# Patient Record
Sex: FEMALE | Race: WHITE | Employment: OTHER | URBAN - METROPOLITAN AREA
[De-identification: names, ages, dates, MRNs, and addresses within clinical notes are randomized per-mention and may not be internally consistent; named-entity substitution may affect disease eponyms.]

---

## 2018-02-23 PROBLEM — L02.91 ABSCESS: Status: ACTIVE | Noted: 2018-02-23

## 2018-11-07 ENCOUNTER — HOSPITAL ENCOUNTER (OUTPATIENT)
Dept: WOUND CARE | Age: 53
Discharge: HOME | End: 2018-11-07
Payer: MEDICAID

## 2018-12-05 ENCOUNTER — HOSPITAL ENCOUNTER (OUTPATIENT)
Dept: WOUND CARE | Age: 53
Discharge: HOME | End: 2018-12-05
Payer: MEDICAID

## 2018-12-12 ENCOUNTER — HOSPITAL ENCOUNTER (OUTPATIENT)
Dept: WOUND CARE | Age: 53
Discharge: HOME | End: 2018-12-12
Payer: MEDICAID

## 2019-01-25 ENCOUNTER — TELEPHONE (OUTPATIENT)
Dept: ADMINISTRATIVE | Age: 54
End: 2019-01-25

## 2019-03-08 ENCOUNTER — APPOINTMENT (OUTPATIENT)
Dept: GENERAL RADIOLOGY | Age: 54
End: 2019-03-08
Payer: OTHER GOVERNMENT

## 2019-03-08 ENCOUNTER — HOSPITAL ENCOUNTER (EMERGENCY)
Age: 54
Discharge: HOME | End: 2019-03-08
Payer: OTHER GOVERNMENT

## 2019-03-08 VITALS
WEIGHT: 175 LBS | BODY MASS INDEX: 25.92 KG/M2 | DIASTOLIC BLOOD PRESSURE: 84 MMHG | TEMPERATURE: 98.2 F | HEIGHT: 69 IN | RESPIRATION RATE: 16 BRPM | OXYGEN SATURATION: 95 % | HEART RATE: 86 BPM | SYSTOLIC BLOOD PRESSURE: 142 MMHG

## 2019-03-08 DIAGNOSIS — L03.113 CELLULITIS OF RIGHT ELBOW: Primary | ICD-10-CM

## 2019-03-08 PROCEDURE — 99283 EMERGENCY DEPT VISIT LOW MDM: CPT

## 2019-03-08 PROCEDURE — 73080 X-RAY EXAM OF ELBOW: CPT

## 2019-03-08 PROCEDURE — 6370000000 HC RX 637 (ALT 250 FOR IP): Performed by: NURSE PRACTITIONER

## 2019-03-08 RX ORDER — DOXYCYCLINE HYCLATE 100 MG/1
100 CAPSULE ORAL ONCE
Status: COMPLETED | OUTPATIENT
Start: 2019-03-08 | End: 2019-03-08

## 2019-03-08 RX ORDER — DOXYCYCLINE HYCLATE 100 MG
100 TABLET ORAL 2 TIMES DAILY WITH MEALS
Qty: 20 TABLET | Refills: 0 | Status: SHIPPED | OUTPATIENT
Start: 2019-03-08 | End: 2019-03-18

## 2019-03-08 RX ADMIN — DOXYCYCLINE HYCLATE 100 MG: 100 CAPSULE ORAL at 22:15

## 2019-04-05 ENCOUNTER — OFFICE VISIT (OUTPATIENT)
Dept: FAMILY MEDICINE CLINIC | Age: 54
End: 2019-04-05
Payer: MEDICAID

## 2019-04-05 ENCOUNTER — HOSPITAL ENCOUNTER (OUTPATIENT)
Age: 54
Discharge: HOME | End: 2019-04-07
Payer: OTHER GOVERNMENT

## 2019-04-05 VITALS
DIASTOLIC BLOOD PRESSURE: 73 MMHG | RESPIRATION RATE: 16 BRPM | SYSTOLIC BLOOD PRESSURE: 120 MMHG | HEART RATE: 81 BPM | TEMPERATURE: 96.8 F | BODY MASS INDEX: 26.51 KG/M2 | HEIGHT: 69 IN | WEIGHT: 179 LBS | OXYGEN SATURATION: 98 %

## 2019-04-05 DIAGNOSIS — E55.9 VITAMIN D DEFICIENCY: ICD-10-CM

## 2019-04-05 DIAGNOSIS — J44.9 CHRONIC OBSTRUCTIVE PULMONARY DISEASE, UNSPECIFIED COPD TYPE (HCC): ICD-10-CM

## 2019-04-05 DIAGNOSIS — S91.301A OPEN WOUND OF RIGHT HEEL, INITIAL ENCOUNTER: ICD-10-CM

## 2019-04-05 DIAGNOSIS — E78.5 HYPERLIPIDEMIA, UNSPECIFIED HYPERLIPIDEMIA TYPE: ICD-10-CM

## 2019-04-05 DIAGNOSIS — R25.1 TREMORS OF NERVOUS SYSTEM: Primary | ICD-10-CM

## 2019-04-05 LAB
AMPHETAMINE SCREEN, URINE: NOT DETECTED
BARBITURATE SCREEN URINE: NOT DETECTED
BENZODIAZEPINE SCREEN, URINE: NOT DETECTED
CANNABINOID SCREEN URINE: NOT DETECTED
COCAINE METABOLITE SCREEN URINE: NOT DETECTED
METHADONE SCREEN, URINE: POSITIVE
OPIATE SCREEN URINE: NOT DETECTED
PHENCYCLIDINE SCREEN URINE: NOT DETECTED
PROPOXYPHENE SCREEN: NOT DETECTED

## 2019-04-05 PROCEDURE — 87070 CULTURE OTHR SPECIMN AEROBIC: CPT

## 2019-04-05 PROCEDURE — G8427 DOCREV CUR MEDS BY ELIG CLIN: HCPCS | Performed by: FAMILY MEDICINE

## 2019-04-05 PROCEDURE — 87147 CULTURE TYPE IMMUNOLOGIC: CPT

## 2019-04-05 PROCEDURE — 80307 DRUG TEST PRSMV CHEM ANLYZR: CPT

## 2019-04-05 PROCEDURE — G8419 CALC BMI OUT NRM PARAM NOF/U: HCPCS | Performed by: FAMILY MEDICINE

## 2019-04-05 PROCEDURE — G8926 SPIRO NO PERF OR DOC: HCPCS | Performed by: FAMILY MEDICINE

## 2019-04-05 PROCEDURE — 3023F SPIROM DOC REV: CPT | Performed by: FAMILY MEDICINE

## 2019-04-05 PROCEDURE — 3017F COLORECTAL CA SCREEN DOC REV: CPT | Performed by: FAMILY MEDICINE

## 2019-04-05 PROCEDURE — G0480 DRUG TEST DEF 1-7 CLASSES: HCPCS

## 2019-04-05 PROCEDURE — 99204 OFFICE O/P NEW MOD 45 MIN: CPT | Performed by: FAMILY MEDICINE

## 2019-04-05 PROCEDURE — 4004F PT TOBACCO SCREEN RCVD TLK: CPT | Performed by: FAMILY MEDICINE

## 2019-04-05 RX ORDER — SULFAMETHOXAZOLE AND TRIMETHOPRIM 800; 160 MG/1; MG/1
1 TABLET ORAL 2 TIMES DAILY
Qty: 20 TABLET | Refills: 0 | Status: SHIPPED | OUTPATIENT
Start: 2019-04-05 | End: 2019-04-15

## 2019-04-05 RX ORDER — GABAPENTIN 600 MG/1
600 TABLET ORAL 2 TIMES DAILY
COMMUNITY
End: 2019-05-02

## 2019-04-05 RX ORDER — FLUTICASONE FUROATE AND VILANTEROL 100; 25 UG/1; UG/1
POWDER RESPIRATORY (INHALATION) DAILY
COMMUNITY
End: 2019-04-05

## 2019-04-05 RX ORDER — GABAPENTIN 600 MG/1
600 TABLET ORAL 2 TIMES DAILY
Qty: 60 TABLET | Refills: 0 | Status: CANCELLED | OUTPATIENT
Start: 2019-04-05 | End: 2019-05-05

## 2019-04-05 RX ORDER — BUDESONIDE AND FORMOTEROL FUMARATE DIHYDRATE 160; 4.5 UG/1; UG/1
2 AEROSOL RESPIRATORY (INHALATION) 2 TIMES DAILY
Qty: 3 INHALER | Refills: 5 | Status: ON HOLD | OUTPATIENT
Start: 2019-04-05 | End: 2019-07-08

## 2019-04-05 ASSESSMENT — PATIENT HEALTH QUESTIONNAIRE - PHQ9
2. FEELING DOWN, DEPRESSED OR HOPELESS: 1
SUM OF ALL RESPONSES TO PHQ9 QUESTIONS 1 & 2: 2
SUM OF ALL RESPONSES TO PHQ QUESTIONS 1-9: 2
SUM OF ALL RESPONSES TO PHQ QUESTIONS 1-9: 2
1. LITTLE INTEREST OR PLEASURE IN DOING THINGS: 1

## 2019-04-05 NOTE — PROGRESS NOTES
Every Day Smoker     Packs/day: 1.00     Types: Cigarettes    Smokeless tobacco: Never Used   Substance and Sexual Activity    Alcohol use: No    Drug use: No     Comment: clean since 2015    Sexual activity: None   Lifestyle    Physical activity:     Days per week: None     Minutes per session: None    Stress: None   Relationships    Social connections:     Talks on phone: None     Gets together: None     Attends Scientology service: None     Active member of club or organization: None     Attends meetings of clubs or organizations: None     Relationship status: None    Intimate partner violence:     Fear of current or ex partner: None     Emotionally abused: None     Physically abused: None     Forced sexual activity: None   Other Topics Concern    None   Social History Narrative    None       History reviewed. No pertinent family history. Current Outpatient Medications   Medication Sig Dispense Refill    gabapentin (NEURONTIN) 600 MG tablet Take 600 mg by mouth 2 times daily.  budesonide-formoterol (SYMBICORT) 160-4.5 MCG/ACT AERO Inhale 2 puffs into the lungs 2 times daily Rinse mouth after use. 3 Inhaler 5    sulfamethoxazole-trimethoprim (BACTRIM DS) 800-160 MG per tablet Take 1 tablet by mouth 2 times daily for 10 days 20 tablet 0    METHADONE HCL PO Take 75 mg by mouth daily       albuterol (PROVENTIL) (2.5 MG/3ML) 0.083% nebulizer solution Take 3 mLs by nebulization every 6 hours as needed for Wheezing or Shortness of Breath 120 each 3     No current facility-administered medications for this visit. Allergies   Allergen Reactions    Ancef [Cefazolin] Anaphylaxis    Duricef [Cefadroxil] Anaphylaxis    Iodine      Iv contrast       REVIEW OF SYSTEMS  Review of Systems   Constitutional: Positive for fatigue. Negative for fever. HENT: Negative for ear pain, sinus pressure, sneezing and sore throat. Eyes: Negative for pain.    Respiratory: Negative for cough and shortness of breath. Cardiovascular: Negative for chest pain and leg swelling. Gastrointestinal: Negative for abdominal pain, constipation and diarrhea. Genitourinary: Negative for dysuria and urgency. Musculoskeletal: Negative for back pain and myalgias. Skin: Negative for rash. Allergic/Immunologic: Negative for food allergies. Neurological: Positive for tremors, weakness and light-headedness. Negative for headaches. Hematological: Does not bruise/bleed easily. Psychiatric/Behavioral: Negative for behavioral problems and sleep disturbance. PHYSICAL EXAM  /73 (Site: Left Upper Arm)   Pulse 81   Temp 96.8 °F (36 °C) (Temporal)   Resp 16   Ht 5' 9\" (1.753 m)   Wt 179 lb (81.2 kg)   LMP 08/28/2013   SpO2 98%   BMI 26.43 kg/m²   Physical Exam   Constitutional: She is oriented to person, place, and time. She appears well-developed and well-nourished. HENT:   Head: Normocephalic and atraumatic. Right Ear: External ear normal.   Left Ear: External ear normal.   Nose: Nose normal.   Mouth/Throat: Oropharynx is clear and moist.   Eyes: Conjunctivae and EOM are normal.   Neck: Normal range of motion. Neck supple. Cardiovascular: Normal rate, regular rhythm and normal heart sounds. Exam reveals no gallop and no friction rub. No murmur heard. Pulmonary/Chest: Effort normal and breath sounds normal. She has no wheezes. Abdominal: Soft. There is no tenderness. Musculoskeletal: Normal range of motion. She exhibits no edema or tenderness. Neurological: She is alert and oriented to person, place, and time. She has normal reflexes. Resting tremor right side only, no asterixis, rapid alternating movements abnormal bilaterally. Skin: Skin is warm and dry. No rash noted. Large wound on the right heel that is deep into the soft tissues.  No erythema noted or drainage   Psychiatric: Her behavior is normal.   Goes on tangents, does not answer questions appropriately and timely   Nursing note and/or guardian verbalizes understanding, agrees, feels comfortable with and wishes to proceed withabove treatment plan.     Advised patient to call with any new medication issues, and read all Rx infofrom pharmacy to assure aware of all possible risks and side effects of medication before taking.     Reviewed age and gender appropriate health screening exams and vaccinations. Advised patient regarding importance of keeping up with recommended health maintenance and to schedule as soon as possible if overdue, as this isimportant in assessing for undiagnosed pathology, especially cancer, as well as protecting against potentially harmful/life threatening disease.          Patient and/or guardian verbalizes understanding andagrees with above counseling, assessment and plan.     All questions answered. Terry Baca DO  4/9/2019        NOTE: This report was transcribed usingvoice recognition software.  Every effort was made to ensure accuracy; however, inadvertent computerized transcription errors may be present

## 2019-04-09 LAB — WOUND/ABSCESS: NORMAL

## 2019-04-09 ASSESSMENT — ENCOUNTER SYMPTOMS
ABDOMINAL PAIN: 0
SHORTNESS OF BREATH: 0
DIARRHEA: 0
CONSTIPATION: 0
COUGH: 0
BACK PAIN: 0
SORE THROAT: 0
SINUS PRESSURE: 0
EYE PAIN: 0

## 2019-04-10 ENCOUNTER — TELEPHONE (OUTPATIENT)
Dept: FAMILY MEDICINE CLINIC | Age: 54
End: 2019-04-10

## 2019-04-10 NOTE — TELEPHONE ENCOUNTER
I tried to phone pt to get her in for a breathing treatment today, no answer, LM asking to phone us back.

## 2019-04-11 LAB
EDDP, URINE: >5000 NG/ML
METHADONE, URINE: 3263 NG/ML

## 2019-04-12 LAB
6AM URINE: <10 NG/ML
CODEINE, URINE: <20 NG/ML
HYDROCODONE, URINE: <20 NG/ML
HYDROMORPHONE, URINE: <20 NG/ML
MORPHINE URINE: <20 NG/ML
NORHYDROCODONE, URINE: <20 NG/ML
NOROXYCODONE, URINE: <20 NG/ML
NOROXYMORPHONE, URINE: <20 NG/ML
OXYCODONE, URINE CONFIRMATION: <20 NG/ML
OXYMORPHONE, URINE: <20 NG/ML

## 2019-04-17 ENCOUNTER — HOSPITAL ENCOUNTER (OUTPATIENT)
Dept: WOUND CARE | Age: 54
Discharge: HOME | End: 2019-04-17
Payer: MEDICAID

## 2019-04-24 ENCOUNTER — HOSPITAL ENCOUNTER (OUTPATIENT)
Dept: WOUND CARE | Age: 54
Discharge: HOME | End: 2019-04-24
Payer: MEDICAID

## 2019-04-30 ENCOUNTER — TELEPHONE (OUTPATIENT)
Dept: FAMILY MEDICINE CLINIC | Age: 54
End: 2019-04-30

## 2019-04-30 NOTE — TELEPHONE ENCOUNTER
Pt calling and states she is still waiting for her symbicort and gabapentin. She states the pharmacy needs a prior Annemarie Mohr.   Call back # [phone]

## 2019-04-30 NOTE — LETTER
[place] [address]  [facility] [address]  Phone: [phone]  Fax: [address] [facility] [address], DO        May 21, 2019     Patient: Jahaira Graves   YOB: 1965   Date of Visit: 4/30/2019       To Whom It May Concern: It is my medical opinion that Jahaira Graves needs improvement in her wheelchair ramp. If you have any questions or concerns, please don't hesitate to call.     Sincerely,        Vipin Rodriguez DO

## 2019-05-02 RX ORDER — GABAPENTIN 300 MG/1
300 CAPSULE ORAL 2 TIMES DAILY
Qty: 60 CAPSULE | Refills: 0 | Status: SHIPPED | OUTPATIENT
Start: 2019-05-02 | End: 2019-06-25 | Stop reason: SDUPTHER

## 2019-05-08 ENCOUNTER — HOSPITAL ENCOUNTER (OUTPATIENT)
Dept: WOUND CARE | Age: 54
Discharge: HOME | End: 2019-05-08
Payer: OTHER GOVERNMENT

## 2019-05-08 ENCOUNTER — TELEPHONE (OUTPATIENT)
Dept: FAMILY MEDICINE CLINIC | Age: 54
End: 2019-05-08

## 2019-05-08 DIAGNOSIS — J44.9 CHRONIC OBSTRUCTIVE PULMONARY DISEASE, UNSPECIFIED COPD TYPE (HCC): ICD-10-CM

## 2019-05-08 DIAGNOSIS — S91.301A OPEN WOUND OF RIGHT HEEL, INITIAL ENCOUNTER: Primary | ICD-10-CM

## 2019-05-21 NOTE — TELEPHONE ENCOUNTER
Pt phoned back today, this wheelchair ramp has been on her house it just has become worn down, this pt is scheduled for hip and foot surgeries, she will be going to [address] tomorrow, please fax letter for improvement to wheelchair ramp for pt, send to [phone], [employer].     PS Pt also wants to know because Symbicort was denied could should try Jan Kee, uses [employer]

## 2019-06-03 ENCOUNTER — TELEPHONE (OUTPATIENT)
Dept: FAMILY MEDICINE CLINIC | Age: 54
End: 2019-06-03

## 2019-06-03 NOTE — TELEPHONE ENCOUNTER
Pt getting a pic line ordered but podiatry put in but wants to know if you will follow with this. To have infection cleared out.

## 2019-06-04 NOTE — TELEPHONE ENCOUNTER
She did not get any of the orders completed - is she still going to follow with us?  If so then she needs to complete workup that was ordered as well

## 2019-06-24 ENCOUNTER — TELEPHONE (OUTPATIENT)
Dept: FAMILY MEDICINE CLINIC | Age: 54
End: 2019-06-24

## 2019-06-25 RX ORDER — GABAPENTIN 300 MG/1
300 CAPSULE ORAL 2 TIMES DAILY
Qty: 60 CAPSULE | Refills: 2 | Status: SHIPPED | OUTPATIENT
Start: 2019-06-25 | End: 2019-08-21

## 2019-06-26 NOTE — TELEPHONE ENCOUNTER
Pt phoned to check status she needs Gabapentin and she needs antibiotic for R heal also she would like a call to what kind of bacteria was found on her foot swab please

## 2019-06-26 NOTE — TELEPHONE ENCOUNTER
Pt was phoned again after I spoke with Dr Jordi Kearney,    Pt told antibiotic would not be phoned in because Dr Jordi Kearney has not seen pt recently and at last appt pt refused to schedule a one month follow up,    Pt told she needs to do Wound care that has been requested several times but pt does not go, pt also told she needs to follow podiatrist as well, Dr Jordi Kearney was told she was following podiatry back in early June,     Pt states she will go to ER to get checked    Pt gave understanding to all information given    BHAVANI

## 2019-06-27 NOTE — TELEPHONE ENCOUNTER
This will be the last month of gabapentin and will be dsicharged unless patient becomes compliant with medical therapy.

## 2019-07-04 ENCOUNTER — APPOINTMENT (OUTPATIENT)
Dept: GENERAL RADIOLOGY | Age: 54
DRG: 593 | End: 2019-07-04
Payer: OTHER GOVERNMENT

## 2019-07-04 ENCOUNTER — HOSPITAL ENCOUNTER (INPATIENT)
Age: 54
LOS: 5 days | Discharge: HOME HEALTH | DRG: 593 | End: 2019-07-09
Attending: EMERGENCY MEDICINE | Admitting: FAMILY MEDICINE
Payer: OTHER GOVERNMENT

## 2019-07-04 DIAGNOSIS — S91.301A OPEN WOUND OF RIGHT HEEL, INITIAL ENCOUNTER: Primary | ICD-10-CM

## 2019-07-04 DIAGNOSIS — J44.9 CHRONIC OBSTRUCTIVE PULMONARY DISEASE, UNSPECIFIED COPD TYPE (HCC): ICD-10-CM

## 2019-07-04 PROBLEM — L97.409 NONHEALING ULCER OF HEEL (HCC): Status: ACTIVE | Noted: 2019-07-04

## 2019-07-04 LAB
ALBUMIN SERPL-MCNC: 3.8 G/DL (ref 3.5–5.2)
ALP BLD-CCNC: 76 U/L (ref 35–104)
ALT SERPL-CCNC: <5 U/L (ref 0–32)
ANION GAP SERPL CALCULATED.3IONS-SCNC: 14 MMOL/L (ref 7–16)
AST SERPL-CCNC: 12 U/L (ref 0–31)
BASOPHILS ABSOLUTE: 0.05 E9/L (ref 0–0.2)
BASOPHILS RELATIVE PERCENT: 0.8 % (ref 0–2)
BILIRUB SERPL-MCNC: 0.2 MG/DL (ref 0–1.2)
BUN BLDV-MCNC: 10 MG/DL (ref 6–20)
C-REACTIVE PROTEIN: 3.4 MG/DL (ref 0–0.4)
CALCIUM SERPL-MCNC: 9.3 MG/DL (ref 8.6–10.2)
CHLORIDE BLD-SCNC: 99 MMOL/L (ref 98–107)
CO2: 29 MMOL/L (ref 22–29)
CREAT SERPL-MCNC: 0.9 MG/DL (ref 0.5–1)
EOSINOPHILS ABSOLUTE: 0.05 E9/L (ref 0.05–0.5)
EOSINOPHILS RELATIVE PERCENT: 0.8 % (ref 0–6)
GFR AFRICAN AMERICAN: >60
GFR NON-AFRICAN AMERICAN: >60 ML/MIN/1.73
GLUCOSE BLD-MCNC: 88 MG/DL (ref 74–99)
HCT VFR BLD CALC: 42.7 % (ref 34–48)
HEMOGLOBIN: 13.1 G/DL (ref 11.5–15.5)
IMMATURE GRANULOCYTES #: 0.03 E9/L
IMMATURE GRANULOCYTES %: 0.5 % (ref 0–5)
LACTIC ACID: 1.8 MMOL/L (ref 0.5–2.2)
LYMPHOCYTES ABSOLUTE: 1.52 E9/L (ref 1.5–4)
LYMPHOCYTES RELATIVE PERCENT: 24.8 % (ref 20–42)
MCH RBC QN AUTO: 29.4 PG (ref 26–35)
MCHC RBC AUTO-ENTMCNC: 30.7 % (ref 32–34.5)
MCV RBC AUTO: 96 FL (ref 80–99.9)
MONOCYTES ABSOLUTE: 0.46 E9/L (ref 0.1–0.95)
MONOCYTES RELATIVE PERCENT: 7.5 % (ref 2–12)
NEUTROPHILS ABSOLUTE: 4.03 E9/L (ref 1.8–7.3)
NEUTROPHILS RELATIVE PERCENT: 65.6 % (ref 43–80)
PDW BLD-RTO: 14.1 FL (ref 11.5–15)
PLATELET # BLD: 305 E9/L (ref 130–450)
PMV BLD AUTO: 10.2 FL (ref 7–12)
POTASSIUM SERPL-SCNC: 4.7 MMOL/L (ref 3.5–5)
RBC # BLD: 4.45 E12/L (ref 3.5–5.5)
SEDIMENTATION RATE, ERYTHROCYTE: 41 MM/HR (ref 0–20)
SODIUM BLD-SCNC: 142 MMOL/L (ref 132–146)
TOTAL PROTEIN: 8.1 G/DL (ref 6.4–8.3)
WBC # BLD: 6.1 E9/L (ref 4.5–11.5)

## 2019-07-04 PROCEDURE — 96368 THER/DIAG CONCURRENT INF: CPT

## 2019-07-04 PROCEDURE — 85025 COMPLETE CBC W/AUTO DIFF WBC: CPT

## 2019-07-04 PROCEDURE — 2060000000 HC ICU INTERMEDIATE R&B

## 2019-07-04 PROCEDURE — 36415 COLL VENOUS BLD VENIPUNCTURE: CPT

## 2019-07-04 PROCEDURE — 87075 CULTR BACTERIA EXCEPT BLOOD: CPT

## 2019-07-04 PROCEDURE — 6360000002 HC RX W HCPCS: Performed by: EMERGENCY MEDICINE

## 2019-07-04 PROCEDURE — 80053 COMPREHEN METABOLIC PANEL: CPT

## 2019-07-04 PROCEDURE — 87147 CULTURE TYPE IMMUNOLOGIC: CPT

## 2019-07-04 PROCEDURE — 73650 X-RAY EXAM OF HEEL: CPT

## 2019-07-04 PROCEDURE — 99285 EMERGENCY DEPT VISIT HI MDM: CPT

## 2019-07-04 PROCEDURE — 86140 C-REACTIVE PROTEIN: CPT

## 2019-07-04 PROCEDURE — 96365 THER/PROPH/DIAG IV INF INIT: CPT

## 2019-07-04 PROCEDURE — 2580000003 HC RX 258: Performed by: EMERGENCY MEDICINE

## 2019-07-04 PROCEDURE — 85651 RBC SED RATE NONAUTOMATED: CPT

## 2019-07-04 PROCEDURE — 87040 BLOOD CULTURE FOR BACTERIA: CPT

## 2019-07-04 PROCEDURE — 83605 ASSAY OF LACTIC ACID: CPT

## 2019-07-04 PROCEDURE — 2500000003 HC RX 250 WO HCPCS: Performed by: EMERGENCY MEDICINE

## 2019-07-04 PROCEDURE — 87070 CULTURE OTHR SPECIMN AEROBIC: CPT

## 2019-07-04 RX ORDER — CLINDAMYCIN PHOSPHATE 600 MG/50ML
600 INJECTION INTRAVENOUS ONCE
Status: COMPLETED | OUTPATIENT
Start: 2019-07-04 | End: 2019-07-04

## 2019-07-04 RX ADMIN — CLINDAMYCIN PHOSPHATE 600 MG: 600 INJECTION, SOLUTION INTRAVENOUS at 23:23

## 2019-07-04 RX ADMIN — VANCOMYCIN HYDROCHLORIDE 1750 MG: 10 INJECTION, POWDER, LYOPHILIZED, FOR SOLUTION INTRAVENOUS at 23:58

## 2019-07-04 ASSESSMENT — ENCOUNTER SYMPTOMS
SINUS PRESSURE: 0
COLOR CHANGE: 0
COUGH: 0
SHORTNESS OF BREATH: 0
VOMITING: 0
WHEEZING: 0
ABDOMINAL PAIN: 0
NAUSEA: 0
SORE THROAT: 0
RHINORRHEA: 0
DIARRHEA: 0

## 2019-07-04 ASSESSMENT — PAIN DESCRIPTION - ORIENTATION: ORIENTATION: RIGHT

## 2019-07-04 ASSESSMENT — PAIN DESCRIPTION - LOCATION: LOCATION: FOOT

## 2019-07-04 ASSESSMENT — PAIN SCALES - GENERAL: PAINLEVEL_OUTOF10: 8

## 2019-07-05 ENCOUNTER — APPOINTMENT (OUTPATIENT)
Dept: GENERAL RADIOLOGY | Age: 54
DRG: 593 | End: 2019-07-05
Payer: OTHER GOVERNMENT

## 2019-07-05 ENCOUNTER — APPOINTMENT (OUTPATIENT)
Dept: MRI IMAGING | Age: 54
DRG: 593 | End: 2019-07-05
Payer: OTHER GOVERNMENT

## 2019-07-05 PROBLEM — R26.2 AMBULATORY DYSFUNCTION: Status: ACTIVE | Noted: 2019-07-05

## 2019-07-05 PROBLEM — I95.9 HYPOTENSION: Status: ACTIVE | Noted: 2019-07-05

## 2019-07-05 PROBLEM — F32.1 CURRENT MODERATE EPISODE OF MAJOR DEPRESSIVE DISORDER (HCC): Status: ACTIVE | Noted: 2019-07-05

## 2019-07-05 PROBLEM — R00.1 BRADYCARDIA: Status: ACTIVE | Noted: 2019-07-05

## 2019-07-05 PROBLEM — F19.10 SUBSTANCE ABUSE (HCC): Status: ACTIVE | Noted: 2019-07-05

## 2019-07-05 PROBLEM — Z87.81 HISTORY OF FRACTURE OF LEFT HIP: Status: ACTIVE | Noted: 2019-07-05

## 2019-07-05 PROBLEM — R63.4 ABNORMAL WEIGHT LOSS: Status: ACTIVE | Noted: 2019-07-05

## 2019-07-05 PROBLEM — J44.9 COPD (CHRONIC OBSTRUCTIVE PULMONARY DISEASE) (HCC): Status: ACTIVE | Noted: 2019-07-05

## 2019-07-05 LAB
AMPHETAMINE SCREEN, URINE: NOT DETECTED
ANION GAP SERPL CALCULATED.3IONS-SCNC: 9 MMOL/L (ref 7–16)
BARBITURATE SCREEN URINE: NOT DETECTED
BASOPHILS ABSOLUTE: 0.04 E9/L (ref 0–0.2)
BASOPHILS RELATIVE PERCENT: 0.8 % (ref 0–2)
BENZODIAZEPINE SCREEN, URINE: POSITIVE
BUN BLDV-MCNC: 11 MG/DL (ref 6–20)
CALCIUM SERPL-MCNC: 8.6 MG/DL (ref 8.6–10.2)
CANNABINOID SCREEN URINE: NOT DETECTED
CHLORIDE BLD-SCNC: 101 MMOL/L (ref 98–107)
CO2: 32 MMOL/L (ref 22–29)
COCAINE METABOLITE SCREEN URINE: POSITIVE
CREAT SERPL-MCNC: 0.9 MG/DL (ref 0.5–1)
EKG ATRIAL RATE: 64 BPM
EKG P AXIS: 71 DEGREES
EKG P-R INTERVAL: 150 MS
EKG Q-T INTERVAL: 430 MS
EKG QRS DURATION: 92 MS
EKG QTC CALCULATION (BAZETT): 443 MS
EKG R AXIS: 28 DEGREES
EKG T AXIS: 13 DEGREES
EKG VENTRICULAR RATE: 64 BPM
EOSINOPHILS ABSOLUTE: 0.09 E9/L (ref 0.05–0.5)
EOSINOPHILS RELATIVE PERCENT: 1.9 % (ref 0–6)
GFR AFRICAN AMERICAN: >60
GFR NON-AFRICAN AMERICAN: >60 ML/MIN/1.73
GLUCOSE BLD-MCNC: 84 MG/DL (ref 74–99)
HCT VFR BLD CALC: 39.4 % (ref 34–48)
HEMOGLOBIN: 12 G/DL (ref 11.5–15.5)
IMMATURE GRANULOCYTES #: 0.03 E9/L
IMMATURE GRANULOCYTES %: 0.6 % (ref 0–5)
INR BLD: 1
LYMPHOCYTES ABSOLUTE: 1.74 E9/L (ref 1.5–4)
LYMPHOCYTES RELATIVE PERCENT: 36.8 % (ref 20–42)
MCH RBC QN AUTO: 29.7 PG (ref 26–35)
MCHC RBC AUTO-ENTMCNC: 30.5 % (ref 32–34.5)
MCV RBC AUTO: 97.5 FL (ref 80–99.9)
METHADONE SCREEN, URINE: POSITIVE
MONOCYTES ABSOLUTE: 0.49 E9/L (ref 0.1–0.95)
MONOCYTES RELATIVE PERCENT: 10.4 % (ref 2–12)
NEUTROPHILS ABSOLUTE: 2.34 E9/L (ref 1.8–7.3)
NEUTROPHILS RELATIVE PERCENT: 49.5 % (ref 43–80)
OPIATE SCREEN URINE: POSITIVE
PDW BLD-RTO: 14.4 FL (ref 11.5–15)
PHENCYCLIDINE SCREEN URINE: NOT DETECTED
PLATELET # BLD: 272 E9/L (ref 130–450)
PMV BLD AUTO: 9.8 FL (ref 7–12)
POTASSIUM REFLEX MAGNESIUM: 4.7 MMOL/L (ref 3.5–5)
PROCALCITONIN: 0.05 NG/ML (ref 0–0.08)
PROPOXYPHENE SCREEN: NOT DETECTED
PROTHROMBIN TIME: 11.8 SEC (ref 9.3–12.4)
RBC # BLD: 4.04 E12/L (ref 3.5–5.5)
SODIUM BLD-SCNC: 142 MMOL/L (ref 132–146)
T4 FREE: 0.89 NG/DL (ref 0.93–1.7)
TSH SERPL DL<=0.05 MIU/L-ACNC: 5.88 UIU/ML (ref 0.27–4.2)
WBC # BLD: 4.7 E9/L (ref 4.5–11.5)

## 2019-07-05 PROCEDURE — 84443 ASSAY THYROID STIM HORMONE: CPT

## 2019-07-05 PROCEDURE — 97161 PT EVAL LOW COMPLEX 20 MIN: CPT

## 2019-07-05 PROCEDURE — 71045 X-RAY EXAM CHEST 1 VIEW: CPT

## 2019-07-05 PROCEDURE — 85610 PROTHROMBIN TIME: CPT

## 2019-07-05 PROCEDURE — 97166 OT EVAL MOD COMPLEX 45 MIN: CPT

## 2019-07-05 PROCEDURE — 93010 ELECTROCARDIOGRAM REPORT: CPT | Performed by: INTERNAL MEDICINE

## 2019-07-05 PROCEDURE — 36415 COLL VENOUS BLD VENIPUNCTURE: CPT

## 2019-07-05 PROCEDURE — 2500000003 HC RX 250 WO HCPCS: Performed by: FAMILY MEDICINE

## 2019-07-05 PROCEDURE — 84439 ASSAY OF FREE THYROXINE: CPT

## 2019-07-05 PROCEDURE — 93005 ELECTROCARDIOGRAM TRACING: CPT | Performed by: FAMILY MEDICINE

## 2019-07-05 PROCEDURE — G0480 DRUG TEST DEF 1-7 CLASSES: HCPCS

## 2019-07-05 PROCEDURE — 97530 THERAPEUTIC ACTIVITIES: CPT

## 2019-07-05 PROCEDURE — 6360000002 HC RX W HCPCS: Performed by: INTERNAL MEDICINE

## 2019-07-05 PROCEDURE — 80048 BASIC METABOLIC PNL TOTAL CA: CPT

## 2019-07-05 PROCEDURE — 97535 SELF CARE MNGMENT TRAINING: CPT

## 2019-07-05 PROCEDURE — 84145 PROCALCITONIN (PCT): CPT

## 2019-07-05 PROCEDURE — 85025 COMPLETE CBC W/AUTO DIFF WBC: CPT

## 2019-07-05 PROCEDURE — A9576 INJ PROHANCE MULTIPACK: HCPCS | Performed by: RADIOLOGY

## 2019-07-05 PROCEDURE — 80307 DRUG TEST PRSMV CHEM ANLYZR: CPT

## 2019-07-05 PROCEDURE — 2580000003 HC RX 258: Performed by: FAMILY MEDICINE

## 2019-07-05 PROCEDURE — 6360000004 HC RX CONTRAST MEDICATION: Performed by: RADIOLOGY

## 2019-07-05 PROCEDURE — 73723 MRI JOINT LWR EXTR W/O&W/DYE: CPT

## 2019-07-05 PROCEDURE — 80074 ACUTE HEPATITIS PANEL: CPT

## 2019-07-05 PROCEDURE — 6370000000 HC RX 637 (ALT 250 FOR IP): Performed by: FAMILY MEDICINE

## 2019-07-05 PROCEDURE — 6360000002 HC RX W HCPCS: Performed by: SPECIALIST

## 2019-07-05 PROCEDURE — 86703 HIV-1/HIV-2 1 RESULT ANTBDY: CPT

## 2019-07-05 PROCEDURE — 97110 THERAPEUTIC EXERCISES: CPT

## 2019-07-05 PROCEDURE — 2580000003 HC RX 258: Performed by: INTERNAL MEDICINE

## 2019-07-05 PROCEDURE — 96366 THER/PROPH/DIAG IV INF ADDON: CPT

## 2019-07-05 PROCEDURE — 2060000000 HC ICU INTERMEDIATE R&B

## 2019-07-05 RX ORDER — METHADONE HYDROCHLORIDE 10 MG/ML
105 CONCENTRATE ORAL DAILY
Status: DISCONTINUED | OUTPATIENT
Start: 2019-07-05 | End: 2019-07-09 | Stop reason: HOSPADM

## 2019-07-05 RX ORDER — SODIUM CHLORIDE 0.9 % (FLUSH) 0.9 %
10 SYRINGE (ML) INJECTION PRN
Status: DISCONTINUED | OUTPATIENT
Start: 2019-07-05 | End: 2019-07-09 | Stop reason: HOSPADM

## 2019-07-05 RX ORDER — ACETAMINOPHEN 325 MG/1
650 TABLET ORAL EVERY 4 HOURS PRN
Status: DISCONTINUED | OUTPATIENT
Start: 2019-07-05 | End: 2019-07-09 | Stop reason: HOSPADM

## 2019-07-05 RX ORDER — ONDANSETRON 2 MG/ML
4 INJECTION INTRAMUSCULAR; INTRAVENOUS EVERY 6 HOURS PRN
Status: DISCONTINUED | OUTPATIENT
Start: 2019-07-05 | End: 2019-07-09 | Stop reason: HOSPADM

## 2019-07-05 RX ORDER — GABAPENTIN 300 MG/1
300 CAPSULE ORAL 2 TIMES DAILY
Status: DISCONTINUED | OUTPATIENT
Start: 2019-07-05 | End: 2019-07-09 | Stop reason: HOSPADM

## 2019-07-05 RX ORDER — DIPHENHYDRAMINE HYDROCHLORIDE 50 MG/ML
50 INJECTION INTRAMUSCULAR; INTRAVENOUS 2 TIMES DAILY PRN
Status: DISCONTINUED | OUTPATIENT
Start: 2019-07-05 | End: 2019-07-09 | Stop reason: HOSPADM

## 2019-07-05 RX ORDER — SODIUM CHLORIDE 0.9 % (FLUSH) 0.9 %
10 SYRINGE (ML) INJECTION EVERY 12 HOURS SCHEDULED
Status: DISCONTINUED | OUTPATIENT
Start: 2019-07-05 | End: 2019-07-09 | Stop reason: HOSPADM

## 2019-07-05 RX ORDER — IPRATROPIUM BROMIDE AND ALBUTEROL SULFATE 2.5; .5 MG/3ML; MG/3ML
1 SOLUTION RESPIRATORY (INHALATION) EVERY 4 HOURS PRN
Status: DISCONTINUED | OUTPATIENT
Start: 2019-07-05 | End: 2019-07-09 | Stop reason: HOSPADM

## 2019-07-05 RX ORDER — CLINDAMYCIN PHOSPHATE 600 MG/50ML
600 INJECTION INTRAVENOUS EVERY 8 HOURS
Status: DISCONTINUED | OUTPATIENT
Start: 2019-07-05 | End: 2019-07-05

## 2019-07-05 RX ORDER — SODIUM CHLORIDE 9 MG/ML
INJECTION, SOLUTION INTRAVENOUS CONTINUOUS
Status: DISCONTINUED | OUTPATIENT
Start: 2019-07-05 | End: 2019-07-07

## 2019-07-05 RX ORDER — HYDROXYZINE PAMOATE 25 MG/1
25 CAPSULE ORAL 3 TIMES DAILY PRN
Status: DISCONTINUED | OUTPATIENT
Start: 2019-07-05 | End: 2019-07-09 | Stop reason: HOSPADM

## 2019-07-05 RX ADMIN — VANCOMYCIN HYDROCHLORIDE 1250 MG: 10 INJECTION, POWDER, LYOPHILIZED, FOR SOLUTION INTRAVENOUS at 17:54

## 2019-07-05 RX ADMIN — SODIUM CHLORIDE: 9 INJECTION, SOLUTION INTRAVENOUS at 01:01

## 2019-07-05 RX ADMIN — Medication 10 ML: at 21:04

## 2019-07-05 RX ADMIN — Medication 10 ML: at 09:42

## 2019-07-05 RX ADMIN — DIPHENHYDRAMINE HYDROCHLORIDE 50 MG: 50 INJECTION, SOLUTION INTRAMUSCULAR; INTRAVENOUS at 17:23

## 2019-07-05 RX ADMIN — CLINDAMYCIN PHOSPHATE 600 MG: 600 INJECTION, SOLUTION INTRAVENOUS at 09:42

## 2019-07-05 RX ADMIN — MOMETASONE FUROATE AND FORMOTEROL FUMARATE DIHYDRATE 2 PUFF: 100; 5 AEROSOL RESPIRATORY (INHALATION) at 22:18

## 2019-07-05 RX ADMIN — METHADONE HYDROCHLORIDE 105 MG: 10 CONCENTRATE ORAL at 09:42

## 2019-07-05 RX ADMIN — SODIUM CHLORIDE: 9 INJECTION, SOLUTION INTRAVENOUS at 21:04

## 2019-07-05 RX ADMIN — MEROPENEM 1 G: 1 INJECTION, POWDER, FOR SOLUTION INTRAVENOUS at 16:48

## 2019-07-05 RX ADMIN — GADOTERIDOL 16 ML: 279.3 INJECTION, SOLUTION INTRAVENOUS at 14:25

## 2019-07-05 RX ADMIN — MOMETASONE FUROATE AND FORMOTEROL FUMARATE DIHYDRATE 2 PUFF: 100; 5 AEROSOL RESPIRATORY (INHALATION) at 09:42

## 2019-07-05 RX ADMIN — GABAPENTIN 300 MG: 300 CAPSULE ORAL at 09:42

## 2019-07-05 RX ADMIN — GABAPENTIN 300 MG: 300 CAPSULE ORAL at 21:03

## 2019-07-05 ASSESSMENT — PAIN SCALES - GENERAL
PAINLEVEL_OUTOF10: 0
PAINLEVEL_OUTOF10: 10
PAINLEVEL_OUTOF10: 0
PAINLEVEL_OUTOF10: 0

## 2019-07-05 ASSESSMENT — PAIN DESCRIPTION - PAIN TYPE: TYPE: CHRONIC PAIN

## 2019-07-05 NOTE — PROGRESS NOTES
Pharmacy Consultation Note  (Antibiotic Dosing and Monitoring)      Pharmacy is following for Vancomycin dosing. Allergies: Ancef [cefazolin]; Duricef [cefadroxil]; and Iodine    49 y.o. female    Ht Readings from Last 1 Encounters:   07/05/19 5' 9\" (1.753 m)     Wt Readings from Last 1 Encounters:   07/05/19 170 lb 6.4 oz (77.3 kg)       Recent Labs     07/04/19  2145   WBC 6.1       Recent Labs     07/04/19  2145   BUN 10   CREATININE 0.9       Estimated Creatinine Clearance: 76 mL/min (based on SCr of 0.9 mg/dL). No intake or output data in the 24 hours ending 07/05/19 0057    Cultures:  available culture and sensitivity results were reviewed in [facility]      Assessment:  Consulted by Dr. Xavier Cabrera to dose/monitor vancomycin  Estimated CrCl = 76 mL/min  Goal trough level = 15-20 mcg/mL    Plan:  Pt received a dose of vanco 1750mg at 23:58 on 7-4-19. Pt will not need another dose for at least 12hrs. Clinical pharmacy will take over dosing when they arrive later in the AM on 7-5-19 before the next dose is due.    Monitor renal function   Clinical pharmacy will follow up and complete full consult note      Yoel Crowe [facility] 7/5/2019 12:57 AM

## 2019-07-05 NOTE — H&P
 Hx of blood clots     dvt rt calf       Past Surgical History:          Procedure Laterality Date     SECTION      x three    DRAIN SKIN ABSCESS SIMPLE  2014         FOOT SURGERY         Medications Prior to Admission:      Prior to Admission medications    Medication Sig Start Date End Date Taking? Authorizing Provider   gabapentin (NEURONTIN) 300 MG capsule Take 1 capsule by mouth 2 times daily for 30 days. Intended supply: 30 days 19  [facility], DO   fluticasone-salmeterol (ADVAIR DISKUS) 250-50 MCG/DOSE AEPB Inhale 1 puff into the lungs every 12 hours 19   [facility] [place], DO   budesonide-formoterol (SYMBICORT) 160-4.5 MCG/ACT AERO Inhale 2 puffs into the lungs 2 times daily Rinse mouth after use. 19   [facility] [place], DO   albuterol (PROVENTIL) (2.5 MG/3ML) 0.083% nebulizer solution Take 3 mLs by nebulization every 6 hours as needed for Wheezing or Shortness of Breath 10/16/15   Vicenta Bah MD   METHADONE HCL PO Take 75 mg by mouth daily     Historical Provider, MD       Allergies: Ancef [cefazolin]; Duricef [cefadroxil]; and Iodine    Social History:      The patient currently lives at home. TOBACCO:   reports that she has quit smoking. Her smoking use included cigarettes. She smoked 1.00 pack per day. She has never used smokeless tobacco.  ETOH:   reports that she does not drink alcohol. Drugs: Heroine, last used 2 days ago. Also on methadone for substance abuse. Family History:     Reviewed in detail Positive as follows: Son has atrial fibrillation    History reviewed. No pertinent family history. REVIEW OF SYSTEMS:   Pertinent positives as noted in the HPI. All other systems reviewed and negative.     PHYSICAL EXAM:    BP (!) 114/52   Pulse 59   Temp 97.8 °F (36.6 °C) (Oral)   Resp 15   Ht 5' 9\" (1.753 m)   Wt 187 lb (84.8 kg)   LMP 2013   SpO2 93%   BMI 27.62 kg/m²     General appearance: Middle-aged female, no apparent

## 2019-07-05 NOTE — CONSULTS
Department of Podiatry  Resident Consult Note      Reason for Consult:  non healing foot ulcer    Requesting Physician:  Leora Landau, MD    CHIEF COMPLAINT:  Nonhealing ulcer of heel    HISTORY OF PRESENT ILLNESS:                The patient is a 49 y.o. female with significant past medical history of Hep C, COPD, smoker, drug abuse who presents with a chronic non-healing wound to the right foot. Patient states that she's had this wound for about 22 years, that it began as a callous. She relates she's been seeing podiatrists for this wound over the years, but due to certain reasons, she hasn't seen one in about six months. She states she's been dressing her own wound, and that there's malodor and drainage. She denies any increase in pain, malodor or drainage, but decided to come to the hospital because the pain in unbearable. She is an opioid abuser who admits to recently relapsing after abstaining for about 3.5 years. Patient denies any N/V/F/C SOB/CP. Patient states she is retired and lives at home alone. Patient states she only want to have antibiotics again. She doesn't wish to have surgery of the foot again.     Past Medical History:        Diagnosis Date    Abscess     states for a couple years she has had problems with     Chronic pain     Hip fracture ([address]     Hx of blood clots     dvt rt calf     Past Surgical History:        Procedure Laterality Date     SECTION      x three    DRAIN SKIN ABSCESS SIMPLE  2014         FOOT SURGERY       Current Medications:    Current Facility-Administered Medications: methadone (DOLOPHINE) 10 MG/ML solution 105 mg, 105 mg, Oral, Daily  gabapentin (NEURONTIN) capsule 300 mg, 300 mg, Oral, BID  mometasone-formoterol (DULERA) 100-5 MCG/ACT inhaler 2 puff, 2 puff, Inhalation, BID  ipratropium-albuterol (DUONEB) nebulizer solution 1 ampule, 1 ampule, Inhalation, Q4H PRN  sodium chloride flush 0.9 % injection 10 mL, 10 mL,
MG/5ML suspension 30 mL  30 mL Oral Daily PRN Serge Miller MD        ondansetron (ZOFRAN) injection 4 mg  4 mg Intravenous Q6H PRN Serge Miller MD        acetaminophen (TYLENOL) tablet 650 mg  650 mg Oral Q4H PRN Serge Miller MD        0.9 % sodium chloride infusion   Intravenous Continuous Serge Miller MD 75 mL/hr at 07/05/19 0101      clindamycin (CLEOCIN) 600 mg in dextrose 5 % 50 mL IVPB  600 mg Intravenous Q8H Serge Miller MD   Stopped at 07/05/19 1017    hydrOXYzine (VISTARIL) capsule 25 mg  25 mg Oral TID PRN Kieran Valadez DO           Allergies: Ancef [cefazolin]; Duricef [cefadroxil]; and Iodine    Social History:      Social History     Socioeconomic History    Marital status:       Spouse name: Not on file    Number of children: Not on file    Years of education: Not on file    Highest education level: Not on file   Occupational History    Not on file   Social Needs    Financial resource strain: Not on file    Food insecurity:     Worry: Not on file     Inability: Not on file    Transportation needs:     Medical: Not on file     Non-medical: Not on file   Tobacco Use    Smoking status: Former Smoker     Packs/day: 1.00     Types: Cigarettes    Smokeless tobacco: Never Used    Tobacco comment: approx 7 months ago 7-4-19   Substance and Sexual Activity    Alcohol use: No    Drug use: No     Comment: clean since 2015    Sexual activity: Not on file   Lifestyle    Physical activity:     Days per week: Not on file     Minutes per session: Not on file    Stress: Not on file   Relationships    Social connections:     Talks on phone: Not on file     Gets together: Not on file     Attends Faith service: Not on file     Active member of club or organization: Not on file     Attends meetings of clubs or organizations: Not on file     Relationship status: Not on file    Intimate partner violence:     Fear of current or ex partner: Not on file
hydroxide (MILK OF MAGNESIA) 400 MG/5ML suspension 30 mL, 30 mL, Oral, Daily PRN  ondansetron (ZOFRAN) injection 4 mg, 4 mg, Intravenous, Q6H PRN  acetaminophen (TYLENOL) tablet 650 mg, 650 mg, Oral, Q4H PRN  0.9 % sodium chloride infusion, , Intravenous, Continuous  clindamycin (CLEOCIN) 600 mg in dextrose 5 % 50 mL IVPB, 600 mg, Intravenous, Q8H  hydrOXYzine (VISTARIL) capsule 25 mg, 25 mg, Oral, TID PRN       VITALS: BP (!) 118/90   Pulse 95   Temp 98.1 °F (36.7 °C) (Temporal)   Resp 19   Ht 5' 9\" (1.753 m)   Wt 170 lb 6.4 oz (77.3 kg)   LMP 08/28/2013   SpO2 93%   BMI 25.16 kg/m²     ALLERGIES: Ancef [cefazolin];  Duricef [cefadroxil]; and Iodine      MENTAL STATUS EXAM:       Mental Status Examination:    Cognition:      [x] Alert  [x] Awake  [x] Oriented  [x] Person  [x] Place [x] Time      [] drowsy  [] tired  [] lethargic  [] distractable  []     Attention/Concentration:   [x] Attentive  [] Distracted        Memory Recent and Remote: [x] Intact   [] Impaired [] Partially Impaired     Language: [] Able to recognize and name objects          [] Unable to recognize and name Objects    Fund of Knowledge:  [] Poor []  Fair  [x] Good    Speech: [] Normal  [x] Soft  [] Slow  [] Fast [] Pressured            [] Loud [] Dysarthria  [] Incoherent       Appearance: [] Well Groomed  [x] Casual Dressed  [] Unkept  [] Disheveled          [] Normal weight[] Thin  [] Overweight  [] Obese           Attitude: [] Positive  [] Hostile  [] Demanding  [] Guarded  [] Defensive         [x] Cooperative  []  Uncooperative      Behavior:  [] Normal Gait  [] Walks with Assistance  [] [address]    [] Walks with Keron Barber  [x] In Hospital Bed  [] Sitting in Chair    Muscle-Skeletal:  [x] Normal Muscle Tone [] Muscle Atrophy       [] Abnormal Muscle Movement     Eye Contact:  [x] Good eye contact  [] Intermittent Eye Contact  [] Poor Eye Contact     Mood: [] Depressed  [] Anxious  [] Irritated  [x] Euthymic   [] Angry []

## 2019-07-05 NOTE — PROGRESS NOTES
provided. Reports L hip and R foot pain with activity (mild); has been chronic  Cognition: A&O: 4/4; Follows 2 step directions. F Attention to task- impulsive and anxious. Max cues for pacing to improve overall safety. Communication: G  Ability to express needs:   Memory: F+   Sequencing: F             Comprehension: F+   Problem solving: P+   Judgement/safety: P+  Impulsive, anxiety decreased judgement, safety and problem solving. Max cues for ww positioning and hand placement       Functional Assessment:   Initial Eval Status  Date: 7/5/19 Treatment Status  Date: Short Term Goals  Treatment frequency: 1-3x/week on ICU; PRN on stepdown unit  -pt will improve. .. Feeding NPO      Indep  Sitting upright in chair for majority of meals; pending cleared diet restriction   Grooming Min A  Standing at sink with intermittent hand support on ww/counter while combing hair, washing face and hands, and brushing teeth/dentures. Safety cues for pacing and ww positioning. Educated on [address] techniques including prepping and completing tasks at seated position to reduce risk of fall and pain. Mod I   while seated/standing with device prn; no LOB, good safety     UB Dressing Setup  seated     Mod I  while seated; including clothing retrieval;    G BUE functional use; G safety/pacing   LB Dressing Min A  For standing balance during pants/clothing mgmt; crossing L foot behind R leg to zion/doff sock (compensatory technique d/t L hip pain)    Mod I   with AE/device PRN; including clothing retrieval   Bathing UB-  Sup  LB-  Min A  simulation    UB-  Mod I  LB-  Sup with AE/DME prn   Toileting Min A  Seated for hygiene; min A for standing balance at ww level for pants mgmt up/down hips. Using standard toilet with grab bar.       Mod I  including clothing mgmt and toileting hygiene using DME/device prn   Bed Mobility  Supine to sit: SBA  Sit to supine: SBA  Rolling: SBA     Functional/  Bathroom  Transfers Sit to stand: Min training [x]  Environmental Modifications [x]  Cognitive re-training []   Compensatory techniques for ADLs [x]  Splinting Needs []   Positioning to improve overall function [x]   Therapeutic Activity [x]                       Therapeutic Exercise  [x]  Visual/Perceptual: []    Delirium prevention/treatment  []   Other:  []    Rehab Potential: Good for established goals    Patient / Family Goal: decrease pain     Patient and/or family were instructed/educated on diagnosis, prognosis/goals and plan of care. Demonstrated good understanding, further information not needed. [] Malnutrition indicators have been identified and nursing has been notified to ensure a dietitian consult is ordered. Evaluation time includes thorough review of current medical information, gathering information on past medical & social history & PLOF, completion of standardized testing, informal observation of tasks, consultation with other medical professions/disciplines, assessment of data & development of POC/goals.      Moderate evaluation + 24 treatment minutes  Time in: 10:55  Time out: [address], OTR/L 6873

## 2019-07-05 NOTE — PROGRESS NOTES
of this evaluation. Pt performed the following therapeutic activities/exercise:   Seated ankle pumps, LAQ, hip flexion, hip abduction/adduction all x20 reps B    Static and dynamic standing balance activities   Comments: Pt was supine upon PT arrival and agreeable to PT evaluation/treatment. Pt completed all mobility with SBA<>min A, mildly unsteady, cued for ww safety and appropriate use, postural correction, safety with transfers. Pt tolerated activity well. Recommend continued skilled PT services at discharge . At end of session pt supine with call light within reach and all needs met. Patient education  Pt educated re: safety recommendations, PT treatment expectations and POC, PT d/c recommendations. Patient response to education:   Pt verbalized understanding Pt demonstrated skill Pt requires further education in this area   Yes partial Yes      Rehab potential is good for reaching above PT goals. Pts/ family goals   To go home       PLAN  PT care will be provided in accordance with the objectives noted above. Whenever appropriate, clear delegation orders will be provided for nursing staff. Exercises and functional mobility practice will be used as well as appropriate assistive devices or modalities to obtain goals. Patient and family education will also be administered as needed. Frequency of treatments will be 2-5x/week x 1-3 days. Patient and or family understand(s) diagnosis, prognosis, and plan of care.       Time in: 1047  Time out: Marie Paris DPT   License number:  [id number]

## 2019-07-05 NOTE — ED NOTES
FIRST PROVIDER CONTACT ASSESSMENT NOTE      Department of Emergency Medicine   7/4/19  8:18 PM    Chief Complaint: Wound Check (right heel, draining, foul smelling, states she needs picc line for atb but has not had it placed yet)      History of Present Illness:    Kassidy Mullins is a 49 y.o. female who presents to the ED by private car for right heel wound. Patient reports it is draining foul smelling colored drainage. Reports that she is needing a PICC line for antibiotics but they have not scheduled it yet. Focused Screening Exam:  Constitutional:  Alert, appears stated age and is in no distress. *ALLERGIES*     Ancef [cefazolin];  Duricef [cefadroxil]; and Iodine     ED Triage Vitals   BP Temp Temp Source Pulse Resp SpO2 Height Weight   07/04/19 1948 07/04/19 1935 07/04/19 1935 07/04/19 1935 07/04/19 1935 07/04/19 1935 07/04/19 1948 07/04/19 1948   108/65 97.1 °F (36.2 °C) Temporal 80 16 93 % 5' 9\" (1.753 m) 187 lb (84.8 kg)        Initial Plan of Care:  Initiate Treatment-Testing, Proceed toTreatment Area When Bed Available for ED Attending/MLP to Continue Care    -----------------END OF FIRST PROVIDER CONTACT ASSESSMENT NOTE--------------  Electronically signed by SALIMA Arciniega CNP   DD: 7/4/19         SALIMA Arciniega CNP  07/04/19 2018

## 2019-07-05 NOTE — ED PROVIDER NOTES
exhibits no discharge. Left eye exhibits no discharge. No scleral icterus. Neck: Normal range of motion. Neck supple. No JVD present. No tracheal deviation present. No thyromegaly present. Cardiovascular: Normal rate, regular rhythm, normal heart sounds and intact distal pulses. Exam reveals no gallop and no friction rub. No murmur heard. Pulmonary/Chest: Effort normal and breath sounds normal. No accessory muscle usage. No respiratory distress. She has no wheezes. She has no rhonchi. She has no rales. She exhibits no tenderness and no crepitus. Abdominal: Soft. Bowel sounds are normal. She exhibits no distension. There is no tenderness. There is no rebound and no guarding. Musculoskeletal: Normal range of motion. She exhibits no edema, tenderness or deformity. Feet:    Neurological: She is alert and oriented to person, place, and time. No cranial nerve deficit. She exhibits normal muscle tone. Coordination normal.   Skin: Skin is warm and dry. Capillary refill takes less than 2 seconds. No rash noted. She is not diaphoretic. No erythema. No pallor. Nursing note and vitals reviewed. RIGHT FOOT    Procedures    MDM    ED Course as of Jul 05 1046 Thu Jul 04, 2019 2330 Discussed with JL Coulter. She will admit patient. Antibiotic selected based on patient's previous wound cultures were several years ago. New wound culture sent from this department. She is otherwise stable due to concerns for noncompliance worsening infection which may result in severe disability limb loss or even septicemia or death, we will admit the patient for further evaluation and treatment. [JL]      ED Course User Index  [JL] Saurav Carnes DO     ED Course as of Jul 05 1046 Thu Jul 04, 2019 2330 Discussed with JL Coulter. She will admit patient. Antibiotic selected based on patient's previous wound cultures were several years ago. New wound culture sent from this department.   She is otherwise stable due to concerns for noncompliance worsening infection which may result in severe disability limb loss or even septicemia or death, we will admit the patient for further evaluation and treatment. [JL]      ED Course User Index  [JL] Rachel Elizalde DO       --------------------------------------------- PAST HISTORY ---------------------------------------------  Past Medical History:  has a past medical history of Abscess, Chronic pain, Hip fracture ([address] and Hx of blood clots. Past Surgical History:  has a past surgical history that includes Foot surgery;  section; and drain skin abscess simple (2014). Social History:  reports that she has quit smoking. Her smoking use included cigarettes. She smoked 1.00 pack per day. She has never used smokeless tobacco. She reports that she does not drink alcohol or use drugs. Family History: family history is not on file. The patients home medications have been reviewed. Allergies: Ancef [cefazolin];  Duricef [cefadroxil]; and Iodine    -------------------------------------------------- RESULTS -------------------------------------------------    LABS:  Results for orders placed or performed during the hospital encounter of 19   Lactic Acid, Plasma   Result Value Ref Range    Lactic Acid 1.8 0.5 - 2.2 mmol/L   CBC Auto Differential   Result Value Ref Range    WBC 6.1 4.5 - 11.5 E9/L    RBC 4.45 3.50 - 5.50 E12/L    Hemoglobin 13.1 11.5 - 15.5 g/dL    Hematocrit 42.7 34.0 - 48.0 %    MCV 96.0 80.0 - 99.9 fL    MCH 29.4 26.0 - 35.0 pg    MCHC 30.7 (L) 32.0 - 34.5 %    RDW 14.1 11.5 - 15.0 fL    Platelets 963 893 - 662 E9/L    MPV 10.2 7.0 - 12.0 fL    Neutrophils % 65.6 43.0 - 80.0 %    Immature Granulocytes % 0.5 0.0 - 5.0 %    Lymphocytes % 24.8 20.0 - 42.0 %    Monocytes % 7.5 2.0 - 12.0 %    Eosinophils % 0.8 0.0 - 6.0 %    Basophils % 0.8 0.0 - 2.0 %    Neutrophils # 4.03 1.80 - 7.30 E9/L    Immature Granulocytes # 0.03 E9/L Lymphocytes # 1.52 1.50 - 4.00 E9/L    Monocytes # 0.46 0.10 - 0.95 E9/L    Eosinophils # 0.05 0.05 - 0.50 E9/L    Basophils # 0.05 0.00 - 0.20 E9/L   Comprehensive Metabolic Panel   Result Value Ref Range    Sodium 142 132 - 146 mmol/L    Potassium 4.7 3.5 - 5.0 mmol/L    Chloride 99 98 - 107 mmol/L    CO2 29 22 - 29 mmol/L    Anion Gap 14 7 - 16 mmol/L    Glucose 88 74 - 99 mg/dL    BUN 10 6 - 20 mg/dL    CREATININE 0.9 0.5 - 1.0 mg/dL    GFR Non-African American >60 >=60 mL/min/1.73    GFR African American >60     Calcium 9.3 8.6 - 10.2 mg/dL    Total Protein 8.1 6.4 - 8.3 g/dL    Alb 3.8 3.5 - 5.2 g/dL    Total Bilirubin 0.2 0.0 - 1.2 mg/dL    Alkaline Phosphatase 76 35 - 104 U/L    ALT <5 0 - 32 U/L    AST 12 0 - 31 U/L   Sedimentation Rate   Result Value Ref Range    Sed Rate 41 (H) 0 - 20 mm/Hr   C-Reactive Protein   Result Value Ref Range    CRP 3.4 (H) 0.0 - 0.4 mg/dL   CBC auto differential   Result Value Ref Range    WBC 4.7 4.5 - 11.5 E9/L    RBC 4.04 3.50 - 5.50 E12/L    Hemoglobin 12.0 11.5 - 15.5 g/dL    Hematocrit 39.4 34.0 - 48.0 %    MCV 97.5 80.0 - 99.9 fL    MCH 29.7 26.0 - 35.0 pg    MCHC 30.5 (L) 32.0 - 34.5 %    RDW 14.4 11.5 - 15.0 fL    Platelets 748 422 - 255 E9/L    MPV 9.8 7.0 - 12.0 fL    Neutrophils % 49.5 43.0 - 80.0 %    Immature Granulocytes % 0.6 0.0 - 5.0 %    Lymphocytes % 36.8 20.0 - 42.0 %    Monocytes % 10.4 2.0 - 12.0 %    Eosinophils % 1.9 0.0 - 6.0 %    Basophils % 0.8 0.0 - 2.0 %    Neutrophils # 2.34 1.80 - 7.30 E9/L    Immature Granulocytes # 0.03 E9/L    Lymphocytes # 1.74 1.50 - 4.00 E9/L    Monocytes # 0.49 0.10 - 0.95 E9/L    Eosinophils # 0.09 0.05 - 0.50 E9/L    Basophils # 0.04 0.00 - 0.20 F6/Y   Basic Metabolic Panel w/ Reflex to MG   Result Value Ref Range    Sodium 142 132 - 146 mmol/L    Potassium reflex Magnesium 4.7 3.5 - 5.0 mmol/L    Chloride 101 98 - 107 mmol/L    CO2 32 (H) 22 - 29 mmol/L    Anion Gap 9 7 - 16 mmol/L    Glucose 84 74 - 99 mg/dL    BUN 11 6 - 20 mg/dL    CREATININE 0.9 0.5 - 1.0 mg/dL    GFR Non-African American >60 >=60 mL/min/1.73    GFR African American >60     Calcium 8.6 8.6 - 10.2 mg/dL   Procalcitonin   Result Value Ref Range    Procalcitonin 0.05 0.00 - 0.08 ng/mL   TSH without Reflex   Result Value Ref Range    TSH 5.880 (H) 0.270 - 4.200 uIU/mL   T4, free   Result Value Ref Range    T4 Free 0.89 (L) 0.93 - 1.70 ng/dL   URINE DRUG SCREEN   Result Value Ref Range    Amphetamine Screen, Urine NOT DETECTED Negative <1000 ng/mL    Barbiturate Screen, Ur NOT DETECTED Negative < 200 ng/mL    Benzodiazepine Screen, Urine POSITIVE (A) Negative < 200 ng/mL    Cannabinoid Scrn, Ur NOT DETECTED Negative < 50ng/mL    Cocaine Metabolite Screen, Urine POSITIVE (A) Negative < 300 ng/mL    Opiate Scrn, Ur POSITIVE (A) Negative < 300ng/mL    PCP Screen, Urine NOT DETECTED Negative < 25 ng/mL    Methadone Screen, Urine POSITIVE (A) Negative <300 ng/mL    Propoxyphene Scrn, Ur NOT DETECTED Negative <300 ng/mL   Protime-INR   Result Value Ref Range    Protime 11.8 9.3 - 12.4 sec    INR 1.0    EKG 12 lead   Result Value Ref Range    Ventricular Rate 64 BPM    Atrial Rate 64 BPM    P-R Interval 150 ms    QRS Duration 92 ms    Q-T Interval 430 ms    QTc Calculation (Bazett) 443 ms    P Axis 71 degrees    R Axis 28 degrees    T Axis 13 degrees       RADIOLOGY:  XR CHEST PORTABLE   Final Result   Increased opacification right paratracheal stripe region,   possibly reflecting overlapping normal anatomical structures versus   possible prominent vasculature or a abnormal lymphadenopathy. Further   evaluation with CT scan the chest is recommended to exclude any   potential of underlying abnormality, or lymphadenopathy      XR CALCANEUS RIGHT (MIN 2 VIEWS)   Final Result   Soft tissue wound with soft tissue emphysema. See above. Stable   osseous findings as before. No clearly evident acute osteomyelitis.       MRI FOOT RIGHT W WO CONTRAST    (Results Pending) ------------------------- NURSING NOTES AND VITALS REVIEWED ---------------------------  Date / Time Roomed:  7/4/2019  8:56 PM  ED Bed Assignment:  [id number]    The nursing notes within the ED encounter and vital signs as below have been reviewed. Patient Vitals for the past 24 hrs:   BP Temp Temp src Pulse Resp SpO2 Height Weight   07/05/19 0411 -- -- -- -- -- -- -- 170 lb 6.4 oz (77.3 kg)   07/05/19 0030 (!) 118/90 98.1 °F (36.7 °C) Temporal 95 19 93 % 5' 9\" (1.753 m) 170 lb 6.4 oz (77.3 kg)   07/04/19 2319 -- 97.8 °F (36.6 °C) Oral 59 -- -- -- --   07/04/19 2300 (!) 114/52 -- -- 56 15 93 % -- --   07/04/19 2230 98/62 -- -- 58 15 93 % -- --   07/04/19 2201 114/60 -- -- 63 13 94 % -- --   07/04/19 2148 97/66 -- -- 65 17 96 % -- --   07/04/19 1948 108/65 96.9 °F (36.1 °C) Tympanic 74 19 93 % 5' 9\" (1.753 m) 187 lb (84.8 kg)   07/04/19 1935 -- 97.1 °F (36.2 °C) Temporal 80 16 93 % -- --       Oxygen Saturation Interpretation: Normal    ------------------------------------------ PROGRESS NOTES ------------------------------------------  Re-evaluation(s):  Time: 2300  Patients symptoms show no change  Repeat physical examination is not changed    Counseling:  I have spoken with the patient and discussed todays results, in addition to providing specific details for the plan of care and counseling regarding the diagnosis and prognosis. Their questions are answered at this time and they are agreeable with the plan of admission.    --------------------------------- ADDITIONAL PROVIDER NOTES ---------------------------------  Consultations:  Time: 2330. Spoke with JL Haines. Discussed case. They will admit the patient. This patient's ED course included: a personal history and physicial examination, re-evaluation prior to disposition and IV medications    This patient has remained hemodynamically stable during their ED course. Diagnosis:  1.  Open wound of right heel, initial encounter

## 2019-07-06 PROBLEM — E03.8 SUBCLINICAL HYPOTHYROIDISM: Chronic | Status: ACTIVE | Noted: 2019-07-06

## 2019-07-06 PROBLEM — M86.379: Chronic | Status: ACTIVE | Noted: 2019-07-06

## 2019-07-06 PROBLEM — L89.603 PRESSURE INJURY OF HEEL, STAGE 3 (HCC): Chronic | Status: ACTIVE | Noted: 2019-07-06

## 2019-07-06 LAB
ANAEROBIC CULTURE: NORMAL
WOUND/ABSCESS: NORMAL

## 2019-07-06 PROCEDURE — 6370000000 HC RX 637 (ALT 250 FOR IP): Performed by: FAMILY MEDICINE

## 2019-07-06 PROCEDURE — 86592 SYPHILIS TEST NON-TREP QUAL: CPT

## 2019-07-06 PROCEDURE — 2060000000 HC ICU INTERMEDIATE R&B

## 2019-07-06 PROCEDURE — 94640 AIRWAY INHALATION TREATMENT: CPT

## 2019-07-06 PROCEDURE — 80074 ACUTE HEPATITIS PANEL: CPT

## 2019-07-06 PROCEDURE — 36415 COLL VENOUS BLD VENIPUNCTURE: CPT

## 2019-07-06 PROCEDURE — 99223 1ST HOSP IP/OBS HIGH 75: CPT | Performed by: SURGERY

## 2019-07-06 PROCEDURE — 6370000000 HC RX 637 (ALT 250 FOR IP): Performed by: INTERNAL MEDICINE

## 2019-07-06 PROCEDURE — 2580000003 HC RX 258: Performed by: FAMILY MEDICINE

## 2019-07-06 PROCEDURE — 6360000002 HC RX W HCPCS: Performed by: SPECIALIST

## 2019-07-06 PROCEDURE — 6360000002 HC RX W HCPCS: Performed by: INTERNAL MEDICINE

## 2019-07-06 PROCEDURE — 2580000003 HC RX 258: Performed by: INTERNAL MEDICINE

## 2019-07-06 RX ADMIN — DIPHENHYDRAMINE HYDROCHLORIDE 50 MG: 50 INJECTION, SOLUTION INTRAMUSCULAR; INTRAVENOUS at 04:24

## 2019-07-06 RX ADMIN — MOMETASONE FUROATE AND FORMOTEROL FUMARATE DIHYDRATE 2 PUFF: 100; 5 AEROSOL RESPIRATORY (INHALATION) at 09:13

## 2019-07-06 RX ADMIN — GABAPENTIN 300 MG: 300 CAPSULE ORAL at 20:50

## 2019-07-06 RX ADMIN — HYDROXYZINE PAMOATE 25 MG: 25 CAPSULE ORAL at 18:47

## 2019-07-06 RX ADMIN — DIPHENHYDRAMINE HYDROCHLORIDE 50 MG: 50 INJECTION, SOLUTION INTRAMUSCULAR; INTRAVENOUS at 15:38

## 2019-07-06 RX ADMIN — VANCOMYCIN HYDROCHLORIDE 1250 MG: 10 INJECTION, POWDER, LYOPHILIZED, FOR SOLUTION INTRAVENOUS at 16:15

## 2019-07-06 RX ADMIN — METHADONE HYDROCHLORIDE 105 MG: 10 CONCENTRATE ORAL at 09:12

## 2019-07-06 RX ADMIN — MEROPENEM 1 G: 1 INJECTION, POWDER, FOR SOLUTION INTRAVENOUS at 00:05

## 2019-07-06 RX ADMIN — VANCOMYCIN HYDROCHLORIDE 1250 MG: 10 INJECTION, POWDER, LYOPHILIZED, FOR SOLUTION INTRAVENOUS at 04:48

## 2019-07-06 RX ADMIN — MOMETASONE FUROATE AND FORMOTEROL FUMARATE DIHYDRATE 2 PUFF: 100; 5 AEROSOL RESPIRATORY (INHALATION) at 22:10

## 2019-07-06 RX ADMIN — SODIUM CHLORIDE: 9 INJECTION, SOLUTION INTRAVENOUS at 15:38

## 2019-07-06 RX ADMIN — GABAPENTIN 300 MG: 300 CAPSULE ORAL at 09:12

## 2019-07-06 RX ADMIN — MEROPENEM 1 G: 1 INJECTION, POWDER, FOR SOLUTION INTRAVENOUS at 09:12

## 2019-07-06 RX ADMIN — IPRATROPIUM BROMIDE AND ALBUTEROL SULFATE 1 AMPULE: .5; 3 SOLUTION RESPIRATORY (INHALATION) at 18:57

## 2019-07-06 RX ADMIN — Medication 10 ML: at 09:13

## 2019-07-06 RX ADMIN — MEROPENEM 1 G: 1 INJECTION, POWDER, FOR SOLUTION INTRAVENOUS at 15:37

## 2019-07-06 RX ADMIN — MEROPENEM 1 G: 1 INJECTION, POWDER, FOR SOLUTION INTRAVENOUS at 23:59

## 2019-07-06 RX ADMIN — Medication 10 ML: at 20:50

## 2019-07-06 ASSESSMENT — PAIN DESCRIPTION - PROGRESSION
CLINICAL_PROGRESSION: NOT CHANGED
CLINICAL_PROGRESSION: NOT CHANGED

## 2019-07-06 ASSESSMENT — PAIN DESCRIPTION - LOCATION: LOCATION: FOOT;GENERALIZED

## 2019-07-06 ASSESSMENT — PAIN SCALES - GENERAL
PAINLEVEL_OUTOF10: 10
PAINLEVEL_OUTOF10: 0

## 2019-07-06 ASSESSMENT — PAIN DESCRIPTION - ORIENTATION: ORIENTATION: RIGHT;LEFT;MID

## 2019-07-06 ASSESSMENT — PAIN DESCRIPTION - PAIN TYPE: TYPE: CHRONIC PAIN

## 2019-07-06 ASSESSMENT — PAIN DESCRIPTION - DESCRIPTORS: DESCRIPTORS: ACHING;CRUSHING;DISCOMFORT

## 2019-07-06 ASSESSMENT — PAIN DESCRIPTION - FREQUENCY: FREQUENCY: INTERMITTENT

## 2019-07-06 ASSESSMENT — PAIN DESCRIPTION - ONSET: ONSET: AWAKENED FROM SLEEP

## 2019-07-06 NOTE — PROGRESS NOTES
Hospitalist Progress Note      PCP: Roro Tapia DO    Date of Admission: 7/4/2019    Chief Complaint: right foot ulcer    Hospital Course: * pt has a known history of drug abuse, denies using although she was told she is pos for cocaine and benzos. Has a right foot ulcer. Seen by psych, not at risk, to FU as OP she has a right root ulcer, and was seen by podiatry , they recommend surgery, and the patient refuses, a constellation of excuses. Just wants abx and to go home    **     Subjective:   Right heel paiin       Medications:  Reviewed    Infusion Medications    sodium chloride 75 mL/hr at 07/05/19 2104     Scheduled Medications    methadone  105 mg Oral Daily    gabapentin  300 mg Oral BID    mometasone-formoterol  2 puff Inhalation BID    sodium chloride flush  10 mL Intravenous 2 times per day    vancomycin  1,250 mg Intravenous Q12H    meropenem  1 g Intravenous Q8H     PRN Meds: ipratropium-albuterol, sodium chloride flush, magnesium hydroxide, ondansetron, acetaminophen, hydrOXYzine, diphenhydrAMINE      Intake/Output Summary (Last 24 hours) at 7/6/2019 1350  Last data filed at 7/6/2019 0912  Gross per 24 hour   Intake 2385 ml   Output 900 ml   Net 1485 ml       Exam:    /66   Pulse 61   Temp 97.6 °F (36.4 °C) (Temporal)   Resp 18   Ht 5' 9\" (1.753 m)   Wt 175 lb 8 oz (79.6 kg)   LMP 08/28/2013   SpO2 96%   BMI 25.92 kg/m²         Gen: *well developed  HEENT: NC/AT, moist mucous membranes, no oropharyngeal erythema or exudate  Neck: supple, trachea midline, no anterior cervical or SC LAD  Heart:  Normal s1/s2, RRR, no murmurs, gallops, or rubs. Lungs:   cta  bilaterally, *  Abd: bowel sounds present, soft, nontender, nondistended, no masses  Extrem:  No clubbing, cyanosis,  *dsd right foot   Skin: no rashes or lesions  Psych: A & O x3  Neuro: grossly intact, moves all four extremities.     Capillary Refill: Brisk,< 3 seconds   Peripheral Pulses: +2 palpable, equal

## 2019-07-06 NOTE — PROGRESS NOTES
Called Pharmacy to verify what to do after Vanc infiltrates. I tried 2 times with no success. Will call ED and MICU to see if anyone can attempt.

## 2019-07-06 NOTE — PROGRESS NOTES
07/05/2019    CREATININE 0.9 07/05/2019    GFRAA >60 07/05/2019    LABGLOM >60 07/05/2019    GLUCOSE 84 07/05/2019    PROT 8.1 07/04/2019    LABALBU 3.8 07/04/2019    CALCIUM 8.6 07/05/2019    BILITOT 0.2 07/04/2019    ALKPHOS 76 07/04/2019    AST 12 07/04/2019    ALT <5 07/04/2019         Hepatic Function Panel:    Lab Results   Component Value Date    ALKPHOS 76 07/04/2019    ALT <5 07/04/2019    AST 12 07/04/2019    PROT 8.1 07/04/2019    BILITOT 0.2 07/04/2019    LABALBU 3.8 07/04/2019       PT/INR:    Lab Results   Component Value Date    PROTIME 11.8 07/05/2019    INR 1.0 07/05/2019       TSH:    Lab Results   Component Value Date    TSH 5.880 07/05/2019     Sed rate -41  CRP - 3.4    MICROBIOLOGY:    Wound cx - [facility]     Radiology :    Right foot x ray -      Impression:        Soft tissue wound with soft tissue emphysema. See above. Stable  osseous findings as before. No clearly evident acute osteomyelitis.               IMPRESSION:     1. Right heel non healing wound - wound infection, r/o osteomyelitis   2. IVDU, Hep C infection       RECOMMENDATIONS:      1. Vancomycin 1250 mg IV q 12 hrs   2. Meropenem 1 gram IV q 8 hrs ( Ancef allergy )   3. Wound debridement , podiatry consult , MRI (ordered ) - refusing ampuation and biopsy  4. HIV test, RPR, hep panel - pending, will follow       Monette Lundborg, APRN-NP  7/6/19  Pt seen and examined. Above discussed agree with advanced practice nurse. Labs, cultures, and radiographs reviewed. Face to Face encounter occurred. Changes made as necessary.      Mikaela Franco MD

## 2019-07-06 NOTE — PROGRESS NOTES
Subjective:  Patient is seen for follow up of right foot chronic wound. Patient denies n/v/f/c/sob/cp. Patient states she has chronic pain in the right heel. She would like to have the foot fixed so she can have her hip surgery. She states she doesn't wish to have any surgery of the right foot as she had surgery years ago with Dr. Kya Galvin and she became addicted to pain medication after the surgery. Patient also states that she doesn't wish to have an amputation of the leg as she would end up in a nursing home and would loose her house. She is also concerned she wouldn't be able to drive. She states she just wants to have IV antibiotics as it helped in the past.    Vitals:    BP (!) 110/56   Pulse 55   Temp 97.9 °F (36.6 °C) (Temporal)   Resp 18   Ht 5' 9\" (1.753 m)   Wt 175 lb 8 oz (79.6 kg)   LMP 08/28/2013   SpO2 93%   BMI 25.92 kg/m²     Focused Lower Extremity Physical Exam:  Vitals:    07/06/19 0000   BP: (!) 110/56   Pulse: 55   Resp: 18   Temp: 97.9 °F (36.6 °C)   SpO2: 93%      Vascular:        Dorsalis Pedis:  present 1+    Posterior Tibialis:  present 1+  No signs of calf pain or dvt. There is chronic swelling noted right leg compared to the left. Derm: Thin with atrophy noted. Multiple scars noted b/l legs. Ulcer plantar right heel with nonviable tissue and odor. Neurologic:  Sensation:  Intact. Musculoskeletal:   Muscle Tendons Lower extremity 5/5. Deformity noted right foot with partial calcanectomy noted. Wound Care Documentation:  Wound 10/13/15 Heel Right;Plantar (Active)   Wound Type Wound 7/5/2019 12:30 AM   Dressing Status Clean;Dry; Intact 7/5/2019  8:58 PM   Dressing Changed Other (Comment) 7/5/2019  3:45 PM   Dressing/Treatment Ace Wrap 7/5/2019  3:45 PM   Wound Cleansed Rinsed/Irrigated with saline 7/5/2019 12:30 AM   Wound Length (cm) 7 cm 7/5/2019 12:30 AM   Wound Width (cm) 2.5 cm 7/5/2019 12:30 AM   Wound Depth (cm)  1 7/5/2019 12:30 AM   Calculated Wound Size clearly evident acute osteomyelitis. Xr Chest Portable    Result Date: 2019  Patient MRN: [id number] : 1965 Age:  49 years Gender: Female Order Date: 2019 1:00 AM Exam: XR CHEST PORTABLE Number of Views: 1 Indication:   Preop Comparison: 10/13/2015 Findings: There is a normal cardiomediastinal silhouette with increased opacification in the right paratracheal stripe region, not seen on previous exam, measured at approximately 5.4 x 2.3 cm. . No pneumothorax. .     Increased opacification right paratracheal stripe region, possibly reflecting overlapping normal anatomical structures versus possible prominent vasculature or a abnormal lymphadenopathy. Further evaluation with CT scan the chest is recommended to exclude any potential of underlying abnormality, or lymphadenopathy    Mri Ankle Right W Wo Contrast    Result Date: 2019  Patient MRN:  [id number] : 1965 Age: 49 years Gender: Female Order Date:  2019 2:30 PM TECHNIQUE/NUMBER OF IMAGES/COMPARISON/CLINICAL HISTORY: MRI of the ankle right T1 standard T2 and PD sequence were obtained. T1 sequence done without and IV contrast. 240 images IV contrast 60 mL of ProHance. History osteomyelitis, heel ulcer. Reviewed x-ray series of the right foot October 15, 2015 and of the calcaneus 2018. FINDINGS: As observed on the plain radiographs of the calcaneus of , extensive the cutaneous ulcer is seen in the plantar calcaneal region with poor extension of the medial aspect, ulcer crater extends closer at the more proximal cortical margin of the calcaneus. Areas of abnormal bone marrow signal signal with abnormal enhancement are seen in the calcaneus, anterior articular surface of the talar dome, dorsal aspect of the talar neck and head, anterior inferior articular surface of the distal tibial epiphysis with subchondral cystic changes, and diffusely and more vague more towards the inferior subcortical plantar aspect of the cuboid bone. anterior articular surface of the talar dome, and dorsal aspect of the talar neck/head . 3. Diffuse  chronic indolent on going cellulitis seen in the plantar calcaneal region and para calcaneal areas including across the region of the tarsi sinus, and also with involvement of the plantar fascia. 4. No conspicuous localized abscess identified. ALERT:  ABNORMAL REPORT. Assessment:  Principal Problem:    Nonhealing ulcer of heel (HCC)  Active Problems:    Bradycardia    Substance abuse (HCC)    Current moderate episode of major depressive disorder (HCC)    Abnormal weight loss    COPD (chronic obstructive pulmonary disease) ([facility])    History of fracture of left hip    Ambulatory dysfunction    Hypotension    Subclinical hypothyroidism  Resolved Problems:    * No resolved hospital problems. *  Ulcer present on admission. Osteomyelitis right foot    Plan:  Exam  Reviewed chart and labs. Discussed risks and benefits of conservative and surgical treatment options. Vascular consulted yesterday for possible amputation do to MRI results with extensive chronic osteomyelitis noted. and because a BKA would be more functional.  I discussed bone biopsy with IV antibiotics, but again, the patient states she doesn't wish to have any foot surgery. I explained risks of loss the extremity and life. Will follow.            Electronically signed by Shoshana Ovalle DPM on 7/6/2019 at 9:21 AM

## 2019-07-07 LAB
CHOLESTEROL, TOTAL: 118 MG/DL (ref 0–199)
HBA1C MFR BLD: 5.4 % (ref 4–5.6)
HDLC SERPL-MCNC: 50 MG/DL
LDL CHOLESTEROL CALCULATED: 50 MG/DL (ref 0–99)
PREALBUMIN: 12 MG/DL (ref 20–40)
TRIGL SERPL-MCNC: 90 MG/DL (ref 0–149)
VLDLC SERPL CALC-MCNC: 18 MG/DL

## 2019-07-07 PROCEDURE — 6360000002 HC RX W HCPCS: Performed by: SPECIALIST

## 2019-07-07 PROCEDURE — 83036 HEMOGLOBIN GLYCOSYLATED A1C: CPT

## 2019-07-07 PROCEDURE — 6370000000 HC RX 637 (ALT 250 FOR IP): Performed by: FAMILY MEDICINE

## 2019-07-07 PROCEDURE — 2060000000 HC ICU INTERMEDIATE R&B

## 2019-07-07 PROCEDURE — 6360000002 HC RX W HCPCS: Performed by: INTERNAL MEDICINE

## 2019-07-07 PROCEDURE — 2580000003 HC RX 258: Performed by: FAMILY MEDICINE

## 2019-07-07 PROCEDURE — 2580000003 HC RX 258: Performed by: INTERNAL MEDICINE

## 2019-07-07 PROCEDURE — 84134 ASSAY OF PREALBUMIN: CPT

## 2019-07-07 PROCEDURE — 80061 LIPID PANEL: CPT

## 2019-07-07 PROCEDURE — 36415 COLL VENOUS BLD VENIPUNCTURE: CPT

## 2019-07-07 RX ADMIN — DIPHENHYDRAMINE HYDROCHLORIDE 50 MG: 50 INJECTION, SOLUTION INTRAMUSCULAR; INTRAVENOUS at 16:07

## 2019-07-07 RX ADMIN — MOMETASONE FUROATE AND FORMOTEROL FUMARATE DIHYDRATE 2 PUFF: 100; 5 AEROSOL RESPIRATORY (INHALATION) at 21:07

## 2019-07-07 RX ADMIN — MEROPENEM 1 G: 1 INJECTION, POWDER, FOR SOLUTION INTRAVENOUS at 08:57

## 2019-07-07 RX ADMIN — Medication 10 ML: at 08:59

## 2019-07-07 RX ADMIN — VANCOMYCIN HYDROCHLORIDE 1250 MG: 10 INJECTION, POWDER, LYOPHILIZED, FOR SOLUTION INTRAVENOUS at 04:30

## 2019-07-07 RX ADMIN — Medication 10 ML: at 21:07

## 2019-07-07 RX ADMIN — GABAPENTIN 300 MG: 300 CAPSULE ORAL at 08:58

## 2019-07-07 RX ADMIN — METHADONE HYDROCHLORIDE 105 MG: 10 CONCENTRATE ORAL at 08:58

## 2019-07-07 RX ADMIN — VANCOMYCIN HYDROCHLORIDE 1250 MG: 10 INJECTION, POWDER, LYOPHILIZED, FOR SOLUTION INTRAVENOUS at 16:37

## 2019-07-07 RX ADMIN — DIPHENHYDRAMINE HYDROCHLORIDE 50 MG: 50 INJECTION, SOLUTION INTRAMUSCULAR; INTRAVENOUS at 04:03

## 2019-07-07 RX ADMIN — GABAPENTIN 300 MG: 300 CAPSULE ORAL at 21:07

## 2019-07-07 RX ADMIN — MOMETASONE FUROATE AND FORMOTEROL FUMARATE DIHYDRATE 2 PUFF: 100; 5 AEROSOL RESPIRATORY (INHALATION) at 11:46

## 2019-07-07 RX ADMIN — MEROPENEM 1 G: 1 INJECTION, POWDER, FOR SOLUTION INTRAVENOUS at 16:37

## 2019-07-07 ASSESSMENT — PAIN DESCRIPTION - PAIN TYPE
TYPE: CHRONIC PAIN
TYPE: CHRONIC PAIN

## 2019-07-07 ASSESSMENT — PAIN DESCRIPTION - LOCATION
LOCATION: FOOT
LOCATION: HIP

## 2019-07-07 ASSESSMENT — PAIN DESCRIPTION - ORIENTATION
ORIENTATION: RIGHT
ORIENTATION: LEFT

## 2019-07-07 ASSESSMENT — PAIN SCALES - GENERAL
PAINLEVEL_OUTOF10: 0
PAINLEVEL_OUTOF10: 9
PAINLEVEL_OUTOF10: 10

## 2019-07-07 ASSESSMENT — PAIN DESCRIPTION - FREQUENCY: FREQUENCY: CONTINUOUS

## 2019-07-07 ASSESSMENT — PAIN DESCRIPTION - DESCRIPTORS
DESCRIPTORS: ACHING;CONSTANT;DISCOMFORT
DESCRIPTORS: BURNING;CONSTANT;ACHING

## 2019-07-07 ASSESSMENT — PAIN - FUNCTIONAL ASSESSMENT: PAIN_FUNCTIONAL_ASSESSMENT: PREVENTS OR INTERFERES SOME ACTIVE ACTIVITIES AND ADLS

## 2019-07-07 ASSESSMENT — PAIN DESCRIPTION - PROGRESSION: CLINICAL_PROGRESSION: NOT CHANGED

## 2019-07-07 NOTE — PROGRESS NOTES
Nutrition Assessment    Type and Reason for Visit: Initial, Positive Nutrition Screen    Nutrition Recommendations:Continue current diet, Continue current ONS, add Mandeep BID     Nutrition Assessment: Pt at nutrition compromise on admit aeb poor po pta now at further nutrition risk 2/2 increased nutrient needs for wound healing. Provide ONS BID. Malnutrition Assessment:  · Malnutrition Status: At risk for malnutrition  · Context: Chronic illness  · Findings of the 6 clinical characteristics of malnutrition (Minimum of 2 out of 6 clinical characteristics is required to make the diagnosis of moderate or severe Protein Calorie Malnutrition based on AND/ASPEN Guidelines):  1. Energy Intake-Greater than 75% of estimated energy requirement, (2 day)    2. Weight Loss-No significant weight loss,    3. Fat Loss-No significant subcutaneous fat loss,    4. Muscle Loss-No significant muscle mass loss,    5. Fluid Accumulation-No significant fluid accumulation,    6.  Strength-Not measured    Nutrition Risk Level: Moderate    Nutrient Needs:  · Estimated Daily Total Kcal: 6273-5408(ABW REE 1303 x 1.3 SF)  · Estimated Daily Protein (g): 100-115(IBW 1.5-1.8 g/kg)  · Estimated Daily Total Fluid (ml/day): 6908-0894(1 ml/kcal)    Nutrition Diagnosis:   · Problem: Increased nutrient needs  · Etiology: related to Increased demand for energy/nutrients     Signs and symptoms:  as evidenced by Presence of wounds    Objective Information:  · Nutrition-Focused Physical Findings: alert, abdomen WDL, + BS, no edema, + I/O, substance abuse,  · Wound Type:  Wound Consult Pending, Open Wounds  · Current Nutrition Therapies:  · Oral Diet Orders: General   · Oral Diet intake: %  · Oral Nutrition Supplement (ONS) Orders: Standard High Calorie Oral Supplement  · ONS intake: Unable to assess  · Anthropometric Measures:  · Ht: 5' 9\" (175.3 cm)   · Current Body Wt: 183 lb (83 kg)(7/5 standing scale)  · Admission Body Wt: 170 lb (77.1 kg)(7/5 first actual)  · Usual Body Wt: 179 lb (81.2 kg)(from office visit 4/2019, no method)  · % Weight Change:  ,  weigh gain noted x 3 days  · Ideal Body Wt: 145 lb (65.8 kg), % Ideal Body 117%(used adm wt)  · Adjusted Body Wt:  , body weight adjusted for    · BMI Classification: BMI 25.0 - 29.9 Overweight(used adm. weight)    Nutrition Interventions:   Continue current diet, Continue current ONS(add Mandeep BID)  Continued Inpatient Monitoring, Education not appropriate at this time, Coordination of Care    Nutrition Evaluation:   · Evaluation: Goals set   · Goals: Consume > 75% of meals/ONS.     · Monitoring: Meal Intake, Supplement Intake, Diet Tolerance, Skin Integrity, Wound Healing, I&O, Weight, Pertinent Labs, Monitor Bowel Function      Electronically signed by Lina Michael RD, LD on 7/7/19 at 9:03 AM    Contact Number:  Ext [phone]

## 2019-07-07 NOTE — PLAN OF CARE
Problem: Skin Integrity:  Goal: Absence of new skin breakdown  Description  Absence of new skin breakdown  7/7/2019 0541 by Lb Engel RN  Outcome: Met This Shift     Problem: Pain:  Goal: Pain level will decrease  Description  Pain level will decrease  7/7/2019 0541 by Lb Engel RN  Outcome: Met This Shift     Problem: Falls - Risk of:  Goal: Will remain free from falls  Description  Will remain free from falls  Outcome: Met This Shift  Goal: Absence of physical injury  Description  Absence of physical injury  Outcome: Met This Shift     Problem: Increased nutrient needs (NI-5.1)  Goal: Food and/or Nutrient Delivery  Description  Individualized approach for food/nutrient provision.   7/7/2019 0902 by Mandy Severino RD, LD  Outcome: Met This Shift

## 2019-07-07 NOTE — PROGRESS NOTES
Department of Internal Medicine  Infectious Diseases   Progress Note      CC: left hip pain    SUBJECTIVE:     Seen at bedside  Awake, alert and oriented   She is complaining of left hip pain which is chronic - states she needs a hip replacement but doctor will not do while she has a right heel wound  Continues to refuse amputation - will try hyperbaric oxygen treatments   Tolerating ATB Well - no acute issues    REVIEW OF SYSTEMS:      10 point ROS otherwise negative except as above       PHYSICAL EXAM:      Vitals:     Vitals:    07/07/19 0845   BP: (!) 103/51   Pulse: 63   Resp: 12   Temp: 98.3 °F (36.8 °C)   SpO2: 92%       General Appearance:    Awake, alert , no acute distress. Head:    Normocephalic, atraumatic   Eyes:    No pallor, no icterus,   Ears:    No obvious deformity or drainage.    Nose:   No nasal drainage   Throat:   Mucosa moist, no oral thrush   Neck:   Supple, no lymphadenopathy   Back:     no CVA tenderness   Lungs:     Wheeze     Heart:    Regular rate and rhythm, no murmur, rub or gallop   Abdomen:     Soft, non-tender, bowel sounds present    Extremities:   Left hip tender,right heel wound ( full thickness with tendon involvement) , tender , no erythema    Pulses:   Dorsalis pedis palpable    Skin:   Wound right heel        CBC with Differential:      Lab Results   Component Value Date    WBC 4.7 07/05/2019    RBC 4.04 07/05/2019    HGB 12.0 07/05/2019    HCT 39.4 07/05/2019     07/05/2019    MCV 97.5 07/05/2019    MCH 29.7 07/05/2019    MCHC 30.5 07/05/2019    RDW 14.4 07/05/2019    LYMPHOPCT 36.8 07/05/2019    MONOPCT 10.4 07/05/2019    BASOPCT 0.8 07/05/2019    MONOSABS 0.49 07/05/2019    LYMPHSABS 1.74 07/05/2019    EOSABS 0.09 07/05/2019    BASOSABS 0.04 07/05/2019       CMP     Lab Results   Component Value Date     07/05/2019    K 4.7 07/05/2019     07/05/2019    CO2 32 07/05/2019    BUN 11 07/05/2019    CREATININE 0.9 07/05/2019    GFRAA >60 07/05/2019

## 2019-07-07 NOTE — PROGRESS NOTES
possibly reflecting overlapping normal anatomical structures versus possible prominent vasculature or a abnormal lymphadenopathy. Further evaluation with CT scan the chest is recommended to exclude any potential of underlying abnormality, or lymphadenopathy    Mri Ankle Right W Wo Contrast    Result Date: 2019  Patient MRN:  [id number] : 1965 Age: 49 years Gender: Female Order Date:  2019 2:30 PM TECHNIQUE/NUMBER OF IMAGES/COMPARISON/CLINICAL HISTORY: MRI of the ankle right T1 standard T2 and PD sequence were obtained. T1 sequence done without and IV contrast. 240 images IV contrast 60 mL of ProHance. History osteomyelitis, heel ulcer. Reviewed x-ray series of the right foot October 15, 2015 and of the calcaneus 2018. FINDINGS: As observed on the plain radiographs of the calcaneus of , extensive the cutaneous ulcer is seen in the plantar calcaneal region with poor extension of the medial aspect, ulcer crater extends closer at the more proximal cortical margin of the calcaneus. Areas of abnormal bone marrow signal signal with abnormal enhancement are seen in the calcaneus, anterior articular surface of the talar dome, dorsal aspect of the talar neck and head, anterior inferior articular surface of the distal tibial epiphysis with subchondral cystic changes, and diffusely and more vague more towards the inferior subcortical plantar aspect of the cuboid bone. No specific bone marrow edema or enhancement is seen in the navicular bone, cuneiform bones, and base of metatarsal bones. There are diffuse vague edema/ swelling and post contrast enhancement of the soft tissues in the plantar calcaneal areolar region around the cutaneous ulcer, extending into the para calcaneal regions bilaterally and supra-calcaneal area towards the sinus tarsi and interosseous ligament. Medially these soft tissues changes also involve the region of the tarsal tunnel area.  These inflammatory process surrounds the peroneal tendons, the flexor hallucis and flexor digitorum longus tendons, and the more distal aspect of the posterior tibial tendon. They do not appears to be specifically involving the area of the extensor tendons but there are inflammatory changes with granulomas-like tissue deeply to the extensor tendons/extensor retinaculum along the anterior articular surface of the tibiotalar joint. Also observed is reabsorptive process of the plantar cortical/subcortical bone of the calcaneous with bone remodeling, with loss of vertical axis of the calcaneous. Chronic indolent on going osteomyelitis is considered to be present in the calcaneus, plantar region of the cuboid bone, and in the articular/periarticular surfaces of the tibio-talar joint anterior with a component of septic arthritis in the mid anterior aspect of the tibiotalar joint. There are also areas of abnormal signal and enhancement in the subtalar joints which are likely to be extension of infection from the adjacent foci of infection, spreading into the tarsi  sinus. 1. Large chronic calcaneal plantar ulcer crater. 2. Signs for chronic indolent on going osteomyelitis in the calcaneus, more discretely in the inferior subcortical region of the cuboid bone, and in the anterior inferior aspect of the articular surfaces of the tibiotalar joint in including the anterior aspect of the distal articular epiphysis of the tibia, the anterior articular surface of the talar dome, and dorsal aspect of the talar neck/head . 3. Diffuse  chronic indolent on going cellulitis seen in the plantar calcaneal region and para calcaneal areas including across the region of the tarsi sinus, and also with involvement of the plantar fascia. 4. No conspicuous localized abscess identified. ALERT:  ABNORMAL REPORT.        Assessment:  Principal Problem:    Pressure injury of heel, stage 3 (HCC)  Active Problems:    Nonhealing ulcer of heel (HCC)    Bradycardia    Substance abuse ([facility]    Current moderate episode of major depressive disorder (HCC)    Abnormal weight loss    COPD (chronic obstructive pulmonary disease) ([facility] [address]    History of fracture of left hip    Ambulatory dysfunction    Hypotension    Subclinical hypothyroidism    Chronic recurrent multifocal osteomyelitis of foot ([facility] [address]  Resolved Problems:    * No resolved hospital problems. *  Ulcer POA. Osteomyelitis right      Plan:  Exam  Reviewed chart and labs. Dressing change. Discussed further treatment options. Patient would like to try hyperbarics and states that she would go to the appointments. Patient defers any forms of intervention per podiatry and is aware of risk of loss of limb or worse. Appreciate vascular input. Patient would likely benefit from wound care clinic after discharge. Will follow while in house.        Electronically signed by Wilman Lawson DPM on 7/7/2019 at 9:29 AM

## 2019-07-08 LAB
HAV IGM SER IA-ACNC: ABNORMAL
HAV IGM SER IA-ACNC: ABNORMAL
HEPATITIS B CORE IGM ANTIBODY: ABNORMAL
HEPATITIS B CORE IGM ANTIBODY: ABNORMAL
HEPATITIS B SURFACE ANTIGEN INTERPRETATION: ABNORMAL
HEPATITIS B SURFACE ANTIGEN INTERPRETATION: ABNORMAL
HEPATITIS C ANTIBODY INTERPRETATION: REACTIVE
HEPATITIS C ANTIBODY INTERPRETATION: REACTIVE
HIV-1 AND HIV-2 ANTIBODIES: NORMAL
RPR: NORMAL

## 2019-07-08 PROCEDURE — 6370000000 HC RX 637 (ALT 250 FOR IP): Performed by: FAMILY MEDICINE

## 2019-07-08 PROCEDURE — 2580000003 HC RX 258: Performed by: INTERNAL MEDICINE

## 2019-07-08 PROCEDURE — 94640 AIRWAY INHALATION TREATMENT: CPT

## 2019-07-08 PROCEDURE — 2580000003 HC RX 258: Performed by: FAMILY MEDICINE

## 2019-07-08 PROCEDURE — 2060000000 HC ICU INTERMEDIATE R&B

## 2019-07-08 PROCEDURE — 6360000002 HC RX W HCPCS: Performed by: INTERNAL MEDICINE

## 2019-07-08 PROCEDURE — 6360000002 HC RX W HCPCS: Performed by: SPECIALIST

## 2019-07-08 RX ADMIN — DIPHENHYDRAMINE HYDROCHLORIDE 50 MG: 50 INJECTION, SOLUTION INTRAMUSCULAR; INTRAVENOUS at 03:47

## 2019-07-08 RX ADMIN — GABAPENTIN 300 MG: 300 CAPSULE ORAL at 20:51

## 2019-07-08 RX ADMIN — GABAPENTIN 300 MG: 300 CAPSULE ORAL at 09:19

## 2019-07-08 RX ADMIN — Medication 10 ML: at 09:19

## 2019-07-08 RX ADMIN — MOMETASONE FUROATE AND FORMOTEROL FUMARATE DIHYDRATE 2 PUFF: 100; 5 AEROSOL RESPIRATORY (INHALATION) at 09:19

## 2019-07-08 RX ADMIN — MEROPENEM 1 G: 1 INJECTION, POWDER, FOR SOLUTION INTRAVENOUS at 09:19

## 2019-07-08 RX ADMIN — IPRATROPIUM BROMIDE AND ALBUTEROL SULFATE 1 AMPULE: .5; 3 SOLUTION RESPIRATORY (INHALATION) at 13:11

## 2019-07-08 RX ADMIN — Medication 10 ML: at 00:30

## 2019-07-08 RX ADMIN — MOMETASONE FUROATE AND FORMOTEROL FUMARATE DIHYDRATE 2 PUFF: 100; 5 AEROSOL RESPIRATORY (INHALATION) at 20:51

## 2019-07-08 RX ADMIN — MEROPENEM 1 G: 1 INJECTION, POWDER, FOR SOLUTION INTRAVENOUS at 00:28

## 2019-07-08 RX ADMIN — Medication 10 ML: at 03:48

## 2019-07-08 RX ADMIN — Medication 10 ML: at 04:23

## 2019-07-08 RX ADMIN — METHADONE HYDROCHLORIDE 105 MG: 10 CONCENTRATE ORAL at 09:19

## 2019-07-08 RX ADMIN — VANCOMYCIN HYDROCHLORIDE 1250 MG: 10 INJECTION, POWDER, LYOPHILIZED, FOR SOLUTION INTRAVENOUS at 04:24

## 2019-07-08 ASSESSMENT — PAIN - FUNCTIONAL ASSESSMENT
PAIN_FUNCTIONAL_ASSESSMENT: PREVENTS OR INTERFERES SOME ACTIVE ACTIVITIES AND ADLS
PAIN_FUNCTIONAL_ASSESSMENT: PREVENTS OR INTERFERES SOME ACTIVE ACTIVITIES AND ADLS

## 2019-07-08 ASSESSMENT — PAIN SCALES - GENERAL
PAINLEVEL_OUTOF10: 10
PAINLEVEL_OUTOF10: 0
PAINLEVEL_OUTOF10: 10
PAINLEVEL_OUTOF10: 10

## 2019-07-08 ASSESSMENT — PAIN DESCRIPTION - PROGRESSION
CLINICAL_PROGRESSION: NOT CHANGED
CLINICAL_PROGRESSION: NOT CHANGED

## 2019-07-08 ASSESSMENT — PAIN DESCRIPTION - PAIN TYPE
TYPE: CHRONIC PAIN

## 2019-07-08 ASSESSMENT — PAIN DESCRIPTION - ONSET: ONSET: AWAKENED FROM SLEEP

## 2019-07-08 ASSESSMENT — PAIN DESCRIPTION - LOCATION
LOCATION: HIP
LOCATION: HIP
LOCATION: LEG

## 2019-07-08 ASSESSMENT — PAIN DESCRIPTION - DESCRIPTORS
DESCRIPTORS: CONSTANT;TINGLING;SORE
DESCRIPTORS: ACHING;BURNING;CONSTANT
DESCRIPTORS: ACHING;DISCOMFORT;BURNING

## 2019-07-08 ASSESSMENT — PAIN DESCRIPTION - ORIENTATION
ORIENTATION: LEFT
ORIENTATION: RIGHT;LEFT
ORIENTATION: LEFT

## 2019-07-08 ASSESSMENT — PAIN DESCRIPTION - FREQUENCY
FREQUENCY: CONTINUOUS
FREQUENCY: INTERMITTENT

## 2019-07-08 NOTE — PROGRESS NOTES
vomiting. HEENT: denies blurring of vision or double vision, denies hearing problem  RESPIRATORY: denies cough, shortness of breath, sputum expectoration, chest pain. CARDIOVASCULAR:  Denies palpitation  GASTROINTESTINAL:  Denies abdomen pain, diarrhea or constipation. GENITOURINARY:  Denies burning urination or frequency of urination  INTEGUMENT: denies wound , rash  HEMATOLOGIC/LYMPHATIC:  Denies lymph node swelling, gum bleeding or easy bruising. MUSCULOSKELETAL: right heel wound pain, left hip pain   NEUROLOGICAL:  Denies light headed, dizziness, loss of consciousness, weakness of lower extremities, bowel or bladder incontinence. PHYSICAL EXAM:      Vitals:     /85   Pulse 63   Temp 98.6 °F (37 °C) (Temporal)   Resp 16   Ht 5' 9\" (1.753 m)   Wt 184 lb 12.8 oz (83.8 kg)   LMP 08/28/2013   SpO2 94%   BMI 27.29 kg/m²     General Appearance:    Awake, alert , no acute distress. Head:    Normocephalic, atraumatic   Eyes:    No pallor, no icterus,   Ears:    No obvious deformity or drainage.    Nose:   No nasal drainage   Throat:   Mucosa moist, no oral thrush   Neck:   Supple, no lymphadenopathy   Back:     no CVA tenderness   Lungs:     Wheeze     Heart:    Regular rate and rhythm, no murmur, rub or gallop   Abdomen:     Soft, non-tender, bowel sounds present    Extremities:   Left hip tender,right heel wound ( full thickness) , tender , no erythema    Pulses:   Dorsalis pedis palpable    Skin:   Wound right heel        CBC with Differential:      Lab Results   Component Value Date    WBC 4.7 07/05/2019    RBC 4.04 07/05/2019    HGB 12.0 07/05/2019    HCT 39.4 07/05/2019     07/05/2019    MCV 97.5 07/05/2019    MCH 29.7 07/05/2019    MCHC 30.5 07/05/2019    RDW 14.4 07/05/2019    LYMPHOPCT 36.8 07/05/2019    MONOPCT 10.4 07/05/2019    BASOPCT 0.8 07/05/2019    MONOSABS 0.49 07/05/2019    LYMPHSABS 1.74 07/05/2019    EOSABS 0.09 07/05/2019    BASOSABS 0.04 07/05/2019       CMP     Lab Results   Component Value Date     07/05/2019    K 4.7 07/05/2019     07/05/2019    CO2 32 07/05/2019    BUN 11 07/05/2019    CREATININE 0.9 07/05/2019    GFRAA >60 07/05/2019    LABGLOM >60 07/05/2019    GLUCOSE 84 07/05/2019    PROT 8.1 07/04/2019    LABALBU 3.8 07/04/2019    CALCIUM 8.6 07/05/2019    BILITOT 0.2 07/04/2019    ALKPHOS 76 07/04/2019    AST 12 07/04/2019    ALT <5 07/04/2019         Hepatic Function Panel:    Lab Results   Component Value Date    ALKPHOS 76 07/04/2019    ALT <5 07/04/2019    AST 12 07/04/2019    PROT 8.1 07/04/2019    BILITOT 0.2 07/04/2019    LABALBU 3.8 07/04/2019       PT/INR:    Lab Results   Component Value Date    PROTIME 11.8 07/05/2019    INR 1.0 07/05/2019       TSH:    Lab Results   Component Value Date    TSH 5.880 07/05/2019     Sed rate -41  CRP - 3.4    MICROBIOLOGY:    Wound cx -     Mixed bryce isolated includes:   Mixed Corynebacteria species   Alpha hemolytic Strep species   Group C Beta hemolytic Strep species      Narrative:         Radiology :    Right foot x ray -      Impression:        Soft tissue wound with soft tissue emphysema. See above. Stable  osseous findings as before. No clearly evident acute osteomyelitis.             IMPRESSION:     1. Right heel non healing wound - wound infection, osteomyelitis   2. IVDU, Hep C infection ( HIV test negative )       RECOMMENDATIONS:      1. Stop vancomycin   2. Meropenem 1 gram IV q 8 hrs ( Ancef allergy )   3.  Wound debridement / bone culture  ( pt is agreeable to debridement, but no amputation )

## 2019-07-08 NOTE — CARE COORDINATION
Referral made to Caesar at  [facility], will need home health care orders for dressing changes, CPA, med compliance.

## 2019-07-09 ENCOUNTER — APPOINTMENT (OUTPATIENT)
Dept: HYPERBARIC MEDICINE | Age: 54
DRG: 593 | End: 2019-07-09
Payer: OTHER GOVERNMENT

## 2019-07-09 VITALS
BODY MASS INDEX: 26.64 KG/M2 | TEMPERATURE: 98 F | DIASTOLIC BLOOD PRESSURE: 63 MMHG | WEIGHT: 179.9 LBS | OXYGEN SATURATION: 97 % | SYSTOLIC BLOOD PRESSURE: 128 MMHG | RESPIRATION RATE: 18 BRPM | HEART RATE: 65 BPM | HEIGHT: 69 IN

## 2019-07-09 LAB — BLOOD CULTURE, ROUTINE: NORMAL

## 2019-07-09 PROCEDURE — 99232 SBSQ HOSP IP/OBS MODERATE 35: CPT | Performed by: SURGERY

## 2019-07-09 PROCEDURE — 6370000000 HC RX 637 (ALT 250 FOR IP): Performed by: FAMILY MEDICINE

## 2019-07-09 PROCEDURE — 6370000000 HC RX 637 (ALT 250 FOR IP): Performed by: INTERNAL MEDICINE

## 2019-07-09 RX ORDER — AMOXICILLIN AND CLAVULANATE POTASSIUM 875; 125 MG/1; MG/1
1 TABLET, FILM COATED ORAL EVERY 12 HOURS SCHEDULED
Qty: 14 TABLET | Refills: 0 | Status: SHIPPED | OUTPATIENT
Start: 2019-07-09 | End: 2019-07-16

## 2019-07-09 RX ORDER — AMOXICILLIN AND CLAVULANATE POTASSIUM 875; 125 MG/1; MG/1
1 TABLET, FILM COATED ORAL EVERY 12 HOURS SCHEDULED
Status: DISCONTINUED | OUTPATIENT
Start: 2019-07-09 | End: 2019-07-09 | Stop reason: HOSPADM

## 2019-07-09 RX ADMIN — AMOXICILLIN AND CLAVULANATE POTASSIUM 1 TABLET: 875; 125 TABLET, FILM COATED ORAL at 12:49

## 2019-07-09 RX ADMIN — METHADONE HYDROCHLORIDE 105 MG: 10 CONCENTRATE ORAL at 08:44

## 2019-07-09 RX ADMIN — GABAPENTIN 300 MG: 300 CAPSULE ORAL at 08:43

## 2019-07-09 RX ADMIN — MOMETASONE FUROATE AND FORMOTEROL FUMARATE DIHYDRATE 2 PUFF: 100; 5 AEROSOL RESPIRATORY (INHALATION) at 10:18

## 2019-07-09 ASSESSMENT — PAIN - FUNCTIONAL ASSESSMENT: PAIN_FUNCTIONAL_ASSESSMENT: PREVENTS OR INTERFERES WITH MANY ACTIVE NOT PASSIVE ACTIVITIES

## 2019-07-09 ASSESSMENT — PAIN SCALES - GENERAL
PAINLEVEL_OUTOF10: 10
PAINLEVEL_OUTOF10: 10

## 2019-07-09 ASSESSMENT — PAIN DESCRIPTION - ORIENTATION
ORIENTATION: RIGHT
ORIENTATION: RIGHT;LEFT

## 2019-07-09 ASSESSMENT — PAIN DESCRIPTION - PAIN TYPE: TYPE: CHRONIC PAIN

## 2019-07-09 ASSESSMENT — PAIN DESCRIPTION - LOCATION
LOCATION: OTHER (COMMENT)
LOCATION: LEG

## 2019-07-09 ASSESSMENT — PAIN DESCRIPTION - DESCRIPTORS: DESCRIPTORS: ACHING;CONSTANT;SORE

## 2019-07-09 NOTE — PROGRESS NOTES
Spoke to [facility] tech, They do the screening. Pt will be updated and they need to do the screener to see if she is eligible.

## 2019-07-09 NOTE — PLAN OF CARE
Problem: Skin Integrity:  Goal: Will show no infection signs and symptoms  Description  Will show no infection signs and symptoms  Outcome: Met This Shift  Goal: Absence of new skin breakdown  Description  Absence of new skin breakdown  Outcome: Met This Shift     Problem: Falls - Risk of:  Goal: Will remain free from falls  Description  Will remain free from falls  Outcome: Met This Shift  Goal: Absence of physical injury  Description  Absence of physical injury  Outcome: Met This Shift     Problem: Musculor/Skeletal Functional Status  Goal: Absence of falls  Outcome: Met This Shift

## 2019-07-10 ENCOUNTER — TELEPHONE (OUTPATIENT)
Dept: FAMILY MEDICINE CLINIC | Age: 54
End: 2019-07-10

## 2019-07-10 DIAGNOSIS — J44.9 CHRONIC OBSTRUCTIVE PULMONARY DISEASE, UNSPECIFIED COPD TYPE (HCC): ICD-10-CM

## 2019-07-10 LAB — CULTURE, BLOOD 2: NORMAL

## 2019-07-11 LAB
6AM URINE: 752 NG/ML
7-AMINOCLONAZEPAM, URINE: <5 NG/ML
ALPHA-HYDROXYALPRAZOLAM, URINE: <5 NG/ML
ALPHA-HYDROXYMIDAZOLAM, URINE: <20 NG/ML
ALPRAZOLAM, URINE: <5 NG/ML
CHLORDIAZEPOXIDE, URINE: <20 NG/ML
CLONAZEPAM, URINE: <5 NG/ML
COCAINE, CONFIRM, URINE: >1000 NG/ML
CODEINE, URINE: 315 NG/ML
DIAZEPAM, URINE: <20 NG/ML
EDDP, URINE: >5000 NG/ML
HYDROCODONE, URINE: <20 NG/ML
HYDROMORPHONE, URINE: 39 NG/ML
LORAZEPAM, URINE: <20 NG/ML
METHADONE, URINE: >5000 NG/ML
MIDAZOLAM, URINE: <20 NG/ML
MORPHINE URINE: >4000 NG/ML
NORDIAZEPAM, URINE: 371 NG/ML
NORHYDROCODONE, URINE: <20 NG/ML
NOROXYCODONE, URINE: <20 NG/ML
NOROXYMORPHONE, URINE: <20 NG/ML
OXAZEPAM, URINE: 1535 NG/ML
OXYCODONE, URINE CONFIRMATION: <20 NG/ML
OXYMORPHONE, URINE: <20 NG/ML
TEMAZEPAM, URINE: 982 NG/ML

## 2019-07-12 ENCOUNTER — TELEPHONE (OUTPATIENT)
Dept: FAMILY MEDICINE CLINIC | Age: 54
End: 2019-07-12

## 2019-07-12 NOTE — TELEPHONE ENCOUNTER
Jovany Mcnair from [facility] called stated pt needs a rx for wound gel, please advise any questions [phone] gael

## 2019-07-17 ENCOUNTER — HOSPITAL ENCOUNTER (OUTPATIENT)
Dept: WOUND CARE | Age: 54
Discharge: HOME | End: 2019-07-17
Payer: OTHER GOVERNMENT

## 2019-07-29 NOTE — DISCHARGE SUMMARY
Hospital Medicine Discharge Summary    Patient ID: Clarissa Castro      Patient's PCP: Ruth Frank DO    Admit Date: 7/4/2019     Discharge Date: 7/9/2019    Admitting Physician: Melina Farrell MD     Discharge Physician: Jada Mccain MD     Discharge Diagnoses: Active Hospital Problems    Diagnosis Date Noted    Subclinical hypothyroidism [E03.9] 07/06/2019    Chronic recurrent multifocal osteomyelitis of foot ([address] [M86.379] 07/06/2019    Pressure injury of heel, stage 3 ([address] [L89.603] 07/06/2019    Bradycardia [R00.1] 07/05/2019    Substance abuse ([facility] [address] [F19.10] 07/05/2019    Current moderate episode of major depressive disorder ([facility] [address] [F32.1] 07/05/2019    Abnormal weight loss [R63.4] 07/05/2019    COPD (chronic obstructive pulmonary disease) ([facility] [address] [J44.9] 07/05/2019    History of fracture of left hip [Z87.81] 07/05/2019    Ambulatory dysfunction [R26.2] 07/05/2019    Hypotension [I95.9] 07/05/2019    Nonhealing ulcer of heel ([address] [L97.409] 07/04/2019       The patient was seen and examined on day of discharge and this discharge summary is in conjunction with any daily progress note from day of discharge. Hospital Course:   Pt has a known history of drug abuse, denies using although she was told she is pos for cocaine and benzos. Has a right foot ulcer. Seen by psych, not at risk, to FU as OP she has a right root ulcer, and was seen by podiatry , they recommend surgery, and the patient refuses any surgical intervention from podiatry or vascular, a constellation of excuses. Just wants abx and to go home She would not be a good candidate for PICC line due to IVDA. On 7/9 pt declining IV antibiotics with placement, so she was transitioned to oral augmentin per ID recs.  On 7/9 pt was discharged home in stable condition with PCP follow up     Exam:     /63   Pulse 65   Temp 98 °F (36.7 °C) (Temporal)   Resp 18   Ht 5' 9\" (1.753 m)   Wt 179 lb 14.4 oz (81.6 kg) LMP 08/28/2013   SpO2 97%   BMI 26.57 kg/m²     Gen: *well developed  HEENT: NC/AT, moist mucous membranes, no oropharyngeal erythema or exudate  Neck: supple, trachea midline, no anterior cervical or SC LAD  Heart:  Normal s1/s2, RRR, no murmurs, gallops, or rubs. Lungs:   cta  bilaterally, *  Abd: bowel sounds present, soft, nontender, nondistended, no masses  Extrem:  No clubbing, cyanosis,  *dsd right foot   Skin: no rashes or lesions  Psych: A & O x3  Neuro: grossly intact, moves all four extremities.    Capillary Refill: Brisk,< 3 seconds   Peripheral Pulses: +2 palpable, equal bilaterally          Consults:     IP CONSULT TO INTERNAL MEDICINE  IP CONSULT TO PSYCHIATRY  IP CONSULT TO PODIATRY  IP CONSULT TO PHARMACY  IP CONSULT TO INFECTIOUS DISEASES  IP CONSULT TO VASCULAR SURGERY  IP CONSULT TO DIETITIAN    Significant Diagnostic Studies:     MRI ANKLE RIGHT W WO CONTRAST   Final Result      1. Large chronic calcaneal plantar ulcer crater. 2. Signs for chronic indolent on going osteomyelitis in the calcaneus,   more discretely in the inferior subcortical region of the cuboid bone,   and in the anterior inferior aspect of the articular surfaces of the   tibiotalar joint in including the anterior aspect of the distal   articular epiphysis of the tibia, the anterior articular surface of   the talar dome, and dorsal aspect of the talar neck/head . 3. Diffuse  chronic indolent on going cellulitis seen in the plantar   calcaneal region and para calcaneal areas including across the region   of the tarsi sinus, and also with involvement of the plantar fascia. 4. No conspicuous localized abscess identified. ALERT:  ABNORMAL REPORT. XR CHEST PORTABLE   Final Result   Increased opacification right paratracheal stripe region,   possibly reflecting overlapping normal anatomical structures versus   possible prominent vasculature or a abnormal lymphadenopathy.  Further   evaluation with

## 2019-07-31 ENCOUNTER — TELEPHONE (OUTPATIENT)
Dept: FAMILY MEDICINE CLINIC | Age: 54
End: 2019-07-31

## 2019-08-08 ENCOUNTER — HOSPITAL ENCOUNTER (OUTPATIENT)
Age: 54
Discharge: HOME | End: 2019-08-10
Payer: OTHER GOVERNMENT

## 2019-08-08 ENCOUNTER — OFFICE VISIT (OUTPATIENT)
Dept: FAMILY MEDICINE CLINIC | Age: 54
End: 2019-08-08
Payer: MEDICAID

## 2019-08-08 VITALS
SYSTOLIC BLOOD PRESSURE: 130 MMHG | TEMPERATURE: 97.6 F | HEART RATE: 70 BPM | BODY MASS INDEX: 26.07 KG/M2 | RESPIRATION RATE: 20 BRPM | HEIGHT: 69 IN | DIASTOLIC BLOOD PRESSURE: 79 MMHG | WEIGHT: 176 LBS

## 2019-08-08 DIAGNOSIS — B19.20 HEPATITIS C VIRUS INFECTION WITHOUT HEPATIC COMA, UNSPECIFIED CHRONICITY: ICD-10-CM

## 2019-08-08 DIAGNOSIS — S91.301A OPEN WOUND OF RIGHT HEEL, INITIAL ENCOUNTER: Primary | ICD-10-CM

## 2019-08-08 DIAGNOSIS — E03.9 HYPOTHYROIDISM, UNSPECIFIED TYPE: ICD-10-CM

## 2019-08-08 LAB
T4 FREE: 0.98 NG/DL (ref 0.93–1.7)
TSH SERPL DL<=0.05 MIU/L-ACNC: 2.92 UIU/ML (ref 0.27–4.2)

## 2019-08-08 PROCEDURE — 84443 ASSAY THYROID STIM HORMONE: CPT

## 2019-08-08 PROCEDURE — 99214 OFFICE O/P EST MOD 30 MIN: CPT | Performed by: FAMILY MEDICINE

## 2019-08-08 PROCEDURE — 1036F TOBACCO NON-USER: CPT | Performed by: FAMILY MEDICINE

## 2019-08-08 PROCEDURE — G8427 DOCREV CUR MEDS BY ELIG CLIN: HCPCS | Performed by: FAMILY MEDICINE

## 2019-08-08 PROCEDURE — 1111F DSCHRG MED/CURRENT MED MERGE: CPT | Performed by: FAMILY MEDICINE

## 2019-08-08 PROCEDURE — 3017F COLORECTAL CA SCREEN DOC REV: CPT | Performed by: FAMILY MEDICINE

## 2019-08-08 PROCEDURE — 87522 HEPATITIS C REVRS TRNSCRPJ: CPT

## 2019-08-08 PROCEDURE — 84439 ASSAY OF FREE THYROXINE: CPT

## 2019-08-08 PROCEDURE — G8419 CALC BMI OUT NRM PARAM NOF/U: HCPCS | Performed by: FAMILY MEDICINE

## 2019-08-08 RX ORDER — BUDESONIDE AND FORMOTEROL FUMARATE DIHYDRATE 160; 4.5 UG/1; UG/1
2 AEROSOL RESPIRATORY (INHALATION) 2 TIMES DAILY
Qty: 3 INHALER | Refills: 1 | Status: SHIPPED | OUTPATIENT
Start: 2019-08-08 | End: 2019-10-11 | Stop reason: ALTCHOICE

## 2019-08-08 RX ORDER — AMOXICILLIN AND CLAVULANATE POTASSIUM 875; 125 MG/1; MG/1
1 TABLET, FILM COATED ORAL 2 TIMES DAILY WITH MEALS
Qty: 20 TABLET | Refills: 0 | Status: SHIPPED | OUTPATIENT
Start: 2019-08-08 | End: 2019-08-18

## 2019-08-08 NOTE — PROGRESS NOTES
Angela [facility]  : 1965    Chief Complaint:     Chief Complaint   Patient presents with    Care Management     TCM    Referral - General     wound care    Discuss Medications     symbicort PA needed       HPI  Angela [facility] 49 y.o. presents for   Chief Complaint   Patient presents with   [address] Management     TCM    Referral - General     wound care    Discuss Medications     symbicort PA needed     Presents for follow-up. She states that she is doing improved. She is following with home care and they are coming once a week for her wound. However she missed her wound care appointment. We did discuss the need for her to follow-up with wound care and the importance of this. She states she will start to follow. She does need another antibiotic as well she did not  the prescription that was from the hospital.  She also states Symbicort works better than Advair and wishes for this today. I did explain that her insurance will not pay for this we do not have samples. Her hepatitis panel was positive in the hospital.  Will check hep C viral load. However she did have cocaine in her system and she likely will not be able to get treatment at this time. Thyroid labs are also elevated reviewed we will repeat those today. Otherwise she feels well. She does admit to some pain in her hips. All questions were answered to patients satisfaction.     Past Medical History:   Diagnosis Date    Abscess     states for a couple years she has had problems with     Chronic pain     Chronic recurrent multifocal osteomyelitis of foot ([address] 2019    Hip fracture ([address]     Hx of blood clots     dvt rt calf    Pressure injury of heel, stage 3 ([address] 2019    Subclinical hypothyroidism 2019       Past Surgical History:   Procedure Laterality Date     SECTION      x three    DRAIN SKIN ABSCESS SIMPLE  2014         FOOT SURGERY         Social History Socioeconomic History    Marital status:      Spouse name: None    Number of children: None    Years of education: None    Highest education level: None   Occupational History    None   Social Needs    Financial resource strain: None    Food insecurity:     Worry: None     Inability: None    Transportation needs:     Medical: None     Non-medical: None   Tobacco Use    Smoking status: Former Smoker     Packs/day: 1.00     Types: Cigarettes    Smokeless tobacco: Never Used    Tobacco comment: approx 7 months ago 7-4-19   Substance and Sexual Activity    Alcohol use: No    Drug use: No     Comment: clean since 2015    Sexual activity: None   Lifestyle    Physical activity:     Days per week: None     Minutes per session: None    Stress: None   Relationships    Social connections:     Talks on phone: None     Gets together: None     Attends Rastafarian service: None     Active member of club or organization: None     Attends meetings of clubs or organizations: None     Relationship status: None    Intimate partner violence:     Fear of current or ex partner: None     Emotionally abused: None     Physically abused: None     Forced sexual activity: None   Other Topics Concern    None   Social History Narrative    None       History reviewed. No pertinent family history. Current Outpatient Medications   Medication Sig Dispense Refill    budesonide-formoterol (SYMBICORT) 160-4.5 MCG/ACT AERO Inhale 2 puffs into the lungs 2 times daily 3 Inhaler 1    amoxicillin-clavulanate (AUGMENTIN) 875-125 MG per tablet Take 1 tablet by mouth 2 times daily (with meals) for 10 days 20 tablet 0    albuterol (PROVENTIL) (2.5 MG/3ML) 0.083% nebulizer solution Take 3 mLs by nebulization every 6 hours as needed for Wheezing or Shortness of Breath 120 each 3    METHADONE HCL PO Take 105 mg by mouth daily       gabapentin (NEURONTIN) 300 MG capsule Take 1 capsule by mouth 2 times daily for 30 days. for undiagnosed pathology, especially cancer, as well as protecting against potentially harmful/life threatening disease.          Patient and/or guardian verbalizes understanding and agrees with above counseling, assessment and plan.     All questions answered. Jay 5, DO  8/8/19    NOTE: This report was transcribed using voice recognition software.  Every effort was made to ensure accuracy; however, inadvertent computerized transcription errors may be present

## 2019-08-09 ASSESSMENT — ENCOUNTER SYMPTOMS
COUGH: 0
ABDOMINAL PAIN: 0
SHORTNESS OF BREATH: 0
BACK PAIN: 0
EYE PAIN: 0
SORE THROAT: 0
SINUS PRESSURE: 0
CONSTIPATION: 0
DIARRHEA: 0

## 2019-08-11 LAB
HCV QNT BY NAAT IU/ML: NOT DETECTED IU/ML
HCV QNT BY NAAT LOG IU/ML: NOT DETECTED LOG IU/ML
INTERPRETATION: NOT DETECTED

## 2019-08-21 ENCOUNTER — HOSPITAL ENCOUNTER (OUTPATIENT)
Dept: WOUND CARE | Age: 54
Discharge: HOME | End: 2019-08-21
Payer: OTHER GOVERNMENT

## 2019-08-21 ENCOUNTER — HOSPITAL ENCOUNTER (OUTPATIENT)
Age: 54
Discharge: HOME | End: 2019-08-23
Payer: OTHER GOVERNMENT

## 2019-08-21 VITALS
SYSTOLIC BLOOD PRESSURE: 118 MMHG | RESPIRATION RATE: 20 BRPM | HEART RATE: 60 BPM | TEMPERATURE: 98.3 F | DIASTOLIC BLOOD PRESSURE: 60 MMHG | BODY MASS INDEX: 26.07 KG/M2 | WEIGHT: 176 LBS | HEIGHT: 69 IN

## 2019-08-21 DIAGNOSIS — L97.409 NONHEALING ULCER OF HEEL (HCC): Primary | ICD-10-CM

## 2019-08-21 PROCEDURE — 87075 CULTR BACTERIA EXCEPT BLOOD: CPT

## 2019-08-21 PROCEDURE — 87070 CULTURE OTHR SPECIMN AEROBIC: CPT

## 2019-08-21 PROCEDURE — 87205 SMEAR GRAM STAIN: CPT

## 2019-08-21 PROCEDURE — 11042 DBRDMT SUBQ TIS 1ST 20SQCM/<: CPT | Performed by: SURGERY

## 2019-08-21 PROCEDURE — 99204 OFFICE O/P NEW MOD 45 MIN: CPT | Performed by: SURGERY

## 2019-08-21 PROCEDURE — 11042 DBRDMT SUBQ TIS 1ST 20SQCM/<: CPT

## 2019-08-21 PROCEDURE — 99213 OFFICE O/P EST LOW 20 MIN: CPT

## 2019-08-21 RX ORDER — AMOXICILLIN 500 MG/1
250 CAPSULE ORAL 2 TIMES DAILY
COMMUNITY
End: 2019-10-11 | Stop reason: ALTCHOICE

## 2019-08-21 RX ORDER — GABAPENTIN 300 MG/1
300 CAPSULE ORAL 2 TIMES DAILY
COMMUNITY

## 2019-08-21 RX ORDER — AMOXICILLIN AND CLAVULANATE POTASSIUM 875; 125 MG/1; MG/1
1 TABLET, FILM COATED ORAL 2 TIMES DAILY
Qty: 20 TABLET | Refills: 0 | Status: SHIPPED | OUTPATIENT
Start: 2019-08-21 | End: 2019-08-31

## 2019-08-21 RX ORDER — LIDOCAINE HYDROCHLORIDE 20 MG/ML
JELLY TOPICAL ONCE
Status: DISCONTINUED | OUTPATIENT
Start: 2019-08-21 | End: 2019-08-22 | Stop reason: HOSPADM

## 2019-08-21 RX ORDER — M-VIT,TX,IRON,MINS/CALC/FOLIC 27MG-0.4MG
1 TABLET ORAL DAILY
COMMUNITY
End: 2021-07-20

## 2019-08-21 RX ORDER — MUPIROCIN CALCIUM 20 MG/G
CREAM TOPICAL
Qty: 1 TUBE | Refills: 2 | Status: SHIPPED | OUTPATIENT
Start: 2019-08-21 | End: 2019-09-20

## 2019-08-21 ASSESSMENT — PAIN DESCRIPTION - PAIN TYPE: TYPE: CHRONIC PAIN

## 2019-08-21 ASSESSMENT — PAIN DESCRIPTION - LOCATION: LOCATION: FOOT

## 2019-08-21 ASSESSMENT — PAIN - FUNCTIONAL ASSESSMENT: PAIN_FUNCTIONAL_ASSESSMENT: ACTIVITIES ARE NOT PREVENTED

## 2019-08-21 ASSESSMENT — PAIN DESCRIPTION - DESCRIPTORS: DESCRIPTORS: BURNING

## 2019-08-21 ASSESSMENT — PAIN DESCRIPTION - FREQUENCY: FREQUENCY: CONTINUOUS

## 2019-08-21 ASSESSMENT — PAIN DESCRIPTION - ONSET: ONSET: ON-GOING

## 2019-08-21 ASSESSMENT — PAIN DESCRIPTION - ORIENTATION: ORIENTATION: RIGHT

## 2019-08-21 ASSESSMENT — PAIN SCALES - GENERAL: PAINLEVEL_OUTOF10: 9

## 2019-08-21 ASSESSMENT — PAIN DESCRIPTION - PROGRESSION: CLINICAL_PROGRESSION: NOT CHANGED

## 2019-08-21 NOTE — PROGRESS NOTES
07/05/2019    CREATININE 0.9 07/05/2019       RADIOLOGY:    Assessment:   Please refer to nursing measurements and assessment regarding wound size pre and post debridement. Wound check. Care provided includes removal of existing dressing and visual inspection    Procedure:  Debridement: Excisional Debridement  Using curette the wound was sharply debrided    down through and including the removal of subcutaneous tissue. The wound(s) was debrided sharply of fibrotic, necrotic, and hyperkeratotic tissue, including a layer of surrounding healthy tissue. Devitalized Tissue Debrided:  biofilm, slough, necrotic/eschar and exudatee. Percent of Wound Debrided: 100%  Total Surface Area Debrided:   < 20 sq cm  Bleeding: Minimal  Hemostasis:   not needed  Response to treatment:  Well tolerated by patient. Plan:   Plan for wound - Dress per physician order  Treatment:     Compression : no   Dressing : bactroban and santyl   Additional : xray of foot     1. Labs ordered Yes  2. Cultures done Yes  3. Vascular Studies ordered No  4. Pt is not a smoker   - Discussed relationship of smoking and negative affects on wound healing   - Emphasized importance of tobacco avoidace/cessation   - Script for nicotine patch given to patient   - Information regarding support groups for smoking cessation given  5. Discussed appropriate home care of this wound. , Dispensed dressing supplies and instructions on their use., Wound redressed. 6. Patient instructions were given. 7. Follow up: 1 week.     Bird Moreira MD

## 2019-08-23 LAB — ANAEROBIC CULTURE: NORMAL

## 2019-08-24 LAB
GRAM STAIN RESULT: NORMAL
WOUND/ABSCESS: NORMAL

## 2019-08-30 ENCOUNTER — HOSPITAL ENCOUNTER (OUTPATIENT)
Dept: WOUND CARE | Age: 54
Discharge: HOME | End: 2019-08-30
Payer: OTHER GOVERNMENT

## 2019-09-06 ENCOUNTER — HOSPITAL ENCOUNTER (OUTPATIENT)
Dept: WOUND CARE | Age: 54
Discharge: HOME | End: 2019-09-06
Payer: OTHER GOVERNMENT

## 2019-09-13 ENCOUNTER — HOSPITAL ENCOUNTER (OUTPATIENT)
Dept: WOUND CARE | Age: 54
Discharge: HOME | End: 2019-09-13
Payer: OTHER GOVERNMENT

## 2019-09-20 ENCOUNTER — TELEPHONE (OUTPATIENT)
Dept: ADMINISTRATIVE | Age: 54
End: 2019-09-20

## 2019-09-27 ENCOUNTER — HOSPITAL ENCOUNTER (OUTPATIENT)
Dept: WOUND CARE | Age: 54
Discharge: HOME | End: 2019-09-27
Payer: OTHER GOVERNMENT

## 2019-10-03 ENCOUNTER — TELEPHONE (OUTPATIENT)
Dept: FAMILY MEDICINE CLINIC | Age: 54
End: 2019-10-03

## 2019-10-04 ENCOUNTER — HOSPITAL ENCOUNTER (OUTPATIENT)
Dept: WOUND CARE | Age: 54
Discharge: HOME | End: 2019-10-04
Payer: OTHER GOVERNMENT

## 2019-10-10 ENCOUNTER — TELEPHONE (OUTPATIENT)
Dept: ADMINISTRATIVE | Age: 54
End: 2019-10-10

## 2019-10-11 ENCOUNTER — HOSPITAL ENCOUNTER (OUTPATIENT)
Dept: WOUND CARE | Age: 54
Discharge: HOME | End: 2019-10-11
Payer: OTHER GOVERNMENT

## 2019-10-11 VITALS
BODY MASS INDEX: 26.51 KG/M2 | WEIGHT: 179 LBS | DIASTOLIC BLOOD PRESSURE: 64 MMHG | SYSTOLIC BLOOD PRESSURE: 116 MMHG | RESPIRATION RATE: 18 BRPM | HEIGHT: 69 IN | HEART RATE: 76 BPM | TEMPERATURE: 97.7 F

## 2019-10-11 DIAGNOSIS — L97.409 NONHEALING ULCER OF HEEL (HCC): ICD-10-CM

## 2019-10-11 DIAGNOSIS — L89.613 PRESSURE INJURY OF RIGHT HEEL, STAGE 3 (HCC): Primary | Chronic | ICD-10-CM

## 2019-10-11 PROCEDURE — 99213 OFFICE O/P EST LOW 20 MIN: CPT

## 2019-10-11 PROCEDURE — 99203 OFFICE O/P NEW LOW 30 MIN: CPT | Performed by: PODIATRIST

## 2019-10-11 RX ORDER — LIDOCAINE HYDROCHLORIDE 20 MG/ML
JELLY TOPICAL ONCE
Status: DISCONTINUED | OUTPATIENT
Start: 2019-10-11 | End: 2019-10-12 | Stop reason: HOSPADM

## 2019-10-11 ASSESSMENT — PAIN DESCRIPTION - FREQUENCY: FREQUENCY: CONTINUOUS

## 2019-10-11 ASSESSMENT — PAIN DESCRIPTION - LOCATION: LOCATION: FOOT

## 2019-10-11 ASSESSMENT — PAIN SCALES - GENERAL: PAINLEVEL_OUTOF10: 8

## 2019-10-11 ASSESSMENT — PAIN DESCRIPTION - ONSET: ONSET: ON-GOING

## 2019-10-11 ASSESSMENT — PAIN DESCRIPTION - ORIENTATION: ORIENTATION: RIGHT

## 2019-10-11 ASSESSMENT — PAIN DESCRIPTION - PROGRESSION: CLINICAL_PROGRESSION: NOT CHANGED

## 2019-10-11 ASSESSMENT — PAIN DESCRIPTION - DESCRIPTORS: DESCRIPTORS: BURNING

## 2019-10-11 ASSESSMENT — PAIN DESCRIPTION - PAIN TYPE: TYPE: CHRONIC PAIN

## 2019-10-30 ENCOUNTER — TELEPHONE (OUTPATIENT)
Dept: WOUND CARE | Age: 54
End: 2019-10-30

## 2019-11-23 RX ORDER — GABAPENTIN 300 MG/1
CAPSULE ORAL
Qty: 60 CAPSULE | Refills: 2 | OUTPATIENT
Start: 2019-11-23

## 2019-12-02 ENCOUNTER — TELEPHONE (OUTPATIENT)
Dept: FAMILY MEDICINE CLINIC | Age: 54
End: 2019-12-02

## 2020-02-08 ENCOUNTER — HOSPITAL ENCOUNTER (INPATIENT)
Age: 55
LOS: 3 days | Discharge: HOME | DRG: 140 | End: 2020-02-11
Attending: EMERGENCY MEDICINE | Admitting: INTERNAL MEDICINE
Payer: MEDICAID

## 2020-02-08 ENCOUNTER — APPOINTMENT (OUTPATIENT)
Dept: GENERAL RADIOLOGY | Age: 55
DRG: 140 | End: 2020-02-08
Payer: MEDICAID

## 2020-02-08 PROBLEM — J96.01 ACUTE RESPIRATORY FAILURE WITH HYPOXIA (HCC): Status: ACTIVE | Noted: 2020-02-08

## 2020-02-08 PROBLEM — J44.1 COPD EXACERBATION (HCC): Status: ACTIVE | Noted: 2020-02-08

## 2020-02-08 LAB
ALBUMIN SERPL-MCNC: 3.5 G/DL (ref 3.5–5.2)
ALP BLD-CCNC: 103 U/L (ref 35–104)
ALT SERPL-CCNC: 8 U/L (ref 0–32)
ANION GAP SERPL CALCULATED.3IONS-SCNC: 10 MMOL/L (ref 7–16)
AST SERPL-CCNC: 10 U/L (ref 0–31)
BASOPHILS ABSOLUTE: 0.03 E9/L (ref 0–0.2)
BASOPHILS RELATIVE PERCENT: 0.4 % (ref 0–2)
BILIRUB SERPL-MCNC: 0.4 MG/DL (ref 0–1.2)
BUN BLDV-MCNC: 7 MG/DL (ref 6–20)
CALCIUM SERPL-MCNC: 9.1 MG/DL (ref 8.6–10.2)
CHLORIDE BLD-SCNC: 96 MMOL/L (ref 98–107)
CO2: 31 MMOL/L (ref 22–29)
CREAT SERPL-MCNC: 0.7 MG/DL (ref 0.5–1)
EKG ATRIAL RATE: 72 BPM
EKG P AXIS: 81 DEGREES
EKG P-R INTERVAL: 126 MS
EKG Q-T INTERVAL: 424 MS
EKG QRS DURATION: 94 MS
EKG QTC CALCULATION (BAZETT): 464 MS
EKG R AXIS: 60 DEGREES
EKG T AXIS: 61 DEGREES
EKG VENTRICULAR RATE: 72 BPM
EOSINOPHILS ABSOLUTE: 0.01 E9/L (ref 0.05–0.5)
EOSINOPHILS RELATIVE PERCENT: 0.1 % (ref 0–6)
GFR AFRICAN AMERICAN: >60
GFR NON-AFRICAN AMERICAN: >60 ML/MIN/1.73
GLUCOSE BLD-MCNC: 175 MG/DL (ref 74–99)
HCT VFR BLD CALC: 42.1 % (ref 34–48)
HEMOGLOBIN: 12.8 G/DL (ref 11.5–15.5)
IMMATURE GRANULOCYTES #: 0.03 E9/L
IMMATURE GRANULOCYTES %: 0.4 % (ref 0–5)
INFLUENZA A BY PCR: NOT DETECTED
INFLUENZA B BY PCR: NOT DETECTED
LACTIC ACID: 0.9 MMOL/L (ref 0.5–2.2)
LYMPHOCYTES ABSOLUTE: 0.78 E9/L (ref 1.5–4)
LYMPHOCYTES RELATIVE PERCENT: 9.3 % (ref 20–42)
MCH RBC QN AUTO: 30.2 PG (ref 26–35)
MCHC RBC AUTO-ENTMCNC: 30.4 % (ref 32–34.5)
MCV RBC AUTO: 99.3 FL (ref 80–99.9)
MONOCYTES ABSOLUTE: 0.78 E9/L (ref 0.1–0.95)
MONOCYTES RELATIVE PERCENT: 9.3 % (ref 2–12)
NEUTROPHILS ABSOLUTE: 6.77 E9/L (ref 1.8–7.3)
NEUTROPHILS RELATIVE PERCENT: 80.5 % (ref 43–80)
PDW BLD-RTO: 14.3 FL (ref 11.5–15)
PLATELET # BLD: 220 E9/L (ref 130–450)
PMV BLD AUTO: 10.4 FL (ref 7–12)
POTASSIUM REFLEX MAGNESIUM: 4 MMOL/L (ref 3.5–5)
PRO-BNP: 1110 PG/ML (ref 0–125)
PROCALCITONIN: 0.07 NG/ML (ref 0–0.08)
RBC # BLD: 4.24 E12/L (ref 3.5–5.5)
SODIUM BLD-SCNC: 137 MMOL/L (ref 132–146)
TOTAL PROTEIN: 7.7 G/DL (ref 6.4–8.3)
TROPONIN: <0.01 NG/ML (ref 0–0.03)
WBC # BLD: 8.4 E9/L (ref 4.5–11.5)

## 2020-02-08 PROCEDURE — 6370000000 HC RX 637 (ALT 250 FOR IP): Performed by: STUDENT IN AN ORGANIZED HEALTH CARE EDUCATION/TRAINING PROGRAM

## 2020-02-08 PROCEDURE — 6360000002 HC RX W HCPCS: Performed by: STUDENT IN AN ORGANIZED HEALTH CARE EDUCATION/TRAINING PROGRAM

## 2020-02-08 PROCEDURE — 84484 ASSAY OF TROPONIN QUANT: CPT

## 2020-02-08 PROCEDURE — 80053 COMPREHEN METABOLIC PANEL: CPT

## 2020-02-08 PROCEDURE — 99285 EMERGENCY DEPT VISIT HI MDM: CPT

## 2020-02-08 PROCEDURE — 84145 PROCALCITONIN (PCT): CPT

## 2020-02-08 PROCEDURE — 93005 ELECTROCARDIOGRAM TRACING: CPT | Performed by: STUDENT IN AN ORGANIZED HEALTH CARE EDUCATION/TRAINING PROGRAM

## 2020-02-08 PROCEDURE — G0378 HOSPITAL OBSERVATION PER HR: HCPCS

## 2020-02-08 PROCEDURE — 93010 ELECTROCARDIOGRAM REPORT: CPT | Performed by: INTERNAL MEDICINE

## 2020-02-08 PROCEDURE — 99222 1ST HOSP IP/OBS MODERATE 55: CPT | Performed by: INTERNAL MEDICINE

## 2020-02-08 PROCEDURE — 87077 CULTURE AEROBIC IDENTIFY: CPT

## 2020-02-08 PROCEDURE — 96372 THER/PROPH/DIAG INJ SC/IM: CPT

## 2020-02-08 PROCEDURE — 85025 COMPLETE CBC W/AUTO DIFF WBC: CPT

## 2020-02-08 PROCEDURE — 36415 COLL VENOUS BLD VENIPUNCTURE: CPT

## 2020-02-08 PROCEDURE — 96374 THER/PROPH/DIAG INJ IV PUSH: CPT

## 2020-02-08 PROCEDURE — 83605 ASSAY OF LACTIC ACID: CPT

## 2020-02-08 PROCEDURE — 83880 ASSAY OF NATRIURETIC PEPTIDE: CPT

## 2020-02-08 PROCEDURE — 71045 X-RAY EXAM CHEST 1 VIEW: CPT

## 2020-02-08 PROCEDURE — 87206 SMEAR FLUORESCENT/ACID STAI: CPT

## 2020-02-08 PROCEDURE — 94640 AIRWAY INHALATION TREATMENT: CPT

## 2020-02-08 PROCEDURE — 0100U HC RESPIRPTHGN MULT REV TRANS & AMP PRB TECH 21 TRGT: CPT

## 2020-02-08 PROCEDURE — 96375 TX/PRO/DX INJ NEW DRUG ADDON: CPT

## 2020-02-08 PROCEDURE — 2500000003 HC RX 250 WO HCPCS: Performed by: STUDENT IN AN ORGANIZED HEALTH CARE EDUCATION/TRAINING PROGRAM

## 2020-02-08 PROCEDURE — 87184 SC STD DISK METHOD PER PLATE: CPT

## 2020-02-08 PROCEDURE — 87040 BLOOD CULTURE FOR BACTERIA: CPT

## 2020-02-08 PROCEDURE — 1200000000 HC SEMI PRIVATE

## 2020-02-08 PROCEDURE — 2580000003 HC RX 258: Performed by: STUDENT IN AN ORGANIZED HEALTH CARE EDUCATION/TRAINING PROGRAM

## 2020-02-08 PROCEDURE — 94761 N-INVAS EAR/PLS OXIMETRY MLT: CPT

## 2020-02-08 PROCEDURE — 99223 1ST HOSP IP/OBS HIGH 75: CPT | Performed by: INTERNAL MEDICINE

## 2020-02-08 PROCEDURE — 87070 CULTURE OTHR SPECIMN AEROBIC: CPT

## 2020-02-08 PROCEDURE — 94760 N-INVAS EAR/PLS OXIMETRY 1: CPT

## 2020-02-08 PROCEDURE — 96365 THER/PROPH/DIAG IV INF INIT: CPT

## 2020-02-08 PROCEDURE — 94664 DEMO&/EVAL PT USE INHALER: CPT

## 2020-02-08 PROCEDURE — 87502 INFLUENZA DNA AMP PROBE: CPT

## 2020-02-08 RX ORDER — METHYLPREDNISOLONE SODIUM SUCCINATE 125 MG/2ML
125 INJECTION, POWDER, LYOPHILIZED, FOR SOLUTION INTRAMUSCULAR; INTRAVENOUS DAILY
Status: DISCONTINUED | OUTPATIENT
Start: 2020-02-08 | End: 2020-02-08

## 2020-02-08 RX ORDER — METHADONE HYDROCHLORIDE 10 MG/ML
105 CONCENTRATE ORAL EVERY MORNING
Status: DISCONTINUED | OUTPATIENT
Start: 2020-02-09 | End: 2020-02-11 | Stop reason: HOSPADM

## 2020-02-08 RX ORDER — ARFORMOTEROL TARTRATE 15 UG/2ML
15 SOLUTION RESPIRATORY (INHALATION) EVERY 12 HOURS
Status: DISCONTINUED | OUTPATIENT
Start: 2020-02-08 | End: 2020-02-11 | Stop reason: HOSPADM

## 2020-02-08 RX ORDER — ONDANSETRON 2 MG/ML
4 INJECTION INTRAMUSCULAR; INTRAVENOUS EVERY 6 HOURS PRN
Status: DISCONTINUED | OUTPATIENT
Start: 2020-02-08 | End: 2020-02-11 | Stop reason: HOSPADM

## 2020-02-08 RX ORDER — PREDNISONE 20 MG/1
40 TABLET ORAL 2 TIMES DAILY
Status: DISCONTINUED | OUTPATIENT
Start: 2020-02-09 | End: 2020-02-09

## 2020-02-08 RX ORDER — BUDESONIDE AND FORMOTEROL FUMARATE DIHYDRATE 160; 4.5 UG/1; UG/1
2 AEROSOL RESPIRATORY (INHALATION) DAILY
COMMUNITY

## 2020-02-08 RX ORDER — LEVOFLOXACIN 750 MG/1
750 TABLET ORAL DAILY
Status: DISCONTINUED | OUTPATIENT
Start: 2020-02-08 | End: 2020-02-09

## 2020-02-08 RX ORDER — SODIUM CHLORIDE 0.9 % (FLUSH) 0.9 %
10 SYRINGE (ML) INJECTION EVERY 12 HOURS SCHEDULED
Status: DISCONTINUED | OUTPATIENT
Start: 2020-02-08 | End: 2020-02-11 | Stop reason: HOSPADM

## 2020-02-08 RX ORDER — IPRATROPIUM BROMIDE AND ALBUTEROL SULFATE 2.5; .5 MG/3ML; MG/3ML
1 SOLUTION RESPIRATORY (INHALATION)
Status: DISCONTINUED | OUTPATIENT
Start: 2020-02-08 | End: 2020-02-11 | Stop reason: HOSPADM

## 2020-02-08 RX ORDER — 0.9 % SODIUM CHLORIDE 0.9 %
500 INTRAVENOUS SOLUTION INTRAVENOUS ONCE
Status: COMPLETED | OUTPATIENT
Start: 2020-02-08 | End: 2020-02-08

## 2020-02-08 RX ORDER — BUDESONIDE 0.5 MG/2ML
500 INHALANT ORAL 2 TIMES DAILY
Status: DISCONTINUED | OUTPATIENT
Start: 2020-02-08 | End: 2020-02-11 | Stop reason: HOSPADM

## 2020-02-08 RX ORDER — GABAPENTIN 300 MG/1
300 CAPSULE ORAL 2 TIMES DAILY
Status: DISCONTINUED | OUTPATIENT
Start: 2020-02-08 | End: 2020-02-11 | Stop reason: HOSPADM

## 2020-02-08 RX ORDER — IPRATROPIUM BROMIDE AND ALBUTEROL SULFATE 2.5; .5 MG/3ML; MG/3ML
3 SOLUTION RESPIRATORY (INHALATION) ONCE
Status: COMPLETED | OUTPATIENT
Start: 2020-02-08 | End: 2020-02-08

## 2020-02-08 RX ORDER — PREDNISONE 20 MG/1
40 TABLET ORAL 2 TIMES DAILY
Status: CANCELLED | OUTPATIENT
Start: 2020-02-09

## 2020-02-08 RX ORDER — SODIUM CHLORIDE 0.9 % (FLUSH) 0.9 %
10 SYRINGE (ML) INJECTION PRN
Status: DISCONTINUED | OUTPATIENT
Start: 2020-02-08 | End: 2020-02-11 | Stop reason: HOSPADM

## 2020-02-08 RX ORDER — ACETAMINOPHEN 325 MG/1
650 TABLET ORAL EVERY 4 HOURS PRN
Status: DISCONTINUED | OUTPATIENT
Start: 2020-02-08 | End: 2020-02-11 | Stop reason: HOSPADM

## 2020-02-08 RX ORDER — METHADONE HYDROCHLORIDE 5 MG/5ML
120 SOLUTION ORAL EVERY MORNING
Status: ON HOLD | COMMUNITY
End: 2021-07-21

## 2020-02-08 RX ORDER — ALBUTEROL SULFATE 90 UG/1
2 AEROSOL, METERED RESPIRATORY (INHALATION) EVERY 6 HOURS PRN
COMMUNITY

## 2020-02-08 RX ADMIN — IPRATROPIUM BROMIDE AND ALBUTEROL SULFATE 1 AMPULE: .5; 3 SOLUTION RESPIRATORY (INHALATION) at 20:55

## 2020-02-08 RX ADMIN — ENOXAPARIN SODIUM 40 MG: 40 INJECTION SUBCUTANEOUS at 14:27

## 2020-02-08 RX ADMIN — ARFORMOTEROL TARTRATE 15 MCG: 15 SOLUTION RESPIRATORY (INHALATION) at 20:55

## 2020-02-08 RX ADMIN — DOXYCYCLINE 100 MG: 100 INJECTION, POWDER, LYOPHILIZED, FOR SOLUTION INTRAVENOUS at 13:08

## 2020-02-08 RX ADMIN — BUDESONIDE 500 MCG: 0.5 SUSPENSION RESPIRATORY (INHALATION) at 20:55

## 2020-02-08 RX ADMIN — GABAPENTIN 300 MG: 300 CAPSULE ORAL at 14:27

## 2020-02-08 RX ADMIN — METHYLPREDNISOLONE SODIUM SUCCINATE 125 MG: 125 INJECTION, POWDER, FOR SOLUTION INTRAMUSCULAR; INTRAVENOUS at 11:13

## 2020-02-08 RX ADMIN — GABAPENTIN 300 MG: 300 CAPSULE ORAL at 20:59

## 2020-02-08 RX ADMIN — SODIUM CHLORIDE, PRESERVATIVE FREE 10 ML: 5 INJECTION INTRAVENOUS at 20:59

## 2020-02-08 RX ADMIN — IPRATROPIUM BROMIDE AND ALBUTEROL SULFATE 1 AMPULE: .5; 3 SOLUTION RESPIRATORY (INHALATION) at 17:09

## 2020-02-08 RX ADMIN — IPRATROPIUM BROMIDE AND ALBUTEROL SULFATE 3 AMPULE: 2.5; .5 SOLUTION RESPIRATORY (INHALATION) at 10:50

## 2020-02-08 RX ADMIN — SODIUM CHLORIDE 500 ML: 9 INJECTION, SOLUTION INTRAVENOUS at 11:12

## 2020-02-08 RX ADMIN — LEVOFLOXACIN 750 MG: 750 TABLET, FILM COATED ORAL at 15:36

## 2020-02-08 ASSESSMENT — ENCOUNTER SYMPTOMS
ABDOMINAL DISTENTION: 0
EYE REDNESS: 0
EYE PAIN: 0
COUGH: 1
WHEEZING: 1
ABDOMINAL PAIN: 0
DIARRHEA: 0
SORE THROAT: 0
SHORTNESS OF BREATH: 1
SINUS PRESSURE: 0
VOMITING: 0
BACK PAIN: 0
NAUSEA: 0
EYE DISCHARGE: 0

## 2020-02-08 ASSESSMENT — PAIN SCALES - GENERAL
PAINLEVEL_OUTOF10: 0

## 2020-02-08 NOTE — H&P
[facility]    Attending Physician Statement:  Mary Jane Mclean M.D., F.A.C.P. I have discussed the case, including pertinent history and exam findings with the resident/NP. I have seen and examined the patient and the key elements of the encounter have been performed by me. I agree with the resident ROS, PMHx, PSHx, meds reviewed and assessment, plan and orders as documented by the resident/NP      Hospital charts reviewed, including other providers notes, relevant labs and imaging. See resident note  Acute hypoxic respiratory fialure- o2 sat 70%s-- ?full admit, home o2  Copd exac  Sp \"cold\" viral like illness, sore throat, nasal congestion  +chills  procalcitonin low-- likely viral bronchtitis acute  Wbc N   NO ACUTE CARDIOPULMONARY PROCESS  +bronchospasms- steroids, duonebs  Doxy - switched to doxy +/- antiviral zinc  Flu A/B negative (defer tamiflu    High bnp, dobut chf more likel yacute bonchitis assoc.  ekg-normal sinus rhythm, incomplete RBBB, , normal axis, unchanged from previous on 7/5/19. /64   Pulse 62   Temp 97 °F (36.1 °C) (Temporal)   Resp 18   Ht 5' 9\" (1.753 m)   Wt 172 lb (78 kg)   LMP 08/28/2013   SpO2 93%   BMI 25.40 kg/m²    On NC o2- o2 sat 93%    Heel osteo hx  ?polysubstance abuse hx  >50% of time spent coordinating care with other providers and/or counseling patient/family  Remainder of medical problems as per resident note.

## 2020-02-08 NOTE — CONSULTS
Pulmonary [facility]                             Pulmonary Consult/Progress Note :          Patient: Suzanne Coelho  MRN: [id number]  : 1965      Date of Admission: .2020 10:37 AM    Consulting Physician:Dr Susy Rajput         Reason for Consultation: Acute COPD exacerbation  CC : Shortness of breath     HPI:   Suzanne Coelho is a 48y.o. year old 28 pack/year smoking history, who quit smoking 4 years ago, presented with worsening shortness of breath and cough along with dyspnea on exertion patient is not on home oxygen    She states that she is having cough and yellow green sputum    She states that she has not been using her inhalers for her oxygen which she is about 2 L as she ran out of it      History of drug abuse, and she is on methadone    Denies any hemoptysis, weight loss, chest x-ray slightly showing some hilar congestion along with left-sided infiltrate    She was admitted for further evaluation and treatment of COPD exacerbation    PAST MEDICAL HISTORY:   Past Medical History:   Diagnosis Date    Abscess     states for a couple years she has had problems with     Chronic pain     Chronic recurrent multifocal osteomyelitis of foot ([address] 2019    Hip fracture ([address]     Hx of blood clots     dvt rt calf    Pressure injury of heel, stage 3 ([address] 2019    Subclinical hypothyroidism 2019       PAST SURGICAL HISTORY:   Past Surgical History:   Procedure Laterality Date     SECTION      x three    DRAIN SKIN ABSCESS SIMPLE  2014         FOOT SURGERY         FAMILY HISTORY:   History reviewed. No pertinent family history. SOCIAL HISTORY:   Social History     Socioeconomic History    Marital status:       Spouse name: Not on file    Number of children: Not on file    Years of education: Not on file    Highest education level: Not on file   Occupational History    Not on file   Social Needs    Musculoskeletal ROS:      - no joint swelling ,no joint pain   Neurological ROS:     -no weakness or numbness    Dermatological ROS:   No skin rash ,no urticaria     PHYSICAL EXAMINATION:     VITAL SIGNS:  /64   Pulse 62   Temp 97 °F (36.1 °C) (Temporal)   Resp 18   Ht 5' 9\" (1.753 m)   Wt 172 lb (78 kg)   LMP 08/28/2013   SpO2 93%   BMI 25.40 kg/m²   Wt Readings from Last 3 Encounters:   02/08/20 172 lb (78 kg)   10/11/19 179 lb (81.2 kg)   08/21/19 176 lb (79.8 kg)     Temp Readings from Last 3 Encounters:   02/08/20 97 °F (36.1 °C) (Temporal)   10/11/19 97.7 °F (36.5 °C) (Oral)   08/21/19 98.3 °F (36.8 °C) (Oral)     TMAX:  BP Readings from Last 3 Encounters:   02/08/20 126/64   10/11/19 116/64   08/21/19 118/60     Pulse Readings from Last 3 Encounters:   02/08/20 62   10/11/19 76   08/21/19 60           INTAKE/OUTPUTS:  No intake/output data recorded.   No intake or output data in the 24 hours ending 02/08/20 1416    General Appearance: alert and oriented to person, place and time, well-developed and   well-nourished, in no acute distress   Eyes: pupils equal, round, and reactive to light, extraocular eye movements intact, conjunctivae normal and sclera anicteric   Neck: neck supple and non tender without mass, no thyromegaly, no thyroid nodules and no cervical adenopathy   Pulmonary/Chest:Rhonchi bilateral,some wheezing   Cardiovascular: normal rate, regular rhythm, normal S1 and S2, no murmurs, rubs, clicks or gallops, distal pulses intact, no carotid bruits, no murmurs, no gallops, no carotid bruits and no JVD   Abdomen: obese, soft, non-tender, non-distended, normal bowel sounds, no masses or organomegaly   Extremities:no edema  Musculoskeletal: normal range of motion, no joint swelling, deformity or tenderness   Neurologic: reflexes normal and symmetric, no cranial nerve deficit noted    LABS/IMAGING:    CBC:  Lab Results   Component Value Date    WBC 8.4 02/08/2020    HGB 12.8 02/08/2020 HCT 42.1 02/08/2020    MCV 99.3 02/08/2020     02/08/2020    LYMPHOPCT 9.3 (L) 02/08/2020    RBC 4.24 02/08/2020    MCH 30.2 02/08/2020    MCHC 30.4 (L) 02/08/2020    RDW 14.3 02/08/2020    NEUTOPHILPCT 80.5 (H) 02/08/2020    MONOPCT 9.3 02/08/2020    BASOPCT 0.4 02/08/2020    NEUTROABS 6.77 02/08/2020    LYMPHSABS 0.78 (L) 02/08/2020    MONOSABS 0.78 02/08/2020    EOSABS 0.01 (L) 02/08/2020    BASOSABS 0.03 02/08/2020       Recent Labs     02/08/20  1100   WBC 8.4   HGB 12.8   HCT 42.1   MCV 99.3          BMP:   Recent Labs     02/08/20  1100      K 4.0   CL 96*   CO2 31*   BUN 7   CREATININE 0.7       MG:   Lab Results   Component Value Date    MG 2.0 10/14/2015     Ca/Phos:   Lab Results   Component Value Date    CALCIUM 9.1 02/08/2020     Amylase: No results found for: AMYLASE  Lipase: No results found for: LIPASE  LIVER PROFILE:   Recent Labs     02/08/20  1100   AST 10   ALT 8   BILITOT 0.4   ALKPHOS 103       PT/INR: No results for input(s): PROTIME, INR in the last 72 hours. APTT: No results for input(s): APTT in the last 72 hours. Cardiac Enzymes:  Lab Results   Component Value Date    TROPONINI <0.01 02/08/2020         :  Patient Active Problem List   Diagnosis    Abscess    Nonhealing ulcer of heel (HCC)    Bradycardia    Substance abuse ([address]    Current moderate episode of major depressive disorder (HCC)    Abnormal weight loss    COPD (chronic obstructive pulmonary disease) (HCC)    History of fracture of left hip    Ambulatory dysfunction    Hypotension    Subclinical hypothyroidism    Chronic recurrent multifocal osteomyelitis of foot (HCC)    Pressure injury of heel, stage 3 (HCC)    Open wound of fifth toe of left foot    COPD exacerbation (HCC)               ASSESSMENT:  1.) Acute COPD exacerbation   2. )Left lower lobe infiltrate   3.)h/o very severe COPD with FEV1 21 %  4. )Chroncic hypoxia   5.)remote history of smoking   6-possible fluid overload high BNP PLAN:    COPD exacerbation ,unsure what trigger it ,no digmd of of infrvtion ,check viral apnel and D Dimer since low prob for PE    *-IV steroids   *-on Levaquin ,if CRP negative we can stop abx   *- check viral panel   *- Sputum culture if  Possible   *- pro calcitonin 0.08  *-BD  ICS   Strep pneum  legionella  Incentive spirmetery and flutter valve   CT chest down the road as she will need screen regardless  Check o2 at ambulation before discharge  May needed NIMV if in any distress            Thank you very much for allowing me to participate in the care of this pleasant patient , should you have any questions ,please do not hesitate to contact me         [address]     NOTE: This report was transcribed using voice recognition software. Every effort was made to ensure accuracy; however, inadvertent computerized transcription errors may be present.

## 2020-02-08 NOTE — H&P
as needed for Wheezing   Yes Historical Provider, MD   gabapentin (NEURONTIN) 300 MG capsule Take 300 mg by mouth 2 times daily. Yes Historical Provider, MD   Multiple Vitamins-Minerals (THERAPEUTIC MULTIVITAMIN-MINERALS) tablet Take 1 tablet by mouth daily   Yes Historical Provider, MD       Allergies: Ancef [cefazolin]; Duricef [cefadroxil]; and Iodine    Social History:   TOBACCO:   reports that she has quit smoking. Her smoking use included cigarettes. She smoked 1.00 pack per day. She has never used smokeless tobacco.  ETOH:   reports no history of alcohol use. Family History:   History reviewed. No pertinent family history. REVIEW OF SYSTEMS:    · Constitutional: fever,  chills, no change in weight; good appetite  · HEENT: No blurred vision, no ear problems, no sore throat, no rhinorrhea. · Respiratory: cough, sputum production, no pleuritic chest pain, no shortness of breath  · Cardiology: No angina, no dyspnea on exertion, no paroxysmal nocturnal dyspnea, no orthopnea, no palpitation, no leg swelling. · Gastroenterology: No dysphagia, no reflux; no abdominal pain, no nausea or vomiting; no constipation or diarrhea.  No hematochezia   · Genitourinary: No dysuria, no frequency, hesitancy; no hematuria  · Musculoskeletal: no joint pain, no myalgia, no change in range of movement  · Neurology: no focal weakness in extremities, no slurred speech, no double vision, no tingling or numbness sensation  · Endocrinology: no temperature intolerance, no polyphagia, polydipsia or polyuria  · Hematology: no increased bleeding, no bruising, no lymphadenopathy  · Skin: no skin changes noticed by patient  · Psychology: no depressed mood, no suicidal ideation    Physical Exam   · Vitals: /64   Pulse 62   Temp 97 °F (36.1 °C) (Temporal)   Resp 18   Ht 5' 9\" (1.753 m)   Wt 172 lb (78 kg)   LMP 08/28/2013   SpO2 93%   BMI 25.40 kg/m²     · General Appearance: alert and oriented to person, place and Number of Images: 1 view Indication:   short of breath short of breath Comparison: Prior chest radiograph from 07/05/2019 is available. Findings: The lungs are clear. There is no evidence of pulmonary infiltrate or pleural effusion. The pulmonary vascularity is unremarkable. The cardiac, hilar and mediastinal silhouettes are satisfactory. The bony thorax demonstrates no gross abnormality. NO ACUTE CARDIOPULMONARY PROCESS       Resident's Assessment and Plan     Mendy Wang is a 48 y.o. female who presented to the ED with SOB. 1.Acute COPD exacerbation (stage IV) 0 2/2 non-compliance and environmental exposure to smoking. FEV1/FVC: 51%, FEV1% 21% of predicted. Start Duoneb Q4 Hrs, LABA and ICS   Start levofloxacin 750 BID, total 7 days. O2 Sat. 88-92%  Solu-medrol 125 mg given in the ED, start prednisone 40 mg BID from tomorrow, total of 5 days. Order Procal., Respiratory panel, respiratory Cx. Consult Pulmonology to establish care  Elevated Pro-BNP, Order Echo, Possible Diastolic dysfunction.    -  2. Polysubstance abuse    On Methadone program, follow with Meridiene, will continue 105 mg daily    3. Hx of heel osteomyelitis    Wound care consulted, stable.    -    PT/OT evaluation: None  DVT prophylaxis/ GI prophylaxis: LMWH   Disposition: admit inpatient    Rich Castro MD, PGY-1   Attending physician: Dr. Letty Fitzgerald, [address] [place] [address]  Internal Medicine Clinic     Attending Physician Statement:  Gunnar Ortiz M.D., F.A.C.P.     I have discussed the case, including pertinent history and exam findings with the resident/NP. I have seen and examined the patient and the key elements of the encounter have been performed by me. I agree with the resident ROS, PMHx, PSHx, meds reviewed and assessment, plan and orders as documented by the resident/NP    [facility] [address] charts reviewed, including other providers notes, relevant labs and imaging.    See resident note    Known stage 4 copd -- fev1 21%  Years ago -- 2nd hand smoker, quit 8 months. Likely acuate on chronic copd exac  (rule out environmental change or drug trigger)-- did run out of inhalers ONE month ago-- ?need likely for home o2-- we feel that likely that she is chronically o2 sat <90%  Struggling to breathe in ER,  Incomplete sentences  Acute hypoxic respiratory fialure- o2 sat 70%s-- ?full admit, home o2  Copd exac  Sp \"cold\" viral like illness, sore throat, nasal congestion  +chills  procalcitonin low-- likely viral bronchtitis acute  Wbc N   NO ACUTE CARDIOPULMONARY PROCESS  +bronchospasms- steroids, duonebs  Doxy - switched to levaquin +/- antiviral zinc  Flu A/B negative (defer tamiflu)-- respiratory panel negative     High bnp, dobut chf more likel yacute bonchitis assoc.  ekg-normal sinus rhythm, incomplete RBBB, , normal axis, unchanged from previous on 7/5/19.   /64     On NC o2- o2 sat 93%     aic 5.4 in past but BS significantly elevated stress and steroids, pre diabetic likely   Nutritional etoh/lumbar radic vs diabetic neuropathy--    Severe neuropathy in feet,  PATRICIA 2015 N- doubt PVD  Chronic issue-- Heel osteo hx-- keeps NO showing podiatry and vascular appointments-- she refused us to look at  ?polysubstance abuse hx-- claims NOT active  Polysubstance abuse-- On Methadone program, follow with Meridiene, will continue 105 mg daily  QTc 454- on methadone-- OK for levaquin  >50% of time spent coordinating care with other providers and/or counseling patient/family  Remainder of medical problems as per resident note

## 2020-02-08 NOTE — ED PROVIDER NOTES
Chief Complaint   Patient presents with    Shortness of Breath     with productive cough       Primo Griggs is a 27-year-old female with a past medical history of COPD, hypothyroidism, substance abuse on methadone, who presents today for cough and shortness of breath. Patient states she has had a cough for the past 2 weeks, with green sputum production. She states she got acutely short of breath over the past 2 days. She is supposed to be using Symbicort at home for COPD, but states she ran out of this 1 month ago and has not been to her PCP to get this refilled. She attempted to use her at home oxygen, for which she uses only when needed yesterday, but states her oxygen tank was empty. She does not wear oxygen continuously. She endorses fevers and chills, with a cough which has been worsening for 2 weeks. Patient denies chest pain, nausea, vomiting, diarrhea, recent travel. Review of Systems   Constitutional: Positive for chills. Negative for fever. HENT: Negative for ear pain, sinus pressure and sore throat. Eyes: Negative for pain, discharge and redness. Respiratory: Positive for cough, shortness of breath and wheezing. Cardiovascular: Negative for chest pain, palpitations and leg swelling. Gastrointestinal: Negative for abdominal distention, abdominal pain, diarrhea, nausea and vomiting. Genitourinary: Negative for dysuria and frequency. Musculoskeletal: Negative for arthralgias and back pain. Skin: Negative for rash and wound. Neurological: Negative for dizziness, syncope, weakness and headaches. Hematological: Negative for adenopathy. Psychiatric/Behavioral: Negative for behavioral problems and confusion. All other systems reviewed and are negative. Physical Exam  Vitals signs and nursing note reviewed. Constitutional:       General: She is in acute distress. Appearance: She is well-developed. She is not toxic-appearing.    HENT:      Head: Normocephalic and

## 2020-02-09 ENCOUNTER — APPOINTMENT (OUTPATIENT)
Dept: GENERAL RADIOLOGY | Age: 55
DRG: 140 | End: 2020-02-09
Payer: MEDICAID

## 2020-02-09 LAB
ADENOVIRUS BY PCR: NOT DETECTED
ANION GAP SERPL CALCULATED.3IONS-SCNC: 12 MMOL/L (ref 7–16)
BACTERIA: ABNORMAL /HPF
BILIRUBIN URINE: NEGATIVE
BLOOD, URINE: NORMAL
BORDETELLA PARAPERTUSSIS BY PCR: NOT DETECTED
BORDETELLA PERTUSSIS BY PCR: NOT DETECTED
BUN BLDV-MCNC: 13 MG/DL (ref 6–20)
CALCIUM SERPL-MCNC: 9.1 MG/DL (ref 8.6–10.2)
CHLAMYDOPHILIA PNEUMONIAE BY PCR: NOT DETECTED
CHLORIDE BLD-SCNC: 99 MMOL/L (ref 98–107)
CLARITY: CLEAR
CO2: 28 MMOL/L (ref 22–29)
COLOR: YELLOW
CORONAVIRUS 229E BY PCR: NOT DETECTED
CORONAVIRUS HKU1 BY PCR: NOT DETECTED
CORONAVIRUS NL63 BY PCR: NOT DETECTED
CORONAVIRUS OC43 BY PCR: NOT DETECTED
CREAT SERPL-MCNC: 0.8 MG/DL (ref 0.5–1)
EPITHELIAL CELLS, UA: ABNORMAL /HPF
GFR AFRICAN AMERICAN: >60
GFR NON-AFRICAN AMERICAN: >60 ML/MIN/1.73
GLUCOSE BLD-MCNC: 180 MG/DL (ref 74–99)
GLUCOSE URINE: NEGATIVE MG/DL
HCT VFR BLD CALC: 38.5 % (ref 34–48)
HEMOGLOBIN: 11.2 G/DL (ref 11.5–15.5)
HUMAN METAPNEUMOVIRUS BY PCR: NOT DETECTED
HUMAN RHINOVIRUS/ENTEROVIRUS BY PCR: NOT DETECTED
INFLUENZA A BY PCR: NOT DETECTED
INFLUENZA B BY PCR: NOT DETECTED
KETONES, URINE: NEGATIVE MG/DL
LEUKOCYTE ESTERASE, URINE: NEGATIVE
MAGNESIUM: 2.1 MG/DL (ref 1.6–2.6)
MCH RBC QN AUTO: 29.1 PG (ref 26–35)
MCHC RBC AUTO-ENTMCNC: 29.1 % (ref 32–34.5)
MCV RBC AUTO: 100 FL (ref 80–99.9)
MYCOPLASMA PNEUMONIAE BY PCR: NOT DETECTED
NITRITE, URINE: NEGATIVE
PARAINFLUENZA VIRUS 1 BY PCR: NOT DETECTED
PARAINFLUENZA VIRUS 2 BY PCR: NOT DETECTED
PARAINFLUENZA VIRUS 3 BY PCR: NOT DETECTED
PARAINFLUENZA VIRUS 4 BY PCR: NOT DETECTED
PDW BLD-RTO: 14.1 FL (ref 11.5–15)
PH UA: 5.5 (ref 5–9)
PLATELET # BLD: 221 E9/L (ref 130–450)
PMV BLD AUTO: 10.4 FL (ref 7–12)
POTASSIUM REFLEX MAGNESIUM: 4.5 MMOL/L (ref 3.5–5)
PROTEIN UA: NEGATIVE MG/DL
RBC # BLD: 3.85 E12/L (ref 3.5–5.5)
RBC UA: ABNORMAL /HPF (ref 0–2)
RESPIRATORY SYNCYTIAL VIRUS BY PCR: NOT DETECTED
SODIUM BLD-SCNC: 139 MMOL/L (ref 132–146)
SPECIFIC GRAVITY UA: >=1.03 (ref 1–1.03)
UROBILINOGEN, URINE: 0.2 E.U./DL
WBC # BLD: 7.9 E9/L (ref 4.5–11.5)
WBC UA: ABNORMAL /HPF (ref 0–5)

## 2020-02-09 PROCEDURE — 73502 X-RAY EXAM HIP UNI 2-3 VIEWS: CPT

## 2020-02-09 PROCEDURE — 96372 THER/PROPH/DIAG INJ SC/IM: CPT

## 2020-02-09 PROCEDURE — 1200000000 HC SEMI PRIVATE

## 2020-02-09 PROCEDURE — 94760 N-INVAS EAR/PLS OXIMETRY 1: CPT

## 2020-02-09 PROCEDURE — 2580000003 HC RX 258: Performed by: STUDENT IN AN ORGANIZED HEALTH CARE EDUCATION/TRAINING PROGRAM

## 2020-02-09 PROCEDURE — 94761 N-INVAS EAR/PLS OXIMETRY MLT: CPT

## 2020-02-09 PROCEDURE — 2700000000 HC OXYGEN THERAPY PER DAY

## 2020-02-09 PROCEDURE — 80048 BASIC METABOLIC PNL TOTAL CA: CPT

## 2020-02-09 PROCEDURE — 6360000002 HC RX W HCPCS: Performed by: STUDENT IN AN ORGANIZED HEALTH CARE EDUCATION/TRAINING PROGRAM

## 2020-02-09 PROCEDURE — 99233 SBSQ HOSP IP/OBS HIGH 50: CPT | Performed by: INTERNAL MEDICINE

## 2020-02-09 PROCEDURE — 83735 ASSAY OF MAGNESIUM: CPT

## 2020-02-09 PROCEDURE — 6370000000 HC RX 637 (ALT 250 FOR IP): Performed by: STUDENT IN AN ORGANIZED HEALTH CARE EDUCATION/TRAINING PROGRAM

## 2020-02-09 PROCEDURE — 36415 COLL VENOUS BLD VENIPUNCTURE: CPT

## 2020-02-09 PROCEDURE — 94640 AIRWAY INHALATION TREATMENT: CPT

## 2020-02-09 PROCEDURE — G0378 HOSPITAL OBSERVATION PER HR: HCPCS

## 2020-02-09 PROCEDURE — 85027 COMPLETE CBC AUTOMATED: CPT

## 2020-02-09 PROCEDURE — 96376 TX/PRO/DX INJ SAME DRUG ADON: CPT

## 2020-02-09 PROCEDURE — 81001 URINALYSIS AUTO W/SCOPE: CPT

## 2020-02-09 PROCEDURE — 6370000000 HC RX 637 (ALT 250 FOR IP): Performed by: INTERNAL MEDICINE

## 2020-02-09 PROCEDURE — 6360000002 HC RX W HCPCS: Performed by: INTERNAL MEDICINE

## 2020-02-09 RX ORDER — METHYLPREDNISOLONE SODIUM SUCCINATE 40 MG/ML
40 INJECTION, POWDER, LYOPHILIZED, FOR SOLUTION INTRAMUSCULAR; INTRAVENOUS EVERY 8 HOURS
Status: DISCONTINUED | OUTPATIENT
Start: 2020-02-09 | End: 2020-02-10

## 2020-02-09 RX ORDER — DOXYCYCLINE HYCLATE 100 MG/1
100 CAPSULE ORAL EVERY 12 HOURS SCHEDULED
Status: DISCONTINUED | OUTPATIENT
Start: 2020-02-09 | End: 2020-02-11 | Stop reason: HOSPADM

## 2020-02-09 RX ADMIN — ARFORMOTEROL TARTRATE 15 MCG: 15 SOLUTION RESPIRATORY (INHALATION) at 08:31

## 2020-02-09 RX ADMIN — IPRATROPIUM BROMIDE AND ALBUTEROL SULFATE 1 AMPULE: .5; 3 SOLUTION RESPIRATORY (INHALATION) at 16:35

## 2020-02-09 RX ADMIN — ARFORMOTEROL TARTRATE 15 MCG: 15 SOLUTION RESPIRATORY (INHALATION) at 19:59

## 2020-02-09 RX ADMIN — BUDESONIDE 500 MCG: 0.5 SUSPENSION RESPIRATORY (INHALATION) at 08:31

## 2020-02-09 RX ADMIN — METHADONE HYDROCHLORIDE 105 MG: 10 CONCENTRATE ORAL at 09:16

## 2020-02-09 RX ADMIN — DOXYCYCLINE HYCLATE 100 MG: 100 CAPSULE ORAL at 20:27

## 2020-02-09 RX ADMIN — ENOXAPARIN SODIUM 40 MG: 40 INJECTION SUBCUTANEOUS at 09:16

## 2020-02-09 RX ADMIN — IPRATROPIUM BROMIDE AND ALBUTEROL SULFATE 1 AMPULE: .5; 3 SOLUTION RESPIRATORY (INHALATION) at 08:31

## 2020-02-09 RX ADMIN — IPRATROPIUM BROMIDE AND ALBUTEROL SULFATE 1 AMPULE: .5; 3 SOLUTION RESPIRATORY (INHALATION) at 11:46

## 2020-02-09 RX ADMIN — DOXYCYCLINE HYCLATE 100 MG: 100 CAPSULE ORAL at 11:47

## 2020-02-09 RX ADMIN — GABAPENTIN 300 MG: 300 CAPSULE ORAL at 20:28

## 2020-02-09 RX ADMIN — LEVOFLOXACIN 750 MG: 750 TABLET, FILM COATED ORAL at 09:17

## 2020-02-09 RX ADMIN — METHYLPREDNISOLONE SODIUM SUCCINATE 40 MG: 40 INJECTION, POWDER, FOR SOLUTION INTRAMUSCULAR; INTRAVENOUS at 09:17

## 2020-02-09 RX ADMIN — GABAPENTIN 300 MG: 300 CAPSULE ORAL at 09:17

## 2020-02-09 RX ADMIN — SODIUM CHLORIDE, PRESERVATIVE FREE 10 ML: 5 INJECTION INTRAVENOUS at 09:17

## 2020-02-09 RX ADMIN — SODIUM CHLORIDE, PRESERVATIVE FREE 10 ML: 5 INJECTION INTRAVENOUS at 20:28

## 2020-02-09 RX ADMIN — METHYLPREDNISOLONE SODIUM SUCCINATE 40 MG: 40 INJECTION, POWDER, FOR SOLUTION INTRAMUSCULAR; INTRAVENOUS at 18:33

## 2020-02-09 RX ADMIN — BUDESONIDE 500 MCG: 0.5 SUSPENSION RESPIRATORY (INHALATION) at 19:59

## 2020-02-09 RX ADMIN — IPRATROPIUM BROMIDE AND ALBUTEROL SULFATE 1 AMPULE: .5; 3 SOLUTION RESPIRATORY (INHALATION) at 19:59

## 2020-02-09 ASSESSMENT — PAIN SCALES - GENERAL
PAINLEVEL_OUTOF10: 0

## 2020-02-09 NOTE — PROGRESS NOTES
[facility]  Internal Medicine Residency Program  Progress Note    Patient:  Kalen Shukla 48 y.o. female MRN: [id number]     Date of Service: 2/9/2020    Hospital Day: 2      Chief complaint: had concerns including Shortness of Breath (with productive cough). Subjective      She was seen and examined. She wheezed badly this morning. Complains of hip pain, but informed it has been there for 2 years. She has had bad osteoarthritis.        Objective   · Vitals: BP (!) 115/56   Pulse 56   Temp 97.9 °F (36.6 °C) (Temporal)   Resp 18   Ht 5' 9\" (1.753 m)   Wt 172 lb (78 kg)   LMP 08/28/2013   SpO2 93%   BMI 25.40 kg/m²     · General Appearance: alert and oriented to person, place and time, well developed and well- nourished, in no acute distress  · Skin: warm and dry, no rash or erythema  · Head: normocephalic and atraumatic  · Eyes: pupils equal, round, and reactive to light, extraocular eye movements intact, conjunctivae normal  · ENT: tympanic membrane, external ear and ear canal normal bilaterally, nose without deformity, nasal mucosa and turbinates normal without polyps  · Neck: supple and non-tender without mass, no thyromegaly or thyroid nodules, no cervical lymphadenopathy  · Pulmonary/Chest: Bilateral Wheezes, rales or rhonchi, normal air movement, no respiratory distress  · Cardiovascular: normal rate, regular rhythm, normal S1 and S2, no murmurs, rubs, clicks, or gallops, distal pulses intact, no carotid bruits  · Abdomen: soft, non-tender, non-distended, normal bowel sounds, no masses or organomegaly  · Extremities: no cyanosis, clubbing or edema  · Musculoskeletal: normal range of motion, no joint swelling, deformity or tenderness  · Neurologic: reflexes normal and symmetric, no cranial nerve deficit, gait, coordination and speech normal   Labs and Imaging Studies   Basic Labs  Recent Labs     02/08/20  1100 02/09/20  0524    139   K 4.0 4.5   CL 96* 99   CO2 31* 28 BUN 7 13   CREATININE 0.7 0.8   GLUCOSE 175* 180*   CALCIUM 9.1 9.1       Recent Labs     20  1100 20  0524   WBC 8.4 7.9   RBC 4.24 3.85   HGB 12.8 11.2*   HCT 42.1 38.5   MCV 99.3 100.0*   MCH 30.2 29.1   MCHC 30.4* 29.1*   RDW 14.3 14.1    221   MPV 10.4 10.4       CBC:   Lab Results   Component Value Date    WBC 7.9 2020    RBC 3.85 2020    HGB 11.2 2020    HCT 38.5 2020    .0 2020    RDW 14.1 2020     2020     BMP:    Lab Results   Component Value Date     2020    K 4.5 2020    CL 99 2020    CO2 28 2020    BUN 13 2020       Imaging Studies:     Xr Chest Portable    Result Date: 2020  Patient MRN: [id number] : 1965 Age:  48 years Gender: Female Order Date: 2020 11:00 AM Exam: XR CHEST PORTABLE Number of Images: 1 view Indication:   short of breath short of breath Comparison: Prior chest radiograph from 2019 is available. Findings: The lungs are clear. There is no evidence of pulmonary infiltrate or pleural effusion. The pulmonary vascularity is unremarkable. The cardiac, hilar and mediastinal silhouettes are satisfactory. The bony thorax demonstrates no gross abnormality. NO ACUTE CARDIOPULMONARY PROCESS       Resident's Assessment and Plan     Daquan Jose is a 48 y.o. female who presented to the ED with SOB. 1.Acute COPD exacerbation  GOLD D - stage IV  FEV1/FVC: 51%, FEV1% 21% of predicted. Will evaluate a need for chronic O2  ECHO for cor pulmonale / RV dysfunction  Bronchodilators  Solumedrol 40 mg tid   Pul following  Cont doxy 100 mg bid for > 2 cardinal symptoms     2. Polysubstance abuse    On Methadone program, follow with Meridiene, will continue 105 mg daily    3. Hx of heel decubitus ulcer  Wound care consulted, stable.    Need vascular follow-up  Will check PATRICIA here  Check A1c to r/o T2DM    PT/OT evaluation: yes  DVT prophylaxis/ GI prophylaxis: LMWH / diet  Disposition: med/surg    Constanza Lawrence MD, PGY- 3     Attending physician: Dr. Shawn Abernathy

## 2020-02-10 ENCOUNTER — APPOINTMENT (OUTPATIENT)
Dept: INTERVENTIONAL RADIOLOGY/VASCULAR | Age: 55
DRG: 140 | End: 2020-02-10
Payer: MEDICAID

## 2020-02-10 LAB
AMMONIA: 19 UMOL/L (ref 11–51)
ANION GAP SERPL CALCULATED.3IONS-SCNC: 11 MMOL/L (ref 7–16)
BUN BLDV-MCNC: 17 MG/DL (ref 6–20)
CALCIUM SERPL-MCNC: 9.2 MG/DL (ref 8.6–10.2)
CHLORIDE BLD-SCNC: 96 MMOL/L (ref 98–107)
CO2: 30 MMOL/L (ref 22–29)
CREAT SERPL-MCNC: 0.8 MG/DL (ref 0.5–1)
FOLATE: 5 NG/ML (ref 4.8–24.2)
GFR AFRICAN AMERICAN: >60
GFR NON-AFRICAN AMERICAN: >60 ML/MIN/1.73
GLUCOSE BLD-MCNC: 141 MG/DL (ref 74–99)
HBA1C MFR BLD: 5.3 % (ref 4–5.6)
HCT VFR BLD CALC: 41 % (ref 34–48)
HEMOGLOBIN: 12.1 G/DL (ref 11.5–15.5)
MAGNESIUM: 2.2 MG/DL (ref 1.6–2.6)
MCH RBC QN AUTO: 29.7 PG (ref 26–35)
MCHC RBC AUTO-ENTMCNC: 29.5 % (ref 32–34.5)
MCV RBC AUTO: 100.5 FL (ref 80–99.9)
PDW BLD-RTO: 14.3 FL (ref 11.5–15)
PLATELET # BLD: 264 E9/L (ref 130–450)
PMV BLD AUTO: 10.2 FL (ref 7–12)
POTASSIUM REFLEX MAGNESIUM: 4.8 MMOL/L (ref 3.5–5)
RBC # BLD: 4.08 E12/L (ref 3.5–5.5)
SODIUM BLD-SCNC: 137 MMOL/L (ref 132–146)
VITAMIN B-12: 426 PG/ML (ref 211–946)
WBC # BLD: 10.2 E9/L (ref 4.5–11.5)

## 2020-02-10 PROCEDURE — G0378 HOSPITAL OBSERVATION PER HR: HCPCS

## 2020-02-10 PROCEDURE — 94640 AIRWAY INHALATION TREATMENT: CPT

## 2020-02-10 PROCEDURE — 82607 VITAMIN B-12: CPT

## 2020-02-10 PROCEDURE — 6360000002 HC RX W HCPCS: Performed by: INTERNAL MEDICINE

## 2020-02-10 PROCEDURE — 2700000000 HC OXYGEN THERAPY PER DAY

## 2020-02-10 PROCEDURE — 6370000000 HC RX 637 (ALT 250 FOR IP): Performed by: STUDENT IN AN ORGANIZED HEALTH CARE EDUCATION/TRAINING PROGRAM

## 2020-02-10 PROCEDURE — 6360000002 HC RX W HCPCS: Performed by: STUDENT IN AN ORGANIZED HEALTH CARE EDUCATION/TRAINING PROGRAM

## 2020-02-10 PROCEDURE — 85027 COMPLETE CBC AUTOMATED: CPT

## 2020-02-10 PROCEDURE — 36415 COLL VENOUS BLD VENIPUNCTURE: CPT

## 2020-02-10 PROCEDURE — 83735 ASSAY OF MAGNESIUM: CPT

## 2020-02-10 PROCEDURE — 80048 BASIC METABOLIC PNL TOTAL CA: CPT

## 2020-02-10 PROCEDURE — 93922 UPR/L XTREMITY ART 2 LEVELS: CPT

## 2020-02-10 PROCEDURE — 96372 THER/PROPH/DIAG INJ SC/IM: CPT

## 2020-02-10 PROCEDURE — 1200000000 HC SEMI PRIVATE

## 2020-02-10 PROCEDURE — 2580000003 HC RX 258: Performed by: STUDENT IN AN ORGANIZED HEALTH CARE EDUCATION/TRAINING PROGRAM

## 2020-02-10 PROCEDURE — 99232 SBSQ HOSP IP/OBS MODERATE 35: CPT | Performed by: INTERNAL MEDICINE

## 2020-02-10 PROCEDURE — 6370000000 HC RX 637 (ALT 250 FOR IP): Performed by: INTERNAL MEDICINE

## 2020-02-10 PROCEDURE — 96376 TX/PRO/DX INJ SAME DRUG ADON: CPT

## 2020-02-10 PROCEDURE — 83036 HEMOGLOBIN GLYCOSYLATED A1C: CPT

## 2020-02-10 PROCEDURE — 82746 ASSAY OF FOLIC ACID SERUM: CPT

## 2020-02-10 PROCEDURE — 99233 SBSQ HOSP IP/OBS HIGH 50: CPT | Performed by: INTERNAL MEDICINE

## 2020-02-10 PROCEDURE — 82140 ASSAY OF AMMONIA: CPT

## 2020-02-10 RX ORDER — PROPRANOLOL HYDROCHLORIDE 10 MG/1
10 TABLET ORAL DAILY
Status: DISCONTINUED | OUTPATIENT
Start: 2020-02-10 | End: 2020-02-11

## 2020-02-10 RX ORDER — KETOROLAC TROMETHAMINE 30 MG/ML
10 INJECTION, SOLUTION INTRAMUSCULAR; INTRAVENOUS ONCE
Status: COMPLETED | OUTPATIENT
Start: 2020-02-10 | End: 2020-02-10

## 2020-02-10 RX ORDER — METHYLPREDNISOLONE SODIUM SUCCINATE 40 MG/ML
40 INJECTION, POWDER, LYOPHILIZED, FOR SOLUTION INTRAMUSCULAR; INTRAVENOUS EVERY 12 HOURS
Status: DISCONTINUED | OUTPATIENT
Start: 2020-02-10 | End: 2020-02-11

## 2020-02-10 RX ADMIN — GABAPENTIN 300 MG: 300 CAPSULE ORAL at 10:02

## 2020-02-10 RX ADMIN — DOXYCYCLINE HYCLATE 100 MG: 100 CAPSULE ORAL at 10:02

## 2020-02-10 RX ADMIN — BUDESONIDE 500 MCG: 0.5 SUSPENSION RESPIRATORY (INHALATION) at 19:03

## 2020-02-10 RX ADMIN — ENOXAPARIN SODIUM 40 MG: 40 INJECTION SUBCUTANEOUS at 10:02

## 2020-02-10 RX ADMIN — DOXYCYCLINE HYCLATE 100 MG: 100 CAPSULE ORAL at 20:28

## 2020-02-10 RX ADMIN — PROPRANOLOL HYDROCHLORIDE 10 MG: 10 TABLET ORAL at 14:35

## 2020-02-10 RX ADMIN — IPRATROPIUM BROMIDE AND ALBUTEROL SULFATE 1 AMPULE: .5; 3 SOLUTION RESPIRATORY (INHALATION) at 13:11

## 2020-02-10 RX ADMIN — IPRATROPIUM BROMIDE AND ALBUTEROL SULFATE 1 AMPULE: .5; 3 SOLUTION RESPIRATORY (INHALATION) at 19:03

## 2020-02-10 RX ADMIN — ARFORMOTEROL TARTRATE 15 MCG: 15 SOLUTION RESPIRATORY (INHALATION) at 09:39

## 2020-02-10 RX ADMIN — BUDESONIDE 500 MCG: 0.5 SUSPENSION RESPIRATORY (INHALATION) at 09:39

## 2020-02-10 RX ADMIN — GABAPENTIN 300 MG: 300 CAPSULE ORAL at 20:28

## 2020-02-10 RX ADMIN — KETOROLAC TROMETHAMINE 9.9 MG: 30 INJECTION, SOLUTION INTRAMUSCULAR; INTRAVENOUS at 10:02

## 2020-02-10 RX ADMIN — ARFORMOTEROL TARTRATE 15 MCG: 15 SOLUTION RESPIRATORY (INHALATION) at 19:03

## 2020-02-10 RX ADMIN — METHADONE HYDROCHLORIDE 105 MG: 10 CONCENTRATE ORAL at 10:02

## 2020-02-10 RX ADMIN — IPRATROPIUM BROMIDE AND ALBUTEROL SULFATE 1 AMPULE: .5; 3 SOLUTION RESPIRATORY (INHALATION) at 09:39

## 2020-02-10 RX ADMIN — METHYLPREDNISOLONE SODIUM SUCCINATE 40 MG: 40 INJECTION, POWDER, FOR SOLUTION INTRAMUSCULAR; INTRAVENOUS at 02:03

## 2020-02-10 RX ADMIN — SODIUM CHLORIDE, PRESERVATIVE FREE 10 ML: 5 INJECTION INTRAVENOUS at 10:03

## 2020-02-10 ASSESSMENT — PAIN DESCRIPTION - PAIN TYPE: TYPE: CHRONIC PAIN

## 2020-02-10 ASSESSMENT — PAIN DESCRIPTION - LOCATION: LOCATION: HIP

## 2020-02-10 ASSESSMENT — PAIN SCALES - GENERAL
PAINLEVEL_OUTOF10: 0
PAINLEVEL_OUTOF10: 8

## 2020-02-10 ASSESSMENT — PAIN DESCRIPTION - PROGRESSION: CLINICAL_PROGRESSION: NOT CHANGED

## 2020-02-10 ASSESSMENT — PAIN DESCRIPTION - FREQUENCY: FREQUENCY: CONTINUOUS

## 2020-02-10 ASSESSMENT — PAIN - FUNCTIONAL ASSESSMENT: PAIN_FUNCTIONAL_ASSESSMENT: PREVENTS OR INTERFERES SOME ACTIVE ACTIVITIES AND ADLS

## 2020-02-10 ASSESSMENT — PAIN DESCRIPTION - DESCRIPTORS: DESCRIPTORS: ACHING;CONSTANT;DISCOMFORT

## 2020-02-10 ASSESSMENT — PAIN DESCRIPTION - ORIENTATION: ORIENTATION: RIGHT

## 2020-02-10 ASSESSMENT — PAIN DESCRIPTION - ONSET: ONSET: ON-GOING

## 2020-02-10 NOTE — CARE COORDINATION
SW met with pt who lives with her son in a 1 story home. Pt reports bedroom and bathroom are on the 1st floor. Pt reports no recent falls. Pt reports she has crutches, walker and w/c. Pt reports she has been going to [facility] for recovery services for about 3 1/2 years. Pt does not have a PCP at this time. Pt reports that she would like a PCP within the same facility as [facility]. SW provided pt with [facility] primary care services information and a telephone number to set up primary care. Pt reports she uses a nebulizer and oxygen. Pt received nebulizer from [facility]. SW still trying to figure out where pt receives oxygen. SW reached out to [facility], [facility] and [facility] and they all have report not having hx with pt for oxygen. Pt reports that if she is appropriate for d/c that she would like to go home and that her good family friend Tammie Cooks ([phone]) will transport her home. STEPHANIE/NISREEN to follow up with pt for further d/c needs.     Reviewed by Kev Armstrong, [id number]  [facility], [address] Intern

## 2020-02-10 NOTE — PROGRESS NOTES
Behavior normal.       Labs and Imaging Studies     CBC:   Recent Labs     02/08/20  1100 02/09/20  0524 02/10/20  0506   WBC 8.4 7.9 10.2   HGB 12.8 11.2* 12.1   HCT 42.1 38.5 41.0   MCV 99.3 100.0* 100.5*    221 264       BMP:    Recent Labs     02/08/20  1100 02/09/20  0524 02/10/20  0506    139 137   K 4.0 4.5 4.8   CL 96* 99 96*   CO2 31* 28 30*   BUN 7 13 17   CREATININE 0.7 0.8 0.8   GLUCOSE 175* 180* 141*       LIVER PROFILE:   Recent Labs     02/08/20  1100   AST 10   ALT 8   BILITOT 0.4   ALKPHOS 103       PT/INR:   No results for input(s): PROTIME, INR in the last 72 hours. APTT:   No results for input(s): APTT in the last 72 hours. Fasting Lipid Panel:    Lab Results   Component Value Date    CHOL 118 07/07/2019    TRIG 90 07/07/2019    HDL 50 07/07/2019       Notable Cultures:      Blood cultures   Blood Culture, Routine   Date Value Ref Range Status   02/08/2020 24 Hours- no growth  Preliminary     Respiratory cultures No results found for: RESPCULTURE   Gram Stain Result   Date Value Ref Range Status   08/21/2019   Final    Gram stain performed on unspun fluid  Polymorphonuclear leukocytes not seen  Epithelial cells not seen  Rare gram positive rods Diphtheroid-like  Rare Gram positive cocci       Urine No results found for: LABURIN  Legionella No results found for: LABLEGI  C Diff PCR No results found for: CDIFPCR  Wound culture/abscess: No results for input(s): WNDABS in the last 72 hours. Tip culture:No results for input(s): CXCATHTIP in the last 72 hours. Xr Hip Left (2-3 Views)    Result Date: 2/9/2020  2 views of the left hip. COMPARISON: None. Clinical indications: Osteoarthritis. Hip pain. FINDINGS: There is severe loss of joint space, osteophytosis and eburnation of the femoral acetabular articulating surfaces. There is slight collapse of the femoral head suggesting the possibility of an additional component of remote avascular necrosis.  Osteophytosis and eburnation of injection 10 mL  10 mL Intravenous 2 times per day Rich Castro MD   10 mL at 02/09/20 2028    sodium chloride flush 0.9 % injection 10 mL  10 mL Intravenous PRN Rich Castro MD        magnesium hydroxide (MILK OF MAGNESIA) 400 MG/5ML suspension 30 mL  30 mL Oral Daily PRN Rich Castro MD        [facility]ansetron [facility]) injection 4 mg  4 mg Intravenous Q6H PRN Rich Castro MD        enoxaparin (LOVENOX) injection 40 mg  40 mg Subcutaneous Daily Rich Castro MD   40 mg at 02/09/20 1691    acetaminophen (TYLENOL) tablet 650 mg  650 mg Oral Q4H PRN Rich Castro MD           Continuous Infusions:  Scheduled Meds:   methylPREDNISolone  40 mg Intravenous Q8H    doxycycline hyclate  100 mg Oral 2 times per day    ipratropium-albuterol  1 ampule Inhalation Q4H [place]    methadone  105 mg Oral QAM    gabapentin  300 mg Oral BID    budesonide  500 mcg Nebulization BID    Arformoterol Tartrate  15 mcg Nebulization Q12H    sodium chloride flush  10 mL Intravenous 2 times per day    enoxaparin  40 mg Subcutaneous Daily     PRN Meds: sodium chloride flush, magnesium hydroxide, ondansetron, acetaminophen      Resident's Assessment and Plan      Mendy Wang is 48 y.o. female admitted for SOB    <Neurology>    Tremors   -Since the past year. Pt stated that her mother also had a history of tremors and jerky neck movements   -essential vs alcohol withdrawal   -check ammonia     <Pulmonary>    Acute COPD Exacerbation   -GOLD D - stage IV  -FEV1/FVC: 51%, FEV1% 21% of predicted. -Will evaluate a need for chronic O2  -Resp panel neg, Rapid Flu neg  -ECHO for cor pulmonale / RV dysfunction  -Bronchodilators  -Solumedrol 40 mg tid   -Pul following  -Cont doxy 100 mg bid for > 2 cardinal symptoms    <Dermatologic/Musculoskeletal>    Hx of Decubitis Ulcer   -Wound care consulted, stable.    -Need vascular follow-up  -Will check PATRICIA here  -HbA1c- 5.3    OA of L hip  -Xray of hip - Severe degenerative

## 2020-02-11 VITALS
SYSTOLIC BLOOD PRESSURE: 136 MMHG | TEMPERATURE: 97.1 F | DIASTOLIC BLOOD PRESSURE: 70 MMHG | HEIGHT: 69 IN | HEART RATE: 63 BPM | WEIGHT: 189 LBS | RESPIRATION RATE: 18 BRPM | OXYGEN SATURATION: 99 % | BODY MASS INDEX: 27.99 KG/M2

## 2020-02-11 PROBLEM — E44.1 MILD PROTEIN-CALORIE MALNUTRITION (HCC): Chronic | Status: ACTIVE | Noted: 2020-02-11

## 2020-02-11 LAB
ANION GAP SERPL CALCULATED.3IONS-SCNC: 10 MMOL/L (ref 7–16)
BUN BLDV-MCNC: 23 MG/DL (ref 6–20)
CALCIUM SERPL-MCNC: 9.1 MG/DL (ref 8.6–10.2)
CHLORIDE BLD-SCNC: 97 MMOL/L (ref 98–107)
CO2: 29 MMOL/L (ref 22–29)
CREAT SERPL-MCNC: 0.9 MG/DL (ref 0.5–1)
CULTURE, RESPIRATORY: ABNORMAL
GFR AFRICAN AMERICAN: >60
GFR NON-AFRICAN AMERICAN: >60 ML/MIN/1.73
GLUCOSE BLD-MCNC: 123 MG/DL (ref 74–99)
HCT VFR BLD CALC: 40.6 % (ref 34–48)
HEMOGLOBIN: 11.8 G/DL (ref 11.5–15.5)
MAGNESIUM: 2.2 MG/DL (ref 1.6–2.6)
MCH RBC QN AUTO: 29 PG (ref 26–35)
MCHC RBC AUTO-ENTMCNC: 29.1 % (ref 32–34.5)
MCV RBC AUTO: 99.8 FL (ref 80–99.9)
ORGANISM: ABNORMAL
PDW BLD-RTO: 14.5 FL (ref 11.5–15)
PLATELET # BLD: 296 E9/L (ref 130–450)
PMV BLD AUTO: 10 FL (ref 7–12)
POTASSIUM REFLEX MAGNESIUM: 4.9 MMOL/L (ref 3.5–5)
RBC # BLD: 4.07 E12/L (ref 3.5–5.5)
SMEAR, RESPIRATORY: ABNORMAL
SODIUM BLD-SCNC: 136 MMOL/L (ref 132–146)
WBC # BLD: 8 E9/L (ref 4.5–11.5)

## 2020-02-11 PROCEDURE — 36415 COLL VENOUS BLD VENIPUNCTURE: CPT

## 2020-02-11 PROCEDURE — 6370000000 HC RX 637 (ALT 250 FOR IP): Performed by: STUDENT IN AN ORGANIZED HEALTH CARE EDUCATION/TRAINING PROGRAM

## 2020-02-11 PROCEDURE — 99233 SBSQ HOSP IP/OBS HIGH 50: CPT | Performed by: INTERNAL MEDICINE

## 2020-02-11 PROCEDURE — 6360000002 HC RX W HCPCS: Performed by: INTERNAL MEDICINE

## 2020-02-11 PROCEDURE — G0378 HOSPITAL OBSERVATION PER HR: HCPCS

## 2020-02-11 PROCEDURE — 83735 ASSAY OF MAGNESIUM: CPT

## 2020-02-11 PROCEDURE — 85027 COMPLETE CBC AUTOMATED: CPT

## 2020-02-11 PROCEDURE — 94640 AIRWAY INHALATION TREATMENT: CPT

## 2020-02-11 PROCEDURE — 2580000003 HC RX 258: Performed by: STUDENT IN AN ORGANIZED HEALTH CARE EDUCATION/TRAINING PROGRAM

## 2020-02-11 PROCEDURE — 6370000000 HC RX 637 (ALT 250 FOR IP): Performed by: INTERNAL MEDICINE

## 2020-02-11 PROCEDURE — 96372 THER/PROPH/DIAG INJ SC/IM: CPT

## 2020-02-11 PROCEDURE — 6360000002 HC RX W HCPCS: Performed by: STUDENT IN AN ORGANIZED HEALTH CARE EDUCATION/TRAINING PROGRAM

## 2020-02-11 PROCEDURE — 2700000000 HC OXYGEN THERAPY PER DAY

## 2020-02-11 PROCEDURE — 80048 BASIC METABOLIC PNL TOTAL CA: CPT

## 2020-02-11 PROCEDURE — 96376 TX/PRO/DX INJ SAME DRUG ADON: CPT

## 2020-02-11 RX ORDER — PROPRANOLOL HYDROCHLORIDE 20 MG/1
20 TABLET ORAL DAILY
Status: DISCONTINUED | OUTPATIENT
Start: 2020-02-12 | End: 2020-02-11 | Stop reason: HOSPADM

## 2020-02-11 RX ORDER — LEVOFLOXACIN 750 MG/1
750 TABLET ORAL DAILY
Qty: 5 TABLET | Refills: 0 | Status: SHIPPED | OUTPATIENT
Start: 2020-02-11 | End: 2020-02-16

## 2020-02-11 RX ORDER — PREDNISONE 20 MG/1
40 TABLET ORAL
Status: DISCONTINUED | OUTPATIENT
Start: 2020-02-11 | End: 2020-02-11 | Stop reason: HOSPADM

## 2020-02-11 RX ORDER — PREDNISONE 10 MG/1
TABLET ORAL
Qty: 30 TABLET | Refills: 0 | Status: SHIPPED | OUTPATIENT
Start: 2020-02-11 | End: 2021-07-20

## 2020-02-11 RX ORDER — PROPRANOLOL HYDROCHLORIDE 20 MG/1
20 TABLET ORAL DAILY
Qty: 90 TABLET | Refills: 3 | Status: SHIPPED | OUTPATIENT
Start: 2020-02-12 | End: 2021-07-20

## 2020-02-11 RX ADMIN — ARFORMOTEROL TARTRATE 15 MCG: 15 SOLUTION RESPIRATORY (INHALATION) at 07:43

## 2020-02-11 RX ADMIN — METHADONE HYDROCHLORIDE 105 MG: 10 CONCENTRATE ORAL at 09:26

## 2020-02-11 RX ADMIN — SODIUM CHLORIDE, PRESERVATIVE FREE 10 ML: 5 INJECTION INTRAVENOUS at 00:08

## 2020-02-11 RX ADMIN — BUDESONIDE 500 MCG: 0.5 SUSPENSION RESPIRATORY (INHALATION) at 07:43

## 2020-02-11 RX ADMIN — SODIUM CHLORIDE, PRESERVATIVE FREE 10 ML: 5 INJECTION INTRAVENOUS at 09:27

## 2020-02-11 RX ADMIN — ENOXAPARIN SODIUM 40 MG: 40 INJECTION SUBCUTANEOUS at 09:27

## 2020-02-11 RX ADMIN — IPRATROPIUM BROMIDE AND ALBUTEROL SULFATE 1 AMPULE: .5; 3 SOLUTION RESPIRATORY (INHALATION) at 16:20

## 2020-02-11 RX ADMIN — GABAPENTIN 300 MG: 300 CAPSULE ORAL at 09:26

## 2020-02-11 RX ADMIN — METHYLPREDNISOLONE SODIUM SUCCINATE 40 MG: 40 INJECTION, POWDER, FOR SOLUTION INTRAMUSCULAR; INTRAVENOUS at 00:08

## 2020-02-11 RX ADMIN — PROPRANOLOL HYDROCHLORIDE 10 MG: 10 TABLET ORAL at 09:26

## 2020-02-11 RX ADMIN — PREDNISONE 40 MG: 20 TABLET ORAL at 12:42

## 2020-02-11 RX ADMIN — IPRATROPIUM BROMIDE AND ALBUTEROL SULFATE 1 AMPULE: .5; 3 SOLUTION RESPIRATORY (INHALATION) at 11:56

## 2020-02-11 RX ADMIN — IPRATROPIUM BROMIDE AND ALBUTEROL SULFATE 1 AMPULE: .5; 3 SOLUTION RESPIRATORY (INHALATION) at 07:43

## 2020-02-11 RX ADMIN — DOXYCYCLINE HYCLATE 100 MG: 100 CAPSULE ORAL at 09:26

## 2020-02-11 ASSESSMENT — PAIN SCALES - GENERAL: PAINLEVEL_OUTOF10: 8

## 2020-02-11 ASSESSMENT — PAIN DESCRIPTION - PAIN TYPE: TYPE: CHRONIC PAIN

## 2020-02-11 ASSESSMENT — PAIN DESCRIPTION - ONSET: ONSET: ON-GOING

## 2020-02-11 ASSESSMENT — PAIN - FUNCTIONAL ASSESSMENT: PAIN_FUNCTIONAL_ASSESSMENT: PREVENTS OR INTERFERES SOME ACTIVE ACTIVITIES AND ADLS

## 2020-02-11 ASSESSMENT — PAIN DESCRIPTION - PROGRESSION: CLINICAL_PROGRESSION: NOT CHANGED

## 2020-02-11 ASSESSMENT — PAIN DESCRIPTION - LOCATION: LOCATION: HIP

## 2020-02-11 ASSESSMENT — PAIN DESCRIPTION - ORIENTATION: ORIENTATION: RIGHT

## 2020-02-11 ASSESSMENT — PAIN DESCRIPTION - FREQUENCY: FREQUENCY: CONTINUOUS

## 2020-02-11 ASSESSMENT — PAIN DESCRIPTION - DESCRIPTORS: DESCRIPTORS: ACHING;CONSTANT;DISCOMFORT

## 2020-02-11 NOTE — PROGRESS NOTES
[facility]  Internal Medicine Residency Program  Progress Note - House Team 1    Patient:  Oz Claros 48 y.o. female   MRN: [id number]       Date of Service: 2020    Allergy: Ancef [cefazolin]; Duricef [cefadroxil]; and Iodine      Subjective     Patient was seen and examined in AM. Patient states feels better. Denies fever, CP, SOB, chills, and N/V. No complaints. 24 hour change: Stable overnight. No acute overnight issues reported. VL PATRICIA b/l was done yesterday - pending reading      Objective     TEMPERATURE:  Current - Temp: 96.9 °F (36.1 °C); Max - Temp  Av.9 °F (36.6 °C)  Min: 96.9 °F (36.1 °C)  Max: 98.4 °F (36.9 °C)  RESPIRATIONS RANGE: Resp  Av.7  Min: 17  Max: 18  PULSE RANGE: Pulse  Av.8  Min: 51  Max: 73  BLOOD PRESSURE RANGE:  Systolic (55CAI), YRP:964 , Min:109 , LMP:799   ; Diastolic (58OSC), GTA:12, Min:57, Max:65    PULSE OXIMETRY RANGE: SpO2  Av.7 %  Min: 91 %  Max: 92 %    I & O - 24hr:    Intake/Output Summary (Last 24 hours) at 2020 0843  Last data filed at 2/10/2020 1958  Gross per 24 hour   Intake 540 ml   Output --   Net 540 ml     I/O last 3 completed shifts: In: 540 [P.O.:540]  Out: -  No intake/output data recorded. Weight change:     Physical Exam  Vitals signs and nursing note reviewed. Constitutional:       Appearance: Normal appearance. HENT:      Head: Normocephalic and atraumatic. Mouth/Throat:      Mouth: Mucous membranes are moist.   Cardiovascular:      Rate and Rhythm: Normal rate and regular rhythm. Pulmonary:      Effort: Pulmonary effort is normal.      Breath sounds: Wheezing present. Abdominal:      General: Abdomen is flat. Bowel sounds are normal.      Palpations: Abdomen is soft. Skin:     General: Skin is warm and dry. Capillary Refill: Capillary refill takes less than 2 seconds. Neurological:      Mental Status: She is alert and oriented to person, place, and time.    Psychiatric: remote avascular necrosis. Osteophytosis and eburnation of the sacroiliac joints and symphysis pubis. Otherwise the regional soft tissue and osseous structures are unremarkable. No acute fracture is identified. Severe degenerative change of the left hip which may be due to osteoarthritis, inflammatory arthritis or remote avascular necrosis of the femoral head. Xr Chest Portable    Result Date: 2020  Patient MRN: [id number] : 1965 Age:  48 years Gender: Female Order Date: 2020 11:00 AM Exam: XR CHEST PORTABLE Number of Images: 1 view Indication:   short of breath short of breath Comparison: Prior chest radiograph from 2019 is available. Findings: The lungs are clear. There is no evidence of pulmonary infiltrate or pleural effusion. The pulmonary vascularity is unremarkable. The cardiac, hilar and mediastinal silhouettes are satisfactory. The bony thorax demonstrates no gross abnormality.      NO ACUTE CARDIOPULMONARY PROCESS     Medications     Current Meds:  Current Facility-Administered Medications   Medication Dose Route Frequency Provider Last Rate Last Dose    predniSONE (DELTASONE) tablet 40 mg  40 mg Oral Lunch Donald Zapien MD        propranolol (INDERAL) tablet 10 mg  10 mg Oral Daily Donald Zapien MD   10 mg at 02/10/20 1435    doxycycline hyclate (VIBRAMYCIN) capsule 100 mg  100 mg Oral 2 times per day Elvia Ahumada, MD   100 mg at 02/10/20 2028    ipratropium-albuterol (DUONEB) nebulizer solution 1 ampule  1 ampule Inhalation Q4H WA Aline Dixon MD   1 ampule at 20 0743    methadone (DOLOPHINE) 10 MG/ML solution 105 mg  105 mg Oral QAM Aline Dixon MD   105 mg at 02/10/20 1002    gabapentin (NEURONTIN) capsule 300 mg  300 mg Oral BID Aline Dixon MD   300 mg at 02/10/20 2028    budesonide (PULMICORT) nebulizer suspension 500 mcg  500 mcg Nebulization BID Aline Dixon MD   500 mcg at 20 07    Arformoterol Tartrate (BROVANA) nebulizer solution 15 mcg  15 mcg Nebulization Q12H Yariel Cooper MD   15 mcg at 02/11/20 0743    sodium chloride flush 0.9 % injection 10 mL  10 mL Intravenous 2 times per day Yariel Cooper MD   10 mL at 02/10/20 1003    sodium chloride flush 0.9 % injection 10 mL  10 mL Intravenous PRN Yariel Cooper MD   10 mL at 02/11/20 0008    magnesium hydroxide (MILK OF MAGNESIA) 400 MG/5ML suspension 30 mL  30 mL Oral Daily PRN Yariel Cooper MD        ondansetron [facility]) injection 4 mg  4 mg Intravenous Q6H PRN Yariel Cooper MD        enoxaparin (LOVENOX) injection 40 mg  40 mg Subcutaneous Daily Yariel Cooper MD   40 mg at 02/10/20 1002    acetaminophen (TYLENOL) tablet 650 mg  650 mg Oral Q4H PRN Yariel Cooper MD           Continuous Infusions:  Scheduled Meds:   predniSONE  40 mg Oral Lunch    propranolol  10 mg Oral Daily    doxycycline hyclate  100 mg Oral 2 times per day    ipratropium-albuterol  1 ampule Inhalation Q4H WA    methadone  105 mg Oral QAM    gabapentin  300 mg Oral BID    budesonide  500 mcg Nebulization BID    Arformoterol Tartrate  15 mcg Nebulization Q12H    sodium chloride flush  10 mL Intravenous 2 times per day    enoxaparin  40 mg Subcutaneous Daily     PRN Meds: sodium chloride flush, magnesium hydroxide, ondansetron, acetaminophen      Resident's Assessment and Plan      Daquan Jose is 48 y.o. female admitted for SOB    <Neurology>    Tremors   -Since the past year. Pt stated that her mother also had a history of tremors and jerky neck movements   -essential vs alcohol withdrawal   -check ammonia     <Pulmonary>    Acute COPD Exacerbation   -GOLD D - stage IV  -FEV1/FVC: 51%, FEV1% 21% of predicted. -Will evaluate a need for chronic O2  -Resp panel neg, Rapid Flu neg  -Resp Cx - H.  Influenzae   -ECHO for cor pulmonale / RV dysfunction  -Bronchodilators  -Prednisone daily 40 mg   -Pul following  -Cont doxy 100 mg bid for > 2 cardinal symptoms    <Dermatologic/Musculoskeletal>    Hx of Decubitis Ulcer   -Wound care consulted, stable. -Need vascular follow-up  -Will check PATRICIA here  -HbA1c- 5.3    OA of L hip  -Xray of hip - Severe degenerative changes of Left hip   -Toradol x1 for pain     Polysubstance use   -On Methadone, follows with Meridiene, continue 105 mg daily     # Peptic ulcer prophylaxis: Diet  # DVT Prophylaxis: Lovenox  # Disposition: Cont current care    Benjamin Murphy MD PGY-1   Internal medicine resident  Attending Physician: Dr. Prosper Lopezfilippo  [facility]    Attending Physician Statement:  Farida Gomez M.D., F.A.C.P. I have discussed the case, including pertinent history and exam findings with the resident/NP. I have seen and examined the patient and the key elements of the encounter have been performed by me. I agree with the resident ROS, PMHx, PSHx, meds reviewed and assessment, plan and orders as documented by the resident/NP      Hospital charts reviewed, including other providers notes, relevant labs and imaging. Attending Physician Statement:  Farida Gomez M.D., F.A.C.P.     I have discussed the case, including pertinent history and exam findings with the resident/NP. I have seen and examined the patient and the key elements of the encounter have been performed by me.  I agree with the resident ROS, PMHx, PSHx, meds reviewed and assessment, plan and orders as documented by the resident/NP [facility] [place] [facility] charts reviewed, including other providers notes, relevant labs and imaging.   See resident note     Known stage 4 copd -- fev1 21%  Years ago -- 2nd hand smoker, quit 8 months.   Likely acuate on chronic copd exac  (rule out environmental change or drug trigger)-- did run out of inhalers ONE month ago-- ?need likely for home o2-- we feel that likely that she is chronically o2 sat <90%  Struggling to breathe in ER,  Incomplete sentences  Acute hypoxic respiratory fialure- o2 sat 70%s-- ?full admit, home o2     Likely needs chronic home o2 92% at rest -- on [facility]  Copd exac  Sp \"cold\" viral like illness, sore throat, nasal congestion  +chills  procalcitonin low-- likely viral bronchtitis acute  Wbc N   NO ACUTE CARDIOPULMONARY PROCESS  +bronchospasms- steroids, duonebs  Doxy - switched to levaquin-- back to doxy  Flu A/B negative (defer tamiflu)-- respiratory panel negative   sputum:  Group 5: >25 PMN's/LPF and <10 Epithelial cells/LPF   Abundant Polymorphonuclear leukocytes   Rare Epithelial cells   Abundant Gram negative rods   Rare Gram positive cocci in clusters      Improving respiratory status, WBC OK,  No fevers  Likely type 3 WHO? Chronic emphyseam  High bnp, dobut chf more likel acute bonchitis assoc  Echo pending.  ekg-normal sinus rhythm, incomplete RBBB, , normal axis, unchanged from previous on 7/5/19. /64   -- NOT hypertensive      tremors- noted in past year-- likely essential tremors-- ?worsened by aersols. Steroids weaning   Might consider low dose -- propanolol trial (watch for exac of wheezing)- postural tremor arms extended persistent- plan to inc betablocker dose  Also cutting back steroids   (defer xopenex)  Prep for DC today-- plan home o2  aic 5.4 in past but BS significantly elevated stress and steroids, pre diabetic likely   Nutritional etoh/lumbar radic vs diabetic neuropathy--    Severe neuropathy in feet,  PATRICIA 2015 N- doubt PVD  Chronic issue-- Heel osteo hx-- keeps NO showing podiatry and vascular appointments-- she refused us to look at  ?polysubstance abuse hx-- claims NOT active  Polysubstance abuse-- On Methadone program, follow with Meridiene, will continue 105 mg daily  QTc 454- on methadone-- OK for levaquin     Post AVN- HIP OA-- noted drugs of abuse  severe loss of joint space, osteophytosis and eburnation of  the femoral acetabular articulating surfaces.  There is slight collapse  of the femoral head suggesting the

## 2020-02-11 NOTE — PLAN OF CARE
PULSE OX=91%-92% SITTING ON ROOM AIR  PULSE OX ON ROOM AIR AMBULATING 88%-90%  PULSE OX 94%-95% ON 2L SITTING RECOVERY  PULSE OX 93%-94% 0N 2L AMBULATING RECOVERY. PT STATES SHE WEARS O2 AT HOME AS NEEDED BUT THE TANK SHE HAS AT HOME IS CURRENTLY EMPTY.

## 2020-02-11 NOTE — DISCHARGE INSTR - COC
Elimination:  Continence:   · Bowel: {YES / LB:05235}  · Bladder: {YES / UL:72754}  Urinary Catheter: {Urinary Catheter:432577185}   Colostomy/Ileostomy/Ileal Conduit: {YES / JL:86281}       Date of Last BM: ***    Intake/Output Summary (Last 24 hours) at 2020 1526  Last data filed at 2020 0800  Gross per 24 hour   Intake 420 ml   Output --   Net 420 ml     I/O last 3 completed shifts:   In: 65 [P.O.:660]  Out: -     Safety Concerns:     [address] Concerns:326947118}    Impairments/Disabilities:      [address] Impairments/Disabilities:973680004}    Nutrition Therapy:  Current Nutrition Therapy:   [address] [facility] Diet List:798656806}    Routes of Feeding: {CHP DME Other Feedings:069138114}  Liquids: {Slp liquid thickness:19924}  Daily Fluid Restriction: {CHP DME Yes amt example:519393845}  Last Modified Barium Swallow with Video (Video Swallowing Test): {Done Not Done RYCF:049730343}    Treatments at the Time of Hospital Discharge:   Respiratory Treatments: ***  Oxygen Therapy:  {Therapy; copd oxygen:52870}  Ventilator:    { CC Vent UCLP:758840409}    Rehab Therapies: {THERAPEUTIC INTERVENTION:6394013845}  Weight Bearing Status/Restrictions: [address] [facility] Weight Bearin}  Other Medical Equipment (for information only, NOT a DME order):  {EQUIPMENT:284499887}  Other Treatments: ***    Patient's personal belongings (please select all that are sent with patient):  {CHP DME Belongings:868117151}    RN SIGNATURE:  {Esignature:500565104}    CASE MANAGEMENT/SOCIAL WORK SECTION    Inpatient Status Date: ***    Readmission Risk Assessment Score:  Readmission Risk              Risk of Unplanned Readmission:        17           Discharging to Facility/ Agency   · Name:   · Address:  · Phone:  · Fax:    Dialysis Facility (if applicable)   · Name:  · Address:  · Dialysis Schedule:  · Phone:  · Fax:    / signature: {Esignature:[id number]}    PHYSICIAN SECTION    Prognosis:

## 2020-02-11 NOTE — PROGRESS NOTES
Nutrition Assessment    Type and Reason for Visit: Initial    Nutrition Recommendations: Continue current diet as ordered. WIll add Jvuen BID to promote optimal PO intake and healing. Nutrition Assessment:  Pt meets mild malnutrition criteria and is at increased nutrition risk w/ need of nutrition for wound healing r/t copd excerbation & Stage 3 pressure ulcer at R heel, Will start ONS & monitor nutrition progression, Pt stated to have good appetite at this time. Malnutrition Assessment:  · Malnutrition Status: Mild Malnutrition  · Context: Chronic illness  · Findings of the 6 clinical characteristics of malnutrition (Minimum of 2 out of 6 clinical characteristics is required to make the diagnosis of moderate or severe Protein Calorie Malnutrition based on AND/ASPEN Guidelines):  1. Energy Intake-Less than or equal to 75% of estimated energy requirement, (x 3 days since admit )    2. Weight Loss-5% loss or greater, in 6 months  3. Fat Loss-No significant subcutaneous fat loss,    4. Muscle Loss-Mild muscle mass loss, Temples (temporalis muscle), Clavicles (pectoralis and deltoids)  5. Fluid Accumulation-No significant fluid accumulation,    6.  Strength-Not measured    Nutrition Risk Level: Moderate    Nutrient Needs:  · Estimated Daily Total Kcal: 2291-0794(MSJ 1.2 SF)  · Estimated Daily Protein (g): 112-120(1.3-1.4gm/kg IBW)  · Estimated Daily Total Fluid (ml/day): 8497-0204(1ml/kcal)    Nutrition Diagnosis:   · Problem:  In context of chronic illness(Mild Malnutrition )  · Etiology: related to Increased demand for energy/nutrients     Signs and symptoms:  as evidenced by Intake 50-75%, Presence of wounds, Mild muscle loss, Weight loss(5.6 % wt loss x 7 months per EMR )    Objective Information:  · Nutrition-Focused Physical Findings: A&O x4, + 1.6 L I/Os, abd WDL, No noted edema, skin dry;flaky,warm, denture uppper/lower, wound dry/clean , substance abuse   · Wound Type: Open Wounds, Stage III(at R planter heel)  · Current Nutrition Therapies:  · Oral Diet Orders: General   · Oral Diet intake: 51-75%  · Oral Nutrition Supplement (ONS) Orders: None  · ONS intake: 0%  · Anthropometric Measures:  · Ht: 5' 9\" (175.3 cm)   · Current Body Wt: 189 lb (85.7 kg)(bed scale 2/11/20)  · Admission Body Wt: 189 lb (85.7 kg)(bed scale, 2/11/20)  · Usual Body Wt: 179 lb (81.2 kg)(standing scale 7/4/19)  · % Weight Change:  ,  5.6% loss x 7months   · Ideal Body Wt: 150 lb (68 kg), % Ideal Body 126%  · BMI Classification: BMI 25.0 - 29.9 Overweight    Nutrition Interventions:   Continue current diet, Start ONS(tato BID)  Education Initiated, Continued Inpatient Monitoring, Coordination of Care(d/w RN)    Nutrition Evaluation:   · Evaluation: Goals set   · Goals: consume >75%    · Monitoring: Meal Intake, Supplement Intake, Monitor Bowel Function, Weight, Diet Tolerance, Pertinent Labs, I&O, Wound Healing, Skin Integrity, Nutrition Progression      Electronically signed by Eunice Tesfaye RD, LD on 2/11/20 at 2:25 PM    Contact Number: [phone]

## 2020-02-11 NOTE — PROGRESS NOTES
Pulmonary [facility]                             Pulmonary Consult/Progress Note :                Reason for Consultation: Acute COPD exacerbation  CC : Shortness of breath     HPI:     Doing Much better,  Stated that his shortness of breath has improved significantly with less cough unable to take deep breaths, she was on room air        PHYSICAL EXAMINATION:     VITAL SIGNS:  BP (!) 109/58   Pulse 51   Temp 96.9 °F (36.1 °C) (Temporal)   Resp 18   Ht 5' 9\" (1.753 m)   Wt 172 lb (78 kg)   LMP 08/28/2013   SpO2 91%   BMI 25.40 kg/m²   Wt Readings from Last 3 Encounters:   02/08/20 172 lb (78 kg)   10/11/19 179 lb (81.2 kg)   08/21/19 176 lb (79.8 kg)     Temp Readings from Last 3 Encounters:   02/10/20 96.9 °F (36.1 °C) (Temporal)   10/11/19 97.7 °F (36.5 °C) (Oral)   08/21/19 98.3 °F (36.8 °C) (Oral)     TMAX:  BP Readings from Last 3 Encounters:   02/10/20 (!) 109/58   10/11/19 116/64   08/21/19 118/60     Pulse Readings from Last 3 Encounters:   02/10/20 51   10/11/19 76   08/21/19 60           INTAKE/OUTPUTS:  I/O last 3 completed shifts:   In: 480 [P.O.:480]  Out: -     Intake/Output Summary (Last 24 hours) at 2/10/2020 2238  Last data filed at 2/10/2020 1958  Gross per 24 hour   Intake 780 ml   Output --   Net 780 ml       General Appearance: alert and oriented to person, place and time, well-developed and   well-nourished, in no acute distress   Eyes: pupils equal, round, and reactive to light, extraocular eye movements intact, conjunctivae normal and sclera anicteric   Neck: neck supple and non tender without mass, no thyromegaly, no thyroid nodules and no cervical adenopathy   Pulmonary/Chest:Rhonchi bilateral,some wheezing   Cardiovascular: normal rate, regular rhythm, normal S1 and S2, no murmurs, rubs, clicks or gallops, distal pulses intact, no carotid bruits, no murmurs, no gallops, no carotid bruits and no JVD   Abdomen: obese, soft, non-tender,

## 2020-02-11 NOTE — DISCHARGE SUMMARY
CREATININE 0.9 2020    CALCIUM 9.1 2020    GFRAA >60 2020    LABGLOM >60 2020    GLUCOSE 123 2020        Xr Hip Left (2-3 Views)    Result Date: 2020  2 views of the left hip. COMPARISON: None. Clinical indications: Osteoarthritis. Hip pain. FINDINGS: There is severe loss of joint space, osteophytosis and eburnation of the femoral acetabular articulating surfaces. There is slight collapse of the femoral head suggesting the possibility of an additional component of remote avascular necrosis. Osteophytosis and eburnation of the sacroiliac joints and symphysis pubis. Otherwise the regional soft tissue and osseous structures are unremarkable. No acute fracture is identified. Severe degenerative change of the left hip which may be due to osteoarthritis, inflammatory arthritis or remote avascular necrosis of the femoral head. Xr Chest Portable    Result Date: 2020  Patient MRN: [id number] : 1965 Age:  48 years Gender: Female Order Date: 2020 11:00 AM Exam: XR CHEST PORTABLE Number of Images: 1 view Indication:   short of breath short of breath Comparison: Prior chest radiograph from 2019 is available. Findings: The lungs are clear. There is no evidence of pulmonary infiltrate or pleural effusion. The pulmonary vascularity is unremarkable. The cardiac, hilar and mediastinal silhouettes are satisfactory. The bony thorax demonstrates no gross abnormality. NO ACUTE CARDIOPULMONARY PROCESS     Pending test results: None    Consults: Pulmonology    Procedures: None    Condition at discharge: Improved, stable    Disposition: home    Discharge Medications:  Current Discharge Medication List      START taking these medications    Details   propranolol (INDERAL) 20 MG tablet Take 1 tablet by mouth daily  Qty: 90 tablet, Refills: 3      predniSONE (DELTASONE) 10 MG tablet Take 4 tabs x 3 days, 3 tabs x 3 days, 2 tabs x 3 days, 1 tab x 3 days, stop.   Qty: 30 tablet, Refills: 0      levofloxacin (LEVAQUIN) 750 MG tablet Take 1 tablet by mouth daily for 5 days  Qty: 5 tablet, Refills: 0         CONTINUE these medications which have NOT CHANGED    Details   methadone 5 MG/5ML solution Take 105 mg by mouth every morning. budesonide-formoterol (SYMBICORT) 160-4.5 MCG/ACT AERO Inhale 2 puffs into the lungs 2 times daily      albuterol sulfate  (90 Base) MCG/ACT inhaler Inhale 2 puffs into the lungs every 6 hours as needed for Wheezing      gabapentin (NEURONTIN) 300 MG capsule Take 300 mg by mouth 2 times daily. Multiple Vitamins-Minerals (THERAPEUTIC MULTIVITAMIN-MINERALS) tablet Take 1 tablet by mouth daily             Activity: activity as tolerated  Diet: regular diet    To do:   Follow up with PCP for transition of care and to establish care  Follow up with Radha Espitia M.D., PGY - 1   3:38 PM  2/11/2020    Attending physician: Dr. Veronica Guerra

## 2020-02-11 NOTE — CARE COORDINATION
Met with the pt regarding her oxygen. She insists that she receives her oxygen from [facility] ([facility] Per [facility] DME, the oxygen equipment was returned in 2015 due to the pt's non-compliance.  They will not be able to accept her again if needed

## 2020-02-13 LAB
BLOOD CULTURE, ROUTINE: NORMAL
CULTURE, BLOOD 2: NORMAL

## 2020-02-16 RX ORDER — GABAPENTIN 300 MG/1
CAPSULE ORAL
Qty: 60 CAPSULE | OUTPATIENT
Start: 2020-02-16

## 2020-02-27 ENCOUNTER — TELEPHONE (OUTPATIENT)
Dept: FAMILY MEDICINE CLINIC | Age: 55
End: 2020-02-27

## 2021-07-20 ENCOUNTER — APPOINTMENT (OUTPATIENT)
Dept: GENERAL RADIOLOGY | Age: 56
DRG: 504 | End: 2021-07-20
Payer: OTHER GOVERNMENT

## 2021-07-20 ENCOUNTER — HOSPITAL ENCOUNTER (INPATIENT)
Age: 56
LOS: 10 days | Discharge: NURSING, REHAB OR LONG-TERM CARE | DRG: 504 | End: 2021-07-30
Attending: EMERGENCY MEDICINE | Admitting: INTERNAL MEDICINE
Payer: OTHER GOVERNMENT

## 2021-07-20 DIAGNOSIS — Z87.81 HISTORY OF FRACTURE OF LEFT HIP: ICD-10-CM

## 2021-07-20 DIAGNOSIS — M86.9 OSTEOMYELITIS OF RIGHT FOOT, UNSPECIFIED TYPE (HCC): Primary | ICD-10-CM

## 2021-07-20 DIAGNOSIS — M86.379: ICD-10-CM

## 2021-07-20 DIAGNOSIS — L02.91 ABSCESS: ICD-10-CM

## 2021-07-20 DIAGNOSIS — F19.10 SUBSTANCE ABUSE (HCC): ICD-10-CM

## 2021-07-20 DIAGNOSIS — L97.409 NONHEALING ULCER OF HEEL (HCC): ICD-10-CM

## 2021-07-20 DIAGNOSIS — S91.105A OPEN WOUND OF FIFTH TOE OF LEFT FOOT, INITIAL ENCOUNTER: ICD-10-CM

## 2021-07-20 DIAGNOSIS — Z79.899 HIGH RISK MEDICATION USE: ICD-10-CM

## 2021-07-20 DIAGNOSIS — M86.071 ACUTE HEMATOGENOUS OSTEOMYELITIS OF RIGHT FOOT (HCC): ICD-10-CM

## 2021-07-20 LAB
ANION GAP SERPL CALCULATED.3IONS-SCNC: 8 MMOL/L (ref 7–16)
BASOPHILS ABSOLUTE: 0.04 E9/L (ref 0–0.2)
BASOPHILS RELATIVE PERCENT: 0.6 % (ref 0–2)
BUN BLDV-MCNC: 10 MG/DL (ref 6–20)
C-REACTIVE PROTEIN: 5.5 MG/DL (ref 0–0.4)
CALCIUM SERPL-MCNC: 9 MG/DL (ref 8.6–10.2)
CHLORIDE BLD-SCNC: 98 MMOL/L (ref 98–107)
CO2: 30 MMOL/L (ref 22–29)
CREAT SERPL-MCNC: 0.9 MG/DL (ref 0.5–1)
EOSINOPHILS ABSOLUTE: 0.04 E9/L (ref 0.05–0.5)
EOSINOPHILS RELATIVE PERCENT: 0.6 % (ref 0–6)
GFR AFRICAN AMERICAN: >60
GFR NON-AFRICAN AMERICAN: >60 ML/MIN/1.73
GLUCOSE BLD-MCNC: 110 MG/DL (ref 74–99)
HBA1C MFR BLD: 6.5 % (ref 4–5.6)
HCT VFR BLD CALC: 42.3 % (ref 34–48)
HEMOGLOBIN: 12.9 G/DL (ref 11.5–15.5)
IMMATURE GRANULOCYTES #: 0.02 E9/L
IMMATURE GRANULOCYTES %: 0.3 % (ref 0–5)
LACTIC ACID: 1 MMOL/L (ref 0.5–2.2)
LYMPHOCYTES ABSOLUTE: 0.95 E9/L (ref 1.5–4)
LYMPHOCYTES RELATIVE PERCENT: 13.3 % (ref 20–42)
MCH RBC QN AUTO: 27.3 PG (ref 26–35)
MCHC RBC AUTO-ENTMCNC: 30.5 % (ref 32–34.5)
MCV RBC AUTO: 89.4 FL (ref 80–99.9)
MONOCYTES ABSOLUTE: 0.55 E9/L (ref 0.1–0.95)
MONOCYTES RELATIVE PERCENT: 7.7 % (ref 2–12)
NEUTROPHILS ABSOLUTE: 5.54 E9/L (ref 1.8–7.3)
NEUTROPHILS RELATIVE PERCENT: 77.5 % (ref 43–80)
PDW BLD-RTO: 14.8 FL (ref 11.5–15)
PLATELET # BLD: 305 E9/L (ref 130–450)
PMV BLD AUTO: 9.7 FL (ref 7–12)
POTASSIUM REFLEX MAGNESIUM: 4.4 MMOL/L (ref 3.5–5)
PROCALCITONIN: 0.05 NG/ML (ref 0–0.08)
RBC # BLD: 4.73 E12/L (ref 3.5–5.5)
SEDIMENTATION RATE, ERYTHROCYTE: 45 MM/HR (ref 0–20)
SODIUM BLD-SCNC: 136 MMOL/L (ref 132–146)
TSH SERPL DL<=0.05 MIU/L-ACNC: 2.57 UIU/ML (ref 0.27–4.2)
WBC # BLD: 7.1 E9/L (ref 4.5–11.5)

## 2021-07-20 PROCEDURE — 83605 ASSAY OF LACTIC ACID: CPT

## 2021-07-20 PROCEDURE — 36415 COLL VENOUS BLD VENIPUNCTURE: CPT

## 2021-07-20 PROCEDURE — 2580000003 HC RX 258: Performed by: EMERGENCY MEDICINE

## 2021-07-20 PROCEDURE — 99285 EMERGENCY DEPT VISIT HI MDM: CPT

## 2021-07-20 PROCEDURE — 84443 ASSAY THYROID STIM HORMONE: CPT

## 2021-07-20 PROCEDURE — 85651 RBC SED RATE NONAUTOMATED: CPT

## 2021-07-20 PROCEDURE — 6360000002 HC RX W HCPCS: Performed by: STUDENT IN AN ORGANIZED HEALTH CARE EDUCATION/TRAINING PROGRAM

## 2021-07-20 PROCEDURE — 85025 COMPLETE CBC W/AUTO DIFF WBC: CPT

## 2021-07-20 PROCEDURE — 83036 HEMOGLOBIN GLYCOSYLATED A1C: CPT

## 2021-07-20 PROCEDURE — 6360000002 HC RX W HCPCS: Performed by: INTERNAL MEDICINE

## 2021-07-20 PROCEDURE — 6370000000 HC RX 637 (ALT 250 FOR IP): Performed by: INTERNAL MEDICINE

## 2021-07-20 PROCEDURE — 6360000002 HC RX W HCPCS: Performed by: EMERGENCY MEDICINE

## 2021-07-20 PROCEDURE — 86140 C-REACTIVE PROTEIN: CPT

## 2021-07-20 PROCEDURE — 84145 PROCALCITONIN (PCT): CPT

## 2021-07-20 PROCEDURE — 73630 X-RAY EXAM OF FOOT: CPT

## 2021-07-20 PROCEDURE — 1200000000 HC SEMI PRIVATE

## 2021-07-20 PROCEDURE — 80048 BASIC METABOLIC PNL TOTAL CA: CPT

## 2021-07-20 PROCEDURE — 87040 BLOOD CULTURE FOR BACTERIA: CPT

## 2021-07-20 PROCEDURE — 2580000003 HC RX 258: Performed by: INTERNAL MEDICINE

## 2021-07-20 RX ORDER — BUDESONIDE 0.5 MG/2ML
0.5 INHALANT ORAL 2 TIMES DAILY
Status: DISCONTINUED | OUTPATIENT
Start: 2021-07-20 | End: 2021-07-30 | Stop reason: HOSPADM

## 2021-07-20 RX ORDER — ARFORMOTEROL TARTRATE 15 UG/2ML
15 SOLUTION RESPIRATORY (INHALATION) 2 TIMES DAILY
Status: DISCONTINUED | OUTPATIENT
Start: 2021-07-20 | End: 2021-07-30 | Stop reason: HOSPADM

## 2021-07-20 RX ORDER — ASCORBIC ACID 500 MG
500 TABLET ORAL DAILY
Status: DISCONTINUED | OUTPATIENT
Start: 2021-07-21 | End: 2021-07-30 | Stop reason: HOSPADM

## 2021-07-20 RX ORDER — ONDANSETRON 2 MG/ML
4 INJECTION INTRAMUSCULAR; INTRAVENOUS EVERY 6 HOURS PRN
Status: DISCONTINUED | OUTPATIENT
Start: 2021-07-20 | End: 2021-07-30 | Stop reason: HOSPADM

## 2021-07-20 RX ORDER — ONDANSETRON 4 MG/1
4 TABLET, ORALLY DISINTEGRATING ORAL EVERY 8 HOURS PRN
Status: DISCONTINUED | OUTPATIENT
Start: 2021-07-20 | End: 2021-07-30 | Stop reason: HOSPADM

## 2021-07-20 RX ORDER — ACETAMINOPHEN 325 MG/1
650 TABLET ORAL EVERY 6 HOURS PRN
Status: DISCONTINUED | OUTPATIENT
Start: 2021-07-20 | End: 2021-07-30 | Stop reason: HOSPADM

## 2021-07-20 RX ORDER — DIPHENHYDRAMINE HYDROCHLORIDE 50 MG/ML
25 INJECTION INTRAMUSCULAR; INTRAVENOUS ONCE
Status: COMPLETED | OUTPATIENT
Start: 2021-07-20 | End: 2021-07-20

## 2021-07-20 RX ORDER — GABAPENTIN 300 MG/1
300 CAPSULE ORAL 2 TIMES DAILY
Status: DISCONTINUED | OUTPATIENT
Start: 2021-07-20 | End: 2021-07-30 | Stop reason: HOSPADM

## 2021-07-20 RX ORDER — SODIUM CHLORIDE 0.9 % (FLUSH) 0.9 %
10 SYRINGE (ML) INJECTION EVERY 12 HOURS SCHEDULED
Status: DISCONTINUED | OUTPATIENT
Start: 2021-07-20 | End: 2021-07-30 | Stop reason: HOSPADM

## 2021-07-20 RX ORDER — KETOROLAC TROMETHAMINE 30 MG/ML
30 INJECTION, SOLUTION INTRAMUSCULAR; INTRAVENOUS EVERY 6 HOURS PRN
Status: COMPLETED | OUTPATIENT
Start: 2021-07-20 | End: 2021-07-21

## 2021-07-20 RX ORDER — SODIUM CHLORIDE 9 MG/ML
25 INJECTION, SOLUTION INTRAVENOUS PRN
Status: DISCONTINUED | OUTPATIENT
Start: 2021-07-20 | End: 2021-07-30 | Stop reason: HOSPADM

## 2021-07-20 RX ORDER — POLYETHYLENE GLYCOL 3350 17 G/17G
17 POWDER, FOR SOLUTION ORAL DAILY PRN
Status: DISCONTINUED | OUTPATIENT
Start: 2021-07-20 | End: 2021-07-30 | Stop reason: HOSPADM

## 2021-07-20 RX ORDER — ACETAMINOPHEN 650 MG/1
650 SUPPOSITORY RECTAL EVERY 6 HOURS PRN
Status: DISCONTINUED | OUTPATIENT
Start: 2021-07-20 | End: 2021-07-30 | Stop reason: HOSPADM

## 2021-07-20 RX ORDER — SODIUM CHLORIDE 0.9 % (FLUSH) 0.9 %
10 SYRINGE (ML) INJECTION PRN
Status: DISCONTINUED | OUTPATIENT
Start: 2021-07-20 | End: 2021-07-30 | Stop reason: HOSPADM

## 2021-07-20 RX ORDER — IPRATROPIUM BROMIDE AND ALBUTEROL SULFATE 2.5; .5 MG/3ML; MG/3ML
1 SOLUTION RESPIRATORY (INHALATION)
Status: DISCONTINUED | OUTPATIENT
Start: 2021-07-21 | End: 2021-07-21

## 2021-07-20 RX ADMIN — KETOROLAC TROMETHAMINE 30 MG: 30 INJECTION, SOLUTION INTRAMUSCULAR; INTRAVENOUS at 22:40

## 2021-07-20 RX ADMIN — DIPHENHYDRAMINE HYDROCHLORIDE 25 MG: 50 INJECTION INTRAMUSCULAR; INTRAVENOUS at 18:38

## 2021-07-20 RX ADMIN — VANCOMYCIN HYDROCHLORIDE 1250 MG: 10 INJECTION, POWDER, LYOPHILIZED, FOR SOLUTION INTRAVENOUS at 18:41

## 2021-07-20 RX ADMIN — GABAPENTIN 300 MG: 300 CAPSULE ORAL at 22:28

## 2021-07-20 RX ADMIN — MEROPENEM 1000 MG: 1 INJECTION, POWDER, FOR SOLUTION INTRAVENOUS at 17:43

## 2021-07-20 RX ADMIN — Medication 10 ML: at 22:40

## 2021-07-20 ASSESSMENT — PAIN DESCRIPTION - ONSET
ONSET: ON-GOING
ONSET: ON-GOING

## 2021-07-20 ASSESSMENT — ENCOUNTER SYMPTOMS
CHEST TIGHTNESS: 0
SHORTNESS OF BREATH: 0

## 2021-07-20 ASSESSMENT — PAIN DESCRIPTION - PROGRESSION
CLINICAL_PROGRESSION: NOT CHANGED
CLINICAL_PROGRESSION: NOT CHANGED

## 2021-07-20 ASSESSMENT — PAIN DESCRIPTION - FREQUENCY
FREQUENCY: CONTINUOUS
FREQUENCY: CONTINUOUS

## 2021-07-20 ASSESSMENT — PAIN DESCRIPTION - PAIN TYPE
TYPE: ACUTE PAIN
TYPE: ACUTE PAIN

## 2021-07-20 ASSESSMENT — PAIN DESCRIPTION - DESCRIPTORS
DESCRIPTORS: CONSTANT;SHARP
DESCRIPTORS: SHARP;CONSTANT

## 2021-07-20 ASSESSMENT — PAIN DESCRIPTION - LOCATION
LOCATION: LEG
LOCATION: LEG

## 2021-07-20 ASSESSMENT — PAIN DESCRIPTION - DIRECTION
RADIATING_TOWARDS: FOOT
RADIATING_TOWARDS: FOOT

## 2021-07-20 ASSESSMENT — PAIN DESCRIPTION - ORIENTATION
ORIENTATION: RIGHT
ORIENTATION: RIGHT

## 2021-07-20 ASSESSMENT — PAIN SCALES - GENERAL
PAINLEVEL_OUTOF10: 10
PAINLEVEL_OUTOF10: 8
PAINLEVEL_OUTOF10: 10

## 2021-07-20 NOTE — ED PROVIDER NOTES
54 yo patient presenting with concern from Dr. Geovanna Gordon about osteomyelitis. She has a chronic, recurrent wound to the right heel and the bottom of her right foot. She says she is had this before and has not seen a wound care provider for quite some time. Saw Dr. Geovanna Gordon end of last week and he wanted her admitted to the hospital.  She says she also has a fracture to the left hip and cannot have surgery quite yet. She is awake, alert, oriented x4. This is a recurrent problem, persistent, moderate severity, worse with her activities, no obvious fevers or chills, some generalized swelling to the right leg. History reviewed. No pertinent family history. Past Surgical History:   Procedure Laterality Date     SECTION      x three    DRAIN SKIN ABSCESS SIMPLE  2014         FOOT SURGERY         Review of Systems   Constitutional: Negative for chills and fever. Respiratory: Negative for chest tightness and shortness of breath. Cardiovascular: Negative for chest pain. Skin: Positive for wound. All other systems reviewed and are negative. Physical Exam  Constitutional:       General: She is not in acute distress. Appearance: She is well-developed. HENT:      Head: Normocephalic and atraumatic. Eyes:      Conjunctiva/sclera: Conjunctivae normal.      Pupils: Pupils are equal, round, and reactive to light. Neck:      Thyroid: No thyromegaly. Cardiovascular:      Rate and Rhythm: Normal rate and regular rhythm. Pulmonary:      Effort: Pulmonary effort is normal. No respiratory distress. Breath sounds: Normal breath sounds. Abdominal:      General: There is no distension. Palpations: Abdomen is soft. Tenderness: There is no abdominal tenderness. There is no guarding or rebound. Musculoskeletal:         General: No tenderness. Normal range of motion. Cervical back: Normal range of motion. Skin:     General: Skin is warm and dry.       Findings: No erythema. Comments: Wound to the bottom of the right foot and the right heel, no purulent drainage, no fluctuance or crepitus or induration around it, it is very deep with concerns for osteomyelitis   Neurological:      Mental Status: She is alert and oriented to person, place, and time. Cranial Nerves: No cranial nerve deficit. Coordination: Coordination normal.          Procedures     [facility]                --------------------------------------------- PAST HISTORY ---------------------------------------------  Past Medical History:  has a past medical history of Abscess, Chronic pain, Chronic recurrent multifocal osteomyelitis of foot ([facility] [address] Hip fracture ([facility] [address] Hx of blood clots, Pressure injury of heel, stage 3 ([facility] [address] and Subclinical hypothyroidism. Past Surgical History:  has a past surgical history that includes Foot surgery;  section; and drain skin abscess simple (2014). Social History:  reports that she has quit smoking. Her smoking use included cigarettes. She smoked 1.00 pack per day. She has never used smokeless tobacco. She reports that she does not drink alcohol and does not use drugs. Family History: family history is not on file. The patients home medications have been reviewed. Allergies:  Ancef [cefazolin], Duricef [cefadroxil], and Iodine    -------------------------------------------------- RESULTS -------------------------------------------------    LABS:  Results for orders placed or performed during the hospital encounter of 21   CBC Auto Differential   Result Value Ref Range    WBC 7.1 4.5 - 11.5 E9/L    RBC 4.73 3.50 - 5.50 E12/L    Hemoglobin 12.9 11.5 - 15.5 g/dL    Hematocrit 42.3 34.0 - 48.0 %    MCV 89.4 80.0 - 99.9 fL    MCH 27.3 26.0 - 35.0 pg    MCHC 30.5 (L) 32.0 - 34.5 %    RDW 14.8 11.5 - 15.0 fL    Platelets 879 600 - 103 E9/L    MPV 9.7 7.0 - 12.0 fL    Neutrophils % 77.5 43.0 - 80.0 %    Immature Granulocytes % 0.3 0.0 - 5.0 % Lymphocytes % 13.3 (L) 20.0 - 42.0 %    Monocytes % 7.7 2.0 - 12.0 %    Eosinophils % 0.6 0.0 - 6.0 %    Basophils % 0.6 0.0 - 2.0 %    Neutrophils Absolute 5.54 1.80 - 7.30 E9/L    Immature Granulocytes # 0.02 E9/L    Lymphocytes Absolute 0.95 (L) 1.50 - 4.00 E9/L    Monocytes Absolute 0.55 0.10 - 0.95 E9/L    Eosinophils Absolute 0.04 (L) 0.05 - 0.50 E9/L    Basophils Absolute 0.04 0.00 - 0.20 D3/L   Basic Metabolic Panel w/ Reflex to MG   Result Value Ref Range    Sodium 136 132 - 146 mmol/L    Potassium reflex Magnesium 4.4 3.5 - 5.0 mmol/L    Chloride 98 98 - 107 mmol/L    CO2 30 (H) 22 - 29 mmol/L    Anion Gap 8 7 - 16 mmol/L    Glucose 110 (H) 74 - 99 mg/dL    BUN 10 6 - 20 mg/dL    CREATININE 0.9 0.5 - 1.0 mg/dL    GFR Non-African American >60 >=60 mL/min/1.73    GFR African American >60     Calcium 9.0 8.6 - 10.2 mg/dL   Lactic Acid, Plasma   Result Value Ref Range    Lactic Acid 1.0 0.5 - 2.2 mmol/L   C-REACTIVE PROTEIN   Result Value Ref Range    CRP 5.5 (H) 0.0 - 0.4 mg/dL       RADIOLOGY:  XR FOOT RIGHT (MIN 3 VIEWS)   Final Result   1. Amputation of the distal calcaneus. 2. Cortical irregularity likely related to erosive changes involving inferior   aspect of the calcaneus. Findings could suggest osteomyelitis. 3. Skin ulceration involving plantar soft tissue beneath the calcaneus. 4. No obvious subcutaneous gas.               ------------------------- NURSING NOTES AND VITALS REVIEWED ---------------------------  Date / Time Roomed:  7/20/2021  4:35 PM  ED Bed Assignment:  Della Scherer    The nursing notes within the ED encounter and vital signs as below have been reviewed.      Patient Vitals for the past 24 hrs:   BP Temp Pulse Resp SpO2 Weight   07/20/21 1402 (!) 107/55 98.2 °F (36.8 °C) 68 18 95 % 190 lb (86.2 kg)   07/20/21 1357 -- 96.9 °F (36.1 °C) 76 -- 90 % --       Oxygen Saturation Interpretation: Normal    ------------------------------------------ PROGRESS NOTES ------------------------------------------  Re-evaluation(s):    Patients symptoms show no change  Repeat physical examination is not changed    Counseling:  I have spoken with the patient and discussed todays results, in addition to providing specific details for the plan of care and counseling regarding the diagnosis and prognosis. Their questions are answered at this time and they are agreeable with the plan of admission.    --------------------------------- ADDITIONAL PROVIDER NOTES ---------------------------------  Consultations:  Time: 1710. Spoke with Dr. Angeline Mccullough. Discussed case. They will admit the patient. This patient's ED course included: a personal history and physicial examination, re-evaluation prior to disposition and IV medications    This patient has remained hemodynamically stable during their ED course. Diagnosis:  1. [facility]omyelitis of right foot, unspecified type ([facility]        Disposition:  Patient's disposition: Admit to med/surg floor  Patient's condition is stable.            Que Dyer DO  07/20/21 [id number]

## 2021-07-20 NOTE — LETTER
Richard 15 Sex: Female   [place] [facility] [address]         Marital Status:    Employer: RETIRED         Sikhism: Christina   Primary Care Provider: Yazmin Farley DO         Primary Phone: [phone]   EMERGENCY CONTACT   Contact Name Legal Guardian? Relationship to Patient Home Phone Work Phone   1. Phil Andrews  2. *No Contact Specified*      Child    [phone]                 GUARANTOR            Guarantor: Lizethkina Trisha     : 1965   Address: [address] Sex: Female   [place] [address]     Relation to Patient: Self       Home Phone: [phone]   Guarantor ID: [id number]       Work Phone:     Guarantor Employer: RETIRED         Status: RETIRED      COVERAGE  PRIMARY INSURANCE   Payor: [facility] Plan: [facility]   Payor Address: [facility] [id number], [place] [address]       Group Number:   Insurance Type: INDEMNITY   Subscriber Name: Abelardo Loera : 1965   Subscriber ID: [id number] Pat. Rel. to Sub: Self   SECONDARY INSURANCE   Payor: [address]: [facility] Address:  [facility] [id number] [address]          Group Number: [id number] Insurance Type: [id number] Name: Mercedes Roca Subscriber : 1965   Subscriber ID: [id number] Pat.  Rel. to Sub: SELF

## 2021-07-20 NOTE — H&P
\[facility]  Internal Medicine Residency Program  History and Physical    Patient:  Melanie Trotter 55 y.o. female MRN: [id number]     Date of Service: 7/20/2021    Hospital Day: 1      Chief complaint: had concerns including Wound Check (Dr Jana Painting sent in for admission. Has hip Fx on left, needs surgery but they wont do surgery until wound on right heel is healed up ) and Hip Pain. History Obtained From:  patient    Date of Admission:   History of Present Illness   Melanie Trotter is a 55 y.o. female with a past medical history of COPD, subclinical hypothyroidism, polysubstance abuse on methadone, tremors, and right wound pressure injury who presented to the ED after visiting Dr Jana Painting at the end of last week. At her visit last week, she was being evaluated for clearance for left hip replacement due to fracture, which she stated is chronic in nature. She was thrown from her horse many years ago and fell 2-3 years ago on the same side she previously fractured while walking on ice . She has a chronic right heel wound, and her surgeon requested evaluation and treatment of the heel before getting her hip surgery. Dr Jana Painting wanted her admitted to the hospital for this right heal wound. Patient stated this heel wound has been an issue for 20 years. She stated at one point the wound had improved, but about 8 years ago she stepped on a metal agustin (toy jacks that children play with) and the wound reopened. She stated that the agustin \"macerated her heel, as if the tissue underneath never healed. \" She stated that over the last few weeks she started to notice a foul odor coming from the wound. She has been using \"left over antibiotics\" from [facility] in February when she had her lung problem\" at home to try to treat the wound. She has been taking two pills a day. She uses a crutch on her right side to walk. She also has a wheelchair to get around her home.  She denies fever, headache, fatigue, shortness of breath, or abdominal pain. She does note right leg swelling that she states get worse when the wound on her foot stops draining. Patient does have a cough at this time but she stated this is chronic for her. She produces clear sputum, which is also chronic in nature. ED Course: In the ED vitals were stable with BP of 107/55, HR of 68, RR of 18 saturating 95% on room air. Labs were significant for CRP of 5.5. X-ray of the right foot revealed amputation of the distal aspect of the calcaneus, cortical irregularity likely related to erosive changes involving inferior aspect of the calcaneus that could suggest osteomyelitis, and skin ulceration involving plantar soft tissue beneath the calcaneus, but no obvious subcutaneous gas. ED Meds: Patient was given 1250 mg vancomycin and 1000 mg meropenem  ED Fluids: Patient was given no fluids. Previous Hospital Admission: 2/08/2020  Patient presented to the Ed with worsening productive cough and shortness of breath. Patient stated that sputum was greenish in color accompanied with fever and chills. Patient was treated for acute COPD exacerbation with doxycycline, pulmicort, perforomist, duoneb, solumedrol. While in hospital patient complained of left hip pain, x-ray was significant for severe degenerative change of the left hip. Patient was also noted to have tremors, per patient it started about 1 year ago, also + family history in mother. Patient was started on propranolol 10 mg with no exacerbation of wheezing. Dose of propranolol was increased to 20 mg. Respiratory cultures were positive for H. Influenza. Due to history of medication allergies and microbial sensitivities, patient was discharged on Levaquin 750 mg for 5 days.        Past Medical History:       Diagnosis Date    Abscess     states for a couple years she has had problems with     Chronic pain     Chronic recurrent multifocal osteomyelitis of foot ([facility] [address] 7/6/2019    Hip fracture (HCC)     Hx of blood clots     dvt rt calf    Pressure injury of heel, stage 3 (HCC) 2019    Subclinical hypothyroidism 2019       Past Surgical History:        Procedure Laterality Date     SECTION      x three    DRAIN SKIN ABSCESS SIMPLE  2014         FOOT SURGERY         Medications Prior to Admission:    Prior to Admission medications    Medication Sig Start Date End Date Taking? Authorizing Provider   methadone 5 MG/5ML solution Take 120 mg by mouth every morning. Yes Historical Provider, MD   budesonide-formoterol (SYMBICORT) 160-4.5 MCG/ACT AERO Inhale 2 puffs into the lungs daily    Yes Historical Provider, MD   albuterol sulfate  (90 Base) MCG/ACT inhaler Inhale 2 puffs into the lungs every 6 hours as needed for Wheezing   Yes Historical Provider, MD   gabapentin (NEURONTIN) 300 MG capsule Take 300 mg by mouth 2 times daily. Yes Historical Provider, MD       Allergies: Ancef [cefazolin], Duricef [cefadroxil], and Iodine    Social History:   TOBACCO:   reports that she has quit smoking. Her smoking use included cigarettes. She smoked 1.00 pack per day. She has never used smokeless tobacco.  ETOH:   reports no history of alcohol use. Family History:   History reviewed. No pertinent family history. REVIEW OF SYSTEMS:    · Constitutional: No fever, no chills, no change in weight; good appetite  · HEENT: No blurred vision, no ear problems, no sore throat, no rhinorrhea. · Respiratory:  + clear sputum production, + cough  · Cardiology: No angina, no dyspnea on exertion, no paroxysmal nocturnal dyspnea, no orthopnea, no palpitation, no leg swelling. · Gastroenterology: No dysphagia, no reflux; no abdominal pain, no nausea or vomiting; no constipation or diarrhea.  No hematochezia   · Genitourinary: No dysuria, no frequency, hesitancy; no hematuria  · Musculoskeletal: + right foot pain, + left hip pain  · Neurology: no focal weakness in extremities, no slurred speech, no double vision, no tingling or numbness sensation  · Endocrinology: no temperature intolerance, no polyphagia, polydipsia or polyuria  · Hematology: no increased bleeding, no bruising, no lymphadenopathy  · Skin: + erythema and warmth distal right leg and foot  · Psychology: no depressed mood, no suicidal ideation    Physical Exam   · Vitals: BP (!) 119/59   Pulse 73   Temp 97.5 °F (36.4 °C)   Resp 20   Wt 190 lb (86.2 kg)   LMP 08/28/2013   SpO2 98%   BMI 28.06 kg/m²     · General Appearance: alert and oriented to person, place and time  · Skin: erythema and warmth right distal lower extremity and foot  · Head: normocephalic and atraumatic  · Pulmonary/Chest: wheezing present- diffuse bilaterally  · Cardiovascular: normal rate, normal S1 and S2, no murmurs, no gallops and intact distal pulses  · Abdomen: soft, non-tender, non-distended, normal bowel sounds, no masses or organomegaly  · Extremities: foot exam abnormal- right calcaneal ulceration with discharge, lateral tenderness of right foot  · Neurologic: speech normal and sensation to light touch intact   Labs and Imaging Studies   Basic Labs  Recent Labs     07/20/21  1414      K 4.4   CL 98   CO2 30*   BUN 10   CREATININE 0.9   GLUCOSE 110*   CALCIUM 9.0       Recent Labs     07/20/21  1414   WBC 7.1   RBC 4.73   HGB 12.9   HCT 42.3   MCV 89.4   MCH 27.3   MCHC 30.5*   RDW 14.8      MPV 9.7       CBC:   Lab Results   Component Value Date    WBC 7.1 07/20/2021    RBC 4.73 07/20/2021    HGB 12.9 07/20/2021    HCT 42.3 07/20/2021    MCV 89.4 07/20/2021    RDW 14.8 07/20/2021     07/20/2021     CMP:  Lab Results   Component Value Date     07/20/2021    K 4.4 07/20/2021    CL 98 07/20/2021    CO2 30 07/20/2021    BUN 10 07/20/2021    PROT 7.7 02/08/2020       Imaging Studies:     XR FOOT RIGHT (MIN 3 VIEWS)    Result Date: 7/20/2021  EXAMINATION: THREE XRAY VIEWS OF THE RIGHT FOOT 7/20/2021 3:05 pm COMPARISON: None.  HISTORY: ORDERING SYSTEM PROVIDED HISTORY: right heel ulcer TECHNOLOGIST PROVIDED HISTORY: Reason for exam:->right heel ulcer What reading provider will be dictating this exam?->CRC FINDINGS: There is amputation of the distal aspect of the calcaneus. Cortical irregularity involving inferior margin of the calcaneus. There is overlying skin ulceration. No definite subcutaneous gas. Osteophytosis at level of anterior talus. No acute fracture. Vascular calcifications present. 1. Amputation of the distal calcaneus. 2. Cortical irregularity likely related to erosive changes involving inferior aspect of the calcaneus. Findings could suggest osteomyelitis. 3. Skin ulceration involving plantar soft tissue beneath the calcaneus. 4. No obvious subcutaneous gas. Resident's Assessment and Plan       Heaven Ambriz is a 55 y.o. female with  has a past medical history of Abscess, Chronic pain, Chronic recurrent multifocal osteomyelitis of foot ([address] Hip fracture ([address] Hx of blood clots, Pressure injury of heel, stage 3 ([address] and Subclinical hypothyroidism. Came with CC: right heel wound      Assessment/Plan:  1. Osteomyelitis of right calcaneus 2/2 peripheral vascular disease vs neuropathy vs hypothyroidism    - chronic, nonhealing wound on right calcaneus - currently draining with foul smell, uses crutch on right side to walk, patient stated pain in heel intermittently painful   - continue vancomycin and meropenem    - X-ray of foot suggestive of osteomyelitis   - wound cultures, blood cultures, bone biopsy   - ID consult   - Podiatry consult   - NPO diet    - strict bedrest   - check A1C   - check TSH  2. Hx of hip fracture   - patient waiting to be cleared for surgery   - broke hip years ago after being thrown from horse, fell on ice again on same hip  3. Hx of COPD   - patient stated she is on symbicort at home and it works well   - start brovana, pulmicort, duoneb  4.  Hx of polysubstance abuse on methadone   - patient states methadone is for chronic pain, wants to make sure call is placed to [place] so she can continue to receive her methadone while in the hospital   5. Hx of subclinical hypothyroidism   - most recent TSH 2.9, free T4 0.98 from 8/8/2019    - check TSH, free T4  6.  Hx of chronic pain   - call for methadone   - continue home gabapentin    PT/OT: will order  DVT ppx: lovenox  GI ppx: protonix      Luz Maria Poe, MS4  Attending physician: Dr. Melina Delgado

## 2021-07-21 ENCOUNTER — APPOINTMENT (OUTPATIENT)
Dept: INTERVENTIONAL RADIOLOGY/VASCULAR | Age: 56
DRG: 504 | End: 2021-07-21
Payer: OTHER GOVERNMENT

## 2021-07-21 LAB
ANION GAP SERPL CALCULATED.3IONS-SCNC: 10 MMOL/L (ref 7–16)
ANTISTREPTOLYSIN-O: 130 IU/ML (ref 0–200)
BASOPHILS ABSOLUTE: 0.06 E9/L (ref 0–0.2)
BASOPHILS RELATIVE PERCENT: 1.1 % (ref 0–2)
BUN BLDV-MCNC: 10 MG/DL (ref 6–20)
CALCIUM SERPL-MCNC: 9.2 MG/DL (ref 8.6–10.2)
CHLORIDE BLD-SCNC: 98 MMOL/L (ref 98–107)
CO2: 30 MMOL/L (ref 22–29)
CREAT SERPL-MCNC: 0.9 MG/DL (ref 0.5–1)
EOSINOPHILS ABSOLUTE: 0.11 E9/L (ref 0.05–0.5)
EOSINOPHILS RELATIVE PERCENT: 2 % (ref 0–6)
GFR AFRICAN AMERICAN: >60
GFR NON-AFRICAN AMERICAN: >60 ML/MIN/1.73
GLUCOSE BLD-MCNC: 104 MG/DL (ref 74–99)
HCT VFR BLD CALC: 44.9 % (ref 34–48)
HEMOGLOBIN: 13.8 G/DL (ref 11.5–15.5)
IMMATURE GRANULOCYTES #: 0.04 E9/L
IMMATURE GRANULOCYTES %: 0.7 % (ref 0–5)
LYMPHOCYTES ABSOLUTE: 1.13 E9/L (ref 1.5–4)
LYMPHOCYTES RELATIVE PERCENT: 20.4 % (ref 20–42)
MCH RBC QN AUTO: 27.2 PG (ref 26–35)
MCHC RBC AUTO-ENTMCNC: 30.7 % (ref 32–34.5)
MCV RBC AUTO: 88.6 FL (ref 80–99.9)
MONOCYTES ABSOLUTE: 0.47 E9/L (ref 0.1–0.95)
MONOCYTES RELATIVE PERCENT: 8.5 % (ref 2–12)
NEUTROPHILS ABSOLUTE: 3.72 E9/L (ref 1.8–7.3)
NEUTROPHILS RELATIVE PERCENT: 67.3 % (ref 43–80)
PDW BLD-RTO: 15.2 FL (ref 11.5–15)
PLATELET # BLD: 320 E9/L (ref 130–450)
PMV BLD AUTO: 9.9 FL (ref 7–12)
POTASSIUM SERPL-SCNC: 4.3 MMOL/L (ref 3.5–5)
RBC # BLD: 5.07 E12/L (ref 3.5–5.5)
SODIUM BLD-SCNC: 138 MMOL/L (ref 132–146)
WBC # BLD: 5.5 E9/L (ref 4.5–11.5)

## 2021-07-21 PROCEDURE — 99223 1ST HOSP IP/OBS HIGH 75: CPT | Performed by: INTERNAL MEDICINE

## 2021-07-21 PROCEDURE — 6360000002 HC RX W HCPCS: Performed by: INTERNAL MEDICINE

## 2021-07-21 PROCEDURE — 85025 COMPLETE CBC W/AUTO DIFF WBC: CPT

## 2021-07-21 PROCEDURE — 87081 CULTURE SCREEN ONLY: CPT

## 2021-07-21 PROCEDURE — 93923 UPR/LXTR ART STDY 3+ LVLS: CPT

## 2021-07-21 PROCEDURE — 2580000003 HC RX 258: Performed by: INTERNAL MEDICINE

## 2021-07-21 PROCEDURE — 6370000000 HC RX 637 (ALT 250 FOR IP): Performed by: STUDENT IN AN ORGANIZED HEALTH CARE EDUCATION/TRAINING PROGRAM

## 2021-07-21 PROCEDURE — 6370000000 HC RX 637 (ALT 250 FOR IP): Performed by: INTERNAL MEDICINE

## 2021-07-21 PROCEDURE — 1200000000 HC SEMI PRIVATE

## 2021-07-21 PROCEDURE — 80048 BASIC METABOLIC PNL TOTAL CA: CPT

## 2021-07-21 PROCEDURE — 6360000002 HC RX W HCPCS: Performed by: STUDENT IN AN ORGANIZED HEALTH CARE EDUCATION/TRAINING PROGRAM

## 2021-07-21 PROCEDURE — 86060 ANTISTREPTOLYSIN O TITER: CPT

## 2021-07-21 PROCEDURE — 87070 CULTURE OTHR SPECIMN AEROBIC: CPT

## 2021-07-21 PROCEDURE — 94640 AIRWAY INHALATION TREATMENT: CPT

## 2021-07-21 PROCEDURE — 36415 COLL VENOUS BLD VENIPUNCTURE: CPT

## 2021-07-21 RX ORDER — NICOTINE POLACRILEX 4 MG
15 LOZENGE BUCCAL PRN
Status: DISCONTINUED | OUTPATIENT
Start: 2021-07-21 | End: 2021-07-30 | Stop reason: HOSPADM

## 2021-07-21 RX ORDER — METHADONE HYDROCHLORIDE 10 MG/5ML
55 SOLUTION ORAL NIGHTLY
Status: ON HOLD | COMMUNITY
End: 2021-07-30 | Stop reason: HOSPADM

## 2021-07-21 RX ORDER — METHADONE HYDROCHLORIDE 10 MG/ML
65 CONCENTRATE ORAL
Status: DISCONTINUED | OUTPATIENT
Start: 2021-07-21 | End: 2021-07-27

## 2021-07-21 RX ORDER — METHADONE HYDROCHLORIDE 10 MG/ML
65 CONCENTRATE ORAL
Status: DISCONTINUED | OUTPATIENT
Start: 2021-07-22 | End: 2021-07-21

## 2021-07-21 RX ORDER — IPRATROPIUM BROMIDE AND ALBUTEROL SULFATE 2.5; .5 MG/3ML; MG/3ML
1 SOLUTION RESPIRATORY (INHALATION)
Status: DISCONTINUED | OUTPATIENT
Start: 2021-07-21 | End: 2021-07-30 | Stop reason: HOSPADM

## 2021-07-21 RX ORDER — DEXTROSE MONOHYDRATE 25 G/50ML
12.5 INJECTION, SOLUTION INTRAVENOUS PRN
Status: DISCONTINUED | OUTPATIENT
Start: 2021-07-21 | End: 2021-07-30 | Stop reason: HOSPADM

## 2021-07-21 RX ORDER — METHADONE HYDROCHLORIDE 10 MG/5ML
55 SOLUTION ORAL NIGHTLY
Status: DISCONTINUED | OUTPATIENT
Start: 2021-07-21 | End: 2021-07-21 | Stop reason: SDUPTHER

## 2021-07-21 RX ORDER — METHADONE HYDROCHLORIDE 10 MG/5ML
65 SOLUTION ORAL
Status: DISCONTINUED | OUTPATIENT
Start: 2021-07-21 | End: 2021-07-21 | Stop reason: SDUPTHER

## 2021-07-21 RX ORDER — IPRATROPIUM BROMIDE AND ALBUTEROL SULFATE 2.5; .5 MG/3ML; MG/3ML
1 SOLUTION RESPIRATORY (INHALATION) EVERY 4 HOURS PRN
Status: DISCONTINUED | OUTPATIENT
Start: 2021-07-21 | End: 2021-07-21

## 2021-07-21 RX ORDER — METHADONE HYDROCHLORIDE 10 MG/ML
55 CONCENTRATE ORAL NIGHTLY
Status: COMPLETED | OUTPATIENT
Start: 2021-07-21 | End: 2021-07-26

## 2021-07-21 RX ORDER — DEXTROSE MONOHYDRATE 50 MG/ML
100 INJECTION, SOLUTION INTRAVENOUS PRN
Status: DISCONTINUED | OUTPATIENT
Start: 2021-07-21 | End: 2021-07-30 | Stop reason: HOSPADM

## 2021-07-21 RX ORDER — METHADONE HYDROCHLORIDE 10 MG/5ML
65 SOLUTION ORAL EVERY MORNING
Status: ON HOLD | COMMUNITY
End: 2021-07-30 | Stop reason: HOSPADM

## 2021-07-21 RX ADMIN — GABAPENTIN 300 MG: 300 CAPSULE ORAL at 10:19

## 2021-07-21 RX ADMIN — Medication 10 ML: at 20:25

## 2021-07-21 RX ADMIN — GABAPENTIN 300 MG: 300 CAPSULE ORAL at 20:24

## 2021-07-21 RX ADMIN — IPRATROPIUM BROMIDE AND ALBUTEROL SULFATE 1 AMPULE: 2.5; .5 SOLUTION RESPIRATORY (INHALATION) at 06:02

## 2021-07-21 RX ADMIN — KETOROLAC TROMETHAMINE 30 MG: 30 INJECTION, SOLUTION INTRAMUSCULAR; INTRAVENOUS at 05:29

## 2021-07-21 RX ADMIN — IPRATROPIUM BROMIDE AND ALBUTEROL SULFATE 1 AMPULE: .5; 3 SOLUTION RESPIRATORY (INHALATION) at 13:16

## 2021-07-21 RX ADMIN — ENOXAPARIN SODIUM 40 MG: 40 INJECTION SUBCUTANEOUS at 10:20

## 2021-07-21 RX ADMIN — Medication 65 MG: at 17:00

## 2021-07-21 RX ADMIN — IPRATROPIUM BROMIDE AND ALBUTEROL SULFATE 1 AMPULE: .5; 3 SOLUTION RESPIRATORY (INHALATION) at 21:14

## 2021-07-21 RX ADMIN — ARFORMOTEROL TARTRATE 15 MCG: 15 SOLUTION RESPIRATORY (INHALATION) at 21:14

## 2021-07-21 RX ADMIN — Medication 10 ML: at 08:35

## 2021-07-21 RX ADMIN — ARFORMOTEROL TARTRATE 15 MCG: 15 SOLUTION RESPIRATORY (INHALATION) at 06:02

## 2021-07-21 RX ADMIN — OXYCODONE HYDROCHLORIDE AND ACETAMINOPHEN 500 MG: 500 TABLET ORAL at 10:20

## 2021-07-21 RX ADMIN — VANCOMYCIN HYDROCHLORIDE 1250 MG: 10 INJECTION, POWDER, LYOPHILIZED, FOR SOLUTION INTRAVENOUS at 07:04

## 2021-07-21 RX ADMIN — BUDESONIDE 500 MCG: 0.5 SUSPENSION RESPIRATORY (INHALATION) at 06:02

## 2021-07-21 RX ADMIN — BUDESONIDE 500 MCG: 0.5 SUSPENSION RESPIRATORY (INHALATION) at 21:14

## 2021-07-21 RX ADMIN — Medication 55 MG: at 22:14

## 2021-07-21 ASSESSMENT — PAIN DESCRIPTION - ONSET: ONSET: ON-GOING

## 2021-07-21 ASSESSMENT — PAIN DESCRIPTION - ORIENTATION: ORIENTATION: RIGHT

## 2021-07-21 ASSESSMENT — PAIN DESCRIPTION - LOCATION: LOCATION: LEG

## 2021-07-21 ASSESSMENT — PAIN DESCRIPTION - PAIN TYPE: TYPE: ACUTE PAIN

## 2021-07-21 ASSESSMENT — PAIN DESCRIPTION - DESCRIPTORS: DESCRIPTORS: SHARP;CONSTANT

## 2021-07-21 ASSESSMENT — PAIN SCALES - GENERAL
PAINLEVEL_OUTOF10: 8
PAINLEVEL_OUTOF10: 8
PAINLEVEL_OUTOF10: 10
PAINLEVEL_OUTOF10: 8
PAINLEVEL_OUTOF10: 8

## 2021-07-21 ASSESSMENT — PAIN DESCRIPTION - PROGRESSION
CLINICAL_PROGRESSION: NOT CHANGED
CLINICAL_PROGRESSION: NOT CHANGED

## 2021-07-21 ASSESSMENT — PAIN - FUNCTIONAL ASSESSMENT: PAIN_FUNCTIONAL_ASSESSMENT: PREVENTS OR INTERFERES SOME ACTIVE ACTIVITIES AND ADLS

## 2021-07-21 ASSESSMENT — PAIN DESCRIPTION - FREQUENCY: FREQUENCY: CONTINUOUS

## 2021-07-21 ASSESSMENT — PAIN DESCRIPTION - DIRECTION: RADIATING_TOWARDS: FOOT

## 2021-07-21 NOTE — PROGRESS NOTES
Wound care: Consulted for right heel wound by medical. Podiatry following. Chart reviewed. Care plan and education updated. Dietary following. No additional need to see patient. Re-consult if needed.  Arthur Gaytan RN

## 2021-07-21 NOTE — H&P
[facility]  Internal Medicine Residency Program  History and Physical    Patient:  Rikki Reyes 55 y.o. female MRN: [id number]     Date of Service: 7/20/2021    Hospital Day: 1      Chief complaint: had concerns including Wound Check (Dr Fatuma Adams sent in for admission. Has hip Fx on left, needs surgery but they wont do surgery until wound on right heel is healed up ) and Hip Pain. History of Present Illness   The patient is a 55 y.o. female, with past medical history of osteomyelitis, COPD, hypothyroidism, tremor, left hip injury, presented to the ED due to right heel ulceration. According to the patient, her symptoms started 6 weeks ago when she developed a callus after wearing a new pair of shoe 6 weeks ago. She picked on the callus and it got worse. For last 1 week, she also developed pain at the site of ulceration. With time her pain got worse, and it also started foul-smelling discharging from the ulcer. Patient was taking methadone for her pain. She also took her leftover antibiotic, possibly azithromycin that she was prescribed last February because of her lung infection. He said, she was having repeated osteomyelitis in the same area for past 20 years, and she had 3 different surgeries and wound debridement at the same area for foot ulceration. According to patient, he he was at Dr. Tianna Baez office for the clearance of her left hip surgery. On this visit, she was diagnosed her right foot ulcer possible osteomyelitis. She said 2 years ago she fell from her horse and hurt her left hip. After that she also slipped on ice, and fractured her left hip. For this reason, she was having the surgery. Patient also uses right-sided crutch for walking. She is a known case of COPD, on home medication of Symbicort 2 puffs daily, albuterol 2 puffs daily. In February, patient with COPD for which she is taking prednisolone; she completed dose and not taking any prednisone right now. Subclinical for which she is not taking any medication. Former smoker, quit smoking 7 months ago. History of alcohol or drug use; but she has a history of Polysubstance abuse on methadone. She also mentioned that she had tremor for which she was prescribed propanolol but recently she is not taking any propranolol. ED Course: In the ED vitals were stable; Glucose 110, CRP 5.5, sed rate 45 mm/hr, the right foot revealed, amputation of the distal calcaneus. Also reviewed, cortical irregularity likely related to erosive changes involving inferior aspect of the calcaneus but no obvious subcutaneous gas. ED Meds: Patient was given, meropenem 1000 mg and vancomycin 1250 mg in the ED; also received diphenhydramine 25 mg.  ED Fluids: Patient did not receive any fluid. Past Medical History:      Diagnosis Date    Abscess     states for a couple years she has had problems with     Chronic pain     Chronic recurrent multifocal osteomyelitis of foot ([facility] [address] 2019    Hip fracture ([facility] [address]     Hx of blood clots     dvt rt calf    Pressure injury of heel, stage 3 ([facility] [address] 2019    Subclinical hypothyroidism 2019       Past Surgical History:        Procedure Laterality Date     SECTION      x three    DRAIN SKIN ABSCESS SIMPLE  2014         FOOT SURGERY         Medications Prior to Admission:    Prior to Admission medications    Medication Sig Start Date End Date Taking? Authorizing Provider   methadone 5 MG/5ML solution Take 120 mg by mouth every morning. Yes Historical Provider, MD   budesonide-formoterol (SYMBICORT) 160-4.5 MCG/ACT AERO Inhale 2 puffs into the lungs daily    Yes Historical Provider, MD   albuterol sulfate  (90 Base) MCG/ACT inhaler Inhale 2 puffs into the lungs every 6 hours as needed for Wheezing   Yes Historical Provider, MD   gabapentin (NEURONTIN) 300 MG capsule Take 300 mg by mouth 2 times daily. Yes Historical Provider, MD       Allergies:   Ancef [cefazolin], Duricef [cefadroxil], and Iodine    Social History:   TOBACCO:   reports that she has quit smoking. Her smoking use included cigarettes. She smoked 1.00 pack per day. She has never used smokeless tobacco.  ETOH:   reports no history of alcohol use. Family History:   History reviewed. No pertinent family history. REVIEW OF SYSTEMS:    · Constitutional: No fever, no chills, no change in weight; good appetite  · HEENT: No blurred vision, no ear problems, no sore throat, no rhinorrhea. · Respiratory: Productive cough, with clear sputum production, no pleuritic chest pain, no shortness of breath  · Cardiology: No angina, no dyspnea on exertion, no paroxysmal nocturnal dyspnea, no orthopnea, no palpitation, no leg swelling. · Gastroenterology: No dysphagia, no reflux; no abdominal pain, no nausea or vomiting; no constipation or diarrhea. No hematochezia   · Genitourinary: No dysuria, no frequency, hesitancy; no hematuria  · Musculoskeletal: no joint pain, no myalgia, no change in range of movement  · Neurology: no focal weakness in extremities, no slurred speech, no double vision, no tingling or numbness sensation  · Endocrinology: no temperature intolerance, no polyphagia, polydipsia or polyuria  · Hematology: no increased bleeding, no bruising, no lymphadenopathy  · Skin: Right foot ulcer, open and underlying calcaneal bone is visible. No active bleeding or pus was visible. Ankle joint was warmth, tender but no crepitus was present; motor or sensory loss. No weakness of the leg.    · Psychology: no depressed mood, no suicidal ideation    Physical Exam   · Vitals: BP (!) 119/59   Pulse 73   Temp 97.5 °F (36.4 °C)   Resp 20   Wt 190 lb (86.2 kg)   LMP 08/28/2013   SpO2 98%   BMI 28.06 kg/m²     · General Appearance: alert and oriented to person, place and time, well developed and well- nourished, in no acute distress  · Head: normocephalic and atraumatic  · Eyes: pupils equal, round, and reactive to light, extraocular eye movements intact, conjunctivae normal  · ENT: tympanic membrane, external ear and ear canal normal bilaterally, nose without deformity, nasal mucosa and turbinates normal without polyps  · Neck: supple and non-tender without mass, no thyromegaly or thyroid nodules, no cervical lymphadenopathy  · Pulmonary/Chest: Breath sound present bilaterally; Bilateral wheezing present. · Cardiovascular: normal rate, regular rhythm, normal S1 and S2, no murmurs, rubs, clicks, or gallops, distal pulses intact, no carotid bruits  · Abdomen: soft, non-tender, non-distended, normal bowel sounds, no masses or organomegaly  · Extremities: no cyanosis, clubbing or edema  · Musculoskeletal:Right ankle swelling, warmth and red joint, no restricted movement of joint. Hammar toes deformity on the right foot. · Skin: Right foot ulcer, open and underlying calcaneal bone is visible. No active bleeding or pus was visible. Ankle joint was warmth, tender but no crepitus was present; motor or sensory loss. No weakness of the leg. Labs and Imaging Studies   Basic Labs  Recent Labs     07/20/21  1414      K 4.4   CL 98   CO2 30*   BUN 10   CREATININE 0.9   GLUCOSE 110*   CALCIUM 9.0       Recent Labs     07/20/21  1414   WBC 7.1   RBC 4.73   HGB 12.9   HCT 42.3   MCV 89.4   MCH 27.3   MCHC 30.5*   RDW 14.8      MPV 9.7       CBC:   Lab Results   Component Value Date    WBC 7.1 07/20/2021    RBC 4.73 07/20/2021    HGB 12.9 07/20/2021    HCT 42.3 07/20/2021    MCV 89.4 07/20/2021    RDW 14.8 07/20/2021     07/20/2021     BMP:    Lab Results   Component Value Date     07/20/2021    K 4.4 07/20/2021    CL 98 07/20/2021    CO2 30 07/20/2021    BUN 10 07/20/2021     HgBA1c:  No components found for: HGBA1C    Imaging Studies:     XR FOOT RIGHT (MIN 3 VIEWS)    Result Date: 7/20/2021  EXAMINATION: THREE XRAY VIEWS OF THE RIGHT FOOT 7/20/2021 3:05 pm COMPARISON: None.  HISTORY: ORDERING SYSTEM PROVIDED HISTORY: right heel ulcer TECHNOLOGIST PROVIDED HISTORY: Reason for exam:->right heel ulcer What reading provider will be dictating this exam?->CRC FINDINGS: There is amputation of the distal aspect of the calcaneus. Cortical irregularity involving inferior margin of the calcaneus. There is overlying skin ulceration. No definite subcutaneous gas. Osteophytosis at level of anterior talus. No acute fracture. Vascular calcifications present. 1. Amputation of the distal calcaneus. 2. Cortical irregularity likely related to erosive changes involving inferior aspect of the calcaneus. Findings could suggest osteomyelitis. 3. Skin ulceration involving plantar soft tissue beneath the calcaneus. 4. No obvious subcutaneous gas. Resident's Assessment and Plan     Nilesh Bender is a 55 y.o. female, with past medical history of osteomyelitis, left hip injury, COPD, subclinical hypothyroidism, tremor presented to the ED with complaint of painful right heel ulcer possibly due to osteomyelitis. 1. Osteomyelitis 2/2 repeated non healing injury v/s PAD vs Microtrauma vs malnutrition  · Regular wound dressing  · CRP 5.5, sed rate 45 mm/hr  · Please obtain pro calcitonin level   · Obtain blood culture   · Obtain bone biopsy with tissue culture  · MRI of the bone   · Broad spectrum antibiotic; Vancomycin and meropenum  · Continue Gabapentin and methadone for pain   · Follow up with podiatry, would care and infectious disease  · PATRICIA 02/11/2020 - Normal ankle arm index with food arterial flow to both feet including the toes based upon the pulse volume recordings. · Please follow up with HbA1C (02/10/2020 - HbA1C 5.3)  · Continue Vit C for wound healing   · Follow up with albumin level for possible Malnutrition         2. COPD (pre diagnosed)  · SpO2 98% without any oxygen.   · Continue nebulization with Brovana, pulmicort, Duoneb   · If patient develops any shortness of breath or worsens her cough, then we will obtain CXR     3. Hypothyroidism  · Follow up with her TSH, T3 and T4 level     4.  Essential tremor  · Please continue Gabapentin      PT/OT evaluation: No  DVT prophylaxis/ GI prophylaxis: Levonox  Disposition: Continue current treatment    Jennifer Bird MD, PGY-1   Attending physician: Dr. Belle Che

## 2021-07-21 NOTE — CONSULTS
Department of Internal Medicine  Infectious Diseases  Consult   Note      C/C : Right heel wound infection, osteomyelitis     Referring physician : Dr Shankar Camargo:      Pt is known to me . This is a 49 yrs old female with hx of  IVDU, chronic non healing right heel ulcer being evaluated for left hip replacement surgery. I saw her in the office 5 days ago and recommended her to go the ER . She reported increased pain and drainage from the right heel wound . She denied fever and chills .   WBC was 7,1 K   Sed rate  45   CRP  5.5  X ray of right foot - calcaneal ulcer and osteomyelitis   Pt was started on iv vancomycin and meropenem     Past Medical History:      Past Medical History:   Diagnosis Date    Abscess     states for a couple years she has had problems with     Chronic pain     Chronic recurrent multifocal osteomyelitis of foot ([facility] [address] 2019    Hip fracture (HCC)     Hx of blood clots     dvt rt calf    Pressure injury of heel, stage 3 ([facility] [address] 2019    Subclinical hypothyroidism 2019       Past Surgical History:      Past Surgical History:   Procedure Laterality Date     SECTION      x three    DRAIN SKIN ABSCESS SIMPLE  2014         FOOT SURGERY         Current Medications:      Current Facility-Administered Medications   Medication Dose Route Frequency Provider Last Rate Last Admin    glucose (GLUTOSE) 40 % oral gel 15 g  15 g Oral PRN Aster Azar MD        dextrose 50 % IV solution  12.5 g Intravenous PRN Aster Azar MD        glucagon (rDNA) injection 1 mg  1 mg Intramuscular PRN Aster Azar MD        dextrose 5 % solution  100 mL/hr Intravenous PRN Aster Azar MD        ipratropium-albuterol (DUONEB) nebulizer solution 1 ampule  1 ampule Inhalation Q4H MONIQUE Escobar DO        vancomycin 1000 mg IVPB in 250 mL D5W addavial  1,000 mg Intravenous Q12H Aster Azar MD        methadone (DOLOPHINE) 10 MG/ML solution 55 mg  55 mg Oral Nightly Bates MelidaDO        [START ON 7/22/2021] methadone (DOLOPHINE) 10 MG/ML solution 65 mg  65 mg Oral QAM ANDREW Escobar DO        gabapentin (NEURONTIN) capsule 300 mg  300 mg Oral BID Anel Gil MD   300 mg at 07/21/21 1019    sodium chloride flush 0.9 % injection 10 mL  10 mL Intravenous 2 times per day Anel Gil MD   10 mL at 07/21/21 0835    sodium chloride flush 0.9 % injection 10 mL  10 mL Intravenous PRN Anel Gil MD        0.9 % sodium chloride infusion  25 mL Intravenous PRN Anel Gil MD        ondansetron (ZOFRAN-ODT) disintegrating tablet 4 mg  4 mg Oral Q8H PRN Anel Gil MD        Or    [facility]ansetron [facility]) injection 4 mg  4 mg Intravenous Q6H PRN Anel Gil MD        polyethylene glycol (GLYCOLAX) packet 17 g  17 g Oral Daily PRN Anel Gil MD        acetaminophen (TYLENOL) tablet 650 mg  650 mg Oral Q6H PRN Anel Gil MD        Or    acetaminophen (TYLENOL) suppository 650 mg  650 mg Rectal Q6H PRN Anel Gil MD        Arformoterol Tartrate (BROVANA) nebulizer solution 15 mcg  15 mcg Nebulization BID Anel Gil MD   15 mcg at 07/21/21 0602    budesonide (PULMICORT) nebulizer suspension 500 mcg  0.5 mg Nebulization BID Anel Gil MD   500 mcg at 07/21/21 0602    enoxaparin (LOVENOX) injection 40 mg  40 mg Subcutaneous Daily Anel Gil MD   40 mg at 07/21/21 1020    ascorbic acid (VITAMIN C) tablet 500 mg  500 mg Oral Daily Anel Gil MD   500 mg at 07/21/21 1020       Allergies: Ancef [cefazolin], Duricef [cefadroxil], and Iodine    Social History:      Social History     Socioeconomic History    Marital status:       Spouse name: Not on file    Number of children: Not on file    Years of education: Not on file    Highest education level: Not on file   Occupational History    Not on file   Tobacco Use    Smoking status: Former Smoker     Packs/day: 1.00     Types: Cigarettes    Smokeless tobacco: Never Used    Tobacco comment: approx 7 months ago 7-4-19   Substance and Sexual Activity    Alcohol use: No    Drug use: No     Comment: clean since 2015    Sexual activity: Not on file   Other Topics Concern    Not on file   Social History Narrative    Not on file     Social Determinants of Health     Financial Resource Strain:     Difficulty of Paying Living Expenses:    Food Insecurity:     Worried About Running Out of Food in the Last Year:     [address] in the Last Year:    Transportation Needs:     Lack of Transportation (Medical):  Lack of Transportation (Non-Medical):    Physical Activity:     Days of Exercise per Week:     Minutes of Exercise per Session:    Stress:     Feeling of Stress :    Social Connections:     Frequency of Communication with Friends and Family:     Frequency of Social Gatherings with Friends and Family:     Attends [employer] Services:     Active Member of Clubs or Organizations:     Attends Club or Organization Meetings:     Marital Status:    Intimate Partner Violence:     Fear of Current or Ex-Partner:     Emotionally Abused:     Physically Abused:     Sexually Abused:          Family History:     Not pertinent to present illness    REVIEW OF SYSTEMS:      CONSTITUTIONAL:  Denies fever, chill or rigors. HEENT: denies blurring of vision or double vision, denies hearing problem  RESPIRATORY: denies cough, shortness of breath, sputum expectoration, chest pain. CARDIOVASCULAR:  Denies palpitation  GASTROINTESTINAL:  Denies abdomen pain, diarrhea or constipation. GENITOURINARY:  Denies burning urination or frequency of urination  INTEGUMENT: Chronic non healing wound right foot   HEMATOLOGIC/LYMPHATIC:  Denies lymph node swelling, gum bleeding or easy bruising.   MUSCULOSKELETAL: right heel wound pain, left hip pain   NEUROLOGICAL:  weakness          PHYSICAL EXAM:      Vitals:     BP (!) 121/59   Pulse (!) 49 Comment: patient states that is normal for her. Temp 98.4 °F (36.9 °C) (Temporal)   Resp 17   Ht 5' 9\" (1.753 m)   Wt 190 lb (86.2 kg)   LMP 08/28/2013   SpO2 98%   BMI 28.06 kg/m²     General Appearance:    Awake, alert , no acute distress. Head:    Normocephalic, atraumatic   Eyes:    No pallor, no icterus,   Ears:    No obvious deformity or drainage.    Nose:   No nasal drainage   Throat:   Mucosa moist, no oral thrush   Neck:   Supple, no lymphadenopathy   Back:     no CVA tenderness   Lungs:     Clear to auscultation bilaterally    Heart:    Regular rate and rhythm    Abdomen:     Soft, non-tender, bowel sounds present    Extremities:    + edema,   Right heel ulcer, mild tender, no drainage    Pulses:   Dorsalis pedis palpable    Skin:                           CBC with Differential:      Lab Results   Component Value Date    WBC 5.5 07/21/2021    RBC 5.07 07/21/2021    HGB 13.8 07/21/2021    HCT 44.9 07/21/2021     07/21/2021    MCV 88.6 07/21/2021    MCH 27.2 07/21/2021    MCHC 30.7 07/21/2021    RDW 15.2 07/21/2021    LYMPHOPCT 20.4 07/21/2021    MONOPCT 8.5 07/21/2021    BASOPCT 1.1 07/21/2021    MONOSABS 0.47 07/21/2021    LYMPHSABS 1.13 07/21/2021    EOSABS 0.11 07/21/2021    BASOSABS 0.06 07/21/2021       CMP:    Lab Results   Component Value Date     07/21/2021    K 4.3 07/21/2021    K 4.4 07/20/2021    CL 98 07/21/2021    CO2 30 07/21/2021    BUN 10 07/21/2021    CREATININE 0.9 07/21/2021    GFRAA >60 07/21/2021    LABGLOM >60 07/21/2021    GLUCOSE 104 07/21/2021    PROT 7.7 02/08/2020    LABALBU 3.5 02/08/2020    CALCIUM 9.2 07/21/2021    BILITOT 0.4 02/08/2020    ALKPHOS 103 02/08/2020    AST 10 02/08/2020    ALT 8 02/08/2020       Hepatic Function Panel:    Lab Results   Component Value Date    ALKPHOS 103 02/08/2020    ALT 8 02/08/2020    AST 10 02/08/2020    PROT 7.7 02/08/2020    BILITOT 0.4 02/08/2020    LABALBU 3.5 02/08/2020         Microbiology :    Wound cx - pending     Radiology :    X  Ray foot     Impression:        1. Amputation of the distal calcaneus. 2. Cortical irregularity likely related to erosive changes involving inferior   aspect of the calcaneus.  Findings could suggest osteomyelitis. 3. Skin ulceration involving plantar soft tissue beneath the calcaneus. 4. No obvious subcutaneous gas.              IMPRESSION:     1. Chronic non healing right heel ulcer . Wound infection , osteomyelitis     RECOMMENDATIONS:      1. Vancomycin 1250 mg IV q 12 hrs, meropenem 1 gram IV q 8 hrs   2. Wound debridement , bone cx, pathology   3.  PICC line insertion     Thank you Dr Bird Grant for the consult

## 2021-07-21 NOTE — PROGRESS NOTES
[facility]  Internal Medicine Residency Program  Progress Note - House Team     Patient:  Jose Antonio Joshua 55 y.o. female MRN: [id number]     Date of Service: 2021     CC: nonhealing ulcer R foot  Overnight events: Patient was in 8/10 pain overnight. Toradol given. Patient had auditory wheezing noted. Duoneb PRN. Subjective     Patient was seen and examined this morning at bedside in no acute distress. Patient states she is in 10/10 pain this morning. She noted the podiatry came and saw her and the plan is for debridement tomorrow. Patient given diet for today. NPO after midnight. She states that she really needs her methadone and provided number to contact the facility. [facility] was called and dosing was confirmed as a split dose off 65 mg in the AM and 55 mg in the PM for a combined daily dose of 120 mg.     Objective     Physical Exam:  TEMPERATURE:  Current - Temp: 98.4 °F (36.9 °C); Max - Temp  Av °F (36.7 °C)  Min: 96.9 °F (36.1 °C)  Max: 99.2 °F (37.3 °C)  RESPIRATIONS RANGE: Resp  Av.3  Min: 17  Max: 20  PULSE RANGE: Pulse  Av.2  Min: 49  Max: 76  BLOOD PRESSURE RANGE:  Systolic (98LQW), KIK:591 , Min:107 , ENQ:248   ; Diastolic (22SNM), WRK:34, Min:55, Max:59    PULSE OXIMETRY RANGE: SpO2  Av.3 %  Min: 90 %  Max: 98 %    I & O - 24hr:    Intake/Output Summary (Last 24 hours) at 2021 0818  Last data filed at 2021  Gross per 24 hour   Intake 250 ml   Output --   Net 250 ml     I/O last 3 completed shifts: In: 250 [IV Piggyback:250]  Out: -  No intake/output data recorded. Weight change:     Physical Exam  Constitutional:       General: She is not in acute distress. Appearance: She is well-developed. She is not diaphoretic. HENT:      Head: Normocephalic and atraumatic. Eyes:      General: No scleral icterus. Pupils: Pupils are equal, round, and reactive to light. Neck:      Thyroid: No thyromegaly.       Vascular: No JVD.      Trachea: No tracheal deviation. Cardiovascular:      Rate and Rhythm: Normal rate and regular rhythm. Heart sounds: Normal heart sounds. No murmur heard. No friction rub. No gallop. Pulmonary:      Effort: Pulmonary effort is normal. No respiratory distress. Breath sounds: Normal breath sounds. No wheezing or rales. Abdominal:      General: Bowel sounds are normal. There is no distension. Palpations: Abdomen is soft. There is no mass. Tenderness: There is no abdominal tenderness. There is no guarding or rebound. Musculoskeletal:         General: Normal range of motion. Skin:     General: Skin is warm and dry. Capillary Refill: Capillary refill takes less than 2 seconds. Coloration: Skin is not pale. Findings: Erythema (over R calcanous) and lesion (nonhealing ulcer over R calcaneous) present. No rash. Neurological:      Mental Status: She is alert and oriented to person, place, and time. Cranial Nerves: No cranial nerve deficit. Psychiatric:         Behavior: Behavior normal.         Thought Content: Thought content normal.         Judgment: Judgment normal.       Subject  Pertinent Labs & Imaging Studies   jalil  CBC:   Recent Labs     07/20/21  1414   WBC 7.1   HGB 12.9   HCT 42.3   MCV 89.4          BMP:    Recent Labs     07/20/21  1414      K 4.4   CL 98   CO2 30*   BUN 10   CREATININE 0.9   GLUCOSE 110*       LIVER PROFILE:   No results for input(s): AST, ALT, LIPASE, BILIDIR, BILITOT, ALKPHOS in the last 72 hours. Invalid input(s): AMYLASE,  ALB    PT/INR:   No results for input(s): PROTIME, INR in the last 72 hours. APTT:   No results for input(s): APTT in the last 72 hours.     Fasting Lipid Panel:    Lab Results   Component Value Date    CHOL 118 07/07/2019    TRIG 90 07/07/2019    HDL 50 07/07/2019       Notable Cultures:      Blood cultures   Blood Culture, Routine   Date Value Ref Range Status   02/08/2020 5 Days- no growth  Final     Respiratory cultures No results found for: RESPCULTURE   Gram Stain Result   Date Value Ref Range Status   08/21/2019   Final    Gram stain performed on unspun fluid  Polymorphonuclear leukocytes not seen  Epithelial cells not seen  Rare gram positive rods Diphtheroid-like  Rare Gram positive cocci       Urine No results found for: LABURIN  Legionella No results found for: LABLEGI  C Diff PCR No results found for: CDIFPCR  Wound culture/abscess: No results for input(s): WNDABS in the last 72 hours. Tip culture:No results for input(s): CXCATHTIP in the last 72 hours. XR FOOT RIGHT (MIN 3 VIEWS)    Result Date: 7/20/2021  EXAMINATION: THREE XRAY VIEWS OF THE RIGHT FOOT 7/20/2021 3:05 pm COMPARISON: None. HISTORY: ORDERING SYSTEM PROVIDED HISTORY: right heel ulcer TECHNOLOGIST PROVIDED HISTORY: Reason for exam:->right heel ulcer What reading provider will be dictating this exam?->CRC FINDINGS: There is amputation of the distal aspect of the calcaneus. Cortical irregularity involving inferior margin of the calcaneus. There is overlying skin ulceration. No definite subcutaneous gas. Osteophytosis at level of anterior talus. No acute fracture. Vascular calcifications present. 1. Amputation of the distal calcaneus. 2. Cortical irregularity likely related to erosive changes involving inferior aspect of the calcaneus. Findings could suggest osteomyelitis. 3. Skin ulceration involving plantar soft tissue beneath the calcaneus. 4. No obvious subcutaneous gas. Resident's Assessment and Plan     Assessment and Plan:    1. Osteomyelitis 2/2 PAD vs trauma vs neuropathy vs hypothyroidsm  a. Chronic, nonhealing on R calcaneous -- was draining in the ED  b. X-ray suggests osteo  c. Blood cultures wound cultures pending  d. Podiatry to see for possible debridement today  e. Podiatry to take control of pain management   f. Was given toradol overnight for pain (8/10)  g.  NPO for potential debridement  h. ID consulted -- currently on vanc  i. Procal 0.05  j. A1c 6.5  k. CRP 5.5  l. TSH 2.57  2. Hx of hip fracture  a. Chronic -- fell off horse initially then reinjured w mechanical fall  b. Awaiting surgical clearnace  3. Hx of COPD  a. On symbicort at home  b. Given pulmicort, brovana, duoneb  4. Hx of subclinical hypothyroidism  a. TSH 2.57  5. Hx of polysubstance abuse  a. On methadone -- split dose per [facility] -- 65 mg in the AM and 55 mg in the PM for a combined daily dose of 120 mg  6. Hx of neuropathic pain  a. Continue gabapentin      PT/OT evaluation: none  DVT prophylaxis/ GI prophylaxis: lovenox held / protonix  Disposition: continue current care    Anita Dominguez DO, PhD  PGY-1  Attending physician: Dr. Omar Crane  [facility]    Attending Physician Statement:  Mely Aden M.D., F.A.C.P. I have discussed the case, including pertinent history and exam findings with the resident/NP. I have seen and examined the patient and the key elements of the encounter have been performed by me. I agree with the resident ROS, PMHx, PSHx, meds reviewed and assessment, plan and orders as documented by the resident/NP      Hospital charts reviewed, including other providers notes, relevant labs and imaging. Osteomyelitis 2/2 PAD vs trauma vs neuropathy vs hypothyroidsm  b. Chronic, nonhealing on R calcaneous -- was draining in the ED  c. X-ray suggests osteo  d. Blood cultures wound cultures pending  e.  Podiatry to see for possible debridement    [facility] requests for IV pain meds overnight  Chronic opiods and polysubstance abuse  - verified home methadone    aci 6.5- no major neuropathy  Likely venous insufficiency assoc non healing wound, with poor contact calcenectmy  Should have been following pedorthotist - chronic osteo and wound issues in heal  Neuropathic pain issues      Copd stable  After infection and heel wound stable then considerateions for   Sp Hip fracture then post traumatic OA hip  Now considerations for JEREMIAH vs hemiarthroplasty    ? osteoporosis  >50% of time spent coordinating care with other providers and/or counseling patient/family  Remainder of medical problems as per resident note.

## 2021-07-21 NOTE — CARE COORDINATION
Met with the pt at the bedside to discuss transition of care. The pt [place] an will return home when medically stable. She receives Methadone daily. She does not have a PCP. She has had IV antibiotics in the past and is willing to do it agsin. explained that she will need a PCP. She is agreeable to having an appointment made for her. She does not have a preference for [facility]. For debridement tomorrow.  Sherren Skene -[phone]

## 2021-07-21 NOTE — PROGRESS NOTES
Pharmacy Consultation Note  (Antibiotic Dosing and Monitoring)    Initial consult date: 7/20/21  Consulting physician: Dr. Thuy Yoder  Drug(s): vancomycin  Indication: osteomyelitis      Age/  Gender Height Weight IBW Dosing weight  Allergy Information   56 y.o./female 5' 9\" (175.3 cm) 190 lb (86.2 kg)     Ideal body weight: 66.2 kg (145 lb 15.1 oz)  Adjusted ideal body weight: 74.2 kg (163 lb 9.1 oz)  74.2 kg  Ancef [cefazolin], Duricef [cefadroxil], and Iodine          Other anti-infectives Start date Stop date   Meropenem 7/20                     Date  Tmax WBC BUN/CR UOP CrCL  (mL/min) Drug/Dose Time   Given Level(s)   (Time) Comments   7/20 99.2 7.1  10/0.9 -- --  Vancomycin 1250 mg IV x 1 dose 1841     7/21 afebrile 5.5 10/0.9 -- 83 Vancomycin 1250 mg IV x 1 dose    Vancomycin 1000 mg IV q 12 hr 0704      (1900)     7/22       (0700) Vanco trough at (0630)                      Intake/Output Summary (Last 24 hours) at 7/21/2021 1226  Last data filed at 7/21/2021 0835  Gross per 24 hour   Intake 260 ml   Output --   Net 260 ml       Average urine output:    Cultures:    Site Date Result                    No results for input(s): Amado Haas in the last 72 hours. Historical Cultures:  Organism   Date Value Ref Range Status   02/08/2020 Haemophilus influenzae (A)  Final     No results for input(s): BC in the last 72 hours. Radiology:      Assessment:  · 55year old female on vancomycin for osteomyelitis  · Goal trough = 15 - 20 mcg/ml;  Goal AUC/RICARDO 400-600  · 7/21: Scr 0.9    Plan:  · Start vancomycin 1000 mg IV q 12 hr (est AUC/RICARDO 525, trough 17.2 mcg/mL)  · A vanco trough level will be obtained on 7/22 before 0700 dose   · Pharmacist will follow and monitor/adjust dosing as necessary    Paul Woods, PharmD, BCPS 7/21/2021 12:26 PM   Phone: [phone]

## 2021-07-21 NOTE — CONSULTS
Podiatry Consult H&P  2021   Sandy Armenta       SUBJECTIVE: This is a 55 y.o. female seen bedside for R heel wound. Patient was sent in by Dr. Melba Sorto from [facility] yesterday due to increased drainage, odor from wound. She needs hip surgery but is unable to have it until her R heel wound heals. She states she has had the wound on and off for 20 years, does not recall trauma to the area. She denies neuropathy and admits to pain to the heel. Denies constitutional symptoms at this time. Patient is currently requesting methadone and for NPO to be cancelled if she is not going to surgery today so that she can eat. Last PO was a drink of water yesterday, has not had a full meal in two days per patient. Past Medical History:   Diagnosis Date    Abscess     states for a couple years she has had problems with     Chronic pain     Chronic recurrent multifocal osteomyelitis of foot ([address] 2019    [facility] fracture [facility] [facility]     Hx of blood clots     dvt rt calf    Pressure injury of heel, stage 3 ([address] 2019    Subclinical hypothyroidism 2019        Past Surgical History:   Procedure Laterality Date     SECTION      x three    DRAIN SKIN ABSCESS SIMPLE  2014         FOOT SURGERY           History reviewed. No pertinent family history. Social History     Tobacco Use    Smoking status: Former Smoker     Packs/day: 1.00     Types: Cigarettes    Smokeless tobacco: Never Used    Tobacco comment: approx 7 months ago 7--19   Substance Use Topics    Alcohol use: No        Prior to Admission medications    Medication Sig Start Date End Date Taking? Authorizing Provider   methadone 5 MG/5ML solution Take 120 mg by mouth every morning.     Yes Historical Provider, MD   budesonide-formoterol (SYMBICORT) 160-4.5 MCG/ACT AERO Inhale 2 puffs into the lungs daily    Yes Historical Provider, MD   albuterol sulfate  (90 Base) MCG/ACT inhaler Inhale 2 puffs into the lungs every 6 hours as needed for Wheezing   Yes Historical Provider, MD   gabapentin (NEURONTIN) 300 MG capsule Take 300 mg by mouth 2 times daily. Yes Historical Provider, MD        Ancef [cefazolin], Duricef [cefadroxil], and Iodine         OBJECTIVE:        Vitals:    07/21/21 0009   BP: (!) 121/59   Pulse: (!) 49   Resp: 17   Temp: 98.4 °F (36.9 °C)   SpO2:               EXAM:        Pt is AAOx3    Vascular Exam:  DP and PT pulses are palpable B/L. Skin temperature warm to cool to BLE from proximal to distal. Mild edema and erythema to R foot in comparison to contralateral.     Neuro Exam:  Gross and epicritic sensation intact B/L. Dermatologic Exam:  Full thickness wound present to R plantar heel to level of subcutaneous tissue, approx 2.5cm in length. Mixed fibrogranular wound bed with mild serous drainage. No malodor appreciated. No fluctuance or crepitus. Wound does not track, tunnel, or probe deep; small amount of bleeding with probing of wound. Hyperkeratotic rim. No other wounds appreciated. MSK: Evidence of previous partial calcanectomy to RLE. Mild pain to palpation of RLE wound. Muscle strength WNL B/L. Digital contractures to lesser digits B/L.                  Current Facility-Administered Medications   Medication Dose Route Frequency Provider Last Rate Last Admin    ipratropium-albuterol (DUONEB) nebulizer solution 1 ampule  1 ampule Inhalation Q4H PRN Kan Wylie, DO   1 ampule at 07/21/21 0602    glucose (GLUTOSE) 40 % oral gel 15 g  15 g Oral PRN Aster Azar MD        dextrose 50 % IV solution  12.5 g Intravenous PRN Aster Azar MD        glucagon (rDNA) injection 1 mg  1 mg Intramuscular PRN Aster Azar MD        dextrose 5 % solution  100 mL/hr Intravenous PRN Aster Azar MD        gabapentin (NEURONTIN) capsule 300 mg  300 mg Oral BID Aster Azar MD   300 mg at 07/20/21 2228    sodium chloride flush 0.9 % injection 10 mL  10 mL Intravenous 2 times per day Graciela Hebert MD   10 mL at 07/20/21 2240    sodium chloride flush 0.9 % injection 10 mL  10 mL Intravenous PRN Graciela Hebert MD        0.9 % sodium chloride infusion  25 mL Intravenous PRN Graciela Hebert MD        ondansetron (ZOFRAN-ODT) disintegrating tablet 4 mg  4 mg Oral Q8H PRN Graciela Hebert MD        Or    ondansetron [facility]) injection 4 mg  4 mg Intravenous Q6H PRN Graciela Hebert MD        polyethylene glycol (GLYCOLAX) packet 17 g  17 g Oral Daily PRN Graciela Hebert MD        acetaminophen (TYLENOL) tablet 650 mg  650 mg Oral Q6H PRN Graciela Hebert MD        Or    acetaminophen (TYLENOL) suppository 650 mg  650 mg Rectal Q6H PRN Graciela Hebert MD        Arformoterol Tartrate [facility]) nebulizer solution 15 mcg  15 mcg Nebulization BID Graciela Hebert MD   15 mcg at 07/21/21 0602    budesonide (PULMICORT) nebulizer suspension 500 mcg  0.5 mg Nebulization BID Graciela Hebert MD   500 mcg at 07/21/21 0602    enoxaparin (LOVENOX) injection 40 mg  40 mg Subcutaneous Daily Graciela Hebert MD        ascorbic acid (VITAMIN C) tablet 500 mg  500 mg Oral Daily Graciela Hebert MD        vancomycin (VANCOCIN) 1,250 mg in dextrose 5 % 250 mL IVPB  15 mg/kg Intravenous Once Graciela Hebert .7 mL/hr at 07/21/21 0704 1,250 mg at 07/21/21 0704        Lab Results   Component Value Date    WBC 7.1 07/20/2021    HCT 42.3 07/20/2021    HGB 12.9 07/20/2021     07/20/2021     07/20/2021    K 4.4 07/20/2021    CL 98 07/20/2021    CO2 30 (H) 07/20/2021    BUN 10 07/20/2021    CREATININE 0.9 07/20/2021    GLUCOSE 110 (H) 07/20/2021    CRP 5.5 (H) 07/20/2021         Radiographs:  Narrative   EXAMINATION:   THREE XRAY VIEWS OF THE RIGHT FOOT       7/20/2021 3:05 pm       COMPARISON:   None.       HISTORY:   ORDERING SYSTEM PROVIDED HISTORY: right heel ulcer   TECHNOLOGIST PROVIDED HISTORY:   Reason for exam:->right heel ulcer   What

## 2021-07-21 NOTE — PROGRESS NOTES
Pharmacy Consultation Note  (Antibiotic Dosing and Monitoring)    Initial consult date: 7-20-21  Consulting physician: Dr. Emmanuel Seals  Drug: Vancomycin  Indication: Skin and soft tissue. Age/  Gender Height Weight IBW Dosing weight  Allergy Information   56 y.o./female @FLOW(11:first:1)@ @FLOW[14:FIRST:1@     Ideal body weight: 66.2 kg (145 lb 15.1 oz)  Adjusted ideal body weight: 74.2 kg (163 lb 9.1 oz)  86.2kg  Ancef [cefazolin], Duricef [cefadroxil], and Iodine      @TMAX(24)@        Date  WBC BUN SCr CrCl  (mL/min) Drug/Dose Time   Given Level(s)   (Time) Comments        Vancomycin 1250 mg IV                                              Intake/Output Summary (Last 24 hours) at 7/20/2021 2212  Last data filed at 7/20/2021 2051  Gross per 24 hour   Intake 250 ml   Output --   Net 250 ml       Historical Cultures:  Organism   Date Value Ref Range Status   02/08/2020 Haemophilus influenzae (A)  Final     No results for input(s): BC in the last 72 hours. Cultures:  available culture and sensitivity results were reviewed in EPIC    Assessment:  55 y.o. female has been initiated Vancomycin. Estimated CrCl = 95 mL/min  Goal trough level = 15-20 mcg/mL    Plan: Will initiate vancomycin at a dose of 1250mg ONCE.   Monitor renal function   Clinical pharmacy to follow      Ely Ospina [address] [place] [address] 7/20/2021 10:12 PM

## 2021-07-21 NOTE — PROGRESS NOTES
Care:  Continue to monitor while inpatient    Goals:  pt to consume >75% meals/ONS       Nutrition Monitoring and Evaluation:   Food/Nutrient Intake Outcomes:  Food and Nutrient Intake, Supplement Intake  Physical Signs/Symptoms Outcomes:  Biochemical Data, GI Status, Fluid Status or Edema, Nutrition Focused Physical Findings, Skin, Weight     Discharge Planning:     Too soon to determine     Electronically signed by Bertha Stoddard MS, RD, LD on 7/21/21 at 1:30 PM EDT    Contact: [phone]

## 2021-07-21 NOTE — PLAN OF CARE
Talked to Lianna [phone] to get dosing of methadone. She is on a split dose 65 mg AM and 55 mg PM for a daily total of 120 mg.

## 2021-07-21 NOTE — PROGRESS NOTES
[facility]  Internal Medicine Residency Program  Progress Note - House Team     Patient:  Jose Antonio Joshua 55 y.o. female MRN: [id number]     Date of Service: 7/21/2021     CC: nonhealing right heel wound    Days since admission: 1    Subjective     Overnight events: Patient was admitted to the hospital overnight. She presented after evaluation at Dr Kath Dowell office. Initial imaging concerning for osteomyelitis. Stated this right heal wound is chronic in nature, but recently has gotten worse with a foul odor coming from the wound. She initially presented to Dr Katherine Madison for clearance for a left hip replacement for a hip fracture. Patient was resting comfortably in bed on examination this morning. She mentioned upon entering the room that she really needed her methadone and that we needed to call for her prescription. Reassured patient that we would call and get what she needed. She stated that her foot was feeling much better after Podiatry came and dressed her wound. She indicated that she thought the swelling in her right leg had gotten better but noticed new swelling in her left leg. She denied headache, chest pain, and abdominal pain. Other than wanting to make sure we called [place] for her methadone, the patient had no other complaints at this time. Objective     Physical Exam:  · Vitals: BP (!) 121/59   Pulse (!) 49 Comment: patient states that is normal for her. Temp 98.4 °F (36.9 °C) (Temporal)   Resp 17   Ht 5' 9\" (1.753 m)   Wt 190 lb (86.2 kg)   LMP 08/28/2013   SpO2 98%   BMI 28.06 kg/m²     I & O - 24hr:     Intake/Output Summary (Last 24 hours) at 7/21/2021 0851  Last data filed at 7/20/2021 2051  Gross per 24 hour   Intake 250 ml   Output --   Net 250 ml   ·    · General Appearance: alert, appears stated age and cooperative  · HEENT:  Head: Normocephalic, no lesions, without obvious abnormality.   · Neck: supple, symmetrical, trachea midline  · Lung: clear to auscultation bilaterally  · Heart: regular rate and rhythm, S1, S2 normal, no murmur, click, rub or gallop  · Abdomen: soft, non-tender; bowel sounds normal; no masses,  no organomegaly  · Extremities:  edema 1+ Left Lower Extremity, 2+ Right Lower Extremity and right heel wound, bandaged  · Musculokeletal: No joint swelling, no muscle tenderness. ROM normal in all joints of extremities. · Neurologic: Mental status: Alert, oriented, thought content appropriate  Subject  Pertinent Information & Imaging Studies, Consults   jalil  CBC with Differential:    Lab Results   Component Value Date    WBC 7.1 07/20/2021    RBC 4.73 07/20/2021    HGB 12.9 07/20/2021    HCT 42.3 07/20/2021     07/20/2021    MCV 89.4 07/20/2021    MCH 27.3 07/20/2021    MCHC 30.5 07/20/2021    RDW 14.8 07/20/2021    LYMPHOPCT 13.3 07/20/2021    MONOPCT 7.7 07/20/2021    BASOPCT 0.6 07/20/2021    MONOSABS 0.55 07/20/2021    LYMPHSABS 0.95 07/20/2021    EOSABS 0.04 07/20/2021    BASOSABS 0.04 07/20/2021     CMP:    Lab Results   Component Value Date     07/20/2021    K 4.4 07/20/2021    CL 98 07/20/2021    CO2 30 07/20/2021    BUN 10 07/20/2021    CREATININE 0.9 07/20/2021    GFRAA >60 07/20/2021    LABGLOM >60 07/20/2021    GLUCOSE 110 07/20/2021    PROT 7.7 02/08/2020    LABALBU 3.5 02/08/2020    CALCIUM 9.0 07/20/2021    BILITOT 0.4 02/08/2020    ALKPHOS 103 02/08/2020    AST 10 02/08/2020    ALT 8 02/08/2020       IMAGING:   Imaging Studies:    XR FOOT RIGHT (MIN 3 VIEWS)    Result Date: 7/20/2021  1. Amputation of the distal calcaneus. 2. Cortical irregularity likely related to erosive changes involving inferior aspect of the calcaneus. Findings could suggest osteomyelitis. 3. Skin ulceration involving plantar soft tissue beneath the calcaneus. 4. No obvious subcutaneous gas.             PROCEDURES: Debridement and bone biopsy tomorrow      Notable Cultures:      Blood cultures   Blood Culture, Routine   Date Value Ref Range admitted to the hospital for further management of her chronic, nonhealing, right heel wound. Days since admission: 1    Consults:    Infectious disease   Podiatry     Assessment/ Plan:  1. Osteomyelitis of right calcaneus 2/2 peripheral vascular disease vs neuropathy vs hypothyroidism    - chronic, nonhealing wound on right calcaneus - currently with foul smell, uses crutch on right side to walk, patient stated pain in heel intermittently painful   - A1C 6.5   - TSH 2.5   - X-ray of foot suggestive of osteomyelitis   - follow wound cultures, blood cultures   - ID consult - follow recommendations for antibiotics   - Podiatry consult - OR tomorrow for bone biopsy and debridement  2. Hx of hip fracture   - patient waiting to be cleared for surgery   - broke hip years ago after being thrown from horse, fell on ice again on same hip  3. Hx of COPD   - patient stated she is on symbicort at home and it works well   - continue brovana, pulmicort, duoneb  4. Hx of polysubstance abuse on methadone   - patient states methadone is for chronic pain, wants to make sure call is placed to [place] so she can continue to receive her methadone while in the hospital   5. Hx of subclinical hypothyroidism   -  TSH 2.9, free T4 0.98 from 8/8/2019    - TSH 2.5 this admission  6.  Hx of chronic pain   - call for methadone   - continue gabapentin      PT/OT: not ordered at this time  DVT ppx: lovenox  GI ppx: diet    Code Status:   full      Alexia Holden, MS4  Attending physician: Dr. Angeline Mccullough

## 2021-07-22 ENCOUNTER — APPOINTMENT (OUTPATIENT)
Dept: GENERAL RADIOLOGY | Age: 56
DRG: 504 | End: 2021-07-22
Payer: OTHER GOVERNMENT

## 2021-07-22 LAB
ABO/RH: NORMAL
ACETAMINOPHEN LEVEL: <5 MCG/ML (ref 10–30)
AMPHETAMINE SCREEN, URINE: NOT DETECTED
ANION GAP SERPL CALCULATED.3IONS-SCNC: 10 MMOL/L (ref 7–16)
ANTIBODY SCREEN: NORMAL
APTT: 33.3 SEC (ref 24.5–35.1)
BARBITURATE SCREEN URINE: NOT DETECTED
BASOPHILS ABSOLUTE: 0.04 E9/L (ref 0–0.2)
BASOPHILS RELATIVE PERCENT: 0.7 % (ref 0–2)
BENZODIAZEPINE SCREEN, URINE: NOT DETECTED
BUN BLDV-MCNC: 11 MG/DL (ref 6–20)
CALCIUM SERPL-MCNC: 9 MG/DL (ref 8.6–10.2)
CANNABINOID SCREEN URINE: NOT DETECTED
CHLORIDE BLD-SCNC: 100 MMOL/L (ref 98–107)
CO2: 29 MMOL/L (ref 22–29)
COCAINE METABOLITE SCREEN URINE: NOT DETECTED
CREAT SERPL-MCNC: 0.9 MG/DL (ref 0.5–1)
EKG ATRIAL RATE: 66 BPM
EKG P AXIS: 81 DEGREES
EKG P-R INTERVAL: 148 MS
EKG Q-T INTERVAL: 396 MS
EKG QRS DURATION: 84 MS
EKG QTC CALCULATION (BAZETT): 415 MS
EKG R AXIS: -2 DEGREES
EKG T AXIS: 3 DEGREES
EKG VENTRICULAR RATE: 66 BPM
EOSINOPHILS ABSOLUTE: 0.12 E9/L (ref 0.05–0.5)
EOSINOPHILS RELATIVE PERCENT: 2.1 % (ref 0–6)
ETHANOL: <10 MG/DL (ref 0–0.08)
FENTANYL SCREEN, URINE: POSITIVE
GFR AFRICAN AMERICAN: >60
GFR NON-AFRICAN AMERICAN: >60 ML/MIN/1.73
GLUCOSE BLD-MCNC: 93 MG/DL (ref 74–99)
HCT VFR BLD CALC: 43 % (ref 34–48)
HEMOGLOBIN: 13.3 G/DL (ref 11.5–15.5)
IMMATURE GRANULOCYTES #: 0.04 E9/L
IMMATURE GRANULOCYTES %: 0.7 % (ref 0–5)
INR BLD: 1.1
LYMPHOCYTES ABSOLUTE: 1.51 E9/L (ref 1.5–4)
LYMPHOCYTES RELATIVE PERCENT: 27 % (ref 20–42)
Lab: ABNORMAL
MCH RBC QN AUTO: 27.8 PG (ref 26–35)
MCHC RBC AUTO-ENTMCNC: 30.9 % (ref 32–34.5)
MCV RBC AUTO: 89.8 FL (ref 80–99.9)
METHADONE SCREEN, URINE: POSITIVE
MONOCYTES ABSOLUTE: 0.48 E9/L (ref 0.1–0.95)
MONOCYTES RELATIVE PERCENT: 8.6 % (ref 2–12)
MRSA CULTURE ONLY: NORMAL
NEUTROPHILS ABSOLUTE: 3.4 E9/L (ref 1.8–7.3)
NEUTROPHILS RELATIVE PERCENT: 60.9 % (ref 43–80)
OPIATE SCREEN URINE: NOT DETECTED
OXYCODONE URINE: NOT DETECTED
PDW BLD-RTO: 15.1 FL (ref 11.5–15)
PHENCYCLIDINE SCREEN URINE: NOT DETECTED
PLATELET # BLD: 307 E9/L (ref 130–450)
PMV BLD AUTO: 9.7 FL (ref 7–12)
POTASSIUM SERPL-SCNC: 4.4 MMOL/L (ref 3.5–5)
PROTHROMBIN TIME: 11.5 SEC (ref 9.3–12.4)
RBC # BLD: 4.79 E12/L (ref 3.5–5.5)
SALICYLATE, SERUM: <0.3 MG/DL (ref 0–30)
SODIUM BLD-SCNC: 139 MMOL/L (ref 132–146)
TRICYCLIC ANTIDEPRESSANTS SCREEN SERUM: NEGATIVE NG/ML
VANCOMYCIN TROUGH: 9.8 MCG/ML (ref 5–16)
WBC # BLD: 5.6 E9/L (ref 4.5–11.5)

## 2021-07-22 PROCEDURE — 80048 BASIC METABOLIC PNL TOTAL CA: CPT

## 2021-07-22 PROCEDURE — 93005 ELECTROCARDIOGRAM TRACING: CPT | Performed by: PODIATRIST

## 2021-07-22 PROCEDURE — 86901 BLOOD TYPING SEROLOGIC RH(D): CPT

## 2021-07-22 PROCEDURE — 71045 X-RAY EXAM CHEST 1 VIEW: CPT

## 2021-07-22 PROCEDURE — 86900 BLOOD TYPING SEROLOGIC ABO: CPT

## 2021-07-22 PROCEDURE — 76937 US GUIDE VASCULAR ACCESS: CPT

## 2021-07-22 PROCEDURE — C1751 CATH, INF, PER/CENT/MIDLINE: HCPCS

## 2021-07-22 PROCEDURE — 2700000000 HC OXYGEN THERAPY PER DAY

## 2021-07-22 PROCEDURE — 94640 AIRWAY INHALATION TREATMENT: CPT

## 2021-07-22 PROCEDURE — 86850 RBC ANTIBODY SCREEN: CPT

## 2021-07-22 PROCEDURE — 85025 COMPLETE CBC W/AUTO DIFF WBC: CPT

## 2021-07-22 PROCEDURE — 80202 ASSAY OF VANCOMYCIN: CPT

## 2021-07-22 PROCEDURE — 2709999900 HC NON-CHARGEABLE SUPPLY: Performed by: PODIATRIST

## 2021-07-22 PROCEDURE — 82077 ASSAY SPEC XCP UR&BREATH IA: CPT

## 2021-07-22 PROCEDURE — 85730 THROMBOPLASTIN TIME PARTIAL: CPT

## 2021-07-22 PROCEDURE — 36415 COLL VENOUS BLD VENIPUNCTURE: CPT

## 2021-07-22 PROCEDURE — 99232 SBSQ HOSP IP/OBS MODERATE 35: CPT | Performed by: INTERNAL MEDICINE

## 2021-07-22 PROCEDURE — 36569 INSJ PICC 5 YR+ W/O IMAGING: CPT

## 2021-07-22 PROCEDURE — 6360000002 HC RX W HCPCS: Performed by: INTERNAL MEDICINE

## 2021-07-22 PROCEDURE — 6370000000 HC RX 637 (ALT 250 FOR IP): Performed by: STUDENT IN AN ORGANIZED HEALTH CARE EDUCATION/TRAINING PROGRAM

## 2021-07-22 PROCEDURE — 1200000000 HC SEMI PRIVATE

## 2021-07-22 PROCEDURE — 2580000003 HC RX 258: Performed by: INTERNAL MEDICINE

## 2021-07-22 PROCEDURE — 80143 DRUG ASSAY ACETAMINOPHEN: CPT

## 2021-07-22 PROCEDURE — 6370000000 HC RX 637 (ALT 250 FOR IP): Performed by: INTERNAL MEDICINE

## 2021-07-22 PROCEDURE — 80307 DRUG TEST PRSMV CHEM ANLYZR: CPT

## 2021-07-22 PROCEDURE — 80179 DRUG ASSAY SALICYLATE: CPT

## 2021-07-22 PROCEDURE — 85610 PROTHROMBIN TIME: CPT

## 2021-07-22 RX ORDER — HEPARIN SODIUM (PORCINE) LOCK FLUSH IV SOLN 100 UNIT/ML 100 UNIT/ML
3 SOLUTION INTRAVENOUS EVERY 12 HOURS SCHEDULED
Status: DISCONTINUED | OUTPATIENT
Start: 2021-07-22 | End: 2021-07-30 | Stop reason: HOSPADM

## 2021-07-22 RX ORDER — CLONIDINE HYDROCHLORIDE 0.1 MG/1
0.1 TABLET ORAL 3 TIMES DAILY
Status: DISCONTINUED | OUTPATIENT
Start: 2021-07-22 | End: 2021-07-30 | Stop reason: HOSPADM

## 2021-07-22 RX ORDER — SODIUM CHLORIDE 9 MG/ML
25 INJECTION, SOLUTION INTRAVENOUS PRN
Status: DISCONTINUED | OUTPATIENT
Start: 2021-07-22 | End: 2021-07-30 | Stop reason: HOSPADM

## 2021-07-22 RX ORDER — LIDOCAINE HYDROCHLORIDE 10 MG/ML
5 INJECTION, SOLUTION EPIDURAL; INFILTRATION; INTRACAUDAL; PERINEURAL ONCE
Status: DISCONTINUED | OUTPATIENT
Start: 2021-07-22 | End: 2021-07-30 | Stop reason: HOSPADM

## 2021-07-22 RX ORDER — SODIUM CHLORIDE 0.9 % (FLUSH) 0.9 %
5-40 SYRINGE (ML) INJECTION PRN
Status: DISCONTINUED | OUTPATIENT
Start: 2021-07-22 | End: 2021-07-30 | Stop reason: HOSPADM

## 2021-07-22 RX ORDER — SODIUM CHLORIDE 0.9 % (FLUSH) 0.9 %
5-40 SYRINGE (ML) INJECTION EVERY 12 HOURS SCHEDULED
Status: DISCONTINUED | OUTPATIENT
Start: 2021-07-22 | End: 2021-07-30 | Stop reason: HOSPADM

## 2021-07-22 RX ORDER — HEPARIN SODIUM (PORCINE) LOCK FLUSH IV SOLN 100 UNIT/ML 100 UNIT/ML
3 SOLUTION INTRAVENOUS PRN
Status: DISCONTINUED | OUTPATIENT
Start: 2021-07-22 | End: 2021-07-30 | Stop reason: HOSPADM

## 2021-07-22 RX ORDER — PROPRANOLOL HYDROCHLORIDE 10 MG/1
10 TABLET ORAL 3 TIMES DAILY
Status: DISCONTINUED | OUTPATIENT
Start: 2021-07-22 | End: 2021-07-30 | Stop reason: HOSPADM

## 2021-07-22 RX ORDER — FENTANYL CITRATE 50 UG/ML
25 INJECTION, SOLUTION INTRAMUSCULAR; INTRAVENOUS ONCE
Status: COMPLETED | OUTPATIENT
Start: 2021-07-22 | End: 2021-07-22

## 2021-07-22 RX ORDER — FENTANYL CITRATE 50 UG/ML
25 INJECTION, SOLUTION INTRAMUSCULAR; INTRAVENOUS
Status: DISCONTINUED | OUTPATIENT
Start: 2021-07-22 | End: 2021-07-24

## 2021-07-22 RX ADMIN — Medication 65 MG: at 06:50

## 2021-07-22 RX ADMIN — SODIUM CHLORIDE, PRESERVATIVE FREE 10 ML: 5 INJECTION INTRAVENOUS at 16:58

## 2021-07-22 RX ADMIN — ARFORMOTEROL TARTRATE 15 MCG: 15 SOLUTION RESPIRATORY (INHALATION) at 09:37

## 2021-07-22 RX ADMIN — ARFORMOTEROL TARTRATE 15 MCG: 15 SOLUTION RESPIRATORY (INHALATION) at 21:14

## 2021-07-22 RX ADMIN — IPRATROPIUM BROMIDE AND ALBUTEROL SULFATE 1 AMPULE: .5; 3 SOLUTION RESPIRATORY (INHALATION) at 09:37

## 2021-07-22 RX ADMIN — ONDANSETRON 4 MG: 2 INJECTION INTRAMUSCULAR; INTRAVENOUS at 22:32

## 2021-07-22 RX ADMIN — Medication 10 ML: at 22:13

## 2021-07-22 RX ADMIN — VANCOMYCIN HYDROCHLORIDE 1000 MG: 1 INJECTION, POWDER, LYOPHILIZED, FOR SOLUTION INTRAVENOUS at 10:25

## 2021-07-22 RX ADMIN — Medication 55 MG: at 22:13

## 2021-07-22 RX ADMIN — FENTANYL CITRATE 25 MCG: 0.05 INJECTION, SOLUTION INTRAMUSCULAR; INTRAVENOUS at 22:32

## 2021-07-22 RX ADMIN — FENTANYL CITRATE 25 MCG: 50 INJECTION, SOLUTION INTRAMUSCULAR; INTRAVENOUS at 16:58

## 2021-07-22 RX ADMIN — BUDESONIDE 500 MCG: 0.5 SUSPENSION RESPIRATORY (INHALATION) at 09:37

## 2021-07-22 RX ADMIN — GABAPENTIN 300 MG: 300 CAPSULE ORAL at 22:15

## 2021-07-22 RX ADMIN — CLONIDINE HYDROCHLORIDE 0.1 MG: 0.1 TABLET ORAL at 22:12

## 2021-07-22 RX ADMIN — IPRATROPIUM BROMIDE AND ALBUTEROL SULFATE 1 AMPULE: .5; 3 SOLUTION RESPIRATORY (INHALATION) at 21:14

## 2021-07-22 RX ADMIN — SODIUM CHLORIDE, PRESERVATIVE FREE 300 UNITS: 5 INJECTION INTRAVENOUS at 22:14

## 2021-07-22 RX ADMIN — Medication 10 ML: at 14:47

## 2021-07-22 RX ADMIN — Medication 10 ML: at 18:53

## 2021-07-22 RX ADMIN — PROPRANOLOL HYDROCHLORIDE 10 MG: 10 TABLET ORAL at 22:12

## 2021-07-22 RX ADMIN — Medication 10 ML: at 10:25

## 2021-07-22 RX ADMIN — BUDESONIDE 500 MCG: 0.5 SUSPENSION RESPIRATORY (INHALATION) at 21:15

## 2021-07-22 ASSESSMENT — PAIN DESCRIPTION - ORIENTATION
ORIENTATION: LEFT;RIGHT
ORIENTATION: RIGHT
ORIENTATION: RIGHT;LEFT
ORIENTATION: RIGHT
ORIENTATION: LEFT;RIGHT

## 2021-07-22 ASSESSMENT — PAIN DESCRIPTION - DESCRIPTORS
DESCRIPTORS: CONSTANT;THROBBING
DESCRIPTORS: ACHING;SORE;THROBBING
DESCRIPTORS: CONSTANT;THROBBING
DESCRIPTORS: ACHING;SORE;THROBBING
DESCRIPTORS: CONSTANT;THROBBING

## 2021-07-22 ASSESSMENT — PAIN DESCRIPTION - ONSET
ONSET: ON-GOING

## 2021-07-22 ASSESSMENT — PAIN DESCRIPTION - LOCATION
LOCATION: HIP;FOOT
LOCATION: FOOT
LOCATION: FOOT
LOCATION: HIP;FOOT
LOCATION: HIP;FOOT

## 2021-07-22 ASSESSMENT — PAIN DESCRIPTION - FREQUENCY
FREQUENCY: CONTINUOUS

## 2021-07-22 ASSESSMENT — PAIN SCALES - GENERAL
PAINLEVEL_OUTOF10: 9
PAINLEVEL_OUTOF10: 0
PAINLEVEL_OUTOF10: 8

## 2021-07-22 ASSESSMENT — PAIN DESCRIPTION - PAIN TYPE
TYPE: ACUTE PAIN;CHRONIC PAIN

## 2021-07-22 ASSESSMENT — PAIN DESCRIPTION - PROGRESSION
CLINICAL_PROGRESSION: NOT CHANGED

## 2021-07-22 ASSESSMENT — PAIN - FUNCTIONAL ASSESSMENT
PAIN_FUNCTIONAL_ASSESSMENT: PREVENTS OR INTERFERES SOME ACTIVE ACTIVITIES AND ADLS

## 2021-07-22 NOTE — PROGRESS NOTES
Surgery is being cancelled today by Anesthesia because of possible withdrawal for narcotics. [facility] may eat and NPO order will be placed for tomorrow.

## 2021-07-22 NOTE — PROGRESS NOTES
The phlebotomist asked if the pt's stat lab can be drawn through her PICC line.  This nurse pulled blood from the PICC line into a red tube and put the pts sticker on the tube after ID confirmation and handed the tube back to the phlebotomist

## 2021-07-22 NOTE — PROGRESS NOTES
[facility]  Internal Medicine Residency Program  Progress Note - House Team     Patient:  Heaven Ambriz 55 y.o. female MRN: [id number]     Date of Service: 7/22/2021     CC: Right calcaneal ulcer   Overnight events: No event    Subjective     Patient was seen and examined this morning at bedside in mildly acute distress. In the morning, he was lying in her room and was not turning on the lights because she said it hurts her eyes. Physical examination was normal, other than she was having severe tremor that was not as severe during admission. Blood pressure was 162/73; she said she was having pain 8/10 and also could not sleep in the night. Patient mentioned, she is  afraid of losing her leg. In the evening when medical student went there for physical examination, she found out that she was sitting in the bed tearful and tremulous. She was concerned that, she might going through some withdrawal.  Was assured that she is having some methadone for her pain in the same dose she was having it at home. Then she mentioned she found a box in her home, with 20 fentanyl patches, which she believes was a child but for her pain. She was asked about the dose of fentanyl, she said 150 I think. But during admission, she only mentioned about taking methadone not the patch. According to medical student, patient mentioned if she tell that she was on fentanyl as well people are going to  her. During the examination in the evening, she is sedated she said she is not feeling well, anxious shaking, abdominal pain, lacrimation, excessive sweating. When we asked about her bowel movement, she said she had diarrhea 2 times today. But medical student said, when she has covered her bowel movement patient mentioned she is having constipation.         Objective     Physical Exam:  · Vitals: /74   Pulse 68   Temp 97.9 °F (36.6 °C) (Temporal)   Resp 18   Ht 5' 9\" (1.753 m)   Wt 190 lb (86.2 kg) LMP 08/28/2013   SpO2 92%   BMI 28.06 kg/m²     · I & O - 24hr: I/O this shift:  · In: 10 [I.V.:10]  · Out: -    · General Appearance: alert, appears stated age, cooperative and mild distress  · HEENT:  Head: Normocephalic, no lesions, without obvious abnormality. · Neck: no adenopathy, no carotid bruit, no JVD, supple, symmetrical, trachea midline and thyroid not enlarged, symmetric, no tenderness/mass/nodules  · Lung: clear to auscultation bilaterally  · Heart: regular rate and rhythm, S1, S2 normal, no murmur, click, rub or gallop  · Abdomen: soft, non-tender; bowel sounds normal; no masses,  no organomegaly  · Extremities:  extremities normal, atraumatic, no cyanosis or edema  · Musculokeletal: No joint swelling, no muscle tenderness. ROM normal in all joints of extremities. Patient had tremor  · Neurologic: Mental status: Alert, oriented, but in disress  · Skin: right calcaneal ulcer, bone can be seen, red, swollen and tender ankle joint.   Subject  Pertinent Labs & Imaging Studies   jalil  CBC:   Lab Results   Component Value Date    WBC 5.6 07/22/2021    RBC 4.79 07/22/2021    HGB 13.3 07/22/2021    HCT 43.0 07/22/2021    MCV 89.8 07/22/2021    MCH 27.8 07/22/2021    MCHC 30.9 07/22/2021    RDW 15.1 07/22/2021     07/22/2021    MPV 9.7 07/22/2021     CBC with Differential:    Lab Results   Component Value Date    WBC 5.6 07/22/2021    RBC 4.79 07/22/2021    HGB 13.3 07/22/2021    HCT 43.0 07/22/2021     07/22/2021    MCV 89.8 07/22/2021    MCH 27.8 07/22/2021    MCHC 30.9 07/22/2021    RDW 15.1 07/22/2021    LYMPHOPCT 27.0 07/22/2021    MONOPCT 8.6 07/22/2021    BASOPCT 0.7 07/22/2021    MONOSABS 0.48 07/22/2021    LYMPHSABS 1.51 07/22/2021    EOSABS 0.12 07/22/2021    BASOSABS 0.04 07/22/2021     WBC:    Lab Results   Component Value Date    WBC 5.6 07/22/2021     Platelets:    Lab Results   Component Value Date     07/22/2021     Hemoglobin/Hematocrit:    Lab Results   Component Value Date    HGB 13.3 07/22/2021    HCT 43.0 07/22/2021     CMP:    Lab Results   Component Value Date     07/22/2021    K 4.4 07/22/2021    K 4.4 07/20/2021     07/22/2021    CO2 29 07/22/2021    BUN 11 07/22/2021    CREATININE 0.9 07/22/2021    GFRAA >60 07/22/2021    LABGLOM >60 07/22/2021    GLUCOSE 93 07/22/2021    PROT 7.7 02/08/2020    LABALBU 3.5 02/08/2020    CALCIUM 9.0 07/22/2021    BILITOT 0.4 02/08/2020    ALKPHOS 103 02/08/2020    AST 10 02/08/2020    ALT 8 02/08/2020     BMP:    Lab Results   Component Value Date     07/22/2021    K 4.4 07/22/2021    K 4.4 07/20/2021     07/22/2021    CO2 29 07/22/2021    BUN 11 07/22/2021    LABALBU 3.5 02/08/2020    CREATININE 0.9 07/22/2021    CALCIUM 9.0 07/22/2021    GFRAA >60 07/22/2021    LABGLOM >60 07/22/2021    GLUCOSE 93 07/22/2021     Sodium:    Lab Results   Component Value Date     07/22/2021     Potassium:    Lab Results   Component Value Date    K 4.4 07/22/2021    K 4.4 07/20/2021     BUN/Creatinine:    Lab Results   Component Value Date    BUN 11 07/22/2021    CREATININE 0.9 07/22/2021     Hepatic Function Panel:    Lab Results   Component Value Date    ALKPHOS 103 02/08/2020    ALT 8 02/08/2020    AST 10 02/08/2020    PROT 7.7 02/08/2020    BILITOT 0.4 02/08/2020    LABALBU 3.5 02/08/2020     Albumin:    Lab Results   Component Value Date    LABALBU 3.5 02/08/2020     Calcium:    Lab Results   Component Value Date    CALCIUM 9.0 07/22/2021     Ionized Calcium:  No results found for: IONCA  Magnesium:    Lab Results   Component Value Date    MG 2.2 02/11/2020     Phosphorus:  No results found for: PHOS  ABG:  No results found for: PH, PCO2, PO2, HCO3, BE, THGB, TCO2, O2SAT  HgBA1c:    Lab Results   Component Value Date    LABA1C 6.5 07/20/2021     Microalbumen/Creatinine ratio:  No components found for: RUCREAT  TSH:    Lab Results   Component Value Date    TSH 2.570 07/20/2021     VITAMIN B12: No components found for: B12  FOLATE:    Lab Results   Component Value Date    FOLATE 5.0 02/10/2020     IRON:  No results found for: IRON  TIBC:  No results found for: TIBC  RPR:  No results found for: RPR    XR CHEST PORTABLE   Final Result   Normal chest         VL LOWER EXTREMITY ARTERIAL SEGMENTAL PRESSURES W PPG BILATERAL   Final Result      XR FOOT RIGHT (MIN 3 VIEWS)   Final Result   1. Amputation of the distal calcaneus. 2. Cortical irregularity likely related to erosive changes involving inferior   aspect of the calcaneus. Findings could suggest osteomyelitis. 3. Skin ulceration involving plantar soft tissue beneath the calcaneus. 4. No obvious subcutaneous gas. Resident's Assessment and Plan     Assessment and Plan:      1. Osteomyelitis 2/2 repeated non healing injury v/s PAD vs Microtrauma vs malnutrition  · Regular wound dressing  · CRP 5.5, sed rate 45 mm/hr  · pro calcitonin level 0.5 (7/20)  · blood culture negative  · bone biopsy scheduled today  · Broad spectrum antibiotic; Vancomycin and meropenum (day 3)  · Continue Gabapentin and methadone for pain   · Follow up with podiatry, would care and infectious disease  · PATRICIA 07/21/2021 - Normal ankle arm index   · HbA1C 6.5  · TSH 2.5  · Continue Vit C for wound healing   · Follow up with albumin level for possible Malnutrition  · Per ID PICC line insertion      2. Hypertension  · /73  · Continue Clonidine and lopressor      3. Polysubstance use  · On methadone 65 mg in morning; 55 mg at night   · Fentanyl patch (unsure about the dose)  · Follow up with PM&R        4. Chronic pain 2/2 hip fracture vs calcaneal ulcer  · Continue Gabapentin and methadone        5.  Left Hip fracture waiting to get clear for surgery          PT/OT evaluation: yes  DVT prophylaxis/ GI prophylaxis: levonox/ on regular diet  Disposition: continue current care    Dominica Cowden, MD, PGY-1  Attending physician: Dr. Ana Sims

## 2021-07-22 NOTE — PLAN OF CARE
PT/OT/  NWB involved calcaneus-- b.l crutches vs wheelwalker vs knee scooter-- [facility] vs her preference home health    Patient preference home IV abx infusion-  to coordinate with ID    Noted podiatry deferred surgery bc of her chronic tremors  Doubt DTs (as per podiatry) resident impression  Although some concerns of very anxious also on admission and throughout her admission   Narcotics have been a concern:   \". ...not feeling well, anxious shaking, abdominal pain, lacrimation, excessive sweating. When we asked about her bowel movement, she said she had diarrhea 2 times today. But . .then later patient mentioned she is having constipation\"  She admits to taking extra fentanyl  At home  - ?some subtherapeutic dosing of opiods based on her chronic overuse    Likely essential tremor reconcurrence and anxiety recurrenc +sweay- will resume prior home propanol  Chronic use of --Continue methadone, gabapentin  Likely recurrence of essential tremors- was on propanolol for this  (also clonidine for sympathetics +will escelate narcotics dosing based on likely decompensation emotional perioperatively)      -- I will start propanolol tonight  Recommend ped/orthotist - total contact \"casiting\" vs boot with offloading orthotics on fu  Will need to \"off load\" her deformed heel- that had partial calcanealectomy in past

## 2021-07-22 NOTE — PROGRESS NOTES
Department of Internal Medicine  Infectious Diseases  Progress  Note      C/C : Right heel wound infection, osteomyelitis     Pt is awake and alert  Denies fever or chills   Foot pain   Afebrile       Current Facility-Administered Medications   Medication Dose Route Frequency Provider Last Rate Last Admin    lidocaine PF 1 % injection 5 mL  5 mL Intradermal Once Umair Aguirre MD        sodium chloride flush 0.9 % injection 5-40 mL  5-40 mL Intravenous 2 times per day Umair Aguirre MD        sodium chloride flush 0.9 % injection 5-40 mL  5-40 mL Intravenous PRN Umair Aguirre MD        0.9 % sodium chloride infusion  25 mL Intravenous PRN Umair Aguirre MD        heparin flush 100 UNIT/ML injection 300 Units  3 mL Intravenous 2 times per day Umair Aguirre MD        heparin flush 100 UNIT/ML injection 300 Units  3 mL Intracatheter PRN Umair Aguirre MD        glucose (GLUTOSE) 40 % oral gel 15 g  15 g Oral PRN Anel Gil MD        dextrose 50 % IV solution  12.5 g Intravenous PRN Anel Gil MD        glucagon (rDNA) injection 1 mg  1 mg Intramuscular PRN Anel Gil MD        dextrose 5 % solution  100 mL/hr Intravenous PRN Anel Gil MD        ipratropium-albuterol (DUONEB) nebulizer solution 1 ampule  1 ampule Inhalation Q4H WA Steve R Kilar, DO   1 ampule at 07/22/21 1637    vancomycin 1000 mg IVPB in 250 mL D5W addavial  1,000 mg Intravenous Q12H Anel Gil  mL/hr at 07/22/21 1025 1,000 mg at 07/22/21 1025    methadone (DOLOPHINE) 10 MG/ML solution 55 mg  55 mg Oral Nightly Steve R Kilar, DO   55 mg at 07/21/21 2214    methadone (DOLOPHINE) 10 MG/ML solution 65 mg  65 mg Oral QAM AC Steve R Kilar, DO   65 mg at 07/22/21 0650    gabapentin (NEURONTIN) capsule 300 mg  300 mg Oral BID Anel Gil MD   300 mg at 07/21/21 2024    sodium chloride flush 0.9 % injection 10 mL  10 mL Intravenous 2 times per day Anel Gil MD   10 mL at 07/22/21 1025    sodium chloride flush 0.9 % injection 10 mL  10 mL Intravenous PRN Camryn Perkins MD        0.9 % sodium chloride infusion  25 mL Intravenous PRN Camryn Perkins MD        ondansetron (ZOFRAN-ODT) disintegrating tablet 4 mg  4 mg Oral Q8H PRN Camryn Perkins MD        Or    [facility]ron [facility] injection 4 mg  4 mg Intravenous Q6H PRN Camryn Perkins MD        polyethylene glycol (GLYCOLAX) packet 17 g  17 g Oral Daily PRN Camryn Perkins MD        acetaminophen (TYLENOL) tablet 650 mg  650 mg Oral Q6H PRN Camryn Perkins MD        Or    acetaminophen (TYLENOL) suppository 650 mg  650 mg Rectal Q6H PRN Camryn Perkins MD        Arformoterol Tartrate [facility]) nebulizer solution 15 mcg  15 mcg Nebulization BID Camryn Perkins MD   15 mcg at 07/22/21 9407    budesonide (PULMICORT) nebulizer suspension 500 mcg  0.5 mg Nebulization BID Camryn Perkins MD   500 mcg at 07/22/21 7266    enoxaparin (LOVENOX) injection 40 mg  40 mg Subcutaneous Daily Camryn Perkins MD   40 mg at 07/21/21 1020    ascorbic acid (VITAMIN C) tablet 500 mg  500 mg Oral Daily Camryn Perkins MD   500 mg at 07/21/21 1020           REVIEW OF SYSTEMS:      CONSTITUTIONAL:  Denies fever, chill or rigors. HEENT: denies blurring of vision or double vision, denies hearing problem  RESPIRATORY: denies cough, shortness of breath, sputum expectoration, chest pain. CARDIOVASCULAR:  Denies palpitation  GASTROINTESTINAL:  Denies abdomen pain, diarrhea or constipation. GENITOURINARY:  Denies burning urination or frequency of urination  INTEGUMENT: Chronic non healing wound right foot   HEMATOLOGIC/LYMPHATIC:  Denies lymph node swelling, gum bleeding or easy bruising.   MUSCULOSKELETAL: right heel wound pain, left hip pain   NEUROLOGICAL:  weakness          PHYSICAL EXAM:      Vitals:     BP (!) 141/78   Pulse 67   Temp 97.7 °F (36.5 °C) (Temporal)   Resp 18   Ht 5' 9\" (1.753 m)   Wt 190 lb (86.2 kg)   LMP 08/28/2013   SpO2 93%   BMI 28.06 kg/m²     General Appearance:    Awake, alert , no acute distress. Head:    Normocephalic, atraumatic   Eyes:    No pallor, no icterus,   Ears:    No obvious deformity or drainage. Nose:   No nasal drainage   Throat:   Mucosa moist, no oral thrush   Neck:   Supple, no lymphadenopathy   Back:     no CVA tenderness   Lungs:     Clear to auscultation bilaterally    Heart:    Regular rate and rhythm    Abdomen:     Soft, non-tender, bowel sounds present    Extremities:    + edema,   Right heel ulcer, mild tender, no drainage    Pulses:   Dorsalis pedis palpable    Skin:                           CBC with Differential:      Lab Results   Component Value Date    WBC 5.6 07/22/2021    RBC 4.79 07/22/2021    HGB 13.3 07/22/2021    HCT 43.0 07/22/2021     07/22/2021    MCV 89.8 07/22/2021    MCH 27.8 07/22/2021    MCHC 30.9 07/22/2021    RDW 15.1 07/22/2021    LYMPHOPCT 27.0 07/22/2021    MONOPCT 8.6 07/22/2021    BASOPCT 0.7 07/22/2021    MONOSABS 0.48 07/22/2021    LYMPHSABS 1.51 07/22/2021    EOSABS 0.12 07/22/2021    BASOSABS 0.04 07/22/2021       CMP:    Lab Results   Component Value Date     07/22/2021    K 4.4 07/22/2021    K 4.4 07/20/2021     07/22/2021    CO2 29 07/22/2021    BUN 11 07/22/2021    CREATININE 0.9 07/22/2021    GFRAA >60 07/22/2021    LABGLOM >60 07/22/2021    GLUCOSE 93 07/22/2021    PROT 7.7 02/08/2020    LABALBU 3.5 02/08/2020    CALCIUM 9.0 07/22/2021    BILITOT 0.4 02/08/2020    ALKPHOS 103 02/08/2020    AST 10 02/08/2020    ALT 8 02/08/2020       Hepatic Function Panel:    Lab Results   Component Value Date    ALKPHOS 103 02/08/2020    ALT 8 02/08/2020    AST 10 02/08/2020    PROT 7.7 02/08/2020    BILITOT 0.4 02/08/2020    LABALBU 3.5 02/08/2020         Microbiology :    Wound cx - pending     Radiology :    X  Ray foot     Impression:        1. Amputation of the distal calcaneus.    2. Cortical irregularity likely related to erosive changes involving inferior   aspect of the calcaneus.  Findings could suggest osteomyelitis. 3. Skin ulceration involving plantar soft tissue beneath the calcaneus. 4. No obvious subcutaneous gas.              IMPRESSION:     1. Chronic non healing right heel ulcer . Wound infection , osteomyelitis     RECOMMENDATIONS:      1. Vancomycin 1250 mg IV q 12 hrs, meropenem 1 gram IV q 8 hrs   2. Wound debridement , bone cx, pathology - appreciated podiatrist consult   3.  PICC line insertion

## 2021-07-22 NOTE — PROGRESS NOTES
[facility]  Internal Medicine Residency Program  Progress Note - House Team     Patient:  Miguelito Leahy 55 y.o. female MRN: [id number]     Date of Service: 7/22/2021     CC: nonhealing right heel wound    Days since admission: 2    Subjective     Overnight events: No acute events overnight. Patient was sitting up in bed, tearful and tremulous upon entering her room this afternoon. Her lights were off and her blinds were drawn. She stated she was extremely anxious about her surgery. She said she was \"afraid they were going to cut off her foot\" and that she \"would be in so much pain like last time. \" Patient complained of being very hot. She said her only pain was in her left hip at this time and asked for medications for pain. Patient was reassured that her pain after surgery would be addressed. At this time the patient began to cry. She got up to go to the restroom and after about 2 minutes came out of the room indicating she felt \"dope sick. \"     When asked what she meant, she said that she was going through withdrawal. Patient was reassured that she was receiving her methadone at the same dose she was prescribed outpatient. At this time she stated that she had found a box of fentanyl patches in her attic she had received from a pain clinic in [facility] a few years ago. She stated there were about 20 patches in the box. She said when she found the box she thought \"jackpot\" and started using one patch a day for pain and \"to relieve the terrible state she was in emotionally. \" When asked if she knew the dose of the patches she said \"150, I think. \" She said the last patch she used was Monday, though at first she said she could not remember. She stated she felt sick to her stomach and constipated, her last bowel movement looked like little kenia. She stated all of her symptoms started last night.  She stated that she did not want to get in trouble so she did not tell anyone when she was being admitted. Patient asked not to tell anyone else, but she was informed that the residents taking care of her would have to be notified so that she could receive proper care. She agreed to this and residents were notified immediately. Residents and myself returned to the room to reassess. Patient was still anxious. She was still sitting in the dark. Tremor was present, but would diminish when patient was distracted by the television. Pupils were dilated and reactive to light. Patient stated she was hot, sweaty, and anxious. Patient was cool to the touch. Most recent vitals HR 68, RR 18, /74 at 3 pm. Pulse in room at this time was normal.     Objective     Physical Exam:  · Vitals: /74   Pulse 68   Temp 97.9 °F (36.6 °C) (Temporal)   Resp 18   Ht 5' 9\" (1.753 m)   Wt 190 lb (86.2 kg)   LMP 08/28/2013   SpO2 92%   BMI 28.06 kg/m²     I & O - 24hr:     Intake/Output Summary (Last 24 hours) at 7/22/2021 1639  Last data filed at 7/22/2021 1447  Gross per 24 hour   Intake 20 ml   Output --   Net 20 ml   ·    · General Appearance: alert, appears stated age, cooperative, distracted and mild distress, anxious  · HEENT:  Head: Normocephalic, no lesions, without obvious abnormality. · Neck: supple, symmetrical, trachea midline  · Lung: clear to auscultation bilaterally  · Heart: regular rate and rhythm, S1, S2 normal, no murmur, click, rub or gallop  · Abdomen: soft, non-tender; bowel sounds normal; no masses,  no organomegaly  · Extremities:  edema 1+ Left Lower Extremity, 2+ Right Lower Extremity and right heel wound, bandaged  · Musculokeletal: No joint swelling, no muscle tenderness. ROM normal in all joints of extremities.    · Neurologic: Mental status: Alert, oriented, thought content surgery and pain focused, anxious  Subject  Pertinent Information & Imaging Studies, Consults   jalil  CBC with Differential:    Lab Results   Component Value Date    WBC 5.6 07/22/2021    RBC 4.79 07/22/2021    HGB 13.3 07/22/2021    HCT 43.0 07/22/2021     07/22/2021    MCV 89.8 07/22/2021    MCH 27.8 07/22/2021    MCHC 30.9 07/22/2021    RDW 15.1 07/22/2021    LYMPHOPCT 27.0 07/22/2021    MONOPCT 8.6 07/22/2021    BASOPCT 0.7 07/22/2021    MONOSABS 0.48 07/22/2021    LYMPHSABS 1.51 07/22/2021    EOSABS 0.12 07/22/2021    BASOSABS 0.04 07/22/2021     CMP:    Lab Results   Component Value Date     07/22/2021    K 4.4 07/22/2021    K 4.4 07/20/2021     07/22/2021    CO2 29 07/22/2021    BUN 11 07/22/2021    CREATININE 0.9 07/22/2021    GFRAA >60 07/22/2021    LABGLOM >60 07/22/2021    GLUCOSE 93 07/22/2021    PROT 7.7 02/08/2020    LABALBU 3.5 02/08/2020    CALCIUM 9.0 07/22/2021    BILITOT 0.4 02/08/2020    ALKPHOS 103 02/08/2020    AST 10 02/08/2020    ALT 8 02/08/2020       IMAGING:   Imaging Studies:    XR FOOT RIGHT (MIN 3 VIEWS)    Result Date: 7/20/2021  1. Amputation of the distal calcaneus. 2. Cortical irregularity likely related to erosive changes involving inferior aspect of the calcaneus. Findings could suggest osteomyelitis. 3. Skin ulceration involving plantar soft tissue beneath the calcaneus. 4. No obvious subcutaneous gas. PROCEDURES: Debridement and bone biopsy tomorrow      Notable Cultures:      Blood cultures   Blood Culture, Routine   Date Value Ref Range Status   07/20/2021 24 Hours no growth  Preliminary     Respiratory cultures No results found for: RESPCULTURE   Gram Stain Result   Date Value Ref Range Status   08/21/2019   Final    Gram stain performed on unspun fluid  Polymorphonuclear leukocytes not seen  Epithelial cells not seen  Rare gram positive rods Diphtheroid-like  Rare Gram positive cocci       Urine No results found for: LABURIN  Legionella No results found for: LABLEGI  C Diff PCR No results found for: CDIFPCR  Wound culture/abscess: No results for input(s): WNDABS in the last 72 hours.   Tip culture:No results for input(s): CXCATHTIP in the last 72 hours.    OXYGENATION: 98% room air    DIET: Adult regular, NPO midnight tonight        Resident's Assessment and Plan     Margot Blizzard is a 55 y.o. female with  has a past medical history of Abscess, Chronic pain, Chronic recurrent multifocal osteomyelitis of foot ([address] Hip fracture ([address] Hx of blood clots, Pressure injury of heel, stage 3 ([address] and Subclinical hypothyroidism. came here with CC   Chief Complaint   Patient presents with   Farooq Flow     Dr Harvey Holcomb sent in for admission. Has hip Fx on left, needs surgery but they wont do surgery until wound on right heel is healed up     Hip Pain        [id number] [facility] [place] is a 54 yo female who presented to the ED after visiting Dr Harvey Holcomb (ID) as an outpatient. Dr Harvey Holcomb had concerns for osteomyelitis of the right heel and requested the patient go to the emergency room for hospital admission. The patient stated that the wound is chronic in nature, but recently has gotten worse, with a foul odor coming from the wound. The patient was supposed to get clearance for a hip replacement, but stated she needed to get her heel taken care of before her surgeon would proceed with the surgery. In the ED her vitals were stable. She was given vancomycin and meropenem. Blood cultures and wound cultures were drawn and a MRSA screen was ordered. She was admitted to the hospital for further management of her chronic, nonhealing, right heel wound. Days since admission: 1    Consults:    Infectious disease   Podiatry     Assessment/ Plan:  1.  Osteomyelitis of right calcaneus 2/2 peripheral vascular disease vs neuropathy vs hypothyroidism    - chronic, nonhealing wound on right calcaneus - currently with foul smell, uses crutch on right side to walk, patient stated pain in heel intermittently painful   - A1C 6.5   - TSH 2.5   - X-ray of foot suggestive of osteomyelitis   - follow wound cultures, blood cultures   - ID consult - follow recommendations for antibiotics - currently on vancomycin and meropenem    - Podiatry consult - OR today for debridement and bone biopsy  2. Hx of hip fracture   - patient waiting to be cleared for surgery   - broke hip years ago after being thrown from horse, fell on ice again on same hip  3. Hx of COPD   - patient stated she is on symbicort at home and it works well   - continue brovana, pulmicort, duoneb  4. Hx of polysubstance abuse on methadone   - patient states methadone is for chronic pain, wants to make sure call is placed to [place] so she can continue to receive her methadone while in the hospital    - patient receives 65 mg methadone in the morning and 55 mg at night   - patient stated she has been using an old prescription of fentanyl patches at home, last use Monday, now feels \"dope sick\"    - obtain drug screen   - order fentanyl 25 mcg    - monitor for symptoms of withdrawal   5. Hx of subclinical hypothyroidism   -  TSH 2.9, free T4 0.98 from 8/8/2019    - TSH 2.5 this admission  6.  Hx of chronic pain   - continue methadone   - continue gabapentin   - fentanyl patch 25 mcg      PT/OT: not ordered at this time  DVT ppx: lovenox  GI ppx: diet    Code Status:   full      Yenny Witt, MS4  Attending physician: Dr. Rajesh Stone

## 2021-07-22 NOTE — PROGRESS NOTES
Pharmacy Consultation Note  (Antibiotic Dosing and Monitoring)    Initial consult date: 7/20/21  Consulting physician: Dr. Sasha Lozada  Drug(s): vancomycin  Indication: osteomyelitis      Age/  Gender Height Weight IBW Dosing weight  Allergy Information   56 y.o./female 5' 9\" (175.3 cm) 190 lb (86.2 kg)     Ideal body weight: 66.2 kg (145 lb 15.1 oz)  Adjusted ideal body weight: 74.2 kg (163 lb 9.1 oz)  74.2 kg  Ancef [cefazolin], Duricef [cefadroxil], and Iodine          Other anti-infectives Start date Stop date   Meropenem 7/20                     Date  Tmax WBC BUN/CR UOP CrCL  (mL/min) Drug/Dose Time   Given Level(s)   (Time) Comments   7/20 99.2 7.1  10/0.9 -- --  Vancomycin 1250 mg IV x 1 dose 1841     7/21 afebrile 5.5 10/0.9 -- 83 Vancomycin 1250 mg IV x 1 dose    Vancomycin 1000 mg IV q 12 hr 0704      (1900)  1900 dose not give due to loss of IV access   7/22 afebrile 5.6 11/0.9 -- 83 Vancomycin 1000 mg IV q 12 hr 1025 Vanco trough at 0648 is 9.8 mcg/mL                       Intake/Output Summary (Last 24 hours) at 7/22/2021 1130  Last data filed at 7/22/2021 1025  Gross per 24 hour   Intake 10 ml   Output --   Net 10 ml       Average urine output:    Cultures:    Site Date Result                    Recent Labs     07/20/21  2204   BLOODCULT2 24 Hours no growth        Historical Cultures:  Organism   Date Value Ref Range Status   02/08/2020 Haemophilus influenzae (A)  Final     Recent Labs     07/20/21  2200   BC 24 Hours no growth       Radiology:      Assessment:  · 55year old female on vancomycin for osteomyelitis  · Goal trough = 15 - 20 mcg/ml; Goal AUC/RICARDO 400-600  · 7/22: Scr stable at 0.9. Dose at 2300 on 7/21 not given due to IV access.  Level today of 9.8 mcg/mL is not a true trough     Plan:  · Cont vancomycin 1000 mg IV q 12 hr (est AUC/RICARDO 525, trough 17.2 mcg/mL)  · A vanco trough level will be obtained as needed to appropriately monitor    · Pharmacist will follow and monitor/adjust dosing as necessary    Lizbeth Velasquez, PharmD, BCPS 7/22/2021 11:30 AM   Phone: [phone]

## 2021-07-22 NOTE — CARE COORDINATION
Discharge plan is home with Micah Staton. The pt has had [facility] in the past and would like to use their services again, if possible. Referral made to [facility]. Awaiting ability to accept. Per ID, the pt wll need 6 weeks of IV antibiotics. Henrik Addison -[phone]  The Plan for Transition of Care is related to the following treatment goals: The Patient was provided with a choice of provider and agrees   with the discharge plan. [x] Yes [] No    Freedom of choice list was provided with basic dialogue that supports the patient's individualized plan of care/goals, treatment preferences and shares the quality data associated with the providers.  [x] Yes [] No

## 2021-07-22 NOTE — PROGRESS NOTES
Department of Podiatry  Progress Note    SUBJECTIVE:  Ms. [facility] was evaluated at bedside this afternoon for RLE ulcers, infected upon admission. No acute events overnight. Patient is very anxious and upset that surgery was cancelled this afternoon. It was explained that, due to her withdrawal we are unable to take her to surgery without increased risk and that the surgery will be post-poned to tomorrow, when stable.      OBJECTIVE:    Scheduled Meds:   lidocaine PF  5 mL Intradermal Once    sodium chloride flush  5-40 mL Intravenous 2 times per day    heparin flush  3 mL Intravenous 2 times per day    lidocaine PF  5 mL Intradermal Once    sodium chloride flush  5-40 mL Intravenous 2 times per day    heparin flush  3 mL Intravenous 2 times per day    ipratropium-albuterol  1 ampule Inhalation Q4H WA    vancomycin  1,000 mg Intravenous Q12H    methadone  55 mg Oral Nightly    methadone  65 mg Oral QAM AC    gabapentin  300 mg Oral BID    sodium chloride flush  10 mL Intravenous 2 times per day    Arformoterol Tartrate  15 mcg Nebulization BID    budesonide  0.5 mg Nebulization BID    enoxaparin  40 mg Subcutaneous Daily    ascorbic acid  500 mg Oral Daily     Continuous Infusions:   sodium chloride      sodium chloride      dextrose      sodium chloride       PRN Meds:.sodium chloride flush, sodium chloride, heparin flush, sodium chloride flush, sodium chloride, heparin flush, glucose, dextrose, glucagon (rDNA), dextrose, sodium chloride flush, sodium chloride, ondansetron **OR** ondansetron, polyethylene glycol, acetaminophen **OR** acetaminophen    Allergies   Allergen Reactions    Ancef [Cefazolin] Anaphylaxis    Duricef [Cefadroxil] Anaphylaxis    Iodine      Patient does not remember reaction       /74   Pulse 68   Temp 97.9 °F (36.6 °C) (Temporal)   Resp 18   Ht 5' 9\" (1.753 m)   Wt 190 lb (86.2 kg)   LMP 08/28/2013   SpO2 92%   BMI 28.06 kg/m²       EXAM:  -Dressings this afternoon were clean, dry and intact without strike through. No dishevelement  -No posterior calf pain on palpation b/l.   -No offloading noted     PHYSICAL EXAM AS OF 7/21/21:  Vascular Exam:  DP and PT pulses are palpable B/L. Skin temperature warm to cool to BLE from proximal to distal. Mild edema and erythema to R foot in comparison to contralateral.      Neuro Exam:  Gross and epicritic sensation intact B/L.      Dermatologic Exam:  Full thickness wound present to R plantar heel to level of subcutaneous tissue, approx 2.5cm in length. Mixed fibrogranular wound bed with mild serous drainage. No malodor appreciated. No fluctuance or crepitus. Wound does not track, tunnel, or probe deep; small amount of bleeding with probing of wound. Hyperkeratotic rim. No other wounds appreciated.      MSK: Evidence of previous partial calcanectomy to RLE. Mild pain to palpation of RLE wound. Muscle strength WNL B/L. Digital contractures to lesser digits B/L.        Scheduled Meds:   lidocaine PF  5 mL Intradermal Once    sodium chloride flush  5-40 mL Intravenous 2 times per day    heparin flush  3 mL Intravenous 2 times per day    lidocaine PF  5 mL Intradermal Once    sodium chloride flush  5-40 mL Intravenous 2 times per day    heparin flush  3 mL Intravenous 2 times per day    ipratropium-albuterol  1 ampule Inhalation Q4H WA    vancomycin  1,000 mg Intravenous Q12H    methadone  55 mg Oral Nightly    methadone  65 mg Oral QAM AC    gabapentin  300 mg Oral BID    sodium chloride flush  10 mL Intravenous 2 times per day    Arformoterol Tartrate  15 mcg Nebulization BID    budesonide  0.5 mg Nebulization BID    enoxaparin  40 mg Subcutaneous Daily    ascorbic acid  500 mg Oral Daily     Continuous Infusions:   sodium chloride      sodium chloride      dextrose      sodium chloride       PRN Meds:.sodium chloride flush, sodium chloride, heparin flush, sodium chloride flush, sodium chloride, heparin flush, glucose, dextrose, glucagon (rDNA), dextrose, sodium chloride flush, sodium chloride, ondansetron **OR** ondansetron, polyethylene glycol, acetaminophen **OR** acetaminophen    RADIOLOGY:  XR CHEST PORTABLE   Final Result   Normal chest         VL LOWER EXTREMITY ARTERIAL SEGMENTAL PRESSURES W PPG BILATERAL   Final Result      XR FOOT RIGHT (MIN 3 VIEWS)   Final Result   1. Amputation of the distal calcaneus. 2. Cortical irregularity likely related to erosive changes involving inferior   aspect of the calcaneus. Findings could suggest osteomyelitis. 3. Skin ulceration involving plantar soft tissue beneath the calcaneus. 4. No obvious subcutaneous gas. /74   Pulse 68   Temp 97.9 °F (36.6 °C) (Temporal)   Resp 18   Ht 5' 9\" (1.753 m)   Wt 190 lb (86.2 kg)   LMP 08/28/2013   SpO2 92%   BMI 28.06 kg/m²     LABS:    Recent Labs     07/21/21  1030 07/22/21  0648   WBC 5.5 5.6   HGB 13.8 13.3   HCT 44.9 43.0    307        Recent Labs     07/22/21  0648      K 4.4      CO2 29   BUN 11   CREATININE 0.9        Recent Labs     07/22/21  0648   INR 1.1   APTT 33.3         ASSESSMENT:  1.RLE Chronic ulcer-POA,Infected   2. RLE Calc OM   3. RLE partial calcanectomy  4. Withdrawls         PLAN:  - All labs, images and charts reviewed and evaluated. - Patient was seen and evaluated at bedside.   - Dressing applied to RLE yesterday: Xeroform, DSD  - NWB RLE in efforts to offload wound. - Prevalon boots ordered  - Plan to take patient to OR tomorrow morning for debridement, bone biopsy. NPO at midnight 7/23/21 and treatment consent placed. Patient understood the reasoning for re-scheduling: her withdrawal. Nursing aware. - XR reviewed: 1. Possible OM.     2.No soft tissue gas  - Antibiotics as per ID:Vanc  - NIVS ordered to assess vascular status: Normal   - Discussed with Dr.Fahim Eldon Abbott   7/22/21  [id number]

## 2021-07-22 NOTE — PROGRESS NOTES
[facility]  Internal Medicine Residency / [address]    Attending Physician Statement  I have discussed the case, including pertinent history and exam findings with the resident and the team.  I have seen and examined the patient and the key elements of the encounter have been performed by me. I have also personally reviewed imaging studies and labs. I agree with the assessment, plan and orders as documented by the resident. Continues to have heel pain - for debridement today. Methadone dose confirmed and resumed yesterday. She is also on gabapentin. Some tremors noted today. She was on low dose BB in the past for essential tremors. Assessment:  1. Chronic nonhealing R ulcer wound, now with osteomyelitis on imaging  2. COPD, stable  3. Subclinical hypothyroidism  4. Chronic hip fracture -- surgery down the line  5. Hx of polysubstance abuse, chronic pain -  on methadone  6. Essential tremors      Plan: For debridement today - follow cultures  Continue antibiotics  Continue methadone, gabapentin  PICC line for 6 weeks antibiotics  May resume low dose BB trial, monitor respiratory status/wheezing. Was on 3L NC overnight but doing well on RA this am.         Remainder of medical problems as per resident note. Denise Mitchell MD  Internal Medicine Residency Faculty

## 2021-07-22 NOTE — PROGRESS NOTES
picc Placement 7/22/2021    Product number: [id number]   Lot Number: [id number]      Ultrasound: yes   Left Basilic vein:                Upper Arm Circumference: 31cm    Size: 5.5    Exposed Length: 3cm    Internal Length: 39cm   Cut: 13cm   Vein Measurement: 0.56cm    Adan Vickers RN  7/22/2021  1:34 PM      Zee obtained.

## 2021-07-23 ENCOUNTER — ANESTHESIA (OUTPATIENT)
Dept: OPERATING ROOM | Age: 56
DRG: 504 | End: 2021-07-23
Payer: OTHER GOVERNMENT

## 2021-07-23 ENCOUNTER — ANESTHESIA EVENT (OUTPATIENT)
Dept: OPERATING ROOM | Age: 56
DRG: 504 | End: 2021-07-23
Payer: OTHER GOVERNMENT

## 2021-07-23 VITALS — DIASTOLIC BLOOD PRESSURE: 64 MMHG | OXYGEN SATURATION: 98 % | SYSTOLIC BLOOD PRESSURE: 78 MMHG

## 2021-07-23 PROCEDURE — 87077 CULTURE AEROBIC IDENTIFY: CPT

## 2021-07-23 PROCEDURE — 87147 CULTURE TYPE IMMUNOLOGIC: CPT

## 2021-07-23 PROCEDURE — 2700000000 HC OXYGEN THERAPY PER DAY

## 2021-07-23 PROCEDURE — 3600000012 HC SURGERY LEVEL 2 ADDTL 15MIN: Performed by: PODIATRIST

## 2021-07-23 PROCEDURE — 2709999900 HC NON-CHARGEABLE SUPPLY: Performed by: PODIATRIST

## 2021-07-23 PROCEDURE — 6360000002 HC RX W HCPCS: Performed by: PODIATRIST

## 2021-07-23 PROCEDURE — 3700000001 HC ADD 15 MINUTES (ANESTHESIA): Performed by: PODIATRIST

## 2021-07-23 PROCEDURE — 6360000002 HC RX W HCPCS: Performed by: INTERNAL MEDICINE

## 2021-07-23 PROCEDURE — 7100000001 HC PACU RECOVERY - ADDTL 15 MIN: Performed by: PODIATRIST

## 2021-07-23 PROCEDURE — 3700000000 HC ANESTHESIA ATTENDED CARE: Performed by: PODIATRIST

## 2021-07-23 PROCEDURE — 6370000000 HC RX 637 (ALT 250 FOR IP): Performed by: PODIATRIST

## 2021-07-23 PROCEDURE — 0QBL0ZZ EXCISION OF RIGHT TARSAL, OPEN APPROACH: ICD-10-PCS | Performed by: PODIATRIST

## 2021-07-23 PROCEDURE — 88307 TISSUE EXAM BY PATHOLOGIST: CPT

## 2021-07-23 PROCEDURE — 2580000003 HC RX 258

## 2021-07-23 PROCEDURE — 1200000000 HC SEMI PRIVATE

## 2021-07-23 PROCEDURE — 88305 TISSUE EXAM BY PATHOLOGIST: CPT

## 2021-07-23 PROCEDURE — 2580000003 HC RX 258: Performed by: PODIATRIST

## 2021-07-23 PROCEDURE — 87186 SC STD MICRODIL/AGAR DIL: CPT

## 2021-07-23 PROCEDURE — 87206 SMEAR FLUORESCENT/ACID STAI: CPT

## 2021-07-23 PROCEDURE — 6360000002 HC RX W HCPCS: Performed by: ANESTHESIOLOGY

## 2021-07-23 PROCEDURE — 7100000000 HC PACU RECOVERY - FIRST 15 MIN: Performed by: PODIATRIST

## 2021-07-23 PROCEDURE — 87205 SMEAR GRAM STAIN: CPT

## 2021-07-23 PROCEDURE — 87075 CULTR BACTERIA EXCEPT BLOOD: CPT

## 2021-07-23 PROCEDURE — 87015 SPECIMEN INFECT AGNT CONCNTJ: CPT

## 2021-07-23 PROCEDURE — 87102 FUNGUS ISOLATION CULTURE: CPT

## 2021-07-23 PROCEDURE — 2500000003 HC RX 250 WO HCPCS

## 2021-07-23 PROCEDURE — 94640 AIRWAY INHALATION TREATMENT: CPT

## 2021-07-23 PROCEDURE — 99233 SBSQ HOSP IP/OBS HIGH 50: CPT | Performed by: INTERNAL MEDICINE

## 2021-07-23 PROCEDURE — 2500000003 HC RX 250 WO HCPCS: Performed by: PODIATRIST

## 2021-07-23 PROCEDURE — 87070 CULTURE OTHR SPECIMN AEROBIC: CPT

## 2021-07-23 PROCEDURE — 3600000002 HC SURGERY LEVEL 2 BASE: Performed by: PODIATRIST

## 2021-07-23 PROCEDURE — 88311 DECALCIFY TISSUE: CPT

## 2021-07-23 PROCEDURE — 2580000003 HC RX 258: Performed by: INTERNAL MEDICINE

## 2021-07-23 PROCEDURE — 87116 MYCOBACTERIA CULTURE: CPT

## 2021-07-23 PROCEDURE — 6360000002 HC RX W HCPCS

## 2021-07-23 RX ORDER — MIDAZOLAM HYDROCHLORIDE 1 MG/ML
INJECTION INTRAMUSCULAR; INTRAVENOUS PRN
Status: DISCONTINUED | OUTPATIENT
Start: 2021-07-23 | End: 2021-07-23 | Stop reason: SDUPTHER

## 2021-07-23 RX ORDER — BUPIVACAINE HYDROCHLORIDE 5 MG/ML
INJECTION, SOLUTION EPIDURAL; INTRACAUDAL PRN
Status: DISCONTINUED | OUTPATIENT
Start: 2021-07-23 | End: 2021-07-23 | Stop reason: ALTCHOICE

## 2021-07-23 RX ORDER — FENTANYL CITRATE 50 UG/ML
INJECTION, SOLUTION INTRAMUSCULAR; INTRAVENOUS PRN
Status: DISCONTINUED | OUTPATIENT
Start: 2021-07-23 | End: 2021-07-23 | Stop reason: SDUPTHER

## 2021-07-23 RX ORDER — GLYCOPYRROLATE 1 MG/5 ML
SYRINGE (ML) INTRAVENOUS PRN
Status: DISCONTINUED | OUTPATIENT
Start: 2021-07-23 | End: 2021-07-23 | Stop reason: SDUPTHER

## 2021-07-23 RX ORDER — PROMETHAZINE HYDROCHLORIDE 25 MG/ML
6.25 INJECTION, SOLUTION INTRAMUSCULAR; INTRAVENOUS EVERY 10 MIN PRN
Status: DISCONTINUED | OUTPATIENT
Start: 2021-07-23 | End: 2021-07-23 | Stop reason: HOSPADM

## 2021-07-23 RX ORDER — MORPHINE SULFATE 2 MG/ML
1 INJECTION, SOLUTION INTRAMUSCULAR; INTRAVENOUS EVERY 5 MIN PRN
Status: DISCONTINUED | OUTPATIENT
Start: 2021-07-23 | End: 2021-07-23 | Stop reason: HOSPADM

## 2021-07-23 RX ORDER — MEPERIDINE HYDROCHLORIDE 25 MG/ML
12.5 INJECTION INTRAMUSCULAR; INTRAVENOUS; SUBCUTANEOUS EVERY 5 MIN PRN
Status: DISCONTINUED | OUTPATIENT
Start: 2021-07-23 | End: 2021-07-23 | Stop reason: HOSPADM

## 2021-07-23 RX ORDER — SODIUM CHLORIDE 9 MG/ML
INJECTION, SOLUTION INTRAVENOUS CONTINUOUS PRN
Status: DISCONTINUED | OUTPATIENT
Start: 2021-07-23 | End: 2021-07-23 | Stop reason: SDUPTHER

## 2021-07-23 RX ORDER — HYDROXYZINE HYDROCHLORIDE 50 MG/ML
50 INJECTION, SOLUTION INTRAMUSCULAR ONCE
Status: COMPLETED | OUTPATIENT
Start: 2021-07-23 | End: 2021-07-23

## 2021-07-23 RX ORDER — HYDROCODONE BITARTRATE AND ACETAMINOPHEN 5; 325 MG/1; MG/1
1 TABLET ORAL PRN
Status: DISCONTINUED | OUTPATIENT
Start: 2021-07-23 | End: 2021-07-23 | Stop reason: HOSPADM

## 2021-07-23 RX ORDER — HYDROCODONE BITARTRATE AND ACETAMINOPHEN 5; 325 MG/1; MG/1
2 TABLET ORAL PRN
Status: DISCONTINUED | OUTPATIENT
Start: 2021-07-23 | End: 2021-07-23 | Stop reason: HOSPADM

## 2021-07-23 RX ORDER — PROPOFOL 10 MG/ML
INJECTION, EMULSION INTRAVENOUS CONTINUOUS PRN
Status: DISCONTINUED | OUTPATIENT
Start: 2021-07-23 | End: 2021-07-23 | Stop reason: SDUPTHER

## 2021-07-23 RX ORDER — MORPHINE SULFATE 2 MG/ML
2 INJECTION, SOLUTION INTRAMUSCULAR; INTRAVENOUS EVERY 5 MIN PRN
Status: DISCONTINUED | OUTPATIENT
Start: 2021-07-23 | End: 2021-07-23 | Stop reason: HOSPADM

## 2021-07-23 RX ORDER — KETAMINE HCL IN NACL, ISO-OSM 100MG/10ML
SYRINGE (ML) INJECTION PRN
Status: DISCONTINUED | OUTPATIENT
Start: 2021-07-23 | End: 2021-07-23 | Stop reason: SDUPTHER

## 2021-07-23 RX ADMIN — Medication 10 ML: at 02:55

## 2021-07-23 RX ADMIN — Medication 25 MG: at 09:11

## 2021-07-23 RX ADMIN — FENTANYL CITRATE 50 MCG: 50 INJECTION, SOLUTION INTRAMUSCULAR; INTRAVENOUS at 09:23

## 2021-07-23 RX ADMIN — SODIUM CHLORIDE: 9 INJECTION, SOLUTION INTRAVENOUS at 09:08

## 2021-07-23 RX ADMIN — OXYCODONE HYDROCHLORIDE AND ACETAMINOPHEN 500 MG: 500 TABLET ORAL at 12:47

## 2021-07-23 RX ADMIN — SODIUM CHLORIDE, PRESERVATIVE FREE 300 UNITS: 5 INJECTION INTRAVENOUS at 12:47

## 2021-07-23 RX ADMIN — FENTANYL CITRATE 50 MCG: 50 INJECTION, SOLUTION INTRAMUSCULAR; INTRAVENOUS at 09:11

## 2021-07-23 RX ADMIN — Medication 55 MG: at 22:13

## 2021-07-23 RX ADMIN — MIDAZOLAM 2 MG: 1 INJECTION INTRAMUSCULAR; INTRAVENOUS at 09:04

## 2021-07-23 RX ADMIN — FENTANYL CITRATE 25 MCG: 0.05 INJECTION, SOLUTION INTRAMUSCULAR; INTRAVENOUS at 12:48

## 2021-07-23 RX ADMIN — HYDROMORPHONE HYDROCHLORIDE 0.5 MG: 1 INJECTION, SOLUTION INTRAMUSCULAR; INTRAVENOUS; SUBCUTANEOUS at 10:34

## 2021-07-23 RX ADMIN — CLONIDINE HYDROCHLORIDE 0.1 MG: 0.1 TABLET ORAL at 12:48

## 2021-07-23 RX ADMIN — FENTANYL CITRATE 25 MCG: 0.05 INJECTION, SOLUTION INTRAMUSCULAR; INTRAVENOUS at 02:54

## 2021-07-23 RX ADMIN — SODIUM CHLORIDE, PRESERVATIVE FREE 300 UNITS: 5 INJECTION INTRAVENOUS at 20:12

## 2021-07-23 RX ADMIN — PROPRANOLOL HYDROCHLORIDE 10 MG: 10 TABLET ORAL at 20:30

## 2021-07-23 RX ADMIN — PROPOFOL 75 MCG/KG/MIN: 10 INJECTION, EMULSION INTRAVENOUS at 09:11

## 2021-07-23 RX ADMIN — CLONIDINE HYDROCHLORIDE 0.1 MG: 0.1 TABLET ORAL at 20:32

## 2021-07-23 RX ADMIN — SODIUM CHLORIDE, PRESERVATIVE FREE 300 UNITS: 5 INJECTION INTRAVENOUS at 20:13

## 2021-07-23 RX ADMIN — SODIUM CHLORIDE, PRESERVATIVE FREE 10 ML: 5 INJECTION INTRAVENOUS at 15:10

## 2021-07-23 RX ADMIN — Medication 10 ML: at 20:15

## 2021-07-23 RX ADMIN — Medication 0.1 MG: at 09:10

## 2021-07-23 RX ADMIN — ACETAMINOPHEN 650 MG: 325 TABLET ORAL at 15:11

## 2021-07-23 RX ADMIN — VANCOMYCIN HYDROCHLORIDE 1000 MG: 1 INJECTION, POWDER, LYOPHILIZED, FOR SOLUTION INTRAVENOUS at 02:54

## 2021-07-23 RX ADMIN — GABAPENTIN 300 MG: 300 CAPSULE ORAL at 20:31

## 2021-07-23 RX ADMIN — VANCOMYCIN HYDROCHLORIDE 1000 MG: 1 INJECTION, POWDER, LYOPHILIZED, FOR SOLUTION INTRAVENOUS at 15:11

## 2021-07-23 RX ADMIN — Medication 10 ML: at 12:46

## 2021-07-23 RX ADMIN — FENTANYL CITRATE 25 MCG: 0.05 INJECTION, SOLUTION INTRAMUSCULAR; INTRAVENOUS at 05:03

## 2021-07-23 RX ADMIN — Medication 10 ML: at 20:14

## 2021-07-23 RX ADMIN — FENTANYL CITRATE 25 MCG: 0.05 INJECTION, SOLUTION INTRAMUSCULAR; INTRAVENOUS at 20:16

## 2021-07-23 RX ADMIN — FENTANYL CITRATE 25 MCG: 0.05 INJECTION, SOLUTION INTRAMUSCULAR; INTRAVENOUS at 15:10

## 2021-07-23 RX ADMIN — GABAPENTIN 300 MG: 300 CAPSULE ORAL at 12:47

## 2021-07-23 RX ADMIN — PROPRANOLOL HYDROCHLORIDE 10 MG: 10 TABLET ORAL at 12:47

## 2021-07-23 RX ADMIN — HYDROXYZINE HYDROCHLORIDE 50 MG: 50 INJECTION, SOLUTION INTRAMUSCULAR at 17:15

## 2021-07-23 RX ADMIN — IPRATROPIUM BROMIDE AND ALBUTEROL SULFATE 1 AMPULE: .5; 3 SOLUTION RESPIRATORY (INHALATION) at 15:56

## 2021-07-23 ASSESSMENT — PAIN DESCRIPTION - PAIN TYPE
TYPE: ACUTE PAIN;CHRONIC PAIN
TYPE: CHRONIC PAIN
TYPE: CHRONIC PAIN
TYPE: ACUTE PAIN;CHRONIC PAIN
TYPE: ACUTE PAIN;CHRONIC PAIN

## 2021-07-23 ASSESSMENT — PULMONARY FUNCTION TESTS
PIF_VALUE: 1
PIF_VALUE: 0
PIF_VALUE: 0
PIF_VALUE: 1
PIF_VALUE: 0
PIF_VALUE: 0
PIF_VALUE: 1

## 2021-07-23 ASSESSMENT — LIFESTYLE VARIABLES: SMOKING_STATUS: 1

## 2021-07-23 ASSESSMENT — PAIN DESCRIPTION - LOCATION
LOCATION: FOOT
LOCATION: FOOT;HIP
LOCATION: GENERALIZED
LOCATION: FOOT;HIP
LOCATION: FOOT
LOCATION: FOOT

## 2021-07-23 ASSESSMENT — PAIN SCALES - GENERAL
PAINLEVEL_OUTOF10: 10
PAINLEVEL_OUTOF10: 8
PAINLEVEL_OUTOF10: 0
PAINLEVEL_OUTOF10: 10
PAINLEVEL_OUTOF10: 8
PAINLEVEL_OUTOF10: 0
PAINLEVEL_OUTOF10: 2
PAINLEVEL_OUTOF10: 2
PAINLEVEL_OUTOF10: 0
PAINLEVEL_OUTOF10: 0
PAINLEVEL_OUTOF10: 3
PAINLEVEL_OUTOF10: 8

## 2021-07-23 ASSESSMENT — PAIN DESCRIPTION - ORIENTATION
ORIENTATION: LEFT;RIGHT
ORIENTATION: RIGHT
ORIENTATION: RIGHT
ORIENTATION: RIGHT;LEFT
ORIENTATION: RIGHT;LEFT

## 2021-07-23 ASSESSMENT — PAIN DESCRIPTION - ONSET
ONSET: ON-GOING

## 2021-07-23 ASSESSMENT — PAIN DESCRIPTION - PROGRESSION
CLINICAL_PROGRESSION: NOT CHANGED
CLINICAL_PROGRESSION: NOT CHANGED
CLINICAL_PROGRESSION: GRADUALLY IMPROVING
CLINICAL_PROGRESSION: NOT CHANGED
CLINICAL_PROGRESSION: NOT CHANGED

## 2021-07-23 ASSESSMENT — PAIN DESCRIPTION - FREQUENCY
FREQUENCY: CONTINUOUS

## 2021-07-23 ASSESSMENT — PAIN DESCRIPTION - DESCRIPTORS
DESCRIPTORS: ACHING;SORE;THROBBING
DESCRIPTORS: SORE;THROBBING
DESCRIPTORS: ACHING;SORE;THROBBING
DESCRIPTORS: ACHING;CONSTANT;DISCOMFORT
DESCRIPTORS: SORE;THROBBING
DESCRIPTORS: ACHING;DISCOMFORT;DULL

## 2021-07-23 ASSESSMENT — ENCOUNTER SYMPTOMS: SHORTNESS OF BREATH: 1

## 2021-07-23 NOTE — OP NOTE
[address]                  [address] [facility],  [facility]                                OPERATIVE REPORT    PATIENT NAME: Markie Wilburn                     :        1965  MED REC NO:   [id number]                            ROOM:       5409  ACCOUNT NO:   [de-identified]                           ADMIT DATE: 2021  PROVIDER:     Lupe Mota DPM    DATE OF PROCEDURE:  2021    SURGEON:  Lupe Mota DPM    ASSISTANT:  Radhames Kline, PGY-2    PREOPERATIVE DIAGNOSES:  1. Right calcaneus osteomyelitis. 2.  Right heel ulceration with necrosis of soft tissue and bone. 3.  Insulin-dependent diabetes mellitus with neuropathy. POSTOPERATIVE DIAGNOSES:  1. Right calcaneus osteomyelitis. 2.  Right heel ulceration with necrosis of soft tissue and bone. 3.  Insulin-dependent diabetes mellitus with neuropathy. PROCEDURES:  1. Incision and drainage of right calcaneus bone cortex. 2.  Excisional wound debridement of right heel to and through level of  the bone, total measurement is 7 cm x 4 cm x 1.5 cm in dimension. 3.  Application of wound VAC negative pressure therapy. ANESTHESIA:  Monitored anesthesia care. ESTIMATED BLOOD LOSS:  Minimal.    COMPLICATIONS:  None. SPECIMEN OBTAINED:  Right calcaneus bone and right heel ulceration. INTRAOPERATIVE FINDINGS:  Necrosis, soft tissue and bone. MATERIALS USED:  3-0 nylon suture, wound VAC negative pressure therapy. INDICATIONS:  This is a pleasant 50-year-old female presented today for  incision and drainage of right foot. The patient has been counseled on  the nature of the problem, proposed course of the procedure, potential  benefits, risks, complications, and convalescence in detail. All  questions have been answered to her apparent satisfaction. No  guarantees have been given as to the outcome of the procedure.     DESCRIPTION OF PROCEDURE:  Under mild sedation, the patient was brought  to the operating room and placed on the operating table in a supine  position. The right lower extremity was scrubbed, prepped, and draped  in the usual sterile fashion. At this time, using a #10-blade, I  performed a sharp excisional debridement of wound to and through level  of deep fascia and muscle and bone. Tissue was sent off for pathologic  and microbiologic examination. At this point, the bone cortex with  _____ was then incised through the curette and osteotome and mallet,  this bone was sent off for pathologic and microbiologic examination. At  this point, _____ copious amount of normal saline. Post-irrigation,  applied the wound VAC negative pressure therapy set at 140 mmHg  continuous with excellent seal maintained. Postoperative bandage was  then applied. The patient tolerated the procedure and anesthesia well in apparent  satisfactory condition with vital signs stable, vascular status intact  to the right lower extremity. The patient was transferred to PACU for  further monitoring prior to admission to the nursing floor.         Randy Camarillo DPM    D: 07/23/2021 9:34:20       T: 07/23/2021 10:24:33     [email or web address]  Job#: [id number]     Doc#: [id number]    CC:

## 2021-07-23 NOTE — PROGRESS NOTES
[facility]  Internal Medicine Residency Program  Progress Note - House Team     Patient:  Margot Blizzard 55 y.o. female MRN: [id number]     Date of Service: 7/23/2021     CC: nonhealing right heel wound    Days since admission: 2    Subjective     Overnight events: No acute events overnight. Patient resting comfortably in bed. She stated she finally slept well overnight. She feels much better. She feels less anxious. She stated she is ready to get her surgery. She said she was in no pain. She indicated that she had a bowel movement last night but it was little round balls. She stated she was not feeling short of breath. She was not experiencing abdominal pain. She stated her hip pain was much improved compared to yesterday. She had no questions or concerns at that time. She just wanted to know when she would have surgery. Objective     Physical Exam:  · Vitals: /68   Pulse 83   Temp 97.4 °F (36.3 °C) (Temporal)   Resp 17   Ht 5' 9\" (1.753 m)   Wt 190 lb (86.2 kg)   LMP 08/28/2013   SpO2 97%   BMI 28.06 kg/m²     I & O - 24hr:     Intake/Output Summary (Last 24 hours) at 7/23/2021 8546  Last data filed at 7/23/2021 0553  Gross per 24 hour   Intake 290 ml   Output --   Net 290 ml   ·    · General Appearance: alert, appears stated age and cooperative  · HEENT:  Head: Normocephalic, no lesions, without obvious abnormality. · Neck: supple, symmetrical, trachea midline  · Lung: wheezes RLL  · Heart: regular rate and rhythm, S1, S2 normal, no murmur, click, rub or gallop  · Abdomen: soft, non-tender; bowel sounds normal; no masses,  no organomegaly  · Extremities:  edema 1+ Left Lower Extremity, 2+ Right Lower Extremity and right heel wound, bandaged  · Musculokeletal: No joint swelling, no muscle tenderness. ROM normal in all joints of extremities.    · Neurologic: Mental status: Alert, oriented, thought content appropriate  Subject  Pertinent Information & Imaging Studies, Consults   jalil  CBC with Differential:    Lab Results   Component Value Date    WBC 5.6 07/22/2021    RBC 4.79 07/22/2021    HGB 13.3 07/22/2021    HCT 43.0 07/22/2021     07/22/2021    MCV 89.8 07/22/2021    MCH 27.8 07/22/2021    MCHC 30.9 07/22/2021    RDW 15.1 07/22/2021    LYMPHOPCT 27.0 07/22/2021    MONOPCT 8.6 07/22/2021    BASOPCT 0.7 07/22/2021    MONOSABS 0.48 07/22/2021    LYMPHSABS 1.51 07/22/2021    EOSABS 0.12 07/22/2021    BASOSABS 0.04 07/22/2021     CMP:    Lab Results   Component Value Date     07/22/2021    K 4.4 07/22/2021    K 4.4 07/20/2021     07/22/2021    CO2 29 07/22/2021    BUN 11 07/22/2021    CREATININE 0.9 07/22/2021    GFRAA >60 07/22/2021    LABGLOM >60 07/22/2021    GLUCOSE 93 07/22/2021    PROT 7.7 02/08/2020    LABALBU 3.5 02/08/2020    CALCIUM 9.0 07/22/2021    BILITOT 0.4 02/08/2020    ALKPHOS 103 02/08/2020    AST 10 02/08/2020    ALT 8 02/08/2020       IMAGING:   Imaging Studies:    XR FOOT RIGHT (MIN 3 VIEWS)    Result Date: 7/20/2021  1. Amputation of the distal calcaneus. 2. Cortical irregularity likely related to erosive changes involving inferior aspect of the calcaneus. Findings could suggest osteomyelitis. 3. Skin ulceration involving plantar soft tissue beneath the calcaneus. 4. No obvious subcutaneous gas.             PROCEDURES: Debridement and bone biopsy tomorrow      Notable Cultures:      Blood cultures   Blood Culture, Routine   Date Value Ref Range Status   07/20/2021 24 Hours no growth  Preliminary     Respiratory cultures No results found for: RESPCULTURE   Gram Stain Result   Date Value Ref Range Status   08/21/2019   Final    Gram stain performed on unspun fluid  Polymorphonuclear leukocytes not seen  Epithelial cells not seen  Rare gram positive rods Diphtheroid-like  Rare Gram positive cocci       Urine No results found for: LABURIN  Legionella No results found for: LABLEGI  C Diff PCR No results found for: CDIFPCR  Wound culture/abscess:   Recent Labs     07/21/21  0537   WNDABS Growth present, evaluating for:  Mixed Gram positive organisms       Tip culture:No results for input(s): CXCATHTIP in the last 72 hours. OXYGENATION: 98% room air    DIET: Adult regular, NPO midnight tonight        Resident's Assessment and Plan     Samm Rahman is a 55 y.o. female with  has a past medical history of Abscess, Chronic pain, Chronic recurrent multifocal osteomyelitis of foot ([address] Hip fracture ([address] Hx of blood clots, Pressure injury of heel, stage 3 ([address] and Subclinical hypothyroidism. came here with CC   Chief Complaint   Patient presents with   Shawn Smith sent in for admission. Has hip Fx on left, needs surgery but they wont do surgery until wound on right heel is healed up     Hip Pain        [id number] [facility]: Ms Sommers is a 56 yo female who presented to the ED after visiting Dr Ynes Smith (ID) as an outpatient. Dr Ynes Smith had concerns for osteomyelitis of the right heel and requested the patient go to the emergency room for hospital admission. The patient stated that the wound is chronic in nature, but recently has gotten worse, with a foul odor coming from the wound. The patient was supposed to get clearance for a hip replacement, but stated she needed to get her heel taken care of before her surgeon would proceed with the surgery. In the ED her vitals were stable. She was given vancomycin and meropenem. Blood cultures and wound cultures were drawn and a MRSA screen was ordered. She was admitted to the hospital for further management of her chronic, nonhealing, right heel wound. Days since admission: 1    Consults:    Infectious disease   Podiatry     Assessment/ Plan:  1.  Osteomyelitis of right calcaneus 2/2 peripheral vascular disease vs neuropathy vs hypothyroidism    - chronic, nonhealing wound on right calcaneus - currently with foul smell, uses crutch on right side to walk, patient stated pain

## 2021-07-23 NOTE — PROGRESS NOTES
[facility]  Internal Medicine Residency Program  Progress Note - House Team     Patient:  Melanie Trotter 55 y.o. female MRN: [id number]     Date of Service: 7/23/2021     CC: right calcaneal ulcer   Overnight events: No overnight events    Subjective     Patient was seen and examined this morning at bedside in no acute distress. She slept well last night. Her anxiety, tremor, sweating and restlessness resolved. She did not complaint about anything. Her Bp 124/68 today. Her Urine toxicology screen shows methadone and fentanyl positive but urine was collected 2 hours after giving the fentanyl. Objective     Physical Exam:  · Vitals: /68   Pulse 83   Temp 97.4 °F (36.3 °C) (Temporal)   Resp 17   Ht 5' 9\" (1.753 m)   Wt 190 lb (86.2 kg)   LMP 08/28/2013   SpO2 97%   BMI 28.06 kg/m²     · I & O - 24hr: No intake/output data recorded. · General Appearance: alert, appears stated age and cooperative  · HEENT:  Head: Normocephalic, no lesions, without obvious abnormality. · Neck: no adenopathy, no carotid bruit, no JVD, supple, symmetrical, trachea midline and thyroid not enlarged, symmetric, no tenderness/mass/nodules  · Lung: Wheezing present on the left loser lung; B?L breath sound present. · Heart: regular rate and rhythm, S1, S2 normal, no murmur, click, rub or gallop  · Abdomen: soft, non-tender; bowel sounds normal; no masses,  no organomegaly  · Extremities:  extremities normal, atraumatic, no cyanosis or edema  · Musculokeletal: right calcaneal ulcer, bone can be seen, red, swollen and tender ankle joint.   · Neurologic: Mental status: Alert, oriented, thought content appropriate  Subject  Pertinent Labs & Imaging Studies   jalil  CBC:   Lab Results   Component Value Date    WBC 5.6 07/22/2021    RBC 4.79 07/22/2021    HGB 13.3 07/22/2021    HCT 43.0 07/22/2021    MCV 89.8 07/22/2021    MCH 27.8 07/22/2021    MCHC 30.9 07/22/2021    RDW 15.1 07/22/2021     07/22/2021 MPV 9.7 07/22/2021     CBC with Differential:    Lab Results   Component Value Date    WBC 5.6 07/22/2021    RBC 4.79 07/22/2021    HGB 13.3 07/22/2021    HCT 43.0 07/22/2021     07/22/2021    MCV 89.8 07/22/2021    MCH 27.8 07/22/2021    MCHC 30.9 07/22/2021    RDW 15.1 07/22/2021    LYMPHOPCT 27.0 07/22/2021    MONOPCT 8.6 07/22/2021    BASOPCT 0.7 07/22/2021    MONOSABS 0.48 07/22/2021    LYMPHSABS 1.51 07/22/2021    EOSABS 0.12 07/22/2021    BASOSABS 0.04 07/22/2021     WBC:    Lab Results   Component Value Date    WBC 5.6 07/22/2021     Platelets:    Lab Results   Component Value Date     07/22/2021     Hemoglobin/Hematocrit:    Lab Results   Component Value Date    HGB 13.3 07/22/2021    HCT 43.0 07/22/2021     CMP:    Lab Results   Component Value Date     07/22/2021    K 4.4 07/22/2021    K 4.4 07/20/2021     07/22/2021    CO2 29 07/22/2021    BUN 11 07/22/2021    CREATININE 0.9 07/22/2021    GFRAA >60 07/22/2021    LABGLOM >60 07/22/2021    GLUCOSE 93 07/22/2021    PROT 7.7 02/08/2020    LABALBU 3.5 02/08/2020    CALCIUM 9.0 07/22/2021    BILITOT 0.4 02/08/2020    ALKPHOS 103 02/08/2020    AST 10 02/08/2020    ALT 8 02/08/2020     BMP:    Lab Results   Component Value Date     07/22/2021    K 4.4 07/22/2021    K 4.4 07/20/2021     07/22/2021    CO2 29 07/22/2021    BUN 11 07/22/2021    LABALBU 3.5 02/08/2020    CREATININE 0.9 07/22/2021    CALCIUM 9.0 07/22/2021    GFRAA >60 07/22/2021    LABGLOM >60 07/22/2021    GLUCOSE 93 07/22/2021     Sodium:    Lab Results   Component Value Date     07/22/2021     Potassium:    Lab Results   Component Value Date    K 4.4 07/22/2021    K 4.4 07/20/2021     BUN/Creatinine:    Lab Results   Component Value Date    BUN 11 07/22/2021    CREATININE 0.9 07/22/2021     Albumin:    Lab Results   Component Value Date    LABALBU 3.5 02/08/2020     Calcium:    Lab Results   Component Value Date    CALCIUM 9.0 07/22/2021     Ionized Calcium:  No results found for: IONCA  Magnesium:    Lab Results   Component Value Date    MG 2.2 02/11/2020     Phosphorus:  No results found for: PHOS  LDH:  No results found for: LDH  PT/INR:    Lab Results   Component Value Date    PROTIME 11.5 07/22/2021    INR 1.1 07/22/2021     Warfarin PT/INR:  No components found for: Angelina Silverman  PTT:    Lab Results   Component Value Date    APTT 33.3 07/22/2021   [APTT}  Troponin:    Lab Results   Component Value Date    TROPONINI <0.01 02/08/2020     Last 3 Troponin:    Lab Results   Component Value Date    TROPONINI <0.01 02/08/2020    TROPONINI <0.01 10/13/2015     U/A:    Lab Results   Component Value Date    COLORU Yellow 02/09/2020    PROTEINU Negative 02/09/2020    PHUR 5.5 02/09/2020    WBCUA 1-3 02/09/2020    RBCUA NONE 02/09/2020    BACTERIA MODERATE 02/09/2020    CLARITYU Clear 02/09/2020    SPECGRAV >=1.030 02/09/2020    LEUKOCYTESUR Negative 02/09/2020    UROBILINOGEN 0.2 02/09/2020    BILIRUBINUR Negative 02/09/2020    BLOODU TRACE-INTACT 02/09/2020    GLUCOSEU Negative 02/09/2020     ABG:  No results found for: PH, PCO2, PO2, HCO3, BE, THGB, TCO2, O2SAT  VBG:  No results found for: PHVEN, BSJ1DPC, BEVEN, M8KLWHHH  HgBA1c:    Lab Results   Component Value Date    LABA1C 6.5 07/20/2021     FLP:    Lab Results   Component Value Date    TRIG 90 07/07/2019    HDL 50 07/07/2019    LDLCALC 50 07/07/2019    LABVLDL 18 07/07/2019     TSH:    Lab Results   Component Value Date    TSH 2.570 07/20/2021     VITAMIN B12: No components found for: B12  FOLATE:    Lab Results   Component Value Date    FOLATE 5.0 02/10/2020     IRON:  No results found for: IRON  AMYLASE:  No results found for: AMYLASE  LIPASE:  No results found for: LIPASE    XR CHEST PORTABLE   Final Result   Normal chest         VL LOWER EXTREMITY ARTERIAL SEGMENTAL PRESSURES W PPG BILATERAL   Final Result      XR FOOT RIGHT (MIN 3 VIEWS)   Final Result   1. Amputation of the distal calcaneus.    2. Cortical irregularity likely related to erosive changes involving inferior   aspect of the calcaneus. Findings could suggest osteomyelitis. 3. Skin ulceration involving plantar soft tissue beneath the calcaneus. 4. No obvious subcutaneous gas. Resident's Assessment and Plan     Assessment and Plan:     1. Osteomyelitis 2/2 repeated non healing injury v/s PAD vs Microtrauma vs malnutrition  · Regular wound dressing  · CRP 5.5, sed rate 45 mm/hr  · WBC - 5.6  · pro calcitonin level 0.5 (7/20)  · blood culture negative  · Wound culture- Growth present, evaluating for mixed organism  · bone biopsy scheduled today  · Broad spectrum antibiotic; Vancomycin (day 3)  · Continue Gabapentin and methadone for pain   · PATRICIA 07/21/2021 - Normal ankle arm index   · HbA1C 6.5  · TSH 2.5  · Continue Vit C for wound healing   · Per ID PICC line insertion  · Per surgery note- they will perform bone biopsy today; did performed yesterday due to patient's symptoms for withdrawal.\Follow up with podiatry, would care and infectious disease.        2. Hypertension (New onset)  · /68  · Continue Clonidine and lopressor        3. Polysubstance use  · On methadone 65 mg in morning; 55 mg at night   · Fentanyl patch (unsure about the dose)   · Urine toxicology positive for methadone and fentanyl (07/22/2021)  · Follow up with PM&R           4. Chronic pain 2/2 hip fracture vs calcaneal ulcer  · Continue Gabapentin and methadone           5. Left Hip fracture waiting to get clear for surgery        PT/OT evaluation: yes  DVT prophylaxis/ GI prophylaxis: levonox/ on regular diet  Disposition: continue current care    Nella Luis MD, PGY-1  Attending physician: Dr. Matt Cochran  [facility]    Attending Physician Statement:  Hever Bailey M.D., F.A.C.P. I have discussed the case, including pertinent history and exam findings with the resident/NP.  I have seen and examined the patient and the key elements of the encounter have been performed by me. I agree with the resident ROS, PMHx, PSHx, meds reviewed and assessment, plan and orders as documented by the resident/NP      Hospital charts reviewed, including other providers notes, relevant labs and imaging. >50% of time spent coordinating care with other providers and/or counseling patient/family  Remainder of medical problems as per resident note. PT/OT/  NWB involved calcaneus-- b.l crutches vs wheelwalker vs knee scooter-- [facility] vs her preference home health     Patient preference home IV abx infusion-  to coordinate with ID     Noted podiatry deferred surgery bc of her chronic tremors  Doubt DTs (as per podiatry) resident impression  Although some concerns of very anxious also on admission and throughout her admission   Narcotics have been a concern:   \". ...not feeling well, anxious shaking, abdominal pain, lacrimation, excessive sweating. When we asked about her bowel movement, she said she had diarrhea 2 times today.  But . .then later patient mentioned she is having constipation\"  She admits to taking extra fentanyl she found  At home in attic (\"jackpot\")  - ?some subtherapeutic dosing of opiods based on her chronic overuse       Likely HTN- BP /HR better controlled  Likely essential tremor reconcurrence and anxiety recurrenc +sweay- will resume prior home propanol  Chronic use of --Continue methadone, gabapentin  Likely recurrence of essential tremors- was on propanolol for this  (also clonidine for sympathetics +will escelate narcotics dosing based on likely decompensation emotional perioperatively)        -- I will started propanolol and clonidine- tremor anxiety, pain better controlled, inc fentanyl ordered perioperatively  Recommend ped/orthotist - total contact \"casiting\" vs boot with offloading orthotics on fu  Will need to \"off load\" her deformed heel- that had partial calcanealectomy in past Procedure(s):  RIGHT FOOT DEBRIDEMENT INCISION AND DRAINAGE WITH BONE BIOPSY AND POSSIBLE WOUND VAC APPLICATION   Noted post op--Findings: Necrosis of soft tissue and bone    meriperenem per ID note, but NOT ordered  She did tolerate this in ER  (?true allergy)

## 2021-07-23 NOTE — PROGRESS NOTES
Department of Internal Medicine  Infectious Diseases  Progress  Note      C/C : Right heel wound infection, osteomyelitis     Pt is awake and alert  Denies fever or chills   Foot pain   Afebrile       Current Facility-Administered Medications   Medication Dose Route Frequency Provider Last Rate Last Admin    enoxaparin (LOVENOX) injection 40 mg  40 mg Subcutaneous Daily Violeta Burns MD        lidocaine PF 1 % injection 5 mL  5 mL Intradermal Once Violeta Burns MD        sodium chloride flush 0.9 % injection 5-40 mL  5-40 mL Intravenous 2 times per day Violeta Burns MD        sodium chloride flush 0.9 % injection 5-40 mL  5-40 mL Intravenous PRN Violeta Burns MD        0.9 % sodium chloride infusion  25 mL Intravenous PRN Violeta Burns MD        heparin flush 100 UNIT/ML injection 300 Units  3 mL Intravenous 2 times per day Violeta Burns MD   300 Units at 07/23/21 1247    heparin flush 100 UNIT/ML injection 300 Units  3 mL Intracatheter PRN Violeta Burns MD        lidocaine PF 1 % injection 5 mL  5 mL Intradermal Once Violeta Burns MD        sodium chloride flush 0.9 % injection 5-40 mL  5-40 mL Intravenous 2 times per day Violeta Burns MD   10 mL at 07/23/21 1246    sodium chloride flush 0.9 % injection 5-40 mL  5-40 mL Intravenous PRN Violeta Burns MD   10 mL at 07/22/21 1658    0.9 % sodium chloride infusion  25 mL Intravenous PRN Violeta Burns MD        heparin flush 100 UNIT/ML injection 300 Units  3 mL Intravenous 2 times per day Violeta Burns MD   300 Units at 07/22/21 2214    heparin flush 100 UNIT/ML injection 300 Units  3 mL Intracatheter PRN Violeta Burns MD        propranolol (INDERAL) tablet 10 mg  10 mg Oral TID Violeta Burns MD   10 mg at 07/23/21 1247    fentaNYL (SUBLIMAZE) injection 25 mcg  25 mcg Intravenous Q1H PRN Violeta Burns MD   25 mcg at 07/23/21 1248    cloNIDine (CATAPRES) tablet 0.1 mg  0.1 mg Oral TID Violeta Burns MD   0.1 mg at 07/23/21 1248    glucose (GLUTOSE) 40 % oral gel 15 g  15 g Oral PRN Shirin Wilson MD        dextrose 50 % IV solution  12.5 g Intravenous PRN Shirin Wilson MD        glucagon (rDNA) injection 1 mg  1 mg Intramuscular PRN Shirin Wilson MD        dextrose 5 % solution  100 mL/hr Intravenous PRN Shirin Wilson MD        ipratropium-albuterol (DUONEB) nebulizer solution 1 ampule  1 ampule Inhalation Q4H WA Shirin Wilson MD   1 ampule at 07/22/21 2114    vancomycin 1000 mg IVPB in 250 mL D5W addavial  1,000 mg Intravenous Q12H Shirin Wilson MD   Stopped at 07/23/21 0354    methadone (DOLOPHINE) 10 MG/ML solution 55 mg  55 mg Oral Nightly Shirin Wilson MD   55 mg at 07/22/21 2213    methadone (DOLOPHINE) 10 MG/ML solution 65 mg  65 mg Oral QAM AC Shirin Wilson MD   65 mg at 07/22/21 0650    gabapentin (NEURONTIN) capsule 300 mg  300 mg Oral BID Shirin Wilson MD   300 mg at 07/23/21 1247    sodium chloride flush 0.9 % injection 10 mL  10 mL Intravenous 2 times per day Shirin Wilson MD   10 mL at 07/22/21 2213    sodium chloride flush 0.9 % injection 10 mL  10 mL Intravenous PRN Shirin Wilson MD   10 mL at 07/23/21 0255    0.9 % sodium chloride infusion  25 mL Intravenous PRN Shirin Wilson MD        ondansetron (ZOFRAN-ODT) disintegrating tablet 4 mg  4 mg Oral Q8H PRN Shirin Wilson MD        Or    ondansetron [facility]) injection 4 mg  4 mg Intravenous Q6H PRN Shirin Wilson MD   4 mg at 07/22/21 2232    polyethylene glycol (GLYCOLAX) packet 17 g  17 g Oral Daily PRN Shirin Wilson MD        acetaminophen (TYLENOL) tablet 650 mg  650 mg Oral Q6H PRN Shirin Wilson MD        Or    acetaminophen (TYLENOL) suppository 650 mg  650 mg Rectal Q6H PRN Shirin Wilson MD        Arformoterol Tartrate [facility]) nebulizer solution 15 mcg  15 mcg Nebulization BID Shirin Wilson MD   15 mcg at 07/22/21 2114    budesonide (PULMICORT) nebulizer suspension 500 mcg  0.5 mg Nebulization BID Shirin Wilson MD   500 mcg at 07/22/21 2115    ascorbic acid (VITAMIN C) tablet 500 mg  500 mg Oral Daily Shirin Wilson MD   500 mg at 07/23/21 1247 REVIEW OF SYSTEMS:      CONSTITUTIONAL:  Denies fever, chill or rigors. HEENT: denies blurring of vision or double vision, denies hearing problem  RESPIRATORY: denies cough, shortness of breath, sputum expectoration, chest pain. CARDIOVASCULAR:  Denies palpitation  GASTROINTESTINAL:  Denies abdomen pain, diarrhea or constipation. GENITOURINARY:  Denies burning urination or frequency of urination  INTEGUMENT: Chronic non healing wound right foot   HEMATOLOGIC/LYMPHATIC:  Denies lymph node swelling, gum bleeding or easy bruising. MUSCULOSKELETAL: right heel wound pain, left hip pain   NEUROLOGICAL:  weakness          PHYSICAL EXAM:      Vitals:     BP (!) 152/84   Pulse 64   Temp 97.9 °F (36.6 °C) (Temporal)   Resp 17   Ht 5' 9\" (1.753 m)   Wt 190 lb (86.2 kg)   LMP 08/28/2013   SpO2 99%   BMI 28.06 kg/m²     General Appearance:    Awake, alert , no acute distress. Head:    Normocephalic, atraumatic   Eyes:    No pallor, no icterus,   Ears:    No obvious deformity or drainage.    Nose:   No nasal drainage   Throat:   Mucosa moist, no oral thrush   Neck:   Supple, no lymphadenopathy   Back:     no CVA tenderness   Lungs:     Clear to auscultation bilaterally    Heart:    Regular rate and rhythm    Abdomen:     Soft, non-tender, bowel sounds present    Extremities:    + edema,   Right heel wound dressed    Pulses:   Dorsalis pedis palpable    Skin:  Right foot wound VA       CBC with Differential:      Lab Results   Component Value Date    WBC 5.6 07/22/2021    RBC 4.79 07/22/2021    HGB 13.3 07/22/2021    HCT 43.0 07/22/2021     07/22/2021    MCV 89.8 07/22/2021    MCH 27.8 07/22/2021    MCHC 30.9 07/22/2021    RDW 15.1 07/22/2021    LYMPHOPCT 27.0 07/22/2021    MONOPCT 8.6 07/22/2021    BASOPCT 0.7 07/22/2021    MONOSABS 0.48 07/22/2021    LYMPHSABS 1.51 07/22/2021    EOSABS 0.12 07/22/2021    BASOSABS 0.04 07/22/2021       CMP:    Lab Results   Component Value Date     07/22/2021 K 4.4 07/22/2021    K 4.4 07/20/2021     07/22/2021    CO2 29 07/22/2021    BUN 11 07/22/2021    CREATININE 0.9 07/22/2021    GFRAA >60 07/22/2021    LABGLOM >60 07/22/2021    GLUCOSE 93 07/22/2021    PROT 7.7 02/08/2020    LABALBU 3.5 02/08/2020    CALCIUM 9.0 07/22/2021    BILITOT 0.4 02/08/2020    ALKPHOS 103 02/08/2020    AST 10 02/08/2020    ALT 8 02/08/2020       Hepatic Function Panel:    Lab Results   Component Value Date    ALKPHOS 103 02/08/2020    ALT 8 02/08/2020    AST 10 02/08/2020    PROT 7.7 02/08/2020    BILITOT 0.4 02/08/2020    LABALBU 3.5 02/08/2020     Vanco trough level 9.8     Microbiology :    Wound cx - pending     Radiology :    X  Ray foot     Impression:        1. Amputation of the distal calcaneus. 2. Cortical irregularity likely related to erosive changes involving inferior   aspect of the calcaneus.  Findings could suggest osteomyelitis. 3. Skin ulceration involving plantar soft tissue beneath the calcaneus. 4. No obvious subcutaneous gas.              IMPRESSION:     1. Chronic non healing right heel ulcer . Wound infection , right calceneus osteomyelitis s/p debridement  (7/22)     RECOMMENDATIONS:      1. Vancomycin 1250 mg IV q 12 hrs, meropenem 1 gram IV q 8 hrs   2.  Wound debridement , bone cx, pathology

## 2021-07-23 NOTE — ANESTHESIA PRE PROCEDURE
Department of Anesthesiology  Preprocedure Note       Name:  Salli Brittle   Age:  55 y.o.  :  1965                                          MRN:  [id number]         Date:  2021      Surgeon: Trista Waggoner):  Luís Scruggs DPM    Procedure: Procedure(s):  RIGHT FOOT DEBRIDEMENT INCISION AND DRAINAGE WITH BONE BIOPSY AND POSSIBLE WOUND VAC APPLICATION    Medications prior to admission:   Prior to Admission medications    Medication Sig Start Date End Date Taking? Authorizing Provider   methadone 10 MG/5ML solution Take 65 mg by mouth every morning. Takes 65 mg in am and 55 mg in pm    Historical Provider, MD   methadone 10 MG/5ML solution Take 55 mg by mouth nightly. Takes 65 mg in am and 55 mg in pm    Historical Provider, MD   budesonide-formoterol (SYMBICORT) 160-4.5 MCG/ACT AERO Inhale 2 puffs into the lungs daily     Historical Provider, MD   albuterol sulfate  (90 Base) MCG/ACT inhaler Inhale 2 puffs into the lungs every 6 hours as needed for Wheezing    Historical Provider, MD   gabapentin (NEURONTIN) 300 MG capsule Take 300 mg by mouth 2 times daily. Historical Provider, MD       Current medications:    No current facility-administered medications for this visit. No current outpatient medications on file.      Facility-Administered Medications Ordered in Other Visits   Medication Dose Route Frequency Provider Last Rate Last Admin    lidocaine PF 1 % injection 5 mL  5 mL Intradermal Once Alyse Frederick MD        sodium chloride flush 0.9 % injection 5-40 mL  5-40 mL Intravenous 2 times per day Alyse Frederick MD        sodium chloride flush 0.9 % injection 5-40 mL  5-40 mL Intravenous PRN Alyse Frederick MD        0.9 % sodium chloride infusion  25 mL Intravenous PRN Alyse Frederick MD        heparin flush 100 UNIT/ML injection 300 Units  3 mL Intravenous 2 times per day Alyse Frederick MD        heparin flush 100 UNIT/ML injection 300 Units  3 mL Intracatheter PRN Alyse Frederick MD        lidocaine PF 1 % injection 5 mL  5 mL Intradermal Once Bernard Hall MD        sodium chloride flush 0.9 % injection 5-40 mL  5-40 mL Intravenous 2 times per day Svetlana Mccain MD        sodium chloride flush 0.9 % injection 5-40 mL  5-40 mL Intravenous PRN Bernard Hall MD   10 mL at 07/22/21 1658    0.9 % sodium chloride infusion  25 mL Intravenous PRN Bernard Hall MD        heparin flush 100 UNIT/ML injection 300 Units  3 mL Intravenous 2 times per day Svetlana Mccain MD   300 Units at 07/22/21 2214    heparin flush 100 UNIT/ML injection 300 Units  3 mL Intracatheter PRN Bernard Hall MD        propranolol (INDERAL) tablet 10 mg  10 mg Oral TID Marshall Cranker, MD   10 mg at 07/22/21 2212    fentaNYL (SUBLIMAZE) injection 25 mcg  25 mcg Intravenous Q1H PRN Marshall Cranker, MD   25 mcg at 07/23/21 0503    cloNIDine (CATAPRES) tablet 0.1 mg  0.1 mg Oral TID Marshall Cranker, MD   0.1 mg at 07/22/21 2212    glucose (GLUTOSE) 40 % oral gel 15 g  15 g Oral PRN Jan Garcia MD        dextrose 50 % IV solution  12.5 g Intravenous PRN Jan Garcia MD        glucagon (rDNA) injection 1 mg  1 mg Intramuscular PRN Jan Garcia MD        dextrose 5 % solution  100 mL/hr Intravenous PRN Jan Garcia MD        ipratropium-albuterol (DUONEB) nebulizer solution 1 ampule  1 ampule Inhalation Q4H WA Steve R Kilar, DO   1 ampule at 07/22/21 2114    vancomycin 1000 mg IVPB in 250 mL D5W addavial  1,000 mg Intravenous Q12H Jan Garcia MD   Stopped at 07/23/21 0354    methadone (DOLOPHINE) 10 MG/ML solution 55 mg  55 mg Oral Nightly Steve R Kilar, DO   55 mg at 07/22/21 2213    methadone (DOLOPHINE) 10 MG/ML solution 65 mg  65 mg Oral QAM AC Steve R Kilar, DO   65 mg at 07/22/21 0650    gabapentin (NEURONTIN) capsule 300 mg  300 mg Oral BID Jan Garcia MD   300 mg at 07/22/21 2215    sodium chloride flush 0.9 % injection 10 mL  10 mL Intravenous 2 times per day Jan Garcia MD   10 mL at 07/22/21 2213    sodium chloride flush 0.9 % injection 10 mL  10 mL Intravenous PRN Jan Garcia MD   10 mL at 07/23/21 0255    0.9 % sodium chloride infusion  25 mL Intravenous PRN Jan Garcia MD        ondansetron (ZOFRAN-ODT) disintegrating tablet 4 mg  4 mg Oral Q8H PRN Jan Garcia MD        Or    ondansetron [facility]) injection 4 mg  4 mg Intravenous Q6H PRN Jan Garcia MD   4 mg at 07/22/21 2232    polyethylene glycol (GLYCOLAX) packet 17 g  17 g Oral Daily PRN Jan Garcia MD        acetaminophen (TYLENOL) tablet 650 mg  650 mg Oral Q6H PRN Jan Garcia MD        Or    acetaminophen (TYLENOL) suppository 650 mg  650 mg Rectal Q6H PRN Jan Garcia MD        Arformoterol Tartrate [facility]) nebulizer solution 15 mcg  15 mcg Nebulization BID Jan Garcia MD   15 mcg at 07/22/21 2114    budesonide (PULMICORT) nebulizer suspension 500 mcg  0.5 mg Nebulization BID Jan Garcia MD   500 mcg at 07/22/21 2115    enoxaparin (LOVENOX) injection 40 mg  40 mg Subcutaneous Daily Jan Garcia MD   40 mg at 07/21/21 1020    ascorbic acid (VITAMIN C) tablet 500 mg  500 mg Oral Daily Jan Garcia MD   500 mg at 07/21/21 1020       Allergies:     Allergies   Allergen Reactions    Ancef [Cefazolin] Anaphylaxis    Duricef [Cefadroxil] Anaphylaxis    Iodine      Patient does not remember reaction       Problem List:    Patient Active Problem List   Diagnosis Code    Abscess L02.91    Nonhealing ulcer of heel ([facility]) L97.409    Bradycardia R00.1    Substance abuse ([facility] F19.10    Current moderate episode of major depressive disorder ([facility] F32.1    Abnormal weight loss R63.4    COPD (chronic obstructive pulmonary disease) ([facility]) J44.9    History of fracture of left hip Z87.81    Ambulatory dysfunction R26.2    Hypotension I95.9    Subclinical hypothyroidism E03.9    Chronic recurrent multifocal osteomyelitis of foot ([facility] M86.379  Pressure injury of heel, stage 3 ([facility]) L89.603    Open wound of fifth toe of left foot S91.105A    COPD exacerbation ([facility]) J44.1    Acute respiratory failure with hypoxia ([facility]) J96.01    Mild protein-calorie malnutrition ([facility]) E44.1    Osteomyelitis ([facility] M86.9       Past Medical History:        Diagnosis Date    Abscess     states for a couple years she has had problems with     Chronic pain     Chronic recurrent multifocal osteomyelitis of foot ([facility] 2019    Hip fracture ([facility])     Hx of blood clots     dvt rt calf    Pressure injury of heel, stage 3 ([facility] 2019    Subclinical hypothyroidism 2019       Past Surgical History:        Procedure Laterality Date     SECTION      x three    DRAIN SKIN ABSCESS SIMPLE  2014         FOOT SURGERY         Social History:    Social History     Tobacco Use    Smoking status: Former Smoker     Packs/day: 1.00     Types: Cigarettes    Smokeless tobacco: Never Used    Tobacco comment: approx 7 months ago 19   Substance Use Topics    Alcohol use: No                                Counseling given: Not Answered  Comment: approx 7 months ago 19      Vital Signs (Current): There were no vitals filed for this visit.                                            BP Readings from Last 3 Encounters:   21 124/68   20 136/70   10/11/19 116/64       NPO Status:   >8 hrs                                                                             BMI:   Wt Readings from Last 3 Encounters:   21 190 lb (86.2 kg)   20 189 lb (85.7 kg)   10/11/19 179 lb (81.2 kg)     There is no height or weight on file to calculate BMI.    CBC:   Lab Results   Component Value Date    WBC 5.6 2021    RBC 4.79 2021    HGB 13.3 2021    HCT 43.0 2021    MCV 89.8 2021    RDW 15.1 2021     2021       CMP:   Lab Results   Component Value Date     2021    K 4.4 2021    K 4.4

## 2021-07-23 NOTE — BRIEF OP NOTE
Brief Postoperative Note      Patient: Johny Haro  YOB: 1965  MRN: [id number]    Date of Procedure: 7/23/2021    Pre-Op Diagnosis: Right calcaneaus OM, right ulceration of the heel with necrosis, IDDM with neuropathy    Post-Op Diagnosis: Same       Procedure(s):  RIGHT FOOT DEBRIDEMENT INCISION AND DRAINAGE WITH BONE BIOPSY AND POSSIBLE WOUND VAC APPLICATION    Surgeon(s):  Luís Huertas DPM    Assistant:  Resident:  Evin Wong MD    Anesthesia: Monitor Anesthesia Care    Estimated Blood Loss (mL): Minimal    Complications: None    Specimens:   ID Type Source Tests Collected by Time Destination   1 : RIGHT FOOT WOUND Specimen Foot CULTURE, ANAEROBIC, CULTURE, FUNGUS, GRAM STAIN, CULTURE, SURGICAL, CULTURE WITH SMEAR, ACID FAST BACILLIUS Luís Dominguez DPM 7/23/2021 0981    A : RIGHT FOOT WOUND Specimen Foot SURGICAL PATHOLOGY Luís Dominguez DPM 7/23/2021 7476        Implants:  * No implants in log *      Drains: * No LDAs found *    Findings: Necrosis of soft tissue and bone    Electronically signed by Gaby Mccarthy DPM on 7/23/2021 at 9:30 AM

## 2021-07-23 NOTE — PROGRESS NOTES
Pharmacy Consultation Note  (Antibiotic Dosing and Monitoring)    Initial consult date: 7/20/21  Consulting physician: Dr. Crystal Preciado  Drug(s): vancomycin  Indication: osteomyelitis      Age/  Gender Height Weight IBW Dosing weight  Allergy Information   56 y.o./female 5' 9\" (175.3 cm) 190 lb (86.2 kg)     Ideal body weight: 66.2 kg (145 lb 15.1 oz)  Adjusted ideal body weight: 74.2 kg (163 lb 9.1 oz)  74.2 kg  Ancef [cefazolin], Duricef [cefadroxil], and Iodine          Other anti-infectives Start date Stop date   Meropenem 7/20                     Date  Tmax WBC BUN/CR UOP CrCL  (mL/min) Drug/Dose Time   Given Level(s)   (Time) Comments   7/20 99.2 7.1  10/0.9 -- --  Vancomycin 1250 mg IV x 1 dose 1841     7/21 afebrile 5.5 10/0.9 -- 83 Vancomycin 1250 mg IV x 1 dose    Vancomycin 1000 mg IV q 12 hr 0704      (1900)- not given  1900 dose not give due to loss of IV access   7/22 afebrile 5.6 11/0.9 -- 83 Vancomycin 1000 mg IV q 12 hr 1025  (1900)- not given Vanco trough at 0648 is 9.8 mcg/mL Patient refused dose at 1900   7/23 afebrile -- -- -- 83 Vancomycin 1000 mg IV q 12 hr 0254  (1500)            Intake/Output Summary (Last 24 hours) at 7/23/2021 1157  Last data filed at 7/23/2021 2118  Gross per 24 hour   Intake 480 ml   Output 5 ml   Net 475 ml       Average urine output:    Cultures:    Site Date Result                    Recent Labs     07/20/21  2204   BLOODCULT2 24 Hours no growth        Historical Cultures:  Organism   Date Value Ref Range Status   02/08/2020 Haemophilus influenzae (A)  Final     Recent Labs     07/20/21  2200   BC 24 Hours no growth       Radiology:      Assessment:  · 55year old female on vancomycin for osteomyelitis  · Goal trough = 15 - 20 mcg/ml; Goal AUC/RICARDO 400-600  · 7/22: Scr stable at 0.9. Dose at 2300 on 7/21 not given due to IV access.  Level today of 9.8 mcg/mL is not a true trough   · 7/23: Dose at 1900 on 7/22 was refused by patient, patient accepted medication at 0254 on 7/23. I adjusted vancomycin administration times.     Plan:  · Cont vancomycin 1000 mg IV q 12 hr (est AUC/RICARDO 525, trough 17.2 mcg/mL)  · A vanco trough level will be obtained as needed to appropriately monitor    · Pharmacist will follow and monitor/adjust dosing as necessary    Yanick Calabrese, PharmD, BCPS 7/23/2021 11:57 AM   Phone: [phone]

## 2021-07-23 NOTE — PROGRESS NOTES
Physician Progress Note      Yanique Mcdonald  CSN #:                  [id number]  :                       1965  ADMIT DATE:       2021 4:35 PM  [address] [place] DATE:  RESPONDING  PROVIDER #:        Tomas Hanks MD          QUERY TEXT:    Dear House Team,    Pt admitted with osteomyelitis and is on methadone. Please document any   correlating medical diagnosis in the medical record: The medical record reflects the following:  Risk Factors: PMH of polysubstance abuse, chronic pain, RLE ulcer w OM  Clinical Indicators: methadone use, positive drug screen  Treatment: methadone, escalate narcotics    Thank you,  Fransisco Jimenez RN  [phone]  Options provided:  -- Opioid dependence  -- Opioid abuse  -- Opioid use  -- Other - I will add my own diagnosis  -- Disagree - Not applicable / Not valid  -- Disagree - Clinically unable to determine / Unknown  -- Refer to Clinical Documentation Reviewer    PROVIDER RESPONSE TEXT:    This patient is opioid dependent.     Query created by: Yariel Reinoso on 2021 2:37 PM      Electronically signed by:  Tomas Hanks MD 2021 3:26 PM

## 2021-07-23 NOTE — ANESTHESIA POSTPROCEDURE EVALUATION
Department of Anesthesiology  Postprocedure Note    Patient: Raffaele Chanel  MRN: [id number]  YOB: 1965  Date of evaluation: 7/23/2021  Time:  11:30 AM     Procedure Summary     Date: 07/23/21 Room / Location: [facility] 04 / [facility]    Anesthesia Start: 0398 Anesthesia Stop:     Procedure: RIGHT FOOT DEBRIDEMENT INCISION AND DRAINAGE WITH BONE BIOPSY AND POSSIBLE WOUND VAC APPLICATION (Right ) Diagnosis: (.)    Surgeons: Michelle Sosa DPM Responsible Provider: Tangela Guzman MD    Anesthesia Type: MAC ASA Status: 3          Anesthesia Type: MAC    Primo Phase I: Primo Score: 8    Primo Phase II:      Last vitals: Reviewed and per EMR flowsheets.        Anesthesia Post Evaluation    Patient location during evaluation: PACU  Patient participation: complete - patient participated  Level of consciousness: awake  Pain score: 3  Airway patency: patent  Nausea & Vomiting: no nausea and no vomiting  Complications: no  Cardiovascular status: blood pressure returned to baseline  Respiratory status: acceptable  Hydration status: euvolemic

## 2021-07-23 NOTE — CARE COORDINATION
Discharge plan is home with [facility]. Referral made to [facility]. They will need to get approval from [place]. Jannie Harper -[phone]. [facility] unable to accept d/t previous noncompliance and history of IVDU. Referral made to [facility].  Jannie Harper -[phone]

## 2021-07-23 NOTE — HOME CARE
Referral received for home health services for possible home iv's. Patient has 100% coverage. Patient will need home health orders and a home iv script faxed to [phone] prior to discharge. Patient placed on schedule for first availability on Tuesday start of care for home health. Home health Intake will follow. Will need to confirm that PCP will sign/follow home health. Call placed to Dr Velasquez's office; awaiting return call.      Thank you for this referral.

## 2021-07-23 NOTE — PLAN OF CARE
Problem: Pain:  Goal: Pain level will decrease  Description: Pain level will decrease  Outcome: Ongoing  Goal: Control of acute pain  Description: Control of acute pain  Outcome: Ongoing  Goal: Control of chronic pain  Description: Control of chronic pain  Outcome: Ongoing     Problem: Falls - Risk of:  Goal: Will remain free from falls  Description: Will remain free from falls  Outcome: Met This Shift  Goal: Absence of physical injury  Description: Absence of physical injury  Outcome: Met This Shift     Problem: Skin Integrity:  Goal: Absence of new skin breakdown  Description: Absence of new skin breakdown  Outcome: Met This Shift

## 2021-07-24 LAB
ANION GAP SERPL CALCULATED.3IONS-SCNC: 4 MMOL/L (ref 7–16)
BASOPHILS ABSOLUTE: 0.04 E9/L (ref 0–0.2)
BASOPHILS RELATIVE PERCENT: 0.7 % (ref 0–2)
BUN BLDV-MCNC: 9 MG/DL (ref 6–20)
CALCIUM SERPL-MCNC: 8.8 MG/DL (ref 8.6–10.2)
CHLORIDE BLD-SCNC: 99 MMOL/L (ref 98–107)
CO2: 34 MMOL/L (ref 22–29)
CREAT SERPL-MCNC: 0.9 MG/DL (ref 0.5–1)
EOSINOPHILS ABSOLUTE: 0.17 E9/L (ref 0.05–0.5)
EOSINOPHILS RELATIVE PERCENT: 3 % (ref 0–6)
GFR AFRICAN AMERICAN: >60
GFR NON-AFRICAN AMERICAN: >60 ML/MIN/1.73
GLUCOSE BLD-MCNC: 122 MG/DL (ref 74–99)
GRAM STAIN ORDERABLE: NORMAL
HCT VFR BLD CALC: 39.2 % (ref 34–48)
HEMOGLOBIN: 11.7 G/DL (ref 11.5–15.5)
IMMATURE GRANULOCYTES #: 0.03 E9/L
IMMATURE GRANULOCYTES %: 0.5 % (ref 0–5)
LYMPHOCYTES ABSOLUTE: 1.93 E9/L (ref 1.5–4)
LYMPHOCYTES RELATIVE PERCENT: 33.7 % (ref 20–42)
MCH RBC QN AUTO: 27.6 PG (ref 26–35)
MCHC RBC AUTO-ENTMCNC: 29.8 % (ref 32–34.5)
MCV RBC AUTO: 92.5 FL (ref 80–99.9)
MONOCYTES ABSOLUTE: 0.6 E9/L (ref 0.1–0.95)
MONOCYTES RELATIVE PERCENT: 10.5 % (ref 2–12)
NEUTROPHILS ABSOLUTE: 2.95 E9/L (ref 1.8–7.3)
NEUTROPHILS RELATIVE PERCENT: 51.6 % (ref 43–80)
PDW BLD-RTO: 15.4 FL (ref 11.5–15)
PLATELET # BLD: 277 E9/L (ref 130–450)
PMV BLD AUTO: 9.5 FL (ref 7–12)
POTASSIUM SERPL-SCNC: 4.7 MMOL/L (ref 3.5–5)
RBC # BLD: 4.24 E12/L (ref 3.5–5.5)
SODIUM BLD-SCNC: 137 MMOL/L (ref 132–146)
VANCOMYCIN TROUGH: 17.4 MCG/ML (ref 5–16)
WBC # BLD: 5.7 E9/L (ref 4.5–11.5)
WOUND/ABSCESS: NORMAL

## 2021-07-24 PROCEDURE — 97535 SELF CARE MNGMENT TRAINING: CPT

## 2021-07-24 PROCEDURE — 6360000002 HC RX W HCPCS: Performed by: PODIATRIST

## 2021-07-24 PROCEDURE — 94760 N-INVAS EAR/PLS OXIMETRY 1: CPT

## 2021-07-24 PROCEDURE — 80202 ASSAY OF VANCOMYCIN: CPT

## 2021-07-24 PROCEDURE — 36592 COLLECT BLOOD FROM PICC: CPT

## 2021-07-24 PROCEDURE — 6370000000 HC RX 637 (ALT 250 FOR IP): Performed by: PODIATRIST

## 2021-07-24 PROCEDURE — 2700000000 HC OXYGEN THERAPY PER DAY

## 2021-07-24 PROCEDURE — 80048 BASIC METABOLIC PNL TOTAL CA: CPT

## 2021-07-24 PROCEDURE — 2580000003 HC RX 258: Performed by: PODIATRIST

## 2021-07-24 PROCEDURE — 94640 AIRWAY INHALATION TREATMENT: CPT

## 2021-07-24 PROCEDURE — 36415 COLL VENOUS BLD VENIPUNCTURE: CPT

## 2021-07-24 PROCEDURE — 6360000002 HC RX W HCPCS: Performed by: STUDENT IN AN ORGANIZED HEALTH CARE EDUCATION/TRAINING PROGRAM

## 2021-07-24 PROCEDURE — 1200000000 HC SEMI PRIVATE

## 2021-07-24 PROCEDURE — 85025 COMPLETE CBC W/AUTO DIFF WBC: CPT

## 2021-07-24 PROCEDURE — 6360000002 HC RX W HCPCS: Performed by: INTERNAL MEDICINE

## 2021-07-24 PROCEDURE — 97165 OT EVAL LOW COMPLEX 30 MIN: CPT

## 2021-07-24 RX ORDER — FENTANYL CITRATE 50 UG/ML
25 INJECTION, SOLUTION INTRAMUSCULAR; INTRAVENOUS EVERY 6 HOURS PRN
Status: DISCONTINUED | OUTPATIENT
Start: 2021-07-24 | End: 2021-07-26

## 2021-07-24 RX ORDER — HYDROXYZINE HYDROCHLORIDE 50 MG/ML
50 INJECTION, SOLUTION INTRAMUSCULAR DAILY
Status: DISCONTINUED | OUTPATIENT
Start: 2021-07-24 | End: 2021-07-30 | Stop reason: HOSPADM

## 2021-07-24 RX ORDER — AMLODIPINE BESYLATE 5 MG/1
5 TABLET ORAL DAILY
Status: DISCONTINUED | OUTPATIENT
Start: 2021-07-24 | End: 2021-07-30 | Stop reason: HOSPADM

## 2021-07-24 RX ORDER — HYDROCODONE BITARTRATE AND ACETAMINOPHEN 5; 325 MG/1; MG/1
1 TABLET ORAL EVERY 4 HOURS PRN
Status: DISCONTINUED | OUTPATIENT
Start: 2021-07-24 | End: 2021-07-30 | Stop reason: HOSPADM

## 2021-07-24 RX ADMIN — PROPRANOLOL HYDROCHLORIDE 10 MG: 10 TABLET ORAL at 21:56

## 2021-07-24 RX ADMIN — FENTANYL CITRATE 25 MCG: 0.05 INJECTION, SOLUTION INTRAMUSCULAR; INTRAVENOUS at 22:10

## 2021-07-24 RX ADMIN — IPRATROPIUM BROMIDE AND ALBUTEROL SULFATE 1 AMPULE: .5; 3 SOLUTION RESPIRATORY (INHALATION) at 17:07

## 2021-07-24 RX ADMIN — SODIUM CHLORIDE, PRESERVATIVE FREE 300 UNITS: 5 INJECTION INTRAVENOUS at 21:54

## 2021-07-24 RX ADMIN — Medication 55 MG: at 21:56

## 2021-07-24 RX ADMIN — HYDROXYZINE HYDROCHLORIDE 50 MG: 50 INJECTION, SOLUTION INTRAMUSCULAR at 14:45

## 2021-07-24 RX ADMIN — CLONIDINE HYDROCHLORIDE 0.1 MG: 0.1 TABLET ORAL at 14:45

## 2021-07-24 RX ADMIN — IPRATROPIUM BROMIDE AND ALBUTEROL SULFATE 1 AMPULE: .5; 3 SOLUTION RESPIRATORY (INHALATION) at 13:59

## 2021-07-24 RX ADMIN — Medication 10 ML: at 09:19

## 2021-07-24 RX ADMIN — PROPRANOLOL HYDROCHLORIDE 10 MG: 10 TABLET ORAL at 09:16

## 2021-07-24 RX ADMIN — OXYCODONE HYDROCHLORIDE AND ACETAMINOPHEN 500 MG: 500 TABLET ORAL at 09:17

## 2021-07-24 RX ADMIN — SODIUM CHLORIDE, PRESERVATIVE FREE 300 UNITS: 5 INJECTION INTRAVENOUS at 09:17

## 2021-07-24 RX ADMIN — VANCOMYCIN HYDROCHLORIDE 1000 MG: 1 INJECTION, POWDER, LYOPHILIZED, FOR SOLUTION INTRAVENOUS at 21:53

## 2021-07-24 RX ADMIN — CLONIDINE HYDROCHLORIDE 0.1 MG: 0.1 TABLET ORAL at 09:17

## 2021-07-24 RX ADMIN — GABAPENTIN 300 MG: 300 CAPSULE ORAL at 21:55

## 2021-07-24 RX ADMIN — Medication 10 ML: at 21:55

## 2021-07-24 RX ADMIN — GABAPENTIN 300 MG: 300 CAPSULE ORAL at 09:17

## 2021-07-24 RX ADMIN — VANCOMYCIN HYDROCHLORIDE 1000 MG: 1 INJECTION, POWDER, LYOPHILIZED, FOR SOLUTION INTRAVENOUS at 02:44

## 2021-07-24 RX ADMIN — PROPRANOLOL HYDROCHLORIDE 10 MG: 10 TABLET ORAL at 14:45

## 2021-07-24 RX ADMIN — Medication 65 MG: at 06:26

## 2021-07-24 RX ADMIN — ENOXAPARIN SODIUM 40 MG: 40 INJECTION SUBCUTANEOUS at 09:17

## 2021-07-24 RX ADMIN — FENTANYL CITRATE 25 MCG: 0.05 INJECTION, SOLUTION INTRAMUSCULAR; INTRAVENOUS at 02:44

## 2021-07-24 RX ADMIN — Medication 10 ML: at 02:44

## 2021-07-24 RX ADMIN — CLONIDINE HYDROCHLORIDE 0.1 MG: 0.1 TABLET ORAL at 21:56

## 2021-07-24 ASSESSMENT — PAIN DESCRIPTION - LOCATION
LOCATION: GENERALIZED

## 2021-07-24 ASSESSMENT — PAIN DESCRIPTION - ORIENTATION
ORIENTATION: RIGHT
ORIENTATION: RIGHT

## 2021-07-24 ASSESSMENT — PAIN DESCRIPTION - FREQUENCY
FREQUENCY: CONTINUOUS

## 2021-07-24 ASSESSMENT — PAIN DESCRIPTION - PAIN TYPE
TYPE: CHRONIC PAIN
TYPE: ACUTE PAIN
TYPE: ACUTE PAIN

## 2021-07-24 ASSESSMENT — PAIN DESCRIPTION - DESCRIPTORS
DESCRIPTORS: ACHING;DISCOMFORT;DULL
DESCRIPTORS: ACHING
DESCRIPTORS: ACHING

## 2021-07-24 ASSESSMENT — PAIN DESCRIPTION - ONSET
ONSET: AWAKENED FROM SLEEP
ONSET: ON-GOING
ONSET: ON-GOING

## 2021-07-24 ASSESSMENT — PAIN SCALES - GENERAL
PAINLEVEL_OUTOF10: 10
PAINLEVEL_OUTOF10: 10
PAINLEVEL_OUTOF10: 8
PAINLEVEL_OUTOF10: 2
PAINLEVEL_OUTOF10: 9
PAINLEVEL_OUTOF10: 3

## 2021-07-24 ASSESSMENT — PAIN SCALES - WONG BAKER: WONGBAKER_NUMERICALRESPONSE: 0

## 2021-07-24 NOTE — PROGRESS NOTES
[address] [place], [place] [id number] [place] [address]      Date:2021                                                  Patient Name: Phillip Moncada  MRN: [id number]  : 1965  Room: [address] [place] [address]    Evaluating OT: MESERET Mahajan, OTR/L  # [id number]    Referring Provider: Radhames Kline MD  Specific Provider Orders:  \"OT Eval and Treat\"  21    Diagnosis: Osteomyelitis Right Foot   OA Left Hip     Surgeries this admission: 21:    1. Incision and drainage of right calcaneus bone cortex. 2.  Excisional wound debridement of right heel to and through level of  the bone, total measurement is 7 cm x 4 cm x 1.5 cm in dimension. 3.  Application of wound VAC negative pressure therapy.         Pertinent Medical History: Chronic Pain, Chronic Recurrent Osteomyelitis Right Foot, COPD, Polysubstance Abuse on Methadone    Pt is tentatively scheduled for Left THR, however, has infected Chronic Right Heel Wound that needs to be treated prior to Left Hip Surgery    Precautions:  Fall Risk  NWB R LE - Wound VAC  WBAT L LE  Regular Diet    Hx of Substance Abuse/Withdrawl     Assessment of current deficits   [x] Functional mobility   [x]ADLs  [x] Strength               [x]Cognition   [x] Functional transfers   [x] IADLs         [x] Safety Awareness   [x]Endurance   [] Fine Coordination              [x] Balance      [] Vision/perception   []Sensation    []Gross Motor Coordination  [] ROM  [] Delirium                   [] Motor Control       OT PLAN OF CARE   OT POC based on physician orders, patient diagnosis and results of clinical assessment    Frequency/Duration 1-3 days/wk for 2 weeks PRN   Specific OT Treatment to include:   * Instruction/training on adapted ADL techniques and AE recommendations to increase functional independence within precautions       * Training on energy conservation strategies, correct breathing pattern and techniques to improve independence/tolerance for self-care routine  * Functional transfer/mobility training/DME recommendations for increased independence, safety, and fall prevention  * Patient/Family education to increase follow through with safety techniques and functional independence  * Recommendation of environmental modifications for increased safety with functional transfers/mobility and ADLs  * Cognitive retraining/development of therapeutic activities to improve problem solving, judgement, memory, and attention for increased safety/participation in ADL/IADL tasks  * Therapeutic exercise to improve motor endurance, ROM, and functional strength for ADLs/functional transfers  * Therapeutic activities to facilitate/challenge dynamic balance, stand tolerance for increased safety and independence with ADLs  * Therapeutic activities to facilitate gross/fine motor skills for increased independence with ADLs  * Neuro-muscular re-education: facilitation of righting/equilibrium reactions, midline orientation, scapular stability/mobility, normalization of muscle tone, and facilitation of volitional active controled movement  * Positioning to improve skin integrity, interaction with environment and functional independence  * Delirium prevention/treatment    Pt was admitted via ER for Wound Check Right Heel    Recommended Adaptive Equipment: TBD as pt progresses - Reacher, Sock Aid, Drop-Arm [facility]      Home Living:  Pt lives alone in a [facility]. Bed/bath on the main floor. Laundry in the Basement. Bathroom setup:  Tub-Shower, [address] [employer]   Equipment owned:  W/C, [employer], Crutches, [facility], Shower Chair, [employer].   (Has battery operated Adjustable bed but unable to safely access site under bed to change battery)    Available Family Assist:  Son local - unknown availability to provide assist    Prior Level of Function:  Pt reported being IND w/ all ADLs, IADLs, Transfers and Mobility using 1 crutch for household ambulation. Driving:  Not reported  Occupation:  None currently    Pain Level:  Denied pain at rest, increased to 6/10 R Foot/Left Hip w/ upright ax;  Relief w/ Rest and Repositioning, Nsg Notified   Additional Complaints:  none    Cognition: A & O x 4   Able to Follow Multi-Step Commands w/ occasional Min VCs for safety   Memory:  good    Sequencing:  good (-)   Problem solving:  good (-)   Judgement/safety:  fair   Additional Comments:  Pt was pleasant and cooperative. Talkative, somewhat quick moving/impulsive - Min-Mod VCs for improved safety, adherence to NWB status    Vitals/Lab Values:  WFL Room air      Functional Assessment:  AM-PAC Daily Activity Raw Score: 12/24     Initial Eval Status  Date: 7/24/21   Treatment Status  Date: STGs = [facility]  Time frame: 10-14 days   Feeding Set up    NA   Grooming SUP/Set up    Able to complete tasks after set up seated EOB  Unsafe to attempt to ambulate to or standing at sink d/t NWB status    SUP  Seated/Standing at the [address] A/Set up    Able to don robe w/ Min A after set up seated EOB    Unable to tolerate item retrieval for activities    Set up     LB Dressing Dep    SUP to don Left Sock seated EOB w/ use of own adaptive tech, Unable to safely stand adequately for attempt at clothing adjustment over hips w/ Max A of 1, unable to adhere to NWB status R LE    Pt ed for safe/adaptive techs, use of adaptive equip    Mod A     Bathing NT    Pt ed re safe techs for Sponge Bathing    Mod A      Toileting NT    Pt ed re: benefits of use of Drop-Arm BSC    Mod A     Bed Mobility  Supine to sit: Min A   Sit to supine:  Min A     VCs for safety    Supine to sit: Mod I  Sit to supine:  Mod I     Functional Transfers Dep    Unable to safely achieve full stance w/ Max A of 1 d/tweakness L Hip and inability to adhere to NWB status R Foot despite Pt ed/demo/VCs for safety/hand placement, returned to seated position for safety    Mod A limits of precautions    Instruction/training on safe functional mobility/transfer techniques, use of DME/AD: within limits of precautions    Instruction/training on energy conservation techs (EC)//work simplification for completion of ADLs:      Neuromuscular Reeducation to facilitate balance/righting reactions for increased function with ADLs:     Skilled positioning/alignment for Pain Mgmt, Skin Integrity, to maximize Pt's ability to [facility] interact w/ his/her environment   Activity tolerance - Sitting/Standing to improve endurance w/ functional ax    Cognitive retraining -  Cues for safety/safety awareness, sequencing, problem solving     Skilled monitoring of pt's response to tx ax        Consulted RN     Made all appropriate Environmental Modifications to facilitate pt's level of IND and safety.  Recommendations for Continued Participation in OT services during Hospitalization and at D/C - SNF    Pt and/or Family verbalized/demonstrated a Good understanding of education provided. Will Review PRN. Rehab Potential: Good(-) for established goals     Patient / Family Goal: Participate in therapy program \"I want to go to my Son[facility]"      Patient and/or family were instructed on functional diagnosis, prognosis/goals and OT plan of care. Demonstrated Good understanding.      Eval Complexity: Low    Time In: 1145  Time Out: 1225  Total Treatment Time: 25 minutes    Min Units   OT Eval Low 97165  X  1   OT Eval Medium 80220      OT Eval High 45977      OT Re-Eval [id number]       Therapeutic Ex 38431       Therapeutic Activities 04814       ADL/Self Care 23448  25  2   Orthotic Management 08316       Manual [id number]     Neuro Re-Ed 56621       Non-Billable Time              Evaluation Time additionally includes thorough review of current medical information, gathering information on past medical history/social history and prior level of function, completion of standardized testing/informal observation of tasks, assessment of data and education on plan of care and goals.             [place], MOT, OTR/L  # [id number]

## 2021-07-24 NOTE — PLAN OF CARE
Health teaching given on spirometer health benefits and methods of use.    Able to perform good return demonst and reach 2 L

## 2021-07-24 NOTE — PROGRESS NOTES
[facility]  Internal Medicine Residency Program  Progress Note - House Team     Patient:  Tyrone Wilkins 55 y.o. female MRN: [id number]     Date of Service: 7/24/2021     CC: Right calcaneal ulcer  Overnight events: no event    Subjective     Patient was seen and examined this morning at bedside in no acute distress. Yesterday evening she complained of itching in her chest and neck. She jamin, in 2014/15 she also had osteomyelitis and that time, with vancomycin she developed red man syndrome. We ordered hydroxyzine and that moderately helped with her itching. During that time she also mentioned having headache,; neurological exam showed no focal deficit; in the middle of examination she said her headache is getting better. This morning she did complained about itching but kept asking for benadryl. She had her bone biopsy yesterday, today morning she had mild pain in her biopsy sight, other than that she did not complained anything. She said her cough is increasing and now its more productive. Objective     Physical Exam:  · Vitals: BP (!) 153/71   Pulse 55   Temp 97.4 °F (36.3 °C) (Temporal)   Resp 16   Ht 5' 9\" (1.753 m)   Wt 190 lb (86.2 kg)   LMP 08/28/2013   SpO2 95%   BMI 28.06 kg/m²     · I & O - 24hr: No intake/output data recorded. · General Appearance: alert, appears stated age and cooperative  · HEENT:  Head: Normocephalic, no lesions, without obvious abnormality. · Neck: no adenopathy, no carotid bruit, no JVD, supple, symmetrical, trachea midline and thyroid not enlarged, symmetric, no tenderness/mass/nodules  · Lung: clear to auscultation bilaterally , Wheezing present in the left lung. · Heart: regular rate and rhythm, S1, S2 normal, no murmur, click, rub or gallop  · Abdomen: soft, non-tender; bowel sounds normal; no masses,  no organomegaly  · Extremities:  extremities normal, atraumatic, no cyanosis or edema  · Musculokeletal: Bandage on the right leg.  No clog on the drain. Mild joint swelling, can move her right toes, capillary refill time normal and Pulses present. ROM normal in all joints of extremities.     · Neurologic: Mental status: Alert, oriented, thought content appropriate  Subject  Pertinent Labs & Imaging Studies   jalil  CBC:   Lab Results   Component Value Date    WBC 5.7 07/24/2021    RBC 4.24 07/24/2021    HGB 11.7 07/24/2021    HCT 39.2 07/24/2021    MCV 92.5 07/24/2021    MCH 27.6 07/24/2021    MCHC 29.8 07/24/2021    RDW 15.4 07/24/2021     07/24/2021    MPV 9.5 07/24/2021     CBC with Differential:    Lab Results   Component Value Date    WBC 5.7 07/24/2021    RBC 4.24 07/24/2021    HGB 11.7 07/24/2021    HCT 39.2 07/24/2021     07/24/2021    MCV 92.5 07/24/2021    MCH 27.6 07/24/2021    MCHC 29.8 07/24/2021    RDW 15.4 07/24/2021    LYMPHOPCT 33.7 07/24/2021    MONOPCT 10.5 07/24/2021    BASOPCT 0.7 07/24/2021    MONOSABS 0.60 07/24/2021    LYMPHSABS 1.93 07/24/2021    EOSABS 0.17 07/24/2021    BASOSABS 0.04 07/24/2021     WBC:    Lab Results   Component Value Date    WBC 5.7 07/24/2021     Platelets:    Lab Results   Component Value Date     07/24/2021     Hemoglobin/Hematocrit:    Lab Results   Component Value Date    HGB 11.7 07/24/2021    HCT 39.2 07/24/2021     CMP:    Lab Results   Component Value Date     07/24/2021    K 4.7 07/24/2021    K 4.4 07/20/2021    CL 99 07/24/2021    CO2 34 07/24/2021    BUN 9 07/24/2021    CREATININE 0.9 07/24/2021    GFRAA >60 07/24/2021    LABGLOM >60 07/24/2021    GLUCOSE 122 07/24/2021    PROT 7.7 02/08/2020    LABALBU 3.5 02/08/2020    CALCIUM 8.8 07/24/2021    BILITOT 0.4 02/08/2020    ALKPHOS 103 02/08/2020    AST 10 02/08/2020    ALT 8 02/08/2020     Sodium:    Lab Results   Component Value Date     07/24/2021     Potassium:    Lab Results   Component Value Date    K 4.7 07/24/2021    K 4.4 07/20/2021     BUN/Creatinine:    Lab Results   Component Value Date    BUN 9 07/24/2021 CREATININE 0.9 07/24/2021     Hepatic Function Panel:    Lab Results   Component Value Date    ALKPHOS 103 02/08/2020    ALT 8 02/08/2020    AST 10 02/08/2020    PROT 7.7 02/08/2020    BILITOT 0.4 02/08/2020    LABALBU 3.5 02/08/2020     Albumin:    Lab Results   Component Value Date    LABALBU 3.5 02/08/2020     Calcium:    Lab Results   Component Value Date    CALCIUM 8.8 07/24/2021     Magnesium:    Lab Results   Component Value Date    MG 2.2 02/11/2020     Phosphorus:  No results found for: PHOS  Uric Acid:  No results found for: LABURIC, URICACID  PT/INR:    Lab Results   Component Value Date    PROTIME 11.5 07/22/2021    INR 1.1 07/22/2021     Warfarin PT/INR:  No components found for: Pat Odom  Troponin:    Lab Results   Component Value Date    TROPONINI <0.01 02/08/2020     Last 3 Troponin:    Lab Results   Component Value Date    TROPONINI <0.01 02/08/2020    TROPONINI <0.01 10/13/2015     U/A:    Lab Results   Component Value Date    COLORU Yellow 02/09/2020    PROTEINU Negative 02/09/2020    PHUR 5.5 02/09/2020    WBCUA 1-3 02/09/2020    RBCUA NONE 02/09/2020    BACTERIA MODERATE 02/09/2020    CLARITYU Clear 02/09/2020    SPECGRAV >=1.030 02/09/2020    LEUKOCYTESUR Negative 02/09/2020    UROBILINOGEN 0.2 02/09/2020    BILIRUBINUR Negative 02/09/2020    BLOODU TRACE-INTACT 02/09/2020    GLUCOSEU Negative 02/09/2020     ABG:  No results found for: PH, PCO2, PO2, HCO3, BE, THGB, TCO2, O2SAT  VBG:  No results found for: PHVEN, ZWQ7CWM, BEVEN, D4YACWBK  HgBA1c:    Lab Results   Component Value Date    LABA1C 6.5 07/20/2021     Microalbumen/Creatinine ratio:  No components found for: RUCREAT  TSH:    Lab Results   Component Value Date    TSH 2.570 07/20/2021       XR CHEST PORTABLE   Final Result   Normal chest         VL LOWER EXTREMITY ARTERIAL SEGMENTAL PRESSURES W PPG BILATERAL   Final Result      XR FOOT RIGHT (MIN 3 VIEWS)   Final Result   1. Amputation of the distal calcaneus.    2. Cortical irregularity likely related to erosive changes involving inferior   aspect of the calcaneus. Findings could suggest osteomyelitis. 3. Skin ulceration involving plantar soft tissue beneath the calcaneus. 4. No obvious subcutaneous gas. Resident's Assessment and Plan     Assessment and Plan:    Alma Parker is a 55 y.o. female, with past medical history of osteomyelitis, left hip injury, COPD, subclinical hypothyroidism, tremor presented to the ED with complaint of painful right heel ulcer possibly due to osteomyelitis.       1.  Osteomyelitis 2/2 repeated non healing injury v/s PAD vs Microtrauma vs malnutrition  · Wound debridement and bone biopsy yesterday  · CRP 5.5, sed rate 45 mm/hr  · WBC - 5.7  · pro calcitonin level 0.5 (7/20)  · blood culture negative  · Wound culture- Growth present, evaluating for mixed organism  · bone biopsy scheduled today  · Broad spectrum antibiotic; Vancomycin (day 4)  · Continue Gabapentin and methadone for pain   · PATRICIA 07/21/2021 - Normal ankle arm index   · HbA1C 6.5  · TSH 2.5  · Continue Vit C for wound healing   · Per ID, follow blood and wound culture        COPD (previous diagnosed)  · SpO2 95% with 2L nasal cannula   · Continue nebulization with Brovana, pulmicort, Duoneb   · If patient develops any shortness of breath or worsens her cough, then we will obtain CXR      Itching 2/2 Vancomycin  · H/O rhonda syndrome  2014/15  · Continue Hydroxyzine      Hypertension (New onset)  · /71  · Started amlodipine from this morning, Clonidine and lopressor      Polysubstance use  · On methadone 65 mg in morning; 55 mg at night   · Fentanyl patch (unsure about the dose)   · Urine toxicology positive for methadone and fentanyl (07/22/2021)  · Follow up with PM&R        Essential tremor  · Continue propranolol        Chronic pain 2/2 hip fracture vs calcaneal ulcer  · Continue Gabapentin and methadone      Left Hip fracture waiting to get clear for surgery      PT/OT evaluation: aordered; due to procedure they will evaluate today.   DVT prophylaxis/ GI prophylaxis: Levonox/  diet  Disposition: continue current care     Joan Salcedo MD, PGY-1  Attending physician: Dr. Monette Bence

## 2021-07-24 NOTE — PLAN OF CARE
Problem: Pain:  Goal: Pain level will decrease  Description: Pain level will decrease  7/23/2021 2053 by Hanane Christian RN  Outcome: Not Met This Shift  7/23/2021 2021 by Shelli Abreu RN  Outcome: Ongoing  Goal: Control of acute pain  Description: Control of acute pain  Outcome: Ongoing  Goal: Control of chronic pain  Description: Control of chronic pain  Outcome: Ongoing     Problem: Falls - Risk of:  Goal: Will remain free from falls  Description: Will remain free from falls  Outcome: Met This Shift  Goal: Absence of physical injury  Description: Absence of physical injury  Outcome: Met This Shift     Problem: Skin Integrity:  Goal: Absence of new skin breakdown  Description: Absence of new skin breakdown  Outcome: Met This Shift

## 2021-07-24 NOTE — PROGRESS NOTES
Pharmacy Consultation Note  (Antibiotic Dosing and Monitoring)    Initial consult date: 7/20/21  Consulting physician: Dr. Daria Corona  Drug(s): vancomycin  Indication: osteomyelitis      Age/  Gender Height Weight IBW Dosing weight  Allergy Information   56 y.o./female 5' 9\" (175.3 cm) 190 lb (86.2 kg)     Ideal body weight: 66.2 kg (145 lb 15.1 oz)  Adjusted ideal body weight: 74.2 kg (163 lb 9.1 oz)  74.2 kg  Ancef [cefazolin], Duricef [cefadroxil], and Iodine          Other anti-infectives Start date Stop date   Meropenem 7/20                     Date  Tmax WBC BUN/CR UOP CrCL  (mL/min) Drug/Dose Time   Given Level(s)   (Time) Comments   7/20 99.2 7.1  10/0.9 -- --  Vancomycin 1250 mg IV x 1 dose 1841     7/21 afebrile 5.5 10/0.9 -- 83 Vancomycin 1250 mg IV x 1 dose    Vancomycin 1000 mg IV q 12 hr 0704      (1900)- not given  1900 dose not give due to loss of IV access   7/22 afebrile 5.6 11/0.9 -- 83 Vancomycin 1000 mg IV q 12 hr 1025  (1900)- not given Vanco trough at 0648 is 9.8 mcg/mL Patient refused dose at 1900   7/23 afebrile -- -- -- 83 Vancomycin 1000 mg IV q 12 hr 0254  1511     7/24 afebrile 5.7 9/0.9 -- 83 Vancomycin 1000 mg IV q 12 hr 0244  <1500> Trough per ID @ 1430                                               Intake/Output Summary (Last 24 hours) at 7/24/2021 1139  Last data filed at 7/24/2021 0800  Gross per 24 hour   Intake 260 ml   Output --   Net 260 ml       Average urine output:    Cultures:    Site Date Result                    No results for input(s): Flor Patient in the last 72 hours. Historical Cultures:  Organism   Date Value Ref Range Status   02/08/2020 Haemophilus influenzae (A)  Final     No results for input(s): BC in the last 72 hours. Radiology:      Assessment:  · 55year old female on vancomycin for osteomyelitis  · Goal trough = 15 - 20 mcg/ml; Goal AUC/RICARDO 400-600  · 7/22: Scr stable at 0.9. Dose at 2300 on 7/21 not given due to IV access.  Level today of 9.8 mcg/mL is not a true trough   · 7/23: Dose at 1900 on 7/22 was refused by patient, patient accepted medication at [phone] on 7/23. I adjusted vancomycin administration times. Plan:  · Cont vancomycin 1000 mg IV q 12 hr (est AUC/RICARDO 525, trough 17.2 mcg/mL)  · ID ordered trough for today @ 1430.   · Pharmacist will follow and monitor/adjust dosing as necessary    Shari Markham PharmD, BCPS 7/24/2021 11:40 AM

## 2021-07-24 NOTE — PROGRESS NOTES
Department of Internal Medicine  Infectious Diseases  Progress  Note      C/C : Right heel wound infection, osteomyelitis     Pt is awake and alert  Denies fever or chills   Foot pain   Afebrile       Current Facility-Administered Medications   Medication Dose Route Frequency Provider Last Rate Last Admin    amLODIPine (NORVASC) tablet 5 mg  5 mg Oral Daily Molly Conte MD        enoxaparin (LOVENOX) injection 40 mg  40 mg Subcutaneous Daily Radhames Kline MD   40 mg at 07/24/21 0917    lidocaine PF 1 % injection 5 mL  5 mL Intradermal Once Radhames Kline MD        sodium chloride flush 0.9 % injection 5-40 mL  5-40 mL Intravenous 2 times per day Radhames Kline MD   10 mL at 07/23/21 2014    sodium chloride flush 0.9 % injection 5-40 mL  5-40 mL Intravenous PRN Radhames Kline MD        0.9 % sodium chloride infusion  25 mL Intravenous PRN Radhames Kline MD        heparin flush 100 UNIT/ML injection 300 Units  3 mL Intravenous 2 times per day Radhames Kline MD   300 Units at 07/23/21 2013    heparin flush 100 UNIT/ML injection 300 Units  3 mL Intracatheter PRN Radhames Kline MD        lidocaine PF 1 % injection 5 mL  5 mL Intradermal Once Radhames Kline MD        sodium chloride flush 0.9 % injection 5-40 mL  5-40 mL Intravenous 2 times per day Radhames Kline MD   10 mL at 07/23/21 2015    sodium chloride flush 0.9 % injection 5-40 mL  5-40 mL Intravenous PRN Radhames Kline MD   10 mL at 07/23/21 1510    0.9 % sodium chloride infusion  25 mL Intravenous PRN Radhames Kline MD        heparin flush 100 UNIT/ML injection 300 Units  3 mL Intravenous 2 times per day Radhames Kline MD   300 Units at 07/24/21 0917    heparin flush 100 UNIT/ML injection 300 Units  3 mL Intracatheter PRN Radhames Kline MD        propranolol (INDERAL) tablet 10 mg  10 mg Oral TID Radhames Kline MD   10 mg at 07/24/21 0916    fentaNYL (SUBLIMAZE) injection 25 mcg  25 mcg Intravenous Q1H PRN Radhames Kline MD   25 mcg at 07/24/21 0244    cloNIDine (CATAPRES) tablet 0.1 mg 0.1 mg Oral TID Kay Bedolla MD   0.1 mg at 07/24/21 0917    glucose (GLUTOSE) 40 % oral gel 15 g  15 g Oral PRN Kay Bedolla MD        dextrose 50 % IV solution  12.5 g Intravenous PRN Kay Bedolla MD        glucagon (rDNA) injection 1 mg  1 mg Intramuscular PRN Kay Bedolla MD        dextrose 5 % solution  100 mL/hr Intravenous PRN Kay Bedolla MD        ipratropium-albuterol (DUONEB) nebulizer solution 1 ampule  1 ampule Inhalation Q4H WA Kay Bedolla MD   1 ampule at 07/23/21 1556    vancomycin 1000 mg IVPB in 250 mL D5W addavial  1,000 mg Intravenous Q12H Kay Bedolla MD   Stopped at 07/24/21 0343    methadone (DOLOPHINE) 10 MG/ML solution 55 mg  55 mg Oral Nightly Kay Bedolla MD   55 mg at 07/23/21 2213    methadone (DOLOPHINE) 10 MG/ML solution 65 mg  65 mg Oral QAM AC Kay Bedolla MD   65 mg at 07/24/21 4781    gabapentin (NEURONTIN) capsule 300 mg  300 mg Oral BID Kay Bedolla MD   300 mg at 07/24/21 5725    sodium chloride flush 0.9 % injection 10 mL  10 mL Intravenous 2 times per day Kay Bedolla MD   10 mL at 07/24/21 0919    sodium chloride flush 0.9 % injection 10 mL  10 mL Intravenous PRN Kay Bedolla MD   10 mL at 07/24/21 0244    0.9 % sodium chloride infusion  25 mL Intravenous PRN Kay Bedolla MD        ondansetron (ZOFRAN-ODT) disintegrating tablet 4 mg  4 mg Oral Q8H PRN Kay Bedolla MD        Or    ondansetron [facility]) injection 4 mg  4 mg Intravenous Q6H PRN Kay Bedolla MD   4 mg at 07/22/21 2232    polyethylene glycol (GLYCOLAX) packet 17 g  17 g Oral Daily PRN Kay Bedolla MD        acetaminophen (TYLENOL) tablet 650 mg  650 mg Oral Q6H PRN Kay Bedolla MD   650 mg at 07/23/21 1511    Or    acetaminophen (TYLENOL) suppository 650 mg  650 mg Rectal Q6H PRN Kay Bedolla MD        Arformoterol Tartrate [facility]) nebulizer solution 15 mcg  15 mcg Nebulization BID Kay Bedolla MD   15 mcg at 07/22/21 2114    budesonide (PULMICORT) nebulizer suspension 500 mcg  0.5 mg Nebulization BID Demond Ca Laura Goodrich MD   500 mcg at 07/22/21 2115    ascorbic acid (VITAMIN C) tablet 500 mg  500 mg Oral Daily Rober Crigler, MD   500 mg at 07/24/21 2827           REVIEW OF SYSTEMS:      CONSTITUTIONAL:  Denies fever, chill or rigors. HEENT: denies blurring of vision or double vision, denies hearing problem  RESPIRATORY: denies cough, shortness of breath, sputum expectoration, chest pain. CARDIOVASCULAR:  Denies palpitation  GASTROINTESTINAL:  Denies abdomen pain, diarrhea or constipation. GENITOURINARY:  Denies burning urination or frequency of urination  INTEGUMENT: Chronic non healing wound right foot   HEMATOLOGIC/LYMPHATIC:  Denies lymph node swelling, gum bleeding or easy bruising. MUSCULOSKELETAL: right heel wound pain, left hip pain   NEUROLOGICAL:  weakness          PHYSICAL EXAM:      Vitals:     BP (!) 153/71   Pulse 55   Temp 97.4 °F (36.3 °C) (Temporal)   Resp 16   Ht 5' 9\" (1.753 m)   Wt 190 lb (86.2 kg)   LMP 08/28/2013   SpO2 95%   BMI 28.06 kg/m²     General Appearance:    Awake, alert , no acute distress. Head:    Normocephalic, atraumatic   Eyes:    No pallor, no icterus,   Ears:    No obvious deformity or drainage.    Nose:   No nasal drainage   Throat:   Mucosa moist, no oral thrush   Neck:   Supple, no lymphadenopathy   Back:     no CVA tenderness   Lungs:     Bilateral wheeze     Heart:    Regular rate and rhythm    Abdomen:     Soft, non-tender, bowel sounds present    Extremities:    + edema,   Right heel wound with VAC    Pulses:   Dorsalis pedis palpable    Skin:  Right foot wound VAC       CBC with Differential:      Lab Results   Component Value Date    WBC 5.7 07/24/2021    RBC 4.24 07/24/2021    HGB 11.7 07/24/2021    HCT 39.2 07/24/2021     07/24/2021    MCV 92.5 07/24/2021    MCH 27.6 07/24/2021    MCHC 29.8 07/24/2021    RDW 15.4 07/24/2021    LYMPHOPCT 33.7 07/24/2021    MONOPCT 10.5 07/24/2021    BASOPCT 0.7 07/24/2021    MONOSABS 0.60 07/24/2021    LYMPHSABS 1.93 07/24/2021    EOSABS 0.17 07/24/2021    BASOSABS 0.04 07/24/2021       CMP:    Lab Results   Component Value Date     07/24/2021    K 4.7 07/24/2021    K 4.4 07/20/2021    CL 99 07/24/2021    CO2 34 07/24/2021    BUN 9 07/24/2021    CREATININE 0.9 07/24/2021    GFRAA >60 07/24/2021    LABGLOM >60 07/24/2021    GLUCOSE 122 07/24/2021    PROT 7.7 02/08/2020    LABALBU 3.5 02/08/2020    CALCIUM 8.8 07/24/2021    BILITOT 0.4 02/08/2020    ALKPHOS 103 02/08/2020    AST 10 02/08/2020    ALT 8 02/08/2020       Hepatic Function Panel:    Lab Results   Component Value Date    ALKPHOS 103 02/08/2020    ALT 8 02/08/2020    AST 10 02/08/2020    PROT 7.7 02/08/2020    BILITOT 0.4 02/08/2020    LABALBU 3.5 02/08/2020     Vanco trough level 9.8     Microbiology :    Wound cx -         Specimen: Foot Updated: 07/22/21 2236    WOUND/ABSCESS --    Growth present, evaluating for:   Mixed Gram positive organisms    Narrative:           Radiology :    X  Ray foot     Impression:        1. Amputation of the distal calcaneus. 2. Cortical irregularity likely related to erosive changes involving inferior   aspect of the calcaneus.  Findings could suggest osteomyelitis. 3. Skin ulceration involving plantar soft tissue beneath the calcaneus. 4. No obvious subcutaneous gas.              IMPRESSION:     1. Chronic non healing right heel ulcer . Wound infection , right calceneus osteomyelitis s/p debridement  (7/22)     RECOMMENDATIONS:      1. Vancomycin 1250 mg IV q 12 hrs, meropenem 1 gram IV q 8 hrs - vanco trough level   2. Wound debridement , bone cx, pathology  3.  Breathing treatment

## 2021-07-24 NOTE — PLAN OF CARE
Problem: Pain:  Goal: Pain level will decrease  Description: Pain level will decrease  7/24/2021 0824 by Sim Woods RN  Outcome: Ongoing     Problem: Pain:  Goal: Control of acute pain  Description: Control of acute pain  7/24/2021 0824 by Sim Woods RN  Outcome: Ongoing     Problem: Pain:  Goal: Control of chronic pain  Description: Control of chronic pain  7/24/2021 0824 by Sim Woods RN  Outcome: Ongoing     Problem: Falls - Risk of:  Goal: Will remain free from falls  Description: Will remain free from falls  7/24/2021 0824 by Sim Woods RN  Outcome: Ongoing

## 2021-07-24 NOTE — PROGRESS NOTES
afternoon were clean, dry and intact without strike through. No dishevelment. Wound vac running at 125mmHg with good seal.   -No posterior calf pain on palpation b/l.   - Patient able to move digits without pain . PHYSICAL EXAM AS OF 7/21/21:  Vascular Exam:  DP and PT pulses are palpable B/L. Skin temperature warm to cool to BLE from proximal to distal. Mild edema and erythema to R foot in comparison to contralateral.      Neuro Exam:  Gross and epicritic sensation intact B/L.      Dermatologic Exam:  Full thickness wound present to R plantar heel to level of subcutaneous tissue, approx 2.5cm in length. Mixed fibrogranular wound bed with mild serous drainage. No malodor appreciated. No fluctuance or crepitus. Wound does not track, tunnel, or probe deep; small amount of bleeding with probing of wound. Hyperkeratotic rim. No other wounds appreciated.      MSK: Evidence of previous partial calcanectomy to RLE. Mild pain to palpation of RLE wound. Muscle strength WNL B/L. Digital contractures to lesser digits B/L.        Scheduled Meds:   amLODIPine  5 mg Oral Daily    hydrOXYzine  50 mg Intramuscular Daily    enoxaparin  40 mg Subcutaneous Daily    lidocaine PF  5 mL Intradermal Once    sodium chloride flush  5-40 mL Intravenous 2 times per day    heparin flush  3 mL Intravenous 2 times per day    lidocaine PF  5 mL Intradermal Once    sodium chloride flush  5-40 mL Intravenous 2 times per day    heparin flush  3 mL Intravenous 2 times per day    propranolol  10 mg Oral TID    cloNIDine  0.1 mg Oral TID    ipratropium-albuterol  1 ampule Inhalation Q4H WA    vancomycin  1,000 mg Intravenous Q12H    methadone  55 mg Oral Nightly    methadone  65 mg Oral QAM AC    gabapentin  300 mg Oral BID    sodium chloride flush  10 mL Intravenous 2 times per day    Arformoterol Tartrate  15 mcg Nebulization BID    budesonide  0.5 mg Nebulization BID    ascorbic acid  500 mg Oral Daily     Continuous Infusions:   sodium chloride      sodium chloride      dextrose      sodium chloride       PRN Meds:. HYDROcodone 5 mg - acetaminophen, fentanNYL, sodium chloride flush, sodium chloride, heparin flush, sodium chloride flush, sodium chloride, heparin flush, glucose, dextrose, glucagon (rDNA), dextrose, sodium chloride flush, sodium chloride, ondansetron **OR** ondansetron, polyethylene glycol, acetaminophen **OR** acetaminophen    RADIOLOGY:  XR CHEST PORTABLE   Final Result   Normal chest         VL LOWER EXTREMITY ARTERIAL SEGMENTAL PRESSURES W PPG BILATERAL   Final Result      XR FOOT RIGHT (MIN 3 VIEWS)   Final Result   1. Amputation of the distal calcaneus. 2. Cortical irregularity likely related to erosive changes involving inferior   aspect of the calcaneus. Findings could suggest osteomyelitis. 3. Skin ulceration involving plantar soft tissue beneath the calcaneus. 4. No obvious subcutaneous gas. BP (!) 153/71   Pulse 55   Temp 97.4 °F (36.3 °C) (Temporal)   Resp 16   Ht 5' 9\" (1.753 m)   Wt 190 lb (86.2 kg)   LMP 08/28/2013   SpO2 98%   BMI 28.06 kg/m²     LABS:    Recent Labs     07/22/21  0648 07/24/21  0400   WBC 5.6 5.7   HGB 13.3 11.7   HCT 43.0 39.2    277        Recent Labs     07/24/21  0400      K 4.7   CL 99   CO2 34*   BUN 9   CREATININE 0.9        Recent Labs     07/22/21  0648   INR 1.1   APTT 33.3         ASSESSMENT:  1.RLE Chronic ulcer-POA,Infected   2. RLE Calc OM   3. RLE partial calcanectomy  4. [address] [place] [address]   5. S/P I&D, Debridement, biopsies (DOS: 7/23/21)        PLAN:  - All labs, images and charts reviewed and evaluated. - Dressing this morning was clean, dry and intact without strike-through. Wound vac has good seal running at 125mmHg.   - NWB RLE.  - Prevalon boots previously ordered  - Wound vac changes: MWF  - XR reviewed: 1. Possible OM. 2.No soft tissue gas  - Antibiotics as per ID:Vanc  - Vascular: 1. Good flow b/l   - Surgical culture: MGP  - Surgical path: Pending   - Will continue to follow while in house.    - Discussed with Miriam Moore   7/24/21  [phone]

## 2021-07-25 LAB
ANAEROBIC CULTURE: NORMAL
ANION GAP SERPL CALCULATED.3IONS-SCNC: 5 MMOL/L (ref 7–16)
BASOPHILS ABSOLUTE: 0.04 E9/L (ref 0–0.2)
BASOPHILS RELATIVE PERCENT: 0.8 % (ref 0–2)
BLOOD CULTURE, ROUTINE: NORMAL
BUN BLDV-MCNC: 12 MG/DL (ref 6–20)
CALCIUM SERPL-MCNC: 9 MG/DL (ref 8.6–10.2)
CHLORIDE BLD-SCNC: 99 MMOL/L (ref 98–107)
CO2: 37 MMOL/L (ref 22–29)
CREAT SERPL-MCNC: 0.9 MG/DL (ref 0.5–1)
CULTURE, BLOOD 2: NORMAL
EOSINOPHILS ABSOLUTE: 0.19 E9/L (ref 0.05–0.5)
EOSINOPHILS RELATIVE PERCENT: 3.7 % (ref 0–6)
GFR AFRICAN AMERICAN: >60
GFR NON-AFRICAN AMERICAN: >60 ML/MIN/1.73
GLUCOSE BLD-MCNC: 86 MG/DL (ref 74–99)
HCT VFR BLD CALC: 41.4 % (ref 34–48)
HEMOGLOBIN: 12.4 G/DL (ref 11.5–15.5)
IMMATURE GRANULOCYTES #: 0.03 E9/L
IMMATURE GRANULOCYTES %: 0.6 % (ref 0–5)
LYMPHOCYTES ABSOLUTE: 1.48 E9/L (ref 1.5–4)
LYMPHOCYTES RELATIVE PERCENT: 28.9 % (ref 20–42)
MCH RBC QN AUTO: 27.8 PG (ref 26–35)
MCHC RBC AUTO-ENTMCNC: 30 % (ref 32–34.5)
MCV RBC AUTO: 92.8 FL (ref 80–99.9)
METER GLUCOSE: 107 MG/DL (ref 74–99)
METER GLUCOSE: 108 MG/DL (ref 74–99)
MONOCYTES ABSOLUTE: 0.56 E9/L (ref 0.1–0.95)
MONOCYTES RELATIVE PERCENT: 10.9 % (ref 2–12)
NEUTROPHILS ABSOLUTE: 2.82 E9/L (ref 1.8–7.3)
NEUTROPHILS RELATIVE PERCENT: 55.1 % (ref 43–80)
PDW BLD-RTO: 15.3 FL (ref 11.5–15)
PLATELET # BLD: 263 E9/L (ref 130–450)
PMV BLD AUTO: 9.8 FL (ref 7–12)
POTASSIUM SERPL-SCNC: 4.7 MMOL/L (ref 3.5–5)
RBC # BLD: 4.46 E12/L (ref 3.5–5.5)
SODIUM BLD-SCNC: 141 MMOL/L (ref 132–146)
WBC # BLD: 5.1 E9/L (ref 4.5–11.5)

## 2021-07-25 PROCEDURE — 6360000002 HC RX W HCPCS: Performed by: INTERNAL MEDICINE

## 2021-07-25 PROCEDURE — 2580000003 HC RX 258: Performed by: INTERNAL MEDICINE

## 2021-07-25 PROCEDURE — 6370000000 HC RX 637 (ALT 250 FOR IP): Performed by: PODIATRIST

## 2021-07-25 PROCEDURE — 82962 GLUCOSE BLOOD TEST: CPT

## 2021-07-25 PROCEDURE — 6360000002 HC RX W HCPCS: Performed by: PODIATRIST

## 2021-07-25 PROCEDURE — 80048 BASIC METABOLIC PNL TOTAL CA: CPT

## 2021-07-25 PROCEDURE — 6360000002 HC RX W HCPCS: Performed by: STUDENT IN AN ORGANIZED HEALTH CARE EDUCATION/TRAINING PROGRAM

## 2021-07-25 PROCEDURE — 2580000003 HC RX 258: Performed by: PODIATRIST

## 2021-07-25 PROCEDURE — 36415 COLL VENOUS BLD VENIPUNCTURE: CPT

## 2021-07-25 PROCEDURE — 6370000000 HC RX 637 (ALT 250 FOR IP): Performed by: INTERNAL MEDICINE

## 2021-07-25 PROCEDURE — 2700000000 HC OXYGEN THERAPY PER DAY

## 2021-07-25 PROCEDURE — 94640 AIRWAY INHALATION TREATMENT: CPT

## 2021-07-25 PROCEDURE — 99232 SBSQ HOSP IP/OBS MODERATE 35: CPT | Performed by: INTERNAL MEDICINE

## 2021-07-25 PROCEDURE — 1200000000 HC SEMI PRIVATE

## 2021-07-25 PROCEDURE — 85025 COMPLETE CBC W/AUTO DIFF WBC: CPT

## 2021-07-25 RX ADMIN — HEPARIN 300 UNITS: 100 SYRINGE at 20:58

## 2021-07-25 RX ADMIN — Medication 10 ML: at 09:53

## 2021-07-25 RX ADMIN — CLONIDINE HYDROCHLORIDE 0.1 MG: 0.1 TABLET ORAL at 20:59

## 2021-07-25 RX ADMIN — SODIUM CHLORIDE, PRESERVATIVE FREE 300 UNITS: 5 INJECTION INTRAVENOUS at 09:54

## 2021-07-25 RX ADMIN — ARFORMOTEROL TARTRATE 15 MCG: 15 SOLUTION RESPIRATORY (INHALATION) at 21:11

## 2021-07-25 RX ADMIN — CLONIDINE HYDROCHLORIDE 0.1 MG: 0.1 TABLET ORAL at 09:48

## 2021-07-25 RX ADMIN — VANCOMYCIN HYDROCHLORIDE 750 MG: 10 INJECTION, POWDER, LYOPHILIZED, FOR SOLUTION INTRAVENOUS at 22:26

## 2021-07-25 RX ADMIN — Medication 10 ML: at 20:58

## 2021-07-25 RX ADMIN — FENTANYL CITRATE 25 MCG: 0.05 INJECTION, SOLUTION INTRAMUSCULAR; INTRAVENOUS at 09:58

## 2021-07-25 RX ADMIN — VANCOMYCIN HYDROCHLORIDE 750 MG: 10 INJECTION, POWDER, LYOPHILIZED, FOR SOLUTION INTRAVENOUS at 10:22

## 2021-07-25 RX ADMIN — OXYCODONE HYDROCHLORIDE AND ACETAMINOPHEN 500 MG: 500 TABLET ORAL at 09:49

## 2021-07-25 RX ADMIN — Medication 65 MG: at 06:37

## 2021-07-25 RX ADMIN — FENTANYL CITRATE 25 MCG: 0.05 INJECTION, SOLUTION INTRAMUSCULAR; INTRAVENOUS at 04:07

## 2021-07-25 RX ADMIN — IPRATROPIUM BROMIDE AND ALBUTEROL SULFATE 1 AMPULE: .5; 3 SOLUTION RESPIRATORY (INHALATION) at 21:12

## 2021-07-25 RX ADMIN — FENTANYL CITRATE 25 MCG: 0.05 INJECTION, SOLUTION INTRAMUSCULAR; INTRAVENOUS at 21:00

## 2021-07-25 RX ADMIN — PROPRANOLOL HYDROCHLORIDE 10 MG: 10 TABLET ORAL at 09:48

## 2021-07-25 RX ADMIN — GABAPENTIN 300 MG: 300 CAPSULE ORAL at 09:49

## 2021-07-25 RX ADMIN — BUDESONIDE 500 MCG: 0.5 SUSPENSION RESPIRATORY (INHALATION) at 21:12

## 2021-07-25 RX ADMIN — PROPRANOLOL HYDROCHLORIDE 10 MG: 10 TABLET ORAL at 20:59

## 2021-07-25 RX ADMIN — AMLODIPINE BESYLATE 5 MG: 5 TABLET ORAL at 09:49

## 2021-07-25 RX ADMIN — ENOXAPARIN SODIUM 40 MG: 40 INJECTION SUBCUTANEOUS at 09:50

## 2021-07-25 RX ADMIN — MEROPENEM 1000 MG: 1 INJECTION, POWDER, FOR SOLUTION INTRAVENOUS at 18:50

## 2021-07-25 RX ADMIN — Medication 10 ML: at 22:26

## 2021-07-25 RX ADMIN — GABAPENTIN 300 MG: 300 CAPSULE ORAL at 20:59

## 2021-07-25 RX ADMIN — Medication 55 MG: at 20:59

## 2021-07-25 ASSESSMENT — PAIN DESCRIPTION - FREQUENCY
FREQUENCY: CONTINUOUS
FREQUENCY: CONTINUOUS

## 2021-07-25 ASSESSMENT — PAIN SCALES - GENERAL
PAINLEVEL_OUTOF10: 9
PAINLEVEL_OUTOF10: 9
PAINLEVEL_OUTOF10: 10
PAINLEVEL_OUTOF10: 9
PAINLEVEL_OUTOF10: 10
PAINLEVEL_OUTOF10: 4

## 2021-07-25 ASSESSMENT — PAIN DESCRIPTION - LOCATION
LOCATION: LEG
LOCATION: LEG

## 2021-07-25 ASSESSMENT — PAIN DESCRIPTION - PAIN TYPE
TYPE: ACUTE PAIN
TYPE: ACUTE PAIN

## 2021-07-25 ASSESSMENT — PAIN DESCRIPTION - DESCRIPTORS
DESCRIPTORS: ACHING
DESCRIPTORS: ACHING

## 2021-07-25 ASSESSMENT — PAIN DESCRIPTION - ORIENTATION
ORIENTATION: RIGHT
ORIENTATION: RIGHT

## 2021-07-25 ASSESSMENT — PAIN DESCRIPTION - ONSET
ONSET: ON-GOING
ONSET: ON-GOING

## 2021-07-25 ASSESSMENT — PAIN DESCRIPTION - PROGRESSION: CLINICAL_PROGRESSION: NOT CHANGED

## 2021-07-25 NOTE — PROGRESS NOTES
Department of Internal Medicine  Infectious Diseases  Progress  Note      C/C : Right heel wound infection, osteomyelitis     Pt is awake and alert  Denies fever or chills   Foot pain   Afebrile       Current Facility-Administered Medications   Medication Dose Route Frequency Provider Last Rate Last Admin    vancomycin (VANCOCIN) 750 mg in dextrose 5 % 250 mL IVPB  750 mg Intravenous Q12H Danica Holliday  mL/hr at 07/25/21 1022 750 mg at 07/25/21 1022    amLODIPine (NORVASC) tablet 5 mg  5 mg Oral Daily Claudio Ding MD   5 mg at 07/25/21 0949    HYDROcodone-acetaminophen (NORCO) 5-325 MG per tablet 1 tablet  1 tablet Oral Q4H PRN Claudio Ding MD        hydrOXYzine (VISTARIL) injection 50 mg  50 mg Intramuscular Daily Yolande Marquez MD   50 mg at 07/24/21 1445    fentaNYL (SUBLIMAZE) injection 25 mcg  25 mcg Intravenous Q6H PRN Claudio Ding MD   25 mcg at 07/25/21 0958    enoxaparin (LOVENOX) injection 40 mg  40 mg Subcutaneous Daily Sissy Cox MD   40 mg at 07/25/21 0950    lidocaine PF 1 % injection 5 mL  5 mL Intradermal Once Sissy Cox MD        sodium chloride flush 0.9 % injection 5-40 mL  5-40 mL Intravenous 2 times per day Sissy Cox MD   10 mL at 07/25/21 0953    sodium chloride flush 0.9 % injection 5-40 mL  5-40 mL Intravenous PRN Sissy Cox MD        0.9 % sodium chloride infusion  25 mL Intravenous PRN Sissy Cox MD        heparin flush 100 UNIT/ML injection 300 Units  3 mL Intravenous 2 times per day Sissy Cox MD   300 Units at 07/25/21 0954    heparin flush 100 UNIT/ML injection 300 Units  3 mL Intracatheter PRN Sissy Cox MD        lidocaine PF 1 % injection 5 mL  5 mL Intradermal Once Sissy Cox MD        sodium chloride flush 0.9 % injection 5-40 mL  5-40 mL Intravenous 2 times per day Sissy Cox MD   10 mL at 07/25/21 0953    sodium chloride flush 0.9 % injection 5-40 mL  5-40 mL Intravenous PRN Sissy Cox MD   10 mL at 07/23/21 1510    0.9 % sodium chloride infusion  25 mL Intravenous PRN Yissel Villarreal MD        heparin flush 100 UNIT/ML injection 300 Units  3 mL Intravenous 2 times per day Yissel Villarreal MD   300 Units at 07/25/21 0954    heparin flush 100 UNIT/ML injection 300 Units  3 mL Intracatheter PRN Yissel Villarreal MD        propranolol (INDERAL) tablet 10 mg  10 mg Oral TID Yissel Villarreal MD   10 mg at 07/25/21 0948    cloNIDine (CATAPRES) tablet 0.1 mg  0.1 mg Oral TID Yissel Villarreal MD   0.1 mg at 07/25/21 0948    glucose (GLUTOSE) 40 % oral gel 15 g  15 g Oral PRN Yissel Villarreal MD        dextrose 50 % IV solution  12.5 g Intravenous PRN Yissel Villarreal MD        glucagon (rDNA) injection 1 mg  1 mg Intramuscular PRN Yissel Villarreal MD        dextrose 5 % solution  100 mL/hr Intravenous PRN Yissel Villarreal MD        ipratropium-albuterol (DUONEB) nebulizer solution 1 ampule  1 ampule Inhalation Q4H WA Yissel Villarreal MD   1 ampule at 07/24/21 1707    methadone (DOLOPHINE) 10 MG/ML solution 55 mg  55 mg Oral Nightly Yissel Villarreal MD   55 mg at 07/24/21 2156    methadone (DOLOPHINE) 10 MG/ML solution 65 mg  65 mg Oral QAM AC Yissel Villarreal MD   65 mg at 07/25/21 2968    gabapentin (NEURONTIN) capsule 300 mg  300 mg Oral BID Yissel Villarreal MD   300 mg at 07/25/21 0949    sodium chloride flush 0.9 % injection 10 mL  10 mL Intravenous 2 times per day Yissel Villarreal MD   10 mL at 07/25/21 0953    sodium chloride flush 0.9 % injection 10 mL  10 mL Intravenous PRN Yissel Villarreal MD   10 mL at 07/24/21 0244    0.9 % sodium chloride infusion  25 mL Intravenous PRN Yissel Villarreal MD        ondansetron (ZOFRAN-ODT) disintegrating tablet 4 mg  4 mg Oral Q8H PRN Yissel Villarreal MD        Or    ondansetron [facility]) injection 4 mg  4 mg Intravenous Q6H PRN Yissel Villarreal MD   4 mg at 07/22/21 2232    polyethylene glycol (GLYCOLAX) packet 17 g  17 g Oral Daily PRN Yissel Villarreal MD        acetaminophen (TYLENOL) tablet 650 mg  650 mg Oral Q6H PRN Yissel Villarreal MD   650 mg at 07/23/21 1511    Or    acetaminophen (TYLENOL) suppository 650 mg  650 mg Rectal Q6H PRN Virginia Coulter MD        Arformoterol Tartrate [facility]) nebulizer solution 15 mcg  15 mcg Nebulization BID Virginia Coulter MD   15 mcg at 07/22/21 2114    budesonide (PULMICORT) nebulizer suspension 500 mcg  0.5 mg Nebulization BID Virginia Coulter MD   500 mcg at 07/22/21 2115    ascorbic acid (VITAMIN C) tablet 500 mg  500 mg Oral Daily Virginia Coulter MD   500 mg at 07/25/21 0949           REVIEW OF SYSTEMS:      CONSTITUTIONAL:  Denies fever, chill or rigors. HEENT: denies blurring of vision or double vision, denies hearing problem  RESPIRATORY: denies cough, shortness of breath, sputum expectoration, chest pain. CARDIOVASCULAR:  Denies palpitation  GASTROINTESTINAL:  Denies abdomen pain, diarrhea or constipation. GENITOURINARY:  Denies burning urination or frequency of urination  INTEGUMENT: Chronic non healing wound right foot   HEMATOLOGIC/LYMPHATIC:  Denies lymph node swelling, gum bleeding or easy bruising. MUSCULOSKELETAL: right heel wound pain, left hip pain   NEUROLOGICAL:  weakness          PHYSICAL EXAM:      Vitals:     /72   Pulse 64   Temp 98.2 °F (36.8 °C) (Temporal)   Resp 16   Ht 5' 9\" (1.753 m)   Wt 190 lb (86.2 kg)   LMP 08/28/2013   SpO2 91%   BMI 28.06 kg/m²     General Appearance:    Awake, alert , no acute distress. Head:    Normocephalic, atraumatic   Eyes:    No pallor, no icterus,   Ears:    No obvious deformity or drainage.    Nose:   No nasal drainage   Throat:   Mucosa moist, no oral thrush   Neck:   Supple, no lymphadenopathy   Back:     no CVA tenderness   Lungs:      Wheeze     Heart:    Regular rate and rhythm    Abdomen:     Soft, non-tender, bowel sounds present    Extremities:    + edema,   Right heel wound with VAC    Pulses:   Dorsalis pedis palpable    Skin:  Right foot wound VAC       PICC line - 7/22     CBC with Differential:      Lab Results   Component Value Date    WBC 5.1 07/25/2021    RBC 4.46 07/25/2021 HGB 12.4 07/25/2021    HCT 41.4 07/25/2021     07/25/2021    MCV 92.8 07/25/2021    MCH 27.8 07/25/2021    MCHC 30.0 07/25/2021    RDW 15.3 07/25/2021    LYMPHOPCT 28.9 07/25/2021    MONOPCT 10.9 07/25/2021    BASOPCT 0.8 07/25/2021    MONOSABS 0.56 07/25/2021    LYMPHSABS 1.48 07/25/2021    EOSABS 0.19 07/25/2021    BASOSABS 0.04 07/25/2021       CMP:    Lab Results   Component Value Date     07/25/2021    K 4.7 07/25/2021    K 4.4 07/20/2021    CL 99 07/25/2021    CO2 37 07/25/2021    BUN 12 07/25/2021    CREATININE 0.9 07/25/2021    GFRAA >60 07/25/2021    LABGLOM >60 07/25/2021    GLUCOSE 86 07/25/2021    PROT 7.7 02/08/2020    LABALBU 3.5 02/08/2020    CALCIUM 9.0 07/25/2021    BILITOT 0.4 02/08/2020    ALKPHOS 103 02/08/2020    AST 10 02/08/2020    ALT 8 02/08/2020       Hepatic Function Panel:    Lab Results   Component Value Date    ALKPHOS 103 02/08/2020    ALT 8 02/08/2020    AST 10 02/08/2020    PROT 7.7 02/08/2020    BILITOT 0.4 02/08/2020    LABALBU 3.5 02/08/2020     Vanco trough level 9.8     Microbiology :    Wound cx -    Mixed bryce isolated. Further workup and sensitivity testing   is not routinely indicated and will not be performed. Mixed bryce isolated includes:   Oxidase positive Gram negative rods   Mixed Corynebacteria   Coagulase negative Staph species   Catalase negative Gram positive rods      Narrative:           Radiology :    X  Ray foot     Impression:        1. Amputation of the distal calcaneus. 2. Cortical irregularity likely related to erosive changes involving inferior   aspect of the calcaneus.  Findings could suggest osteomyelitis. 3. Skin ulceration involving plantar soft tissue beneath the calcaneus. 4. No obvious subcutaneous gas.          Vanco trough 17.4      IMPRESSION:     1. Chronic non healing right heel ulcer .  Wound infection , right calceneus osteomyelitis s/p debridement  (7/22)     RECOMMENDATIONS:      1. Vancomycin 1250 mg IV q 12 hrs, meropenem 1 gram IV q 8 hrs X 5 weeks    2.  ID follow up in 2-3 weeks

## 2021-07-25 NOTE — PROGRESS NOTES
Pharmacy Consultation Note  (Antibiotic Dosing and Monitoring)    Initial consult date: 7/20/21  Consulting physician: Dr. Vickie Christianson  Drug(s): vancomycin  Indication: osteomyelitis      Age/  Gender Height Weight IBW Dosing weight  Allergy Information   56 y.o./female 5' 9\" (175.3 cm) 190 lb (86.2 kg)     Ideal body weight: 66.2 kg (145 lb 15.1 oz)  Adjusted ideal body weight: 74.2 kg (163 lb 9.1 oz)  74.2 kg  Ancef [cefazolin], Duricef [cefadroxil], and Iodine          Other anti-infectives Start date Stop date   Meropenem 7/20                     Date  Tmax WBC BUN/CR UOP CrCL  (mL/min) Drug/Dose Time   Given Level(s)   (Time) Comments   7/20 99.2 7.1  10/0.9 -- --  Vancomycin 1250 mg IV x 1 dose 1841     7/21 afebrile 5.5 10/0.9 -- 83 Vancomycin 1250 mg IV x 1 dose    Vancomycin 1000 mg IV q 12 hr 0704      (1900)- not given  1900 dose not give due to loss of IV access   7/22 afebrile 5.6 11/0.9 -- 83 Vancomycin 1000 mg IV q 12 hr 1025  (1900)- not given Vanco trough at 0648 is 9.8 mcg/mL Patient refused dose at 1900   7/23 afebrile -- -- -- 83 Vancomycin 1000 mg IV q 12 hr 0254  1511     7/24 afebrile 5.7 9/0.9 -- 83 Vancomycin 1000 mg IV q 12 hr 0244  2153 Trough per ID @ 9825 = 17.4 mcg/mL    7/25 afebrile 5.1 12/0.9 -- 83 Vancomycin 750 mg IV q12hr <1000>  <2200>                                    Intake/Output Summary (Last 24 hours) at 7/25/2021 0900  Last data filed at 7/25/2021 0610  Gross per 24 hour   Intake 310 ml   Output --   Net 310 ml       Average urine output:    Cultures:    Site Date Result                    No results for input(s): Louis Read in the last 72 hours. Historical Cultures:  Organism   Date Value Ref Range Status   02/08/2020 Haemophilus influenzae (A)  Final     No results for input(s): BC in the last 72 hours. Radiology:      Assessment:  · 55year old female on vancomycin for osteomyelitis  · Goal trough = 15 - 20 mcg/ml; Goal AUC/RICARDO 400-600  · 7/22: Scr stable at 0.9. Dose at 2300 on 7/21 not given due to IV access. Level today of 9.8 mcg/mL is not a true trough   · 7/23: Dose at 1900 on 7/22 was refused by patient, patient accepted medication at [phone] on 7/23. I adjusted vancomycin administration times. · 7/24: Trough (14 hour level) = 17.4 mcg. mL. True trough ~ 20.1 mcg/mL, AUC/RICARDO 598.      Plan:  · Decrease dose to vancomycin 750 mg IV q 12 hr (est AUC/RICARDO 456, trough 15.3 mcg/mL)  · Pharmacist will follow and monitor/adjust dosing as necessary    Jm Cartagena, PharmD, BCPS 7/25/2021 9:00 AM

## 2021-07-25 NOTE — PROGRESS NOTES
Cortical irregularity likely related to erosive changes involving inferior   aspect of the calcaneus. Findings could suggest osteomyelitis. 3. Skin ulceration involving plantar soft tissue beneath the calcaneus. 4. No obvious subcutaneous gas. Resident's Assessment and Plan     Assessment and Plan:    · Nathaly Santos a 55 y. o. female, with past medical history of osteomyelitis, left hip injury, COPD, subclinical hypothyroidism, tremor presented to the ED with complaint of painful right heel ulcer possibly due to osteomyelitis. Wound debribement was done on Friday. Wound culture- Growth present, evaluating for mixed organism; Surgical site - Growth present, evaluating for Gram positive organisms. She is on vancomycin(day 6).       1. Osteomyelitis 2/2 repeated non healing injury v/s PAD vs Microtrauma vs malnutrition  · Wound debridement and bone biopsy done on friday  · CRP 5.5, sed rate 45 mm/hr (on admission))  · WBC - 5.1  · pro calcitonin level 0.5 (7/20)  · blood culture negative  · Wound culture- Growth present, evaluating for mixed organism  · Surgical site - Growth present, evaluating for Gram positive organisms   · Broad spectrum antibiotic; Vancomycin (day 6); following up with ID for broad spectrum covering.   · Continue Gabapentin and methadone for pain ;fentanyl PRN  · PATRICIA 07/21/2021 - Normal ankle arm index   · HbA1C 6.5  · TSH 2.5  · Continue Vit C for wound healing   · Per ID, follow blood and wound culture    COPD (previous diagnosed)  · SpO2 95% with 2L nasal cannula   · Continue nebulization with Brovana, pulmicort, Duoneb   · CXR- There are no infiltrates or effusions. (07/22/2021)    Essential tremor  · Continue propranolol      Hypertension (New onset)  · BP 153/71  · Started amlodipine from this morning, Clonidine and lopressor        Polysubstance use  · On methadone 65 mg in morning; 55 mg at night   · Fentanyl patch (unsure about the dose)   · Urine toxicology positive for methadone and fentanyl (07/22/2021)  · Follow up with PM&R      Chronic pain 2/2 hip fracture vs calcaneal ulcer  · Continue Gabapentin and methadone        Left Hip fracture waiting to get clear for surgery       PT/OT evaluation: Yes  DVT prophylaxis/ GI prophylaxis: Levonox/ diet  Disposition: continue current care     Cesario Portillo MD, PGY-1  Attending physician: Dr. Antoni Adams

## 2021-07-25 NOTE — PLAN OF CARE
Interval H&P    Saba Sullivan is a 55 y. o. female, with past medical history of osteomyelitis, left hip injury, COPD, subclinical hypothyroidism, tremor presented to the ED with complaint of painful right heel ulcer possibly due to osteomyelitis. Wound debribement was done on Friday. Wound culture- Growth present, evaluating for mixed organism; Surgical site - Growth present, evaluating for Gram positive organisms. She is on vancomycin(day 6). Please follow her antibiotic as per her culture report with ID. For COPD, she is on breathing treatment. She has H/O  polysubstance use with methadone and fentanyl. So now she is on this two pain medication. New onset of hypertension, for which she is on Clonidine, norvasc and metoprolol. She has essential tremor for which she is on propranolol. Patient also mentioned, she wants to go to facility home from here. PT/OT is already ordered.

## 2021-07-25 NOTE — PROGRESS NOTES
[facility]    Attending Physician Statement  I have discussed the case, including pertinent history and exam findings with the resident physician. I have seen and examined the patient and the key elements of the encounter have been performed by me. I agree with the assessment, plan and orders as documented by the resident. I have reviewed all pertinent PMHx, PSHx, FamHx, SocialHx, medications, and allergies and updated history as appropriate. Long standing history of recurrent osteomyelitis on right calcaneus, bone culture showed polymicrobial organisms present and pt is currently on vancomycin, initially received one dose of meropenem x 1. Pt reported mild degree of heel pain. Pt also developed mild SOB with wheezing with HO of COPD, comfortable tiwh 2 L NC O2 at this time, recommended weaning off O2 as pt is tolerated. Remainder of medical problems as per resident note.     Phil Driscoll MD  7/25/2021 10:36 AM

## 2021-07-26 ENCOUNTER — TELEPHONE (OUTPATIENT)
Dept: FAMILY MEDICINE CLINIC | Age: 56
End: 2021-07-26

## 2021-07-26 LAB
ANION GAP SERPL CALCULATED.3IONS-SCNC: 10 MMOL/L (ref 7–16)
BASOPHILS ABSOLUTE: 0.04 E9/L (ref 0–0.2)
BASOPHILS RELATIVE PERCENT: 0.7 % (ref 0–2)
BUN BLDV-MCNC: 12 MG/DL (ref 6–20)
CALCIUM SERPL-MCNC: 9 MG/DL (ref 8.6–10.2)
CHLORIDE BLD-SCNC: 100 MMOL/L (ref 98–107)
CO2: 32 MMOL/L (ref 22–29)
CREAT SERPL-MCNC: 0.9 MG/DL (ref 0.5–1)
EOSINOPHILS ABSOLUTE: 0.18 E9/L (ref 0.05–0.5)
EOSINOPHILS RELATIVE PERCENT: 3.1 % (ref 0–6)
GFR AFRICAN AMERICAN: >60
GFR NON-AFRICAN AMERICAN: >60 ML/MIN/1.73
GLUCOSE BLD-MCNC: 88 MG/DL (ref 74–99)
HCT VFR BLD CALC: 41.2 % (ref 34–48)
HEMOGLOBIN: 12.3 G/DL (ref 11.5–15.5)
IMMATURE GRANULOCYTES #: 0.04 E9/L
IMMATURE GRANULOCYTES %: 0.7 % (ref 0–5)
LYMPHOCYTES ABSOLUTE: 1.5 E9/L (ref 1.5–4)
LYMPHOCYTES RELATIVE PERCENT: 25.6 % (ref 20–42)
MCH RBC QN AUTO: 27.8 PG (ref 26–35)
MCHC RBC AUTO-ENTMCNC: 29.9 % (ref 32–34.5)
MCV RBC AUTO: 93.2 FL (ref 80–99.9)
METER GLUCOSE: 275 MG/DL (ref 74–99)
MONOCYTES ABSOLUTE: 0.58 E9/L (ref 0.1–0.95)
MONOCYTES RELATIVE PERCENT: 9.9 % (ref 2–12)
NEUTROPHILS ABSOLUTE: 3.51 E9/L (ref 1.8–7.3)
NEUTROPHILS RELATIVE PERCENT: 60 % (ref 43–80)
PDW BLD-RTO: 15.3 FL (ref 11.5–15)
PLATELET # BLD: 264 E9/L (ref 130–450)
PMV BLD AUTO: 9.6 FL (ref 7–12)
POTASSIUM SERPL-SCNC: 4.8 MMOL/L (ref 3.5–5)
RBC # BLD: 4.42 E12/L (ref 3.5–5.5)
SODIUM BLD-SCNC: 142 MMOL/L (ref 132–146)
WBC # BLD: 5.9 E9/L (ref 4.5–11.5)

## 2021-07-26 PROCEDURE — 6360000002 HC RX W HCPCS: Performed by: INTERNAL MEDICINE

## 2021-07-26 PROCEDURE — 6360000002 HC RX W HCPCS: Performed by: PODIATRIST

## 2021-07-26 PROCEDURE — 6370000000 HC RX 637 (ALT 250 FOR IP): Performed by: INTERNAL MEDICINE

## 2021-07-26 PROCEDURE — 6370000000 HC RX 637 (ALT 250 FOR IP): Performed by: PODIATRIST

## 2021-07-26 PROCEDURE — 2580000003 HC RX 258: Performed by: INTERNAL MEDICINE

## 2021-07-26 PROCEDURE — 36415 COLL VENOUS BLD VENIPUNCTURE: CPT

## 2021-07-26 PROCEDURE — 80048 BASIC METABOLIC PNL TOTAL CA: CPT

## 2021-07-26 PROCEDURE — 94640 AIRWAY INHALATION TREATMENT: CPT

## 2021-07-26 PROCEDURE — 82962 GLUCOSE BLOOD TEST: CPT

## 2021-07-26 PROCEDURE — 85025 COMPLETE CBC W/AUTO DIFF WBC: CPT

## 2021-07-26 PROCEDURE — 99239 HOSP IP/OBS DSCHRG MGMT >30: CPT | Performed by: INTERNAL MEDICINE

## 2021-07-26 PROCEDURE — 2700000000 HC OXYGEN THERAPY PER DAY

## 2021-07-26 PROCEDURE — 97161 PT EVAL LOW COMPLEX 20 MIN: CPT

## 2021-07-26 PROCEDURE — 2580000003 HC RX 258: Performed by: PODIATRIST

## 2021-07-26 PROCEDURE — 97530 THERAPEUTIC ACTIVITIES: CPT

## 2021-07-26 PROCEDURE — 1200000000 HC SEMI PRIVATE

## 2021-07-26 RX ORDER — MORPHINE SULFATE 15 MG/1
15 TABLET, FILM COATED, EXTENDED RELEASE ORAL EVERY 12 HOURS SCHEDULED
Status: DISCONTINUED | OUTPATIENT
Start: 2021-07-26 | End: 2021-07-30 | Stop reason: HOSPADM

## 2021-07-26 RX ORDER — DIPHENHYDRAMINE HCL 25 MG
25 TABLET ORAL EVERY 12 HOURS PRN
Status: DISCONTINUED | OUTPATIENT
Start: 2021-07-26 | End: 2021-07-30 | Stop reason: HOSPADM

## 2021-07-26 RX ADMIN — FENTANYL CITRATE 25 MCG: 0.05 INJECTION, SOLUTION INTRAMUSCULAR; INTRAVENOUS at 03:58

## 2021-07-26 RX ADMIN — SODIUM CHLORIDE, PRESERVATIVE FREE 300 UNITS: 5 INJECTION INTRAVENOUS at 09:47

## 2021-07-26 RX ADMIN — CLONIDINE HYDROCHLORIDE 0.1 MG: 0.1 TABLET ORAL at 08:26

## 2021-07-26 RX ADMIN — ENOXAPARIN SODIUM 40 MG: 40 INJECTION SUBCUTANEOUS at 08:25

## 2021-07-26 RX ADMIN — HYDROCODONE BITARTRATE AND ACETAMINOPHEN 1 TABLET: 5; 325 TABLET ORAL at 12:54

## 2021-07-26 RX ADMIN — FENTANYL CITRATE 25 MCG: 0.05 INJECTION, SOLUTION INTRAMUSCULAR; INTRAVENOUS at 09:49

## 2021-07-26 RX ADMIN — Medication 10 ML: at 09:51

## 2021-07-26 RX ADMIN — Medication 10 ML: at 21:09

## 2021-07-26 RX ADMIN — OXYCODONE HYDROCHLORIDE AND ACETAMINOPHEN 500 MG: 500 TABLET ORAL at 08:26

## 2021-07-26 RX ADMIN — MEROPENEM 1000 MG: 1 INJECTION, POWDER, FOR SOLUTION INTRAVENOUS at 02:11

## 2021-07-26 RX ADMIN — CLONIDINE HYDROCHLORIDE 0.1 MG: 0.1 TABLET ORAL at 12:55

## 2021-07-26 RX ADMIN — SODIUM CHLORIDE, PRESERVATIVE FREE 300 UNITS: 5 INJECTION INTRAVENOUS at 21:08

## 2021-07-26 RX ADMIN — AMLODIPINE BESYLATE 5 MG: 5 TABLET ORAL at 08:25

## 2021-07-26 RX ADMIN — Medication 65 MG: at 06:49

## 2021-07-26 RX ADMIN — VANCOMYCIN HYDROCHLORIDE 750 MG: 10 INJECTION, POWDER, LYOPHILIZED, FOR SOLUTION INTRAVENOUS at 21:20

## 2021-07-26 RX ADMIN — SODIUM CHLORIDE, PRESERVATIVE FREE 300 UNITS: 5 INJECTION INTRAVENOUS at 21:03

## 2021-07-26 RX ADMIN — MORPHINE SULFATE 15 MG: 15 TABLET, FILM COATED, EXTENDED RELEASE ORAL at 21:07

## 2021-07-26 RX ADMIN — HYDROCODONE BITARTRATE AND ACETAMINOPHEN 1 TABLET: 5; 325 TABLET ORAL at 21:17

## 2021-07-26 RX ADMIN — PROPRANOLOL HYDROCHLORIDE 10 MG: 10 TABLET ORAL at 08:26

## 2021-07-26 RX ADMIN — Medication 10 ML: at 21:06

## 2021-07-26 RX ADMIN — VANCOMYCIN HYDROCHLORIDE 750 MG: 10 INJECTION, POWDER, LYOPHILIZED, FOR SOLUTION INTRAVENOUS at 09:51

## 2021-07-26 RX ADMIN — ARFORMOTEROL TARTRATE 15 MCG: 15 SOLUTION RESPIRATORY (INHALATION) at 08:23

## 2021-07-26 RX ADMIN — SODIUM CHLORIDE, PRESERVATIVE FREE 300 UNITS: 5 INJECTION INTRAVENOUS at 08:25

## 2021-07-26 RX ADMIN — IPRATROPIUM BROMIDE AND ALBUTEROL SULFATE 1 AMPULE: .5; 3 SOLUTION RESPIRATORY (INHALATION) at 08:23

## 2021-07-26 RX ADMIN — MEROPENEM 1000 MG: 1 INJECTION, POWDER, FOR SOLUTION INTRAVENOUS at 09:47

## 2021-07-26 RX ADMIN — Medication 55 MG: at 21:03

## 2021-07-26 RX ADMIN — CLONIDINE HYDROCHLORIDE 0.1 MG: 0.1 TABLET ORAL at 21:03

## 2021-07-26 RX ADMIN — GABAPENTIN 300 MG: 300 CAPSULE ORAL at 21:07

## 2021-07-26 RX ADMIN — MEROPENEM 1000 MG: 1 INJECTION, POWDER, FOR SOLUTION INTRAVENOUS at 17:31

## 2021-07-26 RX ADMIN — HYDROCODONE BITARTRATE AND ACETAMINOPHEN 1 TABLET: 5; 325 TABLET ORAL at 17:30

## 2021-07-26 RX ADMIN — PROPRANOLOL HYDROCHLORIDE 10 MG: 10 TABLET ORAL at 21:07

## 2021-07-26 RX ADMIN — PROPRANOLOL HYDROCHLORIDE 10 MG: 10 TABLET ORAL at 12:55

## 2021-07-26 RX ADMIN — GABAPENTIN 300 MG: 300 CAPSULE ORAL at 08:25

## 2021-07-26 RX ADMIN — BUDESONIDE 500 MCG: 0.5 SUSPENSION RESPIRATORY (INHALATION) at 08:23

## 2021-07-26 ASSESSMENT — PAIN DESCRIPTION - LOCATION
LOCATION: FOOT

## 2021-07-26 ASSESSMENT — PAIN DESCRIPTION - ONSET: ONSET: ON-GOING

## 2021-07-26 ASSESSMENT — PAIN DESCRIPTION - PAIN TYPE
TYPE: ACUTE PAIN
TYPE: ACUTE PAIN
TYPE: SURGICAL PAIN
TYPE: ACUTE PAIN
TYPE: ACUTE PAIN

## 2021-07-26 ASSESSMENT — PAIN SCALES - GENERAL
PAINLEVEL_OUTOF10: 4
PAINLEVEL_OUTOF10: 9
PAINLEVEL_OUTOF10: 4
PAINLEVEL_OUTOF10: 9
PAINLEVEL_OUTOF10: 3
PAINLEVEL_OUTOF10: 0
PAINLEVEL_OUTOF10: 9
PAINLEVEL_OUTOF10: 7
PAINLEVEL_OUTOF10: 8

## 2021-07-26 ASSESSMENT — PAIN DESCRIPTION - ORIENTATION
ORIENTATION: RIGHT

## 2021-07-26 ASSESSMENT — PAIN DESCRIPTION - DESCRIPTORS
DESCRIPTORS: ACHING
DESCRIPTORS: ACHING;CONSTANT;DISCOMFORT
DESCRIPTORS: ACHING;CONSTANT;DISCOMFORT

## 2021-07-26 ASSESSMENT — PAIN DESCRIPTION - FREQUENCY: FREQUENCY: CONTINUOUS

## 2021-07-26 ASSESSMENT — PAIN SCALES - WONG BAKER: WONGBAKER_NUMERICALRESPONSE: 0

## 2021-07-26 NOTE — PROGRESS NOTES
Physical Therapy  Physical Therapy Initial Assessment     Name: Melanie Sessions  : 1965  MRN: [id number]      Date of Service: 2021    Evaluating PT:  Alex Galarza, PT [id number]      Room #:  7867/4406-A  Diagnosis:  Osteomyelitis [facility]) [M86.9]  PMHx/PSHx:     has a past surgical history that includes Foot surgery;  section; drain skin abscess simple (2014); and Foot Debridement (Right, 2021). has a past surgical history that includes Foot surgery;  section; drain skin abscess simple (2014); and Foot Debridement (Right, 2021). Procedure/Surgery:  s/p Right foot I&D, debridement, biopsy and wound vac application (DOS: 32). Precautions:  Falls, NWB (non-weight bearing) RLE, skin integrity, planning on L THR once cleared. Wound vac present RLE foot. Equipment Needs: To be determined. SUBJECTIVE:    Patient lives alone  in a two story home resides first  with Ramp to enter. Bed is on 1 floor and bath is on 1 floor. Patient ambulated independently  PTA. Equipment owned: Wheelchair, Wheeled Walker and [facility] [place],  Has 1 crutch in the room. OBJECTIVE:   Initial Evaluation  Date: 21 Treatment Short Term/ Long Term   Goals   AM-PAC 6 Clicks      Was pt agreeable to Eval/treatment? yes     Does pt have pain? yes     Bed Mobility  Rolling: Ind  Supine to sit: Sup to maintain NWB RLE. Sit to supine: Sup  Scooting: Sup  Rolling: Ind  Supine to sit: Ind  Sit to supine: Ind  Scooting: Ind   Transfers Sit to stand: Min to fww  Stand to sit: Min  Stand pivot: Min with fww  Sit to stand: Mod Ind  Stand to sit: Mod Ind  Stand pivot: Mod Ind   Ambulation    NT   Most likely to complete locomotion. Stair negotiation: ascended and descended  NT  Ramped entrance. ROM BUE:  wfl   BLE:  wfl     Strength BUE: wfl   RLE:  4-/5  LLE:   4/5  4+/5   Balance Sitting EOB:  Ind  Dynamic Standing: Min with fww cues for safety.    Sitting EOB:  Ind  Dynamic Standing: Mod Ind     Patient is Alert & Oriented x person, place, time and situation and follows directions   Sensation:  Pt denies numbness and tingling to extremities  Edema:  none    Therapeutic Exercises:  Functional activity     Patient education  Pt educated on role of PT and purpose of eval.     Patient response to education:   Pt verbalized understanding Pt demonstrated skill Pt requires further education in this area   yes yes Reminders     ASSESSMENT:    Conditions Requiring Skilled Therapeutic Intervention:    [x]Decreased strength     [x]Decreased ROM  [x]Decreased functional mobility  [x]Decreased balance   [x]Decreased endurance   [x]Decreased posture  []Decreased sensation  []Decreased coordination   []Decreased vision  [x]Decreased safety awareness   []Increased pain       Comments:    RN cleared patient for participation in therapy session. Patient was seen this date for PT evaluation. Patient was agreeable to intervention. Results of the functional assessment are noted above. Upon entering the room patient was found supine in bed. Cues for bed mobility. Sat EOB x 5 minutes to increase dynamic sitting balance and activity tolerance. Transfer completed to and from bedside commode. Cues required for sequencing. At end of session, patient in bed with  call light and phone within reach,  all lines and tubes intact, nursing notified. This patient can benefit from the continuation of skilled PT  to maximize functional level and return to PLOF.    Treatment:  Patient practiced and was instructed in the following treatment:    · Bed mobility training - pt given verbal and tactile cues to facilitate proper sequencing and safety during rolling and supine>sit as well as provided with physical assistance to complete task    · Assistive device training - pt educated on using, [employer] approximation/negotiation, and hand placement during sit<>stand to [employer]  · STS and transfer training - educated on hand/foot placement, safety, and sequencing during STS and pivot transfers using assistive device    Pt's/ family goals   1. Home when needed. Prognosis is  good for reaching above PT goals. Patient and or family understand(s) diagnosis, prognosis, and plan of care. yes    PHYSICAL THERAPY PLAN OF CARE:    PT POC is established based on physician order and patient diagnosis     Referring provider/PT Order:    07/23/21 0945  PT eval and treat     Dara Dowd MD       Diagnosis:  Osteomyelitis [facility]) [M86.9]  Specific instructions for next treatment:  Transfer to bedside chair. Current Treatment Recommendations:     [x] Strengthening to improve independence with functional mobility   [x] ROM to improve independence with functional mobility   [x] Balance Training to improve static/dynamic balance and to reduce fall risk  [x] Endurance Training to improve activity tolerance during functional mobility   [x] Transfer Training to improve safety and independence with all functional transfers   [x] Gait Training to improve gait mechanics, endurance and asses need for appropriate assistive device  [x] Stair Training in preparation for safe discharge home and/or into the community   [] Positioning to prevent skin breakdown and contractures  [x] Safety and Education Training   [] Patient/Caregiver Education   [] HEP  [] Other     PT long term treatment goals are located in above grid    Frequency of treatments: 2-5x/week x 1-2 weeks. Time in  1300  Time out  1325    Total Treatment Time  10 minutes     Evaluation Time includes thorough review of current medical information, gathering information on past medical history/social history and prior level of function, completion of standardized testing/informal observation of tasks, assessment of data and education on plan of care and goals.     CPT codes:  [x] Low Complexity PT evaluation 27727  [] Moderate Complexity PT evaluation 91497  [] High Complexity PT evaluation [id number]  [] PT Re-evaluation 28717  [] Gait training 42597 - minutes  [] Manual therapy 17574 - minutes  [x] Therapeutic activities 41580 10 minutes  [] Therapeutic exercises 87925 - minutes  [] Neuromuscular reeducation 23295 - minutes     [address]

## 2021-07-26 NOTE — PROGRESS NOTES
Department of Podiatry  Progress Note    SUBJECTIVE:  Ms. Sommers was evaluated at bedside this morning s/p Right foot I&D, debridement, biopsy and wound vac application (DOS: 6/81/53). No acute events overnight.  She denies N/V/F/C/SOB    OBJECTIVE:    Scheduled Meds:   morphine  15 mg Oral 2 times per day    vancomycin  750 mg Intravenous Q12H    meropenem  1,000 mg Intravenous Q8H    amLODIPine  5 mg Oral Daily    hydrOXYzine  50 mg Intramuscular Daily    enoxaparin  40 mg Subcutaneous Daily    lidocaine PF  5 mL Intradermal Once    sodium chloride flush  5-40 mL Intravenous 2 times per day    heparin flush  3 mL Intravenous 2 times per day    lidocaine PF  5 mL Intradermal Once    sodium chloride flush  5-40 mL Intravenous 2 times per day    heparin flush  3 mL Intravenous 2 times per day    propranolol  10 mg Oral TID    cloNIDine  0.1 mg Oral TID    ipratropium-albuterol  1 ampule Inhalation Q4H WA    methadone  55 mg Oral Nightly    [Held by provider] methadone  65 mg Oral QAM AC    gabapentin  300 mg Oral BID    sodium chloride flush  10 mL Intravenous 2 times per day    Arformoterol Tartrate  15 mcg Nebulization BID    budesonide  0.5 mg Nebulization BID    ascorbic acid  500 mg Oral Daily     Continuous Infusions:   sodium chloride      sodium chloride      dextrose      sodium chloride       PRN Meds:.diphenhydrAMINE, HYDROcodone 5 mg - acetaminophen, sodium chloride flush, sodium chloride, heparin flush, sodium chloride flush, sodium chloride, heparin flush, glucose, dextrose, glucagon (rDNA), dextrose, sodium chloride flush, sodium chloride, ondansetron **OR** ondansetron, polyethylene glycol, acetaminophen **OR** acetaminophen    Allergies   Allergen Reactions    Ancef [Cefazolin] Anaphylaxis    Duricef [Cefadroxil] Anaphylaxis    Iodine      Patient does not remember reaction       /67   Pulse 69   Temp 97.5 °F (36.4 °C) (Temporal)   Resp 16   Ht 5' 9\" (1.753 m) Wt 190 lb (86.2 kg)   LMP 08/28/2013   SpO2 92%   BMI 28.06 kg/m²       EXAM:  Vascular Exam:  DP and PT pulses are palpable B/L. Skin temperature warm to cool to BLE from proximal to distal. Mild edema and erythema to R foot in comparison to contralateral.      Neuro Exam:  Gross and epicritic sensation intact B/L.      Dermatologic Exam:  Surgical débridement noted to the right plantar heel measuring about 4.0x2.0cm. Wound bed is granular and healthy but bony prominence noted. Wound does not tunnel or burrow. No noted pus or drainage. No fluctuance noted. Macerated skin edges noted. Mild active bleeding during dressing changes noted. As pictured below:      MSK: Evidence of previous partial calcanectomy to RLE. No posterior calf pain on palpation b/l. Patient able to move digits without pain.            Scheduled Meds:   morphine  15 mg Oral 2 times per day    vancomycin  750 mg Intravenous Q12H    meropenem  1,000 mg Intravenous Q8H    amLODIPine  5 mg Oral Daily    hydrOXYzine  50 mg Intramuscular Daily    enoxaparin  40 mg Subcutaneous Daily    lidocaine PF  5 mL Intradermal Once    sodium chloride flush  5-40 mL Intravenous 2 times per day    heparin flush  3 mL Intravenous 2 times per day    lidocaine PF  5 mL Intradermal Once    sodium chloride flush  5-40 mL Intravenous 2 times per day    heparin flush  3 mL Intravenous 2 times per day    propranolol  10 mg Oral TID    cloNIDine  0.1 mg Oral TID    ipratropium-albuterol  1 ampule Inhalation Q4H WA    methadone  55 mg Oral Nightly    [Held by provider] methadone  65 mg Oral QAM AC    gabapentin  300 mg Oral BID    sodium chloride flush  10 mL Intravenous 2 times per day    Arformoterol Tartrate  15 mcg Nebulization BID    budesonide  0.5 mg Nebulization BID    ascorbic acid  500 mg Oral Daily     Continuous Infusions:   sodium chloride      sodium chloride      dextrose      sodium chloride       PRN Meds:.diphenhydrAMINE, HYDROcodone 5 mg - acetaminophen, sodium chloride flush, sodium chloride, heparin flush, sodium chloride flush, sodium chloride, heparin flush, glucose, dextrose, glucagon (rDNA), dextrose, sodium chloride flush, sodium chloride, ondansetron **OR** ondansetron, polyethylene glycol, acetaminophen **OR** acetaminophen    RADIOLOGY:  XR CHEST PORTABLE   Final Result   Normal chest         VL LOWER EXTREMITY ARTERIAL SEGMENTAL PRESSURES W PPG BILATERAL   Final Result      XR FOOT RIGHT (MIN 3 VIEWS)   Final Result   1. Amputation of the distal calcaneus. 2. Cortical irregularity likely related to erosive changes involving inferior   aspect of the calcaneus. Findings could suggest osteomyelitis. 3. Skin ulceration involving plantar soft tissue beneath the calcaneus. 4. No obvious subcutaneous gas. /67   Pulse 69   Temp 97.5 °F (36.4 °C) (Temporal)   Resp 16   Ht 5' 9\" (1.753 m)   Wt 190 lb (86.2 kg)   LMP 08/28/2013   SpO2 92%   BMI 28.06 kg/m²     LABS:    Recent Labs     07/25/21  0640 07/26/21  0540   WBC 5.1 5.9   HGB 12.4 12.3   HCT 41.4 41.2    264        Recent Labs     07/26/21  0540      K 4.8      CO2 32*   BUN 12   CREATININE 0.9        No results for input(s): PROT, INR, APTT in the last 72 hours. ASSESSMENT:  1.RLE Chronic ulcer-POA,Infected   2. RLE Calc OM   3. RLE partial calcanectomy  4. [address] [place] [address]   5. S/P I&D, Debridement, biopsies (DOS: 7/23/21)        PLAN:  - All labs, images and charts reviewed and evaluated. - Wound vac dressings changed today. Good seal noted. MWF dressing changes   - NWB RLE.  - Prevalon boots previously ordered  - XR reviewed: 1. Possible OM. 2.No soft tissue gas  - Antibiotics as per ID:Vanc  - Vascular: 1. Good flow b/l   - Surgical culture: Beta Strep, GNR, Staph   - Surgical path: Pending   - Will continue to follow while in house.    - Discussed with

## 2021-07-26 NOTE — TELEPHONE ENCOUNTER
Osman Vaughan [facility]  called to ask if Dr Cristina Davison would follow patient for [address] , pt has been discharged from office since  10 / 2019 information given to [facility]

## 2021-07-26 NOTE — PROGRESS NOTES
Attending Physician Statement:  Rah Zelaya M.D., F.A.C.P.     I have discussed the case, including pertinent history and exam findings with the resident/NP. I have seen and examined the patient and the key elements of the encounter have been performed by me. I agree with the resident ROS, PMHx, PSHx, meds reviewed and assessment, plan and orders as documented by the resident/NP    [facility] charts reviewed, including other providers notes, relevant labs and imaging. Osteomyelitis 2/2 walking on traumatized calceneus- sp partial calcectomy  No PAD - ? neuropathy   b. Acute on Chronic, nonhealing on R calcaneous -- was draining in the ED  c. 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 This might complicate home health ABx  DC time >30min  >50% of time spent coordinating care with other providers and/or counseling patient/family  Remainder of medical problems as per resident note.

## 2021-07-26 NOTE — PROGRESS NOTES
Wound care: Wound vac intact to right foot at 125mmhg, podiatry following and managing wound vac dressing.  Bren Choudhary RN

## 2021-07-26 NOTE — CARE COORDINATION
Discharge plan is home with [facility]. [facility] cannot accept the pt. Referral made to [facility].  Pasquale Munoz -[phone]

## 2021-07-26 NOTE — PROGRESS NOTES
Department of Internal Medicine  Infectious Diseases  Progress  Note    Face to face encounter   C/C : Right heel wound infection, osteomyelitis     Pt is awake and alert  Denies fever or chills   Patient sitting up on bedside- reports pain to foot and hip   Afebrile     Current Facility-Administered Medications   Medication Dose Route Frequency Provider Last Rate Last Admin    vancomycin (VANCOCIN) 750 mg in dextrose 5 % 250 mL IVPB  750 mg Intravenous Q12H An Peck MD   Stopped at 07/26/21 1142    meropenem (MERREM) 1,000 mg in sodium chloride 0.9 % 100 mL IVPB (mini-bag)  1,000 mg Intravenous Q8H Jazzmine Whiting MD   Stopped at 07/26/21 1144    amLODIPine (NORVASC) tablet 5 mg  5 mg Oral Daily Jazzmine Whiting MD   5 mg at 07/26/21 0825    HYDROcodone-acetaminophen (NORCO) 5-325 MG per tablet 1 tablet  1 tablet Oral Q4H PRN Jazzmine Whiting MD        hydrOXYzine (VISTARIL) injection 50 mg  50 mg Intramuscular Daily Carisa Patterson MD   50 mg at 07/24/21 1445    fentaNYL (SUBLIMAZE) injection 25 mcg  25 mcg Intravenous Q6H PRN Jazzmine Whiting MD   25 mcg at 07/26/21 0949    enoxaparin (LOVENOX) injection 40 mg  40 mg Subcutaneous Daily Rhiannon Montiel MD   40 mg at 07/26/21 0825    lidocaine PF 1 % injection 5 mL  5 mL Intradermal Once Rhiannon Montiel MD        sodium chloride flush 0.9 % injection 5-40 mL  5-40 mL Intravenous 2 times per day Rhiannon Montiel MD   10 mL at 07/26/21 0951    sodium chloride flush 0.9 % injection 5-40 mL  5-40 mL Intravenous PRN Rhiannon Montiel MD        0.9 % sodium chloride infusion  25 mL Intravenous PRN Rhiannon Montiel MD        heparin flush 100 UNIT/ML injection 300 Units  3 mL Intravenous 2 times per day Rhiannon Montiel MD   300 Units at 07/26/21 0947    heparin flush 100 UNIT/ML injection 300 Units  3 mL Intracatheter PRN Rhiannon Montiel MD        lidocaine PF 1 % injection 5 mL  5 mL Intradermal Once Rhiannon Montiel MD        sodium chloride flush 0.9 % injection 5-40 mL  5-40 mL Intravenous 2 times per day Kristal Cameron MD   10 mL at 07/26/21 0951    sodium chloride flush 0.9 % injection 5-40 mL  5-40 mL Intravenous PRN Kristal Cameron MD   10 mL at 07/23/21 1510    0.9 % sodium chloride infusion  25 mL Intravenous PRN Kristal Cameron MD        heparin flush 100 UNIT/ML injection 300 Units  3 mL Intravenous 2 times per day Kristal Cameron MD   300 Units at 07/26/21 0825    heparin flush 100 UNIT/ML injection 300 Units  3 mL Intracatheter PRN Kristal Cameron MD   300 Units at 07/25/21 2058    propranolol (INDERAL) tablet 10 mg  10 mg Oral TID Kristal Cameron MD   10 mg at 07/26/21 8107    cloNIDine (CATAPRES) tablet 0.1 mg  0.1 mg Oral TID Kristal Cameron MD   0.1 mg at 07/26/21 0826    glucose (GLUTOSE) 40 % oral gel 15 g  15 g Oral PRN Kristal Cameron MD        dextrose 50 % IV solution  12.5 g Intravenous PRN Kristal Cameron MD        glucagon (rDNA) injection 1 mg  1 mg Intramuscular PRN Kristal Cameron MD        dextrose 5 % solution  100 mL/hr Intravenous PRN Kristal Cameron MD        ipratropium-albuterol (DUONEB) nebulizer solution 1 ampule  1 ampule Inhalation Q4H WA Kristal Cameron MD   1 ampule at 07/26/21 3252    methadone (DOLOPHINE) 10 MG/ML solution 55 mg  55 mg Oral Nightly Kristal Cameron MD   55 mg at 07/25/21 2059    methadone (DOLOPHINE) 10 MG/ML solution 65 mg  65 mg Oral QAM AC Kristal Cameron MD   65 mg at 07/26/21 9891    gabapentin (NEURONTIN) capsule 300 mg  300 mg Oral BID Kristal Cameron MD   300 mg at 07/26/21 0825    sodium chloride flush 0.9 % injection 10 mL  10 mL Intravenous 2 times per day Kristal Cameron MD   10 mL at 07/26/21 0951    sodium chloride flush 0.9 % injection 10 mL  10 mL Intravenous PRN Kristal Cameron MD   10 mL at 07/24/21 0244    0.9 % sodium chloride infusion  25 mL Intravenous PRN Kristal Cameron MD        ondansetron (ZOFRAN-ODT) disintegrating tablet 4 mg  4 mg Oral Q8H PRN Kristal Cameron MD        Or    ondansetron [facility]) injection 4 mg  4 mg Intravenous Q6H PRN Kristal Cameron MD   4 mg at 07/22/21 8546    polyethylene glycol (GLYCOLAX) packet 17 g  17 g Oral Daily PRN Boaz Meza MD        acetaminophen (TYLENOL) tablet 650 mg  650 mg Oral Q6H PRN Boaz Meza MD   650 mg at 07/23/21 1511    Or    acetaminophen (TYLENOL) suppository 650 mg  650 mg Rectal Q6H PRN Boaz Meza MD        Arformoterol Tartrate [facility]) nebulizer solution 15 mcg  15 mcg Nebulization BID Boaz Meza MD   15 mcg at 07/26/21 9083    budesonide (PULMICORT) nebulizer suspension 500 mcg  0.5 mg Nebulization BID Boaz Meza MD   500 mcg at 07/26/21 9064    ascorbic acid (VITAMIN C) tablet 500 mg  500 mg Oral Daily Boaz Meza MD   500 mg at 07/26/21 0278       REVIEW OF SYSTEMS:    CONSTITUTIONAL:  Denies fever, chill or rigors. HEENT: denies blurring of vision or double vision, denies hearing problem  RESPIRATORY: denies cough, shortness of breath, sputum expectoration, chest pain. CARDIOVASCULAR:  Denies palpitation  GASTROINTESTINAL:  Denies abdomen pain, diarrhea or constipation. GENITOURINARY:  Denies burning urination or frequency of urination  INTEGUMENT: Chronic non healing wound right foot   HEMATOLOGIC/LYMPHATIC:  Denies lymph node swelling, gum bleeding or easy bruising. MUSCULOSKELETAL: right heel wound pain, left hip pain   NEUROLOGICAL:  weakness        PHYSICAL EXAM:    Vitals:   /67   Pulse 69   Temp 97.5 °F (36.4 °C) (Temporal)   Resp 16   Ht 5' 9\" (1.753 m)   Wt 190 lb (86.2 kg)   LMP 08/28/2013   SpO2 92%   BMI 28.06 kg/m²     General Appearance:    Awake, alert , no acute distress. Head:    Normocephalic, atraumatic   Eyes:    No pallor, no icterus,   Ears:    No obvious deformity or drainage.    Nose:   No nasal drainage   Throat:   Mucosa moist, no oral thrush   Neck:   Supple, no lymphadenopathy   Back:     no CVA tenderness   Lungs:      Wheeze     Heart:    Regular rate and rhythm    Abdomen:     Soft, non-tender, bowel sounds present    Extremities:    + edema,   Right heel wound with VAC Vanco trough 17.4    IMPRESSION:     1. Chronic non healing right heel ulcer . Wound infection , right calceneus osteomyelitis s/p debridement  (7/22)     RECOMMENDATIONS:      1. Vancomycin 1250 mg IV q 12 hrs, meropenem 1 gram IV q 8 hrs X 5 weeks    -- pharmacy dosing vancomycin   2. Can take benadryl PO prior to vancomycin administration - patient reports she feels itchy while vancomycin is infusion   3. ID follow up in 2-3 weeks   4.  Discharge planning     SALIMA Mccall - CNS  7/26/2021

## 2021-07-26 NOTE — PROGRESS NOTES
[facility]  Internal Medicine Residency Program  Progress Note - House Team     Patient:  Rikki Reyes 55 y.o. female MRN: [id number]     Date of Service: 7/26/2021     CC: Right calcaneal ulcer  Overnight events: No overnight events    Subjective     Patient was seen and examined this morning at bedside in no acute distress. Overnight, patient still complains of right leg pain but claims her breathing has improved from the day before. Patient was asking for more pain meds. Patient was titrated down from 5 L O2 to 3 L O2 on nasal canula; patient tolerated it well and maintained O2 saturation at 95-97%. Patient was also agreeable to being sent home with home health care. However,  was having difficulty having patient approved. Team is considering sending patient to [facility]. Objective     Physical Exam:  · Vitals: /67   Pulse 69   Temp 97.5 °F (36.4 °C) (Temporal)   Resp 16   Ht 5' 9\" (1.753 m)   Wt 190 lb (86.2 kg)   LMP 08/28/2013   SpO2 92%   BMI 28.06 kg/m²     · I & O - 24hr: No intake/output data recorded. · General Appearance: alert, appears stated age and cooperative  · HEENT:  Head: Normal, normocephalic, atraumatic. · Lung: clear to auscultation bilaterally  · Heart: regular rate and rhythm, S1, S2 normal, no murmur, click, rub or gallop  · Abdomen: soft, non-tender; bowel sounds normal; no masses,  no organomegaly  · Extremities:  Right foot with intact bandage and boot, mild nonpitting edema of right leg up to 1/3 of lower shin, no edema on left leg, no cyanosis  · Musculokeletal: No joint swelling, no muscle tenderness. ROM normal in all joints of extremities.    · Neurologic: Mental status: Alert, oriented, thought content appropriate  Subject  Pertinent Labs & Imaging Studies   jalil  CBC with Differential:    Lab Results   Component Value Date    WBC 5.9 07/26/2021    RBC 4.42 07/26/2021    HGB 12.3 07/26/2021    HCT 41.2 07/26/2021     07/26/2021    MCV 93.2 07/26/2021    MCH 27.8 07/26/2021    MCHC 29.9 07/26/2021    RDW 15.3 07/26/2021    LYMPHOPCT 25.6 07/26/2021    MONOPCT 9.9 07/26/2021    BASOPCT 0.7 07/26/2021    MONOSABS 0.58 07/26/2021    LYMPHSABS 1.50 07/26/2021    EOSABS 0.18 07/26/2021    BASOSABS 0.04 07/26/2021     CMP:    Lab Results   Component Value Date     07/26/2021    K 4.8 07/26/2021    K 4.4 07/20/2021     07/26/2021    CO2 32 07/26/2021    BUN 12 07/26/2021    CREATININE 0.9 07/26/2021    GFRAA >60 07/26/2021    LABGLOM >60 07/26/2021    GLUCOSE 88 07/26/2021    PROT 7.7 02/08/2020    LABALBU 3.5 02/08/2020    CALCIUM 9.0 07/26/2021    BILITOT 0.4 02/08/2020    ALKPHOS 103 02/08/2020    AST 10 02/08/2020    ALT 8 02/08/2020       XR CHEST PORTABLE   Final Result   Normal chest         VL LOWER EXTREMITY ARTERIAL SEGMENTAL PRESSURES W PPG BILATERAL   Final Result      XR FOOT RIGHT (MIN 3 VIEWS)   Final Result   1. Amputation of the distal calcaneus. 2. Cortical irregularity likely related to erosive changes involving inferior   aspect of the calcaneus. Findings could suggest osteomyelitis. 3. Skin ulceration involving plantar soft tissue beneath the calcaneus. 4. No obvious subcutaneous gas. Resident's Assessment and Plan     Assessment and Plan:    1. Osteomyelitis 2/2 repeated non healing injury v/s PAD vs Microtrauma vs malnutrition   - Titrated down on O2 to 3 L, continue to wean off O2   - Continue Vancomycin (day 7) and Meropenem (day 6)   - Continue gabapentin   - Fentanyl discontinued   - Norco Q8 PRN for breakthrough pain   - Wean off on methadone for plan discharge to [facility]   - Calculated total opiate requirements 100 mcg fentanyl --> 30 mg PO morphine to be taken 15 mg in AM and in PM   - Per Podiatry: Recommend SNF placement vs LTAC, continue wound VAC MWF   - ID follow up in 2-3 weeks, Vancomycin 1250 mg IV q12hrs, meropenem 1 g IV q8 for 5 weeks     2.  COPD, stable   - SpO2 95-97% with 3 L nasal cannula    - Continue nebulization with Brovana, pulmicort, Duoneb     3. Hypertension (New onset)   - BP: 121/67, 98/55, 117/58, 131/57, 100/65   - Continue amlodipine, Clonidine and propanolol   - Monitor BP, hold clonidine if hypotension occurs    4. Polysubstance use   - Goal to wean off methadone for possible discharge to [facility] vs home with home health care   Calculated total opiate requirements 100 mcg fentanyl --> 30 mg PO morphine to be taken 15 mg in AM and in PM   - Fentanyl discontinued      5. Chronic pain 2/2 hip fracture vs calcaneal ulcer   - Continue Gabapentin, wean off methadone   - Norco Q8 PRN for breakthrough pain     6.  Left Hip fracture   - awaiting clearance from surgery     7. Essential tremor   - Continue propranolol        PT/OT evaluation: Eval and treat  DVT prophylaxis/ GI prophylaxis: Lovenox  Disposition: continue current care     No Suarez MD, PGY-1  Attending physician: Dr. Debora Avery

## 2021-07-26 NOTE — PROGRESS NOTES
Pharmacy Consultation Note  (Antibiotic Dosing and Monitoring)    Initial consult date: 7/20/21  Consulting physician: Dr. Daria Corona  Drug(s): vancomycin  Indication: osteomyelitis      Age/  Gender Height Weight IBW Dosing weight  Allergy Information   56 y.o./female 5' 9\" (175.3 cm) 190 lb (86.2 kg)     Ideal body weight: 66.2 kg (145 lb 15.1 oz)  Adjusted ideal body weight: 74.2 kg (163 lb 9.1 oz)  74.2 kg  Ancef [cefazolin], Duricef [cefadroxil], and Iodine          Other anti-infectives Start date Stop date   Meropenem 7/20                     Date  Tmax WBC BUN/CR UOP CrCL  (mL/min) Drug/Dose Time   Given Level(s)   (Time) Comments   7/20 99.2 7.1  10/0.9 -- --  Vancomycin 1250 mg IV x 1 dose 1841     7/21 afebrile 5.5 10/0.9 -- 83 Vancomycin 1250 mg IV x 1 dose    Vancomycin 1000 mg IV q 12 hr 0704      (1900)- not given  1900 dose not give due to loss of IV access   7/22 afebrile 5.6 11/0.9 -- 83 Vancomycin 1000 mg IV q 12 hr 1025  (1900)- not given Vanco trough at 0648 is 9.8 mcg/mL Patient refused dose at 1900   7/23 afebrile -- -- -- 83 Vancomycin 1000 mg IV q 12 hr 0254  1511     7/24 afebrile 5.7 9/0.9 -- 83 Vancomycin 1000 mg IV q 12 hr 0244  2153 Trough per ID @ 6254 = 17.4 mcg/mL    7/25 afebrile 5.1 12/0.9 -- 83 Vancomycin 750 mg IV q12hr 1022  2226     7/26 afebrile 5.9 12/0.9 -- 83 Vancomycin 750 mg IV q12hr 0951  (2200)     7/27       (1000) Vanco trough (0930)           Intake/Output Summary (Last 24 hours) at 7/26/2021 1400  Last data filed at 7/25/2021 1815  Gross per 24 hour   Intake --   Output 3 ml   Net -3 ml       Average urine output:    Cultures:    Site Date Result                    No results for input(s): Flor Patient in the last 72 hours.      Historical Cultures:  Organism   Date Value Ref Range Status   07/23/2021 Beta Strep Group C (A)  Preliminary   07/23/2021 Gram negative janette (A)  Preliminary   07/23/2021 Staphylococcus species (A)  Preliminary     No results for input(s): BC in the last 72 hours. Radiology:      Assessment:  · 55year old female on vancomycin for osteomyelitis  · Goal trough = 15 - 20 mcg/ml; Goal AUC/RICARDO 400-600  · 7/22: Scr stable at 0.9. Dose at 2300 on 7/21 not given due to IV access. Level today of 9.8 mcg/mL is not a true trough   · 7/23: Dose at 1900 on 7/22 was refused by patient, patient accepted medication at [phone] on 7/23. I adjusted vancomycin administration times. · 7/24: Trough (14 hour level) = 17.4 mcg. mL. True trough ~ 20.1 mcg/mL, AUC/RICARDO 598.    · 7/25: Scr stable at 0.9     Plan:  · Cont vancomycin 750 mg IV q 12 hr (est AUC/RICARDO 456, trough 15.3 mcg/mL)  · Patient for possible discharge today, if patient does not discharge will obtain vancomycin trough on 7/27 before 0930 dose  · Pharmacist will follow and monitor/adjust dosing as necessary    Jona Tate PharmD, BCPS 7/26/2021 2:10 PM  Phone: [phone]

## 2021-07-27 LAB
ANION GAP SERPL CALCULATED.3IONS-SCNC: 5 MMOL/L (ref 7–16)
BASOPHILS ABSOLUTE: 0.06 E9/L (ref 0–0.2)
BASOPHILS RELATIVE PERCENT: 1.1 % (ref 0–2)
BUN BLDV-MCNC: 15 MG/DL (ref 6–20)
CALCIUM SERPL-MCNC: 9 MG/DL (ref 8.6–10.2)
CHLORIDE BLD-SCNC: 99 MMOL/L (ref 98–107)
CO2: 35 MMOL/L (ref 22–29)
CREAT SERPL-MCNC: 1 MG/DL (ref 0.5–1)
EOSINOPHILS ABSOLUTE: 0.2 E9/L (ref 0.05–0.5)
EOSINOPHILS RELATIVE PERCENT: 3.6 % (ref 0–6)
GFR AFRICAN AMERICAN: >60
GFR NON-AFRICAN AMERICAN: 57 ML/MIN/1.73
GLUCOSE BLD-MCNC: 106 MG/DL (ref 74–99)
HCT VFR BLD CALC: 38.5 % (ref 34–48)
HEMOGLOBIN: 11.6 G/DL (ref 11.5–15.5)
IMMATURE GRANULOCYTES #: 0.06 E9/L
IMMATURE GRANULOCYTES %: 1.1 % (ref 0–5)
LYMPHOCYTES ABSOLUTE: 2.04 E9/L (ref 1.5–4)
LYMPHOCYTES RELATIVE PERCENT: 36.4 % (ref 20–42)
MCH RBC QN AUTO: 28.1 PG (ref 26–35)
MCHC RBC AUTO-ENTMCNC: 30.1 % (ref 32–34.5)
MCV RBC AUTO: 93.2 FL (ref 80–99.9)
MONOCYTES ABSOLUTE: 0.52 E9/L (ref 0.1–0.95)
MONOCYTES RELATIVE PERCENT: 9.3 % (ref 2–12)
NEUTROPHILS ABSOLUTE: 2.73 E9/L (ref 1.8–7.3)
NEUTROPHILS RELATIVE PERCENT: 48.5 % (ref 43–80)
PDW BLD-RTO: 15.3 FL (ref 11.5–15)
PLATELET # BLD: 258 E9/L (ref 130–450)
PMV BLD AUTO: 9.6 FL (ref 7–12)
POTASSIUM SERPL-SCNC: 4.8 MMOL/L (ref 3.5–5)
RBC # BLD: 4.13 E12/L (ref 3.5–5.5)
SODIUM BLD-SCNC: 139 MMOL/L (ref 132–146)
VANCOMYCIN TROUGH: 20.8 MCG/ML (ref 5–16)
WBC # BLD: 5.6 E9/L (ref 4.5–11.5)

## 2021-07-27 PROCEDURE — 85025 COMPLETE CBC W/AUTO DIFF WBC: CPT

## 2021-07-27 PROCEDURE — 80048 BASIC METABOLIC PNL TOTAL CA: CPT

## 2021-07-27 PROCEDURE — 2580000003 HC RX 258: Performed by: PODIATRIST

## 2021-07-27 PROCEDURE — 1200000000 HC SEMI PRIVATE

## 2021-07-27 PROCEDURE — 6360000002 HC RX W HCPCS: Performed by: STUDENT IN AN ORGANIZED HEALTH CARE EDUCATION/TRAINING PROGRAM

## 2021-07-27 PROCEDURE — 6370000000 HC RX 637 (ALT 250 FOR IP): Performed by: INTERNAL MEDICINE

## 2021-07-27 PROCEDURE — 6370000000 HC RX 637 (ALT 250 FOR IP): Performed by: CLINICAL NURSE SPECIALIST

## 2021-07-27 PROCEDURE — 80202 ASSAY OF VANCOMYCIN: CPT

## 2021-07-27 PROCEDURE — 6360000002 HC RX W HCPCS: Performed by: PODIATRIST

## 2021-07-27 PROCEDURE — 94640 AIRWAY INHALATION TREATMENT: CPT

## 2021-07-27 PROCEDURE — 2580000003 HC RX 258: Performed by: INTERNAL MEDICINE

## 2021-07-27 PROCEDURE — 99233 SBSQ HOSP IP/OBS HIGH 50: CPT | Performed by: INTERNAL MEDICINE

## 2021-07-27 PROCEDURE — 6370000000 HC RX 637 (ALT 250 FOR IP): Performed by: PODIATRIST

## 2021-07-27 PROCEDURE — 36415 COLL VENOUS BLD VENIPUNCTURE: CPT

## 2021-07-27 PROCEDURE — 6360000002 HC RX W HCPCS: Performed by: INTERNAL MEDICINE

## 2021-07-27 PROCEDURE — 2700000000 HC OXYGEN THERAPY PER DAY

## 2021-07-27 RX ORDER — METHADONE HYDROCHLORIDE 10 MG/ML
55 CONCENTRATE ORAL NIGHTLY
Status: DISCONTINUED | OUTPATIENT
Start: 2021-07-27 | End: 2021-07-28

## 2021-07-27 RX ORDER — METHADONE HYDROCHLORIDE 10 MG/ML
65 CONCENTRATE ORAL
Status: DISCONTINUED | OUTPATIENT
Start: 2021-07-28 | End: 2021-07-28

## 2021-07-27 RX ORDER — METHADONE HYDROCHLORIDE 10 MG/ML
50 CONCENTRATE ORAL
Status: DISCONTINUED | OUTPATIENT
Start: 2021-07-28 | End: 2021-07-27

## 2021-07-27 RX ORDER — METHADONE HYDROCHLORIDE 10 MG/ML
40 CONCENTRATE ORAL NIGHTLY
Status: DISCONTINUED | OUTPATIENT
Start: 2021-07-27 | End: 2021-07-27

## 2021-07-27 RX ADMIN — ARFORMOTEROL TARTRATE 15 MCG: 15 SOLUTION RESPIRATORY (INHALATION) at 19:32

## 2021-07-27 RX ADMIN — Medication 10 ML: at 21:09

## 2021-07-27 RX ADMIN — GABAPENTIN 300 MG: 300 CAPSULE ORAL at 10:30

## 2021-07-27 RX ADMIN — SODIUM CHLORIDE, PRESERVATIVE FREE 300 UNITS: 5 INJECTION INTRAVENOUS at 11:05

## 2021-07-27 RX ADMIN — GABAPENTIN 300 MG: 300 CAPSULE ORAL at 20:39

## 2021-07-27 RX ADMIN — CLONIDINE HYDROCHLORIDE 0.1 MG: 0.1 TABLET ORAL at 10:31

## 2021-07-27 RX ADMIN — VANCOMYCIN HYDROCHLORIDE 750 MG: 10 INJECTION, POWDER, LYOPHILIZED, FOR SOLUTION INTRAVENOUS at 10:30

## 2021-07-27 RX ADMIN — MEROPENEM 1000 MG: 1 INJECTION, POWDER, FOR SOLUTION INTRAVENOUS at 10:35

## 2021-07-27 RX ADMIN — MEROPENEM 1000 MG: 1 INJECTION, POWDER, FOR SOLUTION INTRAVENOUS at 18:59

## 2021-07-27 RX ADMIN — PROPRANOLOL HYDROCHLORIDE 10 MG: 10 TABLET ORAL at 10:31

## 2021-07-27 RX ADMIN — Medication 10 ML: at 10:51

## 2021-07-27 RX ADMIN — HYDROXYZINE HYDROCHLORIDE 50 MG: 50 INJECTION, SOLUTION INTRAMUSCULAR at 10:30

## 2021-07-27 RX ADMIN — IPRATROPIUM BROMIDE AND ALBUTEROL SULFATE 1 AMPULE: .5; 3 SOLUTION RESPIRATORY (INHALATION) at 13:32

## 2021-07-27 RX ADMIN — HYDROCODONE BITARTRATE AND ACETAMINOPHEN 1 TABLET: 5; 325 TABLET ORAL at 05:26

## 2021-07-27 RX ADMIN — AMLODIPINE BESYLATE 5 MG: 5 TABLET ORAL at 10:31

## 2021-07-27 RX ADMIN — Medication 10 ML: at 20:39

## 2021-07-27 RX ADMIN — BUDESONIDE 500 MCG: 0.5 SUSPENSION RESPIRATORY (INHALATION) at 19:32

## 2021-07-27 RX ADMIN — OXYCODONE HYDROCHLORIDE AND ACETAMINOPHEN 500 MG: 500 TABLET ORAL at 10:30

## 2021-07-27 RX ADMIN — ENOXAPARIN SODIUM 40 MG: 40 INJECTION SUBCUTANEOUS at 10:29

## 2021-07-27 RX ADMIN — VANCOMYCIN HYDROCHLORIDE 750 MG: 10 INJECTION, POWDER, LYOPHILIZED, FOR SOLUTION INTRAVENOUS at 22:00

## 2021-07-27 RX ADMIN — Medication 55 MG: at 20:39

## 2021-07-27 RX ADMIN — HYDROCODONE BITARTRATE AND ACETAMINOPHEN 1 TABLET: 5; 325 TABLET ORAL at 01:20

## 2021-07-27 RX ADMIN — Medication 10 ML: at 10:50

## 2021-07-27 RX ADMIN — SODIUM CHLORIDE, PRESERVATIVE FREE 300 UNITS: 5 INJECTION INTRAVENOUS at 20:39

## 2021-07-27 RX ADMIN — CLONIDINE HYDROCHLORIDE 0.1 MG: 0.1 TABLET ORAL at 18:55

## 2021-07-27 RX ADMIN — MORPHINE SULFATE 15 MG: 15 TABLET, FILM COATED, EXTENDED RELEASE ORAL at 10:31

## 2021-07-27 RX ADMIN — SODIUM CHLORIDE, PRESERVATIVE FREE 300 UNITS: 5 INJECTION INTRAVENOUS at 11:04

## 2021-07-27 RX ADMIN — DIPHENHYDRAMINE HYDROCHLORIDE 25 MG: 25 TABLET ORAL at 11:04

## 2021-07-27 RX ADMIN — HYDROCODONE BITARTRATE AND ACETAMINOPHEN 1 TABLET: 5; 325 TABLET ORAL at 18:55

## 2021-07-27 RX ADMIN — SODIUM CHLORIDE, PRESERVATIVE FREE 300 UNITS: 5 INJECTION INTRAVENOUS at 21:08

## 2021-07-27 RX ADMIN — IPRATROPIUM BROMIDE AND ALBUTEROL SULFATE 1 AMPULE: .5; 3 SOLUTION RESPIRATORY (INHALATION) at 19:32

## 2021-07-27 RX ADMIN — PROPRANOLOL HYDROCHLORIDE 10 MG: 10 TABLET ORAL at 18:56

## 2021-07-27 RX ADMIN — MEROPENEM 1000 MG: 1 INJECTION, POWDER, FOR SOLUTION INTRAVENOUS at 01:20

## 2021-07-27 ASSESSMENT — PAIN DESCRIPTION - ORIENTATION
ORIENTATION: RIGHT

## 2021-07-27 ASSESSMENT — PAIN DESCRIPTION - PAIN TYPE
TYPE: ACUTE PAIN

## 2021-07-27 ASSESSMENT — PAIN DESCRIPTION - DESCRIPTORS
DESCRIPTORS: ACHING;CRAMPING;DISCOMFORT
DESCRIPTORS: ACHING;CONSTANT;DISCOMFORT
DESCRIPTORS: ACHING;CONSTANT;DISCOMFORT
DESCRIPTORS: PINS AND NEEDLES

## 2021-07-27 ASSESSMENT — PAIN SCALES - GENERAL
PAINLEVEL_OUTOF10: 10
PAINLEVEL_OUTOF10: 9
PAINLEVEL_OUTOF10: 9
PAINLEVEL_OUTOF10: 10
PAINLEVEL_OUTOF10: 9
PAINLEVEL_OUTOF10: 9

## 2021-07-27 ASSESSMENT — PAIN DESCRIPTION - LOCATION
LOCATION: FOOT

## 2021-07-27 ASSESSMENT — PAIN DESCRIPTION - FREQUENCY: FREQUENCY: CONTINUOUS

## 2021-07-27 NOTE — PROGRESS NOTES
Comprehensive Nutrition Assessment    Type and Reason for Visit:  Reassess    Nutrition Recommendations/Plan: Continue current diet, Start Ensure & Mandeep BID    Nutrition Assessment:  Pt remains nutritionally at risk now s/p R foot I&D, debridement/bx w/ wound vac application 2/2 OM. Noted h/o polysubstance abuse. Will add ONS to aid in wound healing and continue to monitor. Malnutrition Assessment:  Malnutrition Status:  No malnutrition    Context:  Chronic Illness     Findings of the 6 clinical characteristics of malnutrition:  Energy Intake:  Mild decrease in energy intake (Comment)  Weight Loss:  Unable to assess (no wt hx on file)     Body Fat Loss:  No significant body fat loss     Muscle Mass Loss:  No significant muscle mass loss    Fluid Accumulation:  No significant fluid accumulation     Strength:  Not Performed    Estimated Daily Nutrient Needs:  Energy (kcal):  MSJ 1521 x 1.2 SF = 1323-5963; Weight Used for Energy Requirements:  Current     Protein (g):  100-115 (1.5-1.8 g/kg IBW); Weight Used for Protein Requirements:  Ideal        Fluid (ml/day):  0751-8619; Method Used for Fluid Requirements:  1 ml/kcal      Nutrition Related Findings:  A&Ox4, active BS, no edema, +I/Os      Wounds:  Multiple, Open Wounds, Surgical Incision, Wound Vac       Current Nutrition Therapies:    ADULT DIET; Regular    Anthropometric Measures:  · Height: 5' 9\" (175.3 cm)  · Current Body Weight: 190 lb (86.2 kg) (7/20 stated wt - UTO updated wt at this time)   · Usual Body Weight:  (no wt hx on file)     · Ideal Body Weight: 145 lbs; % Ideal Body Weight 131 %   · BMI: 28  · BMI Categories: Overweight (BMI 25.0-29. 9)       Nutrition Diagnosis:   · Increased nutrient needs related to increase demand for energy/nutrients as evidenced by wounds    Nutrition Interventions:   Food and/or Nutrient Delivery:  Continue Current Diet, Start Oral Nutrition Supplement (Ensure BID, Mandeep BID)  Nutrition Education/Counseling:

## 2021-07-27 NOTE — PROGRESS NOTES
Perfect serve sent to internal med resident Estefania Rich that patient wants to see them and anxious that her methadone has been discontinued.

## 2021-07-27 NOTE — PROGRESS NOTES
Department of Podiatry  Progress Note    SUBJECTIVE:  Ms. Sommers was evaluated at bedside this morning s/p Right foot I&D, debridement, biopsy and wound vac application (DOS: 8/81/98). No acute events overnight. She denies N/V/F/C/SOB. Does complain of \"pain all over\" and would like more potent pain meds as her current regiment is unsatisfactory.     OBJECTIVE:    Scheduled Meds:   morphine  15 mg Oral 2 times per day    vancomycin  750 mg Intravenous Q12H    meropenem  1,000 mg Intravenous Q8H    amLODIPine  5 mg Oral Daily    hydrOXYzine  50 mg Intramuscular Daily    enoxaparin  40 mg Subcutaneous Daily    lidocaine PF  5 mL Intradermal Once    sodium chloride flush  5-40 mL Intravenous 2 times per day    heparin flush  3 mL Intravenous 2 times per day    lidocaine PF  5 mL Intradermal Once    sodium chloride flush  5-40 mL Intravenous 2 times per day    heparin flush  3 mL Intravenous 2 times per day    propranolol  10 mg Oral TID    cloNIDine  0.1 mg Oral TID    ipratropium-albuterol  1 ampule Inhalation Q4H WA    [Held by provider] methadone  65 mg Oral QAM AC    gabapentin  300 mg Oral BID    sodium chloride flush  10 mL Intravenous 2 times per day    Arformoterol Tartrate  15 mcg Nebulization BID    budesonide  0.5 mg Nebulization BID    ascorbic acid  500 mg Oral Daily     Continuous Infusions:   sodium chloride      sodium chloride      dextrose      sodium chloride       PRN Meds:.diphenhydrAMINE, HYDROcodone 5 mg - acetaminophen, sodium chloride flush, sodium chloride, heparin flush, sodium chloride flush, sodium chloride, heparin flush, glucose, dextrose, glucagon (rDNA), dextrose, sodium chloride flush, sodium chloride, ondansetron **OR** ondansetron, polyethylene glycol, acetaminophen **OR** acetaminophen    Allergies   Allergen Reactions    Ancef [Cefazolin] Anaphylaxis    Duricef [Cefadroxil] Anaphylaxis    Iodine      Patient does not remember reaction       /62 Pulse 68   Temp 97.6 °F (36.4 °C) (Temporal)   Resp 16   Ht 5' 9\" (1.753 m)   Wt 190 lb (86.2 kg)   LMP 08/28/2013   SpO2 98%   BMI 28.06 kg/m²       EXAM:  -Dressings this morning are clean, dry and intact with wound vac running at 125mmHg with good seal at low continuous. About 150ml. Debris in canister  -No noted dishevelment to dressings  -Patient able to move digits without pain. No posterior calf pain on palpation. PHYSICAL EXAM AS OF 7/26/21:  Vascular Exam:  DP and PT pulses are palpable B/L. Skin temperature warm to cool to BLE from proximal to distal. Mild edema and erythema to R foot in comparison to contralateral.      Neuro Exam:  Gross and epicritic sensation intact B/L.      Dermatologic Exam:  Surgical débridement noted to the right plantar heel measuring about 4.0x2.0cm. Wound bed is granular and healthy but bony prominence noted. Wound does not tunnel or burrow. No noted pus or drainage. No fluctuance noted. Macerated skin edges noted. Mild active bleeding during dressing changes noted. As pictured below:      MSK: Evidence of previous partial calcanectomy to RLE. No posterior calf pain on palpation b/l. Patient able to move digits without pain.            Scheduled Meds:   morphine  15 mg Oral 2 times per day    vancomycin  750 mg Intravenous Q12H    meropenem  1,000 mg Intravenous Q8H    amLODIPine  5 mg Oral Daily    hydrOXYzine  50 mg Intramuscular Daily    enoxaparin  40 mg Subcutaneous Daily    lidocaine PF  5 mL Intradermal Once    sodium chloride flush  5-40 mL Intravenous 2 times per day    heparin flush  3 mL Intravenous 2 times per day    lidocaine PF  5 mL Intradermal Once    sodium chloride flush  5-40 mL Intravenous 2 times per day    heparin flush  3 mL Intravenous 2 times per day    propranolol  10 mg Oral TID    cloNIDine  0.1 mg Oral TID    ipratropium-albuterol  1 ampule Inhalation Q4H WA    [Held by provider] methadone  65 mg Oral QAM AC    gabapentin 300 mg Oral BID    sodium chloride flush  10 mL Intravenous 2 times per day    Arformoterol Tartrate  15 mcg Nebulization BID    budesonide  0.5 mg Nebulization BID    ascorbic acid  500 mg Oral Daily     Continuous Infusions:   sodium chloride      sodium chloride      dextrose      sodium chloride       PRN Meds:.diphenhydrAMINE, HYDROcodone 5 mg - acetaminophen, sodium chloride flush, sodium chloride, heparin flush, sodium chloride flush, sodium chloride, heparin flush, glucose, dextrose, glucagon (rDNA), dextrose, sodium chloride flush, sodium chloride, ondansetron **OR** ondansetron, polyethylene glycol, acetaminophen **OR** acetaminophen    RADIOLOGY:  XR CHEST PORTABLE   Final Result   Normal chest         VL LOWER EXTREMITY ARTERIAL SEGMENTAL PRESSURES W PPG BILATERAL   Final Result      XR FOOT RIGHT (MIN 3 VIEWS)   Final Result   1. Amputation of the distal calcaneus. 2. Cortical irregularity likely related to erosive changes involving inferior   aspect of the calcaneus. Findings could suggest osteomyelitis. 3. Skin ulceration involving plantar soft tissue beneath the calcaneus. 4. No obvious subcutaneous gas. /62   Pulse 68   Temp 97.6 °F (36.4 °C) (Temporal)   Resp 16   Ht 5' 9\" (1.753 m)   Wt 190 lb (86.2 kg)   LMP 08/28/2013   SpO2 98%   BMI 28.06 kg/m²     LABS:    Recent Labs     07/26/21  0540 07/27/21  0534   WBC 5.9 5.6   HGB 12.3 11.6   HCT 41.2 38.5    258        Recent Labs     07/27/21  0534      K 4.8   CL 99   CO2 35*   BUN 15   CREATININE 1.0        No results for input(s): PROT, INR, APTT in the last 72 hours. ASSESSMENT:  1.RLE Chronic ulcer-POA,Infected   2. RLE Calc OM   3. RLE partial calcanectomy  4. [address] [place] [address]   5. S/P I&D, Debridement, biopsies (DOS: 7/23/21)        PLAN:  - All labs, images and charts reviewed and evaluated. - Wound vac dressings changed yesterday. Today good seal noted.  MWF dressing changes   - NWB RLE.  - Prevalon boots noted  - XR reviewed: 1. Possible OM. 2.No soft tissue gas  - Antibiotics as per ID:Vanc  - Vascular: 1. Good flow b/l   - Surgical culture: Beta Strep, GNR, Staph   - Surgical path: Pending   - Will continue to follow while in house. Patient is stable for discharge from Podiatric perspective once cleared from all other teams on board.  Recommend SNF  - Discussed with

## 2021-07-27 NOTE — PROGRESS NOTES
07/27/2021    LYMPHOPCT 36.4 07/27/2021    MONOPCT 9.3 07/27/2021    BASOPCT 1.1 07/27/2021    MONOSABS 0.52 07/27/2021    LYMPHSABS 2.04 07/27/2021    EOSABS 0.20 07/27/2021    BASOSABS 0.06 07/27/2021     CMP:    Lab Results   Component Value Date     07/27/2021    K 4.8 07/27/2021    K 4.4 07/20/2021    CL 99 07/27/2021    CO2 35 07/27/2021    BUN 15 07/27/2021    CREATININE 1.0 07/27/2021    GFRAA >60 07/27/2021    LABGLOM 57 07/27/2021    GLUCOSE 106 07/27/2021    PROT 7.7 02/08/2020    LABALBU 3.5 02/08/2020    CALCIUM 9.0 07/27/2021    BILITOT 0.4 02/08/2020    ALKPHOS 103 02/08/2020    AST 10 02/08/2020    ALT 8 02/08/2020       XR CHEST PORTABLE   Final Result   Normal chest         VL LOWER EXTREMITY ARTERIAL SEGMENTAL PRESSURES W PPG BILATERAL   Final Result      XR FOOT RIGHT (MIN 3 VIEWS)   Final Result   1. Amputation of the distal calcaneus. 2. Cortical irregularity likely related to erosive changes involving inferior   aspect of the calcaneus. Findings could suggest osteomyelitis. 3. Skin ulceration involving plantar soft tissue beneath the calcaneus. 4. No obvious subcutaneous gas. Resident's Assessment and Plan     Assessment and Plan:    1. Osteomyelitis 2/2 repeated non healing injury v/s PAD vs Microtrauma vs malnutrition   - Continue to wean off O2 as tolerated   - Continue ID recs: Vanc(day 8) and Meropenem (day 7). Picc in place   - Continue gabapentin   - Fentanyl discontinued   - Norco Q8 PRN for breakthrough pain   - ID follow up in 2-3 weeks   -Will try for placement in LTAC. Due to [facility], may take a few days to get approval for placement. 2. COPD, stable   - Continue nebulization with Brovana, pulmicort, Duoneb     3.  Hypertension liekly 2/2 anxiety, pain   - Continue amlodipine, Clonidine and propanolol   - Monitor BP, hold clonidine if hypotension occurs        5. Pain management of hip fracture and calcaneal ulcer   - Continue Gabapentin,    -will continue methadone at her dose unless this presents an issue with placement in LTAC   - Guion Q8 PRN for breakthrough pain     6. Left Hip fracture   - awaiting clearance from surgery     7. Essential tremor   - Continue propranolol        PT/OT evaluation: Eval and treat  DVT prophylaxis/ GI prophylaxis: Lovenox  Disposition: continue current care     Soha Dunn MD, PGY-1  Attending physician: Dr. Marissa Agarwal    Attending Physician Statement:  Marissa Agarwal M.D., F.A.C.P.     I have discussed the case, including pertinent history and exam findings with the resident/NP. I have seen and examined the patient and the key elements of the encounter have been performed by me.  I agree with the resident ROS, PMHx, PSHx, meds reviewed and assessment, plan and orders as documented by the resident/NP [facility] [place] [facility] charts reviewed, including other providers notes, relevant labs and imaging.   Osteomyelitis 2/2 walking on traumatized calceneus- sp partial calcectomy  No PAD - ? neuropathy   b.  Acute on Chronic, nonhealing on R calcaneous -- was draining in the ED  c. 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)  Copd stable=-- wean off o2  periprocedural desaturation, take off o2, reassess, doubt penumonia        After infection and heel wound stable then considerateions for   Sp Hip fracture then post traumatic OA hip  Now considerations for JEREMIAH vs hemiarthroplasty     ?osteoporosis     chrronically no withdrawal issues  Anxiety and essential tremores also better  ,medically  stable for DC - awating home vs placement     But ongoing recurrent anxiety and +multiple concerns     Beta streptococcus group c (1)             Antibiotic Interpretation RICARDO Status     ampicillin Sensitive <=^0.25 mcg/mL       benzylpenicillin Sensitive <=^0.06 mcg/mL       cefotaxime Sensitive <=^0.12 mcg/mL       cefTRIAXone Sensitive ^0. 25 mcg/mL       clindamycin Resistant >=^1 mcg/mL       erythromycin Resistant >=^8 mcg/mL       vancomycin Sensitive              vacno should cover   IV home abx- vanco  +meropenemem Q8 hours?  This might complicate home health ABx  Discussion therefore for [facility]-- but they wouldn't take with methadone  Discussed bridge to mahi whitney MS conTin bid and short actog morphine     Then later discussions SNF/[facility]  conceirge now  OK for transportation for ONCE daily methadone -- will attempt this transition  +MS contin prn  Scheduled and prn for transition and s/p debridements-- high risk meds            >50% of time spent coordinating care with other providers and/or counseling patient/family  Remainder of medical problems as per resident note.

## 2021-07-27 NOTE — PROGRESS NOTES
Department of Internal Medicine  Infectious Diseases  Progress  Note    Face to face encounter   C/C : Right heel wound infection, osteomyelitis     Pt is awake and alert  Denies fever or chills   Patient sitting up on bedside- reports pain to foot and hip   Upset this am- sitting up at bed.    Afebrile     Current Facility-Administered Medications   Medication Dose Route Frequency Provider Last Rate Last Admin    diphenhydrAMINE (BENADRYL) tablet 25 mg  25 mg Oral Q12H PRN SALIMA Dai - CNS   25 mg at 07/27/21 1104    morphine (MS CONTIN) extended release tablet 15 mg  15 mg Oral 2 times per day Nory Thy Rodolfo Chambers MD   15 mg at 07/27/21 1031    vancomycin (VANCOCIN) 750 mg in dextrose 5 % 250 mL IVPB  750 mg Intravenous Q12H Laina England  mL/hr at 07/27/21 1030 750 mg at 07/27/21 1030    meropenem (MERREM) 1,000 mg in sodium chloride 0.9 % 100 mL IVPB (mini-bag)  1,000 mg Intravenous Q8H Oumou Connolly MD 33.3 mL/hr at 07/27/21 1035 1,000 mg at 07/27/21 1035    amLODIPine (NORVASC) tablet 5 mg  5 mg Oral Daily Oumou Connolly MD   5 mg at 07/27/21 1031    HYDROcodone-acetaminophen (NORCO) 5-325 MG per tablet 1 tablet  1 tablet Oral Q4H PRN Oumou Connolly MD   1 tablet at 07/27/21 0526    hydrOXYzine (VISTARIL) injection 50 mg  50 mg Intramuscular Daily Gus Rajput MD   50 mg at 07/27/21 1030    enoxaparin (LOVENOX) injection 40 mg  40 mg Subcutaneous Daily Eldon Abbott MD   40 mg at 07/27/21 1029    lidocaine PF 1 % injection 5 mL  5 mL Intradermal Once Eldon Abbott MD        sodium chloride flush 0.9 % injection 5-40 mL  5-40 mL Intravenous 2 times per day Eldon Abbott MD   10 mL at 07/26/21 2109    sodium chloride flush 0.9 % injection 5-40 mL  5-40 mL Intravenous PRN Eldon Abbott MD        0.9 % sodium chloride infusion  25 mL Intravenous PRN Eldon Abbott MD        heparin flush 100 UNIT/ML injection 300 Units  3 mL Intravenous 2 times per day Eldon Abbott MD   300 Units at 07/26/21 2108    heparin flush 100 UNIT/ML injection 300 Units  3 mL Intracatheter PRN Shirin Wilson MD        lidocaine PF 1 % injection 5 mL  5 mL Intradermal Once Shirin Wilson MD        sodium chloride flush 0.9 % injection 5-40 mL  5-40 mL Intravenous 2 times per day Shirin Wilson MD   10 mL at 07/26/21 2109    sodium chloride flush 0.9 % injection 5-40 mL  5-40 mL Intravenous PRN Shirin Wilson MD   10 mL at 07/23/21 1510    0.9 % sodium chloride infusion  25 mL Intravenous PRN Shirin Wilson MD        heparin flush 100 UNIT/ML injection 300 Units  3 mL Intravenous 2 times per day Shirin Wilson MD   300 Units at 07/27/21 1105    heparin flush 100 UNIT/ML injection 300 Units  3 mL Intracatheter PRN Shirin Wilson MD   300 Units at 07/25/21 2058    propranolol (INDERAL) tablet 10 mg  10 mg Oral TID Shirin Wilson MD   10 mg at 07/27/21 1031    cloNIDine (CATAPRES) tablet 0.1 mg  0.1 mg Oral TID Shirin Wilson MD   0.1 mg at 07/27/21 1031    glucose (GLUTOSE) 40 % oral gel 15 g  15 g Oral PRN Shirin Wilson MD        dextrose 50 % IV solution  12.5 g Intravenous PRN Shirin Wilson MD        glucagon (rDNA) injection 1 mg  1 mg Intramuscular PRN Shirin Wilson MD        dextrose 5 % solution  100 mL/hr Intravenous PRN Shirin Wilson MD        ipratropium-albuterol (DUONEB) nebulizer solution 1 ampule  1 ampule Inhalation Q4H WA Shirin Wilson MD   1 ampule at 07/26/21 1099    [Held by provider] methadone (DOLOPHINE) 10 MG/ML solution 65 mg  65 mg Oral QAM AC Shirin Wilson MD   65 mg at 07/26/21 5807    gabapentin (NEURONTIN) capsule 300 mg  300 mg Oral BID Shirin Wilson MD   300 mg at 07/27/21 1030    sodium chloride flush 0.9 % injection 10 mL  10 mL Intravenous 2 times per day Shirin Wilson MD   10 mL at 07/27/21 1051    sodium chloride flush 0.9 % injection 10 mL  10 mL Intravenous PRN Shirin Wilson MD   10 mL at 07/24/21 0244    0.9 % sodium chloride infusion  25 mL Intravenous PRN Shirin Wilson MD        ondansetron (ZOFRAN-ODT) lymphadenopathy   Back:     no CVA tenderness   Lungs:      Wheeze     Heart:    Regular rate and rhythm    Abdomen:     Soft, non-tender, bowel sounds present    Extremities:    + edema,   Right heel wound with VAC        Skin:  Right foot wound VAC  Left upper arm with PICC        PICC line - 7/22     CBC with Differential:      Lab Results   Component Value Date    WBC 5.6 07/27/2021    RBC 4.13 07/27/2021    HGB 11.6 07/27/2021    HCT 38.5 07/27/2021     07/27/2021    MCV 93.2 07/27/2021    MCH 28.1 07/27/2021    MCHC 30.1 07/27/2021    RDW 15.3 07/27/2021    LYMPHOPCT 36.4 07/27/2021    MONOPCT 9.3 07/27/2021    BASOPCT 1.1 07/27/2021    MONOSABS 0.52 07/27/2021    LYMPHSABS 2.04 07/27/2021    EOSABS 0.20 07/27/2021    BASOSABS 0.06 07/27/2021       CMP:    Lab Results   Component Value Date     07/27/2021    K 4.8 07/27/2021    K 4.4 07/20/2021    CL 99 07/27/2021    CO2 35 07/27/2021    BUN 15 07/27/2021    CREATININE 1.0 07/27/2021    GFRAA >60 07/27/2021    LABGLOM 57 07/27/2021    GLUCOSE 106 07/27/2021    PROT 7.7 02/08/2020    LABALBU 3.5 02/08/2020    CALCIUM 9.0 07/27/2021    BILITOT 0.4 02/08/2020    ALKPHOS 103 02/08/2020    AST 10 02/08/2020    ALT 8 02/08/2020       Hepatic Function Panel:    Lab Results   Component Value Date    ALKPHOS 103 02/08/2020    ALT 8 02/08/2020    AST 10 02/08/2020    PROT 7.7 02/08/2020    BILITOT 0.4 02/08/2020    LABALBU 3.5 02/08/2020     Vanco trough level 9.8     Microbiology :    Wound cx - staph species- beta strep, GNR     Mixed bryce isolated. Further workup and sensitivity testing   is not routinely indicated and will not be performed. Mixed bryce isolated includes:   Oxidase positive Gram negative rods   Mixed Corynebacteria   Coagulase negative Staph species   Catalase negative Gram positive rods      Narrative:       Radiology :    X  Ray foot     Impression:        1. Amputation of the distal calcaneus.    2. Cortical irregularity likely related to erosive changes involving inferior   aspect of the calcaneus.  Findings could suggest osteomyelitis. 3. Skin ulceration involving plantar soft tissue beneath the calcaneus. 4. No obvious subcutaneous gas.          Vanco trough 17.4    IMPRESSION:     1. Chronic non healing right heel ulcer . Wound infection , right calceneus osteomyelitis s/p debridement  (7/22)     RECOMMENDATIONS:      1. Vancomycin 1250 mg IV q 12 hrs, meropenem 1 gram IV q 8 hrs X 5 weeks    -- pharmacy dosing vancomycin - trough 20.8   2. Can take benadryl PO prior to vancomycin administration - patient reports she feels itchy while vancomycin is infusion   3. ID follow up in 2-3 weeks   4.  Discharge planning - either [facility] or [address] [place] [address], APRN - CNS  7/27/2021

## 2021-07-27 NOTE — CARE COORDINATION
Met with the pt to discuss discharge plan. The pt will not be able to receive her methadone from [facility] with a PICC line. Discussed LTAC. The pt is in agreement. She has chosen [id number].  Lucius Fisher -[phone]

## 2021-07-27 NOTE — PROGRESS NOTES
Occupational Therapy     Date:2021  Patient Name: Salli Brittle  MRN: [id number]  : 1965  Room: [address] [place] [address]-A     Completed chart review & attempted to see pt with pt declining, appeared very anxious, nsg present, reporting she needs to get her medication straightened out before she can participate in therapy. Will attempt to see pt at another time. Wendy Valle.  [address] [place] [address], [address] [place] [address]

## 2021-07-27 NOTE — PROGRESS NOTES
Attending Physician Statement:  Marissa Agarwal M.D., F.A.C.P.     I have discussed the case, including pertinent history and exam findings with the resident/NP. I have seen and examined the patient and the key elements of the encounter have been performed by me.  I agree with the resident ROS, PMHx, PSHx, meds reviewed and assessment, plan and orders as documented by the resident/NP [facility] [place] [facility] charts reviewed, including other providers notes, relevant labs and imaging.   Osteomyelitis 2/2 walking on traumatized calceneus- sp partial calcectomy  No PAD - ? neuropathy   b.  Acute on Chronic, nonhealing on R calcaneous -- was draining in the ED  c. 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)  Copd stable=-- wean off o2  periprocedural desaturation, take off o2, reassess, doubt penumonia        After infection and heel wound stable then considerateions for   Sp Hip fracture then post traumatic OA hip  Now considerations for JEREMIAH vs hemiarthroplasty     ?osteoporosis     chrronically no withdrawal issues  Anxiety and essential tremores also better  ,medically  stable for DC - awating home vs [facility] vs LTAC     But ongoing recurrent anxiety and +multiple concerns    Beta streptococcus group c (1)    Antibiotic Interpretation RICARDO Status    ampicillin Sensitive <=^0.25 mcg/mL     benzylpenicillin Sensitive <=^0.06 mcg/mL     cefotaxime Sensitive <=^0.12 mcg/mL cefTRIAXone Sensitive ^0. 25 mcg/mL     clindamycin Resistant >=^1 mcg/mL     erythromycin Resistant >=^8 mcg/mL     vancomycin Sensitive         vacno should cover   IV home abx- vanco  +meropenemem Q8 hours? This might complicate home health ABx  Discussion therefore for [facility]-- but they wouldn't take with methadone  Discussed bridge to mahi whitney MS conTin bid and short actog morphine    Then later discussions about LTAC- select      >50% of time spent coordinating care with other providers and/or counseling patient/family  Remainder of medical problems as per resident note.

## 2021-07-27 NOTE — PROGRESS NOTES
Pharmacy Consultation Note  (Antibiotic Dosing and Monitoring)    Initial consult date: 7/20/21  Consulting physician: Dr. Lauryn Khan  Drug(s): vancomycin  Indication: osteomyelitis      Age/  Gender Height Weight IBW Dosing weight  Allergy Information   56 y.o./female 5' 9\" (175.3 cm) 190 lb (86.2 kg)     Ideal body weight: 66.2 kg (145 lb 15.1 oz)  Adjusted ideal body weight: 74.2 kg (163 lb 9.1 oz)  74.2 kg  Ancef [cefazolin], Duricef [cefadroxil], and Iodine          Other anti-infectives Start date Stop date   Meropenem 7/20                     Date  Tmax WBC BUN/CR UOP CrCL  (mL/min) Drug/Dose Time   Given Level(s)   (Time) Comments   7/20 99.2 7.1  10/0.9 -- --  Vancomycin 1250 mg IV x 1 dose 1841     7/21 afebrile 5.5 10/0.9 -- 83 Vancomycin 1250 mg IV x 1 dose    Vancomycin 1000 mg IV q 12 hr 0704      (1900)- not given  1900 dose not give due to loss of IV access   7/22 afebrile 5.6 11/0.9 -- 83 Vancomycin 1000 mg IV q 12 hr 1025  (1900)- not given Vanco trough at 0648 is 9.8 mcg/mL Patient refused dose at 1900   7/23 afebrile -- -- -- 83 Vancomycin 1000 mg IV q 12 hr 0254  1511     7/24 afebrile 5.7 9/0.9 -- 83 Vancomycin 1000 mg IV q 12 hr 0244  2153 Trough per ID @ 0574 = 17.4 mcg/mL    7/25 afebrile 5.1 12/0.9 -- 83 Vancomycin 750 mg IV q12hr 1022  2226     7/26 afebrile 5.9 12/0.9 -- 83 Vancomycin 750 mg IV q12hr 0951  2120     7/27 afebrile 5.6 15/1 -- 74 Vancomycin 750 mg IV q12hr 1030   Vanco level @0534 is 20.8 mcg/mL (taken 4 hours early)            Intake/Output Summary (Last 24 hours) at 7/27/2021 9158  Last data filed at 7/26/2021 2303  Gross per 24 hour   Intake 450 ml   Output --   Net 450 ml       Average urine output:    Cultures:    Site Date Result                    No results for input(s): Britt Cava in the last 72 hours.      Historical Cultures:  Organism   Date Value Ref Range Status   07/23/2021 Beta Strep Group C (A)  Preliminary   07/23/2021 Gram negative janette (A)  Preliminary 07/23/2021 Staphylococcus species (A)  Preliminary     No results for input(s): BC in the last 72 hours. Radiology:      Assessment:  · 55year old female on vancomycin for osteomyelitis  · Goal trough = 15 - 20 mcg/ml; Goal AUC/RICARDO 400-600  · 7/22: Scr stable at 0.9. Dose at 2300 on 7/21 not given due to IV access. Level today of 9.8 mcg/mL is not a true trough   · 7/23: Dose at 1900 on 7/22 was refused by patient, patient accepted medication at [phone] on 7/23. I adjusted vancomycin administration times. · 7/24: Trough (14 hour level) = 17.4 mcg. mL. True trough ~ 20.1 mcg/mL, AUC/RICARDO 598.    · 7/25: Scr stable at 0.9  · 7/26: Scr 1; level 20.8 mcg/mL (taken 4 hours early), est level to be closer to 16 mcg/L     Plan:  · Cont vancomycin 750 mg IV q 12 hr (est AUC/RICARDO 456, trough 15.3 mcg/mL)  · Patient for possible discharge today, if patient does not discharge will obtain vancomycin levels as needed to appropriately monitor   · Pharmacist will follow and monitor/adjust dosing as necessary    Lizbeth Velasquez, RajendraD, BCPS 7/27/2021 8:52 AM  Phone: [phone]

## 2021-07-28 PROBLEM — Z79.899 HIGH RISK MEDICATION USE: Status: ACTIVE | Noted: 2021-07-28

## 2021-07-28 LAB
ANION GAP SERPL CALCULATED.3IONS-SCNC: 7 MMOL/L (ref 7–16)
ANISOCYTOSIS: ABNORMAL
BASOPHILS ABSOLUTE: 0.06 E9/L (ref 0–0.2)
BASOPHILS RELATIVE PERCENT: 1 % (ref 0–2)
BUN BLDV-MCNC: 15 MG/DL (ref 6–20)
CALCIUM SERPL-MCNC: 9.1 MG/DL (ref 8.6–10.2)
CHLORIDE BLD-SCNC: 99 MMOL/L (ref 98–107)
CO2: 32 MMOL/L (ref 22–29)
CREAT SERPL-MCNC: 0.8 MG/DL (ref 0.5–1)
CULTURE SURGICAL: ABNORMAL
EOSINOPHILS ABSOLUTE: 0.17 E9/L (ref 0.05–0.5)
EOSINOPHILS RELATIVE PERCENT: 2.8 % (ref 0–6)
GFR AFRICAN AMERICAN: >60
GFR NON-AFRICAN AMERICAN: >60 ML/MIN/1.73
GLUCOSE BLD-MCNC: 95 MG/DL (ref 74–99)
HCT VFR BLD CALC: 42.9 % (ref 34–48)
HEMOGLOBIN: 12.5 G/DL (ref 11.5–15.5)
IMMATURE GRANULOCYTES #: 0.05 E9/L
IMMATURE GRANULOCYTES %: 0.8 % (ref 0–5)
LYMPHOCYTES ABSOLUTE: 1.98 E9/L (ref 1.5–4)
LYMPHOCYTES RELATIVE PERCENT: 32.4 % (ref 20–42)
MCH RBC QN AUTO: 27.4 PG (ref 26–35)
MCHC RBC AUTO-ENTMCNC: 29.1 % (ref 32–34.5)
MCV RBC AUTO: 93.9 FL (ref 80–99.9)
METER GLUCOSE: 120 MG/DL (ref 74–99)
METER GLUCOSE: 131 MG/DL (ref 74–99)
METER GLUCOSE: 159 MG/DL (ref 74–99)
MONOCYTES ABSOLUTE: 0.48 E9/L (ref 0.1–0.95)
MONOCYTES RELATIVE PERCENT: 7.8 % (ref 2–12)
NEUTROPHILS ABSOLUTE: 3.38 E9/L (ref 1.8–7.3)
NEUTROPHILS RELATIVE PERCENT: 55.2 % (ref 43–80)
ORGANISM: ABNORMAL
OVALOCYTES: ABNORMAL
PDW BLD-RTO: 15.4 FL (ref 11.5–15)
PLATELET # BLD: 252 E9/L (ref 130–450)
PMV BLD AUTO: 9.8 FL (ref 7–12)
POIKILOCYTES: ABNORMAL
POLYCHROMASIA: ABNORMAL
POTASSIUM SERPL-SCNC: 4.8 MMOL/L (ref 3.5–5)
RBC # BLD: 4.57 E12/L (ref 3.5–5.5)
SODIUM BLD-SCNC: 138 MMOL/L (ref 132–146)
WBC # BLD: 6.1 E9/L (ref 4.5–11.5)

## 2021-07-28 PROCEDURE — 6370000000 HC RX 637 (ALT 250 FOR IP): Performed by: PODIATRIST

## 2021-07-28 PROCEDURE — 97535 SELF CARE MNGMENT TRAINING: CPT

## 2021-07-28 PROCEDURE — 6360000002 HC RX W HCPCS: Performed by: INTERNAL MEDICINE

## 2021-07-28 PROCEDURE — 6360000002 HC RX W HCPCS: Performed by: STUDENT IN AN ORGANIZED HEALTH CARE EDUCATION/TRAINING PROGRAM

## 2021-07-28 PROCEDURE — 1200000000 HC SEMI PRIVATE

## 2021-07-28 PROCEDURE — 2580000003 HC RX 258: Performed by: INTERNAL MEDICINE

## 2021-07-28 PROCEDURE — 6360000002 HC RX W HCPCS: Performed by: PODIATRIST

## 2021-07-28 PROCEDURE — 6370000000 HC RX 637 (ALT 250 FOR IP): Performed by: CLINICAL NURSE SPECIALIST

## 2021-07-28 PROCEDURE — 6370000000 HC RX 637 (ALT 250 FOR IP): Performed by: INTERNAL MEDICINE

## 2021-07-28 PROCEDURE — 99233 SBSQ HOSP IP/OBS HIGH 50: CPT | Performed by: INTERNAL MEDICINE

## 2021-07-28 PROCEDURE — 2580000003 HC RX 258: Performed by: PODIATRIST

## 2021-07-28 PROCEDURE — 82962 GLUCOSE BLOOD TEST: CPT

## 2021-07-28 PROCEDURE — 80048 BASIC METABOLIC PNL TOTAL CA: CPT

## 2021-07-28 PROCEDURE — 36415 COLL VENOUS BLD VENIPUNCTURE: CPT

## 2021-07-28 PROCEDURE — 94640 AIRWAY INHALATION TREATMENT: CPT

## 2021-07-28 PROCEDURE — 85025 COMPLETE CBC W/AUTO DIFF WBC: CPT

## 2021-07-28 RX ORDER — METHADONE HYDROCHLORIDE 10 MG/ML
120 CONCENTRATE ORAL DAILY
Status: DISCONTINUED | OUTPATIENT
Start: 2021-07-30 | End: 2021-07-30 | Stop reason: HOSPADM

## 2021-07-28 RX ORDER — METHADONE HYDROCHLORIDE 10 MG/ML
10 CONCENTRATE ORAL ONCE
Status: COMPLETED | OUTPATIENT
Start: 2021-07-29 | End: 2021-07-29

## 2021-07-28 RX ORDER — METHADONE HYDROCHLORIDE 10 MG/ML
25 CONCENTRATE ORAL ONCE
Status: COMPLETED | OUTPATIENT
Start: 2021-07-28 | End: 2021-07-28

## 2021-07-28 RX ORDER — METHADONE HYDROCHLORIDE 10 MG/ML
100 CONCENTRATE ORAL ONCE
Status: COMPLETED | OUTPATIENT
Start: 2021-07-29 | End: 2021-07-29

## 2021-07-28 RX ORDER — METHADONE HYDROCHLORIDE 10 MG/ML
25 CONCENTRATE ORAL ONCE
Status: DISCONTINUED | OUTPATIENT
Start: 2021-07-28 | End: 2021-07-28 | Stop reason: CLARIF

## 2021-07-28 RX ADMIN — Medication 10 ML: at 20:35

## 2021-07-28 RX ADMIN — Medication 10 ML: at 09:53

## 2021-07-28 RX ADMIN — MEROPENEM 1000 MG: 1 INJECTION, POWDER, FOR SOLUTION INTRAVENOUS at 17:57

## 2021-07-28 RX ADMIN — PROPRANOLOL HYDROCHLORIDE 10 MG: 10 TABLET ORAL at 20:34

## 2021-07-28 RX ADMIN — CLONIDINE HYDROCHLORIDE 0.1 MG: 0.1 TABLET ORAL at 09:42

## 2021-07-28 RX ADMIN — SODIUM CHLORIDE, PRESERVATIVE FREE 300 UNITS: 5 INJECTION INTRAVENOUS at 20:36

## 2021-07-28 RX ADMIN — AMLODIPINE BESYLATE 5 MG: 5 TABLET ORAL at 09:42

## 2021-07-28 RX ADMIN — IPRATROPIUM BROMIDE AND ALBUTEROL SULFATE 1 AMPULE: .5; 3 SOLUTION RESPIRATORY (INHALATION) at 17:05

## 2021-07-28 RX ADMIN — Medication 10 ML: at 09:46

## 2021-07-28 RX ADMIN — MEROPENEM 1000 MG: 1 INJECTION, POWDER, FOR SOLUTION INTRAVENOUS at 01:47

## 2021-07-28 RX ADMIN — MORPHINE SULFATE 15 MG: 15 TABLET, FILM COATED, EXTENDED RELEASE ORAL at 09:45

## 2021-07-28 RX ADMIN — HYDROCODONE BITARTRATE AND ACETAMINOPHEN 1 TABLET: 5; 325 TABLET ORAL at 21:49

## 2021-07-28 RX ADMIN — GABAPENTIN 300 MG: 300 CAPSULE ORAL at 20:34

## 2021-07-28 RX ADMIN — MEROPENEM 1000 MG: 1 INJECTION, POWDER, FOR SOLUTION INTRAVENOUS at 09:52

## 2021-07-28 RX ADMIN — PROPRANOLOL HYDROCHLORIDE 10 MG: 10 TABLET ORAL at 09:43

## 2021-07-28 RX ADMIN — GABAPENTIN 300 MG: 300 CAPSULE ORAL at 09:43

## 2021-07-28 RX ADMIN — SODIUM CHLORIDE, PRESERVATIVE FREE 300 UNITS: 5 INJECTION INTRAVENOUS at 09:46

## 2021-07-28 RX ADMIN — DIPHENHYDRAMINE HYDROCHLORIDE 25 MG: 25 TABLET ORAL at 21:56

## 2021-07-28 RX ADMIN — MORPHINE SULFATE 15 MG: 15 TABLET, FILM COATED, EXTENDED RELEASE ORAL at 20:33

## 2021-07-28 RX ADMIN — HYDROCODONE BITARTRATE AND ACETAMINOPHEN 1 TABLET: 5; 325 TABLET ORAL at 09:42

## 2021-07-28 RX ADMIN — Medication 25 MG: at 21:51

## 2021-07-28 RX ADMIN — Medication 65 MG: at 07:03

## 2021-07-28 RX ADMIN — OXYCODONE HYDROCHLORIDE AND ACETAMINOPHEN 500 MG: 500 TABLET ORAL at 09:42

## 2021-07-28 RX ADMIN — VANCOMYCIN HYDROCHLORIDE 750 MG: 10 INJECTION, POWDER, LYOPHILIZED, FOR SOLUTION INTRAVENOUS at 09:56

## 2021-07-28 RX ADMIN — VANCOMYCIN HYDROCHLORIDE 750 MG: 10 INJECTION, POWDER, LYOPHILIZED, FOR SOLUTION INTRAVENOUS at 22:12

## 2021-07-28 RX ADMIN — IPRATROPIUM BROMIDE AND ALBUTEROL SULFATE 1 AMPULE: .5; 3 SOLUTION RESPIRATORY (INHALATION) at 12:52

## 2021-07-28 RX ADMIN — ENOXAPARIN SODIUM 40 MG: 40 INJECTION SUBCUTANEOUS at 09:42

## 2021-07-28 RX ADMIN — HYDROCODONE BITARTRATE AND ACETAMINOPHEN 1 TABLET: 5; 325 TABLET ORAL at 04:57

## 2021-07-28 RX ADMIN — HYDROXYZINE HYDROCHLORIDE 50 MG: 50 INJECTION, SOLUTION INTRAMUSCULAR at 09:43

## 2021-07-28 RX ADMIN — CLONIDINE HYDROCHLORIDE 0.1 MG: 0.1 TABLET ORAL at 20:37

## 2021-07-28 RX ADMIN — Medication 10 ML: at 09:47

## 2021-07-28 ASSESSMENT — PAIN DESCRIPTION - DESCRIPTORS
DESCRIPTORS: ACHING;DISCOMFORT;CONSTANT;SORE
DESCRIPTORS: ACHING;CONSTANT;DISCOMFORT
DESCRIPTORS: ACHING;CONSTANT

## 2021-07-28 ASSESSMENT — PAIN DESCRIPTION - PROGRESSION
CLINICAL_PROGRESSION: GRADUALLY IMPROVING
CLINICAL_PROGRESSION: GRADUALLY IMPROVING

## 2021-07-28 ASSESSMENT — PAIN SCALES - GENERAL
PAINLEVEL_OUTOF10: 2
PAINLEVEL_OUTOF10: 8
PAINLEVEL_OUTOF10: 0
PAINLEVEL_OUTOF10: 9
PAINLEVEL_OUTOF10: 8
PAINLEVEL_OUTOF10: 8
PAINLEVEL_OUTOF10: 4
PAINLEVEL_OUTOF10: 8
PAINLEVEL_OUTOF10: 9

## 2021-07-28 ASSESSMENT — PAIN DESCRIPTION - ORIENTATION
ORIENTATION: RIGHT

## 2021-07-28 ASSESSMENT — PAIN DESCRIPTION - LOCATION
LOCATION: FOOT

## 2021-07-28 ASSESSMENT — PAIN DESCRIPTION - PAIN TYPE
TYPE: ACUTE PAIN
TYPE: ACUTE PAIN;SURGICAL PAIN

## 2021-07-28 NOTE — PROGRESS NOTES
reaction       BP (!) 121/56   Pulse 71   Temp 97.7 °F (36.5 °C) (Temporal)   Resp 16   Ht 5' 9\" (1.753 m)   Wt 190 lb (86.2 kg)   LMP 08/28/2013   SpO2 93%   BMI 28.06 kg/m²       EXAM:    Vascular Exam:  DP and PT pulses are palpable B/L. Skin temperature warm to cool to BLE from proximal to distal. Mild edema and erythema to R foot in comparison to contralateral.      Neuro Exam:  Gross and epicritic sensation intact B/L.      Dermatologic Exam:  Surgical débridement noted to the right plantar heel measuring about 4.0x2.0cm. Wound bed is granular and healthy with bony prominence noted. Wound does not tunnel or burrow. No noted pus or drainage. No fluctuance noted. Macerated skin edges noted. Mild active bleeding during dressing changes noted. As pictured below:      MSK: Evidence of previous partial calcanectomy to RLE. No posterior calf pain on palpation b/l. Patient able to move digits without pain.                Scheduled Meds:   methadone  25 mg Oral Once    [START ON 7/29/2021] methadone  100 mg Oral Once    [START ON 7/29/2021] methadone  10 mg Oral Once    [START ON 7/30/2021] methadone  120 mg Oral Daily    morphine  15 mg Oral 2 times per day    vancomycin  750 mg Intravenous Q12H    meropenem  1,000 mg Intravenous Q8H    amLODIPine  5 mg Oral Daily    hydrOXYzine  50 mg Intramuscular Daily    enoxaparin  40 mg Subcutaneous Daily    lidocaine PF  5 mL Intradermal Once    sodium chloride flush  5-40 mL Intravenous 2 times per day    heparin flush  3 mL Intravenous 2 times per day    lidocaine PF  5 mL Intradermal Once    sodium chloride flush  5-40 mL Intravenous 2 times per day    heparin flush  3 mL Intravenous 2 times per day    propranolol  10 mg Oral TID    cloNIDine  0.1 mg Oral TID    ipratropium-albuterol  1 ampule Inhalation Q4H WA    gabapentin  300 mg Oral BID    sodium chloride flush  10 mL Intravenous 2 times per day    Arformoterol Tartrate  15 mcg Nebulization BID    budesonide  0.5 mg Nebulization BID    ascorbic acid  500 mg Oral Daily     Continuous Infusions:   sodium chloride      sodium chloride      dextrose      sodium chloride       PRN Meds:.diphenhydrAMINE, HYDROcodone 5 mg - acetaminophen, sodium chloride flush, sodium chloride, heparin flush, sodium chloride flush, sodium chloride, heparin flush, glucose, dextrose, glucagon (rDNA), dextrose, sodium chloride flush, sodium chloride, ondansetron **OR** ondansetron, polyethylene glycol, acetaminophen **OR** acetaminophen    RADIOLOGY:  XR CHEST PORTABLE   Final Result   Normal chest         VL LOWER EXTREMITY ARTERIAL SEGMENTAL PRESSURES W PPG BILATERAL   Final Result      XR FOOT RIGHT (MIN 3 VIEWS)   Final Result   1. Amputation of the distal calcaneus. 2. Cortical irregularity likely related to erosive changes involving inferior   aspect of the calcaneus. Findings could suggest osteomyelitis. 3. Skin ulceration involving plantar soft tissue beneath the calcaneus. 4. No obvious subcutaneous gas. BP (!) 121/56   Pulse 71   Temp 97.7 °F (36.5 °C) (Temporal)   Resp 16   Ht 5' 9\" (1.753 m)   Wt 190 lb (86.2 kg)   LMP 08/28/2013   SpO2 93%   BMI 28.06 kg/m²     LABS:    Recent Labs     07/27/21  0534 07/28/21  0511   WBC 5.6 6.1   HGB 11.6 12.5   HCT 38.5 42.9    252        Recent Labs     07/28/21  0511      K 4.8   CL 99   CO2 32*   BUN 15   CREATININE 0.8        No results for input(s): PROT, INR, APTT in the last 72 hours. ASSESSMENT:  1.RLE Chronic ulcer-POA,Infected   2. RLE Calc OM   3. RLE partial calcanectomy  4. [address] [place] [address]   5. S/P I&D, Debridement, biopsies (DOS: 7/23/21)        PLAN:  - All labs, images and charts reviewed and evaluated. - Wound vac dressings changed today: wound vac has good seal runnign at 125mmHg.   - NWB RLE.  - Prevalon boots noted  - XR reviewed: 1. Possible OM.     2.No soft tissue gas  - Antibiotics as per ID:Vanc  - Vascular: 1.Good flow b/l   - Surgical culture: Beta Strep Group C, GNR, Staph   - Surgical path: Negative OM   - Will continue to follow while in house. Patient is stable for discharge from Podiatric perspective once cleared from all other teams on board.  Recommend SNF  - Discussed with

## 2021-07-28 NOTE — PROGRESS NOTES
Department of Internal Medicine  Infectious Diseases  Progress  Note    Face to face encounter   C/C : Right heel wound infection, osteomyelitis     Pt is awake and alert  Denies fever or chills   Patient resting in bed.  No distress  Tired this am   Afebrile     Current Facility-Administered Medications   Medication Dose Route Frequency Provider Last Rate Last Admin    methadone (DOLOPHINE) 10 MG/ML solution 25 mg  25 mg Oral Once Tracy Hayward MD        [START ON 7/29/2021] methadone (DOLOPHINE) 10 MG/ML solution 100 mg  100 mg Oral Once Tracy Hayward MD        [START ON 7/29/2021] methadone (DOLOPHINE) 10 MG/ML solution 10 mg  10 mg Oral Once MD Christ Krause ON 7/30/2021] methadone (DOLOPHINE) 10 MG/ML solution 120 mg  120 mg Oral Daily Tracy Hayward MD        diphenhydrAMINE (BENADRYL) tablet 25 mg  25 mg Oral Q12H PRN SALIMA Gonzalez - CNS   25 mg at 07/27/21 1104    morphine (MS CONTIN) extended release tablet 15 mg  15 mg Oral 2 times per day Nory Thy Jeanine Thompson MD   15 mg at 07/28/21 0945    vancomycin (VANCOCIN) 750 mg in dextrose 5 % 250 mL IVPB  750 mg Intravenous Q12H Xavi Pineda  mL/hr at 07/28/21 0956 750 mg at 07/28/21 0956    meropenem (MERREM) 1,000 mg in sodium chloride 0.9 % 100 mL IVPB (mini-bag)  1,000 mg Intravenous Q8H Tracy Hayward MD 33.3 mL/hr at 07/28/21 0952 1,000 mg at 07/28/21 0952    amLODIPine (NORVASC) tablet 5 mg  5 mg Oral Daily Tracy Hayward MD   5 mg at 07/28/21 0942    HYDROcodone-acetaminophen (NORCO) 5-325 MG per tablet 1 tablet  1 tablet Oral Q4H PRN Tracy Hayward MD   1 tablet at 07/28/21 0942    hydrOXYzine (VISTARIL) injection 50 mg  50 mg Intramuscular Daily Jaime Doty MD   50 mg at 07/28/21 0943    enoxaparin (LOVENOX) injection 40 mg  40 mg Subcutaneous Daily Melissa Mahoney MD   40 mg at 07/28/21 0942    lidocaine PF 1 % injection 5 mL  5 mL Intradermal Once Melissa Mahoney MD        sodium chloride flush 0.9 % injection 5-40 mL  5-40 mL Intravenous 2 times per day Ramón Rogers MD   10 mL at 07/28/21 0953    sodium chloride flush 0.9 % injection 5-40 mL  5-40 mL Intravenous PRN Ramón Rogers MD        0.9 % sodium chloride infusion  25 mL Intravenous PRN Ramón Rogers MD        heparin flush 100 UNIT/ML injection 300 Units  3 mL Intravenous 2 times per day Ramón Rogers MD   300 Units at 07/27/21 2108    heparin flush 100 UNIT/ML injection 300 Units  3 mL Intracatheter PRN Ramón Rogers MD        lidocaine PF 1 % injection 5 mL  5 mL Intradermal Once Ramón Rogers MD        sodium chloride flush 0.9 % injection 5-40 mL  5-40 mL Intravenous 2 times per day Ramón Rogers MD   10 mL at 07/28/21 0947    sodium chloride flush 0.9 % injection 5-40 mL  5-40 mL Intravenous PRN Ramón Rogers MD   10 mL at 07/23/21 1510    0.9 % sodium chloride infusion  25 mL Intravenous PRN Ramón Rogers MD        heparin flush 100 UNIT/ML injection 300 Units  3 mL Intravenous 2 times per day Ramón Rogers MD   300 Units at 07/28/21 0946    heparin flush 100 UNIT/ML injection 300 Units  3 mL Intracatheter PRN Ramón Rogers MD   300 Units at 07/25/21 2058    propranolol (INDERAL) tablet 10 mg  10 mg Oral TID Ramón Rogers MD   10 mg at 07/28/21 5431    cloNIDine (CATAPRES) tablet 0.1 mg  0.1 mg Oral TID Ramón Rogers MD   0.1 mg at 07/28/21 0942    glucose (GLUTOSE) 40 % oral gel 15 g  15 g Oral PRN Ramón Rogers MD        dextrose 50 % IV solution  12.5 g Intravenous PRN Ramón Rogers MD        glucagon (rDNA) injection 1 mg  1 mg Intramuscular PRN Ramón Rogers MD        dextrose 5 % solution  100 mL/hr Intravenous PRN Ramón Rogers MD        ipratropium-albuterol (DUONEB) nebulizer solution 1 ampule  1 ampule Inhalation Q4H WA Ramón Rogers MD   1 ampule at 07/27/21 1932    gabapentin (NEURONTIN) capsule 300 mg  300 mg Oral BID Ramón Rogers MD   300 mg at 07/28/21 0847    sodium chloride flush 0.9 % injection 10 mL  10 mL Intravenous 2 times per day Ramón Rogers MD   10 mL at 07/28/21 6332    sodium chloride flush 0.9 % injection 10 mL  10 mL Intravenous PRN Valarie Villasenor MD   10 mL at 07/24/21 0244    0.9 % sodium chloride infusion  25 mL Intravenous PRN Valarie Villasenor MD        ondansetron (ZOFRAN-ODT) disintegrating tablet 4 mg  4 mg Oral Q8H PRN Valarie Villasenor MD        Or    ondansetron [facility]) injection 4 mg  4 mg Intravenous Q6H PRN Valarie Villasenor MD   4 mg at 07/22/21 2232    polyethylene glycol (GLYCOLAX) packet 17 g  17 g Oral Daily PRN Valarie Villasenor MD        acetaminophen (TYLENOL) tablet 650 mg  650 mg Oral Q6H PRN Valarie Villasenor MD   650 mg at 07/23/21 1511    Or    acetaminophen (TYLENOL) suppository 650 mg  650 mg Rectal Q6H PRN Valarie Villasenor MD        Arformoterol Tartrate [facility]) nebulizer solution 15 mcg  15 mcg Nebulization BID Valarie Villasenor MD   15 mcg at 07/27/21 1932    budesonide (PULMICORT) nebulizer suspension 500 mcg  0.5 mg Nebulization BID Valarie Villasenor MD   500 mcg at 07/27/21 1932    ascorbic acid (VITAMIN C) tablet 500 mg  500 mg Oral Daily Valarie Villasenor MD   500 mg at 07/28/21 1739       REVIEW OF SYSTEMS:    CONSTITUTIONAL:  Denies fever, chill or rigors. HEENT: denies blurring of vision or double vision, denies hearing problem  RESPIRATORY: denies cough, shortness of breath, sputum expectoration, chest pain. CARDIOVASCULAR:  Denies palpitation  GASTROINTESTINAL:  Denies abdomen pain, diarrhea or constipation. GENITOURINARY:  Denies burning urination or frequency of urination  INTEGUMENT: Chronic non healing wound right foot   HEMATOLOGIC/LYMPHATIC:  Denies lymph node swelling, gum bleeding or easy bruising. MUSCULOSKELETAL: right heel wound pain, left hip pain   NEUROLOGICAL:  weakness      PHYSICAL EXAM:    Vitals:   BP (!) 121/56   Pulse 71   Temp 97.7 °F (36.5 °C) (Temporal)   Resp 16   Ht 5' 9\" (1.753 m)   Wt 190 lb (86.2 kg)   LMP 08/28/2013   SpO2 93%   BMI 28.06 kg/m²     General Appearance:    Awake, alert , no acute distress.  Sitting up at bedside- on her phone   Head:    Normocephalic, atraumatic   Eyes:    No pallor, no icterus,   Ears:    No obvious deformity or drainage. Nose:   No nasal drainage   Throat:   Mucosa moist, no oral thrush   Neck:   Supple, no lymphadenopathy   Back:     no CVA tenderness   Lungs:      Wheeze     Heart:    Regular rate and rhythm    Abdomen:     Soft, non-tender, bowel sounds present    Extremities:    + edema,   Right heel wound with VAC            Skin:  Right foot wound VAC  Left upper arm with PICC        PICC line - 7/22     CBC with Differential:      Lab Results   Component Value Date    WBC 6.1 07/28/2021    RBC 4.57 07/28/2021    HGB 12.5 07/28/2021    HCT 42.9 07/28/2021     07/28/2021    MCV 93.9 07/28/2021    MCH 27.4 07/28/2021    MCHC 29.1 07/28/2021    RDW 15.4 07/28/2021    LYMPHOPCT 32.4 07/28/2021    MONOPCT 7.8 07/28/2021    BASOPCT 1.0 07/28/2021    MONOSABS 0.48 07/28/2021    LYMPHSABS 1.98 07/28/2021    EOSABS 0.17 07/28/2021    BASOSABS 0.06 07/28/2021       CMP:    Lab Results   Component Value Date     07/28/2021    K 4.8 07/28/2021    K 4.4 07/20/2021    CL 99 07/28/2021    CO2 32 07/28/2021    BUN 15 07/28/2021    CREATININE 0.8 07/28/2021    GFRAA >60 07/28/2021    LABGLOM >60 07/28/2021    GLUCOSE 95 07/28/2021    PROT 7.7 02/08/2020    LABALBU 3.5 02/08/2020    CALCIUM 9.1 07/28/2021    BILITOT 0.4 02/08/2020    ALKPHOS 103 02/08/2020    AST 10 02/08/2020    ALT 8 02/08/2020       Hepatic Function Panel:    Lab Results   Component Value Date    ALKPHOS 103 02/08/2020    ALT 8 02/08/2020    AST 10 02/08/2020    PROT 7.7 02/08/2020    BILITOT 0.4 02/08/2020    LABALBU 3.5 02/08/2020     Vanco trough level 9.8     Microbiology :    Wound cx - staph species- beta strep, GNR     Mixed bryce isolated. Further workup and sensitivity testing   is not routinely indicated and will not be performed.    Mixed bryce isolated includes:   Oxidase positive Gram negative rods   Mixed Corynebacteria Coagulase negative Staph species   Catalase negative Gram positive rods      Narrative:       Radiology :    X  Ray foot     Impression:        1. Amputation of the distal calcaneus. 2. Cortical irregularity likely related to erosive changes involving inferior   aspect of the calcaneus.  Findings could suggest osteomyelitis. 3. Skin ulceration involving plantar soft tissue beneath the calcaneus. 4. No obvious subcutaneous gas.          Vanco trough 17.4    IMPRESSION:     1. Chronic non healing right heel ulcer .  Wound infection , right calceneus osteomyelitis s/p debridement  (7/22)     RECOMMENDATIONS:      · Vancomycin IV , meropenem 1 gram IV q 8 hrs X 5 weeks    · -- pharmacy dosing vancomycin - trough 20.8   · Can take benadryl PO prior to vancomycin administration - patient reports she feels itchy while vancomycin is infusion   · Podiatry following- continue wound care   · ID follow up in 2-3 weeks   · Med rec updated   · Discharge planning - Dick [id number], APRN - CNS  7/28/2021

## 2021-07-28 NOTE — PROGRESS NOTES
Pharmacy Consultation Note  (Antibiotic Dosing and Monitoring)    Initial consult date: 7/20/21  Consulting physician: Dr. Laurence Lyon  Drug(s): vancomycin  Indication: osteomyelitis      Age/  Gender Height Weight IBW Dosing weight  Allergy Information   56 y.o./female 5' 9\" (175.3 cm) 190 lb (86.2 kg)     Ideal body weight: 66.2 kg (145 lb 15.1 oz)  Adjusted ideal body weight: 74.2 kg (163 lb 9.1 oz)  74.2 kg  Ancef [cefazolin], Duricef [cefadroxil], and Iodine          Other anti-infectives Start date Stop date   Meropenem 7/20                     Date  Tmax WBC BUN/CR UOP CrCL  (mL/min) Drug/Dose Time   Given Level(s)   (Time) Comments   7/20 99.2 7.1  10/0.9 -- --  Vancomycin 1250 mg IV x 1 dose 1841     7/21 afebrile 5.5 10/0.9 -- 83 Vancomycin 1250 mg IV x 1 dose    Vancomycin 1000 mg IV q 12 hr 0704      (1900)- not given  1900 dose not give due to loss of IV access   7/22 afebrile 5.6 11/0.9 -- 83 Vancomycin 1000 mg IV q 12 hr 1025  (1900)- not given Vanco trough at 0648 is 9.8 mcg/mL Patient refused dose at 1900   7/23 afebrile -- -- -- 83 Vancomycin 1000 mg IV q 12 hr 0254  1511     7/24 afebrile 5.7 9/0.9 -- 83 Vancomycin 1000 mg IV q 12 hr 0244  2153 Trough per ID @ 2359 = 17.4 mcg/mL    7/25 afebrile 5.1 12/0.9 -- 83 Vancomycin 750 mg IV q12hr 1022  2226     7/26 afebrile 5.9 12/0.9 -- 83 Vancomycin 750 mg IV q12hr 0951  2120     7/27 afebrile 5.6 15/1 -- 74 Vancomycin 750 mg IV q12hr 1030  2200 Vanco level @0534 is 20.8 mcg/mL (taken 4 hours early)     7/28 afebrile 6.1 15/0.8 -- 93 Vancomycin 750 mg IV q12hr 0956  (2200)     7/29        Vanco level (0930)            Intake/Output Summary (Last 24 hours) at 7/28/2021 1304  Last data filed at 7/28/2021 0801  Gross per 24 hour   Intake 750 ml   Output --   Net 750 ml       Average urine output:    Cultures:    Site Date Result                    No results for input(s): April Latina in the last 72 hours.      Historical Cultures:  Organism   Date Value Ref Range Status   07/23/2021 Beta Strep Group C (A)  Preliminary   07/23/2021 Gram negative janette (A)  Preliminary   07/23/2021 Staphylococcus simulans (A)  Preliminary     No results for input(s): BC in the last 72 hours. Radiology:      Assessment:  · 55year old female on vancomycin for osteomyelitis  · Goal trough = 15 - 20 mcg/ml; Goal AUC/RICARDO 400-600  · 7/22: Scr stable at 0.9. Dose at 2300 on 7/21 not given due to IV access. Level today of 9.8 mcg/mL is not a true trough   · 7/23: Dose at 1900 on 7/22 was refused by patient, patient accepted medication at [phone] on 7/23. I adjusted vancomycin administration times. · 7/24: Trough (14 hour level) = 17.4 mcg. mL. True trough ~ 20.1 mcg/mL, AUC/RICARDO 598. · 7/25: Scr stable at 0.9  · 7/26: Scr 1; level 20.8 mcg/mL (taken 4 hours early), est level to be closer to 16 mcg/L  · 7/28: Scr 0.8     Plan:  · Cont vancomycin 750 mg IV q 12 hr (est AUC/RICARDO 456, trough 15.3 mcg/mL)  · Will obtain vancomycin level before 1000 dose on 7/29  · Pharmacist will follow and monitor/adjust dosing as necessary    Iveth Awan PharmD Candidate 7/28/2021 1:04 PM     I have reviewed the progress note written by the pharmacy intern and agree with the documentation and treatment plan. All medication and/or lab orders have been entered by myself according to the outlined plan in the note.      Norma Harvey PharmD, BCPS 7/28/2021 1:39 PM  Phone: [phone]

## 2021-07-28 NOTE — CARE COORDINATION
[facility] cannot accept the pt. The pt is agreeable to [facility]. Referral made. The pt is aware that she willneed to go to [facility] if she cannot go to [facility]. [facility] has accepted the pt. Will follow.  Jessica Matthews -[phone]

## 2021-07-28 NOTE — PROGRESS NOTES
Occupational Therapy  OT BEDSIDE TREATMENT NOTE   [address] [facility] [address] [facility]  [address] [place] [address]      Date:2021  Patient Name: Gregory Pineda  MRN: [id number]  : 1965  Room: 8051/9513-E     Evaluating OT: MESERET Vega, OTR/L  # [id number]     Referring Provider: Tito Velasquez MD  Specific Provider Orders:  \"OT Eval and Treat\"  21     Diagnosis: Osteomyelitis Right Foot   OA Left Hip      Surgeries this admission: 21:    1.  Incision and drainage of right calcaneus bone cortex. 2.  Excisional wound debridement of right heel to and through level of  the bone, total measurement is 7 cm x 4 cm x 1.5 cm in dimension. 3.  Application of wound VAC negative pressure therapy.          Pertinent Medical History: Chronic Pain, Chronic Recurrent Osteomyelitis Right Foot, COPD, Polysubstance Abuse on Methadone     Pt is tentatively scheduled for Left THR, however, has infected Chronic Right Heel Wound that needs to be treated prior to Left Hip Surgery     Precautions:  Fall Risk  NWB R LE - Wound VAC  WBAT L LE  Regular Diet     Hx of Substance Abuse/Withdrawl      Assessment of current deficits   [x]? Functional mobility                         [x]?ADLs           [x]? Strength                  [x]? Cognition   [x]? Functional transfers           [x]? IADLs         [x]? Safety Awareness   [x]? Endurance   []? Fine Coordination              [x]? Balance      []? Vision/perception   []? Sensation     []? Gross Motor Coordination  []? ROM           []?  Delirium                   []? Motor Control         OT PLAN OF CARE   OT POC based on physician orders, patient diagnosis and results of clinical assessment     Frequency/Duration 1-3 days/wk for 2 weeks PRN   Specific OT Treatment to include:   * Instruction/training on adapted ADL techniques and AE recommendations to increase functional independence within precautions       * Training on energy all ADLs, IADLs, Transfers and Mobility using 1 crutch for household ambulation. Driving:  Not reported  Occupation:  None currently     Pain Level:  pt reports mild BLE pain     Cognition: A & O x 4   Able to Follow Multi-Step Commands w/ occasional Min VCs for safety              Memory:  good               Sequencing:  fair              Problem solving: fair-              Judgement/safety:  fair-, cuing to maintain precautions     Functional Assessment:  AM-PAC Daily Activity Raw Score: 16/24       Initial Eval Status  Date: 7/24/21    Treatment Status  Date: 7/28/21 STGs = LTGs  Time frame: 10-14 days   Feeding Set up     set up NA   Grooming SUP/Set up     Able to complete tasks after set up seated EOB  Unsafe to attempt to ambulate to or standing at sink d/t NWB status    Set up  To comb hair seated EOB  SUP  Seated/Standing at the Sink   UB Dressing Min A/Set up     Able to don robe w/ Min A after set up seated EOB     Unable to tolerate item retrieval for activities     Set up  seated Set up      LB Dressing Dep     SUP to don Left Sock seated EOB w/ use of own adaptive tech, Unable to safely stand adequately for attempt at clothing adjustment over hips w/ Max A of 1, unable to adhere to NWB status R LE     Pt ed for safe/adaptive techs, use of adaptive equip     Mod A  To don/doff underwear seated EOB and standing to pull over hips Mod A      Bathing NT     Pt ed re safe techs for Sponge Bathing     Mod A  Simulated seated EOB Mod A       Toileting NT     Pt ed re: benefits of use of Drop-Arm BSC     Min A  Clothing management  Cuing to maintain precautions Mod A      Bed Mobility  Supine to sit: Min A   Sit to supine:  Min A      VCs for safety    SBA- supine<->sit  Educated pt on technique to increase independence.    Supine to sit: Mod I  Sit to supine:  Mod I      Functional Transfers Dep     Unable to safely achieve full stance w/ Max A of 1 d/tweakness L Hip and inability to adhere to NWB status R Foot despite Pt ed/demo/VCs for safety/hand placement, returned to seated position for safety    Min A  Sit<->stand  With cuing to maintain precautions  Min A- stand pivot transfer  Toilet transfer- Min A (bedside commode)  Mod A      Functional Mobility NT     Unable to safely attempt  Pt ed for safety/methods of transfers and mobility w/in limits of WB restrictions     N/T Mod A  B/t surfaces      Balance Sitting:     Static:  Remote SUP EOB    Dynamic:  Close SUP w/ functional ax EOB     Standing:     Static:  Max A - partial stance    Dynamic:  Max A w/ functional ax/mobility partial stance    Sitting:     Static:  Ind    Dynamic:  Ind     Standing:     Static:  CGA    Dynamic:  Min A      Activity Tolerance Fair     Limited by pain,weakness L LE, NWB status R LE  Able to tolerate sitting EOB w/ functional ax > 20 mins    Poor  Required encouragement to participate  Fair(+)   Visual/  Perceptual     Hearing: WNL   Glasses: Reading     WFL  Hearing Aids:  No                           Comments: Upon arrival pt supine in bed. Pt educated on techniques to increase independence and safety during ADL's, bed mobility, and functional transfers. At end of session pt left seated upright in bed, call light within reach. · Pt has made good progress towards set goals.      · Continue with current plan of care    Treatment Time In: 1:10            Treatment Time Out: 1:25             Treatment Charges: Mins Units   Ther Ex  [id number]     Manual Therapy 56974     Thera Activities 18833     ADL/Home Mgt 39734 15 1   Neuro Re-ed 46249     Group Therapy      Orthotic manage/training  19400     Non-Billable Time     Total Timed Treatment 15 1     Princess 162, Pabloade 86

## 2021-07-28 NOTE — PLAN OF CARE
Problem: Pain:  Description: Pain management should include both nonpharmacologic and pharmacologic interventions.   Goal: Pain level will decrease  Outcome: Met This Shift  Goal: Control of acute pain  Outcome: Ongoing  Goal: Control of chronic pain  Outcome: Ongoing     Problem: Falls - Risk of:  Goal: Will remain free from falls  Outcome: Met This Shift  Goal: Absence of physical injury  Outcome: Met This Shift

## 2021-07-28 NOTE — PROGRESS NOTES
[address]  Internal Medicine Residency Program  Progress Note - House Team     Patient:  Tiarra Saldivar 55 y.o. female MRN: [id number]     Date of Service: 7/28/2021     CC: Nonhealing wound of right heel  Overnight events: No overnight events    Subjective     Patient was seen and examined this morning at bedside in no acute distress. Overnight, patient had no complaints. Patient was very agreeable and in a better mood than earlier this week. She claims her pain is well-controlled with current pain meds. Patient denies SOB, chest pain, headache, fever, abdominal pain and calf pain. The plan to transition patient to once daily dosing of methadone was discussed with emphasis on low dose methadone this evening and patient was agreeable. Patient will start once daily methadone dosing tomorrow. Awaiting insurance approval for nursing home placement. Objective     Physical Exam:  · Vitals: BP 97/62   Pulse 59   Temp 97.1 °F (36.2 °C) (Temporal)   Resp 12   Ht 5' 9\" (1.753 m)   Wt 190 lb (86.2 kg)   LMP 08/28/2013   SpO2 98%   BMI 28.06 kg/m²     · I & O - 24hr: No intake/output data recorded. · General Appearance: alert, appears stated age, cooperative and no distress  · HEENT:  Head: Normal, normocephalic, atraumatic. · Neck: no carotid bruit, no JVD and supple, symmetrical, trachea midline   · Lung: expiratory wheezes bilateral lower lung fields  · Heart: regular rate and rhythm, S1, S2 normal, no murmur, click, rub or gallop  · Abdomen: soft, non-tender; bowel sounds normal; no masses,  no organomegaly  · Extremities:  Edema of right leg up to below the knee, bandage intact, boot on, no cyanosis   · Musculokeletal: No joint swelling, no muscle tenderness. ROM normal in all joints of extremities.    · Neurologic: Mental status: Alert, oriented, thought content appropriate  Subject  Pertinent Labs & Imaging Studies   jalil  CBC with Differential:    Lab Results   Component Value Date    WBC 6.1 07/28/2021    RBC 4.57 07/28/2021    HGB 12.5 07/28/2021    HCT 42.9 07/28/2021     07/28/2021    MCV 93.9 07/28/2021    MCH 27.4 07/28/2021    MCHC 29.1 07/28/2021    RDW 15.4 07/28/2021    LYMPHOPCT 32.4 07/28/2021    MONOPCT 7.8 07/28/2021    BASOPCT 1.0 07/28/2021    MONOSABS 0.48 07/28/2021    LYMPHSABS 1.98 07/28/2021    EOSABS 0.17 07/28/2021    BASOSABS 0.06 07/28/2021     CMP:    Lab Results   Component Value Date     07/28/2021    K 4.8 07/28/2021    K 4.4 07/20/2021    CL 99 07/28/2021    CO2 32 07/28/2021    BUN 15 07/28/2021    CREATININE 0.8 07/28/2021    GFRAA >60 07/28/2021    LABGLOM >60 07/28/2021    GLUCOSE 95 07/28/2021    PROT 7.7 02/08/2020    LABALBU 3.5 02/08/2020    CALCIUM 9.1 07/28/2021    BILITOT 0.4 02/08/2020    ALKPHOS 103 02/08/2020    AST 10 02/08/2020    ALT 8 02/08/2020       XR CHEST PORTABLE   Final Result   Normal chest         VL LOWER EXTREMITY ARTERIAL SEGMENTAL PRESSURES W PPG BILATERAL   Final Result      XR FOOT RIGHT (MIN 3 VIEWS)   Final Result   1. Amputation of the distal calcaneus. 2. Cortical irregularity likely related to erosive changes involving inferior   aspect of the calcaneus. Findings could suggest osteomyelitis. 3. Skin ulceration involving plantar soft tissue beneath the calcaneus. 4. No obvious subcutaneous gas. Resident's Assessment and Plan     Assessment and Plan:    1.  Osteomyelitis of right heel s/p debridement 2/2 repeated no-healing wound vs PAD vs Microtrauma vs malnutrition    - Weaned off O2             - Continue Vancomycin (day 9) and Meropenem (day 8)             - Pain improved, Continue gabapentin             - Fentanyl discontinued             - Norco Q8 PRN for breakthrough pain             - Methadone transitioned to once daily dosing; patient will receive low dose 25 mg methadone tonight and transition to 100 mg methadone tomorrow (07/29/21), then 120 mg methadone starting (07/30/21) - Per Podiatry: Continue wound VAC MWF             - ID follow up in 2-3 weeks, Vancomycin 1250 mg IV q12hrs, meropenem 1 g IV q8 for 5 weeks    2. COPD, stable    - SpO2 >91% at room air    - wheezing in bilateral lower lung bases, no SOB or retractions             - Continue nebulization with Brovana, pulmicort, Duoneb     3. Hypertension likely 2/2 pain, stable   - Continue amlodipine, Clonidine and propanolol   - Monitor BP, hold clonidine if hypotension occurs    4. Polysubstance use             - Methadone transitioned to once daily dosing; patient will receive low dose 25 mg methadone tonight and transition to 100 mg methadone tomorrow (07/29/21), then 120 mg methadone starting (07/30/21)             - Fentanyl discontinued      5. Chronic pain 2/2 hip fracture vs calcaneal ulcer              - Continue Gabapentin, weaned off methadone              - Norco Q8 PRN for breakthrough pain     6. Left Hip fracture             - awaiting clearance from surgery      7.  Essential tremor             - Continue propranolol    PT/OT evaluation: Eval and treat  DVT prophylaxis/ GI prophylaxis: Lovenox 40 mg,   Disposition: continue current care     Raj Clayton MD, PGY-1  Attending physician: Dr. Santos Cummings

## 2021-07-29 LAB
ANION GAP SERPL CALCULATED.3IONS-SCNC: 6 MMOL/L (ref 7–16)
BASOPHILS ABSOLUTE: 0.05 E9/L (ref 0–0.2)
BASOPHILS RELATIVE PERCENT: 1.3 % (ref 0–2)
BUN BLDV-MCNC: 13 MG/DL (ref 6–20)
CALCIUM SERPL-MCNC: 8.9 MG/DL (ref 8.6–10.2)
CHLORIDE BLD-SCNC: 101 MMOL/L (ref 98–107)
CO2: 33 MMOL/L (ref 22–29)
CREAT SERPL-MCNC: 0.7 MG/DL (ref 0.5–1)
EOSINOPHILS ABSOLUTE: 0.13 E9/L (ref 0.05–0.5)
EOSINOPHILS RELATIVE PERCENT: 3.4 % (ref 0–6)
GFR AFRICAN AMERICAN: >60
GFR NON-AFRICAN AMERICAN: >60 ML/MIN/1.73
GLUCOSE BLD-MCNC: 72 MG/DL (ref 74–99)
HCT VFR BLD CALC: 37.3 % (ref 34–48)
HEMOGLOBIN: 11.1 G/DL (ref 11.5–15.5)
IMMATURE GRANULOCYTES #: 0.03 E9/L
IMMATURE GRANULOCYTES %: 0.8 % (ref 0–5)
LYMPHOCYTES ABSOLUTE: 1.64 E9/L (ref 1.5–4)
LYMPHOCYTES RELATIVE PERCENT: 42.9 % (ref 20–42)
MCH RBC QN AUTO: 27.8 PG (ref 26–35)
MCHC RBC AUTO-ENTMCNC: 29.8 % (ref 32–34.5)
MCV RBC AUTO: 93.3 FL (ref 80–99.9)
METER GLUCOSE: 104 MG/DL (ref 74–99)
MONOCYTES ABSOLUTE: 0.39 E9/L (ref 0.1–0.95)
MONOCYTES RELATIVE PERCENT: 10.2 % (ref 2–12)
NEUTROPHILS ABSOLUTE: 1.58 E9/L (ref 1.8–7.3)
NEUTROPHILS RELATIVE PERCENT: 41.4 % (ref 43–80)
PDW BLD-RTO: 15.5 FL (ref 11.5–15)
PLATELET # BLD: 221 E9/L (ref 130–450)
PMV BLD AUTO: 9.9 FL (ref 7–12)
POTASSIUM SERPL-SCNC: 4.6 MMOL/L (ref 3.5–5)
RBC # BLD: 4 E12/L (ref 3.5–5.5)
SODIUM BLD-SCNC: 140 MMOL/L (ref 132–146)
VANCOMYCIN TROUGH: 17.3 MCG/ML (ref 5–16)
WBC # BLD: 3.8 E9/L (ref 4.5–11.5)

## 2021-07-29 PROCEDURE — 6360000002 HC RX W HCPCS: Performed by: PODIATRIST

## 2021-07-29 PROCEDURE — 6370000000 HC RX 637 (ALT 250 FOR IP): Performed by: PODIATRIST

## 2021-07-29 PROCEDURE — 85025 COMPLETE CBC W/AUTO DIFF WBC: CPT

## 2021-07-29 PROCEDURE — 2580000003 HC RX 258: Performed by: INTERNAL MEDICINE

## 2021-07-29 PROCEDURE — 36592 COLLECT BLOOD FROM PICC: CPT

## 2021-07-29 PROCEDURE — 2580000003 HC RX 258: Performed by: PODIATRIST

## 2021-07-29 PROCEDURE — 6360000002 HC RX W HCPCS: Performed by: INTERNAL MEDICINE

## 2021-07-29 PROCEDURE — 6360000002 HC RX W HCPCS: Performed by: STUDENT IN AN ORGANIZED HEALTH CARE EDUCATION/TRAINING PROGRAM

## 2021-07-29 PROCEDURE — 1200000000 HC SEMI PRIVATE

## 2021-07-29 PROCEDURE — 80202 ASSAY OF VANCOMYCIN: CPT

## 2021-07-29 PROCEDURE — 6370000000 HC RX 637 (ALT 250 FOR IP): Performed by: INTERNAL MEDICINE

## 2021-07-29 PROCEDURE — 36415 COLL VENOUS BLD VENIPUNCTURE: CPT

## 2021-07-29 PROCEDURE — 80048 BASIC METABOLIC PNL TOTAL CA: CPT

## 2021-07-29 PROCEDURE — 82962 GLUCOSE BLOOD TEST: CPT

## 2021-07-29 PROCEDURE — 99232 SBSQ HOSP IP/OBS MODERATE 35: CPT | Performed by: INTERNAL MEDICINE

## 2021-07-29 PROCEDURE — 94640 AIRWAY INHALATION TREATMENT: CPT

## 2021-07-29 RX ADMIN — VANCOMYCIN HYDROCHLORIDE 750 MG: 10 INJECTION, POWDER, LYOPHILIZED, FOR SOLUTION INTRAVENOUS at 22:32

## 2021-07-29 RX ADMIN — MORPHINE SULFATE 15 MG: 15 TABLET, FILM COATED, EXTENDED RELEASE ORAL at 21:33

## 2021-07-29 RX ADMIN — GABAPENTIN 300 MG: 300 CAPSULE ORAL at 21:33

## 2021-07-29 RX ADMIN — PROPRANOLOL HYDROCHLORIDE 10 MG: 10 TABLET ORAL at 09:55

## 2021-07-29 RX ADMIN — Medication 10 ML: at 21:56

## 2021-07-29 RX ADMIN — MORPHINE SULFATE 15 MG: 15 TABLET, FILM COATED, EXTENDED RELEASE ORAL at 09:55

## 2021-07-29 RX ADMIN — SODIUM CHLORIDE, PRESERVATIVE FREE 300 UNITS: 5 INJECTION INTRAVENOUS at 07:14

## 2021-07-29 RX ADMIN — HYDROXYZINE HYDROCHLORIDE 50 MG: 50 INJECTION, SOLUTION INTRAMUSCULAR at 09:54

## 2021-07-29 RX ADMIN — HEPARIN 300 UNITS: 100 SYRINGE at 09:53

## 2021-07-29 RX ADMIN — Medication 10 ML: at 09:55

## 2021-07-29 RX ADMIN — BUDESONIDE 500 MCG: 0.5 SUSPENSION RESPIRATORY (INHALATION) at 20:50

## 2021-07-29 RX ADMIN — Medication 10 ML: at 09:52

## 2021-07-29 RX ADMIN — HYDROCODONE BITARTRATE AND ACETAMINOPHEN 1 TABLET: 5; 325 TABLET ORAL at 22:32

## 2021-07-29 RX ADMIN — ARFORMOTEROL TARTRATE 15 MCG: 15 SOLUTION RESPIRATORY (INHALATION) at 20:50

## 2021-07-29 RX ADMIN — MEROPENEM 1000 MG: 1 INJECTION, POWDER, FOR SOLUTION INTRAVENOUS at 09:53

## 2021-07-29 RX ADMIN — MEROPENEM 1000 MG: 1 INJECTION, POWDER, FOR SOLUTION INTRAVENOUS at 01:59

## 2021-07-29 RX ADMIN — MEROPENEM 1000 MG: 1 INJECTION, POWDER, FOR SOLUTION INTRAVENOUS at 18:05

## 2021-07-29 RX ADMIN — IPRATROPIUM BROMIDE AND ALBUTEROL SULFATE 1 AMPULE: .5; 3 SOLUTION RESPIRATORY (INHALATION) at 17:05

## 2021-07-29 RX ADMIN — SODIUM CHLORIDE, PRESERVATIVE FREE 300 UNITS: 5 INJECTION INTRAVENOUS at 21:34

## 2021-07-29 RX ADMIN — AMLODIPINE BESYLATE 5 MG: 5 TABLET ORAL at 09:54

## 2021-07-29 RX ADMIN — Medication 10 MG: at 21:34

## 2021-07-29 RX ADMIN — PROPRANOLOL HYDROCHLORIDE 10 MG: 10 TABLET ORAL at 21:33

## 2021-07-29 RX ADMIN — HYDROCODONE BITARTRATE AND ACETAMINOPHEN 1 TABLET: 5; 325 TABLET ORAL at 10:02

## 2021-07-29 RX ADMIN — Medication 100 MG: at 12:26

## 2021-07-29 RX ADMIN — IPRATROPIUM BROMIDE AND ALBUTEROL SULFATE 1 AMPULE: .5; 3 SOLUTION RESPIRATORY (INHALATION) at 13:15

## 2021-07-29 RX ADMIN — Medication 10 ML: at 21:33

## 2021-07-29 RX ADMIN — OXYCODONE HYDROCHLORIDE AND ACETAMINOPHEN 500 MG: 500 TABLET ORAL at 09:54

## 2021-07-29 RX ADMIN — GABAPENTIN 300 MG: 300 CAPSULE ORAL at 09:54

## 2021-07-29 RX ADMIN — IPRATROPIUM BROMIDE AND ALBUTEROL SULFATE 1 AMPULE: .5; 3 SOLUTION RESPIRATORY (INHALATION) at 20:50

## 2021-07-29 RX ADMIN — HYDROCODONE BITARTRATE AND ACETAMINOPHEN 1 TABLET: 5; 325 TABLET ORAL at 01:58

## 2021-07-29 RX ADMIN — HYDROCODONE BITARTRATE AND ACETAMINOPHEN 1 TABLET: 5; 325 TABLET ORAL at 14:02

## 2021-07-29 RX ADMIN — ENOXAPARIN SODIUM 40 MG: 40 INJECTION SUBCUTANEOUS at 09:56

## 2021-07-29 RX ADMIN — CLONIDINE HYDROCHLORIDE 0.1 MG: 0.1 TABLET ORAL at 21:33

## 2021-07-29 RX ADMIN — CLONIDINE HYDROCHLORIDE 0.1 MG: 0.1 TABLET ORAL at 09:54

## 2021-07-29 RX ADMIN — HYDROCODONE BITARTRATE AND ACETAMINOPHEN 1 TABLET: 5; 325 TABLET ORAL at 18:05

## 2021-07-29 ASSESSMENT — PAIN SCALES - GENERAL
PAINLEVEL_OUTOF10: 9
PAINLEVEL_OUTOF10: 10
PAINLEVEL_OUTOF10: 4
PAINLEVEL_OUTOF10: 10
PAINLEVEL_OUTOF10: 0
PAINLEVEL_OUTOF10: 10
PAINLEVEL_OUTOF10: 9
PAINLEVEL_OUTOF10: 8
PAINLEVEL_OUTOF10: 10

## 2021-07-29 ASSESSMENT — PAIN DESCRIPTION - FREQUENCY
FREQUENCY: CONTINUOUS

## 2021-07-29 ASSESSMENT — PAIN DESCRIPTION - PROGRESSION
CLINICAL_PROGRESSION: GRADUALLY IMPROVING
CLINICAL_PROGRESSION: GRADUALLY IMPROVING

## 2021-07-29 ASSESSMENT — PAIN DESCRIPTION - ORIENTATION
ORIENTATION: RIGHT

## 2021-07-29 ASSESSMENT — PAIN DESCRIPTION - LOCATION
LOCATION: FOOT

## 2021-07-29 ASSESSMENT — PAIN DESCRIPTION - DESCRIPTORS
DESCRIPTORS: ACHING
DESCRIPTORS: ACHING;CONSTANT;DISCOMFORT
DESCRIPTORS: ACHING;CONSTANT;DISCOMFORT
DESCRIPTORS: ACHING
DESCRIPTORS: ACHING
DESCRIPTORS: ACHING;CONSTANT;DISCOMFORT
DESCRIPTORS: ACHING;CONSTANT;DISCOMFORT

## 2021-07-29 ASSESSMENT — PAIN DESCRIPTION - PAIN TYPE
TYPE: ACUTE PAIN;SURGICAL PAIN
TYPE: SURGICAL PAIN
TYPE: SURGICAL PAIN
TYPE: ACUTE PAIN;SURGICAL PAIN
TYPE: SURGICAL PAIN

## 2021-07-29 ASSESSMENT — PAIN SCALES - WONG BAKER: WONGBAKER_NUMERICALRESPONSE: 0

## 2021-07-29 ASSESSMENT — PAIN - FUNCTIONAL ASSESSMENT: PAIN_FUNCTIONAL_ASSESSMENT: PREVENTS OR INTERFERES SOME ACTIVE ACTIVITIES AND ADLS

## 2021-07-29 ASSESSMENT — PAIN DESCRIPTION - ONSET: ONSET: ON-GOING

## 2021-07-29 NOTE — PROGRESS NOTES
Pharmacy Consultation Note  (Antibiotic Dosing and Monitoring)    Initial consult date: 7/20/21  Consulting physician: Dr. Crystal Preciado  Drug(s): vancomycin  Indication: osteomyelitis      Age/  Gender Height Weight IBW Dosing weight  Allergy Information   56 y.o./female 5' 9\" (175.3 cm) 190 lb (86.2 kg)     Ideal body weight: 66.2 kg (145 lb 15.1 oz)  Adjusted ideal body weight: 74.2 kg (163 lb 9.1 oz)  74.2 kg  Ancef [cefazolin], Duricef [cefadroxil], and Iodine          Other anti-infectives Start date Stop date   Meropenem 7/20                     Date  Tmax WBC BUN/CR UOP CrCL  (mL/min) Drug/Dose Time   Given Level(s)   (Time) Comments   7/20 99.2 7.1  10/0.9 -- --  Vancomycin 1250 mg IV x 1 dose 1841     7/21 afebrile 5.5 10/0.9 -- 83 Vancomycin 1250 mg IV x 1 dose    Vancomycin 1000 mg IV q 12 hr 0704      (1900)- not given  1900 dose not give due to loss of IV access   7/22 afebrile 5.6 11/0.9 -- 83 Vancomycin 1000 mg IV q 12 hr 1025  (1900)- not given Vanco trough at 0648 is 9.8 mcg/mL Patient refused dose at 1900   7/23 afebrile -- -- -- 83 Vancomycin 1000 mg IV q 12 hr 0254  1511     7/24 afebrile 5.7 9/0.9 -- 83 Vancomycin 1000 mg IV q 12 hr 0244  2153 Trough per ID @ 8552 = 17.4 mcg/mL    7/25 afebrile 5.1 12/0.9 -- 83 Vancomycin 750 mg IV q12hr 1022  2226     7/26 afebrile 5.9 12/0.9 -- 83 Vancomycin 750 mg IV q12hr 0951  2120     7/27 afebrile 5.6 15/1 -- 74 Vancomycin 750 mg IV q12hr 1030  2200 Vanco level @0534 is 20.8 mcg/mL (taken 4 hours early)     7/28 afebrile 6.1 15/0.8 -- 93 Vancomycin 750 mg IV q12hr 0956  2212     7/29 afebrile 3.8 13/0.7 -- 106 Vancomycin 750 mg IV q12hr (1000)  (2200) Vanco level @1010 is 17.3            Intake/Output Summary (Last 24 hours) at 7/29/2021 1138  Last data filed at 7/29/2021 1002  Gross per 24 hour   Intake 780 ml   Output --   Net 780 ml       Average urine output:    Cultures:    Site Date Result                    No results for input(s): Gertrudis Chen in the last 72 hours. Historical Cultures:  Organism   Date Value Ref Range Status   07/23/2021 Beta Strep Group C (A)  Final   07/23/2021 Gram negative janette (A)  Final   07/23/2021 Staphylococcus simulans (A)  Final     No results for input(s): BC in the last 72 hours. Radiology:      Assessment:  · 55year old female on vancomycin for osteomyelitis  · Goal trough = 15 - 20 mcg/ml; Goal AUC/RICARDO 400-600  · 7/22: Scr stable at 0.9. Dose at 2300 on 7/21 not given due to IV access. Level today of 9.8 mcg/mL is not a true trough   · 7/23: Dose at 1900 on 7/22 was refused by patient, patient accepted medication at [phone] on 7/23. I adjusted vancomycin administration times. · 7/24: Trough (14 hour level) = 17.4 mcg. mL. True trough ~ 20.1 mcg/mL, AUC/RICARDO 598. · 7/25: Scr stable at 0.9  · 7/26: Scr 1; level 20.8 mcg/mL (taken 4 hours early), est level to be closer to 16 mcg/L  · 7/28: Scr 0.8  · 7/29: Scr 0.7; level 17.3 mcg/mL taken correctly before next dose should have been given. Plan:  · Cont vancomycin 750 mg IV q 12 hr (est AUC/RICARDO 456)  · Pharmacist will follow and monitor/adjust dosing as necessary    Edita Ortega PharmD Candidate 7/29/2021 11:38 AM     I have reviewed the progress note written by the pharmacy student and agree with the documentation and treatment plan. All medication and/or lab orders have been entered by myself according to the outlined plan in the note.      Samaria Camargo PharmD, BCPS 7/29/2021 2:34 PM  Phone: [phone]

## 2021-07-29 NOTE — PROGRESS NOTES
Department of Internal Medicine  Infectious Diseases  Progress  Note    Face to face encounter   C/C : Right heel wound infection, osteomyelitis     Pt is awake and alert  Denies fever or chills   No distress  Afebrile     Current Facility-Administered Medications   Medication Dose Route Frequency Provider Last Rate Last Admin    methadone (DOLOPHINE) 10 MG/ML solution 10 mg  10 mg Oral Once Kev Kirkpatrick MD        [START ON 7/30/2021] methadone (DOLOPHINE) 10 MG/ML solution 120 mg  120 mg Oral Daily Kev Kirkpatrick MD        diphenhydrAMINE (BENADRYL) tablet 25 mg  25 mg Oral Q12H PRN Isadore Double, APRN - CNS   25 mg at 07/28/21 2156    morphine (MS CONTIN) extended release tablet 15 mg  15 mg Oral 2 times per day Nory [facility] Kae Villela MD   15 mg at 07/29/21 0955    vancomycin (VANCOCIN) 750 mg in dextrose 5 % 250 mL IVPB  750 mg Intravenous Q12H Marissa Cisse MD   Stopped at 07/28/21 2312    meropenem (MERREM) 1,000 mg in sodium chloride 0.9 % 100 mL IVPB (mini-bag)  1,000 mg Intravenous Q8H Kev Kirkpatrick MD   Stopped at 07/29/21 1300    [Held by provider] amLODIPine (NORVASC) tablet 5 mg  5 mg Oral Daily Kev Kirkpatrick MD   5 mg at 07/29/21 0954    HYDROcodone-acetaminophen (NORCO) 5-325 MG per tablet 1 tablet  1 tablet Oral Q4H PRN Kev Kirkpatrick MD   1 tablet at 07/29/21 1402    hydrOXYzine (VISTARIL) injection 50 mg  50 mg Intramuscular Daily Donna Vegas MD   50 mg at 07/29/21 0954    enoxaparin (LOVENOX) injection 40 mg  40 mg Subcutaneous Daily Galen Spicer MD   40 mg at 07/29/21 0956    lidocaine PF 1 % injection 5 mL  5 mL Intradermal Once Galen Spicer MD        sodium chloride flush 0.9 % injection 5-40 mL  5-40 mL Intravenous 2 times per day Galen Spicer MD   10 mL at 07/28/21 2035    sodium chloride flush 0.9 % injection 5-40 mL  5-40 mL Intravenous PRN Galen Spicer MD        0.9 % sodium chloride infusion  25 mL Intravenous PRN Galen Spicer MD        heparin flush 100 UNIT/ML injection 300 Units  3 mL Intravenous 2 times per day Rober Crigler, MD   300 Units at 07/28/21 2036    heparin flush 100 UNIT/ML injection 300 Units  3 mL Intracatheter PRN Rober Crigler, MD   300 Units at 07/29/21 0953    lidocaine PF 1 % injection 5 mL  5 mL Intradermal Once Rober Crigler, MD        sodium chloride flush 0.9 % injection 5-40 mL  5-40 mL Intravenous 2 times per day Rober Crigler, MD   10 mL at 07/29/21 0952    sodium chloride flush 0.9 % injection 5-40 mL  5-40 mL Intravenous PRN Rober Crigler, MD   10 mL at 07/23/21 1510    0.9 % sodium chloride infusion  25 mL Intravenous PRN Rober Crigler, MD        heparin flush 100 UNIT/ML injection 300 Units  3 mL Intravenous 2 times per day Rober Crigler, MD   300 Units at 07/29/21 0714    heparin flush 100 UNIT/ML injection 300 Units  3 mL Intracatheter PRN Rober Crigler, MD   300 Units at 07/25/21 2058    propranolol (INDERAL) tablet 10 mg  10 mg Oral TID Rober Crigler, MD   10 mg at 07/29/21 0955    cloNIDine (CATAPRES) tablet 0.1 mg  0.1 mg Oral TID Rober Crigler, MD   0.1 mg at 07/29/21 0954    glucose (GLUTOSE) 40 % oral gel 15 g  15 g Oral PRN Rober Crigler, MD        dextrose 50 % IV solution  12.5 g Intravenous PRN Rober Crigler, MD        glucagon (rDNA) injection 1 mg  1 mg Intramuscular PRN Rober Crigler, MD        dextrose 5 % solution  100 mL/hr Intravenous PRN Rober Crigler, MD        ipratropium-albuterol (DUONEB) nebulizer solution 1 ampule  1 ampule Inhalation Q4H WA Rober Crigler, MD   1 ampule at 07/29/21 1315    gabapentin (NEURONTIN) capsule 300 mg  300 mg Oral BID Rober Crigler, MD   300 mg at 07/29/21 0954    sodium chloride flush 0.9 % injection 10 mL  10 mL Intravenous 2 times per day Rober Crigler, MD   10 mL at 07/29/21 0955    sodium chloride flush 0.9 % injection 10 mL  10 mL Intravenous PRN Rober Crigler, MD   10 mL at 07/24/21 0244    0.9 % sodium chloride infusion  25 mL Intravenous PRN Rober Crigler, MD        ondansetron (ZOFRAN-ODT) disintegrating tablet 4 mg  4 mg Oral Q8H PRN Elías Wang MD        Or    [facility]) injection 4 mg  4 mg Intravenous Q6H PRN Elías Wang MD   4 mg at 07/22/21 2232    polyethylene glycol (GLYCOLAX) packet 17 g  17 g Oral Daily PRN Elías Wang MD        acetaminophen (TYLENOL) tablet 650 mg  650 mg Oral Q6H PRN Elías Wang MD   650 mg at 07/23/21 1511    Or    acetaminophen (TYLENOL) suppository 650 mg  650 mg Rectal Q6H PRN Elías Wang MD        Arformoterol Tartrate [facility]) nebulizer solution 15 mcg  15 mcg Nebulization BID Elías Wang MD   15 mcg at 07/27/21 1932    budesonide (PULMICORT) nebulizer suspension 500 mcg  0.5 mg Nebulization BID Elías Wang MD   500 mcg at 07/27/21 1932    ascorbic acid (VITAMIN C) tablet 500 mg  500 mg Oral Daily Elías Wang MD   500 mg at 07/29/21 0954       REVIEW OF SYSTEMS:    CONSTITUTIONAL:  Denies fever, chill or rigors. HEENT: denies blurring of vision or double vision, denies hearing problem  RESPIRATORY: denies cough, shortness of breath, sputum expectoration, chest pain. CARDIOVASCULAR:  Denies palpitation  GASTROINTESTINAL:  Denies abdomen pain, diarrhea or constipation. GENITOURINARY:  Denies burning urination or frequency of urination  INTEGUMENT: Chronic non healing wound right foot   HEMATOLOGIC/LYMPHATIC:  Denies lymph node swelling, gum bleeding or easy bruising. MUSCULOSKELETAL: right heel wound pain, left hip pain   NEUROLOGICAL:  weakness      PHYSICAL EXAM:    Vitals:   BP (!) 94/50   Pulse 64   Temp 97.9 °F (36.6 °C) (Oral)   Resp 16   Ht 5' 9\" (1.753 m)   Wt 190 lb (86.2 kg)   LMP 08/28/2013   SpO2 95%   BMI 28.06 kg/m²     General Appearance:    Awake, alert , no acute distress. Sitting up at bedside- on her phone   Head:    Normocephalic, atraumatic   Eyes:    No pallor, no icterus,   Ears:    No obvious deformity or drainage.    Nose:   No nasal drainage   Throat:   Mucosa moist, no oral thrush   Neck:   Supple, no lymphadenopathy   Back:     no CVA tenderness Lungs:      Wheeze     Heart:    Regular rate and rhythm    Abdomen:     Soft, non-tender, bowel sounds present    Extremities:    + edema,   Right heel wound with VAC            Skin:  Right foot wound VAC  Left upper arm with PICC        PICC line - 7/22     CBC with Differential:      Lab Results   Component Value Date    WBC 3.8 07/29/2021    RBC 4.00 07/29/2021    HGB 11.1 07/29/2021    HCT 37.3 07/29/2021     07/29/2021    MCV 93.3 07/29/2021    MCH 27.8 07/29/2021    MCHC 29.8 07/29/2021    RDW 15.5 07/29/2021    LYMPHOPCT 42.9 07/29/2021    MONOPCT 10.2 07/29/2021    BASOPCT 1.3 07/29/2021    MONOSABS 0.39 07/29/2021    LYMPHSABS 1.64 07/29/2021    EOSABS 0.13 07/29/2021    BASOSABS 0.05 07/29/2021       CMP:    Lab Results   Component Value Date     07/29/2021    K 4.6 07/29/2021    K 4.4 07/20/2021     07/29/2021    CO2 33 07/29/2021    BUN 13 07/29/2021    CREATININE 0.7 07/29/2021    GFRAA >60 07/29/2021    LABGLOM >60 07/29/2021    GLUCOSE 72 07/29/2021    PROT 7.7 02/08/2020    LABALBU 3.5 02/08/2020    CALCIUM 8.9 07/29/2021    BILITOT 0.4 02/08/2020    ALKPHOS 103 02/08/2020    AST 10 02/08/2020    ALT 8 02/08/2020       Hepatic Function Panel:    Lab Results   Component Value Date    ALKPHOS 103 02/08/2020    ALT 8 02/08/2020    AST 10 02/08/2020    PROT 7.7 02/08/2020    BILITOT 0.4 02/08/2020    LABALBU 3.5 02/08/2020     Vanco trough level 9.8     Microbiology :    Wound cx - staph species- beta strep, GNR     Mixed bryce isolated. Further workup and sensitivity testing   is not routinely indicated and will not be performed. Mixed bryce isolated includes:   Oxidase positive Gram negative rods   Mixed Corynebacteria   Coagulase negative Staph species   Catalase negative Gram positive rods      Narrative:       Radiology :    X  Ray foot     Impression:        1. Amputation of the distal calcaneus.    2. Cortical irregularity likely related to erosive changes involving inferior   aspect of the calcaneus.  Findings could suggest osteomyelitis. 3. Skin ulceration involving plantar soft tissue beneath the calcaneus. 4. No obvious subcutaneous gas.          Vanco trough 17.4    IMPRESSION:     1. Chronic non healing right heel ulcer .  Wound infection , right calceneus osteomyelitis s/p debridement  (7/22)     RECOMMENDATIONS:      · Vancomycin IV , meropenem 1 gram IV q 8 hrs X 5 weeks    · -- pharmacy dosing vancomycin - trough 20.8   · Can take benadryl PO prior to vancomycin administration - patient reports she feels itchy while vancomycin is infusion   · Podiatry following- continue wound care   · ID follow up in 2-3 weeks   · Med rec updated   · Discharge planning -      Adrián Muse MD  7/29/2021

## 2021-07-29 NOTE — PROGRESS NOTES
[facility]  Internal Medicine Residency / [address]    Attending Physician Statement  I have discussed the case, including pertinent history and exam findings with the resident and the team.  I have seen and examined the patient and the key elements of the encounter have been performed by me. I have also personally reviewed imaging studies and labs. I agree with the assessment, plan and orders as documented by the resident. Assessment:  1. OM right food s/p debridement, (+) wound vac  2. COPD, stable  3. HTN - BP on the low side while on amlodipine, propranolol (for ET), and clonidine (for anxiety)  4. Currently transitioning to daily methadone      Plan:  Awaiting precert to [facility]  Continue antibiotics  Pain control  Daily dosing of methadone  Hold amlodipine, monitor BP. Remainder of medical problems as per resident note. Elio Muniz MD  Internal Medicine Residency Faculty

## 2021-07-29 NOTE — PROGRESS NOTES
[facility]  Internal Medicine Residency Program  Progress Note - House Team     Patient:  Nilesh Bender 55 y.o. female MRN: [id number]     Date of Service: 7/29/2021     CC: Nonhealing wound of right heel  Overnight events: No overnight events    Subjective     Patient was seen and examined this morning at bedside in no acute distress. Patient had no overnight events. Patient complained of mild pain in her right foot but claimed the pain usually goes away after administration of her pain meds. Patient denied headaches, chest pain, SOB, and calf pain. Still awaiting precert for discharge to [address] [place] [address]. Objective     Physical Exam:  · Vitals: BP (!) 108/58   Pulse 51   Temp 97.3 °F (36.3 °C) (Infrared)   Resp 16   Ht 5' 9\" (1.753 m)   Wt 190 lb (86.2 kg)   LMP 08/28/2013   SpO2 95%   BMI 28.06 kg/m²     · I & O - 24hr: No intake/output data recorded. · General Appearance: alert, appears stated age, cooperative and no distress  · HEENT:  Head: Normal, normocephalic, atraumatic.   · Neck: no carotid bruit, no JVD and supple, symmetrical, trachea midline  · Lung: expiratory wheezing in all lung fields, no retractions  · Heart: regular rate and rhythm, S1, S2 normal, no murmur, click, rub or gallop  · Abdomen: soft, non-tender; bowel sounds normal; no masses,  no organomegaly  · Extremities:  extremities normal, atraumatic, no cyanosis or edema  · Musculokeletal: Edema of right leg up to below the knee, bandage intact, boot on, no cyanosis, Negative Bennett's  · Neurologic: Mental status: Alert, oriented, thought content appropriate  Subject  Pertinent Labs & Imaging Studies   jalil  CBC:   Lab Results   Component Value Date    WBC 3.8 07/29/2021    RBC 4.00 07/29/2021    HGB 11.1 07/29/2021    HCT 37.3 07/29/2021    MCV 93.3 07/29/2021    MCH 27.8 07/29/2021    MCHC 29.8 07/29/2021    RDW 15.5 07/29/2021     07/29/2021    MPV 9.9 07/29/2021     CMP:    Lab Results   Component Value Date  07/29/2021    K 4.6 07/29/2021    K 4.4 07/20/2021     07/29/2021    CO2 33 07/29/2021    BUN 13 07/29/2021    CREATININE 0.7 07/29/2021    GFRAA >60 07/29/2021    LABGLOM >60 07/29/2021    GLUCOSE 72 07/29/2021    PROT 7.7 02/08/2020    LABALBU 3.5 02/08/2020    CALCIUM 8.9 07/29/2021    BILITOT 0.4 02/08/2020    ALKPHOS 103 02/08/2020    AST 10 02/08/2020    ALT 8 02/08/2020       XR CHEST PORTABLE   Final Result   Normal chest         VL LOWER EXTREMITY ARTERIAL SEGMENTAL PRESSURES W PPG BILATERAL   Final Result      XR FOOT RIGHT (MIN 3 VIEWS)   Final Result   1. Amputation of the distal calcaneus. 2. Cortical irregularity likely related to erosive changes involving inferior   aspect of the calcaneus. Findings could suggest osteomyelitis. 3. Skin ulceration involving plantar soft tissue beneath the calcaneus. 4. No obvious subcutaneous gas. Resident's Assessment and Plan     Assessment and Plan:    1. Osteomyelitis of right heel s/p debridement 2/2 repeated no-healing wound vs PAD vs Microtrauma vs malnutrition, stable             - Continue Vancomycin (day 10) and Meropenem (day 9)             - Pain improved, Continue gabapentin             - Fentanyl discontinued             - Norco Q8 PRN for breakthrough pain             - Methadone transitioned to once daily dosing; patient will receive low dose 25 mg methadone 07/28/2021 PM and transition to 100 mg methadone on (07/29/21), then 120 mg methadone starting (07/30/21)             - Per Podiatry: Continue wound VAC MWF             - ID follow up in 2-3 weeks, Vancomycin 1250 mg IV q12hrs, meropenem 1 g IV q8 for 5 weeks   - Awaiting precert for discharge to [facility]     2. COPD, stable               - SpO2 >91% at room air               - expiratory wheezing in all lung fields, no SOB or retractions             - Continue nebulization with Brovana, pulmicort, Duoneb      3.  Hypertension likely 2/2 pain, stable             - Continue amlodipine, Clonidine and propanolol             - Monitor BP, Amlodipine on hold because BP was trending on the lower values but no dizziness     4. Polysubstance use             - Methadone transitioned to once daily dosing; patient will receive low dose 25 mg methadone 07/28/2021 PM and transition to 100 mg methadone on (07/29/21), then 120 mg methadone starting (07/30/21)             - Fentanyl discontinued      5. Chronic pain 2/2 hip fracture vs calcaneal ulcer              - Continue Gabapentin, weaned off methadone              - Norco Q8 PRN for breakthrough pain     6. Left Hip fracture             - awaiting clearance from surgery      7.  Essential tremor             - Continue propranolol      PT/OT evaluation: Eval and treat  DVT prophylaxis/ GI prophylaxis:Lovenox 40 mg  Disposition: continue current care    Flossie Hamman, MD, PGY-1  Attending physician: Dr. Diamond Jenkins

## 2021-07-29 NOTE — PROGRESS NOTES
Department of Podiatry  Progress Note    SUBJECTIVE:  Ms. Rachael Molina was evaluated at bedside this morning s/p Right foot I&D, debridement, biopsy and wound vac application (DOS: 1/05/00). No acute events overnight. She denies N/V/F/C/SOB.      OBJECTIVE:    Scheduled Meds:   methadone  100 mg Oral Once    methadone  10 mg Oral Once    [START ON 7/30/2021] methadone  120 mg Oral Daily    morphine  15 mg Oral 2 times per day    vancomycin  750 mg Intravenous Q12H    meropenem  1,000 mg Intravenous Q8H    amLODIPine  5 mg Oral Daily    hydrOXYzine  50 mg Intramuscular Daily    enoxaparin  40 mg Subcutaneous Daily    lidocaine PF  5 mL Intradermal Once    sodium chloride flush  5-40 mL Intravenous 2 times per day    heparin flush  3 mL Intravenous 2 times per day    lidocaine PF  5 mL Intradermal Once    sodium chloride flush  5-40 mL Intravenous 2 times per day    heparin flush  3 mL Intravenous 2 times per day    propranolol  10 mg Oral TID    cloNIDine  0.1 mg Oral TID    ipratropium-albuterol  1 ampule Inhalation Q4H WA    gabapentin  300 mg Oral BID    sodium chloride flush  10 mL Intravenous 2 times per day    Arformoterol Tartrate  15 mcg Nebulization BID    budesonide  0.5 mg Nebulization BID    ascorbic acid  500 mg Oral Daily     Continuous Infusions:   sodium chloride      sodium chloride      dextrose      sodium chloride       PRN Meds:.diphenhydrAMINE, HYDROcodone 5 mg - acetaminophen, sodium chloride flush, sodium chloride, heparin flush, sodium chloride flush, sodium chloride, heparin flush, glucose, dextrose, glucagon (rDNA), dextrose, sodium chloride flush, sodium chloride, ondansetron **OR** ondansetron, polyethylene glycol, acetaminophen **OR** acetaminophen    Allergies   Allergen Reactions    Ancef [Cefazolin] Anaphylaxis    Duricef [Cefadroxil] Anaphylaxis    Iodine      Patient does not remember reaction       BP (!) 108/58   Pulse 51   Temp 97.3 °F (36.3 °C) (Infrared)   Resp 16   Ht 5' 9\" (1.753 m)   Wt 190 lb (86.2 kg)   LMP 08/28/2013   SpO2 95%   BMI 28.06 kg/m²       EXAM:  -Dressings this morning were clean, dry and in tact without dishevelment.   -Wound vac is intact with good seal, running at 125mmHg. -Prevalon boots noted   -No posterior calf pain noted on palpation b/l. Patient able to move digits without pain. PHYSICAL EXAM AS OF 7/28/21:  Vascular Exam:  DP and PT pulses are palpable B/L. Skin temperature warm to cool to BLE from proximal to distal. Mild edema and erythema to R foot in comparison to contralateral.      Neuro Exam:  Gross and epicritic sensation intact B/L.      Dermatologic Exam:  Surgical débridement noted to the right plantar heel measuring about 4.0x2.0cm. Wound bed is granular and healthy with bony prominence noted. Wound does not tunnel or burrow. No noted pus or drainage. No fluctuance noted. Macerated skin edges noted. Mild active bleeding during dressing changes noted. As pictured below:      MSK: Evidence of previous partial calcanectomy to RLE. No posterior calf pain on palpation b/l. Patient able to move digits without pain.                Scheduled Meds:   methadone  100 mg Oral Once    methadone  10 mg Oral Once    [START ON 7/30/2021] methadone  120 mg Oral Daily    morphine  15 mg Oral 2 times per day    vancomycin  750 mg Intravenous Q12H    meropenem  1,000 mg Intravenous Q8H    amLODIPine  5 mg Oral Daily    hydrOXYzine  50 mg Intramuscular Daily    enoxaparin  40 mg Subcutaneous Daily    lidocaine PF  5 mL Intradermal Once    sodium chloride flush  5-40 mL Intravenous 2 times per day    heparin flush  3 mL Intravenous 2 times per day    lidocaine PF  5 mL Intradermal Once    sodium chloride flush  5-40 mL Intravenous 2 times per day    heparin flush  3 mL Intravenous 2 times per day    propranolol  10 mg Oral TID    cloNIDine  0.1 mg Oral TID    ipratropium-albuterol  1 ampule Inhalation Q4H WA    gabapentin  300 mg Oral BID    sodium chloride flush  10 mL Intravenous 2 times per day    Arformoterol Tartrate  15 mcg Nebulization BID    budesonide  0.5 mg Nebulization BID    ascorbic acid  500 mg Oral Daily     Continuous Infusions:   sodium chloride      sodium chloride      dextrose      sodium chloride       PRN Meds:.diphenhydrAMINE, HYDROcodone 5 mg - acetaminophen, sodium chloride flush, sodium chloride, heparin flush, sodium chloride flush, sodium chloride, heparin flush, glucose, dextrose, glucagon (rDNA), dextrose, sodium chloride flush, sodium chloride, ondansetron **OR** ondansetron, polyethylene glycol, acetaminophen **OR** acetaminophen    RADIOLOGY:  XR CHEST PORTABLE   Final Result   Normal chest         VL LOWER EXTREMITY ARTERIAL SEGMENTAL PRESSURES W PPG BILATERAL   Final Result      XR FOOT RIGHT (MIN 3 VIEWS)   Final Result   1. Amputation of the distal calcaneus. 2. Cortical irregularity likely related to erosive changes involving inferior   aspect of the calcaneus. Findings could suggest osteomyelitis. 3. Skin ulceration involving plantar soft tissue beneath the calcaneus. 4. No obvious subcutaneous gas. BP (!) 108/58   Pulse 51   Temp 97.3 °F (36.3 °C) (Infrared)   Resp 16   Ht 5' 9\" (1.753 m)   Wt 190 lb (86.2 kg)   LMP 08/28/2013   SpO2 95%   BMI 28.06 kg/m²     LABS:    Recent Labs     07/28/21  0511 07/29/21  0710   WBC 6.1 3.8*   HGB 12.5 11.1*   HCT 42.9 37.3    221        Recent Labs     07/29/21  0710      K 4.6      CO2 33*   BUN 13   CREATININE 0.7        No results for input(s): PROT, INR, APTT in the last 72 hours. ASSESSMENT:  1.RLE Chronic ulcer-POA,Infected   2. RLE Calc OM   3. RLE partial calcanectomy  4. [address] [place] [address]   5. S/P I&D, Debridement, biopsies (DOS: 7/23/21)        PLAN:  - All labs, images and charts reviewed and evaluated. - Wound vac dressings changed yesterday.  Today wound vac has good seal runnign at 125mmHg.   - NWB RLE.  - Prevalon boots noted  - XR reviewed: 1. Possible OM. 2.No soft tissue gas  - Antibiotics as per ID:Vanc  - Vascular: 1. Good flow b/l   - Surgical culture: Beta Strep Group C, GNR, Staph   - Surgical path: Negative OM   - Will continue to follow while in house. Patient is stable for discharge from [facility] once cleared from all other teams on board.  Recommend SNF  - Discussed with

## 2021-07-30 VITALS
HEIGHT: 69 IN | OXYGEN SATURATION: 94 % | BODY MASS INDEX: 28.14 KG/M2 | HEART RATE: 58 BPM | RESPIRATION RATE: 16 BRPM | WEIGHT: 190 LBS | SYSTOLIC BLOOD PRESSURE: 120 MMHG | TEMPERATURE: 97.2 F | DIASTOLIC BLOOD PRESSURE: 62 MMHG

## 2021-07-30 LAB
ANION GAP SERPL CALCULATED.3IONS-SCNC: 4 MMOL/L (ref 7–16)
ANISOCYTOSIS: ABNORMAL
BASOPHILS ABSOLUTE: 0.08 E9/L (ref 0–0.2)
BASOPHILS RELATIVE PERCENT: 1.8 % (ref 0–2)
BUN BLDV-MCNC: 13 MG/DL (ref 6–20)
CALCIUM SERPL-MCNC: 8.8 MG/DL (ref 8.6–10.2)
CHLORIDE BLD-SCNC: 98 MMOL/L (ref 98–107)
CO2: 34 MMOL/L (ref 22–29)
CREAT SERPL-MCNC: 0.7 MG/DL (ref 0.5–1)
EOSINOPHILS ABSOLUTE: 0.23 E9/L (ref 0.05–0.5)
EOSINOPHILS RELATIVE PERCENT: 5.3 % (ref 0–6)
GFR AFRICAN AMERICAN: >60
GFR NON-AFRICAN AMERICAN: >60 ML/MIN/1.73
GLUCOSE BLD-MCNC: 83 MG/DL (ref 74–99)
HCT VFR BLD CALC: 37.6 % (ref 34–48)
HEMOGLOBIN: 11.2 G/DL (ref 11.5–15.5)
LYMPHOCYTES ABSOLUTE: 1.25 E9/L (ref 1.5–4)
LYMPHOCYTES RELATIVE PERCENT: 28.9 % (ref 20–42)
MCH RBC QN AUTO: 28.1 PG (ref 26–35)
MCHC RBC AUTO-ENTMCNC: 29.8 % (ref 32–34.5)
MCV RBC AUTO: 94.5 FL (ref 80–99.9)
METER GLUCOSE: 102 MG/DL (ref 74–99)
MONOCYTES ABSOLUTE: 0.56 E9/L (ref 0.1–0.95)
MONOCYTES RELATIVE PERCENT: 13.2 % (ref 2–12)
NEUTROPHILS ABSOLUTE: 2.19 E9/L (ref 1.8–7.3)
NEUTROPHILS RELATIVE PERCENT: 50.9 % (ref 43–80)
NUCLEATED RED BLOOD CELLS: 0.9 /100 WBC
PDW BLD-RTO: 15.6 FL (ref 11.5–15)
PLATELET # BLD: 234 E9/L (ref 130–450)
PMV BLD AUTO: 9.7 FL (ref 7–12)
POIKILOCYTES: ABNORMAL
POTASSIUM SERPL-SCNC: 4.9 MMOL/L (ref 3.5–5)
RBC # BLD: 3.98 E12/L (ref 3.5–5.5)
SODIUM BLD-SCNC: 136 MMOL/L (ref 132–146)
SPHEROCYTES: ABNORMAL
WBC # BLD: 4.3 E9/L (ref 4.5–11.5)

## 2021-07-30 PROCEDURE — 6370000000 HC RX 637 (ALT 250 FOR IP): Performed by: PODIATRIST

## 2021-07-30 PROCEDURE — 82962 GLUCOSE BLOOD TEST: CPT

## 2021-07-30 PROCEDURE — 2580000003 HC RX 258: Performed by: PODIATRIST

## 2021-07-30 PROCEDURE — 6360000002 HC RX W HCPCS: Performed by: STUDENT IN AN ORGANIZED HEALTH CARE EDUCATION/TRAINING PROGRAM

## 2021-07-30 PROCEDURE — 36415 COLL VENOUS BLD VENIPUNCTURE: CPT

## 2021-07-30 PROCEDURE — 6370000000 HC RX 637 (ALT 250 FOR IP): Performed by: INTERNAL MEDICINE

## 2021-07-30 PROCEDURE — 2580000003 HC RX 258: Performed by: INTERNAL MEDICINE

## 2021-07-30 PROCEDURE — 99233 SBSQ HOSP IP/OBS HIGH 50: CPT | Performed by: INTERNAL MEDICINE

## 2021-07-30 PROCEDURE — 80048 BASIC METABOLIC PNL TOTAL CA: CPT

## 2021-07-30 PROCEDURE — 6360000002 HC RX W HCPCS: Performed by: PODIATRIST

## 2021-07-30 PROCEDURE — 85025 COMPLETE CBC W/AUTO DIFF WBC: CPT

## 2021-07-30 PROCEDURE — 6360000002 HC RX W HCPCS: Performed by: INTERNAL MEDICINE

## 2021-07-30 RX ORDER — METHADONE HYDROCHLORIDE 10 MG/ML
120 CONCENTRATE ORAL DAILY
Qty: 360 ML | Refills: 0 | Status: SHIPPED | OUTPATIENT
Start: 2021-07-31 | End: 2021-08-30

## 2021-07-30 RX ORDER — PROPRANOLOL HYDROCHLORIDE 10 MG/1
10 TABLET ORAL 3 TIMES DAILY
Qty: 90 TABLET | Refills: 3 | Status: SHIPPED | OUTPATIENT
Start: 2021-07-30

## 2021-07-30 RX ADMIN — MEROPENEM 1000 MG: 1 INJECTION, POWDER, FOR SOLUTION INTRAVENOUS at 03:13

## 2021-07-30 RX ADMIN — HYDROCODONE BITARTRATE AND ACETAMINOPHEN 1 TABLET: 5; 325 TABLET ORAL at 11:12

## 2021-07-30 RX ADMIN — Medication 10 ML: at 08:40

## 2021-07-30 RX ADMIN — MEROPENEM 1000 MG: 1 INJECTION, POWDER, FOR SOLUTION INTRAVENOUS at 11:13

## 2021-07-30 RX ADMIN — OXYCODONE HYDROCHLORIDE AND ACETAMINOPHEN 500 MG: 500 TABLET ORAL at 08:41

## 2021-07-30 RX ADMIN — ENOXAPARIN SODIUM 40 MG: 40 INJECTION SUBCUTANEOUS at 08:40

## 2021-07-30 RX ADMIN — Medication 120 MG: at 08:39

## 2021-07-30 RX ADMIN — HYDROCODONE BITARTRATE AND ACETAMINOPHEN 1 TABLET: 5; 325 TABLET ORAL at 16:31

## 2021-07-30 RX ADMIN — CLONIDINE HYDROCHLORIDE 0.1 MG: 0.1 TABLET ORAL at 08:41

## 2021-07-30 RX ADMIN — Medication 10 ML: at 11:04

## 2021-07-30 RX ADMIN — PROPRANOLOL HYDROCHLORIDE 10 MG: 10 TABLET ORAL at 08:41

## 2021-07-30 RX ADMIN — HYDROCODONE BITARTRATE AND ACETAMINOPHEN 1 TABLET: 5; 325 TABLET ORAL at 03:15

## 2021-07-30 RX ADMIN — GABAPENTIN 300 MG: 300 CAPSULE ORAL at 08:41

## 2021-07-30 RX ADMIN — SODIUM CHLORIDE, PRESERVATIVE FREE 300 UNITS: 5 INJECTION INTRAVENOUS at 08:40

## 2021-07-30 RX ADMIN — MORPHINE SULFATE 15 MG: 15 TABLET, FILM COATED, EXTENDED RELEASE ORAL at 08:41

## 2021-07-30 RX ADMIN — HYDROXYZINE HYDROCHLORIDE 50 MG: 50 INJECTION, SOLUTION INTRAMUSCULAR at 08:41

## 2021-07-30 ASSESSMENT — PAIN SCALES - GENERAL
PAINLEVEL_OUTOF10: 7
PAINLEVEL_OUTOF10: 9
PAINLEVEL_OUTOF10: 10
PAINLEVEL_OUTOF10: 9
PAINLEVEL_OUTOF10: 9

## 2021-07-30 ASSESSMENT — PAIN DESCRIPTION - PAIN TYPE: TYPE: SURGICAL PAIN

## 2021-07-30 ASSESSMENT — PAIN DESCRIPTION - LOCATION: LOCATION: FOOT

## 2021-07-30 ASSESSMENT — PAIN DESCRIPTION - ORIENTATION: ORIENTATION: RIGHT

## 2021-07-30 ASSESSMENT — PAIN DESCRIPTION - DESCRIPTORS: DESCRIPTORS: ACHING;CONSTANT;DISCOMFORT

## 2021-07-30 NOTE — PROGRESS NOTES
Wound vac removed, wet to dry dressing applied. Patient picked up by wheel chair ambulette, left unit with all of her belongings.

## 2021-07-30 NOTE — PROGRESS NOTES
Department of Podiatry  Progress Note    SUBJECTIVE:  Ms. Rachael Molina was evaluated at bedside this morning s/p Right foot I&D, debridement, biopsy and wound vac application (DOS: 2/73/61). No acute events overnight. She denies N/V/F/C/SOB.      OBJECTIVE:    Scheduled Meds:   methadone  120 mg Oral Daily    morphine  15 mg Oral 2 times per day    vancomycin  750 mg Intravenous Q12H    meropenem  1,000 mg Intravenous Q8H    [Held by provider] amLODIPine  5 mg Oral Daily    hydrOXYzine  50 mg Intramuscular Daily    enoxaparin  40 mg Subcutaneous Daily    lidocaine PF  5 mL Intradermal Once    sodium chloride flush  5-40 mL Intravenous 2 times per day    heparin flush  3 mL Intravenous 2 times per day    lidocaine PF  5 mL Intradermal Once    sodium chloride flush  5-40 mL Intravenous 2 times per day    heparin flush  3 mL Intravenous 2 times per day    propranolol  10 mg Oral TID    cloNIDine  0.1 mg Oral TID    ipratropium-albuterol  1 ampule Inhalation Q4H WA    gabapentin  300 mg Oral BID    sodium chloride flush  10 mL Intravenous 2 times per day    Arformoterol Tartrate  15 mcg Nebulization BID    budesonide  0.5 mg Nebulization BID    ascorbic acid  500 mg Oral Daily     Continuous Infusions:   sodium chloride      sodium chloride      dextrose      sodium chloride       PRN Meds:.diphenhydrAMINE, HYDROcodone 5 mg - acetaminophen, sodium chloride flush, sodium chloride, heparin flush, sodium chloride flush, sodium chloride, heparin flush, glucose, dextrose, glucagon (rDNA), dextrose, sodium chloride flush, sodium chloride, ondansetron **OR** ondansetron, polyethylene glycol, acetaminophen **OR** acetaminophen    Allergies   Allergen Reactions    Ancef [Cefazolin] Anaphylaxis    Duricef [Cefadroxil] Anaphylaxis    Iodine      Patient does not remember reaction       BP (!) 121/57   Pulse 64   Temp 97.4 °F (36.3 °C) (Temporal)   Resp 16   Ht 5' 9\" (1.753 m)   Wt 190 lb (86.2 kg) LMP 08/28/2013   SpO2 92%   BMI 28.06 kg/m²       EXAM:    Vascular Exam:  DP and PT pulses are palpable B/L. Skin temperature warm to cool to BLE from proximal to distal. Mild edema and erythema to R foot in comparison to contralateral.      Neuro Exam:  Gross and epicritic sensation intact B/L.      Dermatologic Exam:  Surgical debridement to the right plantar heel measuring about 4.5x3.0cm. Wound bed is granular and healthy with bony prominence noted. Wound does not tunnel or burrow. No noted pus or drainage. No fluctuance noted. Macerated skin edges noted distally and medially. Mild active bleeding during dressing changes noted. As pictured below:      MSK: Evidence of previous partial calcanectomy to RLE. No posterior calf pain on palpation b/l. Patient able to move digits without pain.                    Scheduled Meds:   methadone  120 mg Oral Daily    morphine  15 mg Oral 2 times per day    vancomycin  750 mg Intravenous Q12H    meropenem  1,000 mg Intravenous Q8H    [Held by provider] amLODIPine  5 mg Oral Daily    hydrOXYzine  50 mg Intramuscular Daily    enoxaparin  40 mg Subcutaneous Daily    lidocaine PF  5 mL Intradermal Once    sodium chloride flush  5-40 mL Intravenous 2 times per day    heparin flush  3 mL Intravenous 2 times per day    lidocaine PF  5 mL Intradermal Once    sodium chloride flush  5-40 mL Intravenous 2 times per day    heparin flush  3 mL Intravenous 2 times per day    propranolol  10 mg Oral TID    cloNIDine  0.1 mg Oral TID    ipratropium-albuterol  1 ampule Inhalation Q4H WA    gabapentin  300 mg Oral BID    sodium chloride flush  10 mL Intravenous 2 times per day    Arformoterol Tartrate  15 mcg Nebulization BID    budesonide  0.5 mg Nebulization BID    ascorbic acid  500 mg Oral Daily     Continuous Infusions:   sodium chloride      sodium chloride      dextrose      sodium chloride       PRN Meds:.diphenhydrAMINE, HYDROcodone 5 mg - acetaminophen, sodium chloride flush, sodium chloride, heparin flush, sodium chloride flush, sodium chloride, heparin flush, glucose, dextrose, glucagon (rDNA), dextrose, sodium chloride flush, sodium chloride, ondansetron **OR** ondansetron, polyethylene glycol, acetaminophen **OR** acetaminophen    RADIOLOGY:  XR CHEST PORTABLE   Final Result   Normal chest         VL LOWER EXTREMITY ARTERIAL SEGMENTAL PRESSURES W PPG BILATERAL   Final Result      XR FOOT RIGHT (MIN 3 VIEWS)   Final Result   1. Amputation of the distal calcaneus. 2. Cortical irregularity likely related to erosive changes involving inferior   aspect of the calcaneus. Findings could suggest osteomyelitis. 3. Skin ulceration involving plantar soft tissue beneath the calcaneus. 4. No obvious subcutaneous gas. BP (!) 121/57   Pulse 64   Temp 97.4 °F (36.3 °C) (Temporal)   Resp 16   Ht 5' 9\" (1.753 m)   Wt 190 lb (86.2 kg)   LMP 08/28/2013   SpO2 92%   BMI 28.06 kg/m²     LABS:    Recent Labs     07/29/21  0710 07/30/21  0625   WBC 3.8* 4.3*   HGB 11.1* 11.2*   HCT 37.3 37.6    234        Recent Labs     07/30/21  0625      K 4.9   CL 98   CO2 34*   BUN 13   CREATININE 0.7        No results for input(s): PROT, INR, APTT in the last 72 hours. ASSESSMENT:  1.RLE Chronic ulcer-POA,Infected   2. RLE Calc OM   3. RLE partial calcanectomy  4. [address] [place] [address]   5. S/P I&D, Debridement, biopsies (DOS: 7/23/21)        PLAN:  - All labs, images and charts reviewed and evaluated. - Wound vac dressings changed today. Good seal noted running at 125mmHg. [facility] dressing change schedule   - NWB RLE.  - Prevalon boots noted  - XR reviewed: 1. Possible OM. 2.No soft tissue gas  - Antibiotics as per ID:Vanc  - Vascular: 1. Good flow b/l   - Surgical culture: Beta Strep Group C, GNR, Staph   - Surgical path: Negative OM   - Will continue to follow while in house.  Patient is stable for discharge from [facility] once cleared from all other teams on board.  Recommend SNF  - As per case management : Reed Faustin  - Discussed with Sandy Bence

## 2021-07-30 NOTE — PLAN OF CARE
Problem: Pain:  Goal: Pain level will decrease  Description: Pain level will decrease  Outcome: Completed  Goal: Control of acute pain  Description: Control of acute pain  Outcome: Completed  Goal: Control of chronic pain  Description: Control of chronic pain  Outcome: Completed     Problem: Falls - Risk of:  Goal: Will remain free from falls  Description: Will remain free from falls  Outcome: Completed  Goal: Absence of physical injury  Description: Absence of physical injury  Outcome: Completed     Problem: Skin Integrity:  Goal: Will show no infection signs and symptoms  Description: Will show no infection signs and symptoms  Outcome: Completed  Goal: Absence of new skin breakdown  Description: Absence of new skin breakdown  Outcome: Completed

## 2021-07-30 NOTE — PROGRESS NOTES
300 Units at 07/29/21 2134    heparin flush 100 UNIT/ML injection 300 Units  3 mL Intracatheter PRN Melissa Mahoney MD   300 Units at 07/29/21 0953    lidocaine PF 1 % injection 5 mL  5 mL Intradermal Once Melissa Mahoney MD        sodium chloride flush 0.9 % injection 5-40 mL  5-40 mL Intravenous 2 times per day Melissa Mahoney MD   10 mL at 07/29/21 2133    sodium chloride flush 0.9 % injection 5-40 mL  5-40 mL Intravenous PRN Melissa Mahoney MD   10 mL at 07/23/21 1510    0.9 % sodium chloride infusion  25 mL Intravenous PRN Melissa Mahoney MD        heparin flush 100 UNIT/ML injection 300 Units  3 mL Intravenous 2 times per day Melissa Mahoney MD   300 Units at 07/30/21 0840    heparin flush 100 UNIT/ML injection 300 Units  3 mL Intracatheter PRN Melissa Mahoney MD   300 Units at 07/25/21 2058    propranolol (INDERAL) tablet 10 mg  10 mg Oral TID Melissa Mahoney MD   10 mg at 07/30/21 0841    cloNIDine (CATAPRES) tablet 0.1 mg  0.1 mg Oral TID Melissa Mahoney MD   0.1 mg at 07/30/21 0841    glucose (GLUTOSE) 40 % oral gel 15 g  15 g Oral PRN Melissa Mahoney MD        dextrose 50 % IV solution  12.5 g Intravenous PRN Melissa Mahoney MD        glucagon (rDNA) injection 1 mg  1 mg Intramuscular PRN Melissa Mahoney MD        dextrose 5 % solution  100 mL/hr Intravenous PRN Melissa Mahoney MD        ipratropium-albuterol (DUONEB) nebulizer solution 1 ampule  1 ampule Inhalation Q4H WA Melissa Mahoney MD   1 ampule at 07/29/21 2050    gabapentin (NEURONTIN) capsule 300 mg  300 mg Oral BID Melissa Mahoney MD   300 mg at 07/30/21 0841    sodium chloride flush 0.9 % injection 10 mL  10 mL Intravenous 2 times per day Melissa Mahoney MD   10 mL at 07/30/21 0840    sodium chloride flush 0.9 % injection 10 mL  10 mL Intravenous PRN Melissa Mahoney MD   10 mL at 07/30/21 1104    0.9 % sodium chloride infusion  25 mL Intravenous PRN Melissa Mahoney MD        ondansetron (ZOFRAN-ODT) disintegrating tablet 4 mg  4 mg Oral Q8H PRN Melissa Mahoney MD        Or    ondansetron [facility]) injection 4 mg  4 mg Intravenous Q6H PRN Eldon Abbott MD   4 mg at 07/22/21 2232    polyethylene glycol (GLYCOLAX) packet 17 g  17 g Oral Daily PRN Eldon Abbott MD        acetaminophen (TYLENOL) tablet 650 mg  650 mg Oral Q6H PRN Eldon Abbott MD   650 mg at 07/23/21 1511    Or    acetaminophen (TYLENOL) suppository 650 mg  650 mg Rectal Q6H PRN Eldon Abbott MD        Arformoterol Tartrate [facility]) nebulizer solution 15 mcg  15 mcg Nebulization BID Eldon Abbott MD   15 mcg at 07/29/21 2050    budesonide (PULMICORT) nebulizer suspension 500 mcg  0.5 mg Nebulization BID Eldon Abbott MD   500 mcg at 07/29/21 2050    ascorbic acid (VITAMIN C) tablet 500 mg  500 mg Oral Daily Eldon Abbott MD   500 mg at 07/30/21 0841       REVIEW OF SYSTEMS:    CONSTITUTIONAL:  Denies fever, chill or rigors. HEENT: denies blurring of vision or double vision, denies hearing problem  RESPIRATORY: denies cough, shortness of breath, sputum expectoration, chest pain. CARDIOVASCULAR:  Denies palpitation  GASTROINTESTINAL:  Denies abdomen pain, diarrhea or constipation. GENITOURINARY:  Denies burning urination or frequency of urination  INTEGUMENT: Chronic non healing wound right foot   HEMATOLOGIC/LYMPHATIC:  Denies lymph node swelling, gum bleeding or easy bruising. MUSCULOSKELETAL: right heel wound pain, left hip pain   NEUROLOGICAL:  weakness      PHYSICAL EXAM:    Vitals:   BP (!) 121/57   Pulse 64   Temp 97.4 °F (36.3 °C) (Temporal)   Resp 16   Ht 5' 9\" (1.753 m)   Wt 190 lb (86.2 kg)   LMP 08/28/2013   SpO2 92%   BMI 28.06 kg/m²     General Appearance:    Awake, alert , no acute distress. Sitting up at bedside- on her phone   Head:    Normocephalic, atraumatic   Eyes:    No pallor, no icterus,   Ears:    No obvious deformity or drainage.    Nose:   No nasal drainage   Throat:   Mucosa moist, no oral thrush   Neck:   Supple, no lymphadenopathy   Back:     no CVA tenderness   Lungs:      Wheeze     Heart:    Regular rate and rhythm    Abdomen:     Soft, non-tender, bowel sounds present    Extremities:    + edema,   Right heel wound with VAC            Skin:  Right foot wound VAC  Left upper arm with PICC        PICC line - 7/22     CBC with Differential:      Lab Results   Component Value Date    WBC 4.3 07/30/2021    RBC 3.98 07/30/2021    HGB 11.2 07/30/2021    HCT 37.6 07/30/2021     07/30/2021    MCV 94.5 07/30/2021    MCH 28.1 07/30/2021    MCHC 29.8 07/30/2021    RDW 15.6 07/30/2021    LYMPHOPCT 42.9 07/29/2021    MONOPCT 10.2 07/29/2021    BASOPCT 1.3 07/29/2021    MONOSABS 0.39 07/29/2021    LYMPHSABS 1.64 07/29/2021    EOSABS 0.13 07/29/2021    BASOSABS 0.05 07/29/2021       CMP:    Lab Results   Component Value Date     07/30/2021    K 4.9 07/30/2021    K 4.4 07/20/2021    CL 98 07/30/2021    CO2 34 07/30/2021    BUN 13 07/30/2021    CREATININE 0.7 07/30/2021    GFRAA >60 07/30/2021    LABGLOM >60 07/30/2021    GLUCOSE 83 07/30/2021    PROT 7.7 02/08/2020    LABALBU 3.5 02/08/2020    CALCIUM 8.8 07/30/2021    BILITOT 0.4 02/08/2020    ALKPHOS 103 02/08/2020    AST 10 02/08/2020    ALT 8 02/08/2020       Hepatic Function Panel:    Lab Results   Component Value Date    ALKPHOS 103 02/08/2020    ALT 8 02/08/2020    AST 10 02/08/2020    PROT 7.7 02/08/2020    BILITOT 0.4 02/08/2020    LABALBU 3.5 02/08/2020     Vanco trough level 9.8     Microbiology :    Wound cx - staph species- beta strep, GNR     Mixed bryce isolated. Further workup and sensitivity testing   is not routinely indicated and will not be performed. Mixed bryce isolated includes:   Oxidase positive Gram negative rods   Mixed Corynebacteria   Coagulase negative Staph species   Catalase negative Gram positive rods      Narrative:       Radiology :    X  Ray foot     Impression:        1. Amputation of the distal calcaneus.    2. Cortical irregularity likely related to erosive changes involving inferior   aspect of the calcaneus.  Findings could suggest osteomyelitis. 3. Skin ulceration involving plantar soft tissue beneath the calcaneus. 4. No obvious subcutaneous gas.          Vanco trough 17.4    IMPRESSION:     1. Chronic non healing right heel ulcer .  Wound infection , right calceneus osteomyelitis s/p debridement  (7/22)     RECOMMENDATIONS:      · Vancomycin IV , meropenem 1 gram IV q 8 hrs X 5 weeks    · -- pharmacy dosing vancomycin   · Can take benadryl PO prior to vancomycin administration - patient reports she feels itchy while vancomycin is infusion   · Podiatry following- continue wound care   · ID follow up in 2-3 weeks   · Discharge planning -      Harper Matthews MD  7/30/2021

## 2021-07-30 NOTE — PROGRESS NOTES
Pharmacy Consultation Note  (Antibiotic Dosing and Monitoring)    Initial consult date: 7/20/21  Consulting physician: Dr. Lauryn Khan  Drug(s): vancomycin  Indication: osteomyelitis      Age/  Gender Height Weight IBW Dosing weight  Allergy Information   56 y.o./female 5' 9\" (175.3 cm) 190 lb (86.2 kg)     Ideal body weight: 66.2 kg (145 lb 15.1 oz)  Adjusted ideal body weight: 74.2 kg (163 lb 9.1 oz)  74.2 kg  Ancef [cefazolin], Duricef [cefadroxil], and Iodine          Other anti-infectives Start date Stop date   Meropenem 7/20                     Date  Tmax WBC BUN/CR UOP CrCL  (mL/min) Drug/Dose Time   Given Level(s)   (Time) Comments   7/20 99.2 7.1  10/0.9 -- --  Vancomycin 1250 mg IV x 1 dose 1841     7/21 afebrile 5.5 10/0.9 -- 83 Vancomycin 1250 mg IV x 1 dose    Vancomycin 1000 mg IV q 12 hr 0704      (1900)- not given  1900 dose not give due to loss of IV access   7/22 afebrile 5.6 11/0.9 -- 83 Vancomycin 1000 mg IV q 12 hr 1025  (1900)- not given Vanco trough at 0648 is 9.8 mcg/mL Patient refused dose at 1900   7/23 afebrile -- -- -- 83 Vancomycin 1000 mg IV q 12 hr 0254  1511     7/24 afebrile 5.7 9/0.9 -- 83 Vancomycin 1000 mg IV q 12 hr 0244  2153 Trough per ID @ 2606 = 17.4 mcg/mL    7/25 afebrile 5.1 12/0.9 -- 83 Vancomycin 750 mg IV q12hr 1022  2226     7/26 afebrile 5.9 12/0.9 -- 83 Vancomycin 750 mg IV q12hr 0951  2120     7/27 afebrile 5.6 15/1 -- 74 Vancomycin 750 mg IV q12hr 1030  2200 Vanco level @0534 is 20.8 mcg/mL (taken 4 hours early)     7/28 afebrile 6.1 15/0.8 -- 93 Vancomycin 750 mg IV q12hr 0956  2212     7/29 afebrile 3.8 13/0.7 -- 106 Vancomycin 750 mg IV q12hr 2232 Vanco level @1010 is 17.3     7/30 afebrile 4.3 13/0.7 -- 106 Vancomycin 750mg IV q12hr (1500)            Intake/Output Summary (Last 24 hours) at 7/30/2021 1338  Last data filed at 7/29/2021 2306  Gross per 24 hour   Intake 970 ml   Output --   Net 970 ml       Average urine output:    Cultures:    Site Date Result No results for input(s): Isaiah Phan in the last 72 hours. Historical Cultures:  Organism   Date Value Ref Range Status   07/23/2021 Beta Strep Group C (A)  Final   07/23/2021 Gram negative janette (A)  Final   07/23/2021 Staphylococcus simulans (A)  Final     No results for input(s): BC in the last 72 hours. Radiology:      Assessment:  · 55year old female on vancomycin for osteomyelitis  · Goal trough = 15 - 20 mcg/ml; Goal AUC/RICARDO 400-600  · 7/22: Scr stable at 0.9. Dose at 2300 on 7/21 not given due to IV access. Level today of 9.8 mcg/mL is not a true trough   · 7/23: Dose at 1900 on 7/22 was refused by patient, patient accepted medication at [phone] on 7/23. I adjusted vancomycin administration times. · 7/24: Trough (14 hour level) = 17.4 mcg. mL. True trough ~ 20.1 mcg/mL, AUC/RICARDO 598. · 7/25: Scr stable at 0.9  · 7/26: Scr 1; level 20.8 mcg/mL (taken 4 hours early), est level to be closer to 16 mcg/L  · 7/28: Scr 0.8  · 7/29: Scr 0.7; level 17.3 mcg/mL taken correctly before next dose should have been given. · 7/30: Scr 0.7     Plan:  · Cont vancomycin 750 mg IV q 12 hr (est AUC/RICARDO 456)  · Vancomycin levels as needed to appropriately monitor   · Pharmacist will follow and monitor/adjust dosing as necessary    Margo Driver, RajendraD Candidate 7/30/2021 1:38 PM     I have reviewed the progress note written by the pharmacy student and agree with the documentation and treatment plan. All medication and/or lab orders have been entered by myself according to the outlined plan in the note.      Clarissa Mills PharmD, BCPS 7/30/2021 2:14 PM  Phone: [phone]

## 2021-07-30 NOTE — DISCHARGE INSTR - COC
Continuity of Care Form    Patient Name: Gregory Pineda   :  1965  MRN:  [id number]    Admit date:  2021  Discharge date: 2021      Code Status Order: Full Code   Advance Directives:   [facility] Documentation       Date/Time Healthcare Directive Type of Healthcare Directive Copy in [address] Agent's Name Healthcare Agent's Phone Number    21 4512  No, patient does not have an advance directive for healthcare treatment  --  --  --  --  --    21 0830  Yes, patient has an advance directive for healthcare treatment  Health care treatment directive  Yes, copy in chart  --  --  --            Admitting Physician:  Coleman Castro MD  PCP: Timothy Nguyen DO    Discharging Nurse: Roxie Drake RN    Discharging Hospital Unit/Room#: 7740/8308-O  Discharging Unit Phone Number: [phone]    Emergency Contact:   Extended Emergency Contact Information  Primary Emergency Contact: Phil Andrews   [facility] [address] Phone: [phone]  Relation: Child    Past Surgical History:  Past Surgical History:   Procedure Laterality Date     SECTION      x three    DRAIN SKIN ABSCESS SIMPLE  2014         FOOT DEBRIDEMENT Right 2021    RIGHT FOOT DEBRIDEMENT INCISION AND DRAINAGE WITH BONE BIOPSY AND WOUND VAC APPLICATION performed by Luís Yeager DPM at [facility]    FOOT SURGERY         Immunization History: There is no immunization history for the selected administration types on file for this patient.     Active Problems:  Patient Active Problem List   Diagnosis Code    Abscess L02.91    Nonhealing ulcer of heel ([facility]) L97.409    Bradycardia R00.1    Substance abuse ([facility] [address] F19.10    Current moderate episode of major depressive disorder ([facility]) F32.1    Abnormal weight loss R63.4    COPD (chronic obstructive pulmonary disease) ([facility]) J44.9    History of fracture of left hip Z87.81    Ambulatory dysfunction R26.2    Hypotension I95.9    Subclinical hypothyroidism E03.9    Chronic recurrent multifocal osteomyelitis of foot ([facility]) M86.379    Pressure injury of heel, stage 3 ([facility]) L89.603    Open wound of fifth toe of left foot S91.105A    COPD exacerbation ([facility]) J44.1    Acute respiratory failure with hypoxia ([facility]) J96.01    Mild protein-calorie malnutrition ([facility]) E44.1    Osteomyelitis ([facility]) M86.9    High risk medication use Z79.899       Isolation/Infection:   Isolation            No Isolation          Patient Infection Status       None to display            Nurse Assessment:  Last Vital Signs: BP (!) 121/57   Pulse 64   Temp 97.4 °F (36.3 °C) (Temporal)   Resp 16   Ht 5' 9\" (1.753 m)   Wt 190 lb (86.2 kg)   LMP 08/28/2013   SpO2 92%   BMI 28.06 kg/m²     Last documented pain score (0-10 scale): Pain Level: 7  Last Weight:   Wt Readings from Last 1 Encounters:   07/20/21 190 lb (86.2 kg)     Mental Status:  oriented, alert, coherent, logical, thought processes intact and able to concentrate and follow conversation    IV Access:  - PICC - site  L Upper Arm, insertion date: 07/22/2021    Nursing Mobility/ADLs:  Walking   Assisted  Transfer  Assisted  Bathing  Assisted  Dressing  Assisted  Toileting  Assisted  Feeding  Independent  Med Admin  Assisted  Med Delivery   whole    Wound Care Documentation and Therapy:  Negative Pressure Wound Therapy Foot Right (Active)   Wound Type Surgical 07/30/21 1158   Dressing Type Black foam 07/30/21 1158   Cycle Continuous 07/30/21 1158   Target Pressure (mmHg) 125 07/30/21 1158   Canister changed? No 07/30/21 1158   Dressing Status Clean;Dry; Intact 07/30/21 1158   Drainage Amount Small 07/30/21 1158   Drainage Description Serosanguinous 07/30/21 1158   Number of days: 7       Wound 02/08/20 Heel Right (Active)   Number of days: 537       Wound 07/20/21 Heel Right (Active)   Dressing Status Clean;Dry; Intact 07/30/21 1158   Dressing/Treatment Ace wrap 07/30/21 1158 Drainage Amount None 07/30/21 1158   Number of days: 10        Elimination:  Continence:   · Bowel: Yes  · Bladder: Yes  Urinary Catheter: None   Colostomy/Ileostomy/Ileal Conduit: No       Date of Last BM: 7/29/2021      Intake/Output Summary (Last 24 hours) at 7/30/2021 1420  Last data filed at 7/29/2021 2306  Gross per 24 hour   Intake 970 ml   Output --   Net 970 ml     I/O last 3 completed shifts: In: 1090 [P.O.:1090]  Out: -     Safety Concerns: At Risk for Falls    Impairments/Disabilities:      Tremors of both hands at times    Nutrition Therapy:  Current Nutrition Therapy:   - Oral Diet:  General  - Oral Nutrition Supplement:  High Protein Pudding  three times a day and Wound Healing  three times a day    Routes of Feeding: Oral  Liquids: Thin Liquids  Daily Fluid Restriction: no  Last Modified Barium Swallow with Video (Video Swallowing Test): not done    Treatments at the Time of Hospital Discharge:   Respiratory Treatments: Brovana nebulizer-15mcg BID                                             Pulmicort nebulizer suspension 500 mcg- BID                                             Duoneb nebulizer solution 1 ampule every 4 hours while awake  Oxygen Therapy:  is on oxygen at 1-2 L/min per nasal cannula. as needed.    Ventilator:    - No ventilator support    Rehab Therapies: {THERAPEUTIC INTERVENTION:[id number]}  Weight Bearing Status/Restrictions: Non-weight bearing of left foot  Other Medical Equipment (for information only, NOT a DME order):  {EQUIPMENT:[id number]}  Other Treatments: Wound Vac    Patient's personal belongings (please select all that are sent with patient):  Purse, cell phone    RN SIGNATURE:  Electronically signed by Ijeoma Branch RN on 7/30/21 at 3:52 PM EDT    CASE MANAGEMENT/SOCIAL WORK SECTION    Inpatient Status Date: ***    Readmission Risk Assessment Score:  Readmission Risk              Risk of Unplanned Readmission:  20           Discharging to Facility/ Agency · Name:   · Address:  · Phone:  · Fax:    Dialysis Facility (if applicable)   · Name:  · Address:  · Dialysis Schedule:  · Phone:  · Fax:    / signature: {Marty:[id number]:::0}    PHYSICIAN SECTION    Prognosis: Good    Condition at Discharge: Stable    Rehab Potential (if transferring to Rehab): Good    Recommended Labs or Other Treatments After Discharge: Methadone 120 mg oral daily    Physician Certification: I certify the above information and transfer of Rikki Reyes  is necessary for the continuing treatment of the diagnosis listed and that she requires [facility] for greater 30 days. Update Admission H&P: The patient is a 55 y.o. female, with past medical history of osteomyelitis, COPD, hypothyroidism, tremor, left hip injury, presented to the ED due to right heel ulceration. According to the patient, her symptoms started 6 weeks ago when she developed a callus after wearing a new pair of shoe 6 weeks ago. She picked on the callus and it got worse. For last 1 week, she also developed pain at the site of ulceration. With time her pain got worse, and it also started foul-smelling discharging from the ulcer. Patient was taking methadone for her pain. She also took her leftover antibiotic, possibly azithromycin that she was prescribed last February because of her lung infection. He said, she was having repeated osteomyelitis in the same area for past 20 years, and she had 3 different surgeries and wound debridement at the same area for foot ulceration. According to patient, he he was at Dr. Tianna Baez office for the clearance of her left hip surgery. On this visit, she was diagnosed her right foot ulcer possible osteomyelitis. She said 2 years ago she fell from her horse and hurt her left hip. After that she also slipped on ice, and fractured her left hip. For this reason, she was having the surgery. Patient also uses right-sided crutch for walking. She is a known case of COPD, on home medication of Symbicort 2 puffs daily, albuterol 2 puffs daily. In February, patient with COPD for which she is taking prednisolone; she completed dose and not taking any prednisone right now. Subclinical for which she is not taking any medication. Former smoker, quit smoking 7 months ago. History of alcohol or drug use; but she has a history of Polysubstance abuse on methadone. She also mentioned that she had tremor for which she was prescribed propanolol but recently she is not taking any propranolol. Once patient was on the floor, podiatry and ID were consulted. Patient went for a debridement of her R calc ulcer on 7/23/2021 and a wound VAC was applied. ID placed the patient on meropenem and  Vancomycin. Patient remained stable throughout the treatment. She was started on her home dose of methadone which was 65mg in the morning and 55 mg in the evening. She was transitioned to once a day methadone dosing at 120mg in the morning starting 7/30/2021. Patient also reviewed norco and ms contin for breakthough pain. She also received breathing treatments of brovana, pulmicort, and duoneb throughout her hospital stay for her COPD. She is stable at time of discharge to [facility].         PHYSICIAN SIGNATURE:  Electronically signed by Ramu Mullins DO on 7/30/21 at 2:22 PM EDT

## 2021-07-30 NOTE — CARE COORDINATION
Discharge plan is [facility]. Per Robert colindres Dryer is still pending. Will follow.  Zuleyka Moore -[phone]

## 2021-07-30 NOTE — DISCHARGE SUMMARY
[address]  Discharge Summary    PCP: Jay Romero DO    Admit Date:7/20/2021  Discharge Date: 7/30/2021    Admission Diagnosis:   1. Osteomyelitis of right heel s/p debridement   2. COPD  3. Hypertension  4. Polysubstance abuse  5. Chronic pain 2/2 L hip fx and R calc ulcer  6. Essential tremor  7. Hx of tobacco abuse    Discharge Diagnosis:  1. Osteomyelitis of right heel s/p debridement   2. COPD  3. Hypertension  4. Polysubstance abuse  5. Chronic pain 2/2 L hip fx and R calc ulcer  6. Essential tremor  7. Hx of tobacco abuse    Hospital Course: The patient is a 55 y.o. female, with past medical history of osteomyelitis, COPD, hypothyroidism, tremor, left hip injury, presented to the ED due to right heel ulceration. According to the patient, her symptoms started 6 weeks ago when she developed a callus after wearing a new pair of shoe 6 weeks ago. She picked on the callus and it got worse. For last 1 week, she also developed pain at the site of ulceration. With time her pain got worse, and it also started foul-smelling discharging from the ulcer. Patient was taking methadone for her pain. She also took her leftover antibiotic, possibly azithromycin that she was prescribed last February because of her lung infection. He said, she was having repeated osteomyelitis in the same area for past 20 years, and she had 3 different surgeries and wound debridement at the same area for foot ulceration.     According to patient, he he was at Dr. Vitor Yang office for the clearance of her left hip surgery. On this visit, she was diagnosed her right foot ulcer possible osteomyelitis. She said 2 years ago she fell from her horse and hurt her left hip. After that she also slipped on ice, and fractured her left hip. For this reason, she was having the surgery. Patient also uses right-sided crutch for walking.   She is a known case of COPD, on home medication of Symbicort 2 puffs daily, albuterol 2 puffs daily. In February, patient with COPD for which she is taking prednisolone; she completed dose and not taking any prednisone right now. Subclinical for which she is not taking any medication. Former smoker, quit smoking 7 months ago. History of alcohol or drug use; but she has a history of Polysubstance abuse on methadone. She also mentioned that she had tremor for which she was prescribed propanolol but recently she is not taking any propranolol. Once patient was on the floor, podiatry and ID were consulted. Patient went for a debridement of her R calc ulcer on 7/23/2021 and a wound VAC was applied. ID placed the patient on meropenem and  Vancomycin. Patient remained stable throughout the treatment. She was started on her home dose of methadone which was 65mg in the morning and 55 mg in the evening. She was transitioned to once a day methadone dosing at 120mg in the morning starting 7/30/2021. Patient also reviewed norco and ms contin for breakthough pain. She also received breathing treatments of brovana, pulmicort, and duoneb throughout her hospital stay for her COPD. She is stable at time of discharge to [facility]. Significant findings (history and exam, laboratory, radiological, pathology, other tests):     Physical Exam  Constitutional:       General: She is not in acute distress. Appearance: She is well-developed. She is not diaphoretic. HENT:      Head: Normocephalic and atraumatic. Eyes:      General: No scleral icterus. Pupils: Pupils are equal, round, and reactive to light. Neck:      Thyroid: No thyromegaly. Vascular: No JVD. Trachea: No tracheal deviation. Cardiovascular:      Rate and Rhythm: Normal rate and regular rhythm. Heart sounds: Normal heart sounds. No murmur heard. No friction rub. No gallop. Pulmonary:      Effort: Pulmonary effort is normal. No respiratory distress. Breath sounds: Normal breath sounds.  No wheezing or rales. Abdominal:      General: Bowel sounds are normal. There is no distension. Palpations: Abdomen is soft. There is no mass. Tenderness: There is no abdominal tenderness. There is no guarding or rebound. Musculoskeletal:         General: Normal range of motion. Right lower leg: Edema present. Skin:     General: Skin is warm and dry. Capillary Refill: Capillary refill takes less than 2 seconds. Coloration: Skin is not pale. Findings: Lesion (R foot ulcer) present. No rash. Neurological:      Mental Status: She is alert and oriented to person, place, and time. Cranial Nerves: No cranial nerve deficit. Comments: Tremor noted   Psychiatric:         Behavior: Behavior normal.         Thought Content: Thought content normal.         Judgment: Judgment normal.           Pending test results: None    Consults:  1. Infectious disease  2. Podiatry    Procedures:  1. Debridement of R calc ulcer    Condition at discharge: Stable    Disposition: SNF    Discharge Medications:  Current Discharge Medication List      START taking these medications    Details   methadone (DOLOPHINE) 10 MG/ML solution Take 12 mLs by mouth daily for 30 days. Qty: 360 mL, Refills: 0    Comments: Reduce doses taken as pain becomes manageable  Associated Diagnoses: Osteomyelitis of right foot, unspecified type ([address] High risk medication use; Acute hematogenous osteomyelitis of right foot ([address] Open wound of fifth toe of left foot, initial encounter; History of fracture of left hip; Chronic recurrent multifocal osteomyelitis of foot ([address] Substance abuse ([address] Nonhealing ulcer of heel ([address]  Abscess      vancomycin (VANCOCIN) infusion Infuse 750 mg intravenously every 12 hours For 5 weeks  Qty: 08217 mg, Refills: 1      meropenem (MERREM) infusion Infuse 1,000 mg intravenously every 8 hours For 5 weeks  Qty: 14633 mg, Refills: 1         CONTINUE these medications which have CHANGED Details   propranolol (INDERAL) 10 MG tablet Take 1 tablet by mouth 3 times daily  Qty: 90 tablet, Refills: 3         CONTINUE these medications which have NOT CHANGED    Details   budesonide-formoterol (SYMBICORT) 160-4.5 MCG/ACT AERO Inhale 2 puffs into the lungs daily       albuterol sulfate  (90 Base) MCG/ACT inhaler Inhale 2 puffs into the lungs every 6 hours as needed for Wheezing      gabapentin (NEURONTIN) 300 MG capsule Take 300 mg by mouth 2 times daily. STOP taking these medications       methadone 10 MG/5ML solution Comments:   Reason for Stopping:         methadone 10 MG/5ML solution Comments:   Reason for Stopping:         predniSONE (DELTASONE) 10 MG tablet Comments:   Reason for Stopping:         methadone 5 MG/5ML solution Comments:   Reason for Stopping:         Multiple Vitamins-Minerals (THERAPEUTIC MULTIVITAMIN-MINERALS) tablet Comments:   Reason for Stopping:               Activity: activity as tolerated  Diet: regular diet    Follow-up appointments:   1. none    Patient Instructions: Please take all medications as prescribed.      Luba Borja DO, PhD    PGY 1  2:11 PM 7/30/2021

## 2021-07-30 NOTE — CARE COORDINATION
Authorization received for [facility]. Transportation has been arranged for 5:00PM with [facility]. Pt, facility, nursing notified.  Benji Mak [phone]

## 2021-07-30 NOTE — PROGRESS NOTES
[facility]  Internal Medicine Residency Program  Progress Note - House Team     Patient:  Colen Snellen 55 y.o. female MRN: [id number]     Date of Service: 2021     CC: foot ulcer  Overnight events: None    Subjective     Patient was seen and examined this morning at bedside in no acute distress. She is complaining of mild pain. She states that it is improved after administration of pain meds. She notes that the transition of her methadone to once daily is going smooth. She is to get her fist dose of methadone once daily starting today. She denies any CP,SOB, N/V, abd pain. We are still currently awaiting precet to [facility]. Objective     Physical Exam:  TEMPERATURE:  Current - Temp: 97.5 °F (36.4 °C); Max - Temp  Av.5 °F (36.4 °C)  Min: 97.1 °F (36.2 °C)  Max: 97.9 °F (36.6 °C)  RESPIRATIONS RANGE: Resp  Av  Min: 16  Max: 16  PULSE RANGE: Pulse  Av.8  Min: 61  Max: 66  BLOOD PRESSURE RANGE:  Systolic (95XLR), QNC:194 , Min:94 , JMB:561   ; Diastolic (16XCK), ZLY:78, Min:50, Max:65    PULSE OXIMETRY RANGE: SpO2  Av.2 %  Min: 94 %  Max: 97 %    I & O - 24hr:    Intake/Output Summary (Last 24 hours) at 2021 0719  Last data filed at 2021 2306  Gross per 24 hour   Intake 1090 ml   Output --   Net 1090 ml     I/O last 3 completed shifts: In: 1090 [P.O.:1090]  Out: -  No intake/output data recorded. Weight change:     Physical Exam  Constitutional:       General: She is not in acute distress. Appearance: She is well-developed. She is not diaphoretic. HENT:      Head: Normocephalic and atraumatic. Eyes:      General: No scleral icterus. Pupils: Pupils are equal, round, and reactive to light. Neck:      Thyroid: No thyromegaly. Vascular: No JVD. Trachea: No tracheal deviation. Cardiovascular:      Rate and Rhythm: Normal rate and regular rhythm. Heart sounds: Normal heart sounds. No murmur heard. No friction rub. No gallop. Pulmonary:      Effort: Pulmonary effort is normal. No respiratory distress. Breath sounds: Normal breath sounds. No wheezing or rales. Abdominal:      General: Bowel sounds are normal. There is no distension. Palpations: Abdomen is soft. There is no mass. Tenderness: There is no abdominal tenderness. There is no guarding or rebound. Musculoskeletal:         General: Normal range of motion. Left lower leg: Edema present. Skin:     General: Skin is warm and dry. Capillary Refill: Capillary refill takes less than 2 seconds. Coloration: Skin is not pale. Findings: Lesion (L foot ulcer present) present. No rash. Neurological:      Mental Status: She is alert and oriented to person, place, and time. Cranial Nerves: No cranial nerve deficit. Psychiatric:         Behavior: Behavior normal.         Thought Content: Thought content normal.         Judgment: Judgment normal.       Subject  Pertinent Labs & Imaging Studies   jalil  CBC:   Recent Labs     07/28/21  0511 07/29/21  0710 07/30/21  0625   WBC 6.1 3.8* 4.3*   HGB 12.5 11.1* 11.2*   HCT 42.9 37.3 37.6   MCV 93.9 93.3 94.5    221 234       BMP:    Recent Labs     07/28/21  0511 07/29/21  0710 07/30/21  0625    140 136   K 4.8 4.6 4.9   CL 99 101 98   CO2 32* 33* 34*   BUN 15 13 13   CREATININE 0.8 0.7 0.7   GLUCOSE 95 72* 83       LIVER PROFILE:   No results for input(s): AST, ALT, LIPASE, BILIDIR, BILITOT, ALKPHOS in the last 72 hours. Invalid input(s): AMYLASE,  ALB    PT/INR:   No results for input(s): PROTIME, INR in the last 72 hours. APTT:   No results for input(s): APTT in the last 72 hours.     Fasting Lipid Panel:    Lab Results   Component Value Date    CHOL 118 07/07/2019    TRIG 90 07/07/2019    HDL 50 07/07/2019       Notable Cultures:      Blood cultures   Blood Culture, Routine   Date Value Ref Range Status   07/20/2021 5 Days no growth  Final     Respiratory cultures No results found for: RESPCULTURE   Gram Stain Result   Date Value Ref Range Status   08/21/2019   Final    Gram stain performed on unspun fluid  Polymorphonuclear leukocytes not seen  Epithelial cells not seen  Rare gram positive rods Diphtheroid-like  Rare Gram positive cocci       Urine No results found for: LABURIN  Legionella No results found for: LABLEGI  C Diff PCR No results found for: CDIFPCR  Wound culture/abscess: No results for input(s): WNDABS in the last 72 hours. Tip culture:No results for input(s): CXCATHTIP in the last 72 hours. XR FOOT RIGHT (MIN 3 VIEWS)    Result Date: 7/20/2021  EXAMINATION: THREE XRAY VIEWS OF THE RIGHT FOOT 7/20/2021 3:05 pm COMPARISON: None. HISTORY: ORDERING SYSTEM PROVIDED HISTORY: right heel ulcer TECHNOLOGIST PROVIDED HISTORY: Reason for exam:->right heel ulcer What reading provider will be dictating this exam?->CRC FINDINGS: There is amputation of the distal aspect of the calcaneus. Cortical irregularity involving inferior margin of the calcaneus. There is overlying skin ulceration. No definite subcutaneous gas. Osteophytosis at level of anterior talus. No acute fracture. Vascular calcifications present. 1. Amputation of the distal calcaneus. 2. Cortical irregularity likely related to erosive changes involving inferior aspect of the calcaneus. Findings could suggest osteomyelitis. 3. Skin ulceration involving plantar soft tissue beneath the calcaneus. 4. No obvious subcutaneous gas. XR CHEST PORTABLE    Result Date: 7/22/2021  EXAMINATION: ONE XRAY VIEW OF THE CHEST 7/22/2021 9:29 am COMPARISON: 02/08/2020 HISTORY: ORDERING SYSTEM PROVIDED HISTORY: pre-op TECHNOLOGIST PROVIDED HISTORY: Reason for exam:->pre-op What reading provider will be dictating this exam?->CRC FINDINGS: Heart size is normal.  There are no infiltrates or effusions.      Normal chest     VL LOWER EXTREMITY ARTERIAL SEGMENTAL PRESSURES W PPG BILATERAL    Result Date: 7/21/2021  Normal ankle arm index with good arterial flow to both feet including the toes based upon the pulse volume recordings      Resident's Assessment and Plan     Assessment and Plan:    1. Osteomyelitis of right heel status post debridement secondary to peripheral artery disease versus microtrauma  a. Patient on vancomycin(day eleven) and meropenem(day ten)  b. Patient states pain is improved  c. Continue gabapentin  d. Norco every 8 as needed  e. Methadone once daily 120 mg  f. Per podiatry: Stable for discharge recommend SNF  g. ID to follow-up in 2 to 3 weeks --vancomycin IV every 12 hours, meropenem IV every 8 hours for the next 5 weeks  h. Currently awaiting precert to [facility]  2. COPD  a. SPO2 92% on RA  b. Respiratory wheezing all lung fields  c. Visualization with Oralia Mcclure Pulmicort  3. Hypertension  a. Patient currently on amlodipine, clonidine, and propanolol  b. Continue to monitor vitals  c. Patient amlodipine was on hold because of low BP  d. 121/58  4. Polysubstance abuse  a. Patient transitioned to methadone 20 mg daily  b. Fentanyl discontinued  5. Chronic pain secondary to her right hip fracture and calcaneal ulcer  a. Patient on methadone once daily 120 mg  b. Continue gabapentin  c. Norco every 8 as needed  6. Left hip fracture  a. She is currently awaiting clearance from surgery for replacement. 7. Essential tremor  a. Continue propranolol      PT/OT evaluation: on board  DVT prophylaxis/ GI prophylaxis: lovenox  Disposition: continue current care    Jermain Dobson DO, PhD  PGY-1  Attending physician: Dr. Priscila Meek    Attending Physician Statement:  Silvia Gtz M.D., F.A.C.P.     I have discussed the case, including pertinent history and exam findings with the resident/NP.  I have seen and examined the patient and the key elements of the encounter have been performed by me.  I agree with the resident ROS, PMHx, PSHx, meds reviewed and assessment, plan and orders as documented by the resident/NP    [facility] charts reviewed, including other providers notes, relevant labs and imaging.   Osteomyelitis 2/2 walking on traumatized calceneus- sp partial calcectomy  No PAD - ? neuropathy   b. Acute on Chronic, nonhealing on R calcaneous -- was draining in the ED  c. 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)  Copd stable=-- wean off o2  periprocedural desaturation, take off o2, reassess, doubt penumonia        After infection and heel wound stable then considerateions for   Sp Hip fracture then post traumatic OA hip  Now considerations for JEREMIAH vs hemiarthroplasty     ?osteoporosis     chrronically no withdrawal issues  Anxiety and essential tremores also better  ,medically  stable for DC - awating home vs placement     But ongoing recurrent anxiety and +multiple concerns     Beta streptococcus group c (1)                   Antibiotic Interpretation RICARDO Status     ampicillin Sensitive <=^0.25 mcg/mL       benzylpenicillin Sensitive <=^0.06 mcg/mL       cefotaxime Sensitive <=^0.12 mcg/mL       cefTRIAXone Sensitive ^0. 25 mcg/mL       clindamycin Resistant >=^1 mcg/mL       erythromycin Resistant >=^8 mcg/mL       vancomycin Sensitive              vacno should cover   IV home abx- vanco  +meropenemem Q8 hours?  This might complicate home health ABx  Discussion therefore for [facility]-- but they wouldn't take with methadone  Discussed bridge to mahi whitney MS conTin bid and short actog morphine     Then later discussions SNF/torsten      Now transited to methadone 120 daily (no longer BID)  OK for transportation for ONCE daily methadone -- will attempt this transition  [facility] should be able to give methadone?   Uncertain as to delay  +MS contin prn  Scheduled and prn for transition and s/p debridements-- high risk meds              >50% of time spent coordinating care with other providers and/or counseling patient/family  Remainder of medical problems as per resident note.

## 2021-08-06 LAB
Lab: NORMAL
REPORT: NORMAL
THIS TEST SENT TO: NORMAL

## 2021-08-23 ENCOUNTER — TELEPHONE (OUTPATIENT)
Dept: WOUND CARE | Age: 56
End: 2021-08-23

## 2021-08-23 NOTE — TELEPHONE ENCOUNTER
Spoke with John Arnett at [facility] who states they are treating the wound and do not need our services at this time.

## 2021-08-30 LAB
FUNGUS (MYCOLOGY) CULTURE: NORMAL
FUNGUS STAIN: NORMAL

## 2021-09-14 LAB
AFB CULTURE (MYCOBACTERIA): NORMAL
AFB SMEAR: NORMAL

## 2022-06-24 ENCOUNTER — APPOINTMENT (OUTPATIENT)
Dept: GENERAL RADIOLOGY | Age: 57
End: 2022-06-24
Payer: OTHER MISCELLANEOUS

## 2022-06-24 ENCOUNTER — APPOINTMENT (OUTPATIENT)
Dept: CT IMAGING | Age: 57
End: 2022-06-24
Payer: OTHER MISCELLANEOUS

## 2022-06-24 ENCOUNTER — HOSPITAL ENCOUNTER (EMERGENCY)
Age: 57
Discharge: HOME | End: 2022-06-24
Attending: EMERGENCY MEDICINE
Payer: OTHER MISCELLANEOUS

## 2022-06-24 VITALS
TEMPERATURE: 97.9 F | BODY MASS INDEX: 28.14 KG/M2 | SYSTOLIC BLOOD PRESSURE: 130 MMHG | HEART RATE: 77 BPM | DIASTOLIC BLOOD PRESSURE: 85 MMHG | OXYGEN SATURATION: 98 % | RESPIRATION RATE: 18 BRPM | WEIGHT: 190 LBS | HEIGHT: 69 IN

## 2022-06-24 DIAGNOSIS — S09.90XA CLOSED HEAD INJURY, INITIAL ENCOUNTER: ICD-10-CM
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
## 2022-06-24 DIAGNOSIS — S70.12XA CONTUSION OF LEFT THIGH, INITIAL ENCOUNTER: ICD-10-CM

## 2022-06-24 PROCEDURE — 99284 EMERGENCY DEPT VISIT MOD MDM: CPT

## 2022-06-24 PROCEDURE — 71045 X-RAY EXAM CHEST 1 VIEW: CPT

## 2022-06-24 PROCEDURE — 73502 X-RAY EXAM HIP UNI 2-3 VIEWS: CPT

## 2022-06-24 PROCEDURE — 73562 X-RAY EXAM OF KNEE 3: CPT

## 2022-06-24 PROCEDURE — 70450 CT HEAD/BRAIN W/O DYE: CPT

## 2022-06-24 PROCEDURE — 73552 X-RAY EXAM OF FEMUR 2/>: CPT

## 2022-06-24 PROCEDURE — 72125 CT NECK SPINE W/O DYE: CPT

## 2022-06-24 PROCEDURE — 6370000000 HC RX 637 (ALT 250 FOR IP): Performed by: EMERGENCY MEDICINE

## 2022-06-24 RX ORDER — CYCLOBENZAPRINE HCL 10 MG
10 TABLET ORAL 3 TIMES DAILY PRN
Qty: 30 TABLET | Refills: 0 | Status: SHIPPED | OUTPATIENT
Start: 2022-06-24 | End: 2022-07-04

## 2022-06-24 RX ORDER — HYDROCODONE BITARTRATE AND ACETAMINOPHEN 5; 325 MG/1; MG/1
2 TABLET ORAL ONCE
Status: COMPLETED | OUTPATIENT
Start: 2022-06-24 | End: 2022-06-24

## 2022-06-24 RX ORDER — FENTANYL CITRATE 50 UG/ML
50 INJECTION, SOLUTION INTRAMUSCULAR; INTRAVENOUS ONCE
Status: DISCONTINUED | OUTPATIENT
Start: 2022-06-24 | End: 2022-06-24

## 2022-06-24 RX ORDER — ONDANSETRON 2 MG/ML
4 INJECTION INTRAMUSCULAR; INTRAVENOUS ONCE
Status: DISCONTINUED | OUTPATIENT
Start: 2022-06-24 | End: 2022-06-24

## 2022-06-24 RX ORDER — NAPROXEN 500 MG/1
500 TABLET ORAL 2 TIMES DAILY PRN
Qty: 60 TABLET | Refills: 0 | Status: SHIPPED | OUTPATIENT
Start: 2022-06-24

## 2022-06-24 RX ADMIN — HYDROCODONE BITARTRATE AND ACETAMINOPHEN 2 TABLET: 5; 325 TABLET ORAL at 16:40

## 2022-06-24 ASSESSMENT — PAIN SCALES - GENERAL
PAINLEVEL_OUTOF10: 7
PAINLEVEL_OUTOF10: 8

## 2022-06-24 ASSESSMENT — PAIN DESCRIPTION - ONSET: ONSET: SUDDEN

## 2022-06-24 ASSESSMENT — PAIN DESCRIPTION - DESCRIPTORS
DESCRIPTORS: ACHING
DESCRIPTORS: ACHING

## 2022-06-24 ASSESSMENT — PAIN - FUNCTIONAL ASSESSMENT
PAIN_FUNCTIONAL_ASSESSMENT: PREVENTS OR INTERFERES SOME ACTIVE ACTIVITIES AND ADLS
PAIN_FUNCTIONAL_ASSESSMENT: ACTIVITIES ARE NOT PREVENTED
PAIN_FUNCTIONAL_ASSESSMENT: 0-10

## 2022-06-24 ASSESSMENT — PAIN DESCRIPTION - FREQUENCY: FREQUENCY: CONTINUOUS

## 2022-06-24 ASSESSMENT — PAIN DESCRIPTION - LOCATION
LOCATION: GENERALIZED
LOCATION: GENERALIZED

## 2022-06-24 ASSESSMENT — PAIN DESCRIPTION - PAIN TYPE: TYPE: ACUTE PAIN

## 2022-06-24 NOTE — ED NOTES
Pt has no ride home. Called the Nursing Office to get a cab voucher. Gave pt a sandwich & ginger ale while she waits for her can voucher.       Christiane Ny, IRMA  06/24/22 [id number]

## 2022-06-24 NOTE — ED NOTES
Radiology Procedure Waiver   Name: Tiarra Saldivar  : 1965  MRN: [id number]    Date:  22    Time: 2:44 PM EDT    Benefits of immediately proceeding with Radiology exam(s) without pre-testing outweigh the risks or are not indicated as specified below and therefore the following is/are being waived:    [] Pregnancy test   [] Patients LMP on-time and regular.   [] Patient had Tubal Ligation or has other Contraception Device. [x] Patient  is Menopausal or Premenarcheal.    [] Patient had Full or Partial Hysterectomy. [] Protocol for Iodine allergy    [] MRI Questionnaire     [] BUN/Creatinine   [] Patient age w/no hx of renal dysfunction. [] Patient on Dialysis. [] Recent Normal Labs.   Electronically signed by Jutsin Galvez MD on 22 at 2:44 PM EDT               Justin Galvez MD  22 [id number]
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
## 2023-03-30 NOTE — PROGRESS NOTES
Department of Podiatry  Progress Note    SUBJECTIVE:  Ms. Maria Luisa Lund was evaluated at bedside this morning s/p Right foot I&D, debridement, biopsy and wound vac application (DOS: 2/21/30). No acute events overnight. She denies N/V/F/C/SOB    OBJECTIVE:    Scheduled Meds:   vancomycin  750 mg Intravenous Q12H    amLODIPine  5 mg Oral Daily    hydrOXYzine  50 mg Intramuscular Daily    enoxaparin  40 mg Subcutaneous Daily    lidocaine PF  5 mL Intradermal Once    sodium chloride flush  5-40 mL Intravenous 2 times per day    heparin flush  3 mL Intravenous 2 times per day    lidocaine PF  5 mL Intradermal Once    sodium chloride flush  5-40 mL Intravenous 2 times per day    heparin flush  3 mL Intravenous 2 times per day    propranolol  10 mg Oral TID    cloNIDine  0.1 mg Oral TID    ipratropium-albuterol  1 ampule Inhalation Q4H WA    methadone  55 mg Oral Nightly    methadone  65 mg Oral QAM AC    gabapentin  300 mg Oral BID    sodium chloride flush  10 mL Intravenous 2 times per day    Arformoterol Tartrate  15 mcg Nebulization BID    budesonide  0.5 mg Nebulization BID    ascorbic acid  500 mg Oral Daily     Continuous Infusions:   sodium chloride      sodium chloride      dextrose      sodium chloride       PRN Meds:. HYDROcodone 5 mg - acetaminophen, fentanNYL, sodium chloride flush, sodium chloride, heparin flush, sodium chloride flush, sodium chloride, heparin flush, glucose, dextrose, glucagon (rDNA), dextrose, sodium chloride flush, sodium chloride, ondansetron **OR** ondansetron, polyethylene glycol, acetaminophen **OR** acetaminophen    Allergies   Allergen Reactions    Ancef [Cefazolin] Anaphylaxis    Duricef [Cefadroxil] Anaphylaxis    Iodine      Patient does not remember reaction       /72   Pulse 64   Temp 98.2 °F (36.8 °C) (Temporal)   Resp 16   Ht 5' 9\" (1.753 m)   Wt 190 lb (86.2 kg)   LMP 08/28/2013   SpO2 91%   BMI 28.06 kg/m²       EXAM:  -Dressings this Pt stated that she usually gets a yeast infection when on antibiotics.  She is requesting a prescription for a yeast infection afternoon were clean, dry and intact without strike through. No dishevelment. Wound vac running at 125mmHg with good seal. About 200ml of debris in wound vac cannister   -No posterior calf pain on palpation b/l.   -Patient able to move digits without pain . PHYSICAL EXAM AS OF 7/21/21:  Vascular Exam:  DP and PT pulses are palpable B/L. Skin temperature warm to cool to BLE from proximal to distal. Mild edema and erythema to R foot in comparison to contralateral.      Neuro Exam:  Gross and epicritic sensation intact B/L.      Dermatologic Exam:  Full thickness wound present to R plantar heel to level of subcutaneous tissue, approx 2.5cm in length. Mixed fibrogranular wound bed with mild serous drainage. No malodor appreciated. No fluctuance or crepitus. Wound does not track, tunnel, or probe deep; small amount of bleeding with probing of wound. Hyperkeratotic rim. No other wounds appreciated.      MSK: Evidence of previous partial calcanectomy to RLE. Mild pain to palpation of RLE wound. Muscle strength WNL B/L. Digital contractures to lesser digits B/L.        Scheduled Meds:   vancomycin  750 mg Intravenous Q12H    amLODIPine  5 mg Oral Daily    hydrOXYzine  50 mg Intramuscular Daily    enoxaparin  40 mg Subcutaneous Daily    lidocaine PF  5 mL Intradermal Once    sodium chloride flush  5-40 mL Intravenous 2 times per day    heparin flush  3 mL Intravenous 2 times per day    lidocaine PF  5 mL Intradermal Once    sodium chloride flush  5-40 mL Intravenous 2 times per day    heparin flush  3 mL Intravenous 2 times per day    propranolol  10 mg Oral TID    cloNIDine  0.1 mg Oral TID    ipratropium-albuterol  1 ampule Inhalation Q4H WA    methadone  55 mg Oral Nightly    methadone  65 mg Oral QAM AC    gabapentin  300 mg Oral BID    sodium chloride flush  10 mL Intravenous 2 times per day    Arformoterol Tartrate  15 mcg Nebulization BID    budesonide  0.5 mg Nebulization BID    ascorbic acid 500 mg Oral Daily     Continuous Infusions:   sodium chloride      sodium chloride      dextrose      sodium chloride       PRN Meds:. HYDROcodone 5 mg - acetaminophen, fentanNYL, sodium chloride flush, sodium chloride, heparin flush, sodium chloride flush, sodium chloride, heparin flush, glucose, dextrose, glucagon (rDNA), dextrose, sodium chloride flush, sodium chloride, ondansetron **OR** ondansetron, polyethylene glycol, acetaminophen **OR** acetaminophen    RADIOLOGY:  XR CHEST PORTABLE   Final Result   Normal chest         VL LOWER EXTREMITY ARTERIAL SEGMENTAL PRESSURES W PPG BILATERAL   Final Result      XR FOOT RIGHT (MIN 3 VIEWS)   Final Result   1. Amputation of the distal calcaneus. 2. Cortical irregularity likely related to erosive changes involving inferior   aspect of the calcaneus. Findings could suggest osteomyelitis. 3. Skin ulceration involving plantar soft tissue beneath the calcaneus. 4. No obvious subcutaneous gas. /72   Pulse 64   Temp 98.2 °F (36.8 °C) (Temporal)   Resp 16   Ht 5' 9\" (1.753 m)   Wt 190 lb (86.2 kg)   LMP 08/28/2013   SpO2 91%   BMI 28.06 kg/m²     LABS:    Recent Labs     07/24/21  0400 07/25/21  0640   WBC 5.7 5.1   HGB 11.7 12.4   HCT 39.2 41.4    263        Recent Labs     07/25/21  0640      K 4.7   CL 99   CO2 37*   BUN 12   CREATININE 0.9        No results for input(s): PROT, INR, APTT in the last 72 hours. ASSESSMENT:  1.RLE Chronic ulcer-POA,Infected   2. RLE Calc OM   3. RLE partial calcanectomy  4. [address] [place] [address]   5. S/P I&D, Debridement, biopsies (DOS: 7/23/21)        PLAN:  - All labs, images and charts reviewed and evaluated. - Dressing today was clean, dry and intact without strike-through. Wound vac has good seal running at 125mmHg. About 200ml of debris in canister   - NWB RLE.  - Prevalon boots previously ordered  - Wound vac changes: MWF  - XR reviewed: 1. Possible OM.     2.No soft tissue gas  - Antibiotics as per ID:Vanc  - Vascular: 1. Good flow b/l   - Surgical culture: Beta Strep, GNR, Staph   - Surgical path: Pending   - Will continue to follow while in house.    - Discussed with Bird Patel   7/25/21  [phone]

## 2023-08-16 NOTE — TELEPHONE ENCOUNTER
Mike Moncada with [address] phoned, states pt contacted them requesting wound care supplies, they will need an ok to fill these, can we call [phone], if these order is okd.   Thanks
Patient was discharged due to non compliance and cancellations.
Instructions: This plan will send the code FBSE to the PM system.  DO NOT or CHANGE the price.
Price (Do Not Change): 0.00
Detail Level: Simple

## 2024-04-17 ENCOUNTER — HOSPITAL ENCOUNTER (INPATIENT)
Age: 59
LOS: 17 days | Discharge: HOME | DRG: 871 | End: 2024-05-04
Attending: EMERGENCY MEDICINE | Admitting: INTERNAL MEDICINE
Payer: OTHER GOVERNMENT

## 2024-04-17 ENCOUNTER — APPOINTMENT (OUTPATIENT)
Dept: CT IMAGING | Age: 59
DRG: 871 | End: 2024-04-17
Payer: OTHER GOVERNMENT

## 2024-04-17 ENCOUNTER — APPOINTMENT (OUTPATIENT)
Dept: GENERAL RADIOLOGY | Age: 59
DRG: 871 | End: 2024-04-17
Payer: OTHER GOVERNMENT

## 2024-04-17 ENCOUNTER — APPOINTMENT (OUTPATIENT)
Dept: ULTRASOUND IMAGING | Age: 59
DRG: 871 | End: 2024-04-17
Payer: OTHER GOVERNMENT

## 2024-04-17 DIAGNOSIS — I21.4 NSTEMI (NON-ST ELEVATED MYOCARDIAL INFARCTION) (HCC): ICD-10-CM

## 2024-04-17 DIAGNOSIS — M87.052 AVASCULAR NECROSIS OF BONE OF LEFT HIP (HCC): ICD-10-CM

## 2024-04-17 DIAGNOSIS — J96.02 ACUTE RESPIRATORY FAILURE WITH HYPOXIA AND HYPERCAPNIA (HCC): Primary | ICD-10-CM

## 2024-04-17 DIAGNOSIS — S70.11XD HEMATOMA OF RIGHT THIGH, SUBSEQUENT ENCOUNTER: ICD-10-CM

## 2024-04-17 DIAGNOSIS — J96.01 ACUTE RESPIRATORY FAILURE WITH HYPOXIA AND HYPERCAPNIA (HCC): Primary | ICD-10-CM

## 2024-04-17 DIAGNOSIS — J96.01 ACUTE RESPIRATORY FAILURE WITH HYPOXIA (HCC): ICD-10-CM

## 2024-04-17 DIAGNOSIS — Z87.81 HISTORY OF FRACTURE OF LEFT HIP: ICD-10-CM

## 2024-04-17 DIAGNOSIS — N17.9 AKI (ACUTE KIDNEY INJURY) (HCC): ICD-10-CM

## 2024-04-17 DIAGNOSIS — R79.89 ELEVATED LFTS: ICD-10-CM

## 2024-04-17 LAB
AADO2: 434.8 MMHG
ALBUMIN SERPL-MCNC: 3.1 G/DL (ref 3.5–5.2)
ALP SERPL-CCNC: 117 U/L (ref 35–104)
ALT SERPL-CCNC: 94 U/L (ref 0–32)
ANION GAP SERPL CALCULATED.3IONS-SCNC: 14 MMOL/L (ref 7–16)
APAP SERPL-MCNC: <5 UG/ML (ref 10–30)
AST SERPL-CCNC: 368 U/L (ref 0–31)
B PARAP IS1001 DNA NPH QL NAA+NON-PROBE: NOT DETECTED
B PERT DNA SPEC QL NAA+PROBE: NOT DETECTED
B.E.: -0.3 MMOL/L (ref -3–3)
B.E.: -1.2 MMOL/L (ref -3–3)
B.E.: -3.6 MMOL/L (ref -3–3)
BASOPHILS # BLD: 0.02 K/UL (ref 0–0.2)
BASOPHILS NFR BLD: 0 % (ref 0–2)
BILIRUB SERPL-MCNC: 0.3 MG/DL (ref 0–1.2)
BNP SERPL-MCNC: ABNORMAL PG/ML (ref 0–125)
BUN SERPL-MCNC: 21 MG/DL (ref 6–20)
C PNEUM DNA NPH QL NAA+NON-PROBE: NOT DETECTED
CALCIUM SERPL-MCNC: 8.2 MG/DL (ref 8.6–10.2)
CHLORIDE SERPL-SCNC: 92 MMOL/L (ref 98–107)
CK SERPL-CCNC: ABNORMAL U/L (ref 20–180)
CO2 SERPL-SCNC: 26 MMOL/L (ref 22–29)
COHB: 0.4 % (ref 0–1.5)
COHB: 0.5 % (ref 0–1.5)
COHB: 0.7 % (ref 0–1.5)
CREAT SERPL-MCNC: 2.2 MG/DL (ref 0.5–1)
CRITICAL: ABNORMAL
DATE ANALYZED: ABNORMAL
DATE OF COLLECTION: ABNORMAL
EKG ATRIAL RATE: 100 BPM
EKG P AXIS: 80 DEGREES
EKG P-R INTERVAL: 124 MS
EKG Q-T INTERVAL: 404 MS
EKG QRS DURATION: 88 MS
EKG QTC CALCULATION (BAZETT): 521 MS
EKG R AXIS: -10 DEGREES
EKG T AXIS: -5 DEGREES
EKG VENTRICULAR RATE: 100 BPM
EOSINOPHIL # BLD: 0 K/UL (ref 0.05–0.5)
EOSINOPHILS RELATIVE PERCENT: 0 % (ref 0–6)
ERYTHROCYTE [DISTWIDTH] IN BLOOD BY AUTOMATED COUNT: 13.6 % (ref 11.5–15)
ERYTHROCYTE [DISTWIDTH] IN BLOOD BY AUTOMATED COUNT: 13.8 % (ref 11.5–15)
ETHANOLAMINE SERPL-MCNC: <10 MG/DL
FIO2: 100
FIO2: 100
FIO2: 90 %
FLOW RATE: 6
FLUAV RNA NPH QL NAA+NON-PROBE: NOT DETECTED
FLUBV RNA NPH QL NAA+NON-PROBE: NOT DETECTED
GFR SERPL CREATININE-BSD FRML MDRD: 26 ML/MIN/1.73M2
GLUCOSE SERPL-MCNC: 148 MG/DL (ref 74–99)
HADV DNA NPH QL NAA+NON-PROBE: NOT DETECTED
HCO3: 23.4 MMOL/L (ref 22–26)
HCO3: 26.7 MMOL/L (ref 22–26)
HCO3: 26.7 MMOL/L (ref 22–26)
HCOV 229E RNA NPH QL NAA+NON-PROBE: NOT DETECTED
HCOV HKU1 RNA NPH QL NAA+NON-PROBE: NOT DETECTED
HCOV NL63 RNA NPH QL NAA+NON-PROBE: NOT DETECTED
HCOV OC43 RNA NPH QL NAA+NON-PROBE: NOT DETECTED
HCT VFR BLD AUTO: 50.5 % (ref 34–48)
HCT VFR BLD AUTO: 52.3 % (ref 34–48)
HCT VFR BLD AUTO: 53 % (ref 37–54)
HCT VFR BLD AUTO: 54 % (ref 37–54)
HCT VFR BLD AUTO: 55 % (ref 37–54)
HGB BLD-MCNC: 16 G/DL (ref 11.5–15.5)
HGB BLD-MCNC: 16.7 G/DL (ref 11.5–15.5)
HHB: 1.7 % (ref 0–5)
HHB: 14.8 % (ref 0–5)
HHB: 2.3 % (ref 0–5)
HMPV RNA NPH QL NAA+NON-PROBE: NOT DETECTED
HPIV1 RNA NPH QL NAA+NON-PROBE: NOT DETECTED
HPIV2 RNA NPH QL NAA+NON-PROBE: NOT DETECTED
HPIV3 RNA NPH QL NAA+NON-PROBE: NOT DETECTED
HPIV4 RNA NPH QL NAA+NON-PROBE: NOT DETECTED
IMM GRANULOCYTES # BLD AUTO: 0.08 K/UL (ref 0–0.58)
IMM GRANULOCYTES NFR BLD: 1 % (ref 0–5)
INR PPP: 1.2
INSPIRATORY PRESSURE: 12
INSPIRATORY PRESSURE: 12
LAB: ABNORMAL
LACTATE BLDV-SCNC: 1.8 MMOL/L (ref 0.5–1.9)
LYMPHOCYTES NFR BLD: 1 K/UL (ref 1.5–4)
LYMPHOCYTES RELATIVE PERCENT: 6 % (ref 20–42)
Lab: 1750
Lab: 1958
Lab: 2210
M PNEUMO DNA NPH QL NAA+NON-PROBE: NOT DETECTED
MCH RBC QN AUTO: 28.3 PG (ref 26–35)
MCH RBC QN AUTO: 28.4 PG (ref 26–35)
MCHC RBC AUTO-ENTMCNC: 31.7 G/DL (ref 32–34.5)
MCHC RBC AUTO-ENTMCNC: 31.9 G/DL (ref 32–34.5)
MCV RBC AUTO: 88.5 FL (ref 80–99.9)
MCV RBC AUTO: 89.5 FL (ref 80–99.9)
METHB: 0.3 % (ref 0–1.5)
METHB: 0.4 % (ref 0–1.5)
METHB: 0.4 % (ref 0–1.5)
MODE: ABNORMAL
MONOCYTES NFR BLD: 1.42 K/UL (ref 0.1–0.95)
MONOCYTES NFR BLD: 8 % (ref 2–12)
NEGATIVE BASE EXCESS, ART: 0.1 MMOL/L
NEUTROPHILS NFR BLD: 85 % (ref 43–80)
NEUTS SEG NFR BLD: 14.77 K/UL (ref 1.8–7.3)
O2 DELIVERY DEVICE: ABNORMAL
O2 SATURATION: 85.1 % (ref 92–98.5)
O2 SATURATION: 97.7 % (ref 92–98.5)
O2 SATURATION: 98.3 % (ref 92–98.5)
O2HB: 84.4 % (ref 94–97)
O2HB: 96.6 % (ref 94–97)
O2HB: 97.5 % (ref 94–97)
OPERATOR ID: 2067
OPERATOR ID: 5100
OPERATOR ID: 5100
PARTIAL THROMBOPLASTIN TIME: 145 SEC (ref 24.5–35.1)
PARTIAL THROMBOPLASTIN TIME: 43.3 SEC (ref 24.5–35.1)
PATIENT TEMP: 37 C
PCO2: 49.1 MMHG (ref 35–45)
PCO2: 53.2 MMHG (ref 35–45)
PCO2: 57.3 MMHG (ref 35–45)
PEEP: 6
PEEP: 6
PFO2: 1.44 MMHG/%
PH BLOOD GAS: 7.29 (ref 7.35–7.45)
PH BLOOD GAS: 7.3 (ref 7.35–7.45)
PH BLOOD GAS: 7.32 (ref 7.35–7.45)
PLATELET # BLD AUTO: 333 K/UL (ref 130–450)
PLATELET # BLD AUTO: 363 K/UL (ref 130–450)
PMV BLD AUTO: 10 FL (ref 7–12)
PMV BLD AUTO: 9.8 FL (ref 7–12)
PO2: 108.6 MMHG (ref 75–100)
PO2: 129.9 MMHG (ref 75–100)
PO2: 54.7 MMHG (ref 75–100)
POC HCO3: 27.1 MMOL/L (ref 22–26)
POC HCO3: 27.9 MMOL/L (ref 22–26)
POC HCO3: 28.9 MMOL/L (ref 22–26)
POC HEMOGLOBIN (CALC): 17.9 G/DL (ref 12.5–15.5)
POC HEMOGLOBIN (CALC): 18.4 G/DL (ref 12.5–15.5)
POC HEMOGLOBIN (CALC): 18.7 G/DL (ref 12.5–15.5)
POC O2 SATURATION: 84 % (ref 92–98.5)
POC O2 SATURATION: 90 % (ref 92–98.5)
POC O2 SATURATION: 92.1 % (ref 92–98.5)
POC PCO2: 50.6 MM HG (ref 35–45)
POC PCO2: 56 MM HG (ref 35–45)
POC PCO2: 59.9 MM HG (ref 35–45)
POC PH: 7.29 (ref 7.35–7.45)
POC PH: 7.3 (ref 7.35–7.45)
POC PH: 7.34 (ref 7.35–7.45)
POC PO2: 55.6 MM HG (ref 80–100)
POC PO2: 63.4 MM HG (ref 80–100)
POC PO2: 71.9 MM HG (ref 80–100)
POSITIVE BASE EXCESS, ART: 0.1 MMOL/L (ref 0–3)
POSITIVE BASE EXCESS, ART: 0.3 MMOL/L (ref 0–3)
POTASSIUM SERPL-SCNC: 4.4 MMOL/L (ref 3.5–5)
PROT SERPL-MCNC: 7.4 G/DL (ref 6.4–8.3)
PROTHROMBIN TIME: 13.5 SEC (ref 9.3–12.4)
RBC # BLD AUTO: 5.64 M/UL (ref 3.5–5.5)
RBC # BLD AUTO: 5.91 M/UL (ref 3.5–5.5)
RI(T): 3.35
RSV RNA NPH QL NAA+NON-PROBE: NOT DETECTED
RV+EV RNA NPH QL NAA+NON-PROBE: NOT DETECTED
SALICYLATES SERPL-MCNC: <0.3 MG/DL (ref 0–30)
SARS-COV-2 RNA NPH QL NAA+NON-PROBE: NOT DETECTED
SODIUM SERPL-SCNC: 132 MMOL/L (ref 132–146)
SOURCE, BLOOD GAS: ABNORMAL
SPECIMEN DESCRIPTION: NORMAL
THB: 15 G/DL (ref 11.5–16.5)
THB: 15.9 G/DL (ref 11.5–16.5)
THB: 16.2 G/DL (ref 11.5–16.5)
TIME ANALYZED: 1755
TIME ANALYZED: 2007
TIME ANALYZED: 2229
TOXIC TRICYCLIC SC,BLOOD: NEGATIVE
TROPONIN I SERPL HS-MCNC: 208 NG/L (ref 0–9)
TROPONIN I SERPL HS-MCNC: 228 NG/L (ref 0–9)
WBC OTHER # BLD: 16.3 K/UL (ref 4.5–11.5)
WBC OTHER # BLD: 17.3 K/UL (ref 4.5–11.5)

## 2024-04-17 PROCEDURE — 6370000000 HC RX 637 (ALT 250 FOR IP): Performed by: EMERGENCY MEDICINE

## 2024-04-17 PROCEDURE — 96366 THER/PROPH/DIAG IV INF ADDON: CPT

## 2024-04-17 PROCEDURE — 2580000003 HC RX 258

## 2024-04-17 PROCEDURE — 99291 CRITICAL CARE FIRST HOUR: CPT

## 2024-04-17 PROCEDURE — 93005 ELECTROCARDIOGRAM TRACING: CPT

## 2024-04-17 PROCEDURE — 80307 DRUG TEST PRSMV CHEM ANLYZR: CPT

## 2024-04-17 PROCEDURE — 84484 ASSAY OF TROPONIN QUANT: CPT

## 2024-04-17 PROCEDURE — 6360000002 HC RX W HCPCS: Performed by: EMERGENCY MEDICINE

## 2024-04-17 PROCEDURE — 82805 BLOOD GASES W/O2 SATURATION: CPT

## 2024-04-17 PROCEDURE — 6360000004 HC RX CONTRAST MEDICATION: Performed by: RADIOLOGY

## 2024-04-17 PROCEDURE — 96374 THER/PROPH/DIAG INJ IV PUSH: CPT

## 2024-04-17 PROCEDURE — 85025 COMPLETE CBC W/AUTO DIFF WBC: CPT

## 2024-04-17 PROCEDURE — 71275 CT ANGIOGRAPHY CHEST: CPT

## 2024-04-17 PROCEDURE — 73620 X-RAY EXAM OF FOOT: CPT

## 2024-04-17 PROCEDURE — 94640 AIRWAY INHALATION TREATMENT: CPT

## 2024-04-17 PROCEDURE — 71045 X-RAY EXAM CHEST 1 VIEW: CPT

## 2024-04-17 PROCEDURE — 2500000003 HC RX 250 WO HCPCS

## 2024-04-17 PROCEDURE — 6360000002 HC RX W HCPCS

## 2024-04-17 PROCEDURE — 85610 PROTHROMBIN TIME: CPT

## 2024-04-17 PROCEDURE — 80074 ACUTE HEPATITIS PANEL: CPT

## 2024-04-17 PROCEDURE — 80053 COMPREHEN METABOLIC PANEL: CPT

## 2024-04-17 PROCEDURE — 93970 EXTREMITY STUDY: CPT

## 2024-04-17 PROCEDURE — 85014 HEMATOCRIT: CPT

## 2024-04-17 PROCEDURE — 72128 CT CHEST SPINE W/O DYE: CPT

## 2024-04-17 PROCEDURE — 85027 COMPLETE CBC AUTOMATED: CPT

## 2024-04-17 PROCEDURE — 02HV33Z INSERTION OF INFUSION DEVICE INTO SUPERIOR VENA CAVA, PERCUTANEOUS APPROACH: ICD-10-PCS | Performed by: INTERNAL MEDICINE

## 2024-04-17 PROCEDURE — 6370000000 HC RX 637 (ALT 250 FOR IP)

## 2024-04-17 PROCEDURE — 83605 ASSAY OF LACTIC ACID: CPT

## 2024-04-17 PROCEDURE — 80179 DRUG ASSAY SALICYLATE: CPT

## 2024-04-17 PROCEDURE — 2700000000 HC OXYGEN THERAPY PER DAY

## 2024-04-17 PROCEDURE — 93010 ELECTROCARDIOGRAM REPORT: CPT | Performed by: INTERNAL MEDICINE

## 2024-04-17 PROCEDURE — 82550 ASSAY OF CK (CPK): CPT

## 2024-04-17 PROCEDURE — 72131 CT LUMBAR SPINE W/O DYE: CPT

## 2024-04-17 PROCEDURE — 94660 CPAP INITIATION&MGMT: CPT

## 2024-04-17 PROCEDURE — 80143 DRUG ASSAY ACETAMINOPHEN: CPT

## 2024-04-17 PROCEDURE — 82803 BLOOD GASES ANY COMBINATION: CPT

## 2024-04-17 PROCEDURE — 2000000000 HC ICU R&B

## 2024-04-17 PROCEDURE — 87040 BLOOD CULTURE FOR BACTERIA: CPT

## 2024-04-17 PROCEDURE — G0480 DRUG TEST DEF 1-7 CLASSES: HCPCS

## 2024-04-17 PROCEDURE — 96372 THER/PROPH/DIAG INJ SC/IM: CPT

## 2024-04-17 PROCEDURE — 85730 THROMBOPLASTIN TIME PARTIAL: CPT

## 2024-04-17 PROCEDURE — 87081 CULTURE SCREEN ONLY: CPT

## 2024-04-17 PROCEDURE — 87389 HIV-1 AG W/HIV-1&-2 AB AG IA: CPT

## 2024-04-17 PROCEDURE — 83880 ASSAY OF NATRIURETIC PEPTIDE: CPT

## 2024-04-17 PROCEDURE — 0202U NFCT DS 22 TRGT SARS-COV-2: CPT

## 2024-04-17 PROCEDURE — 96365 THER/PROPH/DIAG IV INF INIT: CPT

## 2024-04-17 PROCEDURE — 5A09457 ASSISTANCE WITH RESPIRATORY VENTILATION, 24-96 CONSECUTIVE HOURS, CONTINUOUS POSITIVE AIRWAY PRESSURE: ICD-10-PCS | Performed by: INTERNAL MEDICINE

## 2024-04-17 RX ORDER — HEPARIN SODIUM 1000 [USP'U]/ML
80 INJECTION, SOLUTION INTRAVENOUS; SUBCUTANEOUS PRN
Status: DISCONTINUED | OUTPATIENT
Start: 2024-04-17 | End: 2024-04-19

## 2024-04-17 RX ORDER — HEPARIN SODIUM 1000 [USP'U]/ML
80 INJECTION, SOLUTION INTRAVENOUS; SUBCUTANEOUS ONCE
Status: COMPLETED | OUTPATIENT
Start: 2024-04-17 | End: 2024-04-17

## 2024-04-17 RX ORDER — ASPIRIN 325 MG
325 TABLET ORAL ONCE
Status: COMPLETED | OUTPATIENT
Start: 2024-04-17 | End: 2024-04-17

## 2024-04-17 RX ORDER — FENTANYL CITRATE 50 UG/ML
50 INJECTION, SOLUTION INTRAMUSCULAR; INTRAVENOUS ONCE
Status: COMPLETED | OUTPATIENT
Start: 2024-04-17 | End: 2024-04-17

## 2024-04-17 RX ORDER — 0.9 % SODIUM CHLORIDE 0.9 %
500 INTRAVENOUS SOLUTION INTRAVENOUS ONCE
Status: COMPLETED | OUTPATIENT
Start: 2024-04-17 | End: 2024-04-17

## 2024-04-17 RX ORDER — HEPARIN SODIUM 10000 [USP'U]/100ML
5-30 INJECTION, SOLUTION INTRAVENOUS CONTINUOUS
Status: DISCONTINUED | OUTPATIENT
Start: 2024-04-17 | End: 2024-04-18

## 2024-04-17 RX ORDER — IPRATROPIUM BROMIDE AND ALBUTEROL SULFATE 2.5; .5 MG/3ML; MG/3ML
1 SOLUTION RESPIRATORY (INHALATION) ONCE
Status: COMPLETED | OUTPATIENT
Start: 2024-04-17 | End: 2024-04-17

## 2024-04-17 RX ORDER — DIPHENHYDRAMINE HCL 25 MG
50 TABLET ORAL ONCE
Status: COMPLETED | OUTPATIENT
Start: 2024-04-17 | End: 2024-04-17

## 2024-04-17 RX ORDER — 0.9 % SODIUM CHLORIDE 0.9 %
1000 INTRAVENOUS SOLUTION INTRAVENOUS ONCE
Status: DISCONTINUED | OUTPATIENT
Start: 2024-04-17 | End: 2024-04-17

## 2024-04-17 RX ORDER — IPRATROPIUM BROMIDE AND ALBUTEROL SULFATE 2.5; .5 MG/3ML; MG/3ML
1 SOLUTION RESPIRATORY (INHALATION)
Status: DISCONTINUED | OUTPATIENT
Start: 2024-04-18 | End: 2024-04-18

## 2024-04-17 RX ORDER — HEPARIN SODIUM 1000 [USP'U]/ML
40 INJECTION, SOLUTION INTRAVENOUS; SUBCUTANEOUS PRN
Status: DISCONTINUED | OUTPATIENT
Start: 2024-04-17 | End: 2024-04-19

## 2024-04-17 RX ADMIN — IOPAMIDOL 60 ML: 755 INJECTION, SOLUTION INTRAVENOUS at 14:55

## 2024-04-17 RX ADMIN — HEPARIN SODIUM 6710 UNITS: 1000 INJECTION INTRAVENOUS; SUBCUTANEOUS at 13:06

## 2024-04-17 RX ADMIN — HEPARIN SODIUM 15 UNITS/KG/HR: 10000 INJECTION, SOLUTION INTRAVENOUS at 21:04

## 2024-04-17 RX ADMIN — HEPARIN SODIUM 18 UNITS/KG/HR: 10000 INJECTION, SOLUTION INTRAVENOUS at 13:08

## 2024-04-17 RX ADMIN — FENTANYL CITRATE 50 MCG: 50 INJECTION INTRAMUSCULAR; INTRAVENOUS at 10:28

## 2024-04-17 RX ADMIN — DOXYCYCLINE 100 MG: 100 INJECTION, POWDER, LYOPHILIZED, FOR SOLUTION INTRAVENOUS at 16:59

## 2024-04-17 RX ADMIN — DIPHENHYDRAMINE HYDROCHLORIDE 50 MG: 25 TABLET ORAL at 08:54

## 2024-04-17 RX ADMIN — WATER 125 MG: 1 INJECTION INTRAMUSCULAR; INTRAVENOUS; SUBCUTANEOUS at 08:37

## 2024-04-17 RX ADMIN — MEROPENEM 1000 MG: 1 INJECTION, POWDER, FOR SOLUTION INTRAVENOUS at 16:24

## 2024-04-17 RX ADMIN — ASPIRIN 325 MG: 325 TABLET ORAL at 08:54

## 2024-04-17 RX ADMIN — IPRATROPIUM BROMIDE AND ALBUTEROL SULFATE 1 DOSE: .5; 2.5 SOLUTION RESPIRATORY (INHALATION) at 08:14

## 2024-04-17 RX ADMIN — SODIUM CHLORIDE 500 ML: 9 INJECTION, SOLUTION INTRAVENOUS at 13:09

## 2024-04-17 NOTE — PLAN OF CARE
Was notified patient family at bedside in ED.    Went down to the ED, met with patient's 2 children. I updated them on patient's medical condition.  All questions were addressed.    Patient currently cooperating with BiPAP, SpO2 at 96%.    Discussed with patient's nurse, repeat ABG to evaluate ongoing respiratory acidosis.    Electronically signed by Rl Cruz MD on 4/17/2024 at 8:00 PM
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
## 2024-04-17 NOTE — H&P
[facility]  Internal Medicine Residency Program  History and Physical    Patient:  Saba Sullivan 59 y.o. female   MRN: [id number]       Date of Service: 2024          Chief complaint: had concerns including Shortness of Breath (Hx COPD- started 4-5 hrs ago, 2 Duoneb and cpap by EMS right leg swelling, hx blood clots, no current thinners).    History of Present Illness   The patient is a 59 y.o. female with PMH COPD gold stage IV, polysubstance abuse, essential tremor  presented to the ED due to shortness of breath. History was taken from EMR as patient was on Bipap and unable to communicate.  Patient stated that she had shortness of breath for the past 4 to 5 hours.  She also mentioned that she has a history of blood clots but is not currently on blood thinners.  She also reports lower extremity swelling greater on the left.    In the ED /106, RR 20, .  Patient was put on a BiPAP but continue to repeatedly remove it and dropped her saturations to 73%.  Labs were significant for a CR 2.2 (bs 0.7), troponin 208, proBNP 22,376,, WBC 16.3.  ABG was significant for pH of 7.2, pCO2 49.1, pO2 54.7.  Due to patient's low threshold for intubation patient was transferred to the ICU.      Past Medical History:      Diagnosis Date    Abscess     states for a couple years she has had problems with     Chronic pain     Chronic recurrent multifocal osteomyelitis of foot (HCC) 2019    Hip fracture (HCC)     Hx of blood clots     dvt rt calf    Pressure injury of heel, stage 3 (HCC) 2019    Subclinical hypothyroidism 2019       Past Surgical History:        Procedure Laterality Date     SECTION      x three    DRAIN SKIN ABSCESS SIMPLE  2014         FOOT DEBRIDEMENT Right 2021    RIGHT FOOT DEBRIDEMENT INCISION AND DRAINAGE WITH BONE BIOPSY AND WOUND VAC APPLICATION performed by Luís Dominguez DPM at [facility]    FOOT SURGERY         Medications Prior to

## 2024-04-17 NOTE — PROGRESS NOTES
Date: 4/17/2024    Time: 1:03 PM    Patient Placed On BIPAP/CPAP/ Non-Invasive Ventilation?  Yes    If no must comment.  Facial area red/color change? No           If YES are Blister/Lesion present?No   If yes must notify nursing staff  BIPAP/CPAP skin barrier?  Yes   Skin barrier type:mepilexlite       Comments:      Xiao Cruz RCP

## 2024-04-17 NOTE — ED NOTES
Patient continually rips off BiPAP and sats to 73% on room air. This RN repeatedly is educating patient that she needs to keep BiPAP on or else she may become intubated. Patient now resting with eyes closed with BiPAP on sating at 92%. Dr. Rees notified.

## 2024-04-17 NOTE — ED NOTES
Radiology Procedure Waiver   Name: Saba Sullivan  : 1965  MRN: [id number]    Date:  24    Time: 8:50 AM EDT    Benefits of immediately proceeding with Radiology exam(s) without pre-testing outweigh the risks or are not indicated as specified below and therefore the following is/are being waived:    [] Pregnancy test   [] Patients LMP on-time and regular.   [] Patient had Tubal Ligation or has other Contraception Device.   [] Patient  is Menopausal or Premenarcheal.    [] Patient had Full or Partial Hysterectomy.    [] Protocol for Iodine allergy    [] MRI Questionnaire     [x] BUN/Creatinine   [] Patient age w/no hx of renal dysfunction.   [] Patient on Dialysis.   [] Recent Normal Labs.  Electronically signed by Bret Roberts DO on 24 at 8:50 AM EDT        Benefit outweighs risk     Bret Roberts DO  24 [id number]

## 2024-04-17 NOTE — PROGRESS NOTES
Patient ripped apart/off the bipap mask and did not want me to place her back on it. Notified Dr. Roberts of the situation and he is okay with trying the patient off of the Bipap and on a nasal canula at this time. Notified RN who is caring for the patient. Patient is resting comfortably and in no distress at this time.

## 2024-04-18 ENCOUNTER — APPOINTMENT (OUTPATIENT)
Dept: GENERAL RADIOLOGY | Age: 59
DRG: 871 | End: 2024-04-18
Payer: OTHER GOVERNMENT

## 2024-04-18 PROBLEM — I21.4 NSTEMI (NON-ST ELEVATED MYOCARDIAL INFARCTION) (HCC): Status: ACTIVE | Noted: 2024-04-18

## 2024-04-18 LAB
AADO2: 444.2 MMHG
AADO2: 460.1 MMHG
ALBUMIN SERPL-MCNC: 2.7 G/DL (ref 3.5–5.2)
ALP SERPL-CCNC: 86 U/L (ref 35–104)
ALT SERPL-CCNC: 91 U/L (ref 0–32)
AMMONIA PLAS-SCNC: 25 UMOL/L (ref 11–51)
AMORPH SED URNS QL MICRO: PRESENT
AMPHET UR QL SCN: POSITIVE
ANION GAP SERPL CALCULATED.3IONS-SCNC: 11 MMOL/L (ref 7–16)
AST SERPL-CCNC: 256 U/L (ref 0–31)
B.E.: -0.9 MMOL/L (ref -3–3)
B.E.: -2.3 MMOL/L (ref -3–3)
BACTERIA URNS QL MICRO: ABNORMAL
BARBITURATES UR QL SCN: NEGATIVE
BASOPHILS # BLD: 0 K/UL (ref 0–0.2)
BASOPHILS NFR BLD: 0 % (ref 0–2)
BENZODIAZ UR QL: POSITIVE
BILIRUB SERPL-MCNC: 0.3 MG/DL (ref 0–1.2)
BILIRUB UR QL STRIP: ABNORMAL
BNP SERPL-MCNC: ABNORMAL PG/ML (ref 0–125)
BUN SERPL-MCNC: 35 MG/DL (ref 6–20)
BUPRENORPHINE UR QL: NEGATIVE
CALCIUM SERPL-MCNC: 7.8 MG/DL (ref 8.6–10.2)
CANNABINOIDS UR QL SCN: NEGATIVE
CHLORIDE SERPL-SCNC: 93 MMOL/L (ref 98–107)
CHLORIDE UR-SCNC: 24 MMOL/L
CK SERPL-CCNC: ABNORMAL U/L (ref 20–180)
CLARITY UR: CLEAR
CO2 SERPL-SCNC: 27 MMOL/L (ref 22–29)
COCAINE UR QL SCN: POSITIVE
COHB: 0.4 % (ref 0–1.5)
COHB: 0.5 % (ref 0–1.5)
COLOR UR: ABNORMAL
CREAT SERPL-MCNC: 3.1 MG/DL (ref 0.5–1)
CREAT UR-MCNC: 242 MG/DL (ref 29–226)
CRITICAL: ABNORMAL
CRITICAL: ABNORMAL
DATE ANALYZED: ABNORMAL
DATE ANALYZED: ABNORMAL
DATE OF COLLECTION: ABNORMAL
DATE OF COLLECTION: ABNORMAL
EOSINOPHIL # BLD: 0 K/UL (ref 0.05–0.5)
EOSINOPHILS RELATIVE PERCENT: 0 % (ref 0–6)
EPI CELLS #/AREA URNS HPF: ABNORMAL /HPF
ERYTHROCYTE [DISTWIDTH] IN BLOOD BY AUTOMATED COUNT: 13.6 % (ref 11.5–15)
FENTANYL UR QL: POSITIVE
FIO2: 80 %
FIO2: 85 %
GFR SERPL CREATININE-BSD FRML MDRD: 17 ML/MIN/1.73M2
GLUCOSE SERPL-MCNC: 174 MG/DL (ref 74–99)
GLUCOSE UR STRIP-MCNC: 100 MG/DL
HAV IGM SERPL QL IA: NONREACTIVE
HBV CORE IGM SERPL QL IA: NONREACTIVE
HBV SURFACE AG SERPL QL IA: NONREACTIVE
HCO3: 25 MMOL/L (ref 22–26)
HCO3: 27.1 MMOL/L (ref 22–26)
HCT VFR BLD AUTO: 41.9 % (ref 34–48)
HCV AB SERPL QL IA: REACTIVE
HGB BLD-MCNC: 13.5 G/DL (ref 11.5–15.5)
HGB UR QL STRIP.AUTO: ABNORMAL
HHB: 5 % (ref 0–5)
HHB: 7.6 % (ref 0–5)
HIV 1+2 AB+HIV1 P24 AG SERPL QL IA: NONREACTIVE
KETONES UR STRIP-MCNC: NEGATIVE MG/DL
L PNEUMO1 AG UR QL IA.RAPID: NEGATIVE
LAB: ABNORMAL
LAB: ABNORMAL
LACTATE BLDV-SCNC: 1.5 MMOL/L (ref 0.5–2.2)
LEUKOCYTE ESTERASE UR QL STRIP: NEGATIVE
LYMPHOCYTES NFR BLD: 0 K/UL (ref 1.5–4)
LYMPHOCYTES RELATIVE PERCENT: 0 % (ref 20–42)
Lab: 318
Lab: 607
MAGNESIUM SERPL-MCNC: 1.9 MG/DL (ref 1.6–2.6)
MCH RBC QN AUTO: 28.5 PG (ref 26–35)
MCHC RBC AUTO-ENTMCNC: 32.2 G/DL (ref 32–34.5)
MCV RBC AUTO: 88.6 FL (ref 80–99.9)
METAMYELOCYTES ABSOLUTE COUNT: 0.18 K/UL (ref 0–0.12)
METAMYELOCYTES: 1 % (ref 0–1)
METHADONE UR QL: NEGATIVE
METHB: 0.4 % (ref 0–1.5)
METHB: 0.5 % (ref 0–1.5)
MODE: ABNORMAL
MODE: ABNORMAL
MONOCYTES NFR BLD: 0.91 K/UL (ref 0.1–0.95)
MONOCYTES NFR BLD: 4 % (ref 2–12)
NEUTROPHILS NFR BLD: 95 % (ref 43–80)
NEUTS SEG NFR BLD: 19.81 K/UL (ref 1.8–7.3)
NITRITE UR QL STRIP: POSITIVE
O2 CONTENT: 18 ML/DL
O2 CONTENT: 18.9 ML/DL
O2 SATURATION: 92.3 % (ref 92–98.5)
O2 SATURATION: 95 % (ref 92–98.5)
O2HB: 91.4 % (ref 94–97)
O2HB: 94.2 % (ref 94–97)
OPERATOR ID: 914
OPERATOR ID: 914
OPIATES UR QL SCN: POSITIVE
OSMOLALITY SERPL: 302 MOSM/KG (ref 285–310)
OSMOLALITY UR: 355 MOSM/KG (ref 300–900)
OXYCODONE UR QL SCN: NEGATIVE
PARTIAL THROMBOPLASTIN TIME: 61.5 SEC (ref 24.5–35.1)
PATIENT TEMP: 37 C
PATIENT TEMP: 37 C
PCO2: 53.3 MMHG (ref 35–45)
PCO2: 59.3 MMHG (ref 35–45)
PCP UR QL SCN: NEGATIVE
PEEP/CPAP: 6 CMH2O
PEEP/CPAP: 6 CMH2O
PFO2: 0.88 MMHG/%
PFO2: 0.99 MMHG/%
PH BLOOD GAS: 7.28 (ref 7.35–7.45)
PH BLOOD GAS: 7.29 (ref 7.35–7.45)
PH UR STRIP: 5 [PH] (ref 5–9)
PHOSPHATE SERPL-MCNC: 5.8 MG/DL (ref 2.5–4.5)
PIP: 12 CMH2O
PIP: 14 CMH2O
PLATELET # BLD AUTO: 313 K/UL (ref 130–450)
PMV BLD AUTO: 9.9 FL (ref 7–12)
PO2: 70.2 MMHG (ref 75–100)
PO2: 84.4 MMHG (ref 75–100)
POTASSIUM SERPL-SCNC: 4.6 MMOL/L (ref 3.5–5)
POTASSIUM, UR: 51.6 MMOL/L
PROCALCITONIN SERPL-MCNC: 5.34 NG/ML (ref 0–0.08)
PROT SERPL-MCNC: 6.2 G/DL (ref 6.4–8.3)
PROT UR STRIP-MCNC: 100 MG/DL
RBC # BLD AUTO: 4.73 M/UL (ref 3.5–5.5)
RBC # BLD: ABNORMAL 10*6/UL
RBC #/AREA URNS HPF: ABNORMAL /HPF
RI(T): 5.45
RI(T): 6.33
RR MECHANICAL: 14 B/MIN
S PNEUM AG SPEC QL: NEGATIVE
SODIUM SERPL-SCNC: 131 MMOL/L (ref 132–146)
SODIUM UR-SCNC: 21 MMOL/L
SOURCE, BLOOD GAS: ABNORMAL
SOURCE, BLOOD GAS: ABNORMAL
SP GR UR STRIP: >1.03 (ref 1–1.03)
SPECIMEN SOURCE: NORMAL
TEST INFORMATION: ABNORMAL
THB: 14 G/DL (ref 11.5–16.5)
THB: 14.2 G/DL (ref 11.5–16.5)
TIME ANALYZED: 321
TIME ANALYZED: 613
TROPONIN I SERPL HS-MCNC: 186 NG/L (ref 0–9)
TROPONIN I SERPL HS-MCNC: 202 NG/L (ref 0–9)
TROPONIN I SERPL HS-MCNC: 219 NG/L (ref 0–9)
TSH SERPL DL<=0.05 MIU/L-ACNC: 1.04 UIU/ML (ref 0.27–4.2)
UROBILINOGEN UR STRIP-ACNC: 1 EU/DL (ref 0–1)
WBC #/AREA URNS HPF: ABNORMAL /HPF
WBC OTHER # BLD: 20.9 K/UL (ref 4.5–11.5)

## 2024-04-18 PROCEDURE — 83930 ASSAY OF BLOOD OSMOLALITY: CPT

## 2024-04-18 PROCEDURE — 6360000002 HC RX W HCPCS: Performed by: NURSE PRACTITIONER

## 2024-04-18 PROCEDURE — 82805 BLOOD GASES W/O2 SATURATION: CPT

## 2024-04-18 PROCEDURE — 85730 THROMBOPLASTIN TIME PARTIAL: CPT

## 2024-04-18 PROCEDURE — APPSS60 APP SPLIT SHARED TIME 46-60 MINUTES

## 2024-04-18 PROCEDURE — 2500000003 HC RX 250 WO HCPCS

## 2024-04-18 PROCEDURE — 82140 ASSAY OF AMMONIA: CPT

## 2024-04-18 PROCEDURE — 84443 ASSAY THYROID STIM HORMONE: CPT

## 2024-04-18 PROCEDURE — 51701 INSERT BLADDER CATHETER: CPT

## 2024-04-18 PROCEDURE — 84100 ASSAY OF PHOSPHORUS: CPT

## 2024-04-18 PROCEDURE — 82570 ASSAY OF URINE CREATININE: CPT

## 2024-04-18 PROCEDURE — 51798 US URINE CAPACITY MEASURE: CPT

## 2024-04-18 PROCEDURE — 2000000000 HC ICU R&B

## 2024-04-18 PROCEDURE — 94660 CPAP INITIATION&MGMT: CPT

## 2024-04-18 PROCEDURE — 99291 CRITICAL CARE FIRST HOUR: CPT | Performed by: INTERNAL MEDICINE

## 2024-04-18 PROCEDURE — 6360000002 HC RX W HCPCS: Performed by: INTERNAL MEDICINE

## 2024-04-18 PROCEDURE — 2700000000 HC OXYGEN THERAPY PER DAY

## 2024-04-18 PROCEDURE — 6370000000 HC RX 637 (ALT 250 FOR IP): Performed by: NURSE PRACTITIONER

## 2024-04-18 PROCEDURE — 2580000003 HC RX 258: Performed by: INTERNAL MEDICINE

## 2024-04-18 PROCEDURE — 6360000002 HC RX W HCPCS

## 2024-04-18 PROCEDURE — 83605 ASSAY OF LACTIC ACID: CPT

## 2024-04-18 PROCEDURE — 2580000003 HC RX 258: Performed by: NURSE PRACTITIONER

## 2024-04-18 PROCEDURE — 83935 ASSAY OF URINE OSMOLALITY: CPT

## 2024-04-18 PROCEDURE — 80053 COMPREHEN METABOLIC PANEL: CPT

## 2024-04-18 PROCEDURE — 87449 NOS EACH ORGANISM AG IA: CPT

## 2024-04-18 PROCEDURE — 84300 ASSAY OF URINE SODIUM: CPT

## 2024-04-18 PROCEDURE — 87086 URINE CULTURE/COLONY COUNT: CPT

## 2024-04-18 PROCEDURE — 83735 ASSAY OF MAGNESIUM: CPT

## 2024-04-18 PROCEDURE — 94640 AIRWAY INHALATION TREATMENT: CPT

## 2024-04-18 PROCEDURE — C9113 INJ PANTOPRAZOLE SODIUM, VIA: HCPCS | Performed by: NURSE PRACTITIONER

## 2024-04-18 PROCEDURE — 83880 ASSAY OF NATRIURETIC PEPTIDE: CPT

## 2024-04-18 PROCEDURE — 80307 DRUG TEST PRSMV CHEM ANLYZR: CPT

## 2024-04-18 PROCEDURE — 87899 AGENT NOS ASSAY W/OPTIC: CPT

## 2024-04-18 PROCEDURE — 2580000003 HC RX 258

## 2024-04-18 PROCEDURE — 99223 1ST HOSP IP/OBS HIGH 75: CPT | Performed by: INTERNAL MEDICINE

## 2024-04-18 PROCEDURE — 2500000003 HC RX 250 WO HCPCS: Performed by: NURSE PRACTITIONER

## 2024-04-18 PROCEDURE — 81001 URINALYSIS AUTO W/SCOPE: CPT

## 2024-04-18 PROCEDURE — 84484 ASSAY OF TROPONIN QUANT: CPT

## 2024-04-18 PROCEDURE — 82550 ASSAY OF CK (CPK): CPT

## 2024-04-18 PROCEDURE — 71045 X-RAY EXAM CHEST 1 VIEW: CPT

## 2024-04-18 PROCEDURE — 82436 ASSAY OF URINE CHLORIDE: CPT

## 2024-04-18 PROCEDURE — 85025 COMPLETE CBC W/AUTO DIFF WBC: CPT

## 2024-04-18 PROCEDURE — 84133 ASSAY OF URINE POTASSIUM: CPT

## 2024-04-18 PROCEDURE — 84145 PROCALCITONIN (PCT): CPT

## 2024-04-18 RX ORDER — SODIUM CHLORIDE 9 MG/ML
INJECTION, SOLUTION INTRAVENOUS CONTINUOUS
Status: DISCONTINUED | OUTPATIENT
Start: 2024-04-18 | End: 2024-04-20

## 2024-04-18 RX ORDER — SODIUM CHLORIDE 0.9 % (FLUSH) 0.9 %
5-40 SYRINGE (ML) INJECTION PRN
Status: DISCONTINUED | OUTPATIENT
Start: 2024-04-18 | End: 2024-04-19 | Stop reason: SDUPTHER

## 2024-04-18 RX ORDER — ARFORMOTEROL TARTRATE 15 UG/2ML
15 SOLUTION RESPIRATORY (INHALATION)
Status: DISCONTINUED | OUTPATIENT
Start: 2024-04-18 | End: 2024-05-04 | Stop reason: HOSPADM

## 2024-04-18 RX ORDER — CALCIUM GLUCONATE 10 MG/ML
1000 INJECTION, SOLUTION INTRAVENOUS ONCE
Status: COMPLETED | OUTPATIENT
Start: 2024-04-18 | End: 2024-04-18

## 2024-04-18 RX ORDER — IPRATROPIUM BROMIDE AND ALBUTEROL SULFATE 2.5; .5 MG/3ML; MG/3ML
1 SOLUTION RESPIRATORY (INHALATION)
Status: DISCONTINUED | OUTPATIENT
Start: 2024-04-18 | End: 2024-04-24

## 2024-04-18 RX ORDER — BUDESONIDE 0.5 MG/2ML
0.5 INHALANT ORAL
Status: DISCONTINUED | OUTPATIENT
Start: 2024-04-18 | End: 2024-05-04 | Stop reason: HOSPADM

## 2024-04-18 RX ADMIN — DOXYCYCLINE 100 MG: 100 INJECTION, POWDER, LYOPHILIZED, FOR SOLUTION INTRAVENOUS at 06:02

## 2024-04-18 RX ADMIN — WATER 40 MG: 1 INJECTION INTRAMUSCULAR; INTRAVENOUS; SUBCUTANEOUS at 16:28

## 2024-04-18 RX ADMIN — ARFORMOTEROL TARTRATE 15 MCG: 15 SOLUTION RESPIRATORY (INHALATION) at 08:27

## 2024-04-18 RX ADMIN — IPRATROPIUM BROMIDE AND ALBUTEROL SULFATE 1 DOSE: .5; 2.5 SOLUTION RESPIRATORY (INHALATION) at 08:27

## 2024-04-18 RX ADMIN — SODIUM CHLORIDE, PRESERVATIVE FREE 40 MG: 5 INJECTION INTRAVENOUS at 09:13

## 2024-04-18 RX ADMIN — IPRATROPIUM BROMIDE AND ALBUTEROL SULFATE 1 DOSE: .5; 2.5 SOLUTION RESPIRATORY (INHALATION) at 04:19

## 2024-04-18 RX ADMIN — SODIUM CHLORIDE: 9 INJECTION, SOLUTION INTRAVENOUS at 23:36

## 2024-04-18 RX ADMIN — IPRATROPIUM BROMIDE AND ALBUTEROL SULFATE 1 DOSE: .5; 2.5 SOLUTION RESPIRATORY (INHALATION) at 20:20

## 2024-04-18 RX ADMIN — DOXYCYCLINE 100 MG: 100 INJECTION, POWDER, LYOPHILIZED, FOR SOLUTION INTRAVENOUS at 17:49

## 2024-04-18 RX ADMIN — IPRATROPIUM BROMIDE AND ALBUTEROL SULFATE 1 DOSE: .5; 2.5 SOLUTION RESPIRATORY (INHALATION) at 16:34

## 2024-04-18 RX ADMIN — MEROPENEM 1000 MG: 1 INJECTION, POWDER, FOR SOLUTION INTRAVENOUS at 03:13

## 2024-04-18 RX ADMIN — IPRATROPIUM BROMIDE AND ALBUTEROL SULFATE 1 DOSE: .5; 2.5 SOLUTION RESPIRATORY (INHALATION) at 12:05

## 2024-04-18 RX ADMIN — CALCIUM GLUCONATE 1000 MG: 10 INJECTION, SOLUTION INTRAVENOUS at 06:00

## 2024-04-18 RX ADMIN — BUDESONIDE INHALATION 500 MCG: 0.5 SUSPENSION RESPIRATORY (INHALATION) at 20:20

## 2024-04-18 RX ADMIN — BUDESONIDE INHALATION 500 MCG: 0.5 SUSPENSION RESPIRATORY (INHALATION) at 08:27

## 2024-04-18 RX ADMIN — MEROPENEM 1000 MG: 1 INJECTION, POWDER, FOR SOLUTION INTRAVENOUS at 16:28

## 2024-04-18 RX ADMIN — IPRATROPIUM BROMIDE AND ALBUTEROL SULFATE 1 DOSE: .5; 2.5 SOLUTION RESPIRATORY (INHALATION) at 23:40

## 2024-04-18 RX ADMIN — SODIUM CHLORIDE: 9 INJECTION, SOLUTION INTRAVENOUS at 01:43

## 2024-04-18 RX ADMIN — ARFORMOTEROL TARTRATE 15 MCG: 15 SOLUTION RESPIRATORY (INHALATION) at 20:20

## 2024-04-18 NOTE — CARE COORDINATION
Care Coordination: LOS 1 day. Acute hypoxic and hypercapnic resp failure, acute metabolic encephalopathy, polysubstance abuse, elevated troponin, BNP.  Bipap/10 ltr, Heparin gtt, Doxycyline, Merrem.  Met with pt at bedside to discuss transition of care.  Speaks in whisper, olivia for me to call children. Pt is covered under [facility] under  .  Also [facility].  Lives alone, one story home. No hx of hhc, dme or torsten. Follows with Dr Melchor, Confirms son Phil is primary decision maker. Her plan is to return home at discharge. If DME is needed she has no preference. Son will transfer home at discharge. She declines to speak to peer recovery. Will follow    Electronically signed by Marti Johnson RN on 2024 at 10:39 AM

## 2024-04-18 NOTE — PROGRESS NOTES
4 Eyes Skin Assessment     NAME:  Saba Sullivan  YOB: 1965  MEDICAL RECORD NUMBER:  [id number]    The patient is being assessed for  Admission    I agree that at least one RN has performed a thorough Head to Toe Skin Assessment on the patient. ALL assessment sites listed below have been assessed.      Areas assessed by both nurses:    Head, Face, Ears, Shoulders, Back, Chest, Arms, Elbows, Hands, Sacrum. Buttock, Coccyx, Ischium, Legs. Feet and Heels, and Under Medical Devices     Left lateral ankle 4.8 x 5.5 x 0.1 purple/non-blanchable  Right heel- old wound 9 x 3.6  x0.2 eschar, dry  RLE chronic edema, warm to touch, scars  Generalized scars        Does the Patient have a Wound? Yes wound(s) were present on assessment. LDA wound assessment was Initiated and completed by IRMA Meraz Prevention initiated by RN: Yes  Wound Care Orders initiated by RN: Yes    Pressure Injury (Stage 3,4, Unstageable, DTI, NWPT, and Complex wounds) if present, place Wound referral order by RN under : Yes    New Ostomies, if present place, Ostomy referral order under : No     Nurse 1 eSignature: Electronically signed by Antonietta Ordaz RN on 4/18/24 at 1:20 AM EDT    **SHARE this note so that the co-signing nurse can place an eSignature**    Nurse 2 eSignature: Electronically signed by Aretha Welch RN on 4/18/24 at 1:29 AM EDT

## 2024-04-18 NOTE — PLAN OF CARE
Problem: Safety - Adult  Goal: Free from fall injury  4/18/2024 1815 by Lizzeth Silver, RN  Outcome: Progressing     Problem: Skin/Tissue Integrity  Goal: Absence of new skin breakdown  Description: 1.  Monitor for areas of redness and/or skin breakdown  2.  Assess vascular access sites hourly  3.  Every 4-6 hours minimum:  Change oxygen saturation probe site  4.  Every 4-6 hours:  If on nasal continuous positive airway pressure, respiratory therapy assess nares and determine need for appliance change or resting period.  4/18/2024 1815 by Lizzeth Silver, RN  Outcome: Progressing     Problem: Respiratory - Adult  Goal: Achieves optimal ventilation and oxygenation  Outcome: Progressing     Problem: Cardiovascular - Adult  Goal: Absence of cardiac dysrhythmias or at baseline  Outcome: Progressing     Problem: Skin/Tissue Integrity - Adult  Goal: Incisions, wounds, or drain sites healing without S/S of infection  Outcome: Progressing     Problem: Metabolic/Fluid and Electrolytes - Adult  Goal: Electrolytes maintained within normal limits  Outcome: Progressing

## 2024-04-18 NOTE — ACP (ADVANCE CARE PLANNING)
Advance Care Planning   Healthcare Decision Maker:    Primary Decision Maker: DarrylPhil - Teresa - [phone]    Click here to complete Healthcare Decision Makers including selection of the Healthcare Decision Maker Relationship (ie \"Primary\").

## 2024-04-18 NOTE — CONSULTS
Inpatient Cardiology Consultation      Reason for Consult:  NSTEMI    Consulting Physician: Dr. Lyons     Requesting Physician:  Dr. Sanchez     Date of Consultation: 4/18/2024    History of Present Illness:   Jenny Sullivan is a 59-year-old female previously unknown to [facility] cardiology.  Past medical history detailed below.    Patient presented to the emergency department on 4/17/2024 with complaints of shortness of breath x 4 to 5 hours.  And route to the hospital she was given 2 DuoNebs and placed on BiPAP by EMS for O2 saturation of 85%.  On arrival blood pressure was 133/100, heart rate 102, 94% on BiPAP and afebrile. CTA negative for PE but reveals irregular and confluent opacities in lower lobes more significant on the right suggestive of pneumonia and atelectasis with peribronchial thickening bilaterally suggestive of mucous plugging and peribronchial inflammation.  Ultrasound negative for DVT in bilateral lower extremities.  While in the ED she received 125 mg of Solu-Medrol, 1 DuoNeb, 50 mg Benadryl (due to contrast allergy), fentanyl, 500 cc normal saline bolus, doxycycline and meropenem and placed on a heparin drip.  Patient was admitted to the ICU for further evaluation.  UDS completed 4/18/2020 form positive for amphetamines, benzodiazepine, cocaine, fentanyl and opiates. She did receive fentanyl in the ED.     At the time of examination patient is on BiPAP.  She is slightly somnolent however she is arousable. She is unable to provide much history at this time so it was obtained from her chart.     Pertinent Labs:  Sodium 132> 131, potassium 4.4> 4.6, BUN/creatinine 21/2.2> 35/3.1, magnesium 1.9, total , proBNP 22,376> 33,717, troponin 228> 208> 219, ALT 94, , WBC 17.3> 20.9, Hgb 16.7> 13.5    Please note: past medical records were reviewed per electronic medical record (EMR) - see detailed reports under Past Medical/ Surgical History.     Past Medical History:    COPD Gold stage      -----------------------------------------------------------------------------------------------------------------------------------------------    Jairo Lyons MD  Interventional Cardiology/ Structural heart disease

## 2024-04-18 NOTE — PROGRESS NOTES
[facility]  Internal Medicine Residency Program  Progress Note - House Team       Patient:  Saba Sullivan 59 y.o. female   MRN: [id number]       Date of Service: 4/18/2024    CC: SOB    Subjective     Overnight events: Transferred to MICU with low threshold for intubation. Noted to be removing BIPAP last night.     Patient seen and examined at bedside this morning, seen on BIPAP during rounds. Patient was drowsy but arousable, following commands. Unable to engage in conversation due to BIPAP and drowsiness. Denies chest pain.      Objective   Physical Exam  Vitals: BP (!) 144/80   Pulse (!) 110   Temp 98.2 °F (36.8 °C) (Temporal)   Resp 19   Ht 1.753 m (5' 9\")   Wt 76.2 kg (168 lb 1.6 oz)   LMP 08/28/2013   SpO2 92%   BMI 24.82 kg/m²     I & O - 24hr: I/O this shift:  In: 381.1 [I.V.:263.7; IV Piggyback:117.4]  Out: 75 [Urine:75]   General Appearance: Drowsy, seen on BIPAP  HEENT:  Head: Normocephalic, no lesions, without obvious abnormality.  Neck: no adenopathy, no carotid bruit, no JVD, and supple, symmetrical, trachea midline  Lung: (+) rhonchi bilaterally, no wheezing appreciated  Heart: regular rate and rhythm, S1, S2 normal, no murmur, click, rub or gallop  Abdomen: soft, non-tender; bowel sounds normal; no masses,  no organomegaly  Extremities:  Gr 2-3 lower extremity edema, no erythema noted  Musculokeletal: No joint swelling, no muscle tenderness. ROM normal in all joints of extremities.   Neurologic: Mental status: Drowsy/lethargic, arousable, follows commands, moves all extremities    Diet:   Diet NPO    Pertinent Labs & Imaging Studies     Labs    Recent Labs     04/17/24  0800 04/17/24  1215 04/18/24  0256   WBC 17.3* 16.3* 20.9*   RBC 5.91* 5.64* 4.73   HGB 16.7* 16.0* 13.5   HCT 52.3* 50.5* 41.9   MCV 88.5 89.5 88.6   MCH 28.3 28.4 28.5   MCHC 31.9* 31.7* 32.2   RDW 13.8 13.6 13.6    333 313   MPV 9.8 10.0 9.9     Recent Labs     04/17/24  0800 04/18/24  0256   NA

## 2024-04-18 NOTE — PROGRESS NOTES
Patient admitted to MICU with the following belongings:  Jewelry, Cell Phone, Pants, Shirt, and Other ring, underwear, keys . The following belongings admitted with the patient, None, were sent home with the patient's family.

## 2024-04-18 NOTE — CONSULTS
Nephrology Consult  [facility]  Radhames Baptiste MD    CC:   arf    HPI:   the pt is a 61 yo female with a pmh of copd, DVT, polysubstance abuse, recurrent OM of R foot who presented with sob and ms changes. Sat was 85% on RA in ER. She was put on bipap. Cta showed no PE. She was given nebs, steroids and started on doxycycline and merrem. Bilateral LE US showed no DVT. Labs showed na 131 k 4.6 co2 27 bun 36 cr 2.2 > 3.1, lactate 1.5, ca 7.8, p 5.8, nh4 25ck 21k>16K, alb 2.7, mg 1.9, tsh 1.04, uds pos amphetamine, benzos, cocaine, fentanyl, opiates. Wbc 20.9, hgb 13.5, plt 313. Ua 100 glucose, mod bili, sg >1.03, 100 protein, pos nitrite, 10-20 rbc, lg hgb. Vitals showed rr 17 hr 112 bp 148/67, temp 98.1.  cr from 7/2021 was 0.7. she was started on ns at 51. Pt is not giving a history, mumbles when asked a question, eyes closed.     PMH:    Past Medical History:   Diagnosis Date    Abscess     states for a couple years she has had problems with     Chronic pain     Chronic recurrent multifocal osteomyelitis of foot (HCC) 7/6/2019    Hip fracture ([facility])     Hx of blood clots 1990    dvt rt calf    Pressure injury of heel, stage 3 ([facility]) 7/6/2019    Subclinical hypothyroidism 7/6/2019       Patient Active Problem List   Diagnosis    Abscess    Nonhealing ulcer of heel ([facility])    Bradycardia    Substance abuse (HCC)    Current moderate episode of major depressive disorder (HCC)    Abnormal weight loss    COPD (chronic obstructive pulmonary disease) (HCC)    History of fracture of left hip    Ambulatory dysfunction    Hypotension    Subclinical hypothyroidism    Chronic recurrent multifocal osteomyelitis of foot (HCC)    Pressure injury of heel, stage 3 ([facility])    Open wound of fifth toe of left foot    COPD exacerbation (HCC)    Acute respiratory failure with hypoxia (HCC)    Mild protein-calorie malnutrition (HCC)    Osteomyelitis (HCC)    High risk medication use    Acute respiratory failure with hypoxia and

## 2024-04-18 NOTE — PROGRESS NOTES
[facility]  Internal Medicine Department    Attending Physician Statement:  Tom Rees Jr. D.O.    I have discussed the case, including pertinent history and exam findings with the resident. I have reviewed all past medical history, past surgical history, family history, social history, medications, and allergies and updated as appropriate in the history section of the chart. I have seen and examined the patient and the key elements of the encounter have been performed by me. I agree with the assessment, plan and orders as documented by the resident.      Acute Hypoxic Respiratory Failure  -COPD exacerbation, PNA, HF,   -continue respiratory support, NIV as needed; transitioned to HFNC this AM as tolerated   -continue antibiotics; pulmonary toilet; inhalers and steroids   -pancultures     DOMINGO Stage III  -multifactorial, rhabdomyolysis, decreased perfusion, and dehydration   -urine studies and renal US, montior I/O  -hydration per ICU protocol     BL LE Edema  -elevated BNP, negative DVT US   -liksushma related to new onset heart failure  -TTE ordered     NSTEMI  -ST depressions and T wave inversion throughout the inferior and anteroseptal leads  -continue heparin per ICU protocols   -cardiology consult pending     Polysubstane Abuse  -multiple medications; peer recovery; montior for withdrawal symptoms     Transaminitis  -likley combination of acute intoxication, hep c and rhabdomyolysis  -imrpoving with hydration    Remainder of medical problems as per resident note.

## 2024-04-18 NOTE — CONSULTS
metabolic encephalopathy   Polysubstance abuse     Plan:  -monitor for changes in mental status, if no improvement in mentation after ABG improves, may need CT head.   -patient able to be aroused to answer questions now, but intermittently somnolent  -if mental status worsens, may need intubation/ mechanical ventilation  - UDS pending   Pulmonary:   Acute hypoxemic and hypercapnic Respiratory Failure   Community Acquired Pneumonia   Acute COPD exacerbation  Current Tobacco abuse      Plan:   - Currently on BiPAP now, will transition once ABG improves, maintain O2 saturation above 92%  - Bronchodilator Breathing treatments (brovona, pulmicort, duonebs)   - Solu-mederol daily   -Meropenem and doxycyline for pneumonia treatment   -Repeat chest XR in AM   - Blood gas in AM       Cardiac:   NSTEMI   Heart Failure/ volume overload --Elevated ProBNP   Sinus Tachycardia    Plan:   -trend troponin (Q6) and BNP  -Heparin drip per protocol  -Cardiology consulted, input appreciated   -echocardiogram     Gastrointestinal   Elevated Liver Enzymes ==> volume overload==> concern for Cardiohepatic      Plan:   -Trend AST/ ALT   -NPO until patient is more alert   -GI prophylaxis with Protonix     Renal:   Acute Kidney Injury worsening ==> volume overload==> concern for Cadiorenal or UTI  R/o UTI - need Urine collection     Plan:   -gentle hydration with NS at 50mL/hr   -CMP in AM   -Replace abnormal electrolytes   - awaiting on Urinalysis and urine culture  - urine drug screen  - may need Renal US     Infection:  CAP  R/o UTI   Ulceration of right heel    Plan:   - Meropenum and doxycycline   -Repeat CXR in AM   -Repeat lactate  - Obtain procalcitonin, blood culture, urine antigen pending, MRSA nares Pending, RVP/COVID 19 PCR negative   -CBC in AM   -right foot XR negative for acute osteomyelitis     Hematology  History of DVT   Polycythemia     Plan:    -venous duplex US of lower extremities negative for DVT and CTA pulmonary

## 2024-04-19 ENCOUNTER — APPOINTMENT (OUTPATIENT)
Age: 59
DRG: 871 | End: 2024-04-19
Payer: OTHER GOVERNMENT

## 2024-04-19 LAB
ALBUMIN SERPL-MCNC: 2.6 G/DL (ref 3.5–5.2)
ALP SERPL-CCNC: 75 U/L (ref 35–104)
ALT SERPL-CCNC: 72 U/L (ref 0–32)
ANION GAP SERPL CALCULATED.3IONS-SCNC: 15 MMOL/L (ref 7–16)
ANION GAP SERPL CALCULATED.3IONS-SCNC: 15 MMOL/L (ref 7–16)
AST SERPL-CCNC: 139 U/L (ref 0–31)
BASOPHILS # BLD: 0.02 K/UL (ref 0–0.2)
BASOPHILS NFR BLD: 0 % (ref 0–2)
BILIRUB SERPL-MCNC: 0.3 MG/DL (ref 0–1.2)
BUN SERPL-MCNC: 49 MG/DL (ref 6–20)
BUN SERPL-MCNC: 53 MG/DL (ref 6–20)
CALCIUM SERPL-MCNC: 7.6 MG/DL (ref 8.6–10.2)
CALCIUM SERPL-MCNC: 7.9 MG/DL (ref 8.6–10.2)
CHLORIDE SERPL-SCNC: 100 MMOL/L (ref 98–107)
CHLORIDE SERPL-SCNC: 99 MMOL/L (ref 98–107)
CK SERPL-CCNC: 4380 U/L (ref 20–180)
CK SERPL-CCNC: 5868 U/L (ref 20–180)
CO2 SERPL-SCNC: 22 MMOL/L (ref 22–29)
CO2 SERPL-SCNC: 23 MMOL/L (ref 22–29)
CORTIS SERPL-MCNC: 19.7 UG/DL (ref 2.7–18.4)
CORTISOL COLLECTION INFO: ABNORMAL
CREAT SERPL-MCNC: 4.1 MG/DL (ref 0.5–1)
CREAT SERPL-MCNC: 4.4 MG/DL (ref 0.5–1)
ECHO AO ASC DIAM: 2.3 CM
ECHO AO ASCENDING AORTA INDEX: 1.2 CM/M2
ECHO AV AREA PEAK VELOCITY: 2.5 CM2
ECHO AV AREA VTI: 2.5 CM2
ECHO AV AREA/BSA PEAK VELOCITY: 1.3 CM2/M2
ECHO AV AREA/BSA VTI: 1.3 CM2/M2
ECHO AV CUSP MM: 1.9 CM
ECHO AV MEAN GRADIENT: 6 MMHG
ECHO AV MEAN VELOCITY: 1.2 M/S
ECHO AV PEAK GRADIENT: 13 MMHG
ECHO AV PEAK VELOCITY: 1.8 M/S
ECHO AV VELOCITY RATIO: 0.61
ECHO AV VTI: 32.5 CM
ECHO EST RA PRESSURE: 3 MMHG
ECHO LA DIAMETER INDEX: 1.93 CM/M2
ECHO LA DIAMETER: 3.7 CM
ECHO LA VOL A-L A2C: 39 ML (ref 22–52)
ECHO LA VOL A-L A4C: 29 ML (ref 22–52)
ECHO LA VOL MOD A2C: 37 ML (ref 22–52)
ECHO LA VOL MOD A4C: 26 ML (ref 22–52)
ECHO LA VOLUME AREA LENGTH: 34 ML
ECHO LA VOLUME INDEX A-L A2C: 20 ML/M2 (ref 16–34)
ECHO LA VOLUME INDEX A-L A4C: 15 ML/M2 (ref 16–34)
ECHO LA VOLUME INDEX AREA LENGTH: 18 ML/M2 (ref 16–34)
ECHO LA VOLUME INDEX MOD A2C: 19 ML/M2 (ref 16–34)
ECHO LA VOLUME INDEX MOD A4C: 14 ML/M2 (ref 16–34)
ECHO LV FRACTIONAL SHORTENING: 29 % (ref 28–44)
ECHO LV INTERNAL DIMENSION DIASTOLE INDEX: 2.86 CM/M2
ECHO LV INTERNAL DIMENSION DIASTOLIC: 5.5 CM (ref 3.9–5.3)
ECHO LV INTERNAL DIMENSION SYSTOLIC INDEX: 2.03 CM/M2
ECHO LV INTERNAL DIMENSION SYSTOLIC: 3.9 CM
ECHO LV ISOVOLUMETRIC RELAXATION TIME (IVRT): 92.3 MS
ECHO LV IVSD: 0.6 CM (ref 0.6–0.9)
ECHO LV IVSS: 0.7 CM
ECHO LV MASS 2D: 147.6 G (ref 67–162)
ECHO LV MASS INDEX 2D: 76.9 G/M2 (ref 43–95)
ECHO LV POSTERIOR WALL DIASTOLIC: 0.9 CM (ref 0.6–0.9)
ECHO LV POSTERIOR WALL SYSTOLIC: 2 CM
ECHO LV RELATIVE WALL THICKNESS RATIO: 0.33
ECHO LVOT AREA: 4.2 CM2
ECHO LVOT AV VTI INDEX: 0.6
ECHO LVOT DIAM: 2.3 CM
ECHO LVOT MEAN GRADIENT: 2 MMHG
ECHO LVOT PEAK GRADIENT: 5 MMHG
ECHO LVOT PEAK VELOCITY: 1.1 M/S
ECHO LVOT STROKE VOLUME INDEX: 42.4 ML/M2
ECHO LVOT SV: 81.4 ML
ECHO LVOT VTI: 19.6 CM
ECHO MV A VELOCITY: 0.69 M/S
ECHO MV AREA PHT: 4.6 CM2
ECHO MV AREA VTI: 3.4 CM2
ECHO MV E DECELERATION TIME (DT): 136.1 MS
ECHO MV E VELOCITY: 0.93 M/S
ECHO MV E/A RATIO: 1.35
ECHO MV LVOT VTI INDEX: 1.22
ECHO MV MAX VELOCITY: 1 M/S
ECHO MV MEAN GRADIENT: 2 MMHG
ECHO MV MEAN VELOCITY: 0.7 M/S
ECHO MV PEAK GRADIENT: 4 MMHG
ECHO MV PRESSURE HALF TIME (PHT): 47.9 MS
ECHO MV VTI: 23.9 CM
ECHO PV MAX VELOCITY: 0.9 M/S
ECHO PV MEAN GRADIENT: 2 MMHG
ECHO PV MEAN VELOCITY: 0.6 M/S
ECHO PV PEAK GRADIENT: 3 MMHG
ECHO PV VTI: 17.4 CM
ECHO RIGHT VENTRICULAR SYSTOLIC PRESSURE (RVSP): 47 MMHG
ECHO RV INTERNAL DIMENSION: 3.7 CM
ECHO RVOT MEAN GRADIENT: 1 MMHG
ECHO RVOT PEAK GRADIENT: 1 MMHG
ECHO RVOT PEAK VELOCITY: 0.6 M/S
ECHO RVOT VTI: 10.1 CM
ECHO TV REGURGITANT MAX VELOCITY: 3.31 M/S
ECHO TV REGURGITANT PEAK GRADIENT: 44 MMHG
EOSINOPHIL # BLD: 0 K/UL (ref 0.05–0.5)
EOSINOPHILS RELATIVE PERCENT: 0 % (ref 0–6)
ERYTHROCYTE [DISTWIDTH] IN BLOOD BY AUTOMATED COUNT: 14.2 % (ref 11.5–15)
GFR SERPL CREATININE-BSD FRML MDRD: 11 ML/MIN/1.73M2
GFR SERPL CREATININE-BSD FRML MDRD: 12 ML/MIN/1.73M2
GLUCOSE SERPL-MCNC: 126 MG/DL (ref 74–99)
GLUCOSE SERPL-MCNC: 139 MG/DL (ref 74–99)
HCT VFR BLD AUTO: 36.9 % (ref 34–48)
HGB BLD-MCNC: 11.5 G/DL (ref 11.5–15.5)
IMM GRANULOCYTES # BLD AUTO: 0.06 K/UL (ref 0–0.58)
IMM GRANULOCYTES NFR BLD: 0 % (ref 0–5)
LYMPHOCYTES NFR BLD: 0.27 K/UL (ref 1.5–4)
LYMPHOCYTES RELATIVE PERCENT: 2 % (ref 20–42)
MAGNESIUM SERPL-MCNC: 2 MG/DL (ref 1.6–2.6)
MCH RBC QN AUTO: 27.9 PG (ref 26–35)
MCHC RBC AUTO-ENTMCNC: 31.2 G/DL (ref 32–34.5)
MCV RBC AUTO: 89.6 FL (ref 80–99.9)
MICROORGANISM SPEC CULT: NO GROWTH
MICROORGANISM SPEC CULT: NORMAL
MONOCYTES NFR BLD: 0.84 K/UL (ref 0.1–0.95)
MONOCYTES NFR BLD: 6 % (ref 2–12)
NEUTROPHILS NFR BLD: 92 % (ref 43–80)
NEUTS SEG NFR BLD: 13.37 K/UL (ref 1.8–7.3)
PHOSPHATE SERPL-MCNC: 5.8 MG/DL (ref 2.5–4.5)
PLATELET # BLD AUTO: 270 K/UL (ref 130–450)
PMV BLD AUTO: 9.9 FL (ref 7–12)
POTASSIUM SERPL-SCNC: 4.5 MMOL/L (ref 3.5–5)
POTASSIUM SERPL-SCNC: 4.6 MMOL/L (ref 3.5–5)
PROT SERPL-MCNC: 5.8 G/DL (ref 6.4–8.3)
RBC # BLD AUTO: 4.12 M/UL (ref 3.5–5.5)
RBC # BLD: ABNORMAL 10*6/UL
SODIUM SERPL-SCNC: 137 MMOL/L (ref 132–146)
SODIUM SERPL-SCNC: 137 MMOL/L (ref 132–146)
SPECIMEN DESCRIPTION: NORMAL
SPECIMEN DESCRIPTION: NORMAL
URATE SERPL-MCNC: 7.1 MG/DL (ref 2.4–5.7)
WBC OTHER # BLD: 14.6 K/UL (ref 4.5–11.5)

## 2024-04-19 PROCEDURE — 2580000003 HC RX 258: Performed by: INTERNAL MEDICINE

## 2024-04-19 PROCEDURE — 83874 ASSAY OF MYOGLOBIN: CPT

## 2024-04-19 PROCEDURE — 51701 INSERT BLADDER CATHETER: CPT

## 2024-04-19 PROCEDURE — 6360000002 HC RX W HCPCS: Performed by: NURSE PRACTITIONER

## 2024-04-19 PROCEDURE — 84550 ASSAY OF BLOOD/URIC ACID: CPT

## 2024-04-19 PROCEDURE — 94660 CPAP INITIATION&MGMT: CPT

## 2024-04-19 PROCEDURE — 82533 TOTAL CORTISOL: CPT

## 2024-04-19 PROCEDURE — 93306 TTE W/DOPPLER COMPLETE: CPT | Performed by: INTERNAL MEDICINE

## 2024-04-19 PROCEDURE — 99291 CRITICAL CARE FIRST HOUR: CPT | Performed by: INTERNAL MEDICINE

## 2024-04-19 PROCEDURE — 84100 ASSAY OF PHOSPHORUS: CPT

## 2024-04-19 PROCEDURE — 2580000003 HC RX 258: Performed by: NURSE PRACTITIONER

## 2024-04-19 PROCEDURE — 80048 BASIC METABOLIC PNL TOTAL CA: CPT

## 2024-04-19 PROCEDURE — C9113 INJ PANTOPRAZOLE SODIUM, VIA: HCPCS | Performed by: NURSE PRACTITIONER

## 2024-04-19 PROCEDURE — 2000000000 HC ICU R&B

## 2024-04-19 PROCEDURE — 6360000002 HC RX W HCPCS

## 2024-04-19 PROCEDURE — 80053 COMPREHEN METABOLIC PANEL: CPT

## 2024-04-19 PROCEDURE — 83735 ASSAY OF MAGNESIUM: CPT

## 2024-04-19 PROCEDURE — 99233 SBSQ HOSP IP/OBS HIGH 50: CPT | Performed by: INTERNAL MEDICINE

## 2024-04-19 PROCEDURE — 2580000003 HC RX 258

## 2024-04-19 PROCEDURE — 85025 COMPLETE CBC W/AUTO DIFF WBC: CPT

## 2024-04-19 PROCEDURE — 51798 US URINE CAPACITY MEASURE: CPT

## 2024-04-19 PROCEDURE — 2500000003 HC RX 250 WO HCPCS: Performed by: NURSE PRACTITIONER

## 2024-04-19 PROCEDURE — 51702 INSERT TEMP BLADDER CATH: CPT

## 2024-04-19 PROCEDURE — 93306 TTE W/DOPPLER COMPLETE: CPT

## 2024-04-19 PROCEDURE — 82550 ASSAY OF CK (CPK): CPT

## 2024-04-19 PROCEDURE — 6370000000 HC RX 637 (ALT 250 FOR IP): Performed by: NURSE PRACTITIONER

## 2024-04-19 PROCEDURE — 2500000003 HC RX 250 WO HCPCS

## 2024-04-19 PROCEDURE — 94640 AIRWAY INHALATION TREATMENT: CPT

## 2024-04-19 PROCEDURE — 2700000000 HC OXYGEN THERAPY PER DAY

## 2024-04-19 PROCEDURE — 6360000002 HC RX W HCPCS: Performed by: INTERNAL MEDICINE

## 2024-04-19 RX ORDER — HEPARIN 100 UNIT/ML
3 SYRINGE INTRAVENOUS EVERY 12 HOURS SCHEDULED
Status: DISCONTINUED | OUTPATIENT
Start: 2024-04-19 | End: 2024-05-04 | Stop reason: HOSPADM

## 2024-04-19 RX ORDER — HEPARIN SODIUM 5000 [USP'U]/ML
5000 INJECTION, SOLUTION INTRAVENOUS; SUBCUTANEOUS EVERY 8 HOURS SCHEDULED
Status: DISCONTINUED | OUTPATIENT
Start: 2024-04-19 | End: 2024-05-04 | Stop reason: HOSPADM

## 2024-04-19 RX ORDER — SODIUM CHLORIDE 0.9 % (FLUSH) 0.9 %
5-40 SYRINGE (ML) INJECTION EVERY 12 HOURS SCHEDULED
Status: DISCONTINUED | OUTPATIENT
Start: 2024-04-19 | End: 2024-04-19 | Stop reason: SDUPTHER

## 2024-04-19 RX ORDER — HEPARIN 100 UNIT/ML
3 SYRINGE INTRAVENOUS PRN
Status: DISCONTINUED | OUTPATIENT
Start: 2024-04-19 | End: 2024-05-04 | Stop reason: HOSPADM

## 2024-04-19 RX ORDER — SODIUM CHLORIDE 9 MG/ML
INJECTION, SOLUTION INTRAVENOUS PRN
Status: DISCONTINUED | OUTPATIENT
Start: 2024-04-19 | End: 2024-05-04 | Stop reason: HOSPADM

## 2024-04-19 RX ORDER — CALCIUM GLUCONATE 10 MG/ML
1000 INJECTION, SOLUTION INTRAVENOUS ONCE
Status: COMPLETED | OUTPATIENT
Start: 2024-04-19 | End: 2024-04-19

## 2024-04-19 RX ORDER — SODIUM CHLORIDE 0.9 % (FLUSH) 0.9 %
5-40 SYRINGE (ML) INJECTION PRN
Status: DISCONTINUED | OUTPATIENT
Start: 2024-04-19 | End: 2024-05-04 | Stop reason: HOSPADM

## 2024-04-19 RX ORDER — SODIUM CHLORIDE 0.9 % (FLUSH) 0.9 %
5-40 SYRINGE (ML) INJECTION PRN
Status: DISCONTINUED | OUTPATIENT
Start: 2024-04-19 | End: 2024-04-19 | Stop reason: SDUPTHER

## 2024-04-19 RX ORDER — SODIUM CHLORIDE 0.9 % (FLUSH) 0.9 %
5-40 SYRINGE (ML) INJECTION EVERY 12 HOURS SCHEDULED
Status: DISCONTINUED | OUTPATIENT
Start: 2024-04-19 | End: 2024-05-04 | Stop reason: HOSPADM

## 2024-04-19 RX ORDER — ATORVASTATIN CALCIUM 20 MG/1
20 TABLET, FILM COATED ORAL NIGHTLY
Status: DISCONTINUED | OUTPATIENT
Start: 2024-04-19 | End: 2024-04-19

## 2024-04-19 RX ORDER — ASPIRIN 81 MG/1
81 TABLET ORAL DAILY
Status: DISCONTINUED | OUTPATIENT
Start: 2024-04-20 | End: 2024-04-19

## 2024-04-19 RX ORDER — LIDOCAINE HYDROCHLORIDE 10 MG/ML
5 INJECTION, SOLUTION EPIDURAL; INFILTRATION; INTRACAUDAL; PERINEURAL ONCE
Status: DISCONTINUED | OUTPATIENT
Start: 2024-04-19 | End: 2024-05-04 | Stop reason: HOSPADM

## 2024-04-19 RX ORDER — BUMETANIDE 0.25 MG/ML
1 INJECTION INTRAMUSCULAR; INTRAVENOUS 2 TIMES DAILY
Status: COMPLETED | OUTPATIENT
Start: 2024-04-19 | End: 2024-04-19

## 2024-04-19 RX ORDER — SODIUM CHLORIDE 9 MG/ML
INJECTION, SOLUTION INTRAVENOUS PRN
Status: DISCONTINUED | OUTPATIENT
Start: 2024-04-19 | End: 2024-04-19 | Stop reason: SDUPTHER

## 2024-04-19 RX ADMIN — BUDESONIDE INHALATION 500 MCG: 0.5 SUSPENSION RESPIRATORY (INHALATION) at 18:20

## 2024-04-19 RX ADMIN — BUMETANIDE 1 MG: 0.25 INJECTION INTRAMUSCULAR; INTRAVENOUS at 14:45

## 2024-04-19 RX ADMIN — WATER 40 MG: 1 INJECTION INTRAMUSCULAR; INTRAVENOUS; SUBCUTANEOUS at 09:34

## 2024-04-19 RX ADMIN — SODIUM CHLORIDE, PRESERVATIVE FREE 10 ML: 5 INJECTION INTRAVENOUS at 20:32

## 2024-04-19 RX ADMIN — IPRATROPIUM BROMIDE AND ALBUTEROL SULFATE 1 DOSE: .5; 2.5 SOLUTION RESPIRATORY (INHALATION) at 18:20

## 2024-04-19 RX ADMIN — SODIUM CHLORIDE: 9 INJECTION, SOLUTION INTRAVENOUS at 10:51

## 2024-04-19 RX ADMIN — IPRATROPIUM BROMIDE AND ALBUTEROL SULFATE 1 DOSE: .5; 2.5 SOLUTION RESPIRATORY (INHALATION) at 23:40

## 2024-04-19 RX ADMIN — IPRATROPIUM BROMIDE AND ALBUTEROL SULFATE 1 DOSE: .5; 2.5 SOLUTION RESPIRATORY (INHALATION) at 15:02

## 2024-04-19 RX ADMIN — CALCIUM GLUCONATE 1000 MG: 10 INJECTION, SOLUTION INTRAVENOUS at 06:41

## 2024-04-19 RX ADMIN — HEPARIN SODIUM 5000 UNITS: 5000 INJECTION INTRAVENOUS; SUBCUTANEOUS at 20:41

## 2024-04-19 RX ADMIN — DOXYCYCLINE 100 MG: 100 INJECTION, POWDER, LYOPHILIZED, FOR SOLUTION INTRAVENOUS at 17:43

## 2024-04-19 RX ADMIN — SODIUM CHLORIDE, PRESERVATIVE FREE 40 MG: 5 INJECTION INTRAVENOUS at 09:34

## 2024-04-19 RX ADMIN — WATER 40 MG: 1 INJECTION INTRAMUSCULAR; INTRAVENOUS; SUBCUTANEOUS at 01:20

## 2024-04-19 RX ADMIN — ARFORMOTEROL TARTRATE 15 MCG: 15 SOLUTION RESPIRATORY (INHALATION) at 07:58

## 2024-04-19 RX ADMIN — IPRATROPIUM BROMIDE AND ALBUTEROL SULFATE 1 DOSE: .5; 2.5 SOLUTION RESPIRATORY (INHALATION) at 10:41

## 2024-04-19 RX ADMIN — IPRATROPIUM BROMIDE AND ALBUTEROL SULFATE 1 DOSE: .5; 2.5 SOLUTION RESPIRATORY (INHALATION) at 07:58

## 2024-04-19 RX ADMIN — SODIUM CHLORIDE: 9 INJECTION, SOLUTION INTRAVENOUS at 17:47

## 2024-04-19 RX ADMIN — WATER 40 MG: 1 INJECTION INTRAMUSCULAR; INTRAVENOUS; SUBCUTANEOUS at 20:33

## 2024-04-19 RX ADMIN — MEROPENEM 1000 MG: 1 INJECTION, POWDER, FOR SOLUTION INTRAVENOUS at 04:14

## 2024-04-19 RX ADMIN — ARFORMOTEROL TARTRATE 15 MCG: 15 SOLUTION RESPIRATORY (INHALATION) at 18:20

## 2024-04-19 RX ADMIN — HEPARIN SODIUM 5000 UNITS: 5000 INJECTION INTRAVENOUS; SUBCUTANEOUS at 14:45

## 2024-04-19 RX ADMIN — BUDESONIDE INHALATION 500 MCG: 0.5 SUSPENSION RESPIRATORY (INHALATION) at 07:58

## 2024-04-19 RX ADMIN — DOXYCYCLINE 100 MG: 100 INJECTION, POWDER, LYOPHILIZED, FOR SOLUTION INTRAVENOUS at 05:46

## 2024-04-19 RX ADMIN — SODIUM CHLORIDE, PRESERVATIVE FREE 10 ML: 5 INJECTION INTRAVENOUS at 09:35

## 2024-04-19 NOTE — PROGRESS NOTES
Recent Labs     04/17/24  0800 04/17/24  1141 04/18/24  0256 04/18/24  1223 04/18/24  1710   TROPHS 228*   < > 219* 202* 186*   PROBNP 22,376*  --  33,717*  --   --     < > = values in this interval not displayed.       Recent Labs     04/17/24  2210 04/18/24  0318 04/18/24  0607   PH 7.319* 7.278* 7.289*   PCO2 53.2* 59.3* 53.3*   PO2 129.9* 84.4 70.2*   HCO3 26.7* 27.1* 25.0   BE -0.3 -0.9 -2.3   O2SAT 98.3 95.0 92.3       Imaging Studies:    XR CHEST PORTABLE   Final Result   1. No acute cardiopulmonary process.   2. Left basilar atelectasis.         XR FOOT RIGHT (2 VIEWS)   Final Result   9 mm ulceration over the plantar hindfoot at the level of the calcaneal neck   with surrounding inflammatory infiltration. No acute osseous erosion to   suggest acute osteomyelitis.      RECOMMENDATION:   Consider additional evaluation by MRI as clinically indicated.         CTA PULMONARY W CONTRAST   Final Result   1. Irregular and confluent opacities are present in lower lobes more   significant on the right suggestive of pneumonia and atelectasis   2. Peribronchial thickening bilaterally suggestive of mucous plugging and   peribronchial inflammation with areas of bronchial stenosis in the lower   lobes.   3. Additional irregular ground-glass opacities are located in the right upper   lobe.   4. No evidence of pulmonary embolism.   5. Cholelithiasis.         CT THORACIC SPINE WO CONTRAST   Final Result   No evidence for acute fracture involving the thoracic spine.         CT LUMBAR SPINE WO CONTRAST   Final Result   Diffuse disc space narrowing L5-S1.      Cholelithiasis.         XR CHEST PORTABLE   Final Result   Left IJ line tip in the region of cavoatrial junction. No pneumothorax.         Vascular duplex lower extremity venous bilateral   Final Result   No evidence of DVT in either lower extremity.         XR CHEST PORTABLE   Final Result   No acute cardiopulmonary disease.            Resident's Assessment and Plan      Saba Sullivan is a 59 y.o. female that presented with SOB she has a history of COPD gold stage IV and polysubstance abuse. UDS (+) for amphetamine, BZD, cocaine, opiates, fentanyl. Cardio was consulted for possible NSTEMI.     In the ED /106, RR 20, .  Patient was put on a BiPAP but continue to repeatedly remove it and dropped her saturations to 73%.  Labs were significant for a CR 2.2 (bs 0.7), troponin 208, proBNP 22,376,, WBC 16.3.  ABG was significant for pH of 7.2, pCO2 49.1, pO2 54.7.  Due to patient's low threshold for intubation patient was transferred to the ICU.     4/18: Saturating well on BIPAP. Trop flat, no delta - heparin discontinued     Consults:   Cardiology  Critical care      Assessment:   Acute hypoxic respiratory failure 2/2 CAP vs COPD exacerbation, possible HF exacerbation  CXR Mild hazy opacity in the lower lungs.   CTA pneumonia and atelectasis, mucus plugging , RUL ground glass opacities. No PE  Resp panel -ve  COPD Gold stage IV - PFT in 2015  Elevated troponin in setting of hypoxic respiratory failure - downtrending  Prolonged Qtc (521)  Fall  CT thoracic/ lumbar spine- no acute process.   ARF   Transaminitis   Hypertension  Cholelithiasis - seen on CTA pulm, L. Renal calculi   Leukocytosis likely reactive vs infection  Lower extremity swelling  LE US- no DVT   Elevated CK, rhabdomyolysis  Hypocalcemia  Chronic pain 2/2 L hip fx and R calc ulcer  Essential tremor  Hx of tobacco abuse (35 pack year, quit 2016)  Subclinical hypothyroidism   Hx of Osteomyelitis of right heel s/p debridement   Hx Polysubstance abuse   UDS (+) amphetamine, BZD, cocaine, opiates, fentanyl      Plan:   Primary management per MICU team  Continue Merrem (4/17) and doxycycline (4/17)  Continue breathing treatments  Follow resp work up, BC   Follow hepatitis / Hiv  Cardiology following   For echo  Follow urine studies, renal US, strict I/O   Recommending to trend CKD  Follow TSH, cortisol, uric

## 2024-04-19 NOTE — PROGRESS NOTES
Nephrology Consult  [facility]  Radhames Baptiste MD    CC:   arf    HPI:       4/18: the pt is a 61 yo female with a pmh of copd, DVT, polysubstance abuse, recurrent OM of R foot who presented with sob and ms changes. Sat was 85% on RA in ER. She was put on bipap. Cta showed no PE. She was given nebs, steroids and started on doxycycline and merrem. Bilateral LE US showed no DVT. Labs showed na 131 k 4.6 co2 27 bun 36 cr 2.2 > 3.1, lactate 1.5, ca 7.8, p 5.8, nh4 25ck 21k>16K, alb 2.7, mg 1.9, tsh 1.04, uds pos amphetamine, benzos, cocaine, fentanyl, opiates. Wbc 20.9, hgb 13.5, plt 313. Ua 100 glucose, mod bili, sg >1.03, 100 protein, pos nitrite, 10-20 rbc, lg hgb. Vitals showed rr 17 hr 112 bp 148/67, temp 98.1.  cr from 7/2021 was 0.7. she was started on ns at 51. Pt is not giving a history, mumbles when asked a question, eyes closed.     4/19: pt seen and examined in icu. On ns at 125 ml/hr. Pt eyes open but not answering questions    PMH:    Past Medical History:   Diagnosis Date    Abscess     states for a couple years she has had problems with     Chronic pain     Chronic recurrent multifocal osteomyelitis of foot (HCC) 7/6/2019    Hip fracture ([facility])     Hx of blood clots 1990    dvt rt calf    Pressure injury of heel, stage 3 (HCC) 7/6/2019    Subclinical hypothyroidism 7/6/2019       Patient Active Problem List   Diagnosis    Abscess    Nonhealing ulcer of heel (HCC)    Bradycardia    Substance abuse (HCC)    Current moderate episode of major depressive disorder (HCC)    Abnormal weight loss    COPD (chronic obstructive pulmonary disease) ([facility])    History of fracture of left hip    Ambulatory dysfunction    Hypotension    Subclinical hypothyroidism    Chronic recurrent multifocal osteomyelitis of foot (HCC)    Pressure injury of heel, stage 3 ([facility])    Open wound of fifth toe of left foot    COPD exacerbation (HCC)    Acute respiratory failure with hypoxia ([facility])    Mild protein-calorie malnutrition  131* 137   K 4.4 4.6 4.5   CL 92* 93* 99   CO2 26 27 23   CREATININE 2.2* 3.1* 4.1*   BUN 21* 35* 49*   LABGLOM 26* 17* 12*   GLUCOSE 148* 174* 139*   CALCIUM 8.2* 7.8* 7.6*   PHOS  --  5.8* 5.8*   MG  --  1.9 2.0       No results found for: \"VITD25\"    No results found for: \"PTH\"    Recent Labs     04/17/24  0800 04/18/24  0256 04/19/24  0408   ALT 94* 91* 72*   * 256* 139*   ALKPHOS 117* 86 75   BILITOT 0.3 0.3 0.3       No results for input(s): \"LABALBU\" in the last 72 hours.    No results found for: \"FERRITIN\", \"IRON\", \"TIBC\"    Vitamin B-12   Date Value Ref Range Status   02/10/2020 426 211 - 946 pg/mL Final       Folate   Date Value Ref Range Status   02/10/2020 5.0 4.8 - 24.2 ng/mL Final         Lab Results   Component Value Date/Time    COLORU SHEBA 04/18/2024 03:37 AM    NITRU POSITIVE 04/18/2024 03:37 AM    GLUCOSEU 100 04/18/2024 03:37 AM    KETUA NEGATIVE 04/18/2024 03:37 AM    UROBILINOGEN 1.0 04/18/2024 03:37 AM    BILIRUBINUR MODERATE 04/18/2024 03:37 AM       Lab Results   Component Value Date/Time    JOHN 21 04/18/2024 03:37 AM    OSMOU 355 04/18/2024 03:37 AM       No components found for: \"URIC\"    No results found for: \"LIPIDPAN\"      Assessment and Plan:    Arf  Baseline cr 0.7 from 2021  In setting of rhabdo and cocaine use and possible hypovolemia/dye with cta on 4/16  Cr 2.2>3.1>4.1  Strict I/o  Uo 200?  Try dose bumex  May need dialysis if no improvement    2. Rhabdomyolysis  Ck 21K>16K  Follow daily cpk  Continue ivf  Follow p    3. Hyponatremia  Send urine lytes  Likely hypovolemia  Increase ivf  Check tsh cortisol uric acid    4. Hypocalcemia  Replace prn  Check pth and vit d    5. Resp failure  Cta neg for PE  Started on empiric abx  Drug OD    6. Polysubstance abuse    7. Leukocytosis  Started on empiric abx  Pan cx  Possible pna uti    Radhames Fletcher MD

## 2024-04-19 NOTE — PROGRESS NOTES
IV team rounded and at this time does not recommend a midline or picc line. IV team recommends leaving triple lumen for a couple more days since it is new and see the route of antibiotics. If will need long term in a couple weeks they recommend PICC.

## 2024-04-19 NOTE — PLAN OF CARE
Problem: Safety - Adult  Goal: Free from fall injury  4/18/2024 2019 by Antonietta Ordaz RN  Outcome: Progressing  4/18/2024 1815 by Lizzeth Silver RN  Outcome: Progressing     Problem: Discharge Planning  Goal: Discharge to home or other facility with appropriate resources  Outcome: Progressing     Problem: Skin/Tissue Integrity  Goal: Absence of new skin breakdown  Description: 1.  Monitor for areas of redness and/or skin breakdown  2.  Assess vascular access sites hourly  3.  Every 4-6 hours minimum:  Change oxygen saturation probe site  4.  Every 4-6 hours:  If on nasal continuous positive airway pressure, respiratory therapy assess nares and determine need for appliance change or resting period.  4/18/2024 2019 by Antonietta Ordaz RN  Outcome: Progressing  4/18/2024 1815 by Lizzeth Silver RN  Outcome: Progressing     Problem: ABCDS Injury Assessment  Goal: Absence of physical injury  Outcome: Progressing     Problem: Respiratory - Adult  Goal: Achieves optimal ventilation and oxygenation  4/18/2024 2019 by Antonietta Ordaz RN  Outcome: Progressing  4/18/2024 1815 by Lizzeth Silver RN  Outcome: Progressing     Problem: Cardiovascular - Adult  Goal: Absence of cardiac dysrhythmias or at baseline  4/18/2024 2019 by Antonietta Ordaz RN  Outcome: Progressing  4/18/2024 1815 by Lizzeth Silver RN  Outcome: Progressing     Problem: Skin/Tissue Integrity - Adult  Goal: Incisions, wounds, or drain sites healing without S/S of infection  4/18/2024 2019 by Antonietta Ordaz RN  Outcome: Progressing  4/18/2024 1815 by Lizzeth Silver RN  Outcome: Progressing     Problem: Metabolic/Fluid and Electrolytes - Adult  Goal: Electrolytes maintained within normal limits  4/18/2024 2019 by Antonietta Ordaz RN  Outcome: Progressing  4/18/2024 1815 by Lizzeth Silver RN  Outcome: Progressing

## 2024-04-19 NOTE — PROGRESS NOTES
[facility]  Department of Internal Medicine   Internal Medicine Residency   MICU Progress Note    Patient:  Saba Sullivan 59 y.o. female  MRN: [id number]     Date of Service: 4/19/2024    Allergy: Ancef [cefazolin], Duricef [cefadroxil], and Iodine    Overnight Events:   No significant events overnight.    Subjective     Patient was seen by the bedside in the morning.  Patient is alert and oriented, blood pressure sometimes mumbles looks like as if she is altered, but after    ROS: Denies fever, chills, chest pain, shortness of breath, N/V/C/D, dysuria or blood in stool or urine.    Objective     VS:   Patient Vitals for the past 12 hrs:   BP Temp Temp src Pulse Resp SpO2   04/19/24 1200 (!) 163/86 98 °F (36.7 °C) Temporal 95 10 97 %   04/19/24 1100 (!) 163/87 -- -- (!) 103 11 97 %   04/19/24 1041 -- -- -- (!) 102 12 96 %   04/19/24 1000 (!) 155/82 -- -- (!) 103 13 96 %   04/19/24 0952 -- -- -- (!) 107 (!) 9 97 %   04/19/24 0900 (!) 161/85 -- -- (!) 104 12 96 %   04/19/24 0800 (!) 169/84 98.1 °F (36.7 °C) Temporal (!) 105 13 98 %   04/19/24 0759 -- -- -- 96 12 98 %   04/19/24 0758 -- -- -- (!) 102 11 98 %   04/19/24 0700 (!) 151/87 -- -- 98 (!) 8 98 %   04/19/24 0600 (!) 159/81 -- -- 96 12 98 %   04/19/24 0500 (!) 153/87 -- -- 98 (!) 8 99 %   04/19/24 0400 (!) 151/82 98.2 °F (36.8 °C) Axillary 100 13 99 %   04/19/24 0300 (!) 152/78 -- -- (!) 101 15 97 %   04/19/24 0200 (!) 143/81 -- -- (!) 108 17 97 %   ]    I & O - 24hr:   Intake/Output Summary (Last 24 hours) at 4/19/2024 1349  Last data filed at 4/19/2024 0647  Gross per 24 hour   Intake 2608.55 ml   Output 200 ml   Net 2408.55 ml       Net IO Since Admission: 3,499.14 mL [04/19/24 1349]    Sedatives: None    Pressors: None    Oxygen: 6 liters, high flow nasal cannula    ABG:       Recent Labs     04/17/24  2210 04/18/24  0318 04/18/24  0607   PH 7.319* 7.278* 7.289*   PCO2 53.2* 59.3* 53.3*   PO2 129.9* 84.4 70.2*   HCO3 26.7* 27.1* 25.0   BE             Imaging Studies:    XR CHEST PORTABLE   Final Result   1. No acute cardiopulmonary process.   2. Left basilar atelectasis.         XR FOOT RIGHT (2 VIEWS)   Final Result   9 mm ulceration over the plantar hindfoot at the level of the calcaneal neck   with surrounding inflammatory infiltration. No acute osseous erosion to   suggest acute osteomyelitis.      RECOMMENDATION:   Consider additional evaluation by MRI as clinically indicated.         CTA PULMONARY W CONTRAST   Final Result   1. Irregular and confluent opacities are present in lower lobes more   significant on the right suggestive of pneumonia and atelectasis   2. Peribronchial thickening bilaterally suggestive of mucous plugging and   peribronchial inflammation with areas of bronchial stenosis in the lower   lobes.   3. Additional irregular ground-glass opacities are located in the right upper   lobe.   4. No evidence of pulmonary embolism.   5. Cholelithiasis.         CT THORACIC SPINE WO CONTRAST   Final Result   No evidence for acute fracture involving the thoracic spine.         CT LUMBAR SPINE WO CONTRAST   Final Result   Diffuse disc space narrowing L5-S1.      Cholelithiasis.         XR CHEST PORTABLE   Final Result   Left IJ line tip in the region of cavoatrial junction. No pneumothorax.         Vascular duplex lower extremity venous bilateral   Final Result   No evidence of DVT in either lower extremity.         XR CHEST PORTABLE   Final Result   No acute cardiopulmonary disease.                 Resident's Assessment and Plan      Consults:   Critical care  Nephrology  Cardiology     Assessment:    Acute encephalopathy 2/2 most likely in the setting of hypercapnia/polysubstance abuse-improving  Acute renal failure 2/2 in the setting of cocaine use versus rhabdomyolysis versus hypovolemia  Acute hypercapnic hypoxic respiratory insufficiency 2/2 in the setting of pneumonia  Type II MI-NSTEMI 2/2 in the setting of substance abuse, DOMINGO and

## 2024-04-20 LAB
25(OH)D3 SERPL-MCNC: <6 NG/ML (ref 30–100)
ALBUMIN SERPL-MCNC: 2.5 G/DL (ref 3.5–5.2)
ALP SERPL-CCNC: 77 U/L (ref 35–104)
ALT SERPL-CCNC: 52 U/L (ref 0–32)
ANION GAP SERPL CALCULATED.3IONS-SCNC: 19 MMOL/L (ref 7–16)
AST SERPL-CCNC: 75 U/L (ref 0–31)
B-OH-BUTYR SERPL-MCNC: 1.21 MMOL/L (ref 0.02–0.27)
BASOPHILS # BLD: 0 K/UL (ref 0–0.2)
BASOPHILS NFR BLD: 0 % (ref 0–2)
BILIRUB SERPL-MCNC: 0.2 MG/DL (ref 0–1.2)
BUN SERPL-MCNC: 54 MG/DL (ref 6–20)
CALCIUM SERPL-MCNC: 7.9 MG/DL (ref 8.6–10.2)
CHLORIDE SERPL-SCNC: 102 MMOL/L (ref 98–107)
CHOLEST SERPL-MCNC: 116 MG/DL
CK SERPL-CCNC: 2217 U/L (ref 20–180)
CO2 SERPL-SCNC: 18 MMOL/L (ref 22–29)
CREAT SERPL-MCNC: 4.5 MG/DL (ref 0.5–1)
EOSINOPHIL # BLD: 0 K/UL (ref 0.05–0.5)
EOSINOPHILS RELATIVE PERCENT: 0 % (ref 0–6)
ERYTHROCYTE [DISTWIDTH] IN BLOOD BY AUTOMATED COUNT: 14.6 % (ref 11.5–15)
GFR SERPL CREATININE-BSD FRML MDRD: 11 ML/MIN/1.73M2
GLUCOSE BLD-MCNC: 132 MG/DL (ref 74–99)
GLUCOSE SERPL-MCNC: 125 MG/DL (ref 74–99)
HCT VFR BLD AUTO: 36 % (ref 34–48)
HDLC SERPL-MCNC: 58 MG/DL
HGB BLD-MCNC: 11 G/DL (ref 11.5–15.5)
INR PPP: 1.1
LDLC SERPL CALC-MCNC: 36 MG/DL
LYMPHOCYTES NFR BLD: 0.11 K/UL (ref 1.5–4)
LYMPHOCYTES RELATIVE PERCENT: 1 % (ref 20–42)
MAGNESIUM SERPL-MCNC: 2.1 MG/DL (ref 1.6–2.6)
MAGNESIUM SERPL-MCNC: 2.1 MG/DL (ref 1.6–2.6)
MCH RBC QN AUTO: 27.8 PG (ref 26–35)
MCHC RBC AUTO-ENTMCNC: 30.6 G/DL (ref 32–34.5)
MCV RBC AUTO: 90.9 FL (ref 80–99.9)
MONOCYTES NFR BLD: 0.54 K/UL (ref 0.1–0.95)
MONOCYTES NFR BLD: 4 % (ref 2–12)
NEUTROPHILS NFR BLD: 95 % (ref 43–80)
NEUTS SEG NFR BLD: 11.65 K/UL (ref 1.8–7.3)
PHOSPHATE SERPL-MCNC: 5.9 MG/DL (ref 2.5–4.5)
PHOSPHATE SERPL-MCNC: 6.3 MG/DL (ref 2.5–4.5)
PLATELET # BLD AUTO: 246 K/UL (ref 130–450)
PMV BLD AUTO: 9.9 FL (ref 7–12)
POTASSIUM SERPL-SCNC: 4.7 MMOL/L (ref 3.5–5)
PROT SERPL-MCNC: 6.1 G/DL (ref 6.4–8.3)
PROTHROMBIN TIME: 12.1 SEC (ref 9.3–12.4)
RBC # BLD AUTO: 3.96 M/UL (ref 3.5–5.5)
RBC # BLD: ABNORMAL 10*6/UL
SODIUM SERPL-SCNC: 139 MMOL/L (ref 132–146)
TRIGL SERPL-MCNC: 109 MG/DL
VLDLC SERPL CALC-MCNC: 22 MG/DL
WBC OTHER # BLD: 12.3 K/UL (ref 4.5–11.5)

## 2024-04-20 PROCEDURE — 99233 SBSQ HOSP IP/OBS HIGH 50: CPT | Performed by: INTERNAL MEDICINE

## 2024-04-20 PROCEDURE — 2580000003 HC RX 258

## 2024-04-20 PROCEDURE — 82550 ASSAY OF CK (CPK): CPT

## 2024-04-20 PROCEDURE — 2000000000 HC ICU R&B

## 2024-04-20 PROCEDURE — 82010 KETONE BODYS QUAN: CPT

## 2024-04-20 PROCEDURE — 6360000002 HC RX W HCPCS

## 2024-04-20 PROCEDURE — 2580000003 HC RX 258: Performed by: NURSE PRACTITIONER

## 2024-04-20 PROCEDURE — 93005 ELECTROCARDIOGRAM TRACING: CPT | Performed by: INTERNAL MEDICINE

## 2024-04-20 PROCEDURE — 2500000003 HC RX 250 WO HCPCS

## 2024-04-20 PROCEDURE — 94640 AIRWAY INHALATION TREATMENT: CPT

## 2024-04-20 PROCEDURE — 82306 VITAMIN D 25 HYDROXY: CPT

## 2024-04-20 PROCEDURE — 80053 COMPREHEN METABOLIC PANEL: CPT

## 2024-04-20 PROCEDURE — P9047 ALBUMIN (HUMAN), 25%, 50ML: HCPCS | Performed by: INTERNAL MEDICINE

## 2024-04-20 PROCEDURE — 82962 GLUCOSE BLOOD TEST: CPT

## 2024-04-20 PROCEDURE — A4216 STERILE WATER/SALINE, 10 ML: HCPCS | Performed by: NURSE PRACTITIONER

## 2024-04-20 PROCEDURE — 6360000002 HC RX W HCPCS: Performed by: NURSE PRACTITIONER

## 2024-04-20 PROCEDURE — 2700000000 HC OXYGEN THERAPY PER DAY

## 2024-04-20 PROCEDURE — 83735 ASSAY OF MAGNESIUM: CPT

## 2024-04-20 PROCEDURE — 99291 CRITICAL CARE FIRST HOUR: CPT | Performed by: INTERNAL MEDICINE

## 2024-04-20 PROCEDURE — 84100 ASSAY OF PHOSPHORUS: CPT

## 2024-04-20 PROCEDURE — 94660 CPAP INITIATION&MGMT: CPT

## 2024-04-20 PROCEDURE — 85025 COMPLETE CBC W/AUTO DIFF WBC: CPT

## 2024-04-20 PROCEDURE — 80061 LIPID PANEL: CPT

## 2024-04-20 PROCEDURE — C9113 INJ PANTOPRAZOLE SODIUM, VIA: HCPCS | Performed by: NURSE PRACTITIONER

## 2024-04-20 PROCEDURE — 2580000003 HC RX 258: Performed by: INTERNAL MEDICINE

## 2024-04-20 PROCEDURE — 6360000002 HC RX W HCPCS: Performed by: INTERNAL MEDICINE

## 2024-04-20 PROCEDURE — 85610 PROTHROMBIN TIME: CPT

## 2024-04-20 PROCEDURE — 6370000000 HC RX 637 (ALT 250 FOR IP): Performed by: NURSE PRACTITIONER

## 2024-04-20 RX ORDER — ALBUMIN (HUMAN) 12.5 G/50ML
25 SOLUTION INTRAVENOUS EVERY 8 HOURS
Status: COMPLETED | OUTPATIENT
Start: 2024-04-20 | End: 2024-04-22

## 2024-04-20 RX ORDER — ACETAMINOPHEN 325 MG/1
650 TABLET ORAL EVERY 4 HOURS PRN
Status: DISCONTINUED | OUTPATIENT
Start: 2024-04-20 | End: 2024-05-04 | Stop reason: HOSPADM

## 2024-04-20 RX ADMIN — BUDESONIDE INHALATION 500 MCG: 0.5 SUSPENSION RESPIRATORY (INHALATION) at 08:04

## 2024-04-20 RX ADMIN — ALBUMIN (HUMAN) 25 G: 0.25 INJECTION, SOLUTION INTRAVENOUS at 17:31

## 2024-04-20 RX ADMIN — ARFORMOTEROL TARTRATE 15 MCG: 15 SOLUTION RESPIRATORY (INHALATION) at 08:04

## 2024-04-20 RX ADMIN — SODIUM CHLORIDE, PRESERVATIVE FREE 10 ML: 5 INJECTION INTRAVENOUS at 08:39

## 2024-04-20 RX ADMIN — ALBUMIN (HUMAN) 25 G: 0.25 INJECTION, SOLUTION INTRAVENOUS at 10:14

## 2024-04-20 RX ADMIN — BUMETANIDE 1 MG/HR: 0.25 INJECTION INTRAMUSCULAR; INTRAVENOUS at 22:56

## 2024-04-20 RX ADMIN — WATER 40 MG: 1 INJECTION INTRAMUSCULAR; INTRAVENOUS; SUBCUTANEOUS at 21:51

## 2024-04-20 RX ADMIN — HEPARIN SODIUM 5000 UNITS: 5000 INJECTION INTRAVENOUS; SUBCUTANEOUS at 14:45

## 2024-04-20 RX ADMIN — WATER 40 MG: 1 INJECTION INTRAMUSCULAR; INTRAVENOUS; SUBCUTANEOUS at 08:38

## 2024-04-20 RX ADMIN — BUMETANIDE 1 MG/HR: 0.25 INJECTION INTRAMUSCULAR; INTRAVENOUS at 10:21

## 2024-04-20 RX ADMIN — DOXYCYCLINE 100 MG: 100 INJECTION, POWDER, LYOPHILIZED, FOR SOLUTION INTRAVENOUS at 05:24

## 2024-04-20 RX ADMIN — SODIUM CHLORIDE: 9 INJECTION, SOLUTION INTRAVENOUS at 02:13

## 2024-04-20 RX ADMIN — SODIUM CHLORIDE, PRESERVATIVE FREE 40 MG: 5 INJECTION INTRAVENOUS at 08:38

## 2024-04-20 RX ADMIN — IPRATROPIUM BROMIDE AND ALBUTEROL SULFATE 1 DOSE: .5; 2.5 SOLUTION RESPIRATORY (INHALATION) at 08:04

## 2024-04-20 RX ADMIN — DOXYCYCLINE 100 MG: 100 INJECTION, POWDER, LYOPHILIZED, FOR SOLUTION INTRAVENOUS at 17:36

## 2024-04-20 RX ADMIN — HEPARIN SODIUM 5000 UNITS: 5000 INJECTION INTRAVENOUS; SUBCUTANEOUS at 21:51

## 2024-04-20 RX ADMIN — ACETAMINOPHEN 650 MG: 325 TABLET ORAL at 23:01

## 2024-04-20 RX ADMIN — SODIUM CHLORIDE, PRESERVATIVE FREE 10 ML: 5 INJECTION INTRAVENOUS at 21:52

## 2024-04-20 RX ADMIN — HEPARIN SODIUM 5000 UNITS: 5000 INJECTION INTRAVENOUS; SUBCUTANEOUS at 05:13

## 2024-04-20 RX ADMIN — IPRATROPIUM BROMIDE AND ALBUTEROL SULFATE 1 DOSE: .5; 2.5 SOLUTION RESPIRATORY (INHALATION) at 03:33

## 2024-04-20 ASSESSMENT — PAIN SCALES - GENERAL
PAINLEVEL_OUTOF10: 0
PAINLEVEL_OUTOF10: 10
PAINLEVEL_OUTOF10: 10
PAINLEVEL_OUTOF10: 0

## 2024-04-20 ASSESSMENT — PAIN DESCRIPTION - FREQUENCY: FREQUENCY: CONTINUOUS

## 2024-04-20 ASSESSMENT — PAIN DESCRIPTION - DESCRIPTORS: DESCRIPTORS: ACHING;STABBING

## 2024-04-20 ASSESSMENT — PAIN DESCRIPTION - LOCATION: LOCATION: BACK;LEG

## 2024-04-20 ASSESSMENT — PAIN DESCRIPTION - ORIENTATION: ORIENTATION: RIGHT;LEFT

## 2024-04-20 ASSESSMENT — PAIN - FUNCTIONAL ASSESSMENT: PAIN_FUNCTIONAL_ASSESSMENT: PREVENTS OR INTERFERES SOME ACTIVE ACTIVITIES AND ADLS

## 2024-04-20 ASSESSMENT — PAIN DESCRIPTION - ONSET: ONSET: ON-GOING

## 2024-04-20 ASSESSMENT — PAIN DESCRIPTION - PAIN TYPE: TYPE: ACUTE PAIN

## 2024-04-20 NOTE — PROGRESS NOTES
[facility]  Internal Medicine Residency Program  Progress Note - House Team       Patient:  Saba Sullivan 59 y.o. female   MRN: [id number]       Date of Service: 4/20/2024    Overnight events: No overnight events. Refusing BiPAP.   Subjective     Pt seen and examined bedside. No distress.  Not interactive with exam, opens eyes and goes back to sleep.     Objective     Physical Exam  Vitals: BP (!) 148/79   Pulse 96   Temp 98.1 °F (36.7 °C) (Temporal)   Resp (!) 9   Ht 1.753 m (5' 9\")   Wt 76.2 kg (168 lb 1.6 oz)   LMP 08/28/2013   SpO2 94%   BMI 24.82 kg/m²     I & O - 24hr: I/O this shift:  In: -   Out: 25 [Urine:25]   General Appearance: appears older than stated age, fatigued, and no distress  HEENT:  NC/AT, mucus membranes slightly dry but is mouth breathing   Neck: supple   Lung: no increased WOB 5L HFNC good saturations, CTA   Heart: RRR   Abdomen: nontender, soft, not distended   Extremities:  RLE edema     Diet:   Diet NPO    Pertinent Labs & Imaging Studies   Labs    CBC with Differential:    Lab Results   Component Value Date/Time    WBC 12.3 04/20/2024 05:10 AM    RBC 3.96 04/20/2024 05:10 AM    HGB 11.0 04/20/2024 05:10 AM    HCT 36.0 04/20/2024 05:10 AM     04/20/2024 05:10 AM    MCV 90.9 04/20/2024 05:10 AM    MCH 27.8 04/20/2024 05:10 AM    MCHC 30.6 04/20/2024 05:10 AM    RDW 14.6 04/20/2024 05:10 AM    NRBC 0.9 07/30/2021 06:25 AM    METASPCT 1 04/18/2024 02:56 AM    LYMPHOPCT 1 04/20/2024 05:10 AM    MONOPCT 4 04/20/2024 05:10 AM    BASOPCT 0 04/20/2024 05:10 AM    MONOSABS 0.54 04/20/2024 05:10 AM    LYMPHSABS 0.11 04/20/2024 05:10 AM    EOSABS 0.00 04/20/2024 05:10 AM    BASOSABS 0.00 04/20/2024 05:10 AM     BMP:    Lab Results   Component Value Date/Time     04/20/2024 05:10 AM    K 4.7 04/20/2024 05:10 AM    K 4.4 07/20/2021 02:14 PM     04/20/2024 05:10 AM    CO2 18 04/20/2024 05:10 AM    BUN 54 04/20/2024 05:10 AM    CREATININE 4.5 04/20/2024  05:10 AM    CALCIUM 7.9 04/20/2024 05:10 AM    GFRAA >60 07/30/2021 06:25 AM    LABGLOM 11 04/20/2024 05:10 AM    GLUCOSE 125 04/20/2024 05:10 AM     Magnesium:    Lab Results   Component Value Date/Time    MG 2.1 04/20/2024 05:10 AM     Phosphorus:    Lab Results   Component Value Date/Time    PHOS 6.3 04/20/2024 05:10 AM       Imaging Studies:    XR CHEST PORTABLE   Final Result   1. No acute cardiopulmonary process.   2. Left basilar atelectasis.         XR FOOT RIGHT (2 VIEWS)   Final Result   9 mm ulceration over the plantar hindfoot at the level of the calcaneal neck   with surrounding inflammatory infiltration. No acute osseous erosion to   suggest acute osteomyelitis.      RECOMMENDATION:   Consider additional evaluation by MRI as clinically indicated.         CTA PULMONARY W CONTRAST   Final Result   1. Irregular and confluent opacities are present in lower lobes more   significant on the right suggestive of pneumonia and atelectasis   2. Peribronchial thickening bilaterally suggestive of mucous plugging and   peribronchial inflammation with areas of bronchial stenosis in the lower   lobes.   3. Additional irregular ground-glass opacities are located in the right upper   lobe.   4. No evidence of pulmonary embolism.   5. Cholelithiasis.         CT THORACIC SPINE WO CONTRAST   Final Result   No evidence for acute fracture involving the thoracic spine.         CT LUMBAR SPINE WO CONTRAST   Final Result   Diffuse disc space narrowing L5-S1.      Cholelithiasis.         XR CHEST PORTABLE   Final Result   Left IJ line tip in the region of cavoatrial junction. No pneumothorax.         Vascular duplex lower extremity venous bilateral   Final Result   No evidence of DVT in either lower extremity.         XR CHEST PORTABLE   Final Result   No acute cardiopulmonary disease.              Resident's Assessment and Plan     Assessment:  Mechanical fall   Acute encephalopathy 2/2 acute on chronic hypercapnic respiratory

## 2024-04-20 NOTE — PROGRESS NOTES
Inpatient Cardiology Progress note        SUBJECTIVE/OBJECTIVE:   Denies CP or SOB.      PHYSICAL EXAM:   BP (!) 157/89   Pulse (!) 104   Temp 97.6 °F (36.4 °C) (Temporal)   Resp 15   Ht 1.753 m (5' 9\")   Wt 76.2 kg (168 lb 1.6 oz)   LMP 2013   SpO2 96%   BMI 24.82 kg/m²    B/P Range last 24 hours: Systolic (24hrs), Av , Min:134 , Max:169    Diastolic (24hrs), Av, Min:67, Max:100      GENERAL: NAD   HEAD: Normocephalic, atraumatic.   NECK: No JVD. No carotid bruits. No neck masses.    CARDIOVASCULAR: Regular tachycardia, normal S1, S2, no MRG    LUNGS: Clear to auscultation bilaterally, no wheezing.    ABDOMEN: Abdomen is soft and not distended. No tenderness.    EXTREMITIES: There is right more than left pedal edema.   MUSCULOSKELETAL: No joint swelling. Normal range of motion    SKIN: Skin is moist. No ulcers or lesions.   NEUROLOGICAL: No evidence of obvious neurological deficits.    PSYCHOLOGICAL: Patient is in normal mood.        Intake/Output Summary (Last 24 hours) at 2024 2302  Last data filed at 2024  Gross per 24 hour   Intake 2747.45 ml   Output 748 ml   Net 1999.45 ml       Weight:   Wt Readings from Last 3 Encounters:   24 76.2 kg (168 lb 1.6 oz)   22 86.2 kg (190 lb)   21 86.2 kg (190 lb)       Current Inpatient Medications:   lidocaine PF  5 mL IntraDERmal Once    heparin flush  3 mL IntraVENous 2 times per day    sodium chloride flush  5-40 mL IntraVENous 2 times per day    heparin (porcine)  5,000 Units SubCUTAneous 3 times per day    methylPREDNISolone  40 mg IntraVENous Q12H    arformoterol tartrate  15 mcg Nebulization BID RT    budesonide  0.5 mg Nebulization BID RT    pantoprazole (PROTONIX) 40 mg in sodium chloride (PF) 0.9 % 10 mL injection  40 mg IntraVENous Daily    ipratropium 0.5 mg-albuterol 2.5 mg  1 Dose Inhalation Q4H RT    doxycycline (VIBRAMYCIN) IV  100 mg IntraVENous Q12H       IV Infusions (if any):   sodium chloride

## 2024-04-20 NOTE — PROGRESS NOTES
04/19/24 2340   NIV Type   Ventilator ID [id number]   NIV Started/Stopped On   Equipment Type v60   Mode Bilevel   Mask Type Full face mask   Mask Size Medium   Assessment   Pulse 100   Respirations 14   SpO2 98 %   Level of Consciousness 0   Comfort Level Good   Using Accessory Muscles No   Mask Compliance Good   Skin Assessment Clean, dry, & intact   Skin Protection for O2 Device Yes   Orientation Middle   Location Nose   Intervention(s) Skin Barrier   Breath Sounds   Breath Sounds Bilateral Clear;Diminished   Right Upper Lobe Clear;Diminished   Right Middle Lobe Clear;Diminished   Right Lower Lobe Clear;Diminished   Left Upper Lobe Clear;Diminished   Left Lower Lobe Clear;Diminished   Settings/Measurements   PIP Observed 15 cm H20   IPAP 14 cmH20   CPAP/EPAP 8 cmH2O   Vt (Measured) 652 mL   Rate Ordered 15   FiO2  50 %   I Time/ I Time % 0.8 s   Minute Volume (L/min) 11 Liters   Mask Leak (lpm) 64 lpm   Patient's Home Machine No   Alarm Settings   Alarms On Y   Oxygen Therapy/Pulse Ox   O2 Therapy Oxygen humidified   O2 Device PAP (positive airway pressure)   Pulse Oximeter Device Mode Continuous   Pulse Oximeter Device Location Finger

## 2024-04-20 NOTE — PROGRESS NOTES
[facility]  Internal Medicine Residency / House Medicine Service--addendum to resident note  Attending Physician Statement:  Refugio Tapia M.D., F.A.C.P.  Hospital charts reviewed, including other providers notes, relevant labs and imaging. Coordinating care with residents, other providers and/or counseling patient    I have seen patient/discussed the history and PE with the resident, and I agree with workup/plan and orders as documented by the resident.  (billing based on complexity of Medical decision making)  My Assessment as follows:       Still ?lethargic  Admitted to unit-- Acute hypoxic respiratory failure COPD exacerbation, possible HF exacerbation-   Neg respiratory panel  Legionella/strep neg  MRSA nasal neg  sputum  Rule  aspiration  +patch - fibross.thickening  R lower lobe  pneumonia -CT bronchitis/thickening  - doxy and meropenem for  now  Inc procalcitonin     Now improving oxygenation  Refusing bipap- NC o2  COPD gold stage IV   - weaning steroids  ACD- 12.3  polysubstance abuse. UDS (+) for amphetamine, BZD, cocaine, opiates, fentanyl.   Hx of hep C+ neg HIV     Cardio signed off \"NSTEM\"-- stop heparin, Likely type 2   Statin- if high ascvd- doubt CAD  echo ordered  Defer ischemic wokup for now    [place] Cr 4.1- baseline trop uncertain, slight bump, non specific EKG changes  sp heparin drip    Cr 4.4 to 4.5-- urine UO improving  Sp fluids  Bicarb ? Add to fluids  Rhabdo, acidotic urine AG 19,  bicarb 18,  ac    Worsening  cr since admission - cr inc rhabdo and   HTN emergency? contributing to FRANKLIN  Ast>alt probably due to rhabdo (rule out etoh hepatitis)              - prerenal  +dry on admission-- given fluids  Crystal +amphorous UA  Sp IV fluids - ?diuretic challenge  Rhabdo - DIC risk

## 2024-04-20 NOTE — PROGRESS NOTES
04/19/24 2059   NIV Type   Ventilator ID [id number]   NIV Started/Stopped On   Equipment Type v60   Mode Bilevel   Mask Type Full face mask   Mask Size Medium   Assessment   Pulse 98   Heart Rate Source Monitor   Respirations 14   SpO2 95 %   Level of Consciousness 0   Comfort Level Good   Using Accessory Muscles No   Mask Compliance Good   Skin Assessment Clean, dry, & intact   Skin Protection for O2 Device Yes   Orientation Middle   Location Nose   Intervention(s) Skin Barrier   Settings/Measurements   PIP Observed 12 cm H20   IPAP 14 cmH20   CPAP/EPAP 8 cmH2O   Vt (Measured) 443 mL   Rate Ordered 14   Insp Rise Time (%) 3 %   FiO2  50 %   I Time/ I Time % 0.8 s   Minute Volume (L/min) 10.1 Liters   Mask Leak (lpm) 48 lpm   Patient's Home Machine No   Alarm Settings   Alarms On Y   Low Pressure (cmH2O) 5 cmH2O   High Pressure (cmH2O) 30 cmH2O   RR Low (bpm) 14   RR High (bpm) 40 br/min

## 2024-04-20 NOTE — PLAN OF CARE
Problem: Safety - Adult  Goal: Free from fall injury  Outcome: Progressing     Problem: Discharge Planning  Goal: Discharge to home or other facility with appropriate resources  Outcome: Progressing     Problem: Skin/Tissue Integrity  Goal: Absence of new skin breakdown  Description: 1.  Monitor for areas of redness and/or skin breakdown  2.  Assess vascular access sites hourly  3.  Every 4-6 hours minimum:  Change oxygen saturation probe site  4.  Every 4-6 hours:  If on nasal continuous positive airway pressure, respiratory therapy assess nares and determine need for appliance change or resting period.  Outcome: Progressing     Problem: ABCDS Injury Assessment  Goal: Absence of physical injury  Outcome: Progressing  Flowsheets (Taken 4/19/2024 2138)  Absence of Physical Injury: Implement safety measures based on patient assessment     Problem: Respiratory - Adult  Goal: Achieves optimal ventilation and oxygenation  Outcome: Progressing     Problem: Cardiovascular - Adult  Goal: Absence of cardiac dysrhythmias or at baseline  Outcome: Progressing     Problem: Skin/Tissue Integrity - Adult  Goal: Incisions, wounds, or drain sites healing without S/S of infection  Outcome: Progressing     Problem: Metabolic/Fluid and Electrolytes - Adult  Goal: Electrolytes maintained within normal limits  Outcome: Progressing

## 2024-04-20 NOTE — PROGRESS NOTES
[facility]  Department of Internal Medicine   Internal Medicine Residency   MICU Progress Note    Patient:  Saba Sullivan 59 y.o. female  MRN: [id number]     Date of Service: 4/20/2024    Allergy: Ancef [cefazolin], Duricef [cefadroxil], and Iodine    Overnight Events:   No significant events overnight.    Subjective     Patient was seen by the bedside in the morning.  Patient is alert and oriented, blood pressure sometimes mumbles looks like as if she is altered, but after    ROS: Denies fever, chills, chest pain, shortness of breath, N/V/C/D, dysuria or blood in stool or urine.    Objective     VS:   Patient Vitals for the past 12 hrs:   BP Temp Temp src Pulse Resp SpO2   04/20/24 1000 (!) 150/80 -- -- 99 11 97 %   04/20/24 0900 (!) 157/81 -- -- 97 10 100 %   04/20/24 0800 (!) 148/79 98.1 °F (36.7 °C) Temporal 96 (!) 9 94 %   04/20/24 0700 (!) 161/86 -- -- 91 14 99 %   04/20/24 0600 (!) 147/81 -- -- 98 15 99 %   04/20/24 0500 (!) 159/86 -- -- 100 20 98 %   04/20/24 0400 (!) 160/99 97.6 °F (36.4 °C) Temporal 85 14 100 %   04/20/24 0333 -- -- -- 96 16 98 %   04/20/24 0300 (!) 158/87 -- -- 99 14 99 %   04/20/24 0200 (!) 157/86 -- -- 93 16 97 %   04/20/24 0100 (!) 163/89 -- -- 99 15 98 %   04/20/24 0000 (!) 160/95 97.8 °F (36.6 °C) Temporal (!) 101 15 96 %   04/19/24 2340 -- -- -- 100 14 98 %   04/19/24 2300 (!) 156/87 -- -- (!) 103 14 --   ]    I & O - 24hr:   Intake/Output Summary (Last 24 hours) at 4/20/2024 1047  Last data filed at 4/20/2024 1000  Gross per 24 hour   Intake 2982.04 ml   Output 808 ml   Net 2174.04 ml       Net IO Since Admission: 5,578.18 mL [04/20/24 1047]    Sedatives: None    Pressors: None    Oxygen: 6 liters, high flow nasal cannula    ABG:       Recent Labs     04/17/24  2210 04/18/24  0318 04/18/24  0607   PH 7.319* 7.278* 7.289*   PCO2 53.2* 59.3* 53.3*   PO2 129.9* 84.4 70.2*   HCO3 26.7* 27.1* 25.0   BE -0.3 -0.9 -2.3   O2SAT 98.3 95.0 92.3       Physical Exam:  General  Appearance: No acute distress.  Neuro: Round symmetric pupil that react to light bilaterally.   Cardiovascular: regular rate and rhythm, S1 and S2 heard, no murmurs appreciated   Respiratory: Clear to auscultation bilaterally. No wheezing or crackles appreciated.  Abdomen: Soft, non-tender; bowel sounds normal; no masses, no organomegaly, rebound or guarding  Extremities: Extremities normal, no cyanosis, distal pulses intact, no edema.      Lines & Urinary Catheter:     CVC  04/17/24 Left Internal jugular 04/17/24       Medications     Continuous Infusions:   bumetanide (BUMEX) 12.5 mg in sodium chloride 0.9 % 125 mL infusion 1 mg/hr (04/20/24 1021)    sodium chloride         Scheduled Meds:   albumin human 25%  25 g IntraVENous Q8H    lidocaine PF  5 mL IntraDERmal Once    heparin flush  3 mL IntraVENous 2 times per day    sodium chloride flush  5-40 mL IntraVENous 2 times per day    heparin (porcine)  5,000 Units SubCUTAneous 3 times per day    methylPREDNISolone  40 mg IntraVENous Q12H    arformoterol tartrate  15 mcg Nebulization BID RT    budesonide  0.5 mg Nebulization BID RT    pantoprazole (PROTONIX) 40 mg in sodium chloride (PF) 0.9 % 10 mL injection  40 mg IntraVENous Daily    ipratropium 0.5 mg-albuterol 2.5 mg  1 Dose Inhalation Q4H RT    doxycycline (VIBRAMYCIN) IV  100 mg IntraVENous Q12H       PRN Meds: heparin flush, sodium chloride flush, sodium chloride     Labs     Basic Labs    Complete Blood Count:   Recent Labs     04/18/24  0256 04/19/24  0408 04/20/24  0510   WBC 20.9* 14.6* 12.3*   HGB 13.5 11.5 11.0*   HCT 41.9 36.9 36.0    270 246        Last 3 Blood Glucose:   Recent Labs     04/19/24  0408 04/19/24  2048 04/20/24  0510   GLUCOSE 139* 126* 125*        PT/INR:    Lab Results   Component Value Date/Time    PROTIME 12.1 04/20/2024 05:10 AM    INR 1.1 04/20/2024 05:10 AM     PTT:    Lab Results   Component Value Date/Time    APTT 61.5 04/18/2024 02:56 AM    APTT 33.3 07/22/2021 06:48

## 2024-04-20 NOTE — PROGRESS NOTES
Date: 4/19/2024    Time: 11:47 PM    Patient Placed On BIPAP/CPAP/ Non-Invasive Ventilation?  No BIPAP Recheck     If no must comment.  Facial area red/color change? No           If YES are Blister/Lesion present?No   If yes must notify nursing staff  BIPAP/CPAP skin barrier?  Yes    Skin barrier type:mepilexlite       Comments:        Lennie Alejandre RCP

## 2024-04-20 NOTE — PROGRESS NOTES
Nephrology Progress Note  [facility]      CC:   arf    HPI:       4/18: the pt is a 61 yo female with a pmh of copd, DVT, polysubstance abuse, recurrent OM of R foot who presented with sob and ms changes. Sat was 85% on RA in ER. She was put on bipap. Cta showed no PE. She was given nebs, steroids and started on doxycycline and merrem. Bilateral LE US showed no DVT. Labs showed na 131 k 4.6 co2 27 bun 36 cr 2.2 > 3.1, lactate 1.5, ca 7.8, p 5.8, nh4 25ck 21k>16K, alb 2.7, mg 1.9, tsh 1.04, uds pos amphetamine, benzos, cocaine, fentanyl, opiates. Wbc 20.9, hgb 13.5, plt 313. Ua 100 glucose, mod bili, sg >1.03, 100 protein, pos nitrite, 10-20 rbc, lg hgb. Vitals showed rr 17 hr 112 bp 148/67, temp 98.1.  cr from 7/2021 was 0.7. she was started on ns at 51. Pt is not giving a history, mumbles when asked a question, eyes closed.       Patient Active Problem List   Diagnosis    Abscess    Nonhealing ulcer of heel (HCC)    Bradycardia    Substance abuse (HCC)    Current moderate episode of major depressive disorder (HCC)    Abnormal weight loss    COPD (chronic obstructive pulmonary disease) (HCC)    History of fracture of left hip    Ambulatory dysfunction    Hypotension    Subclinical hypothyroidism    Chronic recurrent multifocal osteomyelitis of foot (HCC)    Pressure injury of heel, stage 3 (HCC)    Open wound of fifth toe of left foot    COPD exacerbation (HCC)    Acute respiratory failure with hypoxia (HCC)    Mild protein-calorie malnutrition (HCC)    Osteomyelitis (HCC)    High risk medication use    Acute respiratory failure with hypoxia and hypercapnia (HCC)    NSTEMI (non-ST elevated myocardial infarction) ([facility])         Subjective    4/19: pt seen and examined in icu. On ns at 125 ml/hr. Pt eyes open but not answering questions    4/20:Reported to be refusing BIPAP; on HFNC, acceptable oxygen saturation        Meds:     lidocaine PF  5 mL IntraDERmal Once    heparin flush  3 mL IntraVENous 2 times per

## 2024-04-20 NOTE — PLAN OF CARE
Problem: Safety - Adult  Goal: Free from fall injury  Outcome: Progressing     Problem: Discharge Planning  Goal: Discharge to home or other facility with appropriate resources  Outcome: Progressing     Problem: Skin/Tissue Integrity  Goal: Absence of new skin breakdown  Description: 1.  Monitor for areas of redness and/or skin breakdown  2.  Assess vascular access sites hourly  3.  Every 4-6 hours minimum:  Change oxygen saturation probe site  4.  Every 4-6 hours:  If on nasal continuous positive airway pressure, respiratory therapy assess nares and determine need for appliance change or resting period.  Outcome: Progressing     Problem: ABCDS Injury Assessment  Goal: Absence of physical injury  Outcome: Progressing     Problem: Respiratory - Adult  Goal: Achieves optimal ventilation and oxygenation  Outcome: Progressing     Problem: Cardiovascular - Adult  Goal: Absence of cardiac dysrhythmias or at baseline  Outcome: Progressing     Problem: Skin/Tissue Integrity - Adult  Goal: Incisions, wounds, or drain sites healing without S/S of infection  Outcome: Progressing     Problem: Metabolic/Fluid and Electrolytes - Adult  Goal: Electrolytes maintained within normal limits  Outcome: Progressing     Problem: Pain  Goal: Verbalizes/displays adequate comfort level or baseline comfort level  Outcome: Progressing

## 2024-04-21 ENCOUNTER — APPOINTMENT (OUTPATIENT)
Dept: GENERAL RADIOLOGY | Age: 59
DRG: 871 | End: 2024-04-21
Payer: OTHER GOVERNMENT

## 2024-04-21 LAB
AADO2: 586.3 MMHG
ALBUMIN SERPL-MCNC: 3.8 G/DL (ref 3.5–5.2)
ALP SERPL-CCNC: 58 U/L (ref 35–104)
ALT SERPL-CCNC: 36 U/L (ref 0–32)
ANION GAP SERPL CALCULATED.3IONS-SCNC: 15 MMOL/L (ref 7–16)
ANION GAP SERPL CALCULATED.3IONS-SCNC: 19 MMOL/L (ref 7–16)
ANION GAP SERPL CALCULATED.3IONS-SCNC: 19 MMOL/L (ref 7–16)
AST SERPL-CCNC: 48 U/L (ref 0–31)
B.E.: -5.4 MMOL/L (ref -3–3)
BASOPHILS # BLD: 0.04 K/UL (ref 0–0.2)
BASOPHILS NFR BLD: 0 % (ref 0–2)
BILIRUB SERPL-MCNC: 0.4 MG/DL (ref 0–1.2)
BUN SERPL-MCNC: 70 MG/DL (ref 6–20)
BUN SERPL-MCNC: 73 MG/DL (ref 6–20)
BUN SERPL-MCNC: 76 MG/DL (ref 6–20)
CALCIUM SERPL-MCNC: 9 MG/DL (ref 8.6–10.2)
CALCIUM SERPL-MCNC: 9.2 MG/DL (ref 8.6–10.2)
CALCIUM SERPL-MCNC: 9.5 MG/DL (ref 8.6–10.2)
CHLORIDE SERPL-SCNC: 100 MMOL/L (ref 98–107)
CHLORIDE SERPL-SCNC: 101 MMOL/L (ref 98–107)
CHLORIDE SERPL-SCNC: 99 MMOL/L (ref 98–107)
CK SERPL-CCNC: 1304 U/L (ref 20–180)
CO2 SERPL-SCNC: 19 MMOL/L (ref 22–29)
CO2 SERPL-SCNC: 20 MMOL/L (ref 22–29)
CO2 SERPL-SCNC: 21 MMOL/L (ref 22–29)
COHB: 0.1 % (ref 0–1.5)
CREAT SERPL-MCNC: 5 MG/DL (ref 0.5–1)
CREAT SERPL-MCNC: 5.1 MG/DL (ref 0.5–1)
CREAT SERPL-MCNC: 5.3 MG/DL (ref 0.5–1)
CRITICAL: ABNORMAL
DATE ANALYZED: ABNORMAL
DATE OF COLLECTION: ABNORMAL
EKG ATRIAL RATE: 94 BPM
EKG P AXIS: 71 DEGREES
EKG P-R INTERVAL: 140 MS
EKG Q-T INTERVAL: 330 MS
EKG QRS DURATION: 82 MS
EKG QTC CALCULATION (BAZETT): 412 MS
EKG R AXIS: 11 DEGREES
EKG T AXIS: 38 DEGREES
EKG VENTRICULAR RATE: 94 BPM
EOSINOPHIL # BLD: 0 K/UL (ref 0.05–0.5)
EOSINOPHILS RELATIVE PERCENT: 0 % (ref 0–6)
ERYTHROCYTE [DISTWIDTH] IN BLOOD BY AUTOMATED COUNT: 14.6 % (ref 11.5–15)
FIO2: 100 %
GFR SERPL CREATININE-BSD FRML MDRD: 10 ML/MIN/1.73M2
GFR SERPL CREATININE-BSD FRML MDRD: 9 ML/MIN/1.73M2
GFR SERPL CREATININE-BSD FRML MDRD: 9 ML/MIN/1.73M2
GLUCOSE BLD-MCNC: 169 MG/DL (ref 74–99)
GLUCOSE SERPL-MCNC: 142 MG/DL (ref 74–99)
GLUCOSE SERPL-MCNC: 164 MG/DL (ref 74–99)
GLUCOSE SERPL-MCNC: 170 MG/DL (ref 74–99)
HCO3: 20.9 MMOL/L (ref 22–26)
HCT VFR BLD AUTO: 32.2 % (ref 34–48)
HGB BLD-MCNC: 10.2 G/DL (ref 11.5–15.5)
HHB: 5.9 % (ref 0–5)
IMM GRANULOCYTES # BLD AUTO: 0.28 K/UL (ref 0–0.58)
IMM GRANULOCYTES NFR BLD: 3 % (ref 0–5)
INR PPP: 1.1
LAB: ABNORMAL
LACTATE BLDV-SCNC: 0.6 MMOL/L (ref 0.5–2.2)
LYMPHOCYTES NFR BLD: 0.32 K/UL (ref 1.5–4)
LYMPHOCYTES RELATIVE PERCENT: 3 % (ref 20–42)
Lab: 316
MAGNESIUM SERPL-MCNC: 2.1 MG/DL (ref 1.6–2.6)
MAGNESIUM SERPL-MCNC: 2.1 MG/DL (ref 1.6–2.6)
MAGNESIUM SERPL-MCNC: 2.3 MG/DL (ref 1.6–2.6)
MCH RBC QN AUTO: 28.2 PG (ref 26–35)
MCHC RBC AUTO-ENTMCNC: 31.7 G/DL (ref 32–34.5)
MCV RBC AUTO: 89 FL (ref 80–99.9)
METHB: 0.4 % (ref 0–1.5)
MODE: ABNORMAL
MONOCYTES NFR BLD: 0.64 K/UL (ref 0.1–0.95)
MONOCYTES NFR BLD: 6 % (ref 2–12)
MYOGLOBIN UR-MCNC: <1 MG/L (ref 0–1)
NEUTROPHILS NFR BLD: 89 % (ref 43–80)
NEUTS SEG NFR BLD: 9.97 K/UL (ref 1.8–7.3)
O2 CONTENT: 15.2 ML/DL
O2 SATURATION: 94.1 % (ref 92–98.5)
O2HB: 93.6 % (ref 94–97)
OPERATOR ID: ABNORMAL
PARTIAL THROMBOPLASTIN TIME: 35.7 SEC (ref 24.5–35.1)
PATIENT TEMP: 37 C
PCO2: 44.2 MMHG (ref 35–45)
PEEP/CPAP: 8 CMH2O
PFO2: 0.82 MMHG/%
PH BLOOD GAS: 7.29 (ref 7.35–7.45)
PHOSPHATE SERPL-MCNC: 5.8 MG/DL (ref 2.5–4.5)
PHOSPHATE SERPL-MCNC: 5.9 MG/DL (ref 2.5–4.5)
PHOSPHATE SERPL-MCNC: 6 MG/DL (ref 2.5–4.5)
PIP: 14 CMH2O
PLATELET # BLD AUTO: 235 K/UL (ref 130–450)
PMV BLD AUTO: 9.9 FL (ref 7–12)
PO2: 82.5 MMHG (ref 75–100)
POTASSIUM SERPL-SCNC: 4.8 MMOL/L (ref 3.5–5)
POTASSIUM SERPL-SCNC: 5 MMOL/L (ref 3.5–5)
POTASSIUM SERPL-SCNC: 5.1 MMOL/L (ref 3.5–5)
PROT SERPL-MCNC: 6.6 G/DL (ref 6.4–8.3)
PROTHROMBIN TIME: 11.9 SEC (ref 9.3–12.4)
RBC # BLD AUTO: 3.62 M/UL (ref 3.5–5.5)
RBC # BLD: ABNORMAL 10*6/UL
RI(T): 7.11
SODIUM SERPL-SCNC: 135 MMOL/L (ref 132–146)
SODIUM SERPL-SCNC: 139 MMOL/L (ref 132–146)
SODIUM SERPL-SCNC: 139 MMOL/L (ref 132–146)
SOURCE, BLOOD GAS: ABNORMAL
THB: 11.5 G/DL (ref 11.5–16.5)
TIME ANALYZED: 321
WBC OTHER # BLD: 11.3 K/UL (ref 4.5–11.5)

## 2024-04-21 PROCEDURE — 94660 CPAP INITIATION&MGMT: CPT

## 2024-04-21 PROCEDURE — 6360000002 HC RX W HCPCS: Performed by: NURSE PRACTITIONER

## 2024-04-21 PROCEDURE — 80053 COMPREHEN METABOLIC PANEL: CPT

## 2024-04-21 PROCEDURE — 83605 ASSAY OF LACTIC ACID: CPT

## 2024-04-21 PROCEDURE — 2580000003 HC RX 258: Performed by: INTERNAL MEDICINE

## 2024-04-21 PROCEDURE — 85610 PROTHROMBIN TIME: CPT

## 2024-04-21 PROCEDURE — 99233 SBSQ HOSP IP/OBS HIGH 50: CPT | Performed by: INTERNAL MEDICINE

## 2024-04-21 PROCEDURE — 84100 ASSAY OF PHOSPHORUS: CPT

## 2024-04-21 PROCEDURE — 6360000002 HC RX W HCPCS: Performed by: INTERNAL MEDICINE

## 2024-04-21 PROCEDURE — 71045 X-RAY EXAM CHEST 1 VIEW: CPT

## 2024-04-21 PROCEDURE — 93010 ELECTROCARDIOGRAM REPORT: CPT | Performed by: INTERNAL MEDICINE

## 2024-04-21 PROCEDURE — 2580000003 HC RX 258

## 2024-04-21 PROCEDURE — A4216 STERILE WATER/SALINE, 10 ML: HCPCS | Performed by: NURSE PRACTITIONER

## 2024-04-21 PROCEDURE — 82962 GLUCOSE BLOOD TEST: CPT

## 2024-04-21 PROCEDURE — 80048 BASIC METABOLIC PNL TOTAL CA: CPT

## 2024-04-21 PROCEDURE — 83735 ASSAY OF MAGNESIUM: CPT

## 2024-04-21 PROCEDURE — 2500000003 HC RX 250 WO HCPCS

## 2024-04-21 PROCEDURE — 85025 COMPLETE CBC W/AUTO DIFF WBC: CPT

## 2024-04-21 PROCEDURE — 6360000002 HC RX W HCPCS

## 2024-04-21 PROCEDURE — 82805 BLOOD GASES W/O2 SATURATION: CPT

## 2024-04-21 PROCEDURE — C9113 INJ PANTOPRAZOLE SODIUM, VIA: HCPCS | Performed by: NURSE PRACTITIONER

## 2024-04-21 PROCEDURE — 2000000000 HC ICU R&B

## 2024-04-21 PROCEDURE — 85730 THROMBOPLASTIN TIME PARTIAL: CPT

## 2024-04-21 PROCEDURE — 2700000000 HC OXYGEN THERAPY PER DAY

## 2024-04-21 PROCEDURE — 94640 AIRWAY INHALATION TREATMENT: CPT

## 2024-04-21 PROCEDURE — 6370000000 HC RX 637 (ALT 250 FOR IP): Performed by: NURSE PRACTITIONER

## 2024-04-21 PROCEDURE — 36592 COLLECT BLOOD FROM PICC: CPT

## 2024-04-21 PROCEDURE — 82550 ASSAY OF CK (CPK): CPT

## 2024-04-21 PROCEDURE — P9047 ALBUMIN (HUMAN), 25%, 50ML: HCPCS | Performed by: INTERNAL MEDICINE

## 2024-04-21 PROCEDURE — 2580000003 HC RX 258: Performed by: NURSE PRACTITIONER

## 2024-04-21 RX ADMIN — IPRATROPIUM BROMIDE AND ALBUTEROL SULFATE 1 DOSE: .5; 2.5 SOLUTION RESPIRATORY (INHALATION) at 07:55

## 2024-04-21 RX ADMIN — ALBUMIN (HUMAN) 25 G: 0.25 INJECTION, SOLUTION INTRAVENOUS at 18:43

## 2024-04-21 RX ADMIN — HEPARIN SODIUM 5000 UNITS: 5000 INJECTION INTRAVENOUS; SUBCUTANEOUS at 14:25

## 2024-04-21 RX ADMIN — DOXYCYCLINE 100 MG: 100 INJECTION, POWDER, LYOPHILIZED, FOR SOLUTION INTRAVENOUS at 18:40

## 2024-04-21 RX ADMIN — BUMETANIDE 1 MG/HR: 0.25 INJECTION INTRAMUSCULAR; INTRAVENOUS at 09:54

## 2024-04-21 RX ADMIN — SODIUM CHLORIDE, PRESERVATIVE FREE 40 MG: 5 INJECTION INTRAVENOUS at 09:39

## 2024-04-21 RX ADMIN — IPRATROPIUM BROMIDE AND ALBUTEROL SULFATE 1 DOSE: .5; 2.5 SOLUTION RESPIRATORY (INHALATION) at 13:09

## 2024-04-21 RX ADMIN — BUDESONIDE INHALATION 500 MCG: 0.5 SUSPENSION RESPIRATORY (INHALATION) at 07:55

## 2024-04-21 RX ADMIN — ARFORMOTEROL TARTRATE 15 MCG: 15 SOLUTION RESPIRATORY (INHALATION) at 07:55

## 2024-04-21 RX ADMIN — ALBUMIN (HUMAN) 25 G: 0.25 INJECTION, SOLUTION INTRAVENOUS at 10:05

## 2024-04-21 RX ADMIN — WATER 40 MG: 1 INJECTION INTRAMUSCULAR; INTRAVENOUS; SUBCUTANEOUS at 20:08

## 2024-04-21 RX ADMIN — HEPARIN 300 UNITS: 100 SYRINGE at 09:39

## 2024-04-21 RX ADMIN — ARFORMOTEROL TARTRATE 15 MCG: 15 SOLUTION RESPIRATORY (INHALATION) at 19:54

## 2024-04-21 RX ADMIN — HEPARIN SODIUM 5000 UNITS: 5000 INJECTION INTRAVENOUS; SUBCUTANEOUS at 20:08

## 2024-04-21 RX ADMIN — ACETAMINOPHEN 650 MG: 325 TABLET ORAL at 12:56

## 2024-04-21 RX ADMIN — IPRATROPIUM BROMIDE AND ALBUTEROL SULFATE 1 DOSE: .5; 2.5 SOLUTION RESPIRATORY (INHALATION) at 16:21

## 2024-04-21 RX ADMIN — IPRATROPIUM BROMIDE AND ALBUTEROL SULFATE 1 DOSE: .5; 2.5 SOLUTION RESPIRATORY (INHALATION) at 23:59

## 2024-04-21 RX ADMIN — BUMETANIDE 1 MG/HR: 0.25 INJECTION INTRAMUSCULAR; INTRAVENOUS at 22:13

## 2024-04-21 RX ADMIN — BUMETANIDE 1 MG/HR: 0.25 INJECTION INTRAMUSCULAR; INTRAVENOUS at 09:55

## 2024-04-21 RX ADMIN — HEPARIN SODIUM 5000 UNITS: 5000 INJECTION INTRAVENOUS; SUBCUTANEOUS at 05:36

## 2024-04-21 RX ADMIN — WATER 40 MG: 1 INJECTION INTRAMUSCULAR; INTRAVENOUS; SUBCUTANEOUS at 09:39

## 2024-04-21 RX ADMIN — IPRATROPIUM BROMIDE AND ALBUTEROL SULFATE 1 DOSE: .5; 2.5 SOLUTION RESPIRATORY (INHALATION) at 04:04

## 2024-04-21 RX ADMIN — ALBUMIN (HUMAN) 25 G: 0.25 INJECTION, SOLUTION INTRAVENOUS at 02:03

## 2024-04-21 RX ADMIN — BUDESONIDE INHALATION 500 MCG: 0.5 SUSPENSION RESPIRATORY (INHALATION) at 19:54

## 2024-04-21 RX ADMIN — DOXYCYCLINE 100 MG: 100 INJECTION, POWDER, LYOPHILIZED, FOR SOLUTION INTRAVENOUS at 05:35

## 2024-04-21 RX ADMIN — Medication 300 UNITS: at 09:40

## 2024-04-21 RX ADMIN — IPRATROPIUM BROMIDE AND ALBUTEROL SULFATE 1 DOSE: .5; 2.5 SOLUTION RESPIRATORY (INHALATION) at 19:54

## 2024-04-21 RX ADMIN — SODIUM CHLORIDE, PRESERVATIVE FREE 10 ML: 5 INJECTION INTRAVENOUS at 09:38

## 2024-04-21 RX ADMIN — SODIUM CHLORIDE, PRESERVATIVE FREE 10 ML: 5 INJECTION INTRAVENOUS at 20:08

## 2024-04-21 ASSESSMENT — PAIN DESCRIPTION - DESCRIPTORS
DESCRIPTORS: ACHING;DISCOMFORT
DESCRIPTORS: ACHING;DISCOMFORT

## 2024-04-21 ASSESSMENT — PAIN SCALES - GENERAL
PAINLEVEL_OUTOF10: 0
PAINLEVEL_OUTOF10: 10
PAINLEVEL_OUTOF10: 0
PAINLEVEL_OUTOF10: 8
PAINLEVEL_OUTOF10: 0
PAINLEVEL_OUTOF10: 0
PAINLEVEL_OUTOF10: 6

## 2024-04-21 ASSESSMENT — PAIN DESCRIPTION - LOCATION
LOCATION: GENERALIZED
LOCATION: BACK;LEG
LOCATION: BACK;GENERALIZED

## 2024-04-21 NOTE — PROGRESS NOTES
Nephrology Progress Note  [facility]      CC:   arf    HPI:       4/18: the pt is a 59 yo female with a pmh of copd, DVT, polysubstance abuse, recurrent OM of R foot who presented with sob and ms changes. Sat was 85% on RA in ER. She was put on bipap. Cta showed no PE. She was given nebs, steroids and started on doxycycline and merrem. Bilateral LE US showed no DVT. Labs showed na 131 k 4.6 co2 27 bun 36 cr 2.2 > 3.1, lactate 1.5, ca 7.8, p 5.8, nh4 25ck 21k>16K, alb 2.7, mg 1.9, tsh 1.04, uds pos amphetamine, benzos, cocaine, fentanyl, opiates. Wbc 20.9, hgb 13.5, plt 313. Ua 100 glucose, mod bili, sg >1.03, 100 protein, pos nitrite, 10-20 rbc, lg hgb. Vitals showed rr 17 hr 112 bp 148/67, temp 98.1.  cr from 7/2021 was 0.7. she was started on ns at 51. Pt is not giving a history, mumbles when asked a question, eyes closed.       Patient Active Problem List   Diagnosis    Abscess    Nonhealing ulcer of heel (HCC)    Bradycardia    Substance abuse (HCC)    Current moderate episode of major depressive disorder (HCC)    Abnormal weight loss    COPD (chronic obstructive pulmonary disease) (HCC)    History of fracture of left hip    Ambulatory dysfunction    Hypotension    Subclinical hypothyroidism    Chronic recurrent multifocal osteomyelitis of foot (HCC)    Pressure injury of heel, stage 3 (HCC)    Open wound of fifth toe of left foot    COPD exacerbation (HCC)    Acute respiratory failure with hypoxia (HCC)    Mild protein-calorie malnutrition (HCC)    Osteomyelitis (HCC)    High risk medication use    Acute respiratory failure with hypoxia and hypercapnia (HCC)    NSTEMI (non-ST elevated myocardial infarction) ([facility])         Subjective    4/19: pt seen and examined in icu. On ns at 125 ml/hr. Pt eyes open but not answering questions    4/20:Reported to be refusing BIPAP; on HFNC, acceptable oxygen saturation    4/21 received call from NP overnight; reported patient had episode of SOB and hypoxia; UO improved

## 2024-04-21 NOTE — PROGRESS NOTES
[facility]  Department of Internal Medicine   Internal Medicine Residency   MICU Progress Note    Patient:  Saba Sullivan 59 y.o. female  MRN: [id number]     Date of Service: 4/21/2024    Allergy: Ancef [cefazolin], Duricef [cefadroxil], and Iodine    Overnight Events:   No significant events overnight.    Subjective     Patient was seen by the bedside in the morning.  Patient is alert and oriented, blood pressure sometimes mumbles looks like as if she is altered, but after    ROS: Denies fever, chills, chest pain, shortness of breath, N/V/C/D, dysuria or blood in stool or urine.    Objective     VS:   Patient Vitals for the past 12 hrs:   BP Temp Temp src Pulse Resp SpO2   04/21/24 1000 (!) 162/90 98.2 °F (36.8 °C) Temporal 99 14 --   04/21/24 0930 (!) 164/88 -- -- 98 16 95 %   04/21/24 0900 (!) 163/88 97.6 °F (36.4 °C) Temporal 100 20 93 %   04/21/24 0830 (!) 155/87 -- -- 100 16 97 %   04/21/24 0800 (!) 163/93 97.6 °F (36.4 °C) Temporal (!) 101 19 100 %   04/21/24 0758 -- -- -- 95 -- --   04/21/24 0755 -- -- -- 99 18 100 %   04/21/24 0730 (!) 160/92 -- -- 96 16 100 %   04/21/24 0600 (!) 160/94 -- -- 98 15 98 %   04/21/24 0500 -- -- -- 98 15 100 %   04/21/24 0405 -- -- -- 93 15 99 %   04/21/24 0400 (!) 169/94 98.2 °F (36.8 °C) Temporal 93 15 100 %   04/21/24 0320 -- -- -- 99 22 100 %   04/21/24 0319 (!) 158/101 -- -- 100 23 98 %   04/21/24 0316 -- -- -- (!) 112 (!) 33 (!) 77 %   04/21/24 0315 -- -- -- (!) 113 (!) 38 (!) 74 %   04/21/24 0314 -- -- -- (!) 120 (!) 40 (!) 65 %   04/21/24 0313 -- -- -- (!) 114 29 (!) 79 %   04/21/24 0312 -- -- -- (!) 109 26 (!) 82 %   04/21/24 0300 (!) 165/87 -- -- 97 17 94 %   04/21/24 0200 (!) 158/88 -- -- 97 17 96 %   04/21/24 0100 (!) 166/86 -- -- 98 14 96 %   04/21/24 0040 -- -- -- 98 13 96 %   04/21/24 0000 (!) 163/93 98.2 °F (36.8 °C) Temporal (!) 102 15 96 %   04/20/24 2300 -- -- -- 97 15 95 %   ]    I & O - 24hr:   Intake/Output Summary (Last 24 hours) at

## 2024-04-21 NOTE — PROGRESS NOTES
Pt desated into the 70s while on high flow NC at 5L. BiPap was placed. ROSEMARY Mack was notified. RT was called. Sat increased to 99%.ABGs collected. Will continue to monitor.

## 2024-04-21 NOTE — PLAN OF CARE
Problem: Safety - Adult  Goal: Free from fall injury  4/20/2024 2320 by Michelle Queen RN  Outcome: Progressing     Problem: Discharge Planning  Goal: Discharge to home or other facility with appropriate resources  4/20/2024 2320 by Michelle Queen RN  Outcome: Progressing     Problem: Skin/Tissue Integrity  Goal: Absence of new skin breakdown  Description: 1.  Monitor for areas of redness and/or skin breakdown  2.  Assess vascular access sites hourly  3.  Every 4-6 hours minimum:  Change oxygen saturation probe site  4.  Every 4-6 hours:  If on nasal continuous positive airway pressure, respiratory therapy assess nares and determine need for appliance change or resting period.  4/20/2024 2320 by Michelle Queen RN  Outcome: Progressing     Problem: ABCDS Injury Assessment  Goal: Absence of physical injury  4/20/2024 2320 by Michelle Queen RN  Outcome: Progressing  Flowsheets (Taken 4/20/2024 2319)  Absence of Physical Injury: Implement safety measures based on patient assessment     Problem: Respiratory - Adult  Goal: Achieves optimal ventilation and oxygenation  4/20/2024 2320 by Michelle Queen RN  Outcome: Progressing     Problem: Cardiovascular - Adult  Goal: Absence of cardiac dysrhythmias or at baseline  4/20/2024 2320 by Michelle Queen RN  Outcome: Progressing     Problem: Skin/Tissue Integrity - Adult  Goal: Incisions, wounds, or drain sites healing without S/S of infection  4/20/2024 2320 by Michelle Queen RN  Outcome: Progressing     Problem: Metabolic/Fluid and Electrolytes - Adult  Goal: Electrolytes maintained within normal limits  4/20/2024 2320 by Michelle Queen RN  Outcome: Progressing     Problem: Pain  Goal: Verbalizes/displays adequate comfort level or baseline comfort level  4/20/2024 2320 by Michelle Queen RN  Outcome: Progressing

## 2024-04-21 NOTE — PROGRESS NOTES
[facility]  Internal Medicine Residency Program  Progress Note - House Team       Patient:  Saba Sullivan 59 y.o. female   MRN: [id number]       Date of Service: 4/21/2024    CC: SOB    Subjective     Overnight events: Noted episode of desaturation overnight, placed on BIPAP.     Patient seen and examined at bedside this morning, more alert and oriented, able to engage in conversation. Unable to recall what happened before she was brought to the hospital. Denies chest pain or SOB. Does note that she feels shaky.     Objective   Physical Exam  Vitals: BP (!) 160/94   Pulse 98   Temp 98.2 °F (36.8 °C) (Temporal)   Resp 15   Ht 1.753 m (5' 9\")   Wt 76.2 kg (168 lb 1.6 oz)   LMP 08/28/2013   SpO2 98%   BMI 24.82 kg/m²     I & O - 24hr: No intake/output data recorded.   General Appearance: Alert and oriented x3, not in distress, seen on 8L HFNC  HEENT:  Head: Normocephalic, no lesions, without obvious abnormality.  Neck: no adenopathy, no carotid bruit, no JVD, and supple, symmetrical, trachea midline  Lung: No wheezing appreciated, clear breath sounds  Heart: regular rate and rhythm, S1, S2 normal, no murmur, click, rub or gallop  Abdomen: soft, non-tender; bowel sounds normal; no masses,  no organomegaly  Extremities:  Gr 2-3 lower extremity edema R, no erythema noted; no edema on LLE  Neurologic: Mental status: Alert and oriented x3, following commands.     Diet:   ADULT DIET; Dysphagia - Soft and Bite Sized    Pertinent Labs & Imaging Studies     Labs  Recent Labs     04/19/24  0408 04/20/24  0510 04/21/24  0327   WBC 14.6* 12.3* 11.3   RBC 4.12 3.96 3.62   HGB 11.5 11.0* 10.2*   HCT 36.9 36.0 32.2*   MCV 89.6 90.9 89.0   MCH 27.9 27.8 28.2   MCHC 31.2* 30.6* 31.7*   RDW 14.2 14.6 14.6    246 235   MPV 9.9 9.9 9.9     Recent Labs     04/19/24  0408 04/19/24 2048 04/20/24  0510 04/20/24  1938 04/21/24  0335    137 139  --  139   K 4.5 4.6 4.7  --  5.1*   CL 99 100 102  --   101   MG 2.0  --  2.1 2.1 2.3   PHOS 5.8*  --  6.3* 5.9* 5.8*   CO2 23 22 18*  --  19*   BUN 49* 53* 54*  --  70*   CREATININE 4.1* 4.4* 4.5*  --  5.0*   ANIONGAP 15 15 19*  --  19*   GLUCOSE 139* 126* 125*  --  164*   CALCIUM 7.6* 7.9* 7.9*  --  9.0   PROT 5.8*  --  6.1*  --  6.6   BILITOT 0.3  --  0.2  --  0.4   ALKPHOS 75  --  77  --  58   *  --  75*  --  48*   ALT 72*  --  52*  --  36*     Recent Labs     04/21/24  0335   LACTA 0.6     Recent Labs     04/18/24  1223 04/18/24  1710   TROPHS 202* 186*     Recent Labs     04/21/24  0316   PH 7.293*   PCO2 44.2   PO2 82.5   HCO3 20.9*   BE -5.4*   O2SAT 94.1     Imaging Studies:    XR CHEST PORTABLE   Final Result   1. No acute cardiopulmonary process.   2. Left basilar atelectasis.         XR FOOT RIGHT (2 VIEWS)   Final Result   9 mm ulceration over the plantar hindfoot at the level of the calcaneal neck   with surrounding inflammatory infiltration. No acute osseous erosion to   suggest acute osteomyelitis.      RECOMMENDATION:   Consider additional evaluation by MRI as clinically indicated.         CTA PULMONARY W CONTRAST   Final Result   1. Irregular and confluent opacities are present in lower lobes more   significant on the right suggestive of pneumonia and atelectasis   2. Peribronchial thickening bilaterally suggestive of mucous plugging and   peribronchial inflammation with areas of bronchial stenosis in the lower   lobes.   3. Additional irregular ground-glass opacities are located in the right upper   lobe.   4. No evidence of pulmonary embolism.   5. Cholelithiasis.         CT THORACIC SPINE WO CONTRAST   Final Result   No evidence for acute fracture involving the thoracic spine.         CT LUMBAR SPINE WO CONTRAST   Final Result   Diffuse disc space narrowing L5-S1.      Cholelithiasis.         XR CHEST PORTABLE   Final Result   Left IJ line tip in the region of cavoatrial junction. No pneumothorax.         Vascular duplex lower extremity venous

## 2024-04-21 NOTE — PLAN OF CARE
Problem: Discharge Planning  Goal: Discharge to home or other facility with appropriate resources  4/21/2024 0754 by Romina Ocasio RN  Outcome: Not Progressing  4/20/2024 2320 by Michelle Queen RN  Outcome: Progressing     Problem: Metabolic/Fluid and Electrolytes - Adult  Goal: Electrolytes maintained within normal limits  4/21/2024 0754 by Romina Ocasio RN  Outcome: Not Progressing  4/20/2024 2320 by Michelle Queen RN  Outcome: Progressing     Problem: Safety - Adult  Goal: Free from fall injury  4/21/2024 0754 by Romina Ocasio RN  Outcome: Progressing  4/20/2024 2320 by Michelle Queen RN  Outcome: Progressing     Problem: Skin/Tissue Integrity  Goal: Absence of new skin breakdown  Description: 1.  Monitor for areas of redness and/or skin breakdown  2.  Assess vascular access sites hourly  3.  Every 4-6 hours minimum:  Change oxygen saturation probe site  4.  Every 4-6 hours:  If on nasal continuous positive airway pressure, respiratory therapy assess nares and determine need for appliance change or resting period.  4/21/2024 0754 by Romina Ocasio RN  Outcome: Progressing  4/20/2024 2320 by Michelle Queen RN  Outcome: Progressing     Problem: ABCDS Injury Assessment  Goal: Absence of physical injury  4/21/2024 0754 by Romina Ocasio RN  Outcome: Progressing  4/20/2024 2320 by Michelle Queen RN  Outcome: Progressing  Flowsheets (Taken 4/20/2024 2319)  Absence of Physical Injury: Implement safety measures based on patient assessment     Problem: Respiratory - Adult  Goal: Achieves optimal ventilation and oxygenation  4/21/2024 0754 by Romina Ocasio RN  Outcome: Progressing  4/20/2024 2320 by Michelle Queen RN  Outcome: Progressing     Problem: Cardiovascular - Adult  Goal: Absence of cardiac dysrhythmias or at baseline  4/21/2024 0754 by Romina Ocasio RN  Outcome: Progressing  4/20/2024 2320 by Michelle Queen RN  Outcome: Progressing     Problem: Skin/Tissue Integrity - Adult  Goal:

## 2024-04-21 NOTE — PLAN OF CARE
Interval H&P/Hospital Course    The patient is a 59 y.o. female with PMH COPD gold stage IV, polysubstance abuse, who presented to the ED due to shortness of breath over the past few hours. In the ED, she was hypertensive, mildly tachycardic, hypoxic. She appeared to be in respiratory distress, drowsy. Placed on BIPAP but patient repeatedly removed it, with noted desaturations to 73%. ABG showed respiratory acidosis pH 7.2, pCO2 49.1, pO2 54.7. Crea elevated at 2.2 (Baseline 0.7 in 2021).  CXR no acute findings, US duplex no DVT on lower extremities. CTA chest showed irregular confluent opacities in lower lobes suggestive of mucus plugging; ground glass opacities RUL, no PE. Due to elevated trop 228 with nonspecific ST T wave changes on EKG, she was started on heparin drip for possible NSTEMI. UDS (+) for amphetamine, BZD, cocaine, opiates, fentanyl. She was then transferred to MICU with low threshold for intubation.     She was started on Meropenem and Doxycycline for pneumonia treatment, given breathing treatments, solumedrol. Per cardiology, elevated trop flat, likely type 2 NSTEMI. Heparin drip was discontinued on 4/18. Patient continued to refuse bipap, she was able to tolerate HFNC with good saturations. Due to hyponatremia and worsening creatinine in setting of rhabdomyolysis and contrast, nephrology was consulted. She was started on IV hydration. With progressively worsening DOMINGO, she was given a trial of Bumex. Noted increase in urine output but worsening creatinine, she was started on Bumex drip. On 4/21, noted desaturation, patient was placed on BIPAP temporarily with recovery of spO2. She was more interactive, alert and oriented.     Electronically signed by Hammad Green MD on 4/21/2024 at 12:11 PM

## 2024-04-22 ENCOUNTER — APPOINTMENT (OUTPATIENT)
Dept: GENERAL RADIOLOGY | Age: 59
DRG: 871 | End: 2024-04-22
Payer: OTHER GOVERNMENT

## 2024-04-22 LAB
ANION GAP SERPL CALCULATED.3IONS-SCNC: 17 MMOL/L (ref 7–16)
ANION GAP SERPL CALCULATED.3IONS-SCNC: 19 MMOL/L (ref 7–16)
ANION GAP SERPL CALCULATED.3IONS-SCNC: 21 MMOL/L (ref 7–16)
BASOPHILS # BLD: 0 K/UL (ref 0–0.2)
BASOPHILS NFR BLD: 0 % (ref 0–2)
BUN SERPL-MCNC: 78 MG/DL (ref 6–20)
BUN SERPL-MCNC: 83 MG/DL (ref 6–20)
BUN SERPL-MCNC: 86 MG/DL (ref 6–20)
CALCIUM SERPL-MCNC: 9.4 MG/DL (ref 8.6–10.2)
CALCIUM SERPL-MCNC: 9.7 MG/DL (ref 8.6–10.2)
CALCIUM SERPL-MCNC: 9.8 MG/DL (ref 8.6–10.2)
CHLORIDE SERPL-SCNC: 98 MMOL/L (ref 98–107)
CHLORIDE SERPL-SCNC: 98 MMOL/L (ref 98–107)
CHLORIDE SERPL-SCNC: 99 MMOL/L (ref 98–107)
CK SERPL-CCNC: 878 U/L (ref 20–180)
CO2 SERPL-SCNC: 20 MMOL/L (ref 22–29)
CO2 SERPL-SCNC: 20 MMOL/L (ref 22–29)
CO2 SERPL-SCNC: 25 MMOL/L (ref 22–29)
CREAT SERPL-MCNC: 5.1 MG/DL (ref 0.5–1)
CREAT SERPL-MCNC: 5.4 MG/DL (ref 0.5–1)
CREAT SERPL-MCNC: 5.5 MG/DL (ref 0.5–1)
EOSINOPHIL # BLD: 0 K/UL (ref 0.05–0.5)
EOSINOPHILS RELATIVE PERCENT: 0 % (ref 0–6)
ERYTHROCYTE [DISTWIDTH] IN BLOOD BY AUTOMATED COUNT: 14.6 % (ref 11.5–15)
GFR SERPL CREATININE-BSD FRML MDRD: 8 ML/MIN/1.73M2
GFR SERPL CREATININE-BSD FRML MDRD: 9 ML/MIN/1.73M2
GFR SERPL CREATININE-BSD FRML MDRD: 9 ML/MIN/1.73M2
GLUCOSE BLD-MCNC: 168 MG/DL (ref 74–99)
GLUCOSE BLD-MCNC: 203 MG/DL (ref 74–99)
GLUCOSE BLD-MCNC: 295 MG/DL (ref 74–99)
GLUCOSE SERPL-MCNC: 138 MG/DL (ref 74–99)
GLUCOSE SERPL-MCNC: 162 MG/DL (ref 74–99)
GLUCOSE SERPL-MCNC: 172 MG/DL (ref 74–99)
HCT VFR BLD AUTO: 30.2 % (ref 34–48)
HGB BLD-MCNC: 9.4 G/DL (ref 11.5–15.5)
LYMPHOCYTES NFR BLD: 0.61 K/UL (ref 1.5–4)
LYMPHOCYTES RELATIVE PERCENT: 5 % (ref 20–42)
MAGNESIUM SERPL-MCNC: 2 MG/DL (ref 1.6–2.6)
MAGNESIUM SERPL-MCNC: 2.1 MG/DL (ref 1.6–2.6)
MCH RBC QN AUTO: 27.2 PG (ref 26–35)
MCHC RBC AUTO-ENTMCNC: 31.1 G/DL (ref 32–34.5)
MCV RBC AUTO: 87.3 FL (ref 80–99.9)
METAMYELOCYTES ABSOLUTE COUNT: 0.1 K/UL (ref 0–0.12)
METAMYELOCYTES: 1 % (ref 0–1)
MICROORGANISM SPEC CULT: NORMAL
MICROORGANISM SPEC CULT: NORMAL
MONOCYTES NFR BLD: 0.81 K/UL (ref 0.1–0.95)
MONOCYTES NFR BLD: 7 % (ref 2–12)
MYELOCYTES ABSOLUTE COUNT: 0.31 K/UL
MYELOCYTES: 3 %
NEUTROPHILS NFR BLD: 84 % (ref 43–80)
NEUTS SEG NFR BLD: 9.77 K/UL (ref 1.8–7.3)
NUCLEATED RED BLOOD CELLS: 4 PER 100 WBC
PHOSPHATE SERPL-MCNC: 5.4 MG/DL (ref 2.5–4.5)
PHOSPHATE SERPL-MCNC: 5.6 MG/DL (ref 2.5–4.5)
PHOSPHATE SERPL-MCNC: 5.7 MG/DL (ref 2.5–4.5)
PHOSPHATE SERPL-MCNC: 5.8 MG/DL (ref 2.5–4.5)
PLATELET # BLD AUTO: 223 K/UL (ref 130–450)
PMV BLD AUTO: 10.1 FL (ref 7–12)
POTASSIUM SERPL-SCNC: 5.1 MMOL/L (ref 3.5–5)
POTASSIUM SERPL-SCNC: 5.2 MMOL/L (ref 3.5–5)
POTASSIUM SERPL-SCNC: 5.3 MMOL/L (ref 3.5–5)
RBC # BLD AUTO: 3.46 M/UL (ref 3.5–5.5)
RBC # BLD: NORMAL 10*6/UL
SERVICE CMNT-IMP: NORMAL
SERVICE CMNT-IMP: NORMAL
SODIUM SERPL-SCNC: 137 MMOL/L (ref 132–146)
SODIUM SERPL-SCNC: 140 MMOL/L (ref 132–146)
SODIUM SERPL-SCNC: 140 MMOL/L (ref 132–146)
SPECIMEN DESCRIPTION: NORMAL
SPECIMEN DESCRIPTION: NORMAL
WBC OTHER # BLD: 11.6 K/UL (ref 4.5–11.5)

## 2024-04-22 PROCEDURE — P9047 ALBUMIN (HUMAN), 25%, 50ML: HCPCS | Performed by: INTERNAL MEDICINE

## 2024-04-22 PROCEDURE — 83735 ASSAY OF MAGNESIUM: CPT

## 2024-04-22 PROCEDURE — 82962 GLUCOSE BLOOD TEST: CPT

## 2024-04-22 PROCEDURE — 82550 ASSAY OF CK (CPK): CPT

## 2024-04-22 PROCEDURE — 2700000000 HC OXYGEN THERAPY PER DAY

## 2024-04-22 PROCEDURE — 97530 THERAPEUTIC ACTIVITIES: CPT

## 2024-04-22 PROCEDURE — 97535 SELF CARE MNGMENT TRAINING: CPT

## 2024-04-22 PROCEDURE — 2580000003 HC RX 258: Performed by: NURSE PRACTITIONER

## 2024-04-22 PROCEDURE — 2000000000 HC ICU R&B

## 2024-04-22 PROCEDURE — 97162 PT EVAL MOD COMPLEX 30 MIN: CPT

## 2024-04-22 PROCEDURE — 6370000000 HC RX 637 (ALT 250 FOR IP)

## 2024-04-22 PROCEDURE — 80048 BASIC METABOLIC PNL TOTAL CA: CPT

## 2024-04-22 PROCEDURE — 6370000000 HC RX 637 (ALT 250 FOR IP): Performed by: NURSE PRACTITIONER

## 2024-04-22 PROCEDURE — 2580000003 HC RX 258

## 2024-04-22 PROCEDURE — 94640 AIRWAY INHALATION TREATMENT: CPT

## 2024-04-22 PROCEDURE — 6360000002 HC RX W HCPCS: Performed by: NURSE PRACTITIONER

## 2024-04-22 PROCEDURE — 84100 ASSAY OF PHOSPHORUS: CPT

## 2024-04-22 PROCEDURE — 94660 CPAP INITIATION&MGMT: CPT

## 2024-04-22 PROCEDURE — 2580000003 HC RX 258: Performed by: INTERNAL MEDICINE

## 2024-04-22 PROCEDURE — 6360000002 HC RX W HCPCS: Performed by: INTERNAL MEDICINE

## 2024-04-22 PROCEDURE — 85025 COMPLETE CBC W/AUTO DIFF WBC: CPT

## 2024-04-22 PROCEDURE — 6360000002 HC RX W HCPCS

## 2024-04-22 PROCEDURE — C9113 INJ PANTOPRAZOLE SODIUM, VIA: HCPCS | Performed by: NURSE PRACTITIONER

## 2024-04-22 PROCEDURE — 94010 BREATHING CAPACITY TEST: CPT

## 2024-04-22 PROCEDURE — 99233 SBSQ HOSP IP/OBS HIGH 50: CPT | Performed by: INTERNAL MEDICINE

## 2024-04-22 PROCEDURE — 99291 CRITICAL CARE FIRST HOUR: CPT | Performed by: INTERNAL MEDICINE

## 2024-04-22 PROCEDURE — 71045 X-RAY EXAM CHEST 1 VIEW: CPT

## 2024-04-22 PROCEDURE — 97166 OT EVAL MOD COMPLEX 45 MIN: CPT

## 2024-04-22 RX ORDER — ERGOCALCIFEROL 1.25 MG/1
50000 CAPSULE ORAL WEEKLY
Status: DISCONTINUED | OUTPATIENT
Start: 2024-04-22 | End: 2024-04-22

## 2024-04-22 RX ORDER — DEXTROSE MONOHYDRATE 100 MG/ML
INJECTION, SOLUTION INTRAVENOUS CONTINUOUS PRN
Status: DISCONTINUED | OUTPATIENT
Start: 2024-04-22 | End: 2024-05-04 | Stop reason: HOSPADM

## 2024-04-22 RX ORDER — AMLODIPINE BESYLATE 5 MG/1
5 TABLET ORAL DAILY
Status: DISCONTINUED | OUTPATIENT
Start: 2024-04-22 | End: 2024-04-24

## 2024-04-22 RX ORDER — ERGOCALCIFEROL 1.25 MG/1
50000 CAPSULE ORAL WEEKLY
Status: DISCONTINUED | OUTPATIENT
Start: 2024-04-22 | End: 2024-05-04 | Stop reason: HOSPADM

## 2024-04-22 RX ORDER — GLUCAGON 1 MG/ML
1 KIT INJECTION PRN
Status: DISCONTINUED | OUTPATIENT
Start: 2024-04-22 | End: 2024-05-04 | Stop reason: HOSPADM

## 2024-04-22 RX ORDER — INSULIN LISPRO 100 [IU]/ML
0-4 INJECTION, SOLUTION INTRAVENOUS; SUBCUTANEOUS EVERY 4 HOURS
Status: DISCONTINUED | OUTPATIENT
Start: 2024-04-23 | End: 2024-05-04 | Stop reason: HOSPADM

## 2024-04-22 RX ADMIN — SODIUM CHLORIDE, PRESERVATIVE FREE 10 ML: 5 INJECTION INTRAVENOUS at 20:05

## 2024-04-22 RX ADMIN — IPRATROPIUM BROMIDE AND ALBUTEROL SULFATE 1 DOSE: .5; 2.5 SOLUTION RESPIRATORY (INHALATION) at 10:55

## 2024-04-22 RX ADMIN — ARFORMOTEROL TARTRATE 15 MCG: 15 SOLUTION RESPIRATORY (INHALATION) at 07:41

## 2024-04-22 RX ADMIN — ARFORMOTEROL TARTRATE 15 MCG: 15 SOLUTION RESPIRATORY (INHALATION) at 20:33

## 2024-04-22 RX ADMIN — HEPARIN SODIUM 5000 UNITS: 5000 INJECTION INTRAVENOUS; SUBCUTANEOUS at 20:47

## 2024-04-22 RX ADMIN — IPRATROPIUM BROMIDE AND ALBUTEROL SULFATE 1 DOSE: .5; 2.5 SOLUTION RESPIRATORY (INHALATION) at 07:41

## 2024-04-22 RX ADMIN — WATER 40 MG: 1 INJECTION INTRAMUSCULAR; INTRAVENOUS; SUBCUTANEOUS at 20:04

## 2024-04-22 RX ADMIN — WATER 40 MG: 1 INJECTION INTRAMUSCULAR; INTRAVENOUS; SUBCUTANEOUS at 08:54

## 2024-04-22 RX ADMIN — IPRATROPIUM BROMIDE AND ALBUTEROL SULFATE 1 DOSE: .5; 2.5 SOLUTION RESPIRATORY (INHALATION) at 15:07

## 2024-04-22 RX ADMIN — HEPARIN SODIUM 5000 UNITS: 5000 INJECTION INTRAVENOUS; SUBCUTANEOUS at 06:16

## 2024-04-22 RX ADMIN — ERGOCALCIFEROL 50000 UNITS: 1.25 CAPSULE ORAL at 15:47

## 2024-04-22 RX ADMIN — IPRATROPIUM BROMIDE AND ALBUTEROL SULFATE 1 DOSE: .5; 2.5 SOLUTION RESPIRATORY (INHALATION) at 04:22

## 2024-04-22 RX ADMIN — ACETAMINOPHEN 650 MG: 325 TABLET ORAL at 00:31

## 2024-04-22 RX ADMIN — SODIUM CHLORIDE, PRESERVATIVE FREE 40 MG: 5 INJECTION INTRAVENOUS at 08:54

## 2024-04-22 RX ADMIN — SODIUM CHLORIDE, PRESERVATIVE FREE 10 ML: 5 INJECTION INTRAVENOUS at 08:54

## 2024-04-22 RX ADMIN — BUMETANIDE 1 MG/HR: 0.25 INJECTION INTRAMUSCULAR; INTRAVENOUS at 09:02

## 2024-04-22 RX ADMIN — IPRATROPIUM BROMIDE AND ALBUTEROL SULFATE 1 DOSE: .5; 2.5 SOLUTION RESPIRATORY (INHALATION) at 20:33

## 2024-04-22 RX ADMIN — ACETAMINOPHEN 650 MG: 325 TABLET ORAL at 15:47

## 2024-04-22 RX ADMIN — BUDESONIDE INHALATION 500 MCG: 0.5 SUSPENSION RESPIRATORY (INHALATION) at 20:33

## 2024-04-22 RX ADMIN — BUMETANIDE 1 MG/HR: 0.25 INJECTION INTRAMUSCULAR; INTRAVENOUS at 20:42

## 2024-04-22 RX ADMIN — ALBUMIN (HUMAN) 25 G: 0.25 INJECTION, SOLUTION INTRAVENOUS at 01:31

## 2024-04-22 RX ADMIN — AMLODIPINE BESYLATE 5 MG: 5 TABLET ORAL at 11:57

## 2024-04-22 RX ADMIN — BUDESONIDE INHALATION 500 MCG: 0.5 SUSPENSION RESPIRATORY (INHALATION) at 07:41

## 2024-04-22 ASSESSMENT — PAIN DESCRIPTION - ORIENTATION
ORIENTATION: POSTERIOR;RIGHT;LEFT
ORIENTATION: RIGHT

## 2024-04-22 ASSESSMENT — PAIN SCALES - GENERAL
PAINLEVEL_OUTOF10: 0
PAINLEVEL_OUTOF10: 0
PAINLEVEL_OUTOF10: 8
PAINLEVEL_OUTOF10: 9
PAINLEVEL_OUTOF10: 9
PAINLEVEL_OUTOF10: 0
PAINLEVEL_OUTOF10: 0
PAINLEVEL_OUTOF10: 8
PAINLEVEL_OUTOF10: 0

## 2024-04-22 ASSESSMENT — PAIN - FUNCTIONAL ASSESSMENT
PAIN_FUNCTIONAL_ASSESSMENT: ACTIVITIES ARE NOT PREVENTED
PAIN_FUNCTIONAL_ASSESSMENT: PREVENTS OR INTERFERES SOME ACTIVE ACTIVITIES AND ADLS

## 2024-04-22 ASSESSMENT — PAIN DESCRIPTION - DESCRIPTORS
DESCRIPTORS: ACHING;STABBING
DESCRIPTORS: ACHING;DISCOMFORT

## 2024-04-22 ASSESSMENT — PAIN DESCRIPTION - LOCATION
LOCATION: HIP
LOCATION: BACK;LEG

## 2024-04-22 ASSESSMENT — PAIN DESCRIPTION - FREQUENCY: FREQUENCY: CONTINUOUS

## 2024-04-22 NOTE — PROGRESS NOTES
Comprehensive Nutrition Assessment    Type and Reason for Visit:  Initial, RD Nutrition Re-Screen/LOS    Nutrition Recommendations/Plan:   Continue current diet. Recommend and start ONS: Mandeep BID and Gelatein 20 BID to promote nutrient intake/wound healing. Will continue to monitor.     Malnutrition Assessment:  Malnutrition Status:  At risk for malnutrition (Comment) (04/22/24 1302)    Context:  Acute Illness     Findings of the 6 clinical characteristics of malnutrition:  Energy Intake:  Mild decrease in energy intake (Comment)  Weight Loss:  Unable to assess (2/2 poor wt hx on file to assess/trend)     Body Fat Loss:  Unable to assess     Muscle Mass Loss:  Unable to assess    Fluid Accumulation:  Unable to assess     Strength:  Not Performed    Nutrition Assessment:    Pt. admitted for Acute encephalopathy 2/2 most likely in the setting of acute hypercapnic hypoxic respiratory insufficiency/polysubstance abuse(-improving). Admit w/ Rhabdomyolysis, DOMINGO and Type II MI-NSTEMI. PMHx of COPD, recurrent OM or R foot. Hx of polysubstance abuse; noted UDS (+) amphetamine, BZD, cocaine, opiates, fentanyl. Noted wounds x2. Pt. currently on soft and bite sized diet. Will start ONS to promote PO intake/wound healing and monitor.    Nutrition Related Findings:    Oriented x3, +I/O(1.6L), flat/soft abd, +BS, +3pitting/+2 pitting edema, elevated BUN/Cr/K/phos, hyperglycemia, Wound Type: Multiple, Deep Tissue Injury, Wound Consult Pending, Diabetic Ulcer (diabetic foot wound right , Left lateral foot DTI per wound care)       Current Nutrition Intake & Therapies:    Average Meal Intake: 0%  Average Supplements Intake: None Ordered  ADULT DIET; Dysphagia - Soft and Bite Sized    Anthropometric Measures:  Height: 175.3 cm (5' 9.02\")  Ideal Body Weight (IBW): 145 lbs (66 kg)    Admission Body Weight: 76.2 kg (167 lb 15.9 oz) (4/18 BS first measured)  Current Body Weight: 76.2 kg (167 lb 15.9 oz) (4/18 BS), 115.9 % IBW. Weight  Source: Bed Scale  Current BMI (kg/m2): 24.8  Usual Body Weight:  (UTO d/t lack of wt hx on file to assess)     Weight Adjustment For: No Adjustment                 BMI Categories: Normal Weight (BMI 18.5-24.9)    Estimated Daily Nutrient Needs:  Energy Requirements Based On: Formula  Weight Used for Energy Requirements: Current  Energy (kcal/day): 1600-1800kcal (MSJ x1.2SF)  Weight Used for Protein Requirements: Current  Protein (g/day): 100-115g (1.3-1.5g/kgCBW)  Method Used for Fluid Requirements: Other (Comment)  Fluid (ml/day): per critical care    Nutrition Diagnosis:   Increased nutrient needs related to increase demand for energy/nutrients as evidenced by wounds    Nutrition Interventions:   Food and/or Nutrient Delivery: Continue Current Diet, Start Oral Nutrition Supplement (start ONS : Mandeep BID , Gelatein 20 BID)  Nutrition Education/Counseling: No recommendation at this time  Coordination of Nutrition Care: Continue to monitor while inpatient       Goals:     Goals: PO intake 50% or greater, by next RD assessment       Nutrition Monitoring and Evaluation:   Behavioral-Environmental Outcomes: None Identified  Food/Nutrient Intake Outcomes: Diet Advancement/Tolerance, Food and Nutrient Intake, Supplement Intake  Physical Signs/Symptoms Outcomes: Biochemical Data, Chewing or Swallowing, GI Status, Fluid Status or Edema, Nutrition Focused Physical Findings, Skin, Weight, Hemodynamic Status    Discharge Planning:    Continue Oral Nutrition Supplement     Daniel Pratt RD  Contact: ext [phone]

## 2024-04-22 NOTE — PROGRESS NOTES
[facility]  Department of Internal Medicine   Internal Medicine Residency   MICU Progress Note    Patient:  Saba Sullivan 59 y.o. female  MRN: [id number]     Date of Service: 4/22/2024    Allergy: Ancef [cefazolin], Duricef [cefadroxil], and Iodine    Overnight Events:   Blood pressor has been elevated overnight.    Subjective     Patient was seen by the bedside in the morning.  Patient is alert and oriented,     ROS: Denies fever, chills, chest pain, shortness of breath, N/V/C/D, dysuria or blood in stool or urine.    Objective     VS:   Patient Vitals for the past 12 hrs:   BP Temp Temp src Pulse Resp SpO2   04/22/24 0742 -- -- -- 90 30 96 %   04/22/24 0600 (!) 165/85 -- -- 85 18 97 %   04/22/24 0500 (!) 154/88 -- -- 90 19 97 %   04/22/24 0400 (!) 151/84 98.7 °F (37.1 °C) Temporal 92 23 97 %   04/22/24 0300 (!) 151/83 -- -- (!) 101 22 96 %   04/22/24 0200 (!) 163/85 -- -- 91 18 98 %   04/22/24 0108 -- -- -- 93 21 94 %   04/22/24 0100 (!) 164/93 -- -- 97 18 98 %   04/22/24 0000 (!) 165/95 98.6 °F (37 °C) Temporal 91 21 97 %   04/21/24 2300 (!) 168/97 -- -- 95 -- 98 %   04/21/24 2200 (!) 166/95 -- -- 94 -- 96 %   04/21/24 2100 (!) 163/90 -- -- 94 24 93 %   04/21/24 2000 (!) 161/93 98.3 °F (36.8 °C) Temporal 92 16 99 %     ]    I & O - 24hr:   Intake/Output Summary (Last 24 hours) at 4/22/2024 0748  Last data filed at 4/22/2024 0621  Gross per 24 hour   Intake 482.72 ml   Output 2400 ml   Net -1917.28 ml         Net IO Since Admission: 2,598.91 mL [04/22/24 0748]    Sedatives: None    Pressors: None    Oxygen: 6 liters, high flow nasal cannula    ABG:       Recent Labs     04/21/24  0316   PH 7.293*   PCO2 44.2   PO2 82.5   HCO3 20.9*   BE -5.4*   O2SAT 94.1         Physical Exam:  General Appearance: No acute distress.  Neuro: Round symmetric pupil that react to light bilaterally.   Cardiovascular: regular rate and rhythm, S1 and S2 heard, no murmurs appreciated   Respiratory: Clear to auscultation

## 2024-04-22 NOTE — PROGRESS NOTES
OCCUPATIONAL THERAPY INITIAL EVALUATION    [facility]  [address], [place]       Date:2024                                                               Patient Name: Saba Sullivan  MRN: [id number]  : 1965  Room: [address] [place] [address]    Evaluating OT: Alexus Espinal, NICHOLASD,  OTR/L; [id number]    Referring Provider: Martina Nielson APRN - CNP    Specific Provider Orders/Date: OT eval and treat (24)       Diagnosis: Elevated LFTs [R79.89]  NSTEMI (non-ST elevated myocardial infarction) ([facility]) [I21.4]  DOMINGO (acute kidney injury) ([facility]) [N17.9]  Acute respiratory failure with hypoxia and hypercapnia ([facility]) [J96.01, J96.02]     Reason for admission: Pt admitted with ARF, hx of COPD, tremor    Surgery/Procedures: None this admission     Pertinent Medical History:    Past Medical History:   Diagnosis Date    Abscess     states for a couple years she has had problems with     Chronic pain     Chronic recurrent multifocal osteomyelitis of foot ([facility]) 2019    Hip fracture ([facility])     Hx of blood clots     dvt rt calf    Pressure injury of heel, stage 3 ([facility]) 2019    Subclinical hypothyroidism 2019        *Precautions:  Fall Risk, O2, essential tremor, NWB RLE R heel wound until further clarified, safety, alarms+    Assessment of current deficits   [x] Functional mobility  [x]ADLs  [x] Strength               []Cognition   [x] Functional transfers   [x] IADLs         [x] Safety Awareness   [x]Endurance   [] Fine Coordination        [] ROM     [] Vision/perception   []Sensation    []Gross Motor Coordination [x] Balance   [] Delirium                  []Motor Control     [] Communication    OT PLAN OF CARE   OT POC based on physician orders, patient diagnosis and results of clinical assessment.       Frequency/Duration: 1-3 days/wk for 1-2 weeks PRN    Specific OT Treatment Interventions to include:   * Instruction/training on adapted ADL  Upon arrival, patient semi supine in bed. Pt reports she was walking on RLE at home- RN consulted for possible podiatry consult. Pt agreeable to participate in therapy session, thoughout.  Therapist facilitated ADL tasks & bed mobility to address safety awareness, implementation of fall prevention strategies, & functional engagement throughout daily activities.  Pt demo fair tolerance with fair understanding of education/techniques- pt limited by tremor at EOB requiring max assist to steady BUE. Pt educated on POC and importance of OOB activity. Pt limited by endurance and tremor. At end of session, patient properly positioned in chair position in bed with call light within reach, all lines and tubes intact. Pt instructed on use of call light for assistance and fall prevention. Nursing notified of patient positioning.    Patient presents with decreased ROM/strength, activity tolerance, dynamic balance, functional mobility limiting completion of ADLs and safety.  Pt can benefit from continued skilled OT services to increase safety, functional independence and quality of life.     Rehab Potential: Good for established goals    Patient / Family Goal: to return to PLOF    Patient and/or family were instructed/educated on diagnosis, prognosis/goals and plan of care. Patient demonstrated fair understanding.      Evaluation Complexity: Moderate     History: Expanded chart review of consults, imaging, and psychosocial history related to current functional performance.   Exam: 5+ performance deficits identified limiting functional independence and safe return home   Assistance/Modification: Min/mod assistance or modifications required to perform tasks. May have comorbidities that affect occupational performance.    [] Malnutrition indicators have been identified and nursing has been notified to ensure a dietitian consult is ordered.      Time In:0930             Time Out: 0959         Total Treatment time: 14 min   Min Units

## 2024-04-22 NOTE — PROGRESS NOTES
Physical Therapy    Physical Therapy Initial Assessment     Name: Saba Sullivan  : 1965  MRN: [id number]      Date of Service: 2024    Evaluating PT:  Dante Aguirre, PT, DPT  [id number]     Room #:  4403/4403-A  Diagnosis:  Elevated LFTs [R79.89]  NSTEMI (non-ST elevated myocardial infarction) (HCC) [I21.4]  DOMINGO (acute kidney injury) (HCC) [N17.9]  Acute respiratory failure with hypoxia and hypercapnia (HCC) [J96.01, J96.02]  PMHx/PSHx:   has a past medical history of Abscess, Chronic pain, Chronic recurrent multifocal osteomyelitis of foot (HCC), Hip fracture (HCC), Hx of blood clots, Pressure injury of heel, stage 3 (HCC), and Subclinical hypothyroidism.   Procedure/Surgery:  none   Precautions:  Falls, O2, R heel wound (Questionable WB), Cognition, Tremors   Equipment Needs:  TBD    SUBJECTIVE:    Pt lives with significant other in a 1 story home with ramp to enter.  Bedroom and bathroom are on the main level.  Pt ambulated with  PTA.    OBJECTIVE:   Initial Evaluation  Date: 24  Treatment Short Term/ Long Term   Goals   AM-PAC 6 Clicks      Was pt agreeable to Eval/treatment? Yes      Does pt have pain? No reported pain      Bed Mobility  Rolling: NT  Supine to sit: Mod A x2  Sit to supine: Mod A x2  Scooting: Mod A  Rolling: SBA  Supine to sit: SBA  Sit to supine: SBA  Scooting: SBA   Transfers Sit to stand: Max A  Stand to sit: Max A  Stand pivot: NT  Sit to stand: SBA  Stand to sit: SBA  Stand pivot: SBA with AAD   Ambulation    Few side steps with FWW Max A  >25 feet with AAD Min A   Stair negotiation: ascended and descended  NT  NA   ROM BUE:  Per OT eval   BLE:  WFL     Strength BUE:  Per OT eval   RLE:  Grossly 4/5  LLE:  Grossly 4+/5      Balance Sitting EOB:  SBA static, Min A dynamic   Dynamic Standing:  Max A with FWW  Sitting EOB:  Supervision  Dynamic Standing:  SBA     Pt is A & O x self, hospital, month, year  RASS:  -1 in supine to 0 at EOB  CAM-ICU:  -  Sensation:  Pt  denies numbness and tingling to extremities  Edema:  RLE swelling noted     Vitals:  Blood Pressure at rest 154/83 mmHg  Blood Pressure post session 171/85 mmHg   Heart Rate at rest 92 bpm  Heart Rate post session 88 bpm    SPO2 at rest 97% on 6 L SPO2 post session 95% on 6 L   /100       Functional Status Score-Intensive Care Unit (FSS-ICU)   Rolling -/7   Supine to sit transfer 2/7   Unsupported sitting  4/7   Sit to stand transfers 2/7   Ambulation 1/7   Total  9/35     Therapeutic Exercises:    BLE AROM  STS x 1    Patient education  Pt educated on PT role, safety during functional mobility    Patient response to education:   Pt verbalized understanding Pt demonstrated skill Pt requires further education in this area   Yes  Yes  Yes      ASSESSMENT:    Conditions Requiring Skilled Therapeutic Intervention:    [x]Decreased strength     [x]Decreased ROM  [x]Decreased functional mobility  [x]Decreased balance   [x]Decreased endurance   []Decreased posture  []Decreased sensation  [x]Decreased coordination   []Decreased vision  []Decreased safety awareness   []Increased pain       Comments:  Pt received supine and agreeable to PT evaluation with OT collaboration.  Pt cleared for participation by RN prior to session.  Vitals monitored during session.  Pt lethargic on entry.  Repositioned in supine and assisted with BLE and trunk to sitting EOB.  Pt's alertness improved while seated EOB.  Vitals assessed and BLE ROM/MMT assessed while seated EOB.  Pt had increased mucous production while seated EOB.  Instructed on oral suctioning.  Pt's functional mobility limited by generalized weakness and overall tremors.  Pt instructed on trying to keep weight off R heel wound during standing to FWW d/t unclear status of WB orders.  Pt was able to complete stand and some side steps along EOB with FWW and assistance.  Pt a was positioned in chair position at end of session.  Pt left back in bed with call button in reach,

## 2024-04-22 NOTE — PROGRESS NOTES
Peak flow predicted 380 .  Before aerosol treatment 100 and after aerosol treatment 100. Patient shakes a lot and has a lot of secretions.  Effort was ok.  26 % of predicted

## 2024-04-22 NOTE — CARE COORDINATION
Care Coordination: LOS 5 day. 8 ltr, bumex gtt, Nephrology following for DOMINGO in setting of rhabdo and cocaine us and possible hypovolemia/iv contrast- continue Bumex gtt- may need dialysis if no improvement.  Encephalopathy improving. Echo- mild tricuspid regurg, moderate pulmonary hypertension Cardiology following. Ptx 9/24 Otx 10/24.  Pt initial plan is home at discharge, declined for me to speak to son. Will continue to follow for transition of care needs.    Electronically signed by Marti Johnson RN on 4/22/2024 at 2:59 PM

## 2024-04-22 NOTE — FLOWSHEET NOTE
Inpatient Wound Care(initial evaluation)  4403    Admit Date: 4/17/2024  7:49 AM    Reason for consult:  heels    Significant history:  per H & P  Chief complaint: had concerns including Shortness of Breath (Hx COPD- started 4-5 hrs ago, 2 Duoneb and cpap by EMS right leg swelling, hx blood clots, no current thinners).  PMH COPD gold stage IV, polysubstance abuse, essential tremor  presented to the ED due to shortness of breath.     Wound history:    Patient states she follows podiatry in office  Previous wound care center patient    Findings:     04/22/24 1150   Skin Integumentary    Skin Integrity Ecchymosis;Scars (comment)  (white discolored areas)   Location BUE   Skin Integrity Site 2   Skin Integrity Location 2   (dried scabs)   Location 2 LLE, tips of toes   Skin Integrity Site 3   Skin Integrity Location 3   (old healed areas, dark discoloration, edema)    Location 3 right lower leg   Skin Integrity Site 4   Skin Integrity Location 4 Ecchymosis   Location 4 abdomen   Skin Integrity Site 5   Skin Integrity Location 5   (callous formation)   Location 5 right plantar foot   Wound 04/18/24 Heel Right;Plantar   Date First Assessed/Time First Assessed: 04/18/24 0116   Present on Original Admission: Yes  Primary Wound Type: Diabetic Ulcer  Location: Heel  Wound Location Orientation: Right;Plantar   Wound Image     Wound Etiology Diabetic   Dressing/Treatment Hydrating gel;Alginate  (nurse to apply)   Wound Length (cm) 1.6 cm   Wound Width (cm) 1.6 cm   Wound Depth (cm) 0.1 cm   Wound Surface Area (cm^2) 2.56 cm^2   Change in Wound Size % (l*w) 92.1   Wound Volume (cm^3) 0.256 cm^3   Wound Healing % 96   Wound Assessment Pink/red   Drainage Amount None (dry)   Madhavi-wound Assessment Dry/flaky  (callous)   Wound 04/18/24 Foot Left;Lateral   Date First Assessed/Time First Assessed: 04/18/24 0213   Present on Original Admission: Yes  Primary Wound Type: (c) Pressure Injury  Location: Foot  Wound Location Orientation:

## 2024-04-22 NOTE — PLAN OF CARE
Problem: Safety - Adult  Goal: Free from fall injury  4/21/2024 2036 by Michelle Queen RN  Outcome: Progressing  Flowsheets  Taken 4/21/2024 1450 by Romina Ocasio RN  Free From Fall Injury: Instruct family/caregiver on patient safety  Taken 4/21/2024 0758 by Romina Ocasio RN  Free From Fall Injury: Instruct family/caregiver on patient safety     Problem: Discharge Planning  Goal: Discharge to home or other facility with appropriate resources  4/21/2024 2036 by Michelle Queen RN  Outcome: Progressing     Problem: Skin/Tissue Integrity  Goal: Absence of new skin breakdown  Description: 1.  Monitor for areas of redness and/or skin breakdown  2.  Assess vascular access sites hourly  3.  Every 4-6 hours minimum:  Change oxygen saturation probe site  4.  Every 4-6 hours:  If on nasal continuous positive airway pressure, respiratory therapy assess nares and determine need for appliance change or resting period.  4/21/2024 2036 by Michelle Queen RN  Outcome: Progressing     Problem: ABCDS Injury Assessment  Goal: Absence of physical injury  4/21/2024 2036 by Michelle Queen RN  Outcome: Progressing  Flowsheets  Taken 4/21/2024 1450 by Romina Ocasio RN  Absence of Physical Injury: Implement safety measures based on patient assessment  Taken 4/21/2024 0758 by Romina Ocasio RN  Absence of Physical Injury: Implement safety measures based on patient assessment     Problem: Respiratory - Adult  Goal: Achieves optimal ventilation and oxygenation  4/21/2024 2036 by Michelle Queen RN  Outcome: Progressing  Flowsheets (Taken 4/21/2024 0800 by Romina Ocasio RN)  Achieves optimal ventilation and oxygenation: Respiratory therapy support as indicated     Problem: Cardiovascular - Adult  Goal: Absence of cardiac dysrhythmias or at baseline  4/21/2024 2036 by Michelle Queen RN  Outcome: Progressing     Problem: Skin/Tissue Integrity - Adult  Goal: Incisions, wounds, or drain sites healing without S/S of

## 2024-04-22 NOTE — PROGRESS NOTES
[facility]  Internal Medicine Department    Attending Physician Statement:  Tom Rees Jr. D.O.    I have discussed the case, including pertinent history and exam findings with the resident. I have reviewed all past medical history, past surgical history, family history, social history, medications, and allergies and updated as appropriate in the history section of the chart. I have seen and examined the patient and the key elements of the encounter have been performed by me. I agree with the assessment, plan and orders as documented by the resident.      Acute Hypoxic Respiratory Failure  -COPD exacerbation, PNA, Pulmonary HTN, atelectasis  -s/p treatment with antbiotics  -pulmonary toilet for improved aeration   -continue bipap at night; encouraged compliance     DOMINGO Stage III  -multifactorial, rhabdomyolysis, decreased perfusion, and dehydration   -worsening Cr; bumex drip -2.5 L in last 24 hours  -further care per nephrology     Pulmonary Hypertension  -2/2 methamphetamine use  -further w/u after stabilization of acute hypoxic respiratory failure       NSTEMI  -ST depressions and T wave inversion throughout the inferior and anteroseptal leads  -continue heparin per ICU protocols   -ischemic evaluation per cardiology recs      Polysubstane Abuse  -multiple medications; peer recovery; montior for withdrawal symptoms     Remainder of medical problems as per resident note.

## 2024-04-22 NOTE — PROGRESS NOTES
[facility]  Internal Medicine Residency Program  Progress Note - House Team       Patient:  Saba Sullivan 59 y.o. female   MRN: [id number]       Date of Service: 2024  Admission date: 2024  7:49 AM ; Hospital day: 5   CC: Shortness of Breath (Hx COPD- started 4-5 hrs ago, 2 Duoneb and cpap by EMS right leg swelling, hx blood clots, no current thinners)     Overnight events: no acute events overnight.     Subjective     Saba Sullivan was seen and examined at bedside today morning. She was awake, alert, but mild lethargic, was able to answer questions, she is saturating 96% on 7 L of oxygen via nasal cannula. She complaints about feeling tired, otherwise, she denies any other acute symptoms.    Objective   PHYSICAL EXAM  General Appearance: not in acute distress  HEENT: Normocephalic, atraumatic  Neck: midline trachea  Lung: clear to auscultation bilaterally, no wheezing or rales  Heart: regular rate and rhythm, no murmur  Abdomen: soft, bowel sounds normal; no masses  Extremities: no cyanosis or edema  Musculokeletal: No joint swelling  Neurologic: Mental status: AAOx3, normal reflexes and symmetric bilaterally    CARDIOVASCULAR  Vitals: BP (!) 157/82   Pulse 95   Temp 98.6 °F (37 °C) (Temporal)   Resp 22   Ht 1.753 m (5' 9.02\")   Wt 76.2 kg (168 lb 1.6 oz)   LMP 2013   SpO2 97%   BMI 24.81 kg/m²     Pulse rate range      : Pulse  Av.2  Min: 85  Max: 109  BP range                  : Systolic (24hrs), Av , Min:145 , Max:171   ; Diastolic (24hrs), Av, Min:80, Max:99    PULMONARY  Respiration range   : Resp  Av.8  Min: 16  Max: 30  Pulse Ox range : SpO2  Av.1 %  Min: 92 %  Max: 99 %  Recent Labs     24  0316   PH 7.293*   PCO2 44.2   PO2 82.5   HCO3 20.9*   BE -5.4*   O2SAT 94.1     NEPHROLOGY (FLUIDS/ELECTROLYTES & NUTRITION)      I & O - 24hr:    Intake/Output Summary (Last 24 hours) at 2024 1412  Last data filed at 2024 1200  Gross  2021 ) in setting of rhabdomyolysis, cocaine use  Cr today 5.4, eGFR 9  , trended down  Transaminitis 2/2 polysubstance abuse  Hepatitis C antibody reactive  Moderate pulmonary hypertension  Echo on 4/18 showed RVSP 54-59  Hypertension  hyperphosphatemia  Cholelithiasis-seen on CTA pulm, left renal calculi  Normocytic anemia 2/2 anemia of chronic disease  Hemoglobin 9.4, trending down  Leukocytosis likely reactive versus infection  Lower extremity swelling  LE US-no DVT  Elevated CK, rhabdomyolysis 2/2 polysubstance abuse  Hypocalcemia  Chronic pain 2/2L hip fracture and R calc ulcer  Essential tremor  Vitamin D deficiency  Vitamin D less than 6 on 4/20  History of Parkinson disease  History of tobacco use ) 35-pack-year, quit 2016 )  Subclinical hypothyroidism  History of osteomyelitis of right heel s/p debridement  History of polysubstance abuse  UDS positive with amphetamine, benzodiazepine, cocaine, opiates, fentanyl    Plan:  Monitor respiratory status  Monitor telemetry  Monitor blood pressure  Continue amlodipine 5 mg daily  Continue Bumex drip at 1 Mg per minute, follow strict intake output  Continue Solu-Medrol 40 mg every 12 hour  Continue breathing treatment  Follow daily labs, replace electrolytes accordingly  Follow daily CBC, as hemoglobin is dropping  Continue BiPAP, pulmonary hygiene  Nephrology is following, will appreciate recommendation  Consider starting Vitamin D weekly  Wound care is following  [facility] 9/24  Nephrology is following, appreciate recommendations  Cardiology signed off, recommended to start statin if needed as per ASCVD risk calculation.    Tobacco use per chart:  reports that she has quit smoking. Her smoking use included cigarettes. She has never used smokeless tobacco.  Alcohol use per chart:  reports no history of alcohol use.  DVT prophylaxis: Heparin  GI prophylaxis: Protonix  Bowel regimen: As needed  Diet:   ADULT DIET; Dysphagia - Soft and Bite Sized  ADULT ORAL

## 2024-04-22 NOTE — PROGRESS NOTES
Nephrology Progress Note  [facility]      CC:   arf    HPI:       4/18: the pt is a 59 yo female with a pmh of copd, DVT, polysubstance abuse, recurrent OM of R foot who presented with sob and ms changes. Sat was 85% on RA in ER. She was put on bipap. Cta showed no PE. She was given nebs, steroids and started on doxycycline and merrem. Bilateral LE US showed no DVT. Labs showed na 131 k 4.6 co2 27 bun 36 cr 2.2 > 3.1, lactate 1.5, ca 7.8, p 5.8, nh4 25ck 21k>16K, alb 2.7, mg 1.9, tsh 1.04, uds pos amphetamine, benzos, cocaine, fentanyl, opiates. Wbc 20.9, hgb 13.5, plt 313. Ua 100 glucose, mod bili, sg >1.03, 100 protein, pos nitrite, 10-20 rbc, lg hgb. Vitals showed rr 17 hr 112 bp 148/67, temp 98.1.  cr from 7/2021 was 0.7. she was started on ns at 51. Pt is not giving a history, mumbles when asked a question, eyes closed.       Patient Active Problem List   Diagnosis    Abscess    Nonhealing ulcer of heel (HCC)    Bradycardia    Substance abuse (HCC)    Current moderate episode of major depressive disorder (HCC)    Abnormal weight loss    COPD (chronic obstructive pulmonary disease) (HCC)    History of fracture of left hip    Ambulatory dysfunction    Hypotension    Subclinical hypothyroidism    Chronic recurrent multifocal osteomyelitis of foot (HCC)    Pressure injury of heel, stage 3 (HCC)    Open wound of fifth toe of left foot    COPD exacerbation (HCC)    Acute respiratory failure with hypoxia (HCC)    Mild protein-calorie malnutrition (HCC)    Osteomyelitis (HCC)    High risk medication use    Acute respiratory failure with hypoxia and hypercapnia (HCC)    NSTEMI (non-ST elevated myocardial infarction) ([facility])         Subjective    4/19: pt seen and examined in icu. On ns at 125 ml/hr. Pt eyes open but not answering questions    4/20:Reported to be refusing BIPAP; on HFNC, acceptable oxygen saturation    4/21 received call from NP overnight; reported patient had episode of SOB and hypoxia; UO improved

## 2024-04-23 ENCOUNTER — APPOINTMENT (OUTPATIENT)
Dept: CT IMAGING | Age: 59
DRG: 871 | End: 2024-04-23
Attending: INTERNAL MEDICINE
Payer: OTHER GOVERNMENT

## 2024-04-23 LAB
ANION GAP SERPL CALCULATED.3IONS-SCNC: 16 MMOL/L (ref 7–16)
ANION GAP SERPL CALCULATED.3IONS-SCNC: 17 MMOL/L (ref 7–16)
ANION GAP SERPL CALCULATED.3IONS-SCNC: 18 MMOL/L (ref 7–16)
BASOPHILS # BLD: 0.12 K/UL (ref 0–0.2)
BASOPHILS NFR BLD: 1 % (ref 0–2)
BUN SERPL-MCNC: 90 MG/DL (ref 6–20)
BUN SERPL-MCNC: 94 MG/DL (ref 6–20)
BUN SERPL-MCNC: 98 MG/DL (ref 6–20)
CALCIUM SERPL-MCNC: 10 MG/DL (ref 8.6–10.2)
CALCIUM SERPL-MCNC: 9.6 MG/DL (ref 8.6–10.2)
CALCIUM SERPL-MCNC: 9.7 MG/DL (ref 8.6–10.2)
CHLORIDE SERPL-SCNC: 93 MMOL/L (ref 98–107)
CHLORIDE SERPL-SCNC: 96 MMOL/L (ref 98–107)
CHLORIDE SERPL-SCNC: 98 MMOL/L (ref 98–107)
CK SERPL-CCNC: 793 U/L (ref 20–180)
CO2 SERPL-SCNC: 24 MMOL/L (ref 22–29)
CO2 SERPL-SCNC: 25 MMOL/L (ref 22–29)
CO2 SERPL-SCNC: 25 MMOL/L (ref 22–29)
CREAT SERPL-MCNC: 5.3 MG/DL (ref 0.5–1)
CREAT SERPL-MCNC: 5.4 MG/DL (ref 0.5–1)
CREAT SERPL-MCNC: 5.6 MG/DL (ref 0.5–1)
EOSINOPHIL # BLD: 0 K/UL (ref 0.05–0.5)
EOSINOPHILS RELATIVE PERCENT: 0 % (ref 0–6)
ERYTHROCYTE [DISTWIDTH] IN BLOOD BY AUTOMATED COUNT: 14.7 % (ref 11.5–15)
GFR SERPL CREATININE-BSD FRML MDRD: 8 ML/MIN/1.73M2
GFR SERPL CREATININE-BSD FRML MDRD: 9 ML/MIN/1.73M2
GFR SERPL CREATININE-BSD FRML MDRD: 9 ML/MIN/1.73M2
GLUCOSE BLD-MCNC: 149 MG/DL (ref 74–99)
GLUCOSE BLD-MCNC: 151 MG/DL (ref 74–99)
GLUCOSE BLD-MCNC: 158 MG/DL (ref 74–99)
GLUCOSE BLD-MCNC: 164 MG/DL (ref 74–99)
GLUCOSE BLD-MCNC: 169 MG/DL (ref 74–99)
GLUCOSE BLD-MCNC: 187 MG/DL (ref 74–99)
GLUCOSE SERPL-MCNC: 137 MG/DL (ref 74–99)
GLUCOSE SERPL-MCNC: 145 MG/DL (ref 74–99)
GLUCOSE SERPL-MCNC: 150 MG/DL (ref 74–99)
HCT VFR BLD AUTO: 35.4 % (ref 34–48)
HGB BLD-MCNC: 11.4 G/DL (ref 11.5–15.5)
IMM GRANULOCYTES # BLD AUTO: 1.04 K/UL (ref 0–0.58)
IMM GRANULOCYTES NFR BLD: 8 % (ref 0–5)
LYMPHOCYTES NFR BLD: 0.72 K/UL (ref 1.5–4)
LYMPHOCYTES RELATIVE PERCENT: 5 % (ref 20–42)
MAGNESIUM SERPL-MCNC: 2 MG/DL (ref 1.6–2.6)
MCH RBC QN AUTO: 27.5 PG (ref 26–35)
MCHC RBC AUTO-ENTMCNC: 32.2 G/DL (ref 32–34.5)
MCV RBC AUTO: 85.5 FL (ref 80–99.9)
MONOCYTES NFR BLD: 1.1 K/UL (ref 0.1–0.95)
MONOCYTES NFR BLD: 8 % (ref 2–12)
NEUTROPHILS NFR BLD: 78 % (ref 43–80)
NEUTS SEG NFR BLD: 10.7 K/UL (ref 1.8–7.3)
PHOSPHATE SERPL-MCNC: 5.6 MG/DL (ref 2.5–4.5)
PHOSPHATE SERPL-MCNC: 5.6 MG/DL (ref 2.5–4.5)
PHOSPHATE SERPL-MCNC: 5.8 MG/DL (ref 2.5–4.5)
PLATELET # BLD AUTO: 281 K/UL (ref 130–450)
PMV BLD AUTO: 10.3 FL (ref 7–12)
POTASSIUM SERPL-SCNC: 5.1 MMOL/L (ref 3.5–5)
POTASSIUM SERPL-SCNC: 5.3 MMOL/L (ref 3.5–5)
POTASSIUM SERPL-SCNC: 5.3 MMOL/L (ref 3.5–5)
RBC # BLD AUTO: 4.14 M/UL (ref 3.5–5.5)
SODIUM SERPL-SCNC: 136 MMOL/L (ref 132–146)
SODIUM SERPL-SCNC: 137 MMOL/L (ref 132–146)
SODIUM SERPL-SCNC: 139 MMOL/L (ref 132–146)
WBC OTHER # BLD: 13.7 K/UL (ref 4.5–11.5)

## 2024-04-23 PROCEDURE — 82962 GLUCOSE BLOOD TEST: CPT

## 2024-04-23 PROCEDURE — 36410 VNPNXR 3YR/> PHY/QHP DX/THER: CPT

## 2024-04-23 PROCEDURE — 6360000002 HC RX W HCPCS: Performed by: NURSE PRACTITIONER

## 2024-04-23 PROCEDURE — 99291 CRITICAL CARE FIRST HOUR: CPT | Performed by: INTERNAL MEDICINE

## 2024-04-23 PROCEDURE — 2700000000 HC OXYGEN THERAPY PER DAY

## 2024-04-23 PROCEDURE — 6370000000 HC RX 637 (ALT 250 FOR IP): Performed by: NURSE PRACTITIONER

## 2024-04-23 PROCEDURE — 6360000002 HC RX W HCPCS: Performed by: INTERNAL MEDICINE

## 2024-04-23 PROCEDURE — 94660 CPAP INITIATION&MGMT: CPT

## 2024-04-23 PROCEDURE — C9113 INJ PANTOPRAZOLE SODIUM, VIA: HCPCS | Performed by: NURSE PRACTITIONER

## 2024-04-23 PROCEDURE — 76937 US GUIDE VASCULAR ACCESS: CPT

## 2024-04-23 PROCEDURE — 94640 AIRWAY INHALATION TREATMENT: CPT

## 2024-04-23 PROCEDURE — 80048 BASIC METABOLIC PNL TOTAL CA: CPT

## 2024-04-23 PROCEDURE — 6370000000 HC RX 637 (ALT 250 FOR IP)

## 2024-04-23 PROCEDURE — 05H933Z INSERTION OF INFUSION DEVICE INTO RIGHT BRACHIAL VEIN, PERCUTANEOUS APPROACH: ICD-10-PCS | Performed by: INTERNAL MEDICINE

## 2024-04-23 PROCEDURE — 82550 ASSAY OF CK (CPK): CPT

## 2024-04-23 PROCEDURE — 85025 COMPLETE CBC W/AUTO DIFF WBC: CPT

## 2024-04-23 PROCEDURE — 97530 THERAPEUTIC ACTIVITIES: CPT

## 2024-04-23 PROCEDURE — 6370000000 HC RX 637 (ALT 250 FOR IP): Performed by: INTERNAL MEDICINE

## 2024-04-23 PROCEDURE — 99233 SBSQ HOSP IP/OBS HIGH 50: CPT | Performed by: INTERNAL MEDICINE

## 2024-04-23 PROCEDURE — C1751 CATH, INF, PER/CENT/MIDLINE: HCPCS

## 2024-04-23 PROCEDURE — 83735 ASSAY OF MAGNESIUM: CPT

## 2024-04-23 PROCEDURE — 6360000002 HC RX W HCPCS

## 2024-04-23 PROCEDURE — 2580000003 HC RX 258

## 2024-04-23 PROCEDURE — 92610 EVALUATE SWALLOWING FUNCTION: CPT

## 2024-04-23 PROCEDURE — 84100 ASSAY OF PHOSPHORUS: CPT

## 2024-04-23 PROCEDURE — 71250 CT THORAX DX C-: CPT

## 2024-04-23 PROCEDURE — 2000000000 HC ICU R&B

## 2024-04-23 PROCEDURE — A4216 STERILE WATER/SALINE, 10 ML: HCPCS | Performed by: NURSE PRACTITIONER

## 2024-04-23 PROCEDURE — 2580000003 HC RX 258: Performed by: NURSE PRACTITIONER

## 2024-04-23 PROCEDURE — 2580000003 HC RX 258: Performed by: INTERNAL MEDICINE

## 2024-04-23 RX ORDER — LIDOCAINE 4 G/G
1 PATCH TOPICAL DAILY
Status: DISCONTINUED | OUTPATIENT
Start: 2024-04-23 | End: 2024-05-04 | Stop reason: HOSPADM

## 2024-04-23 RX ADMIN — ACETAMINOPHEN 650 MG: 325 TABLET ORAL at 01:36

## 2024-04-23 RX ADMIN — ACETAMINOPHEN 650 MG: 325 TABLET ORAL at 05:38

## 2024-04-23 RX ADMIN — HEPARIN SODIUM 5000 UNITS: 5000 INJECTION INTRAVENOUS; SUBCUTANEOUS at 13:14

## 2024-04-23 RX ADMIN — Medication 300 UNITS: at 08:56

## 2024-04-23 RX ADMIN — IPRATROPIUM BROMIDE AND ALBUTEROL SULFATE 1 DOSE: .5; 2.5 SOLUTION RESPIRATORY (INHALATION) at 19:27

## 2024-04-23 RX ADMIN — HEPARIN SODIUM 5000 UNITS: 5000 INJECTION INTRAVENOUS; SUBCUTANEOUS at 21:05

## 2024-04-23 RX ADMIN — BUMETANIDE 1 MG/HR: 0.25 INJECTION INTRAMUSCULAR; INTRAVENOUS at 11:07

## 2024-04-23 RX ADMIN — ARFORMOTEROL TARTRATE 15 MCG: 15 SOLUTION RESPIRATORY (INHALATION) at 08:34

## 2024-04-23 RX ADMIN — Medication 300 UNITS: at 20:03

## 2024-04-23 RX ADMIN — AMLODIPINE BESYLATE 5 MG: 5 TABLET ORAL at 08:55

## 2024-04-23 RX ADMIN — IPRATROPIUM BROMIDE AND ALBUTEROL SULFATE 1 DOSE: .5; 2.5 SOLUTION RESPIRATORY (INHALATION) at 08:34

## 2024-04-23 RX ADMIN — HEPARIN SODIUM 5000 UNITS: 5000 INJECTION INTRAVENOUS; SUBCUTANEOUS at 05:39

## 2024-04-23 RX ADMIN — SODIUM CHLORIDE, PRESERVATIVE FREE 10 ML: 5 INJECTION INTRAVENOUS at 08:56

## 2024-04-23 RX ADMIN — BUMETANIDE 1 MG/HR: 0.25 INJECTION INTRAMUSCULAR; INTRAVENOUS at 23:50

## 2024-04-23 RX ADMIN — BUDESONIDE INHALATION 500 MCG: 0.5 SUSPENSION RESPIRATORY (INHALATION) at 08:34

## 2024-04-23 RX ADMIN — ACETAMINOPHEN 650 MG: 325 TABLET ORAL at 08:55

## 2024-04-23 RX ADMIN — ARFORMOTEROL TARTRATE 15 MCG: 15 SOLUTION RESPIRATORY (INHALATION) at 19:27

## 2024-04-23 RX ADMIN — SODIUM CHLORIDE, PRESERVATIVE FREE 40 MG: 5 INJECTION INTRAVENOUS at 08:55

## 2024-04-23 RX ADMIN — ACETAMINOPHEN 650 MG: 325 TABLET ORAL at 21:13

## 2024-04-23 RX ADMIN — WATER 40 MG: 1 INJECTION INTRAMUSCULAR; INTRAVENOUS; SUBCUTANEOUS at 08:55

## 2024-04-23 RX ADMIN — WATER 40 MG: 1 INJECTION INTRAMUSCULAR; INTRAVENOUS; SUBCUTANEOUS at 20:01

## 2024-04-23 RX ADMIN — BUDESONIDE INHALATION 500 MCG: 0.5 SUSPENSION RESPIRATORY (INHALATION) at 19:27

## 2024-04-23 RX ADMIN — SODIUM CHLORIDE, PRESERVATIVE FREE 10 ML: 5 INJECTION INTRAVENOUS at 20:03

## 2024-04-23 ASSESSMENT — PAIN DESCRIPTION - ONSET
ONSET: ON-GOING

## 2024-04-23 ASSESSMENT — PAIN DESCRIPTION - FREQUENCY
FREQUENCY: CONTINUOUS

## 2024-04-23 ASSESSMENT — PAIN SCALES - GENERAL
PAINLEVEL_OUTOF10: 9
PAINLEVEL_OUTOF10: 0
PAINLEVEL_OUTOF10: 7
PAINLEVEL_OUTOF10: 9
PAINLEVEL_OUTOF10: 0
PAINLEVEL_OUTOF10: 7
PAINLEVEL_OUTOF10: 5
PAINLEVEL_OUTOF10: 0
PAINLEVEL_OUTOF10: 10
PAINLEVEL_OUTOF10: 0
PAINLEVEL_OUTOF10: 5

## 2024-04-23 ASSESSMENT — PAIN DESCRIPTION - DESCRIPTORS
DESCRIPTORS: ACHING;DISCOMFORT;SORE
DESCRIPTORS: ACHING;STABBING
DESCRIPTORS: ACHING;STABBING
DESCRIPTORS: ACHING;DISCOMFORT;THROBBING

## 2024-04-23 ASSESSMENT — PAIN DESCRIPTION - LOCATION
LOCATION: BACK;LEG
LOCATION: BACK;LEG
LOCATION: BACK
LOCATION: BACK;LEG

## 2024-04-23 ASSESSMENT — PAIN DESCRIPTION - ORIENTATION
ORIENTATION: RIGHT;LEFT;POSTERIOR
ORIENTATION: LOWER;MID
ORIENTATION: RIGHT;LEFT;POSTERIOR
ORIENTATION: POSTERIOR;LEFT;RIGHT

## 2024-04-23 ASSESSMENT — PULMONARY FUNCTION TESTS: PEFR_L/MIN: 130

## 2024-04-23 ASSESSMENT — PAIN DESCRIPTION - PAIN TYPE
TYPE: ACUTE PAIN
TYPE: ACUTE PAIN
TYPE: CHRONIC PAIN

## 2024-04-23 NOTE — PROGRESS NOTES
CHG double lumen power midline catheter Placement 4/23/2024    Product number: [id number]   Lot Number: [id number]      Ultrasound: yes   Right Brachial vein:                Upper Arm Circumference: (CM) 21    Size:(FR)/GUAGE 5.5/15 cm    Exposed Length: (CM) 3    Internal Length: (CM) 12   Cut: (CM) 0   Vein Measurement: 0.52 cm              Lidocaine Given: yes lidocaine 1%                Procedure performed by URSULA Simno RN  4/23/2024  12:00 PM

## 2024-04-23 NOTE — PROGRESS NOTES
Physical Therapy    Physical Therapy Treatment    Name: Saba Sullivan  : 1965  MRN: [id number]      Date of Service: 2024    Evaluating PT:  Dante Aguirre, PT, DPT  [id number]     Room #:  4403/4403-A  Diagnosis:  Elevated LFTs [R79.89]  NSTEMI (non-ST elevated myocardial infarction) (HCC) [I21.4]  DOMINGO (acute kidney injury) (HCC) [N17.9]  Acute respiratory failure with hypoxia and hypercapnia (HCC) [J96.01, J96.02]  PMHx/PSHx:   has a past medical history of Abscess, Chronic pain, Chronic recurrent multifocal osteomyelitis of foot (HCC), Hepatitis C, Hip fracture (HCC), Hx of blood clots, Polysubstance abuse (HCC), Pressure injury of heel, stage 3 (HCC), and Subclinical hypothyroidism.   Procedure/Surgery:  none   Precautions:  Falls, O2, R heel wound, NWB R foot with CAM boot, Cognition, Tremors   Equipment Needs:  TBD    SUBJECTIVE:    Pt lives with significant other in a 1 story home with ramp to enter.  Bedroom and bathroom are on the main level.  Pt ambulated with WC PTA.    OBJECTIVE:   Initial Evaluation  Date: 24  Treatment  Date: 24 Short Term/ Long Term   Goals   AM-PAC 6 Clicks 9/24 10/24    Was pt agreeable to Eval/treatment? Yes  Yes     Does pt have pain? No reported pain  No reported pain    Bed Mobility  Rolling: NT  Supine to sit: Mod A x2  Sit to supine: Mod A x2  Scooting: Mod A Rolling: NT  Supine to sit: Mod A x2  Sit to supine: Mod A x2  Scooting: Mod A Rolling: SBA  Supine to sit: SBA  Sit to supine: SBA  Scooting: SBA   Transfers Sit to stand: Max A  Stand to sit: Max A  Stand pivot: NT Sit to stand: Max A  Stand to sit: Max A  Stand pivot: MaxA with FWW Sit to stand: SBA  Stand to sit: SBA  Stand pivot: SBA with AAD   Ambulation    Few side steps with FWW Max A Few side steps with FWW Max A >25 feet with AAD Min A   Stair negotiation: ascended and descended  NT NT NA   ROM BUE:  Per OT eval   BLE:  WFL     Strength BUE:  Per OT eval   RLE:  Grossly 4/5  LLE:  Grossly

## 2024-04-23 NOTE — PROGRESS NOTES
SPEECH/LANGUAGE PATHOLOGY  CLINICAL ASSESSMENT OF SWALLOWING FUNCTION   and PLAN OF CARE    PATIENT NAME:  Saba Sullivan  (female)     MRN:  [id number]    :  1965  (59 y.o.)  STATUS:  Inpatient: Room 4403/4403-A    TODAY'S DATE:  2024  REFERRING PROVIDER:    SPECIFIC PROVIDER ORDER: SLP eval and treat Date of order:  24  REASON FOR REFERRAL: dysphagia   EVALUATING THERAPIST: LUCIANO Shen                 RESULTS:    DYSPHAGIA DIAGNOSIS:   Clinical indicators of moderate-severe oropharyngeal phase dysphagia       DIET RECOMMENDATIONS:  NPO -- including medications to be given via alternate method      .       FEEDING RECOMMENDATIONS:     Assistance level:  Not applicable      Compensatory strategies recommended: Not applicable      Discussed recommendations with nursing and/or faxed report to referring provider: No secondary to no diet/liquid change recommended     SPEECH THERAPY  PLAN OF CARE   The dysphagia POC is established based on physician order, dysphagia diagnosis and results of clinical assessment     Skilled SLP intervention for dysphagia management on acute care up to 5 x per week until goals met, pt plateaus in function and/or discharged from hospital    Conditions Requiring Skilled Therapeutic Intervention for dysphagia:    Patient is performing below functional baseline d/t  current acute condition, Multiple diagnoses, multiple medications, and increased dependency upon caregivers.  Throat clearing during PO intake   Wet vocal quality during PO intake    Specific dysphagia interventions to include:     Therapeutic exercises  ongoing skilled PO analysis to determine if PO diet can be initiated     Specific instructions for next treatment:  initiate instruction of therapeutic exercises  and ongoing skilled PO analysis to determine if PO diet can be initiated   Patient Treatment Goals:    Short Term Goals:  Pt will participate in ongoing evaluation of swallow function to determine  heel, stage 3 (HCC)    Open wound of fifth toe of left foot    COPD exacerbation (HCC)    Acute respiratory failure with hypoxia (HCC)    Mild protein-calorie malnutrition (HCC)    Osteomyelitis (HCC)    High risk medication use    Acute respiratory failure with hypoxia and hypercapnia (HCC)    NSTEMI (non-ST elevated myocardial infarction) (HCC)         CPT code:  29262  bedside swallow eval

## 2024-04-23 NOTE — PROGRESS NOTES
Pt removed BiPap and refuses to keep it on. Pt educated on risks and placed on 8L [facility]. Will continue to monitor.

## 2024-04-23 NOTE — CARE COORDINATION
Care Coordination: LOS 6 day, met with pt at bedside to discuss transition of care. Discussed therapy evals and and need for MARTA. She states she wants to go home. Discussed wound care for feet, she states she was following with podiatry in [place] but has not seen for a long time, instead, she states she bought a new pair of sketchers and felt she was doing fine. She declines for me to call son and discuss discharge planning. She then was somewhat agreeable to [facility] for \"one week\". I reviewed list with her and she chose [facility], but before I left room, she already changed her mind and states she \"has a friend Chelle and he is moving in with me and will take care of me. She assures me he will be there 24/7\". I asked if I could call chelle to confirm plan and she recited phone number of [phone] and was agreeable for me to speak to him. Call to Chelle, he states he is currently at her home  cleaning and confirms he will be with her 24/7 and will take care of her.  He will not have access to vehicle until last day of month but assures me her son will  at discharge.  Pt also has [facility] in case transport is needed and can be reached at  [phone] to check eligibility of transport    Electronically signed by Marti Johnson RN on 4/23/2024 at 1:45 PM

## 2024-04-23 NOTE — PROGRESS NOTES
[facility]  Department of Internal Medicine   Internal Medicine Residency   MICU Progress Note    Patient:  Saba Sullivan 59 y.o. female  MRN: [id number]     Date of Service: 4/23/2024    Allergy: Ancef [cefazolin], Duricef [cefadroxil], and Iodine    Overnight Events:   NAEO.    Subjective     Patient was seen by the bedside in the morning.  Patient is alert and oriented,     ROS: Denies fever, chills, chest pain, shortness of breath, N/V/C/D, dysuria or blood in stool or urine.    Objective     VS:   Patient Vitals for the past 12 hrs:   BP Temp Temp src Pulse Resp SpO2   04/23/24 0900 (!) 162/94 -- -- 96 24 92 %   04/23/24 0839 -- -- -- 88 22 99 %   04/23/24 0838 -- -- -- 92 22 97 %   04/23/24 0836 -- -- -- 94 21 100 %   04/23/24 0834 -- -- -- 91 18 99 %   04/23/24 0800 (!) 147/90 97.2 °F (36.2 °C) Temporal 91 22 99 %   04/23/24 0700 (!) 151/86 -- -- 91 24 99 %   04/23/24 0600 (!) 154/89 -- -- 89 17 99 %   04/23/24 0500 (!) 136/103 -- -- 95 18 99 %   04/23/24 0400 (!) 162/92 98 °F (36.7 °C) Temporal 81 18 --   04/23/24 0300 (!) 152/88 -- -- 92 20 99 %   04/23/24 0200 -- -- -- 90 17 96 %   04/23/24 0100 (!) 157/83 -- -- 88 17 99 %   04/23/24 0000 (!) 155/86 97.9 °F (36.6 °C) Temporal 92 25 97 %   04/22/24 2300 (!) 164/90 -- -- 96 21 100 %   04/22/24 2247 -- -- -- 89 18 97 %   04/22/24 2200 (!) 153/85 -- -- 99 (!) 32 97 %   ]    I & O - 24hr:   Intake/Output Summary (Last 24 hours) at 4/23/2024 0958  Last data filed at 4/23/2024 0900  Gross per 24 hour   Intake 60 ml   Output 3180 ml   Net -3120 ml       Net IO Since Admission: -996.09 mL [04/23/24 0958]    Sedatives: None    Pressors: None    Oxygen: 6 liters, high flow nasal cannula    ABG:       Recent Labs     04/21/24  0316   PH 7.293*   PCO2 44.2   PO2 82.5   HCO3 20.9*   BE -5.4*   O2SAT 94.1       Physical Exam:  General Appearance: No acute distress.  Neuro: Round symmetric pupil that react to light bilaterally.   Cardiovascular: regular

## 2024-04-23 NOTE — PROGRESS NOTES
[facility]  Internal Medicine Residency Program  Progress Note - House Team       Patient:  Saba Sullivan 59 y.o. female   MRN: [id number]       Date of Service: 2024  Admission date: 2024  7:49 AM ; Hospital day: 6   CC: Shortness of Breath (Hx COPD- started 4-5 hrs ago, 2 Duoneb and cpap by EMS right leg swelling, hx blood clots, no current thinners)     Overnight events:    > 187  K 5.3--->needs repeat K in the afternoon    Subjective     Saba Sullivan was seen and examined at bedside today morning. She was awake, alert, and oriented, and more co-operative, was able to answer questions, she is saturating 96% on 7 L of oxygen via nasal cannula. She is still on bumex drip, denies any other acute symptoms.    Objective     PHYSICAL EXAM  General Appearance: not in acute distress, on oxygen via nasal canula.   HEENT: Normocephalic, atraumatic  Neck: midline trachea  Lung: clear to auscultation bilaterally, no wheezing or rales  Heart: regular rate and rhythm, no murmur  Abdomen: soft, bowel sounds normal; no masses  Extremities: no cyanosis or edema  Musculokeletal: No joint swelling  Neurologic: Mental status: AAOx3, normal reflexes and symmetric bilaterally    CARDIOVASCULAR  Vitals: BP (!) 151/94   Pulse 98   Temp 97.3 °F (36.3 °C) (Temporal)   Resp 21   Ht 1.753 m (5' 9.02\")   Wt 76.2 kg (168 lb 1.6 oz)   LMP 2013   SpO2 97%   BMI 24.81 kg/m²     Pulse rate range      : Pulse  Av.8  Min: 81  Max: 105  BP range                  : Systolic (24hrs), Av , Min:136 , Max:167   ; Diastolic (24hrs), Av, Min:75, Max:103    PULMONARY  Respiration range   : Resp  Av.5  Min: 17  Max: 32  Pulse Ox range : SpO2  Av.3 %  Min: 92 %  Max: 100 %  Recent Labs     24  0316   PH 7.293*   PCO2 44.2   PO2 82.5   HCO3 20.9*   BE -5.4*   O2SAT 94.1     NEPHROLOGY (FLUIDS/ELECTROLYTES & NUTRITION)  Urine: 0 mL   I & O - 24hr:    Intake/Output Summary (Last  24 hours) at 4/23/2024 1107  Last data filed at 4/23/2024 0900  Gross per 24 hour   Intake 60 ml   Output 2905 ml   Net -2845 ml     Net IO Since Admission: -996.09 mL [04/23/24 1107]  Recent Labs     04/22/24  0125 04/22/24  0903 04/22/24  1758 04/23/24  0150 04/23/24  0945   CREATININE 5.1* 5.4* 5.5* 5.4* 5.6*     GASTROENTEROLOGY     Diet:   Diet NPO Exceptions are: Sips of Water with Meds     Labs and Imaging Studies     Labs  Recent Labs     04/21/24  0327 04/22/24  0430 04/23/24  0413   WBC 11.3 11.6* 13.7*   RBC 3.62 3.46* 4.14   HGB 10.2* 9.4* 11.4*   HCT 32.2* 30.2* 35.4   MCV 89.0 87.3 85.5   MCH 28.2 27.2 27.5   MCHC 31.7* 31.1* 32.2   RDW 14.6 14.6 14.7    223 281   MPV 9.9 10.1 10.3     Recent Labs     04/21/24  0335 04/21/24  0940 04/22/24  0125 04/22/24  0430 04/22/24  0903 04/22/24  1758 04/23/24  0150 04/23/24  0413 04/23/24  0945      < > 137  --  140 140 139  --  137   K 5.1*   < > 5.1*  --  5.2* 5.3* 5.3*  --  5.1*      < > 98  --  99 98 98  --  96*   MG 2.3   < > 2.1   < > 2.1 2.0 2.0 2.0 2.0   PHOS 5.8*   < > 5.4*   < > 5.8* 5.6* 5.6* 5.8* 5.6*   CO2 19*   < > 20*  --  20* 25 25  --  24   BUN 70*   < > 78*  --  83* 86* 90*  --  94*   CREATININE 5.0*   < > 5.1*  --  5.4* 5.5* 5.4*  --  5.6*   ANIONGAP 19*   < > 19*  --  21* 17* 16  --  17*   GLUCOSE 164*   < > 172*  --  138* 162* 150*  --  137*   CALCIUM 9.0   < > 9.4  --  9.8 9.7 9.7  --  10.0   PROT 6.6  --   --   --   --   --   --   --   --    BILITOT 0.4  --   --   --   --   --   --   --   --    ALKPHOS 58  --   --   --   --   --   --   --   --    AST 48*  --   --   --   --   --   --   --   --    ALT 36*  --   --   --   --   --   --   --   --     < > = values in this interval not displayed.     No results for input(s): \"TROPHS\", \"PROBNP\" in the last 72 hours.  Recent Labs     04/21/24  0327   PROTIME 11.9   INR 1.1     Recent Labs     04/21/24  0335 04/22/24  0430 04/23/24  0413   CKTOTAL 1,304* 878* 793*     No results

## 2024-04-23 NOTE — PROGRESS NOTES
Physician Progress Note      PATIENT:               LEE HOOKS  CSN #:                  [id number]  :                       1965  ADMIT DATE:       2024 7:49 AM  DISCH DATE:  RESPONDING  PROVIDER #:        Tom Rees DO          QUERY TEXT:    Pt admitted with PNA.  Noted documentation of sepsis in ED provider note. On   admission: WBC: 17.3, 16.3, 20.9, Procalcitonin: 5.34, HR: 102. If possible,   please document in progress notes and discharge summary:    The medical record reflects the following:  Risk Factors: PNA  Clinical Indicators: On admission: WBC: 17.3, 16.3, 20.9, Procal: 5.34, HR:   102. Per ED: Diagnosis should include pneumonia and severe sepsis.  Treatment: NS bolus, Doxycycline, Merrem,    Thank you,  Xiao Estrada RN CDS  [email or web address]  Options provided:  -- Sepsis confirmed present on admission  -- Sepsis ruled out  -- Other - I will add my own diagnosis  -- Disagree - Not applicable / Not valid  -- Disagree - Clinically unable to determine / Unknown  -- Refer to Clinical Documentation Reviewer    PROVIDER RESPONSE TEXT:    The diagnosis of sepsis was confirmed as present on admission.    Query created by: Xiao Estrada on 2024 12:08 PM      Electronically signed by:  Tom Rees DO 2024 4:21 PM

## 2024-04-23 NOTE — PROGRESS NOTES
Date: 4/22/2024    Time: 10:48 PM    Patient Placed On BIPAP/CPAP/ Non-Invasive Ventilation?  Yes    If no must comment.  Facial area red/color change? No           If YES are Blister/Lesion present?No   If yes must notify nursing staff  BIPAP/CPAP skin barrier?  Yes    Skin barrier type:mepilexlite       Comments:       04/22/24 3486   NIV Type   NIV Started/Stopped On   Equipment Type v60   Mode Bilevel   Mask Type Full face mask   Mask Size Medium   Assessment   Pulse 89   Heart Rate Source Monitor   Respirations 18   SpO2 97 %   Comfort Level Good   Using Accessory Muscles No   Mask Compliance Good   Skin Assessment Clean, dry, & intact   Skin Protection for O2 Device Yes   Orientation Middle   Location Nose   Intervention(s) Skin Barrier   Settings/Measurements   PIP Observed 14 cm H20   IPAP 14 cmH20   CPAP/EPAP 8 cmH2O   Vt (Measured) 480 mL   Rate Ordered 14   Insp Rise Time (%) 3 %   FiO2  60 %   I Time/ I Time % 0.8 s   Minute Volume (L/min) 10.2 Liters   Mask Leak (lpm) 52 lpm   Patient's Home Machine No   Alarm Settings   Alarms On Y             Fany Graff RCP

## 2024-04-23 NOTE — PROGRESS NOTES
04/23/24 0000 (!) 155/86 97.9 °F (36.6 °C) Temporal 92 25 97 % --   04/22/24 2300 (!) 164/90 -- -- 96 21 100 % --   04/22/24 2247 -- -- -- 89 18 97 % --   04/22/24 2200 (!) 153/85 -- -- 99 (!) 32 97 % --   04/22/24 2100 (!) 156/86 -- -- 92 23 97 % --   04/22/24 2000 (!) 152/75 97.9 °F (36.6 °C) Temporal 95 22 95 % --   04/22/24 1900 (!) 152/90 -- -- 98 19 98 % --   04/22/24 1800 (!) 155/82 -- -- 94 21 95 % --   04/22/24 1700 (!) 144/89 -- -- (!) 104 25 94 % --   04/22/24 1600 (!) 153/86 98.7 °F (37.1 °C) Temporal 98 22 96 % --   04/22/24 1500 (!) 167/99 -- -- 99 20 100 % --   04/22/24 1400 (!) 155/85 -- -- (!) 105 24 93 % --   04/22/24 1300 (!) 164/85 -- -- 95 26 96 % --   04/22/24 1255 -- -- -- -- -- -- 1.753 m (5' 9.02\")   04/22/24 1200 (!) 157/82 98.6 °F (37 °C) Temporal 95 22 97 % --   04/22/24 1100 (!) 165/88 -- -- 93 24 96 % --   04/22/24 1000 (!) 171/85 -- -- 95 28 96 % --   04/22/24 0900 (!) 154/83 -- -- 95 26 95 % --   04/22/24 0800 (!) 159/92 98.8 °F (37.1 °C) Temporal 89 23 96 % --   04/22/24 0742 -- -- -- 90 30 96 % --         Intake/Output Summary (Last 24 hours) at 4/23/2024 0741  Last data filed at 4/23/2024 0646  Gross per 24 hour   Intake 60 ml   Output 3205 ml   Net -3145 ml       Constitutional: Patient in no acute distress   Head: normocephalic, atraumatic   Neck: supple, no jvd  Cardiovascular: regular rate and rhythm, no murmurs, gallops, or rubs   Respiratory: Clear, no rales, rhochi, or wheezes,   Gastrointestinal: soft, nontender, nondistended, no hepatosplenomegaly  Ext: edema R LE; no redness or swelling  Neuro: drowsy but opens eyes to name  Skin: dry, no rash   Back: nontender    Data:    Recent Labs     04/21/24  0327 04/22/24  0430 04/23/24  0413   WBC 11.3 11.6* 13.7*   HGB 10.2* 9.4* 11.4*   HCT 32.2* 30.2* 35.4   MCV 89.0 87.3 85.5    223 281       Recent Labs     04/22/24  0903 04/22/24  1758 04/23/24  0150 04/23/24  0413    140 139  --    K 5.2* 5.3* 5.3*  --    CL  cultures no growth  Possible pna uti      6.  Mild hyperkalemia  Due to DOMINGO and acidosis  Potassium level expected to decrease given brisk diuresis  Continue too monitor    D/w Dr Arash Briseno MD

## 2024-04-23 NOTE — PLAN OF CARE
Problem: Safety - Adult  Goal: Free from fall injury  Outcome: Progressing     Problem: Discharge Planning  Goal: Discharge to home or other facility with appropriate resources  Outcome: Progressing     Problem: Skin/Tissue Integrity  Goal: Absence of new skin breakdown  Description: 1.  Monitor for areas of redness and/or skin breakdown  2.  Assess vascular access sites hourly  3.  Every 4-6 hours minimum:  Change oxygen saturation probe site  4.  Every 4-6 hours:  If on nasal continuous positive airway pressure, respiratory therapy assess nares and determine need for appliance change or resting period.  Outcome: Progressing     Problem: ABCDS Injury Assessment  Goal: Absence of physical injury  Outcome: Progressing  Flowsheets (Taken 4/22/2024 2100)  Absence of Physical Injury: Implement safety measures based on patient assessment     Problem: Respiratory - Adult  Goal: Achieves optimal ventilation and oxygenation  Outcome: Progressing     Problem: Cardiovascular - Adult  Goal: Absence of cardiac dysrhythmias or at baseline  Outcome: Progressing     Problem: Skin/Tissue Integrity - Adult  Goal: Incisions, wounds, or drain sites healing without S/S of infection  Outcome: Progressing     Problem: Metabolic/Fluid and Electrolytes - Adult  Goal: Electrolytes maintained within normal limits  Outcome: Progressing     Problem: Pain  Goal: Verbalizes/displays adequate comfort level or baseline comfort level  Outcome: Progressing     Problem: Nutrition Deficit:  Goal: Optimize nutritional status  Outcome: Progressing

## 2024-04-23 NOTE — PROGRESS NOTES
[facility]  Internal Medicine Department    Attending Physician Statement:  Tom Rees Jr. D.O.    I have discussed the case, including pertinent history and exam findings with the resident. I have reviewed all past medical history, past surgical history, family history, social history, medications, and allergies and updated as appropriate in the history section of the chart. I have seen and examined the patient and the key elements of the encounter have been performed by me. I agree with the assessment, plan and orders as documented by the resident.      Acute Hypoxic Respiratory Failure  -improving at 7 L of O2 via nasal canula; encouraged BiPAP compliance   -COPD exacerbation, PNA, Pulmonary HTN, atelectasis  -s/p treatment with antibiotics; continue steroids at this time and plan to start reducing in the next 48 hours   -pulmonary toilet for improved aeration   -CXR showing pulmonary congestion; continue diuresis     DOMINGO Stage III  -multifactorial, rhabdomyolysis, decreased perfusion, and dehydration   -worsening Cr; bumex drip with UOP of 3.2 L in last 24 hours  -further care per nephrology     Pulmonary Hypertension  -2/2 methamphetamine use  -further w/u after stabilization of acute hypoxic respiratory failure       NSTEMI  -ST depressions and T wave inversion throughout the inferior and anteroseptal leads  -continue heparin per ICU protocols   -ischemic evaluation per cardiology recs      Polysubstane Abuse  -multiple medications; peer recovery; montior for withdrawal symptoms     Remainder of medical problems as per resident note.

## 2024-04-24 LAB
ANION GAP SERPL CALCULATED.3IONS-SCNC: 16 MMOL/L (ref 7–16)
ANION GAP SERPL CALCULATED.3IONS-SCNC: 17 MMOL/L (ref 7–16)
ANION GAP SERPL CALCULATED.3IONS-SCNC: 17 MMOL/L (ref 7–16)
ATYPICAL LYMPHOCYTE ABSOLUTE COUNT: 0.13 K/UL (ref 0–0.46)
ATYPICAL LYMPHOCYTES: 1 % (ref 0–4)
BASOPHILS # BLD: 0 K/UL (ref 0–0.2)
BASOPHILS NFR BLD: 0 % (ref 0–2)
BUN SERPL-MCNC: 106 MG/DL (ref 6–20)
BUN SERPL-MCNC: 108 MG/DL (ref 6–20)
BUN SERPL-MCNC: 112 MG/DL (ref 6–20)
CALCIUM SERPL-MCNC: 10 MG/DL (ref 8.6–10.2)
CALCIUM SERPL-MCNC: 10 MG/DL (ref 8.6–10.2)
CALCIUM SERPL-MCNC: 9.7 MG/DL (ref 8.6–10.2)
CHLORIDE SERPL-SCNC: 92 MMOL/L (ref 98–107)
CHLORIDE SERPL-SCNC: 94 MMOL/L (ref 98–107)
CHLORIDE SERPL-SCNC: 94 MMOL/L (ref 98–107)
CK SERPL-CCNC: 623 U/L (ref 20–180)
CO2 SERPL-SCNC: 27 MMOL/L (ref 22–29)
CO2 SERPL-SCNC: 27 MMOL/L (ref 22–29)
CO2 SERPL-SCNC: 28 MMOL/L (ref 22–29)
CREAT SERPL-MCNC: 5.4 MG/DL (ref 0.5–1)
CREAT SERPL-MCNC: 5.5 MG/DL (ref 0.5–1)
CREAT SERPL-MCNC: 5.6 MG/DL (ref 0.5–1)
EOSINOPHIL # BLD: 0 K/UL (ref 0.05–0.5)
EOSINOPHILS RELATIVE PERCENT: 0 % (ref 0–6)
ERYTHROCYTE [DISTWIDTH] IN BLOOD BY AUTOMATED COUNT: 14.6 % (ref 11.5–15)
GFR SERPL CREATININE-BSD FRML MDRD: 8 ML/MIN/1.73M2
GFR SERPL CREATININE-BSD FRML MDRD: 8 ML/MIN/1.73M2
GFR SERPL CREATININE-BSD FRML MDRD: 9 ML/MIN/1.73M2
GLUCOSE BLD-MCNC: 141 MG/DL (ref 74–99)
GLUCOSE BLD-MCNC: 150 MG/DL (ref 74–99)
GLUCOSE BLD-MCNC: 158 MG/DL (ref 74–99)
GLUCOSE BLD-MCNC: 180 MG/DL (ref 74–99)
GLUCOSE BLD-MCNC: 198 MG/DL (ref 74–99)
GLUCOSE BLD-MCNC: 198 MG/DL (ref 74–99)
GLUCOSE SERPL-MCNC: 146 MG/DL (ref 74–99)
GLUCOSE SERPL-MCNC: 176 MG/DL (ref 74–99)
GLUCOSE SERPL-MCNC: 182 MG/DL (ref 74–99)
HCT VFR BLD AUTO: 37.9 % (ref 34–48)
HGB BLD-MCNC: 12.4 G/DL (ref 11.5–15.5)
LYMPHOCYTES NFR BLD: 0.52 K/UL (ref 1.5–4)
LYMPHOCYTES RELATIVE PERCENT: 3 % (ref 20–42)
MAGNESIUM SERPL-MCNC: 1.9 MG/DL (ref 1.6–2.6)
MCH RBC QN AUTO: 27.4 PG (ref 26–35)
MCHC RBC AUTO-ENTMCNC: 32.7 G/DL (ref 32–34.5)
MCV RBC AUTO: 83.8 FL (ref 80–99.9)
METAMYELOCYTES ABSOLUTE COUNT: 0.13 K/UL (ref 0–0.12)
METAMYELOCYTES: 1 % (ref 0–1)
MONOCYTES NFR BLD: 0.91 K/UL (ref 0.1–0.95)
MONOCYTES NFR BLD: 6 % (ref 2–12)
NEUTROPHILS NFR BLD: 89 % (ref 43–80)
NEUTS SEG NFR BLD: 13.41 K/UL (ref 1.8–7.3)
PHOSPHATE SERPL-MCNC: 5.9 MG/DL (ref 2.5–4.5)
PLATELET # BLD AUTO: 263 K/UL (ref 130–450)
PMV BLD AUTO: 10.6 FL (ref 7–12)
POTASSIUM SERPL-SCNC: 5 MMOL/L (ref 3.5–5)
POTASSIUM SERPL-SCNC: 5.2 MMOL/L (ref 3.5–5)
POTASSIUM SERPL-SCNC: 5.3 MMOL/L (ref 3.5–5)
RBC # BLD AUTO: 4.52 M/UL (ref 3.5–5.5)
RBC # BLD: ABNORMAL 10*6/UL
SODIUM SERPL-SCNC: 136 MMOL/L (ref 132–146)
SODIUM SERPL-SCNC: 138 MMOL/L (ref 132–146)
SODIUM SERPL-SCNC: 138 MMOL/L (ref 132–146)
WBC OTHER # BLD: 15.1 K/UL (ref 4.5–11.5)

## 2024-04-24 PROCEDURE — 97530 THERAPEUTIC ACTIVITIES: CPT

## 2024-04-24 PROCEDURE — 82550 ASSAY OF CK (CPK): CPT

## 2024-04-24 PROCEDURE — C9113 INJ PANTOPRAZOLE SODIUM, VIA: HCPCS | Performed by: NURSE PRACTITIONER

## 2024-04-24 PROCEDURE — 2580000003 HC RX 258

## 2024-04-24 PROCEDURE — 2580000003 HC RX 258: Performed by: NURSE PRACTITIONER

## 2024-04-24 PROCEDURE — 94660 CPAP INITIATION&MGMT: CPT

## 2024-04-24 PROCEDURE — 99291 CRITICAL CARE FIRST HOUR: CPT | Performed by: INTERNAL MEDICINE

## 2024-04-24 PROCEDURE — 6360000002 HC RX W HCPCS: Performed by: NURSE PRACTITIONER

## 2024-04-24 PROCEDURE — 97535 SELF CARE MNGMENT TRAINING: CPT

## 2024-04-24 PROCEDURE — 2060000000 HC ICU INTERMEDIATE R&B

## 2024-04-24 PROCEDURE — 2700000000 HC OXYGEN THERAPY PER DAY

## 2024-04-24 PROCEDURE — A4216 STERILE WATER/SALINE, 10 ML: HCPCS | Performed by: NURSE PRACTITIONER

## 2024-04-24 PROCEDURE — 85025 COMPLETE CBC W/AUTO DIFF WBC: CPT

## 2024-04-24 PROCEDURE — 92526 ORAL FUNCTION THERAPY: CPT

## 2024-04-24 PROCEDURE — 80074 ACUTE HEPATITIS PANEL: CPT

## 2024-04-24 PROCEDURE — 6370000000 HC RX 637 (ALT 250 FOR IP): Performed by: NURSE PRACTITIONER

## 2024-04-24 PROCEDURE — 92610 EVALUATE SWALLOWING FUNCTION: CPT

## 2024-04-24 PROCEDURE — 94640 AIRWAY INHALATION TREATMENT: CPT

## 2024-04-24 PROCEDURE — 82962 GLUCOSE BLOOD TEST: CPT

## 2024-04-24 PROCEDURE — 6370000000 HC RX 637 (ALT 250 FOR IP): Performed by: INTERNAL MEDICINE

## 2024-04-24 PROCEDURE — 86317 IMMUNOASSAY INFECTIOUS AGENT: CPT

## 2024-04-24 PROCEDURE — 80048 BASIC METABOLIC PNL TOTAL CA: CPT

## 2024-04-24 PROCEDURE — 83735 ASSAY OF MAGNESIUM: CPT

## 2024-04-24 PROCEDURE — 6360000002 HC RX W HCPCS

## 2024-04-24 PROCEDURE — 99232 SBSQ HOSP IP/OBS MODERATE 35: CPT | Performed by: INTERNAL MEDICINE

## 2024-04-24 PROCEDURE — 84100 ASSAY OF PHOSPHORUS: CPT

## 2024-04-24 RX ORDER — AMLODIPINE BESYLATE 10 MG/1
10 TABLET ORAL DAILY
Status: DISCONTINUED | OUTPATIENT
Start: 2024-04-25 | End: 2024-05-04 | Stop reason: HOSPADM

## 2024-04-24 RX ORDER — FENTANYL CITRATE 50 UG/ML
25 INJECTION, SOLUTION INTRAMUSCULAR; INTRAVENOUS ONCE
Status: COMPLETED | OUTPATIENT
Start: 2024-04-24 | End: 2024-04-24

## 2024-04-24 RX ORDER — MECOBALAMIN 5000 MCG
5 TABLET,DISINTEGRATING ORAL NIGHTLY PRN
Status: DISCONTINUED | OUTPATIENT
Start: 2024-04-24 | End: 2024-05-04 | Stop reason: HOSPADM

## 2024-04-24 RX ORDER — IPRATROPIUM BROMIDE AND ALBUTEROL SULFATE 2.5; .5 MG/3ML; MG/3ML
1 SOLUTION RESPIRATORY (INHALATION) EVERY 4 HOURS PRN
Status: DISCONTINUED | OUTPATIENT
Start: 2024-04-24 | End: 2024-05-04 | Stop reason: HOSPADM

## 2024-04-24 RX ADMIN — WATER 40 MG: 1 INJECTION INTRAMUSCULAR; INTRAVENOUS; SUBCUTANEOUS at 09:45

## 2024-04-24 RX ADMIN — HEPARIN SODIUM 5000 UNITS: 5000 INJECTION INTRAVENOUS; SUBCUTANEOUS at 14:11

## 2024-04-24 RX ADMIN — IPRATROPIUM BROMIDE AND ALBUTEROL SULFATE 1 DOSE: .5; 2.5 SOLUTION RESPIRATORY (INHALATION) at 00:06

## 2024-04-24 RX ADMIN — ACETAMINOPHEN 650 MG: 325 TABLET ORAL at 19:54

## 2024-04-24 RX ADMIN — IPRATROPIUM BROMIDE AND ALBUTEROL SULFATE 1 DOSE: .5; 2.5 SOLUTION RESPIRATORY (INHALATION) at 12:27

## 2024-04-24 RX ADMIN — HEPARIN SODIUM 5000 UNITS: 5000 INJECTION INTRAVENOUS; SUBCUTANEOUS at 21:31

## 2024-04-24 RX ADMIN — IPRATROPIUM BROMIDE AND ALBUTEROL SULFATE 1 DOSE: .5; 2.5 SOLUTION RESPIRATORY (INHALATION) at 08:36

## 2024-04-24 RX ADMIN — BUDESONIDE INHALATION 500 MCG: 0.5 SUSPENSION RESPIRATORY (INHALATION) at 08:36

## 2024-04-24 RX ADMIN — IPRATROPIUM BROMIDE AND ALBUTEROL SULFATE 1 DOSE: .5; 2.5 SOLUTION RESPIRATORY (INHALATION) at 20:30

## 2024-04-24 RX ADMIN — ARFORMOTEROL TARTRATE 15 MCG: 15 SOLUTION RESPIRATORY (INHALATION) at 20:30

## 2024-04-24 RX ADMIN — BUDESONIDE INHALATION 500 MCG: 0.5 SUSPENSION RESPIRATORY (INHALATION) at 20:30

## 2024-04-24 RX ADMIN — IPRATROPIUM BROMIDE AND ALBUTEROL SULFATE 1 DOSE: .5; 2.5 SOLUTION RESPIRATORY (INHALATION) at 04:55

## 2024-04-24 RX ADMIN — IPRATROPIUM BROMIDE AND ALBUTEROL SULFATE 1 DOSE: .5; 2.5 SOLUTION RESPIRATORY (INHALATION) at 17:16

## 2024-04-24 RX ADMIN — FENTANYL CITRATE 25 MCG: 50 INJECTION INTRAMUSCULAR; INTRAVENOUS at 01:05

## 2024-04-24 RX ADMIN — ARFORMOTEROL TARTRATE 15 MCG: 15 SOLUTION RESPIRATORY (INHALATION) at 08:36

## 2024-04-24 RX ADMIN — SODIUM CHLORIDE, PRESERVATIVE FREE 10 ML: 5 INJECTION INTRAVENOUS at 19:39

## 2024-04-24 RX ADMIN — Medication 300 UNITS: at 21:26

## 2024-04-24 RX ADMIN — HEPARIN SODIUM 5000 UNITS: 5000 INJECTION INTRAVENOUS; SUBCUTANEOUS at 05:20

## 2024-04-24 RX ADMIN — SODIUM CHLORIDE, PRESERVATIVE FREE 40 MG: 5 INJECTION INTRAVENOUS at 09:45

## 2024-04-24 RX ADMIN — SODIUM CHLORIDE, PRESERVATIVE FREE 10 ML: 5 INJECTION INTRAVENOUS at 09:53

## 2024-04-24 ASSESSMENT — PAIN SCALES - GENERAL
PAINLEVEL_OUTOF10: 0
PAINLEVEL_OUTOF10: 0
PAINLEVEL_OUTOF10: 10
PAINLEVEL_OUTOF10: 4
PAINLEVEL_OUTOF10: 0

## 2024-04-24 ASSESSMENT — PAIN DESCRIPTION - ORIENTATION
ORIENTATION: LOWER;MID
ORIENTATION: MID;LEFT

## 2024-04-24 ASSESSMENT — PAIN DESCRIPTION - LOCATION
LOCATION: BACK
LOCATION: BACK;LEG

## 2024-04-24 ASSESSMENT — PAIN DESCRIPTION - DESCRIPTORS
DESCRIPTORS: ACHING;DISCOMFORT;THROBBING
DESCRIPTORS: ACHING;DISCOMFORT;PRESSURE

## 2024-04-24 ASSESSMENT — PULMONARY FUNCTION TESTS: PEFR_L/MIN: 140

## 2024-04-24 ASSESSMENT — PAIN - FUNCTIONAL ASSESSMENT: PAIN_FUNCTIONAL_ASSESSMENT: ACTIVITIES ARE NOT PREVENTED

## 2024-04-24 NOTE — PROGRESS NOTES
[facility]  Department of Internal Medicine   Internal Medicine Residency   MICU Progress Note    Patient:  Saba Sullivan 59 y.o. female  MRN: [id number]     Date of Service: 4/24/2024    Allergy: Ancef [cefazolin], Duricef [cefadroxil], and Iodine    Overnight Events:   NAEO. Bumex drip was not stopped overnight. It was stopped this morning.    Subjective     Patient was seen by the bedside in the morning.  Patient is alert and oriented,     ROS: Denies fever, chills, chest pain, shortness of breath, N/V/C/D, dysuria or blood in stool or urine.    Objective     VS:   Patient Vitals for the past 12 hrs:   BP Temp Temp src Pulse Resp SpO2   04/24/24 0800 (!) 153/88 97.1 °F (36.2 °C) Temporal 99 (!) 31 91 %   04/24/24 0700 (!) 151/85 -- -- 99 25 91 %   04/24/24 0600 (!) 162/91 -- -- 97 21 94 %   04/24/24 0500 (!) 144/87 -- -- 97 19 91 %   04/24/24 0400 (!) 160/95 98 °F (36.7 °C) Temporal 92 21 96 %   04/24/24 0300 (!) 151/80 -- -- 99 22 93 %   04/24/24 0200 122/83 -- -- 100 26 90 %   04/24/24 0141 -- -- -- 93 20 100 %   04/24/24 0135 -- -- -- -- 22 --   04/24/24 0105 -- -- -- -- 20 --   04/24/24 0100 137/83 -- -- 96 21 95 %   04/24/24 0008 -- -- -- 96 25 98 %   04/24/24 0007 -- -- -- 94 24 90 %   04/24/24 0000 (!) 144/77 97.5 °F (36.4 °C) Temporal 97 16 96 %   04/23/24 2300 (!) 166/94 -- -- 97 19 92 %     ]    I & O - 24hr:   Intake/Output Summary (Last 24 hours) at 4/24/2024 1045  Last data filed at 4/24/2024 0800  Gross per 24 hour   Intake 100 ml   Output 3345 ml   Net -3245 ml         Net IO Since Admission: -4,341.09 mL [04/24/24 1045]    Sedatives: None    Pressors: None    Oxygen: 6 liters, high flow nasal cannula    ABG:       No results for input(s): \"PH\", \"PCO2\", \"PO2\", \"HCO3\", \"BE\", \"O2SAT\" in the last 72 hours.    Invalid input(s): \"PFRATIO\"      Physical Exam:  General Appearance: No acute distress.  Neuro: Round symmetric pupil that react to light bilaterally.   Cardiovascular: regular  pleural effusions.  There is   persistent consolidation in the right lower lobe with volume loss and 2 a   lesser degree left lower lobe, of the left lower lobe findings have slightly   worsened since the prior study.  This may be on the basis of atelectasis   though pneumonitis may also be considered.      2.  Approximate 2 cm round/oval air-containing area in the consolidated right   lower lobe may represent a pneumatocele or bulla but as it was not seen on   the prior study, an area of developing cavitation is not excluded.      3.  Hyperinflation of the upper lungs with some emphysematous change, similar   to the prior study.      4.  Cholelithiasis with the appearance of the gallbladder otherwise unchanged.      5.  Nonobstructing 3 mm upper pole left intrarenal calculus.  No obstructive   uropathy is noted.      6.  Increasing subcutaneous edema within both flanks which can be a sign of   anasarca.         XR CHEST PORTABLE   Final Result   1. Unchanged opacity/consolidation projected in the right lower chest.   Probable left pleural effusion.   2. Left IJ central venous catheter in good position.         XR CHEST PORTABLE   Final Result   Small bilateral pleural effusions with bibasilar opacities that could be   related to pneumonia or atelectasis.         XR CHEST PORTABLE   Final Result   1. No acute cardiopulmonary process.   2. Left basilar atelectasis.         XR FOOT RIGHT (2 VIEWS)   Final Result   9 mm ulceration over the plantar hindfoot at the level of the calcaneal neck   with surrounding inflammatory infiltration. No acute osseous erosion to   suggest acute osteomyelitis.      RECOMMENDATION:   Consider additional evaluation by MRI as clinically indicated.         CTA PULMONARY W CONTRAST   Final Result   1. Irregular and confluent opacities are present in lower lobes more   significant on the right suggestive of pneumonia and atelectasis   2. Peribronchial thickening bilaterally suggestive of

## 2024-04-24 NOTE — PROGRESS NOTES
Patient has removed bipap 4 times and is refusing to wear it. Patient educated on the importance of the bipap, but patient is still refusing.

## 2024-04-24 NOTE — PROGRESS NOTES
[facility]  Internal Medicine Residency Program  Progress Note - House Team       Patient:  Saba Sullivan 59 y.o. female   MRN: [id number]       Date of Service: 2024  Admission date: 2024  7:49 AM ; Hospital day: 7   CC: Shortness of Breath (Hx COPD- started 4-5 hrs ago, 2 Duoneb and cpap by EMS right leg swelling, hx blood clots, no current thinners)     Overnight events:   No acute events overnight    Subjective     Saba Sullivan was seen and examined at bedside today morning. She was awake, alert, and oriented, co-operative, was able to answer questions, she is saturating 96% on 4 L of oxygen via nasal cannula. She is off of bumex drip, denies any other acute symptoms.     Objective     PHYSICAL EXAM  General Appearance: not in acute distress, on 4 L of oxygen via nasal canula.   HEENT: Normocephalic, atraumatic  Neck: midline trachea  Lung: clear to auscultation bilaterally, no wheezing or rales  Heart: regular rate and rhythm, no murmur  Abdomen: soft, bowel sounds normal; no masses  Extremities: no cyanosis or edema  Musculokeletal: No joint swelling  Neurologic: Mental status: AAOx3, normal reflexes and symmetric bilaterally    CARDIOVASCULAR  Vitals: BP (!) 146/86   Pulse (!) 106   Temp 97.3 °F (36.3 °C) (Temporal)   Resp 23   Ht 1.753 m (5' 9.02\")   Wt 76.2 kg (168 lb 1.6 oz)   LMP 2013   SpO2 (!) 87%   BMI 24.81 kg/m²     Pulse rate range      : Pulse  Av.5  Min: 92  Max: 117  BP range                  : Systolic (24hrs), Av , Min:122 , Max:166   ; Diastolic (24hrs), Av, Min:72, Max:95    PULMONARY  Respiration range   : Resp  Av.3  Min: 16  Max: 31  Pulse Ox range : SpO2  Av %  Min: 85 %  Max: 100 %  No results for input(s): \"PH\", \"PCO2\", \"PO2\", \"HCO3\", \"BE\", \"O2SAT\" in the last 72 hours.    Invalid input(s): \"PFRATIO\"    NEPHROLOGY (FLUIDS/ELECTROLYTES & NUTRITION)  Urine: 0 mL   I & O - 24hr:    Intake/Output Summary (Last 24 hours)

## 2024-04-24 NOTE — PROGRESS NOTES
Physical Therapy    Physical Therapy Treatment    Name: Saba Sullivan  : 1965  MRN: [id number]      Date of Service: 2024    Evaluating PT:  Dante Aguirre, PT, DPT  [id number]     Room #:  4403/4403-A  Diagnosis:  Elevated LFTs [R79.89]  NSTEMI (non-ST elevated myocardial infarction) (HCC) [I21.4]  DOMINGO (acute kidney injury) (HCC) [N17.9]  Acute respiratory failure with hypoxia and hypercapnia (HCC) [J96.01, J96.02]  PMHx/PSHx:   has a past medical history of Abscess, Chronic pain, Chronic recurrent multifocal osteomyelitis of foot (HCC), Hepatitis C, Hip fracture (HCC), Hx of blood clots, Polysubstance abuse (HCC), Pressure injury of heel, stage 3 (HCC), and Subclinical hypothyroidism.   Procedure/Surgery:  none   Precautions:  Falls, O2, R heel wound, NWB R foot with CAM boot, Cognition, Tremors   Equipment Needs:  TBD    SUBJECTIVE:    Pt lives with significant other in a 1 story home with ramp to enter.  Bedroom and bathroom are on the main level.  Pt ambulated with WC PTA.    OBJECTIVE:   Initial Evaluation  Date: 24  Treatment  Date: 24 Short Term/ Long Term   Goals   AM-PAC 6 Clicks     Was pt agreeable to Eval/treatment? Yes  Yes     Does pt have pain? No reported pain  No reported pain    Bed Mobility  Rolling: NT  Supine to sit: Mod A x2  Sit to supine: Mod A x2  Scooting: Mod A Rolling: NT  Supine to sit: Mod A  Sit to supine: Mod A  Scooting: Mod A Rolling: SBA  Supine to sit: SBA  Sit to supine: SBA  Scooting: SBA   Transfers Sit to stand: Max A  Stand to sit: Max A  Stand pivot: NT Sit to stand: Mod A x2   Stand to sit: Mod A x2  Stand pivot: Max A x2 with FWW Sit to stand: SBA  Stand to sit: SBA  Stand pivot: SBA with AAD   Ambulation    Few side steps with FWW Max A Few steps with FWW Max A x2 >25 feet with AAD Min A   Stair negotiation: ascended and descended  NT NT NA   ROM BUE:  Per OT eval   BLE:  WFL     Strength BUE:  Per OT eval   RLE:  Grossly 4/5  LLE:  Grossly  mobility training - pt given verbal and tactile cues to facilitate proper sequencing and safety during supine<>sit as well as provided with physical assistance to complete task    Sitting EOB for >10 minutes for upright tolerance, postural awareness and BLE ROM   STS and pivot transfer training - pt educated on proper hand and foot placement, safety and sequencing, and use of FWW to safely complete sit<>stand and pivot transfers with hands on assistance to complete task safely    Skilled positioning - Pt placed in the chair position with pillows utilized to facilitate upright posture, joint and skin integrity, and interaction with environment.        PHYSICAL THERAPY PLAN OF CARE:  Pt is making good progress towards established goals.  Continue PT POC.     Specific instructions for next treatment:  transfer training, gait training     Time in  0920  Time out  1000    Total Treatment Time  40 minutes     CPT codes:  [] Low Complexity PT evaluation 11384  [] Moderate Complexity PT evaluation 66041  [] High Complexity PT evaluation 71078  [] PT Re-evaluation 19419  [] Gait training 13729 -- minutes  [] Manual therapy 75237 -- minutes  [x] Therapeutic activities 79785 40 minutes  [] Therapeutic exercises 94092 - minutes  [] Neuromuscular reeducation 51370 -- minutes     Dante Aguirre, PT, DPT  [id number]

## 2024-04-24 NOTE — PROGRESS NOTES
OCCUPATIONAL THERAPY TREATMENT SESSION  [facility]  [address], [place]       Date:2024                                                               Patient Name: Saba Sullivan  MRN: [id number]  : 1965  Room: [address] [place] [address]    Evaluating OT: Alexus Espinal, NICHOLASD,  OTR/L; [id number]    Referring Provider: Martina Nielson APRN - CNP    Specific Provider Orders/Date: OT eval and treat (24)       Diagnosis: Elevated LFTs [R79.89]  NSTEMI (non-ST elevated myocardial infarction) (HCC) [I21.4]  DOMINGO (acute kidney injury) (HCC) [N17.9]  Acute respiratory failure with hypoxia and hypercapnia (HCC) [J96.01, J96.02]     Reason for admission: Pt admitted with ARF, hx of COPD, tremor    Surgery/Procedures: None this admission     Pertinent Medical History:    Past Medical History:   Diagnosis Date    Abscess     states for a couple years she has had problems with     Chronic pain     Chronic recurrent multifocal osteomyelitis of foot (HCC) 2019    Hepatitis C     Hip fracture (HCC)     Hx of blood clots     dvt rt calf    Polysubstance abuse (HCC)     Pressure injury of heel, stage 3 (HCC) 2019    Subclinical hypothyroidism 2019        *Precautions:  Fall Risk, O2, essential tremor, NWB RLE (R heel wound) + CAM boot (not present for session), safety, alarms+    Assessment of current deficits   [x] Functional mobility  [x]ADLs  [x] Strength               []Cognition   [x] Functional transfers   [x] IADLs         [x] Safety Awareness   [x]Endurance   [] Fine Coordination        [] ROM     [] Vision/perception   []Sensation    []Gross Motor Coordination [x] Balance   [] Delirium                  []Motor Control     [] Communication    OT PLAN OF CARE   OT POC based on physician orders, patient diagnosis and results of clinical assessment.       Frequency/Duration: 1-3 days/wk for 1-2 weeks PRN    Specific OT Treatment

## 2024-04-24 NOTE — PROGRESS NOTES
04/23/24 2210   NIV Type   NIV Started/Stopped On   Equipment Type v60   Mode Bilevel   Mask Type Full face mask   Mask Size Medium   Assessment   Pulse 98   Respirations 22   SpO2 98 %   Level of Consciousness 0   Comfort Level Good   Using Accessory Muscles No   Mask Compliance Good   Skin Assessment Clean, dry, & intact   Skin Protection for O2 Device Yes   Orientation Middle   Location Nose   Intervention(s) Skin Barrier   Settings/Measurements   IPAP 14 cmH20   CPAP/EPAP 8 cmH2O   Vt (Measured) 756 mL   Rate Ordered 14   FiO2  60 %   Minute Volume (L/min) 15.4 Liters   Mask Leak (lpm) 31 lpm   Patient's Home Machine No   Alarm Settings   Alarms On Y   Low Pressure (cmH2O) 8 cmH2O   High Pressure (cmH2O) 30 cmH2O   RR Low (bpm) 6   RR High (bpm) 40 br/min     Date: 4/23/2024    Time: 10:12 PM    Patient Placed On BIPAP/CPAP/ Non-Invasive Ventilation?  Yes    If no must comment.  Facial area red/color change? No           If YES are Blister/Lesion present?No   If yes must notify nursing staff  BIPAP/CPAP skin barrier?  Yes    Skin barrier type:mepilexlite       Comments:        Riya Stoddard RCP

## 2024-04-24 NOTE — CARE COORDINATION
Care Coordination: LOS 7 day. 4 ltr 02, bumex gtt.  Nephrology following, bun 106/cr 5.6. cxr with worsening.  Met with pt , she is up in chair, sitter at bedside.  Plan remains home and she is agreeable to [facility].  She has no preference, referral made to Sarah at [facility] to see if she can acccept [facility]. They will run benefits and let me know    Electronically signed by Marti Johnson RN on 2024 at 10:07 AM     ADDENDUM: [facility] unable to accept [facility], referral made to [facility] to review benefits    Electronically signed by Marti Johnson RN on 2024 at 10:48 AM     ADDENDUM: [facility] unable to accept. Left message for Moises melchor at [facility] to see if he can assist    Electronically signed by Marti Johnson RN on 2024 at 11:07 AM     ADDENDUM: referral made to Nelda at [facility], checking to see if they can accept and will need to clarify pcp- pt states Dr Melchor but she has been gone over a year.  Can check to see if house team can accept pt as well.    Electronically signed by Marti Johnson RN on 2024 at 11:27 AM     ADDENDUM: return call from Moises MELCHOR at [facility], [facility] is not VA services, it is a product available for purchase or to  veterans families.  They may not have [facility] benefits. Let a message with Nelda at [facility] to confirm they can accept insurance      Electronically signed by Marti Johnson RN on 2024 at 2:36 PM

## 2024-04-24 NOTE — PROGRESS NOTES
[facility]  Internal Medicine Department    Attending Physician Statement:  Tom Rees Jr. D.O.    I have discussed the case, including pertinent history and exam findings with the resident. I have reviewed all past medical history, past surgical history, family history, social history, medications, and allergies and updated as appropriate in the history section of the chart. I have seen and examined the patient and the key elements of the encounter have been performed by me. I agree with the assessment, plan and orders as documented by the resident.      Acute Hypoxic Respiratory Failure  -improving to 4 L of O2 via nasal canula; encouraged BiPAP compliance however patient deferrring   -COPD exacerbation, PNA, Pulmonary HTN, atelectasis  -s/p treatment with antibiotics; continue steroids at this time and plan to start reducing in the next 24  hours  to oral medication  -pulmonary toilet for improved aeration   -CXR showing pulmonary congestion; continue diuresis  -continue current inhalers     HTN  -plan to titrate to normotension with aid of nephrology      DOMINGO Stage III  -multifactorial, rhabdomyolysis, decreased perfusion, and dehydration   -stable Cr and elelctrolytes for last 48 hours   - UOP 3.9 L in the last 24 hours   -further care per nephrology and transition to intermittant dosing     Pulmonary Hypertension  -2/2 methamphetamine use  -further w/u after stabilization of acute hypoxic respiratory failure       NSTEMI  -ST depressions and T wave inversion throughout the inferior and anteroseptal leads  -continue heparin per ICU protocols   -ischemic evaluation per cardiology recs      Polysubstane Abuse  -multiple medications; peer recovery; montior for withdrawal symptoms     Remainder of medical problems as per resident note.

## 2024-04-24 NOTE — PLAN OF CARE
Problem: Safety - Adult  Goal: Free from fall injury  Outcome: Progressing     Problem: Discharge Planning  Goal: Discharge to home or other facility with appropriate resources  Outcome: Progressing     Problem: Skin/Tissue Integrity  Goal: Absence of new skin breakdown  Description: 1.  Monitor for areas of redness and/or skin breakdown  2.  Assess vascular access sites hourly  3.  Every 4-6 hours minimum:  Change oxygen saturation probe site  4.  Every 4-6 hours:  If on nasal continuous positive airway pressure, respiratory therapy assess nares and determine need for appliance change or resting period.  Outcome: Progressing     Problem: ABCDS Injury Assessment  Goal: Absence of physical injury  Outcome: Progressing     Problem: Respiratory - Adult  Goal: Achieves optimal ventilation and oxygenation  Outcome: Progressing     Problem: Cardiovascular - Adult  Goal: Absence of cardiac dysrhythmias or at baseline  Outcome: Progressing     Problem: Skin/Tissue Integrity - Adult  Goal: Incisions, wounds, or drain sites healing without S/S of infection  Outcome: Progressing     Problem: Metabolic/Fluid and Electrolytes - Adult  Goal: Electrolytes maintained within normal limits  Outcome: Progressing     Problem: Pain  Goal: Verbalizes/displays adequate comfort level or baseline comfort level  Outcome: Progressing     Problem: Nutrition Deficit:  Goal: Optimize nutritional status  Outcome: Progressing

## 2024-04-24 NOTE — PROGRESS NOTES
SPEECH/LANGUAGE PATHOLOGY  CLINICAL ASSESSMENT OF SWALLOWING FUNCTION   and PLAN OF CARE    PATIENT NAME:  Saba Sullivan  (female)     MRN:  [id number]    :  1965  (59 y.o.)  STATUS:  Inpatient: Room 4403/4403-A    TODAY'S DATE:  2024  REFERRING PROVIDER:     REASON FOR REFERRAL: re-evaluation   EVALUATING THERAPIST: Audrey Hammer SLP                 RESULTS:    DYSPHAGIA DIAGNOSIS:   Clinical indicators of functional oropharyngeal swallow for age/premorbid functioning      DIET RECOMMENDATIONS:  Minced and moist consistency solids (IDDSI level 5) with  thin liquids (IDDSI level 0).  PT reported family will be bringing in her dentures.   She may then advance to regular texture diet     FEEDING RECOMMENDATIONS:     Assistance level:  Set-up is required for all oral intake      Compensatory strategies recommended: No strategies are recommended at this time      Discussed recommendations with nursing and/or faxed report to referring provider: Yes    SPEECH THERAPY  PLAN OF CARE   The dysphagia POC is established based on physician order, dysphagia diagnosis and results of clinical assessment     Dysphagia therapy is not recommended     Conditions Requiring Skilled Therapeutic Intervention for dysphagia:    Not applicable    Specific dysphagia interventions to include:     not applicable    Specific instructions for next treatment:  not applicable   Patient Treatment Goals:    Short Term Goals:  Not applicable no therapy warranted     Long Term Goals:   Not applicable no therapy warranted      Patient/family Goal:    not applicable                    ADMITTING DIAGNOSIS: Elevated LFTs [R79.89]  NSTEMI (non-ST elevated myocardial infarction) ([facility]) [I21.4]  DOMINGO (acute kidney injury) ([facility]) [N17.9]  Acute respiratory failure with hypoxia and hypercapnia ([facility]) [J96.01, J96.02]    VISIT DIAGNOSIS:   Visit Diagnoses         Codes    DOMINGO (acute kidney injury) ([facility])     N17.9    Elevated LFTs     R79.89

## 2024-04-24 NOTE — PROGRESS NOTES
Nephrology Progress Note  [facility]      CC:   arf    HPI:       4/18: the pt is a 61 yo female with a pmh of copd, DVT, polysubstance abuse, recurrent OM of R foot who presented with sob and ms changes. Sat was 85% on RA in ER. She was put on bipap. Cta showed no PE. She was given nebs, steroids and started on doxycycline and merrem. Bilateral LE US showed no DVT. Labs showed na 131 k 4.6 co2 27 bun 36 cr 2.2 > 3.1, lactate 1.5, ca 7.8, p 5.8, nh4 25ck 21k>16K, alb 2.7, mg 1.9, tsh 1.04, uds pos amphetamine, benzos, cocaine, fentanyl, opiates. Wbc 20.9, hgb 13.5, plt 313. Ua 100 glucose, mod bili, sg >1.03, 100 protein, pos nitrite, 10-20 rbc, lg hgb. Vitals showed rr 17 hr 112 bp 148/67, temp 98.1.  cr from 7/2021 was 0.7. she was started on ns at 51. Pt is not giving a history, mumbles when asked a question, eyes closed.       Patient Active Problem List   Diagnosis    Abscess    Nonhealing ulcer of heel (HCC)    Bradycardia    Substance abuse (HCC)    Current moderate episode of major depressive disorder (HCC)    Abnormal weight loss    COPD (chronic obstructive pulmonary disease) (HCC)    History of fracture of left hip    Ambulatory dysfunction    Hypotension    Subclinical hypothyroidism    Chronic recurrent multifocal osteomyelitis of foot (HCC)    Pressure injury of heel, stage 3 (HCC)    Open wound of fifth toe of left foot    COPD exacerbation (HCC)    Acute respiratory failure with hypoxia (HCC)    Mild protein-calorie malnutrition (HCC)    Osteomyelitis (HCC)    High risk medication use    Acute respiratory failure with hypoxia and hypercapnia (HCC)    NSTEMI (non-ST elevated myocardial infarction) ([facility])         Subjective    4/19: pt seen and examined in icu. On ns at 125 ml/hr. Pt eyes open but not answering questions    4/20:Reported to be refusing BIPAP; on HFNC, acceptable oxygen saturation    4/21 received call from NP overnight; reported patient had episode of SOB and hypoxia; UO improved    04/24/24 0400 (!) 160/95 98 °F (36.7 °C) Temporal 92 21 96 %   04/24/24 0300 (!) 151/80 -- -- 99 22 93 %   04/24/24 0200 122/83 -- -- 100 26 90 %   04/24/24 0141 -- -- -- 93 20 100 %   04/24/24 0135 -- -- -- -- 22 --   04/24/24 0105 -- -- -- -- 20 --   04/24/24 0100 137/83 -- -- 96 21 95 %   04/24/24 0008 -- -- -- 96 25 98 %   04/24/24 0007 -- -- -- 94 24 90 %   04/24/24 0000 (!) 144/77 97.5 °F (36.4 °C) Temporal 97 16 96 %   04/23/24 2300 (!) 166/94 -- -- 97 19 92 %   04/23/24 2210 -- -- -- 98 22 98 %   04/23/24 2200 (!) 145/72 -- -- 99 25 95 %   04/23/24 2100 (!) 149/83 -- -- 96 22 94 %   04/23/24 2000 (!) 163/94 97.2 °F (36.2 °C) Temporal (!) 102 23 96 %   04/23/24 1929 -- -- -- 99 25 92 %   04/23/24 1928 -- -- -- 100 24 92 %   04/23/24 1927 -- -- -- 96 22 92 %   04/23/24 1900 139/80 -- -- 98 26 96 %   04/23/24 1800 (!) 149/84 97.4 °F (36.3 °C) Temporal 99 25 91 %   04/23/24 1700 (!) 155/82 -- -- 100 26 95 %   04/23/24 1600 (!) 158/83 97.6 °F (36.4 °C) Temporal 99 20 91 %   04/23/24 1500 (!) 153/89 -- -- 94 24 94 %   04/23/24 1400 (!) 155/91 97.5 °F (36.4 °C) Temporal (!) 103 26 94 %   04/23/24 1309 (!) 150/96 -- -- 99 24 94 %   04/23/24 1200 (!) 158/83 97.6 °F (36.4 °C) Temporal 96 23 96 %   04/23/24 1100 (!) 154/86 -- -- 94 25 92 %         Intake/Output Summary (Last 24 hours) at 4/24/2024 1052  Last data filed at 4/24/2024 0800  Gross per 24 hour   Intake 100 ml   Output 3345 ml   Net -3245 ml       Constitutional: Patient in no acute distress   Head: normocephalic, atraumatic   Neck: supple, no jvd  Cardiovascular: regular rate and rhythm, no murmurs, gallops, or rubs   Respiratory: Clear, no rales, rhochi, or wheezes,   Gastrointestinal: soft, nontender, nondistended, no hepatosplenomegaly  Ext: edema R LE; no redness or swelling  Neuro: drowsy but opens eyes to name  Skin: dry, no rash   Back: nontender    Data:    Recent Labs     04/22/24  0430 04/23/24  0413 04/24/24  0421   WBC 11.6* 13.7* 15.1*   HGB

## 2024-04-25 LAB
ANION GAP SERPL CALCULATED.3IONS-SCNC: 16 MMOL/L (ref 7–16)
ANION GAP SERPL CALCULATED.3IONS-SCNC: 16 MMOL/L (ref 7–16)
ANION GAP SERPL CALCULATED.3IONS-SCNC: 18 MMOL/L (ref 7–16)
BASOPHILS # BLD: 0 K/UL (ref 0–0.2)
BASOPHILS NFR BLD: 0 % (ref 0–2)
BUN SERPL-MCNC: 103 MG/DL (ref 6–20)
BUN SERPL-MCNC: 103 MG/DL (ref 6–20)
BUN SERPL-MCNC: 110 MG/DL (ref 6–20)
CALCIUM SERPL-MCNC: 9.4 MG/DL (ref 8.6–10.2)
CALCIUM SERPL-MCNC: 9.5 MG/DL (ref 8.6–10.2)
CALCIUM SERPL-MCNC: 9.8 MG/DL (ref 8.6–10.2)
CHLORIDE SERPL-SCNC: 94 MMOL/L (ref 98–107)
CHLORIDE SERPL-SCNC: 96 MMOL/L (ref 98–107)
CHLORIDE SERPL-SCNC: 97 MMOL/L (ref 98–107)
CK SERPL-CCNC: 434 U/L (ref 20–180)
CO2 SERPL-SCNC: 25 MMOL/L (ref 22–29)
CO2 SERPL-SCNC: 26 MMOL/L (ref 22–29)
CO2 SERPL-SCNC: 27 MMOL/L (ref 22–29)
CREAT SERPL-MCNC: 4.6 MG/DL (ref 0.5–1)
CREAT SERPL-MCNC: 4.6 MG/DL (ref 0.5–1)
CREAT SERPL-MCNC: 5.2 MG/DL (ref 0.5–1)
EOSINOPHIL # BLD: 0 K/UL (ref 0.05–0.5)
EOSINOPHILS RELATIVE PERCENT: 0 % (ref 0–6)
ERYTHROCYTE [DISTWIDTH] IN BLOOD BY AUTOMATED COUNT: 15 % (ref 11.5–15)
GFR SERPL CREATININE-BSD FRML MDRD: 10 ML/MIN/1.73M2
GFR SERPL CREATININE-BSD FRML MDRD: 10 ML/MIN/1.73M2
GFR SERPL CREATININE-BSD FRML MDRD: 9 ML/MIN/1.73M2
GLUCOSE BLD-MCNC: 113 MG/DL (ref 74–99)
GLUCOSE BLD-MCNC: 125 MG/DL (ref 74–99)
GLUCOSE BLD-MCNC: 163 MG/DL (ref 74–99)
GLUCOSE BLD-MCNC: 163 MG/DL (ref 74–99)
GLUCOSE BLD-MCNC: 173 MG/DL (ref 74–99)
GLUCOSE BLD-MCNC: 194 MG/DL (ref 74–99)
GLUCOSE SERPL-MCNC: 120 MG/DL (ref 74–99)
GLUCOSE SERPL-MCNC: 181 MG/DL (ref 74–99)
GLUCOSE SERPL-MCNC: 182 MG/DL (ref 74–99)
HAV IGM SERPL QL IA: NONREACTIVE
HBV CORE IGM SERPL QL IA: NONREACTIVE
HBV SURFACE AB SERPL IA-ACNC: <3.1 MIU/ML (ref 0–9.99)
HBV SURFACE AG SERPL QL IA: NONREACTIVE
HCT VFR BLD AUTO: 34.5 % (ref 34–48)
HCV AB SERPL QL IA: REACTIVE
HGB BLD-MCNC: 11.5 G/DL (ref 11.5–15.5)
LYMPHOCYTES NFR BLD: 1.21 K/UL (ref 1.5–4)
LYMPHOCYTES RELATIVE PERCENT: 6 % (ref 20–42)
MAGNESIUM SERPL-MCNC: 1.8 MG/DL (ref 1.6–2.6)
MCH RBC QN AUTO: 27.9 PG (ref 26–35)
MCHC RBC AUTO-ENTMCNC: 33.3 G/DL (ref 32–34.5)
MCV RBC AUTO: 83.7 FL (ref 80–99.9)
METAMYELOCYTES ABSOLUTE COUNT: 0.17 K/UL (ref 0–0.12)
METAMYELOCYTES: 1 % (ref 0–1)
MONOCYTES NFR BLD: 11 % (ref 2–12)
MONOCYTES NFR BLD: 2.25 K/UL (ref 0.1–0.95)
MYELOCYTES ABSOLUTE COUNT: 0.17 K/UL
MYELOCYTES: 1 %
NEUTROPHILS NFR BLD: 80 % (ref 43–80)
NEUTS SEG NFR BLD: 15.92 K/UL (ref 1.8–7.3)
NUCLEATED RED BLOOD CELLS: 1 PER 100 WBC
PHOSPHATE SERPL-MCNC: 4.9 MG/DL (ref 2.5–4.5)
PLATELET # BLD AUTO: 251 K/UL (ref 130–450)
PMV BLD AUTO: 10.9 FL (ref 7–12)
POTASSIUM SERPL-SCNC: 5.1 MMOL/L (ref 3.5–5)
POTASSIUM SERPL-SCNC: 5.3 MMOL/L (ref 3.5–5)
POTASSIUM SERPL-SCNC: 5.6 MMOL/L (ref 3.5–5)
PROMYELOCYTES ABSOLUTE COUNT: 0.17 K/UL
PROMYELOCYTES: 1 %
RBC # BLD AUTO: 4.12 M/UL (ref 3.5–5.5)
RBC # BLD: ABNORMAL 10*6/UL
SODIUM SERPL-SCNC: 136 MMOL/L (ref 132–146)
SODIUM SERPL-SCNC: 139 MMOL/L (ref 132–146)
SODIUM SERPL-SCNC: 140 MMOL/L (ref 132–146)
WBC OTHER # BLD: 19.9 K/UL (ref 4.5–11.5)

## 2024-04-25 PROCEDURE — 2060000000 HC ICU INTERMEDIATE R&B

## 2024-04-25 PROCEDURE — A4216 STERILE WATER/SALINE, 10 ML: HCPCS | Performed by: NURSE PRACTITIONER

## 2024-04-25 PROCEDURE — 2700000000 HC OXYGEN THERAPY PER DAY

## 2024-04-25 PROCEDURE — 6360000002 HC RX W HCPCS: Performed by: NURSE PRACTITIONER

## 2024-04-25 PROCEDURE — 6370000000 HC RX 637 (ALT 250 FOR IP): Performed by: INTERNAL MEDICINE

## 2024-04-25 PROCEDURE — 6370000000 HC RX 637 (ALT 250 FOR IP): Performed by: NURSE PRACTITIONER

## 2024-04-25 PROCEDURE — 99232 SBSQ HOSP IP/OBS MODERATE 35: CPT | Performed by: INTERNAL MEDICINE

## 2024-04-25 PROCEDURE — 83735 ASSAY OF MAGNESIUM: CPT

## 2024-04-25 PROCEDURE — 99291 CRITICAL CARE FIRST HOUR: CPT | Performed by: INTERNAL MEDICINE

## 2024-04-25 PROCEDURE — 82550 ASSAY OF CK (CPK): CPT

## 2024-04-25 PROCEDURE — 5A1D70Z PERFORMANCE OF URINARY FILTRATION, INTERMITTENT, LESS THAN 6 HOURS PER DAY: ICD-10-PCS | Performed by: INTERNAL MEDICINE

## 2024-04-25 PROCEDURE — 2580000003 HC RX 258

## 2024-04-25 PROCEDURE — 82962 GLUCOSE BLOOD TEST: CPT

## 2024-04-25 PROCEDURE — 84100 ASSAY OF PHOSPHORUS: CPT

## 2024-04-25 PROCEDURE — 94660 CPAP INITIATION&MGMT: CPT

## 2024-04-25 PROCEDURE — 6370000000 HC RX 637 (ALT 250 FOR IP)

## 2024-04-25 PROCEDURE — C9113 INJ PANTOPRAZOLE SODIUM, VIA: HCPCS | Performed by: NURSE PRACTITIONER

## 2024-04-25 PROCEDURE — 97530 THERAPEUTIC ACTIVITIES: CPT

## 2024-04-25 PROCEDURE — 85025 COMPLETE CBC W/AUTO DIFF WBC: CPT

## 2024-04-25 PROCEDURE — 90935 HEMODIALYSIS ONE EVALUATION: CPT

## 2024-04-25 PROCEDURE — 36556 INSERT NON-TUNNEL CV CATH: CPT | Performed by: INTERNAL MEDICINE

## 2024-04-25 PROCEDURE — 80048 BASIC METABOLIC PNL TOTAL CA: CPT

## 2024-04-25 PROCEDURE — 97535 SELF CARE MNGMENT TRAINING: CPT

## 2024-04-25 PROCEDURE — 94640 AIRWAY INHALATION TREATMENT: CPT

## 2024-04-25 PROCEDURE — 36556 INSERT NON-TUNNEL CV CATH: CPT

## 2024-04-25 PROCEDURE — 51798 US URINE CAPACITY MEASURE: CPT

## 2024-04-25 PROCEDURE — 6360000002 HC RX W HCPCS

## 2024-04-25 PROCEDURE — 2580000003 HC RX 258: Performed by: NURSE PRACTITIONER

## 2024-04-25 RX ORDER — FENTANYL CITRATE 50 UG/ML
25 INJECTION, SOLUTION INTRAMUSCULAR; INTRAVENOUS ONCE
Status: COMPLETED | OUTPATIENT
Start: 2024-04-25 | End: 2024-04-25

## 2024-04-25 RX ADMIN — Medication 300 UNITS: at 20:45

## 2024-04-25 RX ADMIN — FENTANYL CITRATE 25 MCG: 50 INJECTION INTRAMUSCULAR; INTRAVENOUS at 15:03

## 2024-04-25 RX ADMIN — ACETAMINOPHEN 650 MG: 325 TABLET ORAL at 21:42

## 2024-04-25 RX ADMIN — HEPARIN SODIUM 5000 UNITS: 5000 INJECTION INTRAVENOUS; SUBCUTANEOUS at 21:43

## 2024-04-25 RX ADMIN — BUDESONIDE INHALATION 500 MCG: 0.5 SUSPENSION RESPIRATORY (INHALATION) at 20:45

## 2024-04-25 RX ADMIN — SODIUM CHLORIDE, PRESERVATIVE FREE 10 ML: 5 INJECTION INTRAVENOUS at 09:31

## 2024-04-25 RX ADMIN — SODIUM CHLORIDE, PRESERVATIVE FREE 10 ML: 5 INJECTION INTRAVENOUS at 20:44

## 2024-04-25 RX ADMIN — Medication 300 UNITS: at 09:30

## 2024-04-25 RX ADMIN — WATER 40 MG: 1 INJECTION INTRAMUSCULAR; INTRAVENOUS; SUBCUTANEOUS at 09:28

## 2024-04-25 RX ADMIN — ARFORMOTEROL TARTRATE 15 MCG: 15 SOLUTION RESPIRATORY (INHALATION) at 20:45

## 2024-04-25 RX ADMIN — AMLODIPINE BESYLATE 10 MG: 10 TABLET ORAL at 09:36

## 2024-04-25 RX ADMIN — SODIUM CHLORIDE, PRESERVATIVE FREE 40 MG: 5 INJECTION INTRAVENOUS at 09:32

## 2024-04-25 RX ADMIN — HEPARIN SODIUM 5000 UNITS: 5000 INJECTION INTRAVENOUS; SUBCUTANEOUS at 05:45

## 2024-04-25 ASSESSMENT — PAIN SCALES - GENERAL
PAINLEVEL_OUTOF10: 0
PAINLEVEL_OUTOF10: 4
PAINLEVEL_OUTOF10: 0

## 2024-04-25 ASSESSMENT — PAIN DESCRIPTION - DESCRIPTORS: DESCRIPTORS: ACHING;CRAMPING;DISCOMFORT

## 2024-04-25 ASSESSMENT — PAIN DESCRIPTION - LOCATION: LOCATION: HEAD

## 2024-04-25 ASSESSMENT — PAIN - FUNCTIONAL ASSESSMENT: PAIN_FUNCTIONAL_ASSESSMENT: ACTIVITIES ARE NOT PREVENTED

## 2024-04-25 NOTE — PLAN OF CARE
Problem: Safety - Adult  Goal: Free from fall injury  Outcome: Progressing  Flowsheets (Taken 4/24/2024 0800 by Adis Kelly, RN)  Free From Fall Injury:   Instruct family/caregiver on patient safety   Based on caregiver fall risk screen, instruct family/caregiver to ask for assistance with transferring infant if caregiver noted to have fall risk factors     Problem: Discharge Planning  Goal: Discharge to home or other facility with appropriate resources  Outcome: Progressing  Flowsheets (Taken 4/24/2024 0800 by Adis Kelly, RN)  Discharge to home or other facility with appropriate resources:   Identify barriers to discharge with patient and caregiver   Arrange for needed discharge resources and transportation as appropriate   Arrange for interpreters to assist at discharge as needed   Identify discharge learning needs (meds, wound care, etc)   Refer to discharge planning if patient needs post-hospital services based on physician order or complex needs related to functional status, cognitive ability or social support system     Problem: Skin/Tissue Integrity  Goal: Absence of new skin breakdown  Description: 1.  Monitor for areas of redness and/or skin breakdown  2.  Assess vascular access sites hourly  3.  Every 4-6 hours minimum:  Change oxygen saturation probe site  4.  Every 4-6 hours:  If on nasal continuous positive airway pressure, respiratory therapy assess nares and determine need for appliance change or resting period.  Outcome: Progressing     Problem: ABCDS Injury Assessment  Goal: Absence of physical injury  Outcome: Progressing  Flowsheets (Taken 4/24/2024 0800 by Adis Kelly, RN)  Absence of Physical Injury: Implement safety measures based on patient assessment     Problem: Respiratory - Adult  Goal: Achieves optimal ventilation and oxygenation  Outcome: Progressing  Flowsheets (Taken 4/25/2024 0015)  Achieves optimal ventilation and oxygenation:   Assess for changes in

## 2024-04-25 NOTE — FLOWSHEET NOTE
04/25/24 1856   Vital Signs   /74   Temp 98.1 °F (36.7 °C)   Pulse 100   Respirations 20   SpO2 95 %   Weight - Scale 76.2 kg (167 lb 15.9 oz)   Weight Method Bed scale   Percent Weight Change -0.07   Pain Assessment   Pain Assessment None - Denies Pain   Post-Hemodialysis Assessment   Post-Treatment Procedures Blood returned;Catheter capped, clamped with Citrate x 2 ports   Machine Disinfection Process Acid/Vinegar Clean;Exterior Machine Disinfection;Heat Disinfect   Blood Volume Processed (Liters) 28 L   Dialyzer Clearance Moderately streaked   Duration of Treatment (minutes) 150 minutes   Hemodialysis Intake (ml) 300 ml   Hemodialysis Output (ml) 300 ml   NET Removed (ml) 0   Tolerated Treatment Good   Patient Response to Treatment Tolerated tx well   Bilateral Breath Sounds Diminished   Edema Generalized   Patient Disposition Remain in ICU/ED   Observations & Evaluations   Level of Consciousness 0   Heart Rhythm Regular   Respiratory Quality/Effort Unlabored

## 2024-04-25 NOTE — PROGRESS NOTES
[facility]  Department of Internal Medicine   Internal Medicine Residency   MICU Progress Note    Patient:  Saba Sullivan 59 y.o. female  MRN: [id number]     Date of Service: 4/25/2024    Allergy: Ancef [cefazolin], Duricef [cefadroxil], and Iodine    Overnight Events:   No significant events overnight.  The patient's creatinine and BUN and have been refractory.    Subjective     Patient was seen by the bedside in the morning.  Patient is alert and oriented, following commands.  Patient was told that she would need dialysis now and will place HD line.  She wanted to do it as well.    ROS: Denies fever, chills, chest pain, shortness of breath, N/V/C/D, dysuria or blood in stool or urine.    Objective     VS:   Patient Vitals for the past 12 hrs:   BP Temp Temp src Pulse Resp SpO2   04/25/24 1600 135/87 99 °F (37.2 °C) Temporal (!) 106 18 90 %   04/25/24 1503 -- -- -- -- 25 --   04/25/24 1500 139/83 -- -- 91 29 (!) 77 %   04/25/24 1400 (!) 142/78 -- -- 94 17 96 %   04/25/24 1300 (!) 141/89 -- -- 91 15 98 %   04/25/24 1200 (!) 145/80 98.1 °F (36.7 °C) Oral 88 21 100 %   04/25/24 1100 (!) 142/77 -- -- 85 19 100 %   04/25/24 1000 121/88 -- -- (!) 107 21 93 %   04/25/24 0935 (!) 151/89 -- -- (!) 110 22 --   04/25/24 0900 (!) 151/89 -- -- 85 24 96 %   04/25/24 0800 132/75 97.9 °F (36.6 °C) Temporal 93 24 96 %   04/25/24 0700 134/72 -- -- 90 21 94 %   04/25/24 0600 (!) 149/79 -- -- 92 16 92 %     ]    I & O - 24hr:   Intake/Output Summary (Last 24 hours) at 4/25/2024 1754  Last data filed at 4/25/2024 1400  Gross per 24 hour   Intake 240 ml   Output 1150 ml   Net -910 ml         Net IO Since Admission: -6,301.09 mL [04/25/24 1754]    Sedatives: None    Pressors: None    Oxygen: 6 liters, high flow nasal cannula    ABG:       No results for input(s): \"PH\", \"PCO2\", \"PO2\", \"HCO3\", \"BE\", \"O2SAT\" in the last 72 hours.    Invalid input(s): \"PFRATIO\"      Physical Exam:  General Appearance: No acute

## 2024-04-25 NOTE — PROGRESS NOTES
Physical Therapy    Physical Therapy Treatment    Name: Saba Sullivan  : 1965  MRN: [id number]      Date of Service: 2024    Evaluating PT:  Dante Aguirre, PT, DPT  [id number]     Room #:  4403/4403-A  Diagnosis:  Elevated LFTs [R79.89]  NSTEMI (non-ST elevated myocardial infarction) (HCC) [I21.4]  DOMINGO (acute kidney injury) (HCC) [N17.9]  Acute respiratory failure with hypoxia and hypercapnia (HCC) [J96.01, J96.02]  PMHx/PSHx:   has a past medical history of Abscess, Chronic pain, Chronic recurrent multifocal osteomyelitis of foot (HCC), Hepatitis C, Hip fracture (HCC), Hx of blood clots, Polysubstance abuse (HCC), Pressure injury of heel, stage 3 (HCC), and Subclinical hypothyroidism.   Procedure/Surgery:  none   Precautions:  Falls, O2, R heel wound, NWB R foot with CAM boot, Cognition, Tremors   Equipment Needs:  TBD    SUBJECTIVE:    Pt lives with significant other in a 1 story home with ramp to enter.  Bedroom and bathroom are on the main level.  Pt ambulated with WC PTA.    OBJECTIVE:   Initial Evaluation  Date: 24  Treatment  Date: 24 Short Term/ Long Term   Goals   AM-PAC 6 Clicks     Was pt agreeable to Eval/treatment? Yes  Yes     Does pt have pain? No reported pain  No reported pain    Bed Mobility  Rolling: NT  Supine to sit: Mod A x2  Sit to supine: Mod A x2  Scooting: Mod A Rolling: NT  Supine to sit: Mod A  Sit to supine: Mod A  Scooting: Mod A Rolling: SBA  Supine to sit: SBA  Sit to supine: SBA  Scooting: SBA   Transfers Sit to stand: Max A  Stand to sit: Max A  Stand pivot: NT Sit to stand: Max A   Stand to sit: Max A  Stand pivot: Max A with FWW;  Max A x2 with no AD Sit to stand: SBA  Stand to sit: SBA  Stand pivot: SBA with AAD   Ambulation    Few side steps with FWW Max A Few steps bed>chair with FWW Max A;    Few steps chair>bed with no AD Max A >25 feet with AAD Min A   Stair negotiation: ascended and descended  NT NT NA   ROM BUE:  Per OT eval   BLE:  WFL

## 2024-04-25 NOTE — PROGRESS NOTES
[facility]  Internal Medicine Department    Attending Physician Statement:  Tom Rees Jr. D.O.    I have discussed the case, including pertinent history and exam findings with the resident. I have reviewed all past medical history, past surgical history, family history, social history, medications, and allergies and updated as appropriate in the history section of the chart. I have seen and examined the patient and the key elements of the encounter have been performed by me. I agree with the assessment, plan and orders as documented by the resident.      Acute Hypoxic Respiratory Failure  -improving to 4 L of O2 via nasal canula; encouraged BiPAP compliance however patient deferrring   -COPD exacerbation, PNA, Pulmonary HTN, atelectasis  -pulmonary toilet for improved aeration   -CXR showing pulmonary congestion; continue diuresis  -continue current inhalers and titration of steroids   -diuresis per Renal Replacement therapy     HTN  -plan to titrate to normotension with aid of nephrology      DOMINGO Stage III  -multifactorial, rhabdomyolysis, decreased perfusion, and dehydration   -plan to initiate Renal Replacement Therapy    Pulmonary Hypertension  -2/2 methamphetamine use  -further w/u after stabilization of acute hypoxic respiratory failure       NSTEMI  -ST depressions and T wave inversion throughout the inferior and anteroseptal leads  -continue heparin per ICU protocols   -ischemic evaluation per cardiology recs      Polysubstane Abuse  -multiple medications; peer recovery; montior for withdrawal symptoms     Remainder of medical problems as per resident note.

## 2024-04-25 NOTE — PROGRESS NOTES
[facility]  Internal Medicine Residency Program  Progress Note - House Team       Patient:  Saba Sullivan 59 y.o. female   MRN: [id number]       Date of Service: 4/25/2024    CC: Shortness of Breath (Hx COPD- started 4-5 hrs ago, 2 Duoneb and cpap by EMS right leg swelling, hx blood clots, no current thinners)    Overnight events: None    Subjective     Patient was seen and examined at bedside. She is alert, oriented, following commands.    Objective     I & O - 24hr:    Intake/Output Summary (Last 24 hours) at 4/25/2024 1942  Last data filed at 4/25/2024 1856  Gross per 24 hour   Intake 540 ml   Output 1450 ml   Net -910 ml     Net IO Since Admission: -6,301.09 mL [04/25/24 1942]    Physical Exam  Vitals: /74   Pulse 100   Temp 98.1 °F (36.7 °C)   Resp 20   Ht 1.753 m (5' 9.02\")   Wt 76.2 kg (167 lb 15.9 oz)   LMP 08/28/2013   SpO2 95%   BMI 24.80 kg/m²     General Appearance: alert, appears stated age, and cooperative  HEENT:  Head: Normal, normocephalic, atraumatic.  Lung: clear to auscultation bilaterally  Heart: regular rate and rhythm and S1, S2 normal  Abdomen: soft, non-tender; bowel sounds normal; no masses,  no organomegaly  Extremities:  extremities normal, atraumatic, no cyanosis or edema  Neurologic: Alert, oriented, thought content appropriate    Diet:   ADULT DIET; Dysphagia - Minced and Moist      Pertinent Labs & Imaging Studies     Labs    Recent Labs     04/23/24  0413 04/24/24  0421 04/25/24  0354   WBC 13.7* 15.1* 19.9*   HGB 11.4* 12.4 11.5   HCT 35.4 37.9 34.5   MCV 85.5 83.8 83.7    263 251     Recent Labs     04/23/24  0945 04/23/24  1707 04/24/24  0421 04/24/24  1413 04/24/24  2139 04/25/24  0354      < > 136 138 138 139   K 5.1*   < > 5.3* 5.0 5.2* 5.1*   CL 96*   < > 92* 94* 94* 96*   CO2 24   < > 27 27 28 25   BUN 94*   < > 106* 108* 112* 110*   CREATININE 5.6*   < > 5.6* 5.5* 5.4* 5.2*   MG 2.0  --  1.9  --   --  1.8   PHOS 5.6*  --  5.9*

## 2024-04-25 NOTE — PLAN OF CARE
Problem: Safety - Adult  Goal: Free from fall injury  4/25/2024 1153 by Maryanne Gaming RN  Outcome: Progressing  4/25/2024 0015 by Michelle Sands RN  Outcome: Progressing  Flowsheets (Taken 4/24/2024 0800 by Adis Kelly, RN)  Free From Fall Injury:   Instruct family/caregiver on patient safety   Based on caregiver fall risk screen, instruct family/caregiver to ask for assistance with transferring infant if caregiver noted to have fall risk factors     Problem: Skin/Tissue Integrity  Goal: Absence of new skin breakdown  Description: 1.  Monitor for areas of redness and/or skin breakdown  2.  Assess vascular access sites hourly  3.  Every 4-6 hours minimum:  Change oxygen saturation probe site  4.  Every 4-6 hours:  If on nasal continuous positive airway pressure, respiratory therapy assess nares and determine need for appliance change or resting period.  4/25/2024 1153 by Maryanne Gaming RN  Outcome: Progressing  4/25/2024 0015 by Michelle Sands RN  Outcome: Progressing     Problem: ABCDS Injury Assessment  Goal: Absence of physical injury  4/25/2024 1153 by Maryanne Gaming RN  Outcome: Progressing  4/25/2024 0015 by Michelle Sands RN  Outcome: Progressing  Flowsheets (Taken 4/24/2024 0800 by Adis Kelly, RN)  Absence of Physical Injury: Implement safety measures based on patient assessment     Problem: Respiratory - Adult  Goal: Achieves optimal ventilation and oxygenation  4/25/2024 1153 by Maryanne Gaming RN  Outcome: Progressing  4/25/2024 0015 by Michelle Sands RN  Outcome: Progressing  Flowsheets (Taken 4/25/2024 0015)  Achieves optimal ventilation and oxygenation:   Assess for changes in respiratory status   Assess for changes in mentation and behavior   Position to facilitate oxygenation and minimize respiratory effort   Oxygen supplementation based on oxygen saturation or arterial blood gases   Assess and instruct to report shortness of breath or any respiratory difficulty    Progressing  Flowsheets (Taken 4/25/2024 0015)  Verbalizes/displays adequate comfort level or baseline comfort level:   Encourage patient to monitor pain and request assistance   Assess pain using appropriate pain scale   Implement non-pharmacological measures as appropriate and evaluate response   Administer analgesics based on type and severity of pain and evaluate response   Consider cultural and social influences on pain and pain management     Problem: Nutrition Deficit:  Goal: Optimize nutritional status  4/25/2024 1153 by Maryanne Gaming, RN  Outcome: Progressing  4/25/2024 0015 by Michelle Sands, RN  Outcome: Progressing  Flowsheets (Taken 4/25/2024 0015)  Nutrient intake appropriate for improving, restoring, or maintaining nutritional needs:   Monitor oral intake, labs, and treatment plans   Assess nutritional status and recommend course of action   Recommend appropriate diets, oral nutritional supplements, and vitamin/mineral supplements   Provide specific nutrition education to patient or family as appropriate

## 2024-04-25 NOTE — PROCEDURES
Temporary Hemoaccess Line Insertion     Procedure: right femoral vein double-lumen catheter placement.    Indications: HEMODIALYSIS    Consent: The patient was counseled regarding the procedure, its indications, risks, potential complications and alternatives, and any questions were answered. Consent was obtained to proceed.    Number of sticks: 2    Number of Kits used: 1    Procedure: Time Out: Immediately prior to the procedure a \"timeout\" was called to verify the correct patient and procedure. The patient was placed in the supine position and the skin over the right femoral vein was prepped with betadine and draped in a sterile fashion. Local anesthesia was obtained by infiltration using 2% Lidocaine without epinephrine.  With ultrasound guidance a large bore needle was used to identify the vein, dark non pulsatile blood returned. The guide wire was then inserted through the needle with minimal resistance. Guidewire placement was confirmed with ultrasound. A 2 mm nick was then made in the skin beside the guidewire. Then a dilator was inserted and removed. A second, larger dilator was then inserted and removed easily. A double lumen catheter was then inserted into the vessel over the guide wire using the Seldinger technique to the 24 cm kaden.  All ports showed good, free flowing blood return and were flushed with saline solution.  Both ports were then clamped and capped. The catheter was then securely fastened to the skin with sutures and covered with a bio patch and sterile dressing.  A post procedure X-ray was not indicated.    Complications: bleeding   The patient tolerated the procedure well.    Estimated blood loss: 15 ml.    Artem Barrera MD PGY-2  4/25/2024  6:11 PM

## 2024-04-25 NOTE — PROGRESS NOTES
----- Message from Charles Lacy sent at 8/20/2018 10:43 AM CDT -----  Contact: self/[phone]  Patient called to state the doctor did not send in the pain prescription to her pharmacy and she needs it.    Please call and advise.   I have personally seen the patient and examined along with the resident team. I personally obtained the key and relevent portions of the history and performed  physical exam. I reviewed the chart including MAR , labs and radiology and discussed the patient's plan of action with the resident. This note reflects my plan of care as I have edited the note to reflect my findings and my assessment and plan.      Total hospital day - 8 d   ICU length of stay - 8 d        Polysubstance abuse - AMS - encephalopathy , drug induced   + cocaine , amphetamine +  , benzos+ in UDS      On NC O2 and PRN BiPAP( refusing), refused last night as well      Near complete atelectasis of RLL - worsening over time , Reviewed CT chest   - Continue  IS , FV  - Get OOB to chair and ambulate as tolerated   - Continue Pulmonary toilet   - Continue Chest physiotherapy      DOMINGO on CKD , nephrology following, discussed with nephrology , Bumex gtt is off now  , making urine , Creatine up at  5.2 this am   - Plans for iHD today , will need vasc cath today      Acute on chronic hypercapnic RF - refusing NIV use overnight      Pulmonary Hypertension - likely amphetamine induced .      Aspiration Pneumonitis / PNA   Leukocytosis - send sputum cultures , watch off antibiotics , wean off steroids      I have spent a total critical care time of 32 minutes in the care of this critically ill patient excluding any other separately billable procedures but including  Clinical evaluation, decision making , coordination of care, Consultations and documentation time.

## 2024-04-25 NOTE — PROGRESS NOTES
Nephrology Progress Note  [facility]      CC:   arf    HPI:       4/18: the pt is a 59 yo female with a pmh of copd, DVT, polysubstance abuse, recurrent OM of R foot who presented with sob and ms changes. Sat was 85% on RA in ER. She was put on bipap. Cta showed no PE. She was given nebs, steroids and started on doxycycline and merrem. Bilateral LE US showed no DVT. Labs showed na 131 k 4.6 co2 27 bun 36 cr 2.2 > 3.1, lactate 1.5, ca 7.8, p 5.8, nh4 25ck 21k>16K, alb 2.7, mg 1.9, tsh 1.04, uds pos amphetamine, benzos, cocaine, fentanyl, opiates. Wbc 20.9, hgb 13.5, plt 313. Ua 100 glucose, mod bili, sg >1.03, 100 protein, pos nitrite, 10-20 rbc, lg hgb. Vitals showed rr 17 hr 112 bp 148/67, temp 98.1.  cr from 7/2021 was 0.7. she was started on ns at 51. Pt is not giving a history, mumbles when asked a question, eyes closed.       Patient Active Problem List   Diagnosis    Abscess    Nonhealing ulcer of heel (HCC)    Bradycardia    Substance abuse (HCC)    Current moderate episode of major depressive disorder (HCC)    Abnormal weight loss    COPD (chronic obstructive pulmonary disease) (HCC)    History of fracture of left hip    Ambulatory dysfunction    Hypotension    Subclinical hypothyroidism    Chronic recurrent multifocal osteomyelitis of foot (HCC)    Pressure injury of heel, stage 3 (HCC)    Open wound of fifth toe of left foot    COPD exacerbation (HCC)    Acute respiratory failure with hypoxia (HCC)    Mild protein-calorie malnutrition (HCC)    Osteomyelitis (HCC)    High risk medication use    Acute respiratory failure with hypoxia and hypercapnia (HCC)    NSTEMI (non-ST elevated myocardial infarction) ([facility])         Subjective    4/19: pt seen and examined in icu. On ns at 125 ml/hr. Pt eyes open but not answering questions    4/20:Reported to be refusing BIPAP; on HFNC, acceptable oxygen saturation    4/21 received call from NP overnight; reported patient had episode of SOB and hypoxia; UO improved  %   04/25/24 0200 (!) 154/88 -- -- 85 24 91 %   04/25/24 0100 (!) 151/81 -- -- 89 25 92 %   04/25/24 0000 (!) 147/85 97.9 °F (36.6 °C) Temporal 91 20 93 %   04/24/24 2300 133/75 -- -- 97 23 94 %   04/24/24 2200 124/62 -- -- 94 21 92 %   04/24/24 2100 (!) 148/82 -- -- (!) 103 19 93 %   04/24/24 2000 (!) 147/77 97.7 °F (36.5 °C) Temporal 98 22 92 %   04/24/24 1900 (!) 147/87 -- -- 100 22 (!) 87 %   04/24/24 1600 (!) 150/86 97.6 °F (36.4 °C) Temporal 99 19 93 %   04/24/24 1300 (!) 146/86 -- -- (!) 106 23 (!) 87 %   04/24/24 1200 (!) 147/83 97.3 °F (36.3 °C) Temporal (!) 104 25 93 %   04/24/24 1100 (!) 148/84 -- -- 94 24 (!) 85 %   04/24/24 1000 -- -- -- (!) 117 (!) 31 (!) 87 %   04/24/24 0900 (!) 151/84 -- -- 99 27 94 %   04/24/24 0800 (!) 153/88 97.1 °F (36.2 °C) Temporal 99 (!) 31 91 %         Intake/Output Summary (Last 24 hours) at 4/25/2024 0750  Last data filed at 4/25/2024 0400  Gross per 24 hour   Intake 0 ml   Output 2600 ml   Net -2600 ml       Constitutional: Patient in no acute distress   Head: normocephalic, atraumatic   Neck: supple, no jvd  Cardiovascular: regular rate and rhythm, no murmurs, gallops, or rubs   Respiratory: Clear, no rales, rhochi, or wheezes,   Gastrointestinal: soft, nontender, nondistended, no hepatosplenomegaly  Ext: edema R LE; no redness or swelling  Neuro: drowsy but opens eyes to name  Skin: dry, no rash   Back: nontender    Data:    Recent Labs     04/23/24  0413 04/24/24  0421 04/25/24  0354   WBC 13.7* 15.1* 19.9*   HGB 11.4* 12.4 11.5   HCT 35.4 37.9 34.5   MCV 85.5 83.8 83.7    263 251       Recent Labs     04/23/24  0945 04/23/24  1707 04/24/24  0421 04/24/24  1413 04/24/24  2139 04/25/24  0354      < > 136 138 138 139   K 5.1*   < > 5.3* 5.0 5.2* 5.1*   CL 96*   < > 92* 94* 94* 96*   CO2 24   < > 27 27 28 25   CREATININE 5.6*   < > 5.6* 5.5* 5.4* 5.2*   BUN 94*   < > 106* 108* 112* 110*   LABGLOM 8*   < > 8* 8* 9* 9*   GLUCOSE 137*   < > 146* 182* 176* 120*

## 2024-04-25 NOTE — PROGRESS NOTES
Care Coordination:  LOS 8 day.  [facility] declined d/t hx non compliance.   Attempted to reach [facility] [employer] benefits to see who else can accept .  After long hold time, unable to get through to benefits. Pt also has 2ndary [facility] and can be used if house team will follow orders.  Pt will have 24/7 care at home, confirmed with friend chelle, otherwise can consider MARTA    Electronically signed by Marti Johnson RN on 4/25/2024 at 9:35 AM     ADDENDUM: Attempted to meet with pt, physicians at bedside, Call to friend chelle and plan remains home at discharge, he plans to pick her up in taxi and will be 24/7 care giver. He does not feel she will agree to MARTA.  She has a wheel chair but he states it is old and may need new one prior to discharge.  He was updated by pt today that she may need HD.  Per Nephrology note- Will initiate kidney replacement therapy possibly short course.  Will follow for possible out pt HD needs    Electronically signed by Marti Johnson RN on 4/25/2024 at 3:55 PM

## 2024-04-25 NOTE — PROGRESS NOTES
OCCUPATIONAL THERAPY TREATMENT SESSION  [facility]  [address], [place]       Date:2024                                                               Patient Name: Saba Sullivan  MRN: [id number]  : 1965  Room: [address] [place] [address]    Evaluating OT: Alexus Espinal, NICHOLASD,  OTR/L; [id number]    Referring Provider: Martina Nielson APRN - CNP    Specific Provider Orders/Date: OT eval and treat (24)       Diagnosis: Elevated LFTs [R79.89]  NSTEMI (non-ST elevated myocardial infarction) (HCC) [I21.4]  DOMINGO (acute kidney injury) (HCC) [N17.9]  Acute respiratory failure with hypoxia and hypercapnia (HCC) [J96.01, J96.02]     Reason for admission: Pt admitted with ARF, hx of COPD, tremor    Surgery/Procedures: None this admission     Pertinent Medical History:    Past Medical History:   Diagnosis Date    Abscess     states for a couple years she has had problems with     Chronic pain     Chronic recurrent multifocal osteomyelitis of foot (HCC) 2019    Hepatitis C     Hip fracture (HCC)     Hx of blood clots     dvt rt calf    Polysubstance abuse (HCC)     Pressure injury of heel, stage 3 (HCC) 2019    Subclinical hypothyroidism 2019        *Precautions:  Fall Risk, O2, essential tremor, NWB RLE (R heel wound) + CAM boot (not present for session), safety, alarms+    Assessment of current deficits   [x] Functional mobility  [x]ADLs  [x] Strength               []Cognition   [x] Functional transfers   [x] IADLs         [x] Safety Awareness   [x]Endurance   [] Fine Coordination        [] ROM     [] Vision/perception   []Sensation    []Gross Motor Coordination [x] Balance   [] Delirium                  []Motor Control     [] Communication    OT PLAN OF CARE   OT POC based on physician orders, patient diagnosis and results of clinical assessment.       Frequency/Duration: 1-3 days/wk for 1-2 weeks PRN    Specific OT Treatment

## 2024-04-26 ENCOUNTER — APPOINTMENT (OUTPATIENT)
Dept: INTERVENTIONAL RADIOLOGY/VASCULAR | Age: 59
DRG: 871 | End: 2024-04-26
Payer: OTHER GOVERNMENT

## 2024-04-26 LAB
ALBUMIN SERPL-MCNC: 3.8 G/DL (ref 3.5–5.2)
ALP SERPL-CCNC: 58 U/L (ref 35–104)
ALT SERPL-CCNC: 13 U/L (ref 0–32)
ANION GAP SERPL CALCULATED.3IONS-SCNC: 18 MMOL/L (ref 7–16)
ANION GAP SERPL CALCULATED.3IONS-SCNC: 19 MMOL/L (ref 7–16)
AST SERPL-CCNC: 29 U/L (ref 0–31)
BASOPHILS # BLD: 0 K/UL (ref 0–0.2)
BASOPHILS NFR BLD: 0 % (ref 0–2)
BILIRUB SERPL-MCNC: 0.7 MG/DL (ref 0–1.2)
BUN SERPL-MCNC: 109 MG/DL (ref 6–20)
BUN SERPL-MCNC: 109 MG/DL (ref 6–20)
CALCIUM SERPL-MCNC: 9.5 MG/DL (ref 8.6–10.2)
CALCIUM SERPL-MCNC: 9.7 MG/DL (ref 8.6–10.2)
CHLORIDE SERPL-SCNC: 95 MMOL/L (ref 98–107)
CHLORIDE SERPL-SCNC: 98 MMOL/L (ref 98–107)
CO2 SERPL-SCNC: 25 MMOL/L (ref 22–29)
CO2 SERPL-SCNC: 25 MMOL/L (ref 22–29)
CREAT SERPL-MCNC: 4.4 MG/DL (ref 0.5–1)
CREAT SERPL-MCNC: 4.6 MG/DL (ref 0.5–1)
EOSINOPHIL # BLD: 0 K/UL (ref 0.05–0.5)
EOSINOPHILS RELATIVE PERCENT: 0 % (ref 0–6)
ERYTHROCYTE [DISTWIDTH] IN BLOOD BY AUTOMATED COUNT: 15.2 % (ref 11.5–15)
GFR SERPL CREATININE-BSD FRML MDRD: 11 ML/MIN/1.73M2
GFR SERPL CREATININE-BSD FRML MDRD: 11 ML/MIN/1.73M2
GLUCOSE BLD-MCNC: 158 MG/DL (ref 74–99)
GLUCOSE BLD-MCNC: 169 MG/DL (ref 74–99)
GLUCOSE BLD-MCNC: 187 MG/DL (ref 74–99)
GLUCOSE SERPL-MCNC: 136 MG/DL (ref 74–99)
GLUCOSE SERPL-MCNC: 141 MG/DL (ref 74–99)
HCT VFR BLD AUTO: 32.3 % (ref 34–48)
HGB BLD-MCNC: 10.4 G/DL (ref 11.5–15.5)
INR PPP: 1.1
LYMPHOCYTES NFR BLD: 0.93 K/UL (ref 1.5–4)
LYMPHOCYTES RELATIVE PERCENT: 4 % (ref 20–42)
MAGNESIUM SERPL-MCNC: 1.7 MG/DL (ref 1.6–2.6)
MCH RBC QN AUTO: 27.7 PG (ref 26–35)
MCHC RBC AUTO-ENTMCNC: 32.2 G/DL (ref 32–34.5)
MCV RBC AUTO: 85.9 FL (ref 80–99.9)
METAMYELOCYTES ABSOLUTE COUNT: 0.19 K/UL (ref 0–0.12)
METAMYELOCYTES: 1 % (ref 0–1)
MONOCYTES NFR BLD: 0.93 K/UL (ref 0.1–0.95)
MONOCYTES NFR BLD: 5 % (ref 2–12)
NEUTROPHILS NFR BLD: 90 % (ref 43–80)
NEUTS SEG NFR BLD: 18.76 K/UL (ref 1.8–7.3)
NUCLEATED RED BLOOD CELLS: 1 PER 100 WBC
PHOSPHATE SERPL-MCNC: 4.4 MG/DL (ref 2.5–4.5)
PLATELET # BLD AUTO: 228 K/UL (ref 130–450)
PMV BLD AUTO: 11.6 FL (ref 7–12)
POTASSIUM SERPL-SCNC: 5 MMOL/L (ref 3.5–5)
POTASSIUM SERPL-SCNC: 5.1 MMOL/L (ref 3.5–5)
PROT SERPL-MCNC: 6.2 G/DL (ref 6.4–8.3)
PROTHROMBIN TIME: 12.5 SEC (ref 9.3–12.4)
RBC # BLD AUTO: 3.76 M/UL (ref 3.5–5.5)
RBC # BLD: ABNORMAL 10*6/UL
SODIUM SERPL-SCNC: 139 MMOL/L (ref 132–146)
SODIUM SERPL-SCNC: 141 MMOL/L (ref 132–146)
WBC OTHER # BLD: 20.8 K/UL (ref 4.5–11.5)

## 2024-04-26 PROCEDURE — 6360000002 HC RX W HCPCS

## 2024-04-26 PROCEDURE — 6360000002 HC RX W HCPCS: Performed by: NURSE PRACTITIONER

## 2024-04-26 PROCEDURE — 80053 COMPREHEN METABOLIC PANEL: CPT

## 2024-04-26 PROCEDURE — 99291 CRITICAL CARE FIRST HOUR: CPT | Performed by: INTERNAL MEDICINE

## 2024-04-26 PROCEDURE — 6370000000 HC RX 637 (ALT 250 FOR IP): Performed by: INTERNAL MEDICINE

## 2024-04-26 PROCEDURE — 2580000003 HC RX 258: Performed by: NURSE PRACTITIONER

## 2024-04-26 PROCEDURE — C9113 INJ PANTOPRAZOLE SODIUM, VIA: HCPCS | Performed by: NURSE PRACTITIONER

## 2024-04-26 PROCEDURE — 99232 SBSQ HOSP IP/OBS MODERATE 35: CPT | Performed by: INTERNAL MEDICINE

## 2024-04-26 PROCEDURE — 0JH63XZ INSERTION OF TUNNELED VASCULAR ACCESS DEVICE INTO CHEST SUBCUTANEOUS TISSUE AND FASCIA, PERCUTANEOUS APPROACH: ICD-10-PCS | Performed by: INTERNAL MEDICINE

## 2024-04-26 PROCEDURE — 6360000002 HC RX W HCPCS: Performed by: INTERNAL MEDICINE

## 2024-04-26 PROCEDURE — 84100 ASSAY OF PHOSPHORUS: CPT

## 2024-04-26 PROCEDURE — 82962 GLUCOSE BLOOD TEST: CPT

## 2024-04-26 PROCEDURE — 77001 FLUOROGUIDE FOR VEIN DEVICE: CPT

## 2024-04-26 PROCEDURE — 94640 AIRWAY INHALATION TREATMENT: CPT

## 2024-04-26 PROCEDURE — 36558 INSERT TUNNELED CV CATH: CPT

## 2024-04-26 PROCEDURE — 6360000002 HC RX W HCPCS: Performed by: RADIOLOGY

## 2024-04-26 PROCEDURE — 90935 HEMODIALYSIS ONE EVALUATION: CPT

## 2024-04-26 PROCEDURE — 05H533Z INSERTION OF INFUSION DEVICE INTO RIGHT SUBCLAVIAN VEIN, PERCUTANEOUS APPROACH: ICD-10-PCS | Performed by: INTERNAL MEDICINE

## 2024-04-26 PROCEDURE — 6370000000 HC RX 637 (ALT 250 FOR IP)

## 2024-04-26 PROCEDURE — 2580000003 HC RX 258

## 2024-04-26 PROCEDURE — 85610 PROTHROMBIN TIME: CPT

## 2024-04-26 PROCEDURE — 2500000003 HC RX 250 WO HCPCS: Performed by: RADIOLOGY

## 2024-04-26 PROCEDURE — 85025 COMPLETE CBC W/AUTO DIFF WBC: CPT

## 2024-04-26 PROCEDURE — 80048 BASIC METABOLIC PNL TOTAL CA: CPT

## 2024-04-26 PROCEDURE — 6370000000 HC RX 637 (ALT 250 FOR IP): Performed by: NURSE PRACTITIONER

## 2024-04-26 PROCEDURE — 2060000000 HC ICU INTERMEDIATE R&B

## 2024-04-26 PROCEDURE — 2700000000 HC OXYGEN THERAPY PER DAY

## 2024-04-26 PROCEDURE — C1894 INTRO/SHEATH, NON-LASER: HCPCS

## 2024-04-26 PROCEDURE — 83735 ASSAY OF MAGNESIUM: CPT

## 2024-04-26 RX ORDER — MIDAZOLAM HYDROCHLORIDE 2 MG/2ML
INJECTION, SOLUTION INTRAMUSCULAR; INTRAVENOUS PRN
Status: COMPLETED | OUTPATIENT
Start: 2024-04-26 | End: 2024-04-26

## 2024-04-26 RX ORDER — DIPHENHYDRAMINE HYDROCHLORIDE 50 MG/ML
INJECTION INTRAMUSCULAR; INTRAVENOUS PRN
Status: COMPLETED | OUTPATIENT
Start: 2024-04-26 | End: 2024-04-26

## 2024-04-26 RX ORDER — FENTANYL CITRATE 50 UG/ML
25 INJECTION, SOLUTION INTRAMUSCULAR; INTRAVENOUS ONCE
Status: COMPLETED | OUTPATIENT
Start: 2024-04-26 | End: 2024-04-26

## 2024-04-26 RX ORDER — CLINDAMYCIN PHOSPHATE 600 MG/50ML
600 INJECTION, SOLUTION INTRAVENOUS ONCE
Status: COMPLETED | OUTPATIENT
Start: 2024-04-26 | End: 2024-04-26

## 2024-04-26 RX ORDER — LIDOCAINE HYDROCHLORIDE 20 MG/ML
INJECTION, SOLUTION INFILTRATION; PERINEURAL PRN
Status: COMPLETED | OUTPATIENT
Start: 2024-04-26 | End: 2024-04-26

## 2024-04-26 RX ORDER — FENTANYL CITRATE 50 UG/ML
INJECTION, SOLUTION INTRAMUSCULAR; INTRAVENOUS PRN
Status: COMPLETED | OUTPATIENT
Start: 2024-04-26 | End: 2024-04-26

## 2024-04-26 RX ORDER — HEPARIN SODIUM 1000 [USP'U]/ML
2000 INJECTION, SOLUTION INTRAVENOUS; SUBCUTANEOUS ONCE
Status: COMPLETED | OUTPATIENT
Start: 2024-04-26 | End: 2024-04-26

## 2024-04-26 RX ORDER — LIDOCAINE HYDROCHLORIDE AND EPINEPHRINE BITARTRATE 20; .01 MG/ML; MG/ML
INJECTION, SOLUTION SUBCUTANEOUS PRN
Status: COMPLETED | OUTPATIENT
Start: 2024-04-26 | End: 2024-04-26

## 2024-04-26 RX ADMIN — LIDOCAINE HYDROCHLORIDE,EPINEPHRINE BITARTRATE 10 ML: 20; .01 INJECTION, SOLUTION INFILTRATION; PERINEURAL at 15:32

## 2024-04-26 RX ADMIN — LIDOCAINE HYDROCHLORIDE 10 ML: 20 INJECTION, SOLUTION INFILTRATION; PERINEURAL at 15:31

## 2024-04-26 RX ADMIN — FENTANYL CITRATE 25 MCG: 50 INJECTION, SOLUTION INTRAMUSCULAR; INTRAVENOUS at 15:30

## 2024-04-26 RX ADMIN — FENTANYL CITRATE 25 MCG: 50 INJECTION, SOLUTION INTRAMUSCULAR; INTRAVENOUS at 15:22

## 2024-04-26 RX ADMIN — Medication 300 UNITS: at 12:54

## 2024-04-26 RX ADMIN — CLINDAMYCIN PHOSPHATE 600 MG: 600 INJECTION, SOLUTION INTRAVENOUS at 14:08

## 2024-04-26 RX ADMIN — FENTANYL CITRATE 25 MCG: 50 INJECTION, SOLUTION INTRAMUSCULAR; INTRAVENOUS at 15:36

## 2024-04-26 RX ADMIN — BUDESONIDE INHALATION 500 MCG: 0.5 SUSPENSION RESPIRATORY (INHALATION) at 07:55

## 2024-04-26 RX ADMIN — HEPARIN SODIUM 2000 UNITS: 1000 INJECTION INTRAVENOUS; SUBCUTANEOUS at 11:59

## 2024-04-26 RX ADMIN — HEPARIN SODIUM 5000 UNITS: 5000 INJECTION INTRAVENOUS; SUBCUTANEOUS at 23:47

## 2024-04-26 RX ADMIN — DIPHENHYDRAMINE HYDROCHLORIDE 25 MG: 50 INJECTION, SOLUTION INTRAMUSCULAR; INTRAVENOUS at 15:22

## 2024-04-26 RX ADMIN — MIDAZOLAM HYDROCHLORIDE 0.5 MG: 1 INJECTION, SOLUTION INTRAMUSCULAR; INTRAVENOUS at 15:23

## 2024-04-26 RX ADMIN — HEPARIN SODIUM 5000 UNITS: 5000 INJECTION INTRAVENOUS; SUBCUTANEOUS at 05:29

## 2024-04-26 RX ADMIN — LIDOCAINE HYDROCHLORIDE 10 MG: 20 INJECTION, SOLUTION INFILTRATION; PERINEURAL at 15:36

## 2024-04-26 RX ADMIN — SODIUM CHLORIDE, PRESERVATIVE FREE 10 ML: 5 INJECTION INTRAVENOUS at 08:38

## 2024-04-26 RX ADMIN — MIDAZOLAM HYDROCHLORIDE 0.5 MG: 1 INJECTION, SOLUTION INTRAMUSCULAR; INTRAVENOUS at 15:30

## 2024-04-26 RX ADMIN — AMLODIPINE BESYLATE 10 MG: 10 TABLET ORAL at 08:26

## 2024-04-26 RX ADMIN — SODIUM CHLORIDE, PRESERVATIVE FREE 40 MG: 5 INJECTION INTRAVENOUS at 08:26

## 2024-04-26 RX ADMIN — IPRATROPIUM BROMIDE AND ALBUTEROL SULFATE 1 DOSE: 2.5; .5 SOLUTION RESPIRATORY (INHALATION) at 22:28

## 2024-04-26 RX ADMIN — ARFORMOTEROL TARTRATE 15 MCG: 15 SOLUTION RESPIRATORY (INHALATION) at 07:55

## 2024-04-26 RX ADMIN — SODIUM CHLORIDE, PRESERVATIVE FREE 10 ML: 5 INJECTION INTRAVENOUS at 23:47

## 2024-04-26 RX ADMIN — Medication 300 UNITS: at 23:47

## 2024-04-26 RX ADMIN — WATER 40 MG: 1 INJECTION INTRAMUSCULAR; INTRAVENOUS; SUBCUTANEOUS at 08:26

## 2024-04-26 RX ADMIN — FENTANYL CITRATE 25 MCG: 50 INJECTION INTRAMUSCULAR; INTRAVENOUS at 03:50

## 2024-04-26 ASSESSMENT — PAIN - FUNCTIONAL ASSESSMENT: PAIN_FUNCTIONAL_ASSESSMENT: ACTIVITIES ARE NOT PREVENTED

## 2024-04-26 ASSESSMENT — PAIN DESCRIPTION - DESCRIPTORS: DESCRIPTORS: ACHING;CRAMPING;POUNDING

## 2024-04-26 ASSESSMENT — PAIN SCALES - GENERAL
PAINLEVEL_OUTOF10: 0
PAINLEVEL_OUTOF10: 8

## 2024-04-26 ASSESSMENT — PAIN DESCRIPTION - LOCATION: LOCATION: BACK

## 2024-04-26 ASSESSMENT — PAIN DESCRIPTION - ORIENTATION: ORIENTATION: MID

## 2024-04-26 NOTE — PRE SEDATION
Sedation Pre-Procedure Note    Patient Name: Saba Sullivan   YOB: 1965  Room/Bed: 4501/4501-B  Medical Record Number: [id number]  Date: 2024   Time: 3:55 PM       Indication:  end stage renal disease    Consent: I have discussed with the patient and/or the patient representative the indication, alternatives, and the possible risks and/or complications of the planned procedure and the anesthesia methods. The patient and/or patient representative appear to understand and agree to proceed.    Vital Signs:   Vitals:    24 1551   BP: 134/82   Pulse: 96   Resp: 20   Temp:    SpO2: 100%       Past Medical History:   has a past medical history of Abscess, Chronic pain, Chronic recurrent multifocal osteomyelitis of foot (HCC), Hepatitis C, Hip fracture (HCC), Hx of blood clots, Polysubstance abuse (HCC), Pressure injury of heel, stage 3 (HCC), and Subclinical hypothyroidism.    Past Surgical History:   has a past surgical history that includes Foot surgery;  section; drain skin abscess simple (2014); and Foot Debridement (Right, 2021).    Medications:   Scheduled Meds:    amLODIPine  10 mg Oral Daily    lidocaine  1 patch TransDERmal Daily    lidocaine  1 patch TransDERmal Daily    vitamin D  50,000 Units Oral Weekly    insulin lispro  0-4 Units SubCUTAneous Q4H    lidocaine PF  5 mL IntraDERmal Once    heparin flush  3 mL IntraVENous 2 times per day    sodium chloride flush  5-40 mL IntraVENous 2 times per day    heparin (porcine)  5,000 Units SubCUTAneous 3 times per day    arformoterol tartrate  15 mcg Nebulization BID RT    budesonide  0.5 mg Nebulization BID RT    pantoprazole (PROTONIX) 40 mg in sodium chloride (PF) 0.9 % 10 mL injection  40 mg IntraVENous Daily     Continuous Infusions:    dextrose      sodium chloride       PRN Meds: melatonin, ipratropium 0.5 mg-albuterol 2.5 mg, glucose, dextrose bolus **OR** dextrose bolus, glucagon (rDNA), dextrose, acetaminophen,  heparin flush, sodium chloride flush, sodium chloride  Home Meds:   Prior to Admission medications    Medication Sig Start Date End Date Taking? Authorizing Provider   tiotropium (SPIRIVA RESPIMAT) 2.5 MCG/ACT AERS inhaler Inhale 2 puffs into the lungs daily   Yes Provider, MD Selene     Coumadin Use Last 7 Days:  no  Antiplatelet drug therapy use last 7 days: no  Other anticoagulant use last 7 days: no  Additional Medication Information:  n/a      Pre-Sedation Documentation and Exam:   I have reviewed the patient's history and review of systems.    Mallampati Airway Assessment:  Mallampati Class I - (soft palate, fauces, uvula & anterior/posterior tonsillar pillars are visible)    Prior History of Anesthesia Complications:   none    ASA Classification:  Class 3 - A patient with severe systemic disease that limits activity but is not incapacitating    Sedation/ Anesthesia Plan:   intravenous sedation    Medications Planned:   fentanyl intravenously    Patient is an appropriate candidate for plan of sedation: yes    Electronically signed by LUISITO Kumari MD on 4/26/2024 at 3:55 PM

## 2024-04-26 NOTE — CARE COORDINATION
Care Coordination: LOS 9 day.   Temp Hemoaccess line insertion and HD on 4/25/24.   Per Dr Briseno, plan for treatment #2 today.  Unclear of outpt HD needs, Referral to [facility] faxed to [phone].  Pt would benefit from MARTA at discharge, but pt plan remains home with Nasir. Will follow    Electronically signed by Marti Johnson RN on 4/26/2024 at 11:04 AM     ADDENDUM: updated Viji from [facility] on referral  Electronically signed by Marti Johnson RN on 4/26/2024 at 11:26 AM

## 2024-04-26 NOTE — PLAN OF CARE
Problem: Safety - Adult  Goal: Free from fall injury  4/26/2024 0055 by Michelle Sands RN  Outcome: Progressing  Flowsheets (Taken 4/24/2024 0800 by Adis Kelly, RN)  Free From Fall Injury:   Instruct family/caregiver on patient safety   Based on caregiver fall risk screen, instruct family/caregiver to ask for assistance with transferring infant if caregiver noted to have fall risk factors     Problem: Discharge Planning  Goal: Discharge to home or other facility with appropriate resources  Outcome: Progressing  Flowsheets (Taken 4/24/2024 0800 by Adis Kelly, RN)  Discharge to home or other facility with appropriate resources:   Identify barriers to discharge with patient and caregiver   Arrange for needed discharge resources and transportation as appropriate   Arrange for interpreters to assist at discharge as needed   Identify discharge learning needs (meds, wound care, etc)   Refer to discharge planning if patient needs post-hospital services based on physician order or complex needs related to functional status, cognitive ability or social support system     Problem: Skin/Tissue Integrity  Goal: Absence of new skin breakdown  Description: 1.  Monitor for areas of redness and/or skin breakdown  2.  Assess vascular access sites hourly  3.  Every 4-6 hours minimum:  Change oxygen saturation probe site  4.  Every 4-6 hours:  If on nasal continuous positive airway pressure, respiratory therapy assess nares and determine need for appliance change or resting period.  4/26/2024 0055 by Michelle Sands RN  Outcome: Progressing     Problem: ABCDS Injury Assessment  Goal: Absence of physical injury  4/26/2024 0055 by Michelle Sands RN  Outcome: Progressing  Flowsheets (Taken 4/24/2024 0800 by Adis Kelly, RN)  Absence of Physical Injury: Implement safety measures based on patient assessment     Problem: Respiratory - Adult  Goal: Achieves optimal ventilation and oxygenation  4/26/2024 0055 by Wicho

## 2024-04-26 NOTE — PROCEDURES
1400  Patient arrived to Radiology department Vital signs taken. IV flushed and prn adapter attached. Allergies, home medications, medical history, H&P and fasting instructions reviewed with patient. Paper chart reviewed for correct documents.    1450 Procedural instructions given, questions answered, understanding expressed and consent signed.

## 2024-04-26 NOTE — FLOWSHEET NOTE
04/26/24 1120   Vital Signs   BP (!) 145/80   Pulse 96   Respirations 22   Weight - Scale 73 kg (160 lb 15 oz)   Percent Weight Change -4.28   Post-Hemodialysis Assessment   Machine Disinfection Process Machine Absence of Bleach Machine;Exterior Machine Disinfection   Dialyzer Clearance Clotted   Duration of Treatment (minutes) 20 minutes   Hemodialysis Intake (ml) 300 ml   Hemodialysis Output (ml) 37 ml   NET Removed (ml) -263   Patient Response to Treatment upon hd initiation blood in lines appeared very diluted. crit line wasnt detecting blood either despite CL monitor being connected to dialysis system. within the 20 mins. pt ran on tx blood did get a little darker then rt fem hd cath clotted. Dr. Briseno notified. 2000 mls iv heparin given through picc line then attempted to aspitrate clots from hd cath and manipulate cath position per Dr. Briseno's order. hd cath still will not pull blood. Dr. Briseno and Rn notified. pt may be able to receive a tunneled dialysis cath today. pt made npo and son gave consent.   RLE Edema +3;Pitting  (+ pedal pulse)   LLE Edema +1   Time Off 1120   Patient Disposition Remain in ICU/ED

## 2024-04-26 NOTE — PROGRESS NOTES
Spoke with RN regarding tunneled HD catheter. Patient was just given tray. Patient has temporary line that was placed yesterday. Patient is currently on dialysis for 3 hours. Plan for Monday.

## 2024-04-26 NOTE — PROGRESS NOTES
[facility]  Internal Medicine Department    Attending Physician Statement:  Tom Rees Jr. D.O.    I have discussed the case, including pertinent history and exam findings with the resident. I have reviewed all past medical history, past surgical history, family history, social history, medications, and allergies and updated as appropriate in the history section of the chart. I have seen and examined the patient and the key elements of the encounter have been performed by me. I agree with the assessment, plan and orders as documented by the resident.      Acute Hypoxic Respiratory Failure  -improving to 4 L of O2 via nasal canula; encouraged BiPAP compliance however patient deferrring   -COPD exacerbation, PNA, Pulmonary HTN, atelectasis  -pulmonary toilet for improved aeration   -CXR showing pulmonary congestion; continue diuresis  -continue current inhalers and titration of steroids   -if no improvement in oxygenation with HD then plan for discharge with O2     HTN  -plan to titrate to normotension with aid of nephrology      DOMINGO Stage III  -multifactorial, rhabdomyolysis, decreased perfusion, and dehydration   -Hemodialysis per their recs   -stable with HD     Pulmonary Hypertension  -2/2 methamphetamine use  -further w/u after stabilization of acute hypoxic respiratory failure      Polysubstane Abuse  -multiple medications; peer recovery; montior for withdrawal symptoms     Remainder of medical problems as per resident note.

## 2024-04-26 NOTE — PROGRESS NOTES
Nephrology Progress Note  [facility]      CC:   arf    HPI:       4/18: the pt is a 61 yo female with a pmh of copd, DVT, polysubstance abuse, recurrent OM of R foot who presented with sob and ms changes. Sat was 85% on RA in ER. She was put on bipap. Cta showed no PE. She was given nebs, steroids and started on doxycycline and merrem. Bilateral LE US showed no DVT. Labs showed na 131 k 4.6 co2 27 bun 36 cr 2.2 > 3.1, lactate 1.5, ca 7.8, p 5.8, nh4 25ck 21k>16K, alb 2.7, mg 1.9, tsh 1.04, uds pos amphetamine, benzos, cocaine, fentanyl, opiates. Wbc 20.9, hgb 13.5, plt 313. Ua 100 glucose, mod bili, sg >1.03, 100 protein, pos nitrite, 10-20 rbc, lg hgb. Vitals showed rr 17 hr 112 bp 148/67, temp 98.1.  cr from 7/2021 was 0.7. she was started on ns at 51. Pt is not giving a history, mumbles when asked a question, eyes closed.       Patient Active Problem List   Diagnosis    Abscess    Nonhealing ulcer of heel (HCC)    Bradycardia    Substance abuse (HCC)    Current moderate episode of major depressive disorder (HCC)    Abnormal weight loss    COPD (chronic obstructive pulmonary disease) (HCC)    History of fracture of left hip    Ambulatory dysfunction    Hypotension    Subclinical hypothyroidism    Chronic recurrent multifocal osteomyelitis of foot (HCC)    Pressure injury of heel, stage 3 (HCC)    Open wound of fifth toe of left foot    COPD exacerbation (HCC)    Acute respiratory failure with hypoxia (HCC)    Mild protein-calorie malnutrition (HCC)    Osteomyelitis (HCC)    High risk medication use    Acute respiratory failure with hypoxia and hypercapnia (HCC)    NSTEMI (non-ST elevated myocardial infarction) ([facility])         Subjective    4/19: pt seen and examined in icu. On ns at 125 ml/hr. Pt eyes open but not answering questions    4/20:Reported to be refusing BIPAP; on HFNC, acceptable oxygen saturation    4/21 received call from NP overnight; reported patient had episode of SOB and hypoxia; UO improved

## 2024-04-26 NOTE — PROGRESS NOTES
Patients belongings placed in 4501B while patient went to IR.  These items included Cellphone with  and clothes.

## 2024-04-26 NOTE — PROGRESS NOTES
[facility]  Department of Internal Medicine   Internal Medicine Residency   MICU Progress Note    Patient:  Saba Sullivan 59 y.o. female  MRN: [id number]     Date of Service: 4/26/2024    Allergy: Ancef [cefazolin], Duricef [cefadroxil], and Iodine    Overnight Events:   No significant events overnight.  The patient's creatinine and BUN and have been refractory.    Subjective     Patient was seen by the bedside in the morning.  Patient is alert and oriented, following commands.  Patient was told that she would need dialysis now and will place HD line.  She wanted to do it as well.    ROS: Denies fever, chills, chest pain, shortness of breath, N/V/C/D, dysuria or blood in stool or urine.    Objective     VS:   Patient Vitals for the past 12 hrs:   BP Temp Temp src Pulse Resp SpO2 Weight   04/26/24 0756 -- -- -- 98 25 93 % --   04/26/24 0700 139/74 -- -- 87 24 96 % --   04/26/24 0600 130/69 -- -- 92 19 94 % --   04/26/24 0546 -- -- -- -- -- -- 76.3 kg (168 lb 2 oz)   04/26/24 0500 134/74 -- -- 91 24 93 % --   04/26/24 0420 -- -- -- -- 22 -- --   04/26/24 0400 (!) 141/78 97.4 °F (36.3 °C) Temporal 93 24 93 % --   04/26/24 0350 -- -- -- -- 26 -- --   04/26/24 0300 (!) 125/92 -- -- 89 22 93 % --   04/26/24 0200 129/73 -- -- 90 23 97 % --   04/26/24 0100 (!) 140/73 -- -- 89 20 98 % --   04/26/24 0000 135/76 97.3 °F (36.3 °C) Temporal 94 25 90 % --   04/25/24 2300 130/83 -- -- 93 22 100 % --   ]    I & O - 24hr:   Intake/Output Summary (Last 24 hours) at 4/26/2024 1052  Last data filed at 4/26/2024 0000  Gross per 24 hour   Intake 600 ml   Output 300 ml   Net 300 ml       Net IO Since Admission: -6,241.09 mL [04/26/24 1052]    Sedatives: None    Pressors: None    Oxygen: 6 liters, high flow nasal cannula    ABG:       No results for input(s): \"PH\", \"PCO2\", \"PO2\", \"HCO3\", \"BE\", \"O2SAT\" in the last 72 hours.    Invalid input(s): \"PFRATIO\"      Physical Exam:  General Appearance: No acute distress.  Neuro:    RECOMMENDATION:   Consider additional evaluation by MRI as clinically indicated.         CTA PULMONARY W CONTRAST   Final Result   1. Irregular and confluent opacities are present in lower lobes more   significant on the right suggestive of pneumonia and atelectasis   2. Peribronchial thickening bilaterally suggestive of mucous plugging and   peribronchial inflammation with areas of bronchial stenosis in the lower   lobes.   3. Additional irregular ground-glass opacities are located in the right upper   lobe.   4. No evidence of pulmonary embolism.   5. Cholelithiasis.         CT THORACIC SPINE WO CONTRAST   Final Result   No evidence for acute fracture involving the thoracic spine.         CT LUMBAR SPINE WO CONTRAST   Final Result   Diffuse disc space narrowing L5-S1.      Cholelithiasis.         XR CHEST PORTABLE   Final Result   Left IJ line tip in the region of cavoatrial junction. No pneumothorax.         Vascular duplex lower extremity venous bilateral   Final Result   No evidence of DVT in either lower extremity.         XR CHEST PORTABLE   Final Result   No acute cardiopulmonary disease.                 Assessment and Plan      Consults:   Critical care  Nephrology  Cardiology     Assessment:    Acute encephalopathy 2/2 most likely in the setting of hypercapnia/polysubstance abuse-improving  Oliguric kidney failure likely due to ATN  Cr. 4.4 > 5.3 > 5.1 > 5.6  First session of hemodialysis on 4/25/2024  Acute hypercapnic hypoxic respiratory insufficiency 2/2 in the setting of pneumonia  Type II MI-NSTEMI 2/2 in the setting of substance abuse, DOMINGO and hypoxia  QT prolongation 2/2substance abuse  Rhabdomyolysis 2/2 in the setting of cocaine use  Hyponatremia  Hypocalcemia  Polysubstance abuse  Leukocytosis 2/2 in the setting of steroid use versus pneumonia  Hepatitis C seropositivity  Polysubstance abuse UDS positive for cocaine benzos  amphetamines    Plan:    Continue supplemental oxygen.  Hemodialysis

## 2024-04-26 NOTE — PROGRESS NOTES
[facility]  Internal Medicine Residency Program  Progress Note - House Team       Patient:  Saba Sullivan 59 y.o. female   MRN: [id number]       Date of Service: 2024  Admission date: 2024  7:49 AM ; Hospital day: 9   CC: Shortness of Breath (Hx COPD- started 4-5 hrs ago, 2 Duoneb and cpap by EMS right leg swelling, hx blood clots, no current thinners)     Overnight events:   No acute events overnight    Subjective     Saba Sullivan was seen and examined at bedside today morning. She was awake, alert, and oriented, co-operative, was able to answer questions, she is saturating 96% on 4 L of oxygen via nasal cannula. She complained about generalized weakness, otherwise, denies any acute concerns.    Objective     PHYSICAL EXAM  General Appearance: not in acute distress, on 4 L of oxygen via nasal canula.   HEENT: Normocephalic, atraumatic  Neck: midline trachea  Lung: clear to auscultation bilaterally, no wheezing or rales  Heart: regular rate and rhythm, no murmur  Abdomen: soft, bowel sounds normal; no masses, HD line present in right groin.  Extremities: no cyanosis or edema  Musculokeletal: No joint swelling  Neurologic: Mental status: AAOx3, normal reflexes and symmetric bilaterally, cogwheel rigidity intermittently present    CARDIOVASCULAR  Vitals: BP (!) 145/80   Pulse 96   Temp 97.8 °F (36.6 °C) (Temporal)   Resp 22   Ht 1.753 m (5' 9.02\")   Wt 73 kg (160 lb 15 oz)   LMP 2013   SpO2 91%   BMI 23.76 kg/m²     Pulse rate range      : Pulse  Av.3  Min: 87  Max: 107  BP range                  : Systolic (24hrs), Av , Min:114 , Max:145   ; Diastolic (24hrs), Av, Min:63, Max:92    PULMONARY  Respiration range   : Resp  Av.5  Min: 17  Max: 29  Pulse Ox range : SpO2  Av %  Min: 90 %  Max: 100 %  No results for input(s): \"PH\", \"PCO2\", \"PO2\", \"HCO3\", \"BE\", \"O2SAT\" in the last 72 hours.    Invalid input(s): \"PFRATIO\"    NEPHROLOGY    2. Peribronchial thickening bilaterally suggestive of mucous plugging and   peribronchial inflammation with areas of bronchial stenosis in the lower   lobes.   3. Additional irregular ground-glass opacities are located in the right upper   lobe.   4. No evidence of pulmonary embolism.   5. Cholelithiasis.         CT THORACIC SPINE WO CONTRAST   Final Result   No evidence for acute fracture involving the thoracic spine.         CT LUMBAR SPINE WO CONTRAST   Final Result   Diffuse disc space narrowing L5-S1.      Cholelithiasis.         XR CHEST PORTABLE   Final Result   Left IJ line tip in the region of cavoatrial junction. No pneumothorax.         Vascular duplex lower extremity venous bilateral   Final Result   No evidence of DVT in either lower extremity.         XR CHEST PORTABLE   Final Result   No acute cardiopulmonary disease.         XR CHEST PORTABLE    (Results Pending)   IR TUNNELED CVC PLACE WO SQ PORT/PUMP > 5 YEARS    (Results Pending)     Resident's Assessment and Plan     Assessment and Plan:    Assessment:  Acute hypoxic respiratory failure 2/2 community-acquired pneumonia, COPD exacerbation, possible heart failure exacerbation  CXR on 04/17/2024:  Unchanged opacity/consolidation projected in the right lower chest. Probable left pleural effusion.  CTA pulmonary and atelectasis, mucous plugging, RUL groundglass opacities.  No PE  CT chest wo contrast showed, Interval development of small bilateral pleural effusions, persistent consolidation in the right lower lobe with volume loss, 2 cm round/oval air-containing area in the consolidated right lower lobe may represent a pneumatocele or bulla, Hyperinflation of the upper lungs with some emphysematous change   Respiratory panel negative, strep pneumonia antigen negative, Legionella negative  Blood culture: Negative x 2 days  Completed doxycycline antibiotic 5 days  Elevated troponin 2/2 type II NSTEMI  Troponin on 04/19 was 186  COPD Gold stage IV-PFT

## 2024-04-26 NOTE — BRIEF OP NOTE
Brief Postoperative Note      Patient: Saba Sullivan  YOB: 1965  MRN: [id number]    Date of Procedure: 4/26/2024    End stage renal disease    Post-Op Diagnosis: Same       Tunneled dialysis catheter    VICTORIA Kumari    Assistant:  * No surgical staff found *    Anesthesia: * moderate sedation *    Estimated Blood Loss (mL): Minimal    Complications: None    Specimens:   * No specimens in log *    Implants:  * No implants in log *      Drains:   Negative Pressure Wound Therapy Foot Right (Active)       [REMOVED] Urinary Catheter 04/19/24 (Removed)   $ Urethral catheter insertion $ Not inserted for procedure 04/19/24 1600   Catheter Indications Urinary retention (acute or chronic), continuous bladder irrigation or bladder outlet obstruction;Need for fluid volume management of the critically ill patient in a critical care setting 04/25/24 0400   Site Assessment No urethral drainage 04/25/24 0400   Urine Color Straw 04/25/24 0400   Urine Appearance Clear 04/25/24 0400   Collection Container Standard 04/25/24 0400   Securement Method Securing device (Describe) 04/25/24 0400   Catheter Care  Soap and water 04/25/24 0400   Catheter Best Practices  Drainage tube clipped to bed;Catheter secured to thigh;Tamper seal intact;Bag below bladder;Bag not on floor;Lack of dependent loop in tubing;Drainage bag less than half full 04/25/24 0400   Status Draining;Patent 04/25/24 0400   Output (mL) 175 mL 04/25/24 0400       Findings:  Infection Present At Time Of Surgery (PATOS) (choose all levels that have infection present):  No infection present  Other Findings: ASP 28 catheter    Electronically signed by LUISITO Kumari MD on 4/26/2024 at 3:56 PM

## 2024-04-27 ENCOUNTER — APPOINTMENT (OUTPATIENT)
Dept: GENERAL RADIOLOGY | Age: 59
DRG: 871 | End: 2024-04-27
Payer: OTHER GOVERNMENT

## 2024-04-27 ENCOUNTER — APPOINTMENT (OUTPATIENT)
Dept: ULTRASOUND IMAGING | Age: 59
DRG: 871 | End: 2024-04-27
Payer: OTHER GOVERNMENT

## 2024-04-27 LAB
ALBUMIN SERPL-MCNC: 3.6 G/DL (ref 3.5–5.2)
ALP SERPL-CCNC: 57 U/L (ref 35–104)
ALT SERPL-CCNC: 11 U/L (ref 0–32)
ANION GAP SERPL CALCULATED.3IONS-SCNC: 14 MMOL/L (ref 7–16)
AST SERPL-CCNC: 39 U/L (ref 0–31)
BASOPHILS # BLD: 0 K/UL (ref 0–0.2)
BASOPHILS NFR BLD: 0 % (ref 0–2)
BILIRUB SERPL-MCNC: 0.9 MG/DL (ref 0–1.2)
BUN SERPL-MCNC: 60 MG/DL (ref 6–20)
CALCIUM SERPL-MCNC: 9.1 MG/DL (ref 8.6–10.2)
CHLORIDE SERPL-SCNC: 101 MMOL/L (ref 98–107)
CO2 SERPL-SCNC: 25 MMOL/L (ref 22–29)
CREAT SERPL-MCNC: 2.6 MG/DL (ref 0.5–1)
EOSINOPHIL # BLD: 0 K/UL (ref 0.05–0.5)
EOSINOPHILS RELATIVE PERCENT: 0 % (ref 0–6)
ERYTHROCYTE [DISTWIDTH] IN BLOOD BY AUTOMATED COUNT: 15.5 % (ref 11.5–15)
GFR SERPL CREATININE-BSD FRML MDRD: 21 ML/MIN/1.73M2
GLUCOSE BLD-MCNC: 126 MG/DL (ref 74–99)
GLUCOSE BLD-MCNC: 131 MG/DL (ref 74–99)
GLUCOSE BLD-MCNC: 136 MG/DL (ref 74–99)
GLUCOSE BLD-MCNC: 80 MG/DL (ref 74–99)
GLUCOSE SERPL-MCNC: 125 MG/DL (ref 74–99)
HCT VFR BLD AUTO: 27.2 % (ref 34–48)
HCT VFR BLD AUTO: 27.5 % (ref 34–48)
HGB BLD-MCNC: 8.5 G/DL (ref 11.5–15.5)
HGB BLD-MCNC: 8.7 G/DL (ref 11.5–15.5)
INR PPP: 1.1
LACTATE BLDV-SCNC: 1.3 MMOL/L (ref 0.5–2.2)
LYMPHOCYTES NFR BLD: 4.03 K/UL (ref 1.5–4)
LYMPHOCYTES RELATIVE PERCENT: 17 % (ref 20–42)
MAGNESIUM SERPL-MCNC: 1.6 MG/DL (ref 1.6–2.6)
MCH RBC QN AUTO: 28 PG (ref 26–35)
MCHC RBC AUTO-ENTMCNC: 32 G/DL (ref 32–34.5)
MCV RBC AUTO: 87.5 FL (ref 80–99.9)
METAMYELOCYTES ABSOLUTE COUNT: 0.24 K/UL (ref 0–0.12)
METAMYELOCYTES: 1 % (ref 0–1)
MONOCYTES NFR BLD: 1.19 K/UL (ref 0.1–0.95)
MONOCYTES NFR BLD: 5 % (ref 2–12)
NEUTROPHILS NFR BLD: 77 % (ref 43–80)
NEUTS SEG NFR BLD: 18.25 K/UL (ref 1.8–7.3)
PARTIAL THROMBOPLASTIN TIME: 30.9 SEC (ref 24.5–35.1)
PHOSPHATE SERPL-MCNC: 3 MG/DL (ref 2.5–4.5)
PLATELET # BLD AUTO: 151 K/UL (ref 130–450)
PMV BLD AUTO: 12.3 FL (ref 7–12)
POTASSIUM SERPL-SCNC: 4 MMOL/L (ref 3.5–5)
PROCALCITONIN SERPL-MCNC: 0.79 NG/ML (ref 0–0.08)
PROT SERPL-MCNC: 5.8 G/DL (ref 6.4–8.3)
PROTHROMBIN TIME: 12 SEC (ref 9.3–12.4)
RBC # BLD AUTO: 3.11 M/UL (ref 3.5–5.5)
RBC # BLD: ABNORMAL 10*6/UL
SODIUM SERPL-SCNC: 140 MMOL/L (ref 132–146)
WBC OTHER # BLD: 23.7 K/UL (ref 4.5–11.5)

## 2024-04-27 PROCEDURE — 2580000003 HC RX 258: Performed by: NURSE PRACTITIONER

## 2024-04-27 PROCEDURE — 85610 PROTHROMBIN TIME: CPT

## 2024-04-27 PROCEDURE — 83735 ASSAY OF MAGNESIUM: CPT

## 2024-04-27 PROCEDURE — 82962 GLUCOSE BLOOD TEST: CPT

## 2024-04-27 PROCEDURE — 6370000000 HC RX 637 (ALT 250 FOR IP): Performed by: INTERNAL MEDICINE

## 2024-04-27 PROCEDURE — 71045 X-RAY EXAM CHEST 1 VIEW: CPT

## 2024-04-27 PROCEDURE — A4216 STERILE WATER/SALINE, 10 ML: HCPCS | Performed by: NURSE PRACTITIONER

## 2024-04-27 PROCEDURE — C9113 INJ PANTOPRAZOLE SODIUM, VIA: HCPCS | Performed by: NURSE PRACTITIONER

## 2024-04-27 PROCEDURE — 85025 COMPLETE CBC W/AUTO DIFF WBC: CPT

## 2024-04-27 PROCEDURE — 90935 HEMODIALYSIS ONE EVALUATION: CPT

## 2024-04-27 PROCEDURE — 94640 AIRWAY INHALATION TREATMENT: CPT

## 2024-04-27 PROCEDURE — 85018 HEMOGLOBIN: CPT

## 2024-04-27 PROCEDURE — 6370000000 HC RX 637 (ALT 250 FOR IP)

## 2024-04-27 PROCEDURE — 2700000000 HC OXYGEN THERAPY PER DAY

## 2024-04-27 PROCEDURE — 6360000002 HC RX W HCPCS: Performed by: NURSE PRACTITIONER

## 2024-04-27 PROCEDURE — 84100 ASSAY OF PHOSPHORUS: CPT

## 2024-04-27 PROCEDURE — 85730 THROMBOPLASTIN TIME PARTIAL: CPT

## 2024-04-27 PROCEDURE — 6360000002 HC RX W HCPCS

## 2024-04-27 PROCEDURE — 80053 COMPREHEN METABOLIC PANEL: CPT

## 2024-04-27 PROCEDURE — 83605 ASSAY OF LACTIC ACID: CPT

## 2024-04-27 PROCEDURE — 76999 ECHO EXAMINATION PROCEDURE: CPT

## 2024-04-27 PROCEDURE — 99232 SBSQ HOSP IP/OBS MODERATE 35: CPT | Performed by: INTERNAL MEDICINE

## 2024-04-27 PROCEDURE — 36415 COLL VENOUS BLD VENIPUNCTURE: CPT

## 2024-04-27 PROCEDURE — 85014 HEMATOCRIT: CPT

## 2024-04-27 PROCEDURE — 84145 PROCALCITONIN (PCT): CPT

## 2024-04-27 PROCEDURE — 87040 BLOOD CULTURE FOR BACTERIA: CPT

## 2024-04-27 PROCEDURE — 6370000000 HC RX 637 (ALT 250 FOR IP): Performed by: NURSE PRACTITIONER

## 2024-04-27 PROCEDURE — 2060000000 HC ICU INTERMEDIATE R&B

## 2024-04-27 RX ADMIN — ARFORMOTEROL TARTRATE 15 MCG: 15 SOLUTION RESPIRATORY (INHALATION) at 22:30

## 2024-04-27 RX ADMIN — AMLODIPINE BESYLATE 10 MG: 10 TABLET ORAL at 14:27

## 2024-04-27 RX ADMIN — SODIUM CHLORIDE, PRESERVATIVE FREE 10 ML: 5 INJECTION INTRAVENOUS at 22:53

## 2024-04-27 RX ADMIN — ACETAMINOPHEN 650 MG: 325 TABLET ORAL at 22:53

## 2024-04-27 RX ADMIN — HEPARIN SODIUM 5000 UNITS: 5000 INJECTION INTRAVENOUS; SUBCUTANEOUS at 22:53

## 2024-04-27 RX ADMIN — BUDESONIDE INHALATION 500 MCG: 0.5 SUSPENSION RESPIRATORY (INHALATION) at 22:30

## 2024-04-27 RX ADMIN — ACETAMINOPHEN 650 MG: 325 TABLET ORAL at 04:25

## 2024-04-27 RX ADMIN — SODIUM CHLORIDE, PRESERVATIVE FREE 40 MG: 5 INJECTION INTRAVENOUS at 14:26

## 2024-04-27 RX ADMIN — HEPARIN SODIUM 5000 UNITS: 5000 INJECTION INTRAVENOUS; SUBCUTANEOUS at 05:44

## 2024-04-27 RX ADMIN — Medication 5 MG: at 22:53

## 2024-04-27 RX ADMIN — Medication 300 UNITS: at 22:54

## 2024-04-27 RX ADMIN — HEPARIN SODIUM 5000 UNITS: 5000 INJECTION INTRAVENOUS; SUBCUTANEOUS at 14:32

## 2024-04-27 ASSESSMENT — PAIN DESCRIPTION - LOCATION
LOCATION: BACK

## 2024-04-27 ASSESSMENT — PAIN DESCRIPTION - ORIENTATION
ORIENTATION: LOWER
ORIENTATION: POSTERIOR

## 2024-04-27 ASSESSMENT — PAIN SCALES - GENERAL
PAINLEVEL_OUTOF10: 10
PAINLEVEL_OUTOF10: 10
PAINLEVEL_OUTOF10: 5
PAINLEVEL_OUTOF10: 10
PAINLEVEL_OUTOF10: 10

## 2024-04-27 ASSESSMENT — PAIN DESCRIPTION - DESCRIPTORS
DESCRIPTORS: ACHING
DESCRIPTORS: ACHING

## 2024-04-27 NOTE — PROGRESS NOTES
Nephrology Progress Note  [facility]      CC:   arf    HPI:       4/18: the pt is a 61 yo female with a pmh of copd, DVT, polysubstance abuse, recurrent OM of R foot who presented with sob and ms changes. Sat was 85% on RA in ER. She was put on bipap. Cta showed no PE. She was given nebs, steroids and started on doxycycline and merrem. Bilateral LE US showed no DVT. Labs showed na 131 k 4.6 co2 27 bun 36 cr 2.2 > 3.1, lactate 1.5, ca 7.8, p 5.8, nh4 25ck 21k>16K, alb 2.7, mg 1.9, tsh 1.04, uds pos amphetamine, benzos, cocaine, fentanyl, opiates. Wbc 20.9, hgb 13.5, plt 313. Ua 100 glucose, mod bili, sg >1.03, 100 protein, pos nitrite, 10-20 rbc, lg hgb. Vitals showed rr 17 hr 112 bp 148/67, temp 98.1.  cr from 7/2021 was 0.7. she was started on ns at 51. Pt is not giving a history, mumbles when asked a question, eyes closed.       Patient Active Problem List   Diagnosis    Abscess    Nonhealing ulcer of heel (HCC)    Bradycardia    Substance abuse (HCC)    Current moderate episode of major depressive disorder (HCC)    Abnormal weight loss    COPD (chronic obstructive pulmonary disease) (HCC)    History of fracture of left hip    Ambulatory dysfunction    Hypotension    Subclinical hypothyroidism    Chronic recurrent multifocal osteomyelitis of foot (HCC)    Pressure injury of heel, stage 3 (HCC)    Open wound of fifth toe of left foot    COPD exacerbation (HCC)    Acute respiratory failure with hypoxia (HCC)    Mild protein-calorie malnutrition (HCC)    Osteomyelitis (HCC)    High risk medication use    Acute respiratory failure with hypoxia and hypercapnia (HCC)    NSTEMI (non-ST elevated myocardial infarction) ([facility])         Subjective    4/19: pt seen and examined in icu. On ns at 125 ml/hr. Pt eyes open but not answering questions    4/20:Reported to be refusing BIPAP; on HFNC, acceptable oxygen saturation    4/21 received call from NP overnight; reported patient had episode of SOB and hypoxia; UO improved  > 5.1* 5.0 4.0   CL 96*   < > 95* 98 101   CO2 25   < > 25 25 25   CREATININE 5.2*   < > 4.4* 4.6* 2.6*   *   < > 109* 109* 60*   LABGLOM 9*   < > 11* 11* 21*   GLUCOSE 120*   < > 141* 136* 125*   CALCIUM 9.8   < > 9.5 9.7 9.1   PHOS 4.9*  --  4.4  --  3.0   MG 1.8  --  1.7  --  1.6    < > = values in this interval not displayed.       Vit D, 25-Hydroxy   Date Value Ref Range Status   04/20/2024 <6.0 (L) 30.0 - 100.0 ng/mL Final       No results found for: \"PTH\"    Recent Labs     04/26/24  0527 04/27/24  0600   ALT 13 11   AST 29 39*   ALKPHOS 58 57   BILITOT 0.7 0.9         No results for input(s): \"LABALBU\" in the last 72 hours.    No results found for: \"FERRITIN\", \"IRON\", \"TIBC\"    Vitamin B-12   Date Value Ref Range Status   02/10/2020 426 211 - 946 pg/mL Final       Folate   Date Value Ref Range Status   02/10/2020 5.0 4.8 - 24.2 ng/mL Final         Lab Results   Component Value Date/Time    COLORU SHEBA 04/18/2024 03:37 AM    NITRU POSITIVE 04/18/2024 03:37 AM    GLUCOSEU 100 04/18/2024 03:37 AM    KETUA NEGATIVE 04/18/2024 03:37 AM    UROBILINOGEN 1.0 04/18/2024 03:37 AM    BILIRUBINUR MODERATE 04/18/2024 03:37 AM       Lab Results   Component Value Date/Time    JOHN 21 04/18/2024 03:37 AM    OSMOU 355 04/18/2024 03:37 AM       No components found for: \"URIC\"    No results found for: \"LIPIDPAN\"      Assessment and Plan:    DOMINGO STAGE 3  Baseline cr 0.7 from 2021  In setting of rhabdo and cocaine use and possible hypovolemia/IV contrast with CTA chest  on 4/16  Hemodialysis initiated 4/25 mainly for poor solute clearance  Treatment #3  4/27  Sp tdc  Uo ?      2. Acute Resp failure  CTA neg for PE  S/p empiric abx  Currently on HFNC  Drug OD    3. Polysubstance abuse  Benzos, cocaine amphetamine  Mental status improved    4. NSTEMI  Precipitated by substance abuse  Stable cardiac rhythm  No reports of angina    5. Leukocytosis  Blood and urine cultures no growth  S/p empiric abx, doxycycline; now

## 2024-04-27 NOTE — FLOWSHEET NOTE
04/26/24 2012   Vital Signs   /68   Temp 97.7 °F (36.5 °C)   Pulse 96   Respirations 20   Weight - Scale 73 kg (160 lb 15 oz)   Weight Method Estimated   Percent Weight Change 0   Post-Hemodialysis Assessment   Post-Treatment Procedures Catheter capped, clamped and heparinized x 2 ports   Machine Disinfection Process Acid/Vinegar Clean;Heat Disinfect;Exterior Machine Disinfection   Dialyzer Clearance Lightly streaked   Duration of Treatment (minutes) 180 minutes   Hemodialysis Intake (ml) 300 ml   Hemodialysis Output (ml) 300 ml   NET Removed (ml) 0   Tolerated Treatment Good   Patient Response to Treatment tolerated tx well vss no complaints. no fluid removed per order. new RIJ TDC runs well. rt femoral temp hd cath pulled. pressure held x10 mins. pressure dressing applied. site monitored for bleeding an additional 10 mins. no signs of bleeding. will notify Rn to monitor site periodically as bleeding was an issue last night from rt femoral site.   Time Off 2012   Patient Disposition Return to room   Observations & Evaluations   Level of Consciousness 0   Respiratory Quality/Effort Unlabored   O2 Device High flow nasal cannula   Skin Condition/Temp Warm;Dry

## 2024-04-27 NOTE — PLAN OF CARE
Problem: Safety - Adult  Goal: Free from fall injury  Outcome: Progressing     Problem: Discharge Planning  Goal: Discharge to home or other facility with appropriate resources  Outcome: Progressing  Flowsheets (Taken 4/27/2024 0800)  Discharge to home or other facility with appropriate resources: Identify barriers to discharge with patient and caregiver     Problem: Skin/Tissue Integrity  Goal: Absence of new skin breakdown  Description: 1.  Monitor for areas of redness and/or skin breakdown  2.  Assess vascular access sites hourly  3.  Every 4-6 hours minimum:  Change oxygen saturation probe site  4.  Every 4-6 hours:  If on nasal continuous positive airway pressure, respiratory therapy assess nares and determine need for appliance change or resting period.  Outcome: Progressing     Problem: ABCDS Injury Assessment  Goal: Absence of physical injury  Outcome: Progressing

## 2024-04-27 NOTE — PROGRESS NOTES
[facility]  Internal Medicine Residency / House Medicine Service    Attending Physician Statement  I have discussed the case, including pertinent history and exam findings with the resident and the team.  I have seen and examined the patient and the key elements of the encounter have been performed by me.  I agree with the assessment, plan and orders as documented by the resident.      Case Discussed During AM Rounds   Continues to feel generalized weakness and fatigue   HD again today    Right thigh U/S ordered today- ? Hematoma- HD line has since been removed    Seen after HD today    Requesting pain meds for back pain- explained no controlled substances would be given today      Acute Hypoxic Respiratory Failure    Continue oxygen wean protocol    Improving oxygen requirements with initiation of HD     DOMINGO with acute electrolyte derangement leading to acute HD needs    Nephrology following    Tunnelled catheter in place   HD today    Anemia- further H/H drop    ? Hematoma   U/S pending   Monitor for any additional acute bleeding   H/H this afternoon     Type II NSTEMI    Secondary to presentation     Polysubstance abuse    Noted on presentation with complications noted above including DOMINGO necessitating HD     Remainder of medical problems as per resident note.  Attending note is in collaboration with resident-physician Dr. Bettencourt progress note on 4/27/2024    Elmer Noguera MD  4/27/24    Internal Medicine Residency Faculty

## 2024-04-27 NOTE — PROGRESS NOTES
[facility]  Internal Medicine Residency Program  Progress Note - House Team       Patient:  Saba Sullivan 59 y.o. female   MRN: [id number]       Date of Service: 2024  Admission date: 2024  7:49 AM ; Hospital day: 10   CC: Shortness of Breath (Hx COPD- started 4-5 hrs ago, 2 Duoneb and cpap by EMS right leg swelling, hx blood clots, no current thinners)     Overnight events:   No acute events overnight    Subjective     Saba Sullivan was seen and examined at bedside today morning. She was awake, alert, and oriented, co-operative, was able to answer questions, she is saturating 96% on 4 L of oxygen via nasal cannula. She complained about generalized weakness, pain in right groin, otherwise, denies any acute concerns.     Objective     PHYSICAL EXAM  General Appearance: not in acute distress, on 4 L of oxygen via nasal canula.   HEENT: Normocephalic, atraumatic  Neck: midline trachea  Lung: clear to auscultation bilaterally, no wheezing or rales  Heart: regular rate and rhythm, no murmur  Abdomen: soft, bowel sounds normal; no masses, HD line present in right groin.  Extremities: swelling over right groin on the HD site, no ecchymoses, no cyanosis  Musculokeletal: No joint swelling  Neurologic: Mental status: AAOx3, normal reflexes and symmetric bilaterally, cogwheel rigidity intermittently present    CARDIOVASCULAR  Vitals: BP (!) 147/71   Pulse 93   Temp 98 °F (36.7 °C) (Oral)   Resp 18   Ht 1.753 m (5' 9.02\")   Wt 73 kg (160 lb 15 oz)   LMP 2013   SpO2 98%   BMI 23.76 kg/m²     Pulse rate range      : Pulse  Av.2  Min: 92  Max: 103  BP range                  : Systolic (24hrs), Av , Min:124 , Max:147   ; Diastolic (24hrs), Av, Min:68, Max:96    PULMONARY  Respiration range   : Resp  Av.2  Min: 10  Max: 28  Pulse Ox range : SpO2  Av.9 %  Min: 91 %  Max: 100 %  No results for input(s): \"PH\", \"PCO2\", \"PO2\", \"HCO3\", \"BE\", \"O2SAT\" in the last 72

## 2024-04-28 LAB
ALBUMIN SERPL-MCNC: 3.3 G/DL (ref 3.5–5.2)
ALP SERPL-CCNC: 55 U/L (ref 35–104)
ALT SERPL-CCNC: 10 U/L (ref 0–32)
ANION GAP SERPL CALCULATED.3IONS-SCNC: 14 MMOL/L (ref 7–16)
AST SERPL-CCNC: 35 U/L (ref 0–31)
BASOPHILS # BLD: 0.02 K/UL (ref 0–0.2)
BASOPHILS NFR BLD: 0 % (ref 0–2)
BILIRUB SERPL-MCNC: 0.8 MG/DL (ref 0–1.2)
BUN SERPL-MCNC: 40 MG/DL (ref 6–20)
CALCIUM SERPL-MCNC: 8.6 MG/DL (ref 8.6–10.2)
CHLORIDE SERPL-SCNC: 101 MMOL/L (ref 98–107)
CO2 SERPL-SCNC: 24 MMOL/L (ref 22–29)
CREAT SERPL-MCNC: 2 MG/DL (ref 0.5–1)
EOSINOPHIL # BLD: 0.13 K/UL (ref 0.05–0.5)
EOSINOPHILS RELATIVE PERCENT: 1 % (ref 0–6)
ERYTHROCYTE [DISTWIDTH] IN BLOOD BY AUTOMATED COUNT: 15.5 % (ref 11.5–15)
GFR SERPL CREATININE-BSD FRML MDRD: 29 ML/MIN/1.73M2
GLUCOSE BLD-MCNC: 125 MG/DL (ref 74–99)
GLUCOSE BLD-MCNC: 169 MG/DL (ref 74–99)
GLUCOSE BLD-MCNC: 197 MG/DL (ref 74–99)
GLUCOSE SERPL-MCNC: 127 MG/DL (ref 74–99)
HCT VFR BLD AUTO: 24.7 % (ref 34–48)
HCT VFR BLD AUTO: 25.6 % (ref 34–48)
HGB BLD-MCNC: 7.8 G/DL (ref 11.5–15.5)
HGB BLD-MCNC: 8.2 G/DL (ref 11.5–15.5)
IMM GRANULOCYTES # BLD AUTO: 0.55 K/UL (ref 0–0.58)
IMM GRANULOCYTES NFR BLD: 3 % (ref 0–5)
LYMPHOCYTES NFR BLD: 1.75 K/UL (ref 1.5–4)
LYMPHOCYTES RELATIVE PERCENT: 9 % (ref 20–42)
MAGNESIUM SERPL-MCNC: 1.5 MG/DL (ref 1.6–2.6)
MCH RBC QN AUTO: 27.8 PG (ref 26–35)
MCHC RBC AUTO-ENTMCNC: 31.6 G/DL (ref 32–34.5)
MCV RBC AUTO: 87.9 FL (ref 80–99.9)
MONOCYTES NFR BLD: 1.42 K/UL (ref 0.1–0.95)
MONOCYTES NFR BLD: 7 % (ref 2–12)
NEUTROPHILS NFR BLD: 80 % (ref 43–80)
NEUTS SEG NFR BLD: 15.2 K/UL (ref 1.8–7.3)
PHOSPHATE SERPL-MCNC: 2.5 MG/DL (ref 2.5–4.5)
PLATELET # BLD AUTO: 115 K/UL (ref 130–450)
PMV BLD AUTO: 12.2 FL (ref 7–12)
POTASSIUM SERPL-SCNC: 3.5 MMOL/L (ref 3.5–5)
PROT SERPL-MCNC: 5.4 G/DL (ref 6.4–8.3)
RBC # BLD AUTO: 2.81 M/UL (ref 3.5–5.5)
RBC # BLD: ABNORMAL 10*6/UL
SODIUM SERPL-SCNC: 139 MMOL/L (ref 132–146)
WBC OTHER # BLD: 19.1 K/UL (ref 4.5–11.5)

## 2024-04-28 PROCEDURE — 6360000002 HC RX W HCPCS

## 2024-04-28 PROCEDURE — 6360000002 HC RX W HCPCS: Performed by: NURSE PRACTITIONER

## 2024-04-28 PROCEDURE — 6370000000 HC RX 637 (ALT 250 FOR IP)

## 2024-04-28 PROCEDURE — 2060000000 HC ICU INTERMEDIATE R&B

## 2024-04-28 PROCEDURE — 6370000000 HC RX 637 (ALT 250 FOR IP): Performed by: INTERNAL MEDICINE

## 2024-04-28 PROCEDURE — A4216 STERILE WATER/SALINE, 10 ML: HCPCS | Performed by: NURSE PRACTITIONER

## 2024-04-28 PROCEDURE — 94640 AIRWAY INHALATION TREATMENT: CPT

## 2024-04-28 PROCEDURE — 99232 SBSQ HOSP IP/OBS MODERATE 35: CPT | Performed by: INTERNAL MEDICINE

## 2024-04-28 PROCEDURE — 85025 COMPLETE CBC W/AUTO DIFF WBC: CPT

## 2024-04-28 PROCEDURE — 85018 HEMOGLOBIN: CPT

## 2024-04-28 PROCEDURE — 83735 ASSAY OF MAGNESIUM: CPT

## 2024-04-28 PROCEDURE — 2700000000 HC OXYGEN THERAPY PER DAY

## 2024-04-28 PROCEDURE — 84100 ASSAY OF PHOSPHORUS: CPT

## 2024-04-28 PROCEDURE — 80053 COMPREHEN METABOLIC PANEL: CPT

## 2024-04-28 PROCEDURE — C9113 INJ PANTOPRAZOLE SODIUM, VIA: HCPCS | Performed by: NURSE PRACTITIONER

## 2024-04-28 PROCEDURE — 2580000003 HC RX 258: Performed by: NURSE PRACTITIONER

## 2024-04-28 PROCEDURE — 82962 GLUCOSE BLOOD TEST: CPT

## 2024-04-28 PROCEDURE — 6370000000 HC RX 637 (ALT 250 FOR IP): Performed by: NURSE PRACTITIONER

## 2024-04-28 PROCEDURE — 85014 HEMATOCRIT: CPT

## 2024-04-28 RX ORDER — LIDOCAINE 4 G/G
1 PATCH TOPICAL DAILY
Status: DISCONTINUED | OUTPATIENT
Start: 2024-04-28 | End: 2024-05-04 | Stop reason: HOSPADM

## 2024-04-28 RX ORDER — POTASSIUM CHLORIDE 20 MEQ/1
20 TABLET, EXTENDED RELEASE ORAL ONCE
Status: COMPLETED | OUTPATIENT
Start: 2024-04-28 | End: 2024-04-28

## 2024-04-28 RX ORDER — MAGNESIUM SULFATE IN WATER 40 MG/ML
2000 INJECTION, SOLUTION INTRAVENOUS ONCE
Status: COMPLETED | OUTPATIENT
Start: 2024-04-28 | End: 2024-04-28

## 2024-04-28 RX ADMIN — SODIUM CHLORIDE, PRESERVATIVE FREE 10 ML: 5 INJECTION INTRAVENOUS at 10:05

## 2024-04-28 RX ADMIN — BUDESONIDE INHALATION 500 MCG: 0.5 SUSPENSION RESPIRATORY (INHALATION) at 08:33

## 2024-04-28 RX ADMIN — Medication 300 UNITS: at 09:41

## 2024-04-28 RX ADMIN — ACETAMINOPHEN 650 MG: 325 TABLET ORAL at 19:27

## 2024-04-28 RX ADMIN — ACETAMINOPHEN 650 MG: 325 TABLET ORAL at 10:04

## 2024-04-28 RX ADMIN — SODIUM CHLORIDE, PRESERVATIVE FREE 40 MG: 5 INJECTION INTRAVENOUS at 09:39

## 2024-04-28 RX ADMIN — MAGNESIUM SULFATE HEPTAHYDRATE 2000 MG: 40 INJECTION, SOLUTION INTRAVENOUS at 09:40

## 2024-04-28 RX ADMIN — Medication 300 UNITS: at 20:05

## 2024-04-28 RX ADMIN — SODIUM CHLORIDE, PRESERVATIVE FREE 10 ML: 5 INJECTION INTRAVENOUS at 20:05

## 2024-04-28 RX ADMIN — ARFORMOTEROL TARTRATE 15 MCG: 15 SOLUTION RESPIRATORY (INHALATION) at 08:33

## 2024-04-28 RX ADMIN — AMLODIPINE BESYLATE 10 MG: 10 TABLET ORAL at 09:39

## 2024-04-28 RX ADMIN — POTASSIUM CHLORIDE 20 MEQ: 1500 TABLET, EXTENDED RELEASE ORAL at 09:39

## 2024-04-28 RX ADMIN — HYDROMORPHONE HYDROCHLORIDE 0.25 MG: 1 INJECTION, SOLUTION INTRAMUSCULAR; INTRAVENOUS; SUBCUTANEOUS at 20:05

## 2024-04-28 RX ADMIN — HEPARIN SODIUM 5000 UNITS: 5000 INJECTION INTRAVENOUS; SUBCUTANEOUS at 06:17

## 2024-04-28 ASSESSMENT — PAIN SCALES - GENERAL
PAINLEVEL_OUTOF10: 10
PAINLEVEL_OUTOF10: 1
PAINLEVEL_OUTOF10: 10

## 2024-04-28 ASSESSMENT — PAIN DESCRIPTION - LOCATION: LOCATION: BACK

## 2024-04-28 ASSESSMENT — PAIN DESCRIPTION - DESCRIPTORS: DESCRIPTORS: ACHING;DISCOMFORT;CRAMPING;DULL

## 2024-04-28 ASSESSMENT — PAIN DESCRIPTION - ORIENTATION: ORIENTATION: LOWER

## 2024-04-28 NOTE — PROGRESS NOTES
[facility]  Internal Medicine Residency Program  Progress Note - House Team       Patient:  Saba Sullivan 59 y.o. female   MRN: [id number]       Date of Service: 2024  Admission date: 2024  7:49 AM ; Hospital day: 11   CC: Shortness of Breath (Hx COPD- started 4-5 hrs ago, 2 Duoneb and cpap by EMS right leg swelling, hx blood clots, no current thinners)     Overnight events:   No acute events overnight.    Subjective     Saba Sullivan was seen and examined at bedside today morning. She was awake, alert, and oriented, co-operative, was able to answer questions, she is saturating 99% on 4 L of oxygen via nasal cannula. She complained about generalized weakness, denies any acute pain on thigh, but complaining about back pain which is chronic, otherwise, denies any other acute symptoms.     Objective     PHYSICAL EXAM  General Appearance: not in acute distress, on 4 L of oxygen via nasal canula.   HEENT: Normocephalic, atraumatic  Neck: midline trachea  Lung: clear to auscultation bilaterally, no wheezing or rales, TDC present on right chest  Heart: regular rate and rhythm, no murmur  Abdomen: soft, bowel sounds normal; no masses,   Extremities: swelling over right groin on the HD site, no ecchymoses, no cyanosis  Musculokeletal: No joint swelling  Neurologic: Mental status: AAOx3, normal reflexes and symmetric bilaterally, cogwheel rigidity intermittently present    CARDIOVASCULAR  Vitals: /61   Pulse 88   Temp 98.4 °F (36.9 °C) (Temporal)   Resp 20   Ht 1.753 m (5' 9.02\")   Wt 73 kg (160 lb 15 oz)   LMP 2013   SpO2 97%   BMI 23.76 kg/m²     Pulse rate range      : Pulse  Av.4  Min: 70  Max: 100  BP range                  : Systolic (24hrs), Av , Min:122 , Max:128   ; Diastolic (24hrs), Av, Min:61, Max:76    PULMONARY  Respiration range   : Resp  Av.2  Min: 18  Max: 26  Pulse Ox range : SpO2  Av.5 %  Min: 97 %  Max: 100 %  No results for  represent a pneumatocele or bulla but as it was not seen on   the prior study, an area of developing cavitation is not excluded.      3.  Hyperinflation of the upper lungs with some emphysematous change, similar   to the prior study.      4.  Cholelithiasis with the appearance of the gallbladder otherwise unchanged.      5.  Nonobstructing 3 mm upper pole left intrarenal calculus.  No obstructive   uropathy is noted.      6.  Increasing subcutaneous edema within both flanks which can be a sign of   anasarca.         XR CHEST PORTABLE   Final Result   1. Unchanged opacity/consolidation projected in the right lower chest.   Probable left pleural effusion.   2. Left IJ central venous catheter in good position.         XR CHEST PORTABLE   Final Result   Small bilateral pleural effusions with bibasilar opacities that could be   related to pneumonia or atelectasis.         XR CHEST PORTABLE   Final Result   1. No acute cardiopulmonary process.   2. Left basilar atelectasis.         XR FOOT RIGHT (2 VIEWS)   Final Result   9 mm ulceration over the plantar hindfoot at the level of the calcaneal neck   with surrounding inflammatory infiltration. No acute osseous erosion to   suggest acute osteomyelitis.      RECOMMENDATION:   Consider additional evaluation by MRI as clinically indicated.         CTA PULMONARY W CONTRAST   Final Result   1. Irregular and confluent opacities are present in lower lobes more   significant on the right suggestive of pneumonia and atelectasis   2. Peribronchial thickening bilaterally suggestive of mucous plugging and   peribronchial inflammation with areas of bronchial stenosis in the lower   lobes.   3. Additional irregular ground-glass opacities are located in the right upper   lobe.   4. No evidence of pulmonary embolism.   5. Cholelithiasis.         CT THORACIC SPINE WO CONTRAST   Final Result   No evidence for acute fracture involving the thoracic spine.         CT LUMBAR SPINE WO CONTRAST

## 2024-04-28 NOTE — PROGRESS NOTES
Nephrology Progress Note  [facility]      CC:   arf    HPI:       4/18: the pt is a 61 yo female with a pmh of copd, DVT, polysubstance abuse, recurrent OM of R foot who presented with sob and ms changes. Sat was 85% on RA in ER. She was put on bipap. Cta showed no PE. She was given nebs, steroids and started on doxycycline and merrem. Bilateral LE US showed no DVT. Labs showed na 131 k 4.6 co2 27 bun 36 cr 2.2 > 3.1, lactate 1.5, ca 7.8, p 5.8, nh4 25ck 21k>16K, alb 2.7, mg 1.9, tsh 1.04, uds pos amphetamine, benzos, cocaine, fentanyl, opiates. Wbc 20.9, hgb 13.5, plt 313. Ua 100 glucose, mod bili, sg >1.03, 100 protein, pos nitrite, 10-20 rbc, lg hgb. Vitals showed rr 17 hr 112 bp 148/67, temp 98.1.  cr from 7/2021 was 0.7. she was started on ns at 51. Pt is not giving a history, mumbles when asked a question, eyes closed.       Patient Active Problem List   Diagnosis    Abscess    Nonhealing ulcer of heel (HCC)    Bradycardia    Substance abuse (HCC)    Current moderate episode of major depressive disorder (HCC)    Abnormal weight loss    COPD (chronic obstructive pulmonary disease) (HCC)    History of fracture of left hip    Ambulatory dysfunction    Hypotension    Subclinical hypothyroidism    Chronic recurrent multifocal osteomyelitis of foot (HCC)    Pressure injury of heel, stage 3 (HCC)    Open wound of fifth toe of left foot    COPD exacerbation (HCC)    Acute respiratory failure with hypoxia (HCC)    Mild protein-calorie malnutrition (HCC)    Osteomyelitis (HCC)    High risk medication use    Acute respiratory failure with hypoxia and hypercapnia (HCC)    NSTEMI (non-ST elevated myocardial infarction) ([facility])         Subjective    4/19: pt seen and examined in icu. On ns at 125 ml/hr. Pt eyes open but not answering questions    4/20:Reported to be refusing BIPAP; on HFNC, acceptable oxygen saturation    4/21 received call from NP overnight; reported patient had episode of SOB and hypoxia; UO improved  with bumex drip; patient denies SOB at present    4/22: No new acute issues from overnight; denies SOB    4/23: No new complaints today; denies any problems from overnight    4/24: No acute events from overnight continues to be somewhat anxious    4/25: Not agreeing to NIV at night; she denies shortness of breath at present    4/26: No new acute issues from overnight; she denies shortness of breath; hemodialysis initiated yesterday 4/25 4/27: pt seen in hd. Asking to come off early, no cp or sob, says she is making urine, nothing recorded    4/28: pt seen in room, chart reviewed, no complaints today, no cp or sob, in chair          Meds:     magnesium sulfate  2,000 mg IntraVENous Once    lidocaine  1 patch TransDERmal Daily    amLODIPine  10 mg Oral Daily    lidocaine  1 patch TransDERmal Daily    lidocaine  1 patch TransDERmal Daily    vitamin D  50,000 Units Oral Weekly    insulin lispro  0-4 Units SubCUTAneous Q4H    lidocaine PF  5 mL IntraDERmal Once    heparin flush  3 mL IntraVENous 2 times per day    sodium chloride flush  5-40 mL IntraVENous 2 times per day    [Held by provider] heparin (porcine)  5,000 Units SubCUTAneous 3 times per day    arformoterol tartrate  15 mcg Nebulization BID RT    budesonide  0.5 mg Nebulization BID RT    pantoprazole (PROTONIX) 40 mg in sodium chloride (PF) 0.9 % 10 mL injection  40 mg IntraVENous Daily        dextrose      sodium chloride         Meds prn:     melatonin, ipratropium 0.5 mg-albuterol 2.5 mg, glucose, dextrose bolus **OR** dextrose bolus, glucagon (rDNA), dextrose, acetaminophen, heparin flush, sodium chloride flush, sodium chloride    Meds prior to admission:     No current facility-administered medications on file prior to encounter.     Current Outpatient Medications on File Prior to Encounter   Medication Sig Dispense Refill    tiotropium (SPIRIVA RESPIMAT) 2.5 MCG/ACT AERS inhaler Inhale 2 puffs into the lungs daily         Allergies:    Ancef

## 2024-04-28 NOTE — PLAN OF CARE
Problem: Safety - Adult  Goal: Free from fall injury  4/27/2024 2309 by Nkechi Siegel RN  Outcome: Progressing  4/27/2024 0917 by Ana Lr RN  Outcome: Progressing     Problem: Discharge Planning  Goal: Discharge to home or other facility with appropriate resources  4/27/2024 2309 by Nkechi Siegel RN  Outcome: Progressing  Flowsheets (Taken 4/27/2024 2000)  Discharge to home or other facility with appropriate resources:   Identify barriers to discharge with patient and caregiver   Identify discharge learning needs (meds, wound care, etc)  4/27/2024 0917 by Ana Lr RN  Outcome: Progressing  Flowsheets (Taken 4/27/2024 0800)  Discharge to home or other facility with appropriate resources: Identify barriers to discharge with patient and caregiver     Problem: Skin/Tissue Integrity  Goal: Absence of new skin breakdown  Description: 1.  Monitor for areas of redness and/or skin breakdown  2.  Assess vascular access sites hourly  3.  Every 4-6 hours minimum:  Change oxygen saturation probe site  4.  Every 4-6 hours:  If on nasal continuous positive airway pressure, respiratory therapy assess nares and determine need for appliance change or resting period.  4/27/2024 2309 by Nkechi Siegel RN  Outcome: Progressing  4/27/2024 0917 by Ana Lr RN  Outcome: Progressing     Problem: ABCDS Injury Assessment  Goal: Absence of physical injury  4/27/2024 2309 by Nkechi Siegel RN  Outcome: Progressing  4/27/2024 0917 by Ana Lr RN  Outcome: Progressing     Problem: Respiratory - Adult  Goal: Achieves optimal ventilation and oxygenation  Outcome: Progressing  Flowsheets (Taken 4/27/2024 2000)  Achieves optimal ventilation and oxygenation:   Assess for changes in respiratory status   Assess for changes in mentation and behavior   Position to facilitate oxygenation and minimize respiratory effort     Problem: Cardiovascular - Adult  Goal: Absence of cardiac dysrhythmias or at baseline  Outcome:

## 2024-04-28 NOTE — PROGRESS NOTES
Spoke with Kathleen from US dept regarding result for Soft tissue ultrasound drom 4/27. US results do not match area that was sonographed. Per Kathleen, [facility] will addend the results today.    Electronically signed by Eladia Forte RN on 4/28/2024 at 9:11 AM

## 2024-04-28 NOTE — PROGRESS NOTES
[facility]  Internal Medicine Residency / House Medicine Service    Attending Physician Statement  I have discussed the case, including pertinent history and exam findings with the resident and the team.  I have seen and examined the patient and the key elements of the encounter have been performed by me.  I agree with the assessment, plan and orders as documented by the resident.      Case Discussed During AM Rounds   Remains stable on 4 L NC   Continue oxygen wean protocol today    Vitals remain stable    Primary concern of ongoing back pain    HgB 7.8 this am    Concern from blood loss- stop DVT prophylaxis    H/H this afternoon    U/S of thigh for concern for hematoma pending      Acute Hypoxic Respiratory Failure    Continue oxygen wean protocol    Improving oxygen requirements with initiation of HD     DOMIGNO with acute electrolyte derangement leading to acute HD needs    Nephrology following    Tunnelled catheter in place   HD today    Anemia- further H/H drop    ? Hematoma   U/S pending- need to follow as H/H continues to reduce   Monitor for any additional acute bleeding   H/H this afternoon    Transfusion protocol to maintain 7     Type II NSTEMI    Secondary to presentation     Polysubstance abuse    Noted on presentation with complications noted above including DOMINGO necessitating HD     Remainder of medical problems as per resident note.  Attending note is in collaboration with resident-physician Dr. Bettencourt progress note on 4/28/2024    Elmer Noguera MD  4/28/24    Internal Medicine Residency Faculty

## 2024-04-29 LAB
ALBUMIN SERPL-MCNC: 3.3 G/DL (ref 3.5–5.2)
ALP SERPL-CCNC: 53 U/L (ref 35–104)
ALT SERPL-CCNC: 8 U/L (ref 0–32)
ANION GAP SERPL CALCULATED.3IONS-SCNC: 11 MMOL/L (ref 7–16)
ANION GAP SERPL CALCULATED.3IONS-SCNC: 12 MMOL/L (ref 7–16)
AST SERPL-CCNC: 24 U/L (ref 0–31)
BASOPHILS # BLD: 0 K/UL (ref 0–0.2)
BASOPHILS NFR BLD: 0 % (ref 0–2)
BILIRUB SERPL-MCNC: 0.7 MG/DL (ref 0–1.2)
BUN SERPL-MCNC: 50 MG/DL (ref 6–20)
BUN SERPL-MCNC: 51 MG/DL (ref 6–20)
CALCIUM SERPL-MCNC: 8.6 MG/DL (ref 8.6–10.2)
CALCIUM SERPL-MCNC: 8.7 MG/DL (ref 8.6–10.2)
CHLORIDE SERPL-SCNC: 100 MMOL/L (ref 98–107)
CHLORIDE SERPL-SCNC: 103 MMOL/L (ref 98–107)
CO2 SERPL-SCNC: 25 MMOL/L (ref 22–29)
CO2 SERPL-SCNC: 25 MMOL/L (ref 22–29)
CREAT SERPL-MCNC: 2.2 MG/DL (ref 0.5–1)
CREAT SERPL-MCNC: 2.2 MG/DL (ref 0.5–1)
EOSINOPHIL # BLD: 0 K/UL (ref 0.05–0.5)
EOSINOPHILS RELATIVE PERCENT: 0 % (ref 0–6)
ERYTHROCYTE [DISTWIDTH] IN BLOOD BY AUTOMATED COUNT: 15.8 % (ref 11.5–15)
GFR SERPL CREATININE-BSD FRML MDRD: 25 ML/MIN/1.73M2
GFR SERPL CREATININE-BSD FRML MDRD: 26 ML/MIN/1.73M2
GLUCOSE BLD-MCNC: 190 MG/DL (ref 74–99)
GLUCOSE SERPL-MCNC: 133 MG/DL (ref 74–99)
GLUCOSE SERPL-MCNC: 153 MG/DL (ref 74–99)
HCT VFR BLD AUTO: 24.1 % (ref 34–48)
HGB BLD-MCNC: 7.7 G/DL (ref 11.5–15.5)
LYMPHOCYTES NFR BLD: 1.02 K/UL (ref 1.5–4)
LYMPHOCYTES RELATIVE PERCENT: 4 % (ref 20–42)
MAGNESIUM SERPL-MCNC: 1.9 MG/DL (ref 1.6–2.6)
MAGNESIUM SERPL-MCNC: 2.4 MG/DL (ref 1.6–2.6)
MCH RBC QN AUTO: 28.1 PG (ref 26–35)
MCHC RBC AUTO-ENTMCNC: 32 G/DL (ref 32–34.5)
MCV RBC AUTO: 88 FL (ref 80–99.9)
MONOCYTES NFR BLD: 1.63 K/UL (ref 0.1–0.95)
MONOCYTES NFR BLD: 7 % (ref 2–12)
NEUTROPHILS NFR BLD: 89 % (ref 43–80)
NEUTS SEG NFR BLD: 20.55 K/UL (ref 1.8–7.3)
PHOSPHATE SERPL-MCNC: 2.4 MG/DL (ref 2.5–4.5)
PHOSPHATE SERPL-MCNC: 2.7 MG/DL (ref 2.5–4.5)
PLATELET # BLD AUTO: 138 K/UL (ref 130–450)
PMV BLD AUTO: 12.9 FL (ref 7–12)
POTASSIUM SERPL-SCNC: 3.7 MMOL/L (ref 3.5–5)
POTASSIUM SERPL-SCNC: 3.9 MMOL/L (ref 3.5–5)
PROT SERPL-MCNC: 5.5 G/DL (ref 6.4–8.3)
RBC # BLD AUTO: 2.74 M/UL (ref 3.5–5.5)
RBC # BLD: ABNORMAL 10*6/UL
SODIUM SERPL-SCNC: 136 MMOL/L (ref 132–146)
SODIUM SERPL-SCNC: 140 MMOL/L (ref 132–146)
WBC OTHER # BLD: 23.2 K/UL (ref 4.5–11.5)

## 2024-04-29 PROCEDURE — A4216 STERILE WATER/SALINE, 10 ML: HCPCS | Performed by: NURSE PRACTITIONER

## 2024-04-29 PROCEDURE — 6370000000 HC RX 637 (ALT 250 FOR IP): Performed by: NURSE PRACTITIONER

## 2024-04-29 PROCEDURE — 84100 ASSAY OF PHOSPHORUS: CPT

## 2024-04-29 PROCEDURE — 6370000000 HC RX 637 (ALT 250 FOR IP)

## 2024-04-29 PROCEDURE — 2580000003 HC RX 258: Performed by: NURSE PRACTITIONER

## 2024-04-29 PROCEDURE — 94640 AIRWAY INHALATION TREATMENT: CPT

## 2024-04-29 PROCEDURE — 82962 GLUCOSE BLOOD TEST: CPT

## 2024-04-29 PROCEDURE — 6360000002 HC RX W HCPCS: Performed by: NURSE PRACTITIONER

## 2024-04-29 PROCEDURE — 80048 BASIC METABOLIC PNL TOTAL CA: CPT

## 2024-04-29 PROCEDURE — 2700000000 HC OXYGEN THERAPY PER DAY

## 2024-04-29 PROCEDURE — 97530 THERAPEUTIC ACTIVITIES: CPT

## 2024-04-29 PROCEDURE — 83735 ASSAY OF MAGNESIUM: CPT

## 2024-04-29 PROCEDURE — 80053 COMPREHEN METABOLIC PANEL: CPT

## 2024-04-29 PROCEDURE — 85025 COMPLETE CBC W/AUTO DIFF WBC: CPT

## 2024-04-29 PROCEDURE — 99231 SBSQ HOSP IP/OBS SF/LOW 25: CPT | Performed by: INTERNAL MEDICINE

## 2024-04-29 PROCEDURE — 2060000000 HC ICU INTERMEDIATE R&B

## 2024-04-29 PROCEDURE — C9113 INJ PANTOPRAZOLE SODIUM, VIA: HCPCS | Performed by: NURSE PRACTITIONER

## 2024-04-29 PROCEDURE — 94669 MECHANICAL CHEST WALL OSCILL: CPT

## 2024-04-29 PROCEDURE — 6360000002 HC RX W HCPCS

## 2024-04-29 PROCEDURE — 2580000003 HC RX 258

## 2024-04-29 RX ORDER — SODIUM CHLORIDE 9 MG/ML
INJECTION, SOLUTION INTRAVENOUS CONTINUOUS
Status: ACTIVE | OUTPATIENT
Start: 2024-04-29 | End: 2024-04-29

## 2024-04-29 RX ORDER — MAGNESIUM SULFATE IN WATER 40 MG/ML
2000 INJECTION, SOLUTION INTRAVENOUS ONCE
Status: COMPLETED | OUTPATIENT
Start: 2024-04-29 | End: 2024-04-29

## 2024-04-29 RX ADMIN — AMLODIPINE BESYLATE 10 MG: 10 TABLET ORAL at 08:25

## 2024-04-29 RX ADMIN — SODIUM CHLORIDE: 9 INJECTION, SOLUTION INTRAVENOUS at 07:30

## 2024-04-29 RX ADMIN — POTASSIUM BICARBONATE 20 MEQ: 782 TABLET, EFFERVESCENT ORAL at 08:27

## 2024-04-29 RX ADMIN — BUDESONIDE INHALATION 500 MCG: 0.5 SUSPENSION RESPIRATORY (INHALATION) at 09:06

## 2024-04-29 RX ADMIN — ARFORMOTEROL TARTRATE 15 MCG: 15 SOLUTION RESPIRATORY (INHALATION) at 09:06

## 2024-04-29 RX ADMIN — ACETAMINOPHEN 650 MG: 325 TABLET ORAL at 17:28

## 2024-04-29 RX ADMIN — HEPARIN SODIUM 5000 UNITS: 5000 INJECTION INTRAVENOUS; SUBCUTANEOUS at 20:57

## 2024-04-29 RX ADMIN — SODIUM CHLORIDE, PRESERVATIVE FREE 40 MG: 5 INJECTION INTRAVENOUS at 08:25

## 2024-04-29 RX ADMIN — ACETAMINOPHEN 650 MG: 325 TABLET ORAL at 23:01

## 2024-04-29 RX ADMIN — ACETAMINOPHEN 650 MG: 325 TABLET ORAL at 08:25

## 2024-04-29 RX ADMIN — SODIUM CHLORIDE, PRESERVATIVE FREE 10 ML: 5 INJECTION INTRAVENOUS at 20:58

## 2024-04-29 RX ADMIN — MAGNESIUM SULFATE HEPTAHYDRATE 2000 MG: 40 INJECTION, SOLUTION INTRAVENOUS at 08:27

## 2024-04-29 RX ADMIN — Medication 300 UNITS: at 20:58

## 2024-04-29 ASSESSMENT — PAIN SCALES - GENERAL
PAINLEVEL_OUTOF10: 10
PAINLEVEL_OUTOF10: 5
PAINLEVEL_OUTOF10: 10
PAINLEVEL_OUTOF10: 5

## 2024-04-29 ASSESSMENT — PULMONARY FUNCTION TESTS: PEFR_L/MIN: 140

## 2024-04-29 NOTE — PLAN OF CARE
Problem: Safety - Adult  Goal: Free from fall injury  Outcome: Progressing     Problem: Discharge Planning  Goal: Discharge to home or other facility with appropriate resources  Outcome: Progressing  Flowsheets (Taken 4/28/2024 2000)  Discharge to home or other facility with appropriate resources:   Identify barriers to discharge with patient and caregiver   Identify discharge learning needs (meds, wound care, etc)     Problem: Skin/Tissue Integrity  Goal: Absence of new skin breakdown  Description: 1.  Monitor for areas of redness and/or skin breakdown  2.  Assess vascular access sites hourly  3.  Every 4-6 hours minimum:  Change oxygen saturation probe site  4.  Every 4-6 hours:  If on nasal continuous positive airway pressure, respiratory therapy assess nares and determine need for appliance change or resting period.  Outcome: Progressing     Problem: ABCDS Injury Assessment  Goal: Absence of physical injury  Outcome: Progressing     Problem: Respiratory - Adult  Goal: Achieves optimal ventilation and oxygenation  Outcome: Progressing     Problem: Cardiovascular - Adult  Goal: Absence of cardiac dysrhythmias or at baseline  Outcome: Progressing  Flowsheets (Taken 4/28/2024 2000)  Absence of cardiac dysrhythmias or at baseline: Monitor cardiac rate and rhythm     Problem: Skin/Tissue Integrity - Adult  Goal: Incisions, wounds, or drain sites healing without S/S of infection  Outcome: Progressing     Problem: Metabolic/Fluid and Electrolytes - Adult  Goal: Electrolytes maintained within normal limits  Outcome: Progressing  Flowsheets (Taken 4/28/2024 2000)  Electrolytes maintained within normal limits:   Monitor labs and assess patient for signs and symptoms of electrolyte imbalances   Administer electrolyte replacement as ordered   Monitor response to electrolyte replacements, including repeat lab results as appropriate     Problem: Pain  Goal: Verbalizes/displays adequate comfort level or baseline comfort

## 2024-04-29 NOTE — DISCHARGE SUMMARY
tremor  presented to the ED due to shortness of breath. History was taken from EMR as patient was on Bipap and unable to communicate.  Patient stated that she had shortness of breath for the past 4 to 5 hours.  She also mentioned that she has a history of blood clots but is not currently on blood thinners.  She also reports lower extremity swelling greater on the left. In the ED /106, RR 20, . Patient was put on a BiPAP but continue to repeatedly remove it and dropped her saturations to 73%.  Labs were significant for a CR 2.2 (bs 0.7), troponin 208, proBNP 22,376,, WBC 16.3.  ABG was significant for pH of 7.2, pCO2 49.1, pO2 54.7.  Due to patient's low threshold for intubation patient was transferred to the ICU.    She was Started on Merrem and Doxycycline, breathing treatment, was started on stress dose steroids, placed on BIPAP, heparin drip was started for NSTEMI, cardiology was consulted, echo was ordered, which showed normal EF, was started on IVF for rhabdomyolysis. Heparin drip was stopped after 48 hour,  patient developed acute kidney injury in the setting of rhabdomyolysis, was anuric initially, but was able to produce some urine, Bumex was given IV, with no improvement of urine output, started on Bumex drip, no improvement in urine output, was started on Hemodialysis via HD catheter, s/p TDC placement. Hypertension was managed with Amlodipine 5 mg which was increased to 10 mg. Bedside swallow failed, SLP was done, patient was started on regular diet. Patient transferred out of ICU on 04/26. She was complaining of back pain, which was chronic, was treated with dilaudid multiple times. Later, patient was started on ativan prn, gabapentin, and Cymbalta. During hospital course, with intermittent dialysis, patient's Cr became normal, but remains anuric. However, patient develop large hematoma on right thigh, s/p HD line removal, HGB was dropped to 6.8, got 1 U of blood transfusion, H&H trended few  days, which was stable. PT/OT was done, AMPAC score was 9/24. Patient was counseled to discharge to [facility], which will benefit her, But she was adamant that, she wants to go home, she have her friend, who will help her in daily living.     On the day of discharge, she was hemodynamically stable, was feeling much better. She was counseled to take the prescribed medication as indicated. Medications were prescribed fyhr1ldy. Advised to do Dialysis on scheduled MWF in [facility]. She demonstrated understanding of all the instructions.     Physical Exam  Constitutional:       General: She is not in acute distress.     Appearance: Normal appearance. She is normal weight.   HENT:      Head: Normocephalic and atraumatic.   Eyes:      General: No scleral icterus.     Conjunctiva/sclera: Conjunctivae normal.      Pupils: Pupils are equal, round, and reactive to light.   Cardiovascular:      Rate and Rhythm: Normal rate and regular rhythm.      Pulses: Normal pulses.      Heart sounds: Normal heart sounds.   Pulmonary:      Effort: Pulmonary effort is normal.      Breath sounds: Normal breath sounds.   Abdominal:      General: Bowel sounds are normal. There is no distension.      Palpations: Abdomen is soft.      Tenderness: There is no abdominal tenderness.   Musculoskeletal:         General: Swelling present. No tenderness.      Cervical back: No rigidity or tenderness.      Comments: Hematoma improving on right thigh   Skin:     Findings: No erythema or rash.   Neurological:      General: No focal deficit present.      Mental Status: She is alert and oriented to person, place, and time. Mental status is at baseline.      Cranial Nerves: No cranial nerve deficit.      Motor: No weakness.   Psychiatric:         Mood and Affect: Mood normal.         Behavior: Behavior normal.         Thought Content: Thought content normal.     Consults:  Critical care  Nephrology  Cardiology    Procedures:  Hemodialysis    Condition at

## 2024-04-29 NOTE — PROGRESS NOTES
\"CHOL\", \"HDL\", \"TRIG\" in the last 72 hours.    Invalid input(s): \"CHOLHDLR\", \"LDLCALCU\"    Hemoglobin A1C   Date Value Ref Range Status   2021 6.5 (H) 4.0 - 5.6 % Final      Lab Results   Component Value Date    TSH 1.04 2024     Infectious   Temperature range: Temp  Av.1 °F (36.7 °C)  Min: 97.9 °F (36.6 °C)  Max: 98.5 °F (36.9 °C)  Recent Labs     24  0600 24  1420 24  0610 24  0525   WBC 23.7*  --  19.1* 23.2*   LACTA  --  1.3  --   --      Results       Procedure Component Value Units Date/Time    Culture, Blood 2 [[id number]] Collected: 24 1445    Order Status: Completed Specimen: Blood Updated: 24 153     Specimen Description .BLOOD LEFT     Special Requests          Culture NO GROWTH 1 DAY    Culture, Blood 1 [[id number]] Collected: 24 1440    Order Status: Completed Specimen: Blood Updated: 24 153     Specimen Description .BLOOD RIGHT     Special Requests          Culture NO GROWTH 1 DAY    Culture, Blood 1 [[id number]]     Order Status: Canceled Specimen: Blood     Culture, Blood 2 [[id number]]     Order Status: Canceled Specimen: Blood     Culture, Respiratory [[id number]]     Order Status: Sent Specimen: Sputum Expectorated     Culture, Respiratory [[id number]]     Order Status: Sent Specimen: Sputum Induced     Culture, Respiratory [[id number]] Collected: 24 0915    Order Status: Canceled Specimen: Sputum Expectorated     Culture, Urine [[id number]] Collected: 24 033    Order Status: Completed Specimen: Urine, clean catch Updated: 24 1121     Specimen Description .CLEAN CATCH URINE     Culture NO GROWTH    Strep Pneumoniae Antigen [[id number]] Collected: 24 033    Order Status: Completed Specimen: Urine, straight catheter Updated: 24 0908     Source .CLEAN CATCH URINE     Strep pneumo Ag NEGATIVE     Comment:       Presumptive Negative  suggests no current or recent pneumococcal infection. Infection due to  Comment: Performed by multiplexed nucleic acid assay.       Culture, Blood 1 [[id number]] Collected: 04/17/24 1000    Order Status: Completed Specimen: Blood Updated: 04/22/24 1259     Specimen Description .BLOOD     Special Requests          Culture NO GROWTH 5 DAYS    Culture, Blood 2 [[id number]] Collected: 04/17/24 1000    Order Status: Completed Specimen: Blood Updated: 04/22/24 1305     Specimen Description .BLOOD     Special Requests          Culture NO GROWTH 5 DAYS          MRSA scree No components found for: \"MRSACU\"  HSV No results found for: \"HSVSRC\"  FLU No results found for: \"FLUA\" No results found for: \"FLUB\"  COVID-19 Labs:  Lab Results   Component Value Date/Time    COVID19 Not Detected 04/17/2024 10:20 AM     Legionella No results found for: \"LABLEGI\"  C Diff PCR No results found for: \"CDIFPCR\"  Strep pneumo No results found for: \"SPNEUAGU\"  Acid fast   AFB Smear   Date Value Ref Range Status   07/23/2021 No AFB observed by Fluorescent stain  Final     Stool No results found for: \"CXST\"  Fungust No results found for: \"FUNGUSSMEAR\"  VRE screen No results found for: \"VRECX\"  Ecoli O051:H7 No results found for: \"QVNBM264\"  Giardia No results found for: \"GIAAGS\"  Rotavirus No results found for: \"ROTA\"  Fecal leukocytes No results found for: \"FECLEU\"  -----------------------------------------------------  Fecal occult blood: .No results for input(s): \"OCCULTBLDFEC\" in the last 72 hours.  Occult blood screening: No results for input(s): \"OCCBS\" in the last 72 hours.  Occult blood QC: No results for input(s): \"OBQC\" in the last 72 hours.    Medications     Continuous Infusions:   sodium chloride 75 mL/hr at 04/29/24 0730    dextrose      sodium chloride       Scheduled Meds:   lidocaine  1 patch TransDERmal Daily    amLODIPine  10 mg Oral Daily    lidocaine  1 patch TransDERmal Daily    lidocaine  1 patch TransDERmal Daily    vitamin D  50,000 Units Oral Weekly    insulin lispro  0-4 Units SubCUTAneous

## 2024-04-29 NOTE — PROGRESS NOTES
[facility]  Internal Medicine Residency / House Medicine Service    Attending Physician Statement  I have discussed the case, including pertinent history and exam findings with the resident and the team.  I have seen and examined the patient and the key elements of the encounter have been performed by me.  I agree with the assessment, plan and orders as documented by the resident.      Case Discussed During AM Rounds   HD was held on 4/28 by Nephrology    Vital signs stable    Oxygen requirements have improved    Electrolytes stable this am    H/H stable- maintain Hgb > 7    U/O oliguric persistent    Nephrology coordination ongoing    Monitor for signs of infection in the presence of elevated WBCs    DOMINGO with acute electrolyte derangement leading to acute HD needs    Nephrology following    Tunnelled catheter in place   - 5 L since admission    Oliguric urine output reported at this time     Anemia- further H/H drop    No hematoma seen on U/S with clarification of completion   H/H currently stable   No additional signs of bleeding    Transfusion protocol to maintain 7     Leukocytosis- persistent   Slight worsening of WBCs this am    Repeat blood cultures unremarkable     Type II NSTEMI    Secondary to presentation     Polysubstance abuse    Noted on presentation with complications noted above including DOMINGO necessitating HD     Acute Hypoxic Respiratory Failure- improving    Off oxygen     Remainder of medical problems as per resident note.  Attending note is in collaboration with resident-physician Dr. Bettencourt progress note on 4/29/2024    Elmer Noguera MD  4/29/24    Internal Medicine Residency Faculty

## 2024-04-29 NOTE — PROGRESS NOTES
Physical Therapy    Physical Therapy Treatment    Name: Saba Sullivan  : 1965  MRN: [id number]      Date of Service: 2024    Evaluating PT:  Dante Aguirre, PT, DPT  [id number]     Room #:  4501/4501-B  Diagnosis:  Elevated LFTs [R79.89]  NSTEMI (non-ST elevated myocardial infarction) (HCC) [I21.4]  DOMINGO (acute kidney injury) (HCC) [N17.9]  Acute respiratory failure with hypoxia and hypercapnia (HCC) [J96.01, J96.02]  PMHx/PSHx:   has a past medical history of Abscess, Chronic pain, Chronic recurrent multifocal osteomyelitis of foot (HCC), Hepatitis C, Hip fracture (HCC), Hx of blood clots, Polysubstance abuse (HCC), Pressure injury of heel, stage 3 (HCC), and Subclinical hypothyroidism.   Procedure/Surgery:  none   Precautions:  Falls, O2, R heel wound, NWB R foot with CAM boot, Cognition, Tremors   Equipment Needs:  TBD    SUBJECTIVE:    Pt lives with significant other in a 1 story home with ramp to enter.  Bedroom and bathroom are on the main level.  Pt ambulated with WC PTA.    OBJECTIVE:   Initial Evaluation  Date: 24  Treatment  Date: 24 Short Term/ Long Term   Goals   AM-PAC 6 Clicks     Was pt agreeable to Eval/treatment? Yes  Yes     Does pt have pain? No reported pain  Chronic LBP (not quantified)    Bed Mobility  Rolling: NT  Supine to sit: Mod A x2  Sit to supine: Mod A x2  Scooting: Mod A Rolling: NT  Supine to sit: MaxA  Sit to supine: MaxA  Scooting: MaxA Rolling: SBA  Supine to sit: SBA  Sit to supine: SBA  Scooting: SBA   Transfers Sit to stand: Max A  Stand to sit: Max A  Stand pivot: NT Sit to stand: MaxA x2   Stand to sit: MaxA x2  Stand pivot: NT due to safety concerns Sit to stand: SBA  Stand to sit: SBA  Stand pivot: SBA with AAD   Ambulation    Few side steps with FWW Max A NT due to safety concerns >25 feet with AAD Min A   Stair negotiation: ascended and descended  NT NT NA   ROM BUE:  Per OT eval   BLE:  WFL     Strength BUE:  Per OT eval   RLE:  Grossly  instructions, emotional lability, and overall deconditioning of patient.     Treatment:  Patient practiced and was instructed in the following treatment:    Bed mobility - Cues provided for sequencing, physical assist provided as needed.  Transfers - Cues provided for WB status, safety, hand/feet placement, physical assist provided as needed.  Skilled positioning - To prevent skin breakdown and contractures.  Skilled assessment of vitals.  Education - Provided for safety, WB status, role of PT in acute setting, benefits of upright mobility.    PLAN:    Patient is making Fair+ progress towards established goals.  Will continue with current POC.      Time in  1330  Time out  1400    Total Treatment Time  30 minutes     CPT codes:  [] Gait training 41840 0 minutes  [] Manual therapy 74226 0 minutes  [x] Therapeutic activities 95770 30 minutes  [] Therapeutic exercises 00529 0 minutes  [] Neuromuscular reeducation 90203 0 minutes    Angeline Sung, PT, DPT  [id number]

## 2024-04-29 NOTE — PROGRESS NOTES
Peak Flow Results    Predicted  Peak Flow 380 lpm    Peak Flow before bronchodilators 50 lpm, which is 19 % predicted    Peak Flow after bronchodilators 140 lpm, which is 53 % predicted.         Performed by Audrey Tamayo RCP

## 2024-04-29 NOTE — PROGRESS NOTES
Nephrology Progress Note  [facility]      CC:   arf    HPI:       4/18: the pt is a 61 yo female with a pmh of copd, DVT, polysubstance abuse, recurrent OM of R foot who presented with sob and ms changes. Sat was 85% on RA in ER. She was put on bipap. Cta showed no PE. She was given nebs, steroids and started on doxycycline and merrem. Bilateral LE US showed no DVT. Labs showed na 131 k 4.6 co2 27 bun 36 cr 2.2 > 3.1, lactate 1.5, ca 7.8, p 5.8, nh4 25ck 21k>16K, alb 2.7, mg 1.9, tsh 1.04, uds pos amphetamine, benzos, cocaine, fentanyl, opiates. Wbc 20.9, hgb 13.5, plt 313. Ua 100 glucose, mod bili, sg >1.03, 100 protein, pos nitrite, 10-20 rbc, lg hgb. Vitals showed rr 17 hr 112 bp 148/67, temp 98.1.  cr from 7/2021 was 0.7. she was started on ns at 51. Pt is not giving a history, mumbles when asked a question, eyes closed.       Patient Active Problem List   Diagnosis    Abscess    Nonhealing ulcer of heel (HCC)    Bradycardia    Substance abuse (HCC)    Current moderate episode of major depressive disorder (HCC)    Abnormal weight loss    COPD (chronic obstructive pulmonary disease) (HCC)    History of fracture of left hip    Ambulatory dysfunction    Hypotension    Subclinical hypothyroidism    Chronic recurrent multifocal osteomyelitis of foot (HCC)    Pressure injury of heel, stage 3 (HCC)    Open wound of fifth toe of left foot    COPD exacerbation (HCC)    Acute respiratory failure with hypoxia (HCC)    Mild protein-calorie malnutrition (HCC)    Osteomyelitis (HCC)    High risk medication use    Acute respiratory failure with hypoxia and hypercapnia (HCC)    NSTEMI (non-ST elevated myocardial infarction) ([facility])         Subjective    4/19: pt seen and examined in icu. On ns at 125 ml/hr. Pt eyes open but not answering questions    4/20:Reported to be refusing BIPAP; on HFNC, acceptable oxygen saturation    4/21 received call from NP overnight; reported patient had episode of SOB and hypoxia; UO improved  (SPIRIVA RESPIMAT) 2.5 MCG/ACT AERS inhaler Inhale 2 puffs into the lungs daily         Allergies:    Ancef [cefazolin], Duricef [cefadroxil], and Iodine      ROS:     Unobtainable from pt who is essentially nonverbal    Physical Exam:      Patient Vitals for the past 24 hrs:   BP Temp Temp src Pulse Resp SpO2   04/29/24 1320 -- -- -- -- -- 99 %   04/29/24 1127 -- -- -- -- -- 100 %   04/29/24 1103 130/65 98 °F (36.7 °C) Temporal 89 23 100 %   04/29/24 0906 -- -- -- -- -- 100 %   04/29/24 0857 -- -- -- 94 17 100 %   04/29/24 0856 -- -- -- 90 30 (!) 70 %   04/29/24 0740 124/65 98.1 °F (36.7 °C) Temporal 90 18 98 %   04/29/24 0000 118/60 97.9 °F (36.6 °C) Temporal 90 20 98 %   04/28/24 2000 125/65 98 °F (36.7 °C) Temporal 95 28 99 %         Intake/Output Summary (Last 24 hours) at 4/29/2024 1711  Last data filed at 4/29/2024 0906  Gross per 24 hour   Intake 570 ml   Output 300 ml   Net 270 ml       Constitutional: Patient in no acute distress   Head: normocephalic, atraumatic   Neck: supple, no jvd  Cardiovascular: regular rate and rhythm, no murmurs, gallops, or rubs   Respiratory: Clear, no rales, rhochi, or wheezes,   Gastrointestinal: soft, nontender, nondistended, no hepatosplenomegaly  Ext: edema R LE; no redness or swelling; right thigh with some swelling minimal tenderness, no redness  Skin: dry, no rash   Back: nontender    Data:    Recent Labs     04/27/24  0600 04/27/24 2050 04/28/24  0610 04/28/24  1930 04/29/24  0525   WBC 23.7*  --  19.1*  --  23.2*   HGB 8.7*   < > 7.8* 8.2* 7.7*   HCT 27.2*   < > 24.7* 25.6* 24.1*   MCV 87.5  --  87.9  --  88.0     --  115*  --  138    < > = values in this interval not displayed.       Recent Labs     04/27/24  0600 04/28/24  0610 04/29/24  0525    139 136   K 4.0 3.5 3.7    101 100   CO2 25 24 25   CREATININE 2.6* 2.0* 2.2*   BUN 60* 40* 50*   LABGLOM 21* 29* 26*   GLUCOSE 125* 127* 133*   CALCIUM 9.1 8.6 8.7   PHOS 3.0 2.5 2.7   MG 1.6 1.5* 1.9

## 2024-04-29 NOTE — PROGRESS NOTES
Comprehensive Nutrition Assessment    Type and Reason for Visit:  Reassess    Nutrition Recommendations/Plan:   Continue Diet.    Will Re-Start ONS and monitor.       Malnutrition Assessment:  Malnutrition Status:  At risk for malnutrition (Comment) (04/22/24 1302)    Context:  Acute Illness     Findings of the 6 clinical characteristics of malnutrition:  Energy Intake:  Mild decrease in energy intake (Comment)  Weight Loss:  Unable to assess (2/2 poor wt hx on file to assess/trend)     Body Fat Loss:  Unable to assess     Muscle Mass Loss:  Unable to assess    Fluid Accumulation:  Unable to assess     Strength:  Not Performed    Nutrition Assessment:    Pt. admitted for Acute encephalopathy 2/2 most likely in the setting of acute hypercapnic hypoxic respiratory insufficiency/polysubstance abuse(-improving). Admit w/ Rhabdomyolysis, DOMINGO and Type II MI-NSTEMI. PMHx of COPD, recurrent OM of R foot. Hx of polysubstance abuse; noted UDS (+) amphetamine, BZD, cocaine, opiates, fentanyl. Noted wounds x2. Pt now s/p BSE w/ Mod-Sev OP Dysphagia/SLP rec for NPO 4/23, Repeat BSE WNL/SLP rec for minced/moist/thins 4/24.  Noted worsening DOMINGO, now s/p temp HD cath and start of iHD 4/25, now s/p temp HD cath removal and TDC insert 4/26.  Remains at risk d/t poor belem/intake throughout adm w/ increased needs 2/2 wounds/known HD losses. Will Re-Start ONS to promote PO intake/wound healing and monitor.    Nutrition Related Findings:    A&O, poor dentition, Abd/BS WDL, trace/+2 edema, -I/O's, elevated BUN/Cr/BGL, K+/Phos WNL Wound Type: Deep Tissue Injury, Diabetic Ulcer       Current Nutrition Intake & Therapies:    Average Meal Intake: 51-75%  Average Supplements Intake: None Ordered  ADULT DIET; Regular    Anthropometric Measures:  Height: 175.3 cm (5' 9.02\")  Ideal Body Weight (IBW): 145 lbs (66 kg)    Admission Body Weight: 76.2 kg (167 lb 15.9 oz) (4/18 BS first measured)  Current Body Weight: 72.6 kg (160 lb) (actual 4/26),

## 2024-04-30 PROBLEM — N17.9 AKI (ACUTE KIDNEY INJURY) (HCC): Status: ACTIVE | Noted: 2024-04-30

## 2024-04-30 PROBLEM — M87.052 AVASCULAR NECROSIS OF BONE OF LEFT HIP (HCC): Status: ACTIVE | Noted: 2024-04-30

## 2024-04-30 LAB
ALBUMIN SERPL-MCNC: 3.4 G/DL (ref 3.5–5.2)
ALP SERPL-CCNC: 54 U/L (ref 35–104)
ALT SERPL-CCNC: 8 U/L (ref 0–32)
ANION GAP SERPL CALCULATED.3IONS-SCNC: 12 MMOL/L (ref 7–16)
AST SERPL-CCNC: 21 U/L (ref 0–31)
BASOPHILS # BLD: 0.03 K/UL (ref 0–0.2)
BASOPHILS NFR BLD: 0 % (ref 0–2)
BILIRUB SERPL-MCNC: 0.6 MG/DL (ref 0–1.2)
BUN SERPL-MCNC: 53 MG/DL (ref 6–20)
CALCIUM SERPL-MCNC: 8.7 MG/DL (ref 8.6–10.2)
CHLORIDE SERPL-SCNC: 100 MMOL/L (ref 98–107)
CO2 SERPL-SCNC: 24 MMOL/L (ref 22–29)
CREAT SERPL-MCNC: 2.2 MG/DL (ref 0.5–1)
EOSINOPHIL # BLD: 0.19 K/UL (ref 0.05–0.5)
EOSINOPHILS RELATIVE PERCENT: 1 % (ref 0–6)
ERYTHROCYTE [DISTWIDTH] IN BLOOD BY AUTOMATED COUNT: 16 % (ref 11.5–15)
GFR SERPL CREATININE-BSD FRML MDRD: 26 ML/MIN/1.73M2
GLUCOSE BLD-MCNC: 116 MG/DL (ref 74–99)
GLUCOSE BLD-MCNC: 136 MG/DL (ref 74–99)
GLUCOSE BLD-MCNC: 157 MG/DL (ref 74–99)
GLUCOSE SERPL-MCNC: 121 MG/DL (ref 74–99)
HCT VFR BLD AUTO: 23.3 % (ref 34–48)
HGB BLD-MCNC: 7.3 G/DL (ref 11.5–15.5)
IMM GRANULOCYTES # BLD AUTO: 0.31 K/UL (ref 0–0.58)
IMM GRANULOCYTES NFR BLD: 2 % (ref 0–5)
LYMPHOCYTES NFR BLD: 1.31 K/UL (ref 1.5–4)
LYMPHOCYTES RELATIVE PERCENT: 7 % (ref 20–42)
MAGNESIUM SERPL-MCNC: 2.3 MG/DL (ref 1.6–2.6)
MCH RBC QN AUTO: 28.1 PG (ref 26–35)
MCHC RBC AUTO-ENTMCNC: 31.3 G/DL (ref 32–34.5)
MCV RBC AUTO: 89.6 FL (ref 80–99.9)
MONOCYTES NFR BLD: 1.46 K/UL (ref 0.1–0.95)
MONOCYTES NFR BLD: 7 % (ref 2–12)
NEUTROPHILS NFR BLD: 83 % (ref 43–80)
NEUTS SEG NFR BLD: 16.35 K/UL (ref 1.8–7.3)
PHOSPHATE SERPL-MCNC: 2.6 MG/DL (ref 2.5–4.5)
PLATELET # BLD AUTO: 166 K/UL (ref 130–450)
PMV BLD AUTO: 12.1 FL (ref 7–12)
PROT SERPL-MCNC: 5.8 G/DL (ref 6.4–8.3)
RBC # BLD AUTO: 2.6 M/UL (ref 3.5–5.5)
SODIUM SERPL-SCNC: 136 MMOL/L (ref 132–146)
WBC OTHER # BLD: 19.7 K/UL (ref 4.5–11.5)

## 2024-04-30 PROCEDURE — 94640 AIRWAY INHALATION TREATMENT: CPT

## 2024-04-30 PROCEDURE — 85025 COMPLETE CBC W/AUTO DIFF WBC: CPT

## 2024-04-30 PROCEDURE — 80053 COMPREHEN METABOLIC PANEL: CPT

## 2024-04-30 PROCEDURE — 6360000002 HC RX W HCPCS: Performed by: NURSE PRACTITIONER

## 2024-04-30 PROCEDURE — 6370000000 HC RX 637 (ALT 250 FOR IP)

## 2024-04-30 PROCEDURE — 6360000002 HC RX W HCPCS

## 2024-04-30 PROCEDURE — 90935 HEMODIALYSIS ONE EVALUATION: CPT

## 2024-04-30 PROCEDURE — 2700000000 HC OXYGEN THERAPY PER DAY

## 2024-04-30 PROCEDURE — 6370000000 HC RX 637 (ALT 250 FOR IP): Performed by: NURSE PRACTITIONER

## 2024-04-30 PROCEDURE — 99232 SBSQ HOSP IP/OBS MODERATE 35: CPT | Performed by: INTERNAL MEDICINE

## 2024-04-30 PROCEDURE — 2060000000 HC ICU INTERMEDIATE R&B

## 2024-04-30 PROCEDURE — 2580000003 HC RX 258: Performed by: NURSE PRACTITIONER

## 2024-04-30 PROCEDURE — 83735 ASSAY OF MAGNESIUM: CPT

## 2024-04-30 PROCEDURE — 84100 ASSAY OF PHOSPHORUS: CPT

## 2024-04-30 PROCEDURE — 82962 GLUCOSE BLOOD TEST: CPT

## 2024-04-30 PROCEDURE — C9113 INJ PANTOPRAZOLE SODIUM, VIA: HCPCS | Performed by: NURSE PRACTITIONER

## 2024-04-30 PROCEDURE — A4216 STERILE WATER/SALINE, 10 ML: HCPCS | Performed by: NURSE PRACTITIONER

## 2024-04-30 RX ORDER — GABAPENTIN 300 MG/1
300 CAPSULE ORAL ONCE
Status: COMPLETED | OUTPATIENT
Start: 2024-04-30 | End: 2024-04-30

## 2024-04-30 RX ORDER — LORAZEPAM 1 MG/1
1 TABLET ORAL EVERY 8 HOURS PRN
Status: DISCONTINUED | OUTPATIENT
Start: 2024-04-30 | End: 2024-05-04 | Stop reason: HOSPADM

## 2024-04-30 RX ADMIN — BUDESONIDE INHALATION 500 MCG: 0.5 SUSPENSION RESPIRATORY (INHALATION) at 19:44

## 2024-04-30 RX ADMIN — SODIUM CHLORIDE, PRESERVATIVE FREE 10 ML: 5 INJECTION INTRAVENOUS at 21:10

## 2024-04-30 RX ADMIN — BUDESONIDE INHALATION 500 MCG: 0.5 SUSPENSION RESPIRATORY (INHALATION) at 13:16

## 2024-04-30 RX ADMIN — GABAPENTIN 300 MG: 300 CAPSULE ORAL at 14:39

## 2024-04-30 RX ADMIN — ARFORMOTEROL TARTRATE 15 MCG: 15 SOLUTION RESPIRATORY (INHALATION) at 19:44

## 2024-04-30 RX ADMIN — ARFORMOTEROL TARTRATE 15 MCG: 15 SOLUTION RESPIRATORY (INHALATION) at 13:15

## 2024-04-30 RX ADMIN — HEPARIN SODIUM 5000 UNITS: 5000 INJECTION INTRAVENOUS; SUBCUTANEOUS at 06:21

## 2024-04-30 RX ADMIN — Medication 300 UNITS: at 21:10

## 2024-04-30 RX ADMIN — LORAZEPAM 1 MG: 1 TABLET ORAL at 14:39

## 2024-04-30 RX ADMIN — AMLODIPINE BESYLATE 10 MG: 10 TABLET ORAL at 10:55

## 2024-04-30 RX ADMIN — SODIUM CHLORIDE, PRESERVATIVE FREE 40 MG: 5 INJECTION INTRAVENOUS at 10:56

## 2024-04-30 RX ADMIN — ACETAMINOPHEN 650 MG: 325 TABLET ORAL at 10:55

## 2024-04-30 NOTE — CARE COORDINATION
CM Update: Met with pt at bedside to discuss transition of care. Reviewed therapy evals and she is very adamant she is returning home. She tells me she  is in the wheelchair most of the day and apparently pivots into it herself. I told her she sis not do this with therapy. She stated \" I will do it at home, if you send me to a nursing home I will sign out. I have people at home waiting for me\". Educated pt that this is not the safest plan. We are still awaiting HD set up. [facility]. They are awaiting financial clearance and can not arrange chair time until this is done. CM will continue to follow(TF)        LESLEY Obrien,RN  Case Management  [phone]

## 2024-04-30 NOTE — FLOWSHEET NOTE
04/30/24 1009   Vital Signs   /76   Temp 98 °F (36.7 °C)   Pulse 90   Respirations 20   Weight - Scale 72 kg (158 lb 11.7 oz)   Weight Method Bed scale   Percent Weight Change -1.37   Pain Assessment   Pain Assessment None - Denies Pain   Post-Hemodialysis Assessment   Post-Treatment Procedures Blood returned;Catheter capped, clamped with Citrate x 2 ports   Machine Disinfection Process Acid/Vinegar Clean;Heat Disinfect;Exterior Machine Disinfection   Blood Volume Processed (Liters) 39 L   Dialyzer Clearance Moderately streaked   Duration of Treatment (minutes) 165 minutes   Hemodialysis Intake (ml) 300 ml   Hemodialysis Output (ml) 1300 ml   NET Removed (ml) 1000   Tolerated Treatment Fair  (Patient refused to finish treatment after 2 hrs 45 min.  Signed Dr Finn dias notified.)   Patient Response to Treatment Patient refused to finish treatment after 2 hrs 45 min. Signed Dr Finn dias notified.   Bilateral Breath Sounds Diminished   Edema Generalized   Patient Disposition Return to room   Observations & Evaluations   Level of Consciousness 0   Heart Rhythm Regular   Respiratory Quality/Effort Unlabored   O2 Device Nasal cannula

## 2024-04-30 NOTE — PROGRESS NOTES
Man at bedside with wheelchair attempting to leave with patient. Charge nurse and md made aware. Aggressive behavior continues.

## 2024-04-30 NOTE — PROGRESS NOTES
Occupational Therapy  OT BEDSIDE TREATMENT NOTE   [facility]  [address], [place]      Date:2024  Patient Name: Saba Sullivan  MRN: [id number]  : 1965  Room: [address] [place] [address]       Attempted OT session this date:    [] unavailable due to other medical staff currently with pt   [] on hold per nursing staff   [] on hold per nursing staff secondary to lab / radiology results    [x] declined treatment  this date due to pain.  Benefits of participation in therapy although pt continued to adamantly decline.    [] off unit   [] Other:   Continue with current OT P.O.C      Kristie ERICKSON/L [id number]

## 2024-04-30 NOTE — PROGRESS NOTES
Patient visitor brought up wheel chair to take patient out of hospital. Patient stated she just wanted to go outside but he stated he was taking her home. Explained in great detail patient condition and that she is unable to leave unit due to continuous cardiac monitoring. Patient angry but states understanding. Electronically signed by Aster Beauchamp RN on 4/30/2024 at 12:33 PM

## 2024-04-30 NOTE — PROGRESS NOTES
[facility]  Internal Medicine Residency Program  Progress Note - House Team     Patient:  Saba Sullivan 59 y.o. female MRN: [id number]     Date of Service: 4/30/2024     CC: Shortness of breath  Overnight events: No acute events over    Subjective     Patient was seen and examined this morning during dialysis and at bedside.  Patient expressed wishes to leave AMA.  I discussed the current management and plan with the patient.  I discussed discharge plan and that we are currently awaiting financial clearance for HD center.  Patient reports she has been feeling anxious during her dialysis sessions.  Reports chronic pain, particularly lower back area.  Discussed pain management plan.    Objective     Physical Exam:  Vitals: /78   Pulse 90   Temp 98 °F (36.7 °C)   Resp 18   Ht 1.753 m (5' 9.02\")   Wt 72 kg (158 lb 11.7 oz)   LMP 08/28/2013   SpO2 99%   BMI 23.43 kg/m²     I & O - 24hr: I/O this shift:  In: 300   Out: 1300    General Appearance: alert, appears stated age, and cooperative  HEENT:  Head: Normal, normocephalic, atraumatic.  Neck: no JVD  Lung: clear to auscultation bilaterally  Heart: regular rate and rhythm, S1, S2 normal, no murmur, click, rub or gallop  Abdomen: soft, non-tender; bowel sounds normal; no masses,  no organomegaly  Extremities: Right thigh swelling noted  Musculokeletal: No joint swelling, no muscle tenderness  Neurologic: Mental status: Alert, oriented, thought content appropriate    Pertinent Labs & Imaging Studies     CBC:   Lab Results   Component Value Date/Time    WBC 19.7 04/30/2024 04:30 AM    RBC 2.60 04/30/2024 04:30 AM    HGB 7.3 04/30/2024 04:30 AM    HCT 23.3 04/30/2024 04:30 AM    MCV 89.6 04/30/2024 04:30 AM    MCH 28.1 04/30/2024 04:30 AM    MCHC 31.3 04/30/2024 04:30 AM    RDW 16.0 04/30/2024 04:30 AM     04/30/2024 04:30 AM    MPV 12.1 04/30/2024 04:30 AM     CMP:    Lab Results   Component Value Date/Time     04/30/2024

## 2024-05-01 ENCOUNTER — APPOINTMENT (OUTPATIENT)
Dept: ULTRASOUND IMAGING | Age: 59
DRG: 871 | End: 2024-05-01
Payer: OTHER GOVERNMENT

## 2024-05-01 PROBLEM — S70.11XA HEMATOMA OF RIGHT THIGH: Status: ACTIVE | Noted: 2024-05-01

## 2024-05-01 LAB
ALBUMIN SERPL-MCNC: 3.2 G/DL (ref 3.5–5.2)
ALP SERPL-CCNC: 57 U/L (ref 35–104)
ALT SERPL-CCNC: 7 U/L (ref 0–32)
ANION GAP SERPL CALCULATED.3IONS-SCNC: 12 MMOL/L (ref 7–16)
AST SERPL-CCNC: 18 U/L (ref 0–31)
BASOPHILS # BLD: 0.01 K/UL (ref 0–0.2)
BASOPHILS NFR BLD: 0 % (ref 0–2)
BILIRUB SERPL-MCNC: 0.5 MG/DL (ref 0–1.2)
BUN SERPL-MCNC: 39 MG/DL (ref 6–20)
CALCIUM SERPL-MCNC: 8.2 MG/DL (ref 8.6–10.2)
CHLORIDE SERPL-SCNC: 102 MMOL/L (ref 98–107)
CO2 SERPL-SCNC: 25 MMOL/L (ref 22–29)
CREAT SERPL-MCNC: 1.8 MG/DL (ref 0.5–1)
EOSINOPHIL # BLD: 0.13 K/UL (ref 0.05–0.5)
EOSINOPHILS RELATIVE PERCENT: 1 % (ref 0–6)
ERYTHROCYTE [DISTWIDTH] IN BLOOD BY AUTOMATED COUNT: 16.2 % (ref 11.5–15)
GFR, ESTIMATED: 33 ML/MIN/1.73M2
GLUCOSE BLD-MCNC: 133 MG/DL (ref 74–99)
GLUCOSE BLD-MCNC: 157 MG/DL (ref 74–99)
GLUCOSE SERPL-MCNC: 122 MG/DL (ref 74–99)
HCT VFR BLD AUTO: 21.7 % (ref 34–48)
HGB BLD-MCNC: 6.8 G/DL (ref 11.5–15.5)
IMM GRANULOCYTES # BLD AUTO: 0.16 K/UL (ref 0–0.58)
IMM GRANULOCYTES NFR BLD: 1 % (ref 0–5)
LYMPHOCYTES NFR BLD: 1.11 K/UL (ref 1.5–4)
LYMPHOCYTES RELATIVE PERCENT: 7 % (ref 20–42)
MAGNESIUM SERPL-MCNC: 1.7 MG/DL (ref 1.6–2.6)
MCH RBC QN AUTO: 28.5 PG (ref 26–35)
MCHC RBC AUTO-ENTMCNC: 31.3 G/DL (ref 32–34.5)
MCV RBC AUTO: 90.8 FL (ref 80–99.9)
MONOCYTES NFR BLD: 1.3 K/UL (ref 0.1–0.95)
MONOCYTES NFR BLD: 9 % (ref 2–12)
NEUTROPHILS NFR BLD: 82 % (ref 43–80)
NEUTS SEG NFR BLD: 12.65 K/UL (ref 1.8–7.3)
PHOSPHATE SERPL-MCNC: 2.4 MG/DL (ref 2.5–4.5)
PLATELET # BLD AUTO: 217 K/UL (ref 130–450)
PMV BLD AUTO: 12.2 FL (ref 7–12)
POTASSIUM SERPL-SCNC: 3.9 MMOL/L (ref 3.5–5)
PROT SERPL-MCNC: 5.7 G/DL (ref 6.4–8.3)
RBC # BLD AUTO: 2.39 M/UL (ref 3.5–5.5)
RBC # BLD: ABNORMAL 10*6/UL
SODIUM SERPL-SCNC: 139 MMOL/L (ref 132–146)
WBC OTHER # BLD: 15.4 K/UL (ref 4.5–11.5)

## 2024-05-01 PROCEDURE — 86923 COMPATIBILITY TEST ELECTRIC: CPT

## 2024-05-01 PROCEDURE — 94640 AIRWAY INHALATION TREATMENT: CPT

## 2024-05-01 PROCEDURE — C9113 INJ PANTOPRAZOLE SODIUM, VIA: HCPCS | Performed by: NURSE PRACTITIONER

## 2024-05-01 PROCEDURE — P9016 RBC LEUKOCYTES REDUCED: HCPCS

## 2024-05-01 PROCEDURE — 2060000000 HC ICU INTERMEDIATE R&B

## 2024-05-01 PROCEDURE — 85018 HEMOGLOBIN: CPT

## 2024-05-01 PROCEDURE — 82962 GLUCOSE BLOOD TEST: CPT

## 2024-05-01 PROCEDURE — 2580000003 HC RX 258: Performed by: NURSE PRACTITIONER

## 2024-05-01 PROCEDURE — 85025 COMPLETE CBC W/AUTO DIFF WBC: CPT

## 2024-05-01 PROCEDURE — 6370000000 HC RX 637 (ALT 250 FOR IP)

## 2024-05-01 PROCEDURE — 6370000000 HC RX 637 (ALT 250 FOR IP): Performed by: NURSE PRACTITIONER

## 2024-05-01 PROCEDURE — 80053 COMPREHEN METABOLIC PANEL: CPT

## 2024-05-01 PROCEDURE — 86900 BLOOD TYPING SEROLOGIC ABO: CPT

## 2024-05-01 PROCEDURE — 84100 ASSAY OF PHOSPHORUS: CPT

## 2024-05-01 PROCEDURE — A4216 STERILE WATER/SALINE, 10 ML: HCPCS | Performed by: NURSE PRACTITIONER

## 2024-05-01 PROCEDURE — 6360000002 HC RX W HCPCS

## 2024-05-01 PROCEDURE — 94669 MECHANICAL CHEST WALL OSCILL: CPT

## 2024-05-01 PROCEDURE — 97530 THERAPEUTIC ACTIVITIES: CPT

## 2024-05-01 PROCEDURE — 36430 TRANSFUSION BLD/BLD COMPNT: CPT

## 2024-05-01 PROCEDURE — 85014 HEMATOCRIT: CPT

## 2024-05-01 PROCEDURE — 6360000002 HC RX W HCPCS: Performed by: NURSE PRACTITIONER

## 2024-05-01 PROCEDURE — 83735 ASSAY OF MAGNESIUM: CPT

## 2024-05-01 PROCEDURE — 99232 SBSQ HOSP IP/OBS MODERATE 35: CPT | Performed by: INTERNAL MEDICINE

## 2024-05-01 PROCEDURE — 86901 BLOOD TYPING SEROLOGIC RH(D): CPT

## 2024-05-01 PROCEDURE — 76999 ECHO EXAMINATION PROCEDURE: CPT

## 2024-05-01 PROCEDURE — 30243N1 TRANSFUSION OF NONAUTOLOGOUS RED BLOOD CELLS INTO CENTRAL VEIN, PERCUTANEOUS APPROACH: ICD-10-PCS | Performed by: INTERNAL MEDICINE

## 2024-05-01 PROCEDURE — 97535 SELF CARE MNGMENT TRAINING: CPT

## 2024-05-01 PROCEDURE — 86850 RBC ANTIBODY SCREEN: CPT

## 2024-05-01 RX ORDER — SODIUM CHLORIDE 9 MG/ML
INJECTION, SOLUTION INTRAVENOUS PRN
Status: DISCONTINUED | OUTPATIENT
Start: 2024-05-01 | End: 2024-05-02

## 2024-05-01 RX ORDER — DULOXETIN HYDROCHLORIDE 30 MG/1
30 CAPSULE, DELAYED RELEASE ORAL DAILY
Status: DISCONTINUED | OUTPATIENT
Start: 2024-05-01 | End: 2024-05-04 | Stop reason: HOSPADM

## 2024-05-01 RX ORDER — SENNA AND DOCUSATE SODIUM 50; 8.6 MG/1; MG/1
2 TABLET, FILM COATED ORAL DAILY
Status: DISCONTINUED | OUTPATIENT
Start: 2024-05-01 | End: 2024-05-04 | Stop reason: HOSPADM

## 2024-05-01 RX ORDER — DIPHENHYDRAMINE HYDROCHLORIDE 50 MG/ML
50 INJECTION INTRAMUSCULAR; INTRAVENOUS ONCE
Status: COMPLETED | OUTPATIENT
Start: 2024-05-01 | End: 2024-05-01

## 2024-05-01 RX ORDER — DIPHENHYDRAMINE HYDROCHLORIDE 50 MG/ML
INJECTION INTRAMUSCULAR; INTRAVENOUS
Status: COMPLETED
Start: 2024-05-01 | End: 2024-05-01

## 2024-05-01 RX ADMIN — Medication 300 UNITS: at 09:09

## 2024-05-01 RX ADMIN — ARFORMOTEROL TARTRATE 15 MCG: 15 SOLUTION RESPIRATORY (INHALATION) at 07:32

## 2024-05-01 RX ADMIN — LORAZEPAM 1 MG: 1 TABLET ORAL at 22:46

## 2024-05-01 RX ADMIN — SODIUM CHLORIDE, PRESERVATIVE FREE 10 ML: 5 INJECTION INTRAVENOUS at 21:01

## 2024-05-01 RX ADMIN — SODIUM CHLORIDE, PRESERVATIVE FREE 40 MG: 5 INJECTION INTRAVENOUS at 09:10

## 2024-05-01 RX ADMIN — DULOXETINE HYDROCHLORIDE 30 MG: 30 CAPSULE, DELAYED RELEASE ORAL at 15:53

## 2024-05-01 RX ADMIN — LORAZEPAM 1 MG: 1 TABLET ORAL at 04:20

## 2024-05-01 RX ADMIN — AMLODIPINE BESYLATE 10 MG: 10 TABLET ORAL at 11:30

## 2024-05-01 RX ADMIN — DIPHENHYDRAMINE HYDROCHLORIDE 50 MG: 50 INJECTION, SOLUTION INTRAMUSCULAR; INTRAVENOUS at 20:41

## 2024-05-01 RX ADMIN — LORAZEPAM 1 MG: 1 TABLET ORAL at 12:43

## 2024-05-01 RX ADMIN — ARFORMOTEROL TARTRATE 15 MCG: 15 SOLUTION RESPIRATORY (INHALATION) at 19:26

## 2024-05-01 RX ADMIN — DIPHENHYDRAMINE HYDROCHLORIDE 50 MG: 50 INJECTION INTRAMUSCULAR; INTRAVENOUS at 20:41

## 2024-05-01 RX ADMIN — ACETAMINOPHEN 650 MG: 325 TABLET ORAL at 22:46

## 2024-05-01 RX ADMIN — BUDESONIDE INHALATION 500 MCG: 0.5 SUSPENSION RESPIRATORY (INHALATION) at 19:26

## 2024-05-01 RX ADMIN — BUDESONIDE INHALATION 500 MCG: 0.5 SUSPENSION RESPIRATORY (INHALATION) at 07:32

## 2024-05-01 ASSESSMENT — PAIN SCALES - GENERAL: PAINLEVEL_OUTOF10: 0

## 2024-05-01 NOTE — PROGRESS NOTES
healing, skin integrity, to prevent breakdown, decrease edema, reduce risk of contracture, and encourage functional positioning for interaction with environment.  Skilled Monitoring of Vitals: to include BP, spO2, and HR throughout session to maximize safety.  Sitting/Standing Balance/Tolerance: Pt sat EOB for ~10 min and stood for full duration of ~1-2 min to increase balance and activity tolerance during ADLs and facilitate proper posture and positioning.  Delirium Prevention: Environmental and sensory modifications assessed and implemented to decrease ICU acquired delirium and to improve overall orientation, mentation and pt interaction with family/staff.    Line management and environmental modifications made prior to and end of session to ensure patient safety and to increase efficiency of session.  Skilled monitoring of HR, O2 saturation, blood pressure and patient's response to activity performed throughout session.    Comments: Received permission from RN to see patient. Session completed with PT collaboration d/t decreased functional status of patient and extensive line management for safety purposes. Upon arrival, patient found in semi supine in bed. She agreeable to participate in therapy session, thoughout.  Therapist facilitated ADL tasks & bed mobility to address safety awareness, implementation of fall prevention strategies, & functional engagement throughout daily activities.  Pt demo fair tolerance with fair(+) understanding of education/techniques - pt demonstrated adaptive techniques while seated in chair.   Pt limited by endurance requiring rest breaks to recover. At end of session, patient properly positioned in semi-supine in bed with call light within reach, all lines and tubes intact. Pt instructed on use of call light for assistance and fall prevention. Nursing notified of patient positioning.       Time In: 1544             Time Out: 1622           Total Treatment time: 38 min   Min Units    OT Eval Low 54229     OT Eval Medium 17760     OT Eval High 26645     OT Re-Eval 48596     Therapeutic Ex 81993     Therapeutic Activities 29498 15 1   ADL/Self Care 06666 23 2   Orthotic Management 15027     Neuro Re-Ed 33408     Non-Billable Time           Lori Padgett, MESERET, OTR/L  # [id number]

## 2024-05-01 NOTE — PROGRESS NOTES
Physical Therapy    Physical Therapy Treatment    Name: Saba Sullivan  : 1965  MRN: [id number]      Date of Service: 2024    Evaluating PT:  Dante Aguirre, PT, DPT  [id number]     Room #:  4501/4501-B  Diagnosis:  Elevated LFTs [R79.89]  NSTEMI (non-ST elevated myocardial infarction) (HCC) [I21.4]  DOMINGO (acute kidney injury) (HCC) [N17.9]  Acute respiratory failure with hypoxia and hypercapnia (HCC) [J96.01, J96.02]  PMHx/PSHx:   has a past medical history of Abscess, Chronic pain, Chronic recurrent multifocal osteomyelitis of foot (HCC), Hepatitis C, Hip fracture (HCC), Hx of blood clots, Polysubstance abuse (HCC), Pressure injury of heel, stage 3 (HCC), and Subclinical hypothyroidism.   Procedure/Surgery:  none   Precautions:  Falls, O2, R heel wound, NWB R foot with CAM boot, Cognition, Tremors   Equipment Needs:  TBD    SUBJECTIVE:    Pt lives with significant other in a 1 story home with ramp to enter.  Bedroom and bathroom are on the main level.  Pt ambulated with WC PTA.    OBJECTIVE:   Initial Evaluation  Date: 24  Treatment  Date: 24 Short Term/ Long Term   Goals   AM-PAC 6 Clicks     Was pt agreeable to Eval/treatment? Yes  Yes     Does pt have pain? No reported pain  No c/o pain    Bed Mobility  Rolling: NT  Supine to sit: Mod A x2  Sit to supine: Mod A x2  Scooting: Mod A Rolling: Blaine  Supine to sit: ModA  Sit to supine: ModA  Scooting: ModA Rolling: SBA  Supine to sit: SBA  Sit to supine: SBA  Scooting: SBA   Transfers Sit to stand: Max A  Stand to sit: Max A  Stand pivot: NT Sit to stand: NT   Stand to sit: NT  Stand pivot: NT  Sit to stand: SBA  Stand to sit: SBA  Stand pivot: SBA with AAD   Ambulation    Few side steps with FWW Max A NT  >25 feet with AAD Min A   Stair negotiation: ascended and descended  NT NT NA   ROM BUE:  Per OT eval   BLE:  WFL     Strength BUE:  Per OT eval   RLE:  Grossly 4/5  LLE:  Grossly 4+/5      Balance Sitting EOB:  SBA static, Min A dynamic  vitals.  Education - Provided for WB status, safety with lines, role of PT in acute setting, benefits of upright mobility.    PLAN:    Patient is making Good progress towards established goals.  Will continue with current POC.      Time in  1535  Time out  1613    Total Treatment Time  38 minutes     CPT codes:  [] Gait training 97175 0 minutes  [] Manual therapy 72557 0 minutes  [x] Therapeutic activities 88857 38 minutes  [] Therapeutic exercises 74676 0 minutes  [] Neuromuscular reeducation 77635 0 minutes    Angeline Epp, PT, DPT  [id number]

## 2024-05-01 NOTE — PROGRESS NOTES
Nephrology Progress Note  [facility]      CC:   DOMINGO    HPI:       4/18: the pt is a 61 yo female with a pmh of copd, DVT, polysubstance abuse, recurrent OM of R foot who presented with sob and ms changes. Sat was 85% on RA in ER. She was put on bipap. Cta showed no PE. She was given nebs, steroids and started on doxycycline and merrem. Bilateral LE US showed no DVT. Labs showed na 131 k 4.6 co2 27 bun 36 cr 2.2 > 3.1, lactate 1.5, ca 7.8, p 5.8, nh4 25ck 21k>16K, alb 2.7, mg 1.9, tsh 1.04, uds pos amphetamine, benzos, cocaine, fentanyl, opiates. Wbc 20.9, hgb 13.5, plt 313. Ua 100 glucose, mod bili, sg >1.03, 100 protein, pos nitrite, 10-20 rbc, lg hgb. Vitals showed rr 17 hr 112 bp 148/67, temp 98.1.  cr from 7/2021 was 0.7. she was started on ns at 51. Pt is not giving a history, mumbles when asked a question, eyes closed.       Patient Active Problem List   Diagnosis    Abscess    Nonhealing ulcer of heel (HCC)    Bradycardia    Substance abuse (HCC)    Current moderate episode of major depressive disorder (HCC)    Abnormal weight loss    COPD (chronic obstructive pulmonary disease) (HCC)    History of fracture of left hip    Ambulatory dysfunction    Hypotension    Subclinical hypothyroidism    Chronic recurrent multifocal osteomyelitis of foot (HCC)    Pressure injury of heel, stage 3 (HCC)    Open wound of fifth toe of left foot    COPD exacerbation (HCC)    Acute respiratory failure with hypoxia (HCC)    Mild protein-calorie malnutrition (HCC)    Osteomyelitis (HCC)    High risk medication use    Acute respiratory failure with hypoxia and hypercapnia (HCC)    NSTEMI (non-ST elevated myocardial infarction) ([facility])         Subjective    5/1: Hemoglobin low this a.m. 1 unit PRBC ordered; she complains of fatigue          Meds:     sennosides-docusate sodium  2 tablet Oral Daily    lidocaine  1 patch TransDERmal Daily    amLODIPine  10 mg Oral Daily    lidocaine  1 patch TransDERmal Daily    lidocaine  1

## 2024-05-01 NOTE — CARE COORDINATION
CM Update: Met with pt again at bedside per mary request to discuss MARTA. She is more calm today but continues to decline MARTA. She is adamant she is going home with her friend Nasir. Educated pt that this is not the safest plan. We are still awaiting HD set up. Spoke to mgr Amada with [facility]. They are awaiting financial clearance and can not arrange chair time until this is done. CM will continue to follow(TF)         LESLEY Obrien,RN  Case Management  [phone]

## 2024-05-01 NOTE — PROGRESS NOTES
Nephrology Progress Note  [facility]      CC:   DOMINGO    HPI:       4/18: the pt is a 61 yo female with a pmh of copd, DVT, polysubstance abuse, recurrent OM of R foot who presented with sob and ms changes. Sat was 85% on RA in ER. She was put on bipap. Cta showed no PE. She was given nebs, steroids and started on doxycycline and merrem. Bilateral LE US showed no DVT. Labs showed na 131 k 4.6 co2 27 bun 36 cr 2.2 > 3.1, lactate 1.5, ca 7.8, p 5.8, nh4 25ck 21k>16K, alb 2.7, mg 1.9, tsh 1.04, uds pos amphetamine, benzos, cocaine, fentanyl, opiates. Wbc 20.9, hgb 13.5, plt 313. Ua 100 glucose, mod bili, sg >1.03, 100 protein, pos nitrite, 10-20 rbc, lg hgb. Vitals showed rr 17 hr 112 bp 148/67, temp 98.1.  cr from 7/2021 was 0.7. she was started on ns at 51. Pt is not giving a history, mumbles when asked a question, eyes closed.       Patient Active Problem List   Diagnosis    Abscess    Nonhealing ulcer of heel (HCC)    Bradycardia    Substance abuse (HCC)    Current moderate episode of major depressive disorder (HCC)    Abnormal weight loss    COPD (chronic obstructive pulmonary disease) (HCC)    History of fracture of left hip    Ambulatory dysfunction    Hypotension    Subclinical hypothyroidism    Chronic recurrent multifocal osteomyelitis of foot (HCC)    Pressure injury of heel, stage 3 (HCC)    Open wound of fifth toe of left foot    COPD exacerbation (HCC)    Acute respiratory failure with hypoxia (HCC)    Mild protein-calorie malnutrition (HCC)    Osteomyelitis (HCC)    High risk medication use    Acute respiratory failure with hypoxia and hypercapnia (HCC)    NSTEMI (non-ST elevated myocardial infarction) ([facility])         Subjective    4/19: pt seen and examined in icu. On ns at 125 ml/hr. Pt eyes open but not answering questions    4/20:Reported to be refusing BIPAP; on HFNC, acceptable oxygen saturation    4/21 received call from NP overnight; reported patient had episode of SOB and hypoxia; UO improved  with bumex drip; patient denies SOB at present    4/22: No new acute issues from overnight; denies SOB    4/23: No new complaints today; denies any problems from overnight    4/24: No acute events from overnight continues to be somewhat anxious    4/25: Not agreeing to NIV at night; she denies shortness of breath at present    4/26: No new acute issues from overnight; she denies shortness of breath; hemodialysis initiated yesterday 4/25 4/27: pt seen in hd. Asking to come off early, no cp or sob, says she is making urine, nothing recorded    4/28: pt seen in room, chart reviewed, no complaints today, no cp or sob, in chair    4/29: RT reports patient had episode of respiratory distress with hypoxia earlier improved with breathing treatment; currently she appears comfortable and in no respiratory distress    4/30: Patient did not complete her entire 3 and half hour prescribed dialysis treatment; she insisted on coming off 2 hours into the treatment  Denies shortness of breath.  She is not having any chest pain.          Meds:     lidocaine  1 patch TransDERmal Daily    amLODIPine  10 mg Oral Daily    lidocaine  1 patch TransDERmal Daily    lidocaine  1 patch TransDERmal Daily    vitamin D  50,000 Units Oral Weekly    insulin lispro  0-4 Units SubCUTAneous Q4H    lidocaine PF  5 mL IntraDERmal Once    heparin flush  3 mL IntraVENous 2 times per day    sodium chloride flush  5-40 mL IntraVENous 2 times per day    [Held by provider] heparin (porcine)  5,000 Units SubCUTAneous 3 times per day    arformoterol tartrate  15 mcg Nebulization BID RT    budesonide  0.5 mg Nebulization BID RT    pantoprazole (PROTONIX) 40 mg in sodium chloride (PF) 0.9 % 10 mL injection  40 mg IntraVENous Daily        dextrose      sodium chloride         Meds prn:     LORazepam, melatonin, ipratropium 0.5 mg-albuterol 2.5 mg, glucose, dextrose bolus **OR** dextrose bolus, glucagon (rDNA), dextrose, acetaminophen, heparin flush, sodium

## 2024-05-02 LAB
ABO/RH: NORMAL
ALBUMIN SERPL-MCNC: 3.5 G/DL (ref 3.5–5.2)
ALP SERPL-CCNC: 60 U/L (ref 35–104)
ALT SERPL-CCNC: 8 U/L (ref 0–32)
ANION GAP SERPL CALCULATED.3IONS-SCNC: 14 MMOL/L (ref 7–16)
ANTIBODY SCREEN: NEGATIVE
ARM BAND NUMBER: NORMAL
AST SERPL-CCNC: 18 U/L (ref 0–31)
BASOPHILS # BLD: 0.01 K/UL (ref 0–0.2)
BASOPHILS NFR BLD: 0 % (ref 0–2)
BILIRUB SERPL-MCNC: 0.6 MG/DL (ref 0–1.2)
BLOOD BANK BLOOD PRODUCT EXPIRATION DATE: NORMAL
BLOOD BANK DISPENSE STATUS: NORMAL
BLOOD BANK ISBT PRODUCT BLOOD TYPE: 6200
BLOOD BANK PRODUCT CODE: NORMAL
BLOOD BANK SAMPLE EXPIRATION: NORMAL
BLOOD BANK UNIT TYPE AND RH: NORMAL
BPU ID: NORMAL
BUN SERPL-MCNC: 42 MG/DL (ref 6–20)
CALCIUM SERPL-MCNC: 8.7 MG/DL (ref 8.6–10.2)
CHLORIDE SERPL-SCNC: 104 MMOL/L (ref 98–107)
CO2 SERPL-SCNC: 23 MMOL/L (ref 22–29)
COMPONENT: NORMAL
CREAT SERPL-MCNC: 1.7 MG/DL (ref 0.5–1)
CROSSMATCH RESULT: NORMAL
EOSINOPHIL # BLD: 0.14 K/UL (ref 0.05–0.5)
EOSINOPHILS RELATIVE PERCENT: 1 % (ref 0–6)
ERYTHROCYTE [DISTWIDTH] IN BLOOD BY AUTOMATED COUNT: 16.6 % (ref 11.5–15)
GFR, ESTIMATED: 34 ML/MIN/1.73M2
GLUCOSE BLD-MCNC: 132 MG/DL (ref 74–99)
GLUCOSE BLD-MCNC: 153 MG/DL (ref 74–99)
GLUCOSE BLD-MCNC: 158 MG/DL (ref 74–99)
GLUCOSE BLD-MCNC: 163 MG/DL (ref 74–99)
GLUCOSE SERPL-MCNC: 110 MG/DL (ref 74–99)
HCT VFR BLD AUTO: 22.1 % (ref 34–48)
HCT VFR BLD AUTO: 22.5 % (ref 34–48)
HGB BLD-MCNC: 7.1 G/DL (ref 11.5–15.5)
HGB BLD-MCNC: 7.3 G/DL (ref 11.5–15.5)
IMM GRANULOCYTES # BLD AUTO: 0.16 K/UL (ref 0–0.58)
IMM GRANULOCYTES NFR BLD: 1 % (ref 0–5)
LYMPHOCYTES NFR BLD: 0.79 K/UL (ref 1.5–4)
LYMPHOCYTES RELATIVE PERCENT: 6 % (ref 20–42)
MAGNESIUM SERPL-MCNC: 1.7 MG/DL (ref 1.6–2.6)
MCH RBC QN AUTO: 29 PG (ref 26–35)
MCHC RBC AUTO-ENTMCNC: 32.4 G/DL (ref 32–34.5)
MCV RBC AUTO: 89.3 FL (ref 80–99.9)
MICROORGANISM SPEC CULT: NORMAL
MICROORGANISM SPEC CULT: NORMAL
MONOCYTES NFR BLD: 1.1 K/UL (ref 0.1–0.95)
MONOCYTES NFR BLD: 8 % (ref 2–12)
NEUTROPHILS NFR BLD: 84 % (ref 43–80)
NEUTS SEG NFR BLD: 11.53 K/UL (ref 1.8–7.3)
PHOSPHATE SERPL-MCNC: 2.7 MG/DL (ref 2.5–4.5)
PLATELET # BLD AUTO: 213 K/UL (ref 130–450)
PMV BLD AUTO: 11.6 FL (ref 7–12)
POTASSIUM SERPL-SCNC: 3.7 MMOL/L (ref 3.5–5)
PROT SERPL-MCNC: 5.8 G/DL (ref 6.4–8.3)
RBC # BLD AUTO: 2.52 M/UL (ref 3.5–5.5)
SERVICE CMNT-IMP: NORMAL
SERVICE CMNT-IMP: NORMAL
SODIUM SERPL-SCNC: 141 MMOL/L (ref 132–146)
SPECIMEN DESCRIPTION: NORMAL
SPECIMEN DESCRIPTION: NORMAL
TRANSFUSION STATUS: NORMAL
UNIT DIVISION: 0
UNIT ISSUE DATE/TIME: NORMAL
WBC OTHER # BLD: 13.7 K/UL (ref 4.5–11.5)

## 2024-05-02 PROCEDURE — 6370000000 HC RX 637 (ALT 250 FOR IP): Performed by: NURSE PRACTITIONER

## 2024-05-02 PROCEDURE — 2580000003 HC RX 258: Performed by: NURSE PRACTITIONER

## 2024-05-02 PROCEDURE — 6370000000 HC RX 637 (ALT 250 FOR IP)

## 2024-05-02 PROCEDURE — 82962 GLUCOSE BLOOD TEST: CPT

## 2024-05-02 PROCEDURE — 6360000002 HC RX W HCPCS: Performed by: NURSE PRACTITIONER

## 2024-05-02 PROCEDURE — 83735 ASSAY OF MAGNESIUM: CPT

## 2024-05-02 PROCEDURE — 80053 COMPREHEN METABOLIC PANEL: CPT

## 2024-05-02 PROCEDURE — 2060000000 HC ICU INTERMEDIATE R&B

## 2024-05-02 PROCEDURE — C9113 INJ PANTOPRAZOLE SODIUM, VIA: HCPCS | Performed by: NURSE PRACTITIONER

## 2024-05-02 PROCEDURE — 90935 HEMODIALYSIS ONE EVALUATION: CPT

## 2024-05-02 PROCEDURE — 94640 AIRWAY INHALATION TREATMENT: CPT

## 2024-05-02 PROCEDURE — 84100 ASSAY OF PHOSPHORUS: CPT

## 2024-05-02 PROCEDURE — 6370000000 HC RX 637 (ALT 250 FOR IP): Performed by: STUDENT IN AN ORGANIZED HEALTH CARE EDUCATION/TRAINING PROGRAM

## 2024-05-02 PROCEDURE — 85025 COMPLETE CBC W/AUTO DIFF WBC: CPT

## 2024-05-02 PROCEDURE — 99232 SBSQ HOSP IP/OBS MODERATE 35: CPT | Performed by: INTERNAL MEDICINE

## 2024-05-02 RX ORDER — GABAPENTIN 100 MG/1
100 CAPSULE ORAL 3 TIMES DAILY
Status: DISCONTINUED | OUTPATIENT
Start: 2024-05-02 | End: 2024-05-02

## 2024-05-02 RX ADMIN — BUDESONIDE INHALATION 500 MCG: 0.5 SUSPENSION RESPIRATORY (INHALATION) at 19:53

## 2024-05-02 RX ADMIN — SENNOSIDES AND DOCUSATE SODIUM 2 TABLET: 50; 8.6 TABLET ORAL at 10:49

## 2024-05-02 RX ADMIN — SODIUM CHLORIDE, PRESERVATIVE FREE 40 MG: 5 INJECTION INTRAVENOUS at 10:50

## 2024-05-02 RX ADMIN — DULOXETINE HYDROCHLORIDE 30 MG: 30 CAPSULE, DELAYED RELEASE ORAL at 10:43

## 2024-05-02 RX ADMIN — LORAZEPAM 1 MG: 1 TABLET ORAL at 20:29

## 2024-05-02 RX ADMIN — ARFORMOTEROL TARTRATE 15 MCG: 15 SOLUTION RESPIRATORY (INHALATION) at 19:52

## 2024-05-02 RX ADMIN — LORAZEPAM 1 MG: 1 TABLET ORAL at 10:43

## 2024-05-02 RX ADMIN — ACETAMINOPHEN 650 MG: 325 TABLET ORAL at 16:28

## 2024-05-02 RX ADMIN — Medication 300 UNITS: at 12:11

## 2024-05-02 RX ADMIN — SODIUM CHLORIDE, PRESERVATIVE FREE 10 ML: 5 INJECTION INTRAVENOUS at 12:11

## 2024-05-02 RX ADMIN — ACETAMINOPHEN 650 MG: 325 TABLET ORAL at 10:43

## 2024-05-02 RX ADMIN — GABAPENTIN 100 MG: 100 CAPSULE ORAL at 00:36

## 2024-05-02 RX ADMIN — Medication 5 MG: at 21:52

## 2024-05-02 RX ADMIN — ACETAMINOPHEN 650 MG: 325 TABLET ORAL at 23:56

## 2024-05-02 RX ADMIN — Medication 300 UNITS: at 20:29

## 2024-05-02 RX ADMIN — AMLODIPINE BESYLATE 10 MG: 10 TABLET ORAL at 10:50

## 2024-05-02 ASSESSMENT — PAIN DESCRIPTION - DESCRIPTORS
DESCRIPTORS: ACHING;DISCOMFORT

## 2024-05-02 ASSESSMENT — PAIN DESCRIPTION - PAIN TYPE
TYPE: ACUTE PAIN

## 2024-05-02 ASSESSMENT — PAIN DESCRIPTION - ORIENTATION
ORIENTATION: LOWER

## 2024-05-02 ASSESSMENT — PAIN DESCRIPTION - ONSET
ONSET: ON-GOING
ONSET: ON-GOING

## 2024-05-02 ASSESSMENT — PAIN DESCRIPTION - LOCATION
LOCATION: BACK

## 2024-05-02 ASSESSMENT — PAIN SCALES - GENERAL
PAINLEVEL_OUTOF10: 10
PAINLEVEL_OUTOF10: 9

## 2024-05-02 ASSESSMENT — PAIN - FUNCTIONAL ASSESSMENT
PAIN_FUNCTIONAL_ASSESSMENT: ACTIVITIES ARE NOT PREVENTED
PAIN_FUNCTIONAL_ASSESSMENT: ACTIVITIES ARE NOT PREVENTED

## 2024-05-02 ASSESSMENT — PAIN DESCRIPTION - FREQUENCY
FREQUENCY: CONTINUOUS
FREQUENCY: CONTINUOUS

## 2024-05-02 NOTE — FLOWSHEET NOTE
05/02/24 1015   Vital Signs   BP (!) 141/65   Temp 97.5 °F (36.4 °C)   Pulse 87   Respirations 18   Weight - Scale 70.2 kg (154 lb 12.2 oz)   Weight Method Bed scale   Percent Weight Change -2.5   Pain Assessment   Pain Assessment 0-10   Pain Level 10   Pain Type Acute pain   Pain Location Back   Post-Hemodialysis Assessment   Post-Treatment Procedures Blood returned;Catheter capped, clamped and heparinized x 2 ports   Machine Disinfection Process Exterior Machine Disinfection   Rinseback Volume (ml) 300 ml   Blood Volume Processed (Liters) 60.2 L   Dialyzer Clearance Moderately streaked   Duration of Treatment (minutes) 210 minutes   Heparin Amount Administered During Treatment (mL) 0 mL   Hemodialysis Intake (ml) 300 ml   Hemodialysis Output (ml) 1300 ml   NET Removed (ml) 1000   Tolerated Treatment Fair   Patient Response to Treatment tolerated minimal fluid removal. Pt has c/o severe back pain with repositioning ineffective   Bilateral Breath Sounds Diminished   RLE Edema +2   Physician Notified No   Time Off 1012   Patient Disposition Return to room   Observations & Evaluations   Level of Consciousness 0   Oriented X 3

## 2024-05-02 NOTE — PLAN OF CARE
Problem: Safety - Adult  Goal: Free from fall injury  Outcome: Progressing     Problem: Skin/Tissue Integrity  Goal: Absence of new skin breakdown  Description: 1.  Monitor for areas of redness and/or skin breakdown  2.  Assess vascular access sites hourly  3.  Every 4-6 hours minimum:  Change oxygen saturation probe site  4.  Every 4-6 hours:  If on nasal continuous positive airway pressure, respiratory therapy assess nares and determine need for appliance change or resting period.  Outcome: Progressing     Problem: Respiratory - Adult  Goal: Achieves optimal ventilation and oxygenation  Outcome: Progressing     Problem: Cardiovascular - Adult  Goal: Absence of cardiac dysrhythmias or at baseline  Outcome: Progressing

## 2024-05-02 NOTE — PLAN OF CARE

## 2024-05-02 NOTE — PROGRESS NOTES
[facility]  Internal Medicine Residency / House Medicine Service    Attending Physician Statement  I have discussed the case, including pertinent history and exam findings with the resident and the team.  I have seen and examined the patient and the key elements of the encounter have been performed by me.  I agree with the assessment, plan and orders as documented by the resident.      Case Discussed During AM Rounds   Noted overnight concerns    Patient requesting narcotics- ongoing education regarding pain management recommendations    No recent charting of urine output    States breathing stable    However continues to complain of lower back and hip pain   She notes she has been using a wheelchair at home for \"years\"    Discussed directly with patient significant concern with low AMPAC scoring and assistance needs at home- she continues to note she does not wish for MARTA assessment and prefers DC to home   HD outpatient set up pending    ON exam- worsening ecchymosis- likely evolution of dx right thigh hematoma appreciated- expected with expansion/healing- no noted worsening pain- no clear signs of expansion on exam     Anemia with thigh hematoma   This was strongly suspected when assessed on the weekend- U/S at that time appeared to be targeted at wrong area- however- our team contacted U/S for questioning and need for repeating- they assured the team it was completed on the correct area in question   Due to further drop in HgB- repeat US was performed confirming previously anticipated diagnosis   Large hematoma noted on U/S   Monitor H/H and symptoms closely   No comment on concern for pseudoaneurysm- low threshold for Vascular consultation as discussed     DOMINGO- no U/O charted   Likely without meaningful renal recovery   Nephrology following    HD continued    Awaiting continued coordination for Outpatient HD needs     Acute Hypoxic Respiratory Failure    Oxygen wean protocol continued

## 2024-05-02 NOTE — CARE COORDINATION
CM Update: Plan at discharge is Home, pt adamant no MARTA after numerous conversations with CM and residents. Spoke to Amada with [facility], financial clearance completed. Chair time confirmed for M-W-F 7:05 am. Will update AVS. Spoke to Nasir, her friend, in the wise while pt was at HD and he tells me they are moving in together and he will assist with care. Hgb 7.3 today, will get blood as ordered for < 7.5. US Soft tissue showed 11.9 cm hematoma. CM will continue to follow(TF)      Juliana Garner, ALEJANDRON,RN  Case Management  [phone]

## 2024-05-02 NOTE — PROGRESS NOTES
04/27/2024 showed Significant improvement seen in the region of the right lung base now clear in appearance.  Minimal residual markings at the left lung base with trace left pleural effusion.  Deep line catheter placed on the right tip in the SVC with no pneumothorax.  CTA pulmonary and atelectasis, mucous plugging, RUL groundglass opacities.  No PE  CT chest wo contrast showed, Interval development of small bilateral pleural effusions, persistent consolidation in the right lower lobe with volume loss, 2 cm round/oval air-containing area in the consolidated right lower lobe may represent a pneumatocele or bulla, Hyperinflation of the upper lungs with some emphysematous change   Respiratory panel negative, strep pneumonia antigen negative, Legionella negative  Blood culture: Negative x 2 days  Completed doxycycline antibiotic 5 days  Pre-renal DOMIGNO stage III ( baseline creatinine 0.7 in 2021 ) in setting of rhabdomyolysis, cocaine use  Cr today 1.7, eGFR 34  , trended down  Elevated troponin 2/2 type II NSTEMI  Troponin on 04/19 was 186  Right upper thigh large hematoma, US soft tissue showed 11.9 cm ellipitical fluid collection, consistent with hematoma  COPD Gold stage IV-PFT in 2015  Prolonged QTc ( 521), recent EKG showed Qtc 412  Transaminitis 2/2 polysubstance abuse, resolved  Last HFP on 04/26 showed ALT 8, AST 18  Hepatitis C antibody reactive  Moderate pulmonary hypertension 2/2 pulmonary hypertension  Echo on 4/18 showed RVSP 54-59  Hypertension, on Amlodipine 10 mg daily  Hyperphosphatemia, Phosphate 2.7 today, resolved  Cholelithiasis-seen on CTA pulm  Left intrarenal calculus  Normocytic anemia 2/2 anemia of chronic disease  Hemoglobin 7.3, s/p transfusion  Leukocytosis likely reactive versus infection  WBC today 13.7, trended down  Depression, on Cymbalta, started this admission  Lower extremity swelling  LE US-no DVT  Elevated CK, rhabdomyolysis 2/2 polysubstance abuse  , trending  Supplement  Pain management: as needed  Code status: Full Code  Disposition: Continue Current Care  Family: updated as available    Annabel Bettencourt MD, PGY-1  Attending physician: JESICA Springer

## 2024-05-02 NOTE — PROGRESS NOTES
Nephrology Progress Note  [facility]      CC:   DOMINGO    HPI:       4/18: the pt is a 59 yo female with a pmh of copd, DVT, polysubstance abuse, recurrent OM of R foot who presented with sob and ms changes. Sat was 85% on RA in ER. She was put on bipap. Cta showed no PE. She was given nebs, steroids and started on doxycycline and merrem. Bilateral LE US showed no DVT. Labs showed na 131 k 4.6 co2 27 bun 36 cr 2.2 > 3.1, lactate 1.5, ca 7.8, p 5.8, nh4 25ck 21k>16K, alb 2.7, mg 1.9, tsh 1.04, uds pos amphetamine, benzos, cocaine, fentanyl, opiates. Wbc 20.9, hgb 13.5, plt 313. Ua 100 glucose, mod bili, sg >1.03, 100 protein, pos nitrite, 10-20 rbc, lg hgb. Vitals showed rr 17 hr 112 bp 148/67, temp 98.1.  cr from 7/2021 was 0.7. she was started on ns at 51. Pt is not giving a history, mumbles when asked a question, eyes closed.       Patient Active Problem List   Diagnosis    Abscess    Nonhealing ulcer of heel (HCC)    Bradycardia    Substance abuse (HCC)    Current moderate episode of major depressive disorder (HCC)    Abnormal weight loss    COPD (chronic obstructive pulmonary disease) (HCC)    History of fracture of left hip    Ambulatory dysfunction    Hypotension    Subclinical hypothyroidism    Chronic recurrent multifocal osteomyelitis of foot (HCC)    Pressure injury of heel, stage 3 (HCC)    Open wound of fifth toe of left foot    COPD exacerbation (HCC)    Acute respiratory failure with hypoxia (HCC)    Mild protein-calorie malnutrition (HCC)    Osteomyelitis (HCC)    High risk medication use    Acute respiratory failure with hypoxia and hypercapnia (HCC)    NSTEMI (non-ST elevated myocardial infarction) ([facility])         Subjective    5/1: Hemoglobin low this a.m. 1 unit PRBC ordered; she complains of fatigue    5/2 seen during hemodialysis: She reports feeling anxious tolerating procedure          Meds:     sennosides-docusate sodium  2 tablet Oral Daily    DULoxetine  30 mg Oral Daily    lidocaine  1  03:37 AM    UROBILINOGEN 1.0 04/18/2024 03:37 AM    BILIRUBINUR MODERATE 04/18/2024 03:37 AM       Lab Results   Component Value Date/Time    PROTEIN 5.8 (L) 05/02/2024 06:00 AM    OSMOU 355 04/18/2024 03:37 AM       No components found for: \"URIC\"    No results found for: \"LIPIDPAN\"      Assessment and Plan:    DOMINGO STAGE 3  Baseline cr 0.7 from 2021  In setting of rhabdo and cocaine use and possible hypovolemia/IV contrast with CTA chest  on 4/16  Hemodialysis initiated 4/25 mainly for poor solute clearance  Treatment #3  4/27  Sp RIJ  TDC placement 4/26  Continue dialysis support  Next treatment 5/2      2. Acute Resp failure  CTA neg for PE  S/p empiric abx  Drug OD    3. Polysubstance abuse  Benzos, cocaine amphetamine  Mental status improved    4. NSTEMI  Precipitated by substance abuse  Stable cardiac rhythm  No reports of angina    5.  Anemia  In part due to acute blood loss  Repeat ultrasound right thigh demonstrate large hematoma  Eliquis on hold  Continue to monitor H&H  PRBC transfusion for hemoglobin less than 7    Procedure: Hemodialysis      Okay to discharge once outpatient hemodialysis arrangements in place    Guy Briseno MD

## 2024-05-02 NOTE — PROGRESS NOTES
Patient irate and demanding narcotic pain medication for her lower back. Stated she is going to leave. Pt education regarding medication administration and plan of care reinforced. Pt not receptive at this time. Patient continues to refuse compliance with plan of care.

## 2024-05-03 ENCOUNTER — APPOINTMENT (OUTPATIENT)
Dept: ULTRASOUND IMAGING | Age: 59
DRG: 871 | End: 2024-05-03
Payer: OTHER GOVERNMENT

## 2024-05-03 LAB
ALBUMIN SERPL-MCNC: 3.5 G/DL (ref 3.5–5.2)
ALP SERPL-CCNC: 70 U/L (ref 35–104)
ALT SERPL-CCNC: 8 U/L (ref 0–32)
ANION GAP SERPL CALCULATED.3IONS-SCNC: 15 MMOL/L (ref 7–16)
AST SERPL-CCNC: 19 U/L (ref 0–31)
BASOPHILS # BLD: 0.01 K/UL (ref 0–0.2)
BASOPHILS NFR BLD: 0 % (ref 0–2)
BILIRUB SERPL-MCNC: 0.6 MG/DL (ref 0–1.2)
BUN SERPL-MCNC: 23 MG/DL (ref 6–20)
CALCIUM SERPL-MCNC: 8.5 MG/DL (ref 8.6–10.2)
CHLORIDE SERPL-SCNC: 99 MMOL/L (ref 98–107)
CO2 SERPL-SCNC: 24 MMOL/L (ref 22–29)
CREAT SERPL-MCNC: 1.3 MG/DL (ref 0.5–1)
EOSINOPHIL # BLD: 0.08 K/UL (ref 0.05–0.5)
EOSINOPHILS RELATIVE PERCENT: 1 % (ref 0–6)
ERYTHROCYTE [DISTWIDTH] IN BLOOD BY AUTOMATED COUNT: 16.6 % (ref 11.5–15)
GFR, ESTIMATED: 48 ML/MIN/1.73M2
GLUCOSE BLD-MCNC: 124 MG/DL (ref 74–99)
GLUCOSE BLD-MCNC: 128 MG/DL (ref 74–99)
GLUCOSE BLD-MCNC: 136 MG/DL (ref 74–99)
GLUCOSE BLD-MCNC: 175 MG/DL (ref 74–99)
GLUCOSE BLD-MCNC: 203 MG/DL (ref 74–99)
GLUCOSE SERPL-MCNC: 131 MG/DL (ref 74–99)
HCT VFR BLD AUTO: 24.7 % (ref 34–48)
HGB BLD-MCNC: 7.9 G/DL (ref 11.5–15.5)
IMM GRANULOCYTES # BLD AUTO: 0.1 K/UL (ref 0–0.58)
IMM GRANULOCYTES NFR BLD: 1 % (ref 0–5)
LYMPHOCYTES NFR BLD: 0.77 K/UL (ref 1.5–4)
LYMPHOCYTES RELATIVE PERCENT: 6 % (ref 20–42)
MAGNESIUM SERPL-MCNC: 1.5 MG/DL (ref 1.6–2.6)
MCH RBC QN AUTO: 28.5 PG (ref 26–35)
MCHC RBC AUTO-ENTMCNC: 32 G/DL (ref 32–34.5)
MCV RBC AUTO: 89.2 FL (ref 80–99.9)
MONOCYTES NFR BLD: 0.88 K/UL (ref 0.1–0.95)
MONOCYTES NFR BLD: 7 % (ref 2–12)
NEUTROPHILS NFR BLD: 85 % (ref 43–80)
NEUTS SEG NFR BLD: 10.35 K/UL (ref 1.8–7.3)
PHOSPHATE SERPL-MCNC: 2.5 MG/DL (ref 2.5–4.5)
PLATELET # BLD AUTO: 235 K/UL (ref 130–450)
PMV BLD AUTO: 11.2 FL (ref 7–12)
POTASSIUM SERPL-SCNC: 3.6 MMOL/L (ref 3.5–5)
PROT SERPL-MCNC: 6.3 G/DL (ref 6.4–8.3)
RBC # BLD AUTO: 2.77 M/UL (ref 3.5–5.5)
SODIUM SERPL-SCNC: 138 MMOL/L (ref 132–146)
WBC OTHER # BLD: 12.2 K/UL (ref 4.5–11.5)

## 2024-05-03 PROCEDURE — 94667 MNPJ CHEST WALL 1ST: CPT

## 2024-05-03 PROCEDURE — 99232 SBSQ HOSP IP/OBS MODERATE 35: CPT | Performed by: INTERNAL MEDICINE

## 2024-05-03 PROCEDURE — 2580000003 HC RX 258: Performed by: NURSE PRACTITIONER

## 2024-05-03 PROCEDURE — 90935 HEMODIALYSIS ONE EVALUATION: CPT

## 2024-05-03 PROCEDURE — A4216 STERILE WATER/SALINE, 10 ML: HCPCS | Performed by: NURSE PRACTITIONER

## 2024-05-03 PROCEDURE — 84100 ASSAY OF PHOSPHORUS: CPT

## 2024-05-03 PROCEDURE — 6370000000 HC RX 637 (ALT 250 FOR IP): Performed by: INTERNAL MEDICINE

## 2024-05-03 PROCEDURE — 94644 CONT INHLJ TX 1ST HOUR: CPT

## 2024-05-03 PROCEDURE — 6370000000 HC RX 637 (ALT 250 FOR IP): Performed by: STUDENT IN AN ORGANIZED HEALTH CARE EDUCATION/TRAINING PROGRAM

## 2024-05-03 PROCEDURE — 6370000000 HC RX 637 (ALT 250 FOR IP): Performed by: NURSE PRACTITIONER

## 2024-05-03 PROCEDURE — 2060000000 HC ICU INTERMEDIATE R&B

## 2024-05-03 PROCEDURE — 82962 GLUCOSE BLOOD TEST: CPT

## 2024-05-03 PROCEDURE — 6360000002 HC RX W HCPCS: Performed by: NURSE PRACTITIONER

## 2024-05-03 PROCEDURE — 6370000000 HC RX 637 (ALT 250 FOR IP)

## 2024-05-03 PROCEDURE — 80053 COMPREHEN METABOLIC PANEL: CPT

## 2024-05-03 PROCEDURE — C9113 INJ PANTOPRAZOLE SODIUM, VIA: HCPCS | Performed by: NURSE PRACTITIONER

## 2024-05-03 PROCEDURE — 2700000000 HC OXYGEN THERAPY PER DAY

## 2024-05-03 PROCEDURE — 76999 ECHO EXAMINATION PROCEDURE: CPT

## 2024-05-03 PROCEDURE — 94669 MECHANICAL CHEST WALL OSCILL: CPT

## 2024-05-03 PROCEDURE — 85025 COMPLETE CBC W/AUTO DIFF WBC: CPT

## 2024-05-03 PROCEDURE — 94668 MNPJ CHEST WALL SBSQ: CPT

## 2024-05-03 PROCEDURE — 83735 ASSAY OF MAGNESIUM: CPT

## 2024-05-03 PROCEDURE — 93971 EXTREMITY STUDY: CPT

## 2024-05-03 PROCEDURE — 6360000002 HC RX W HCPCS

## 2024-05-03 PROCEDURE — 94640 AIRWAY INHALATION TREATMENT: CPT

## 2024-05-03 RX ORDER — POTASSIUM CHLORIDE 29.8 MG/ML
40 INJECTION INTRAVENOUS ONCE
Status: DISCONTINUED | OUTPATIENT
Start: 2024-05-03 | End: 2024-05-03

## 2024-05-03 RX ORDER — POTASSIUM CHLORIDE 20 MEQ/1
40 TABLET, EXTENDED RELEASE ORAL ONCE
Status: DISCONTINUED | OUTPATIENT
Start: 2024-05-03 | End: 2024-05-04 | Stop reason: HOSPADM

## 2024-05-03 RX ORDER — MAGNESIUM SULFATE IN WATER 40 MG/ML
2000 INJECTION, SOLUTION INTRAVENOUS ONCE
Status: COMPLETED | OUTPATIENT
Start: 2024-05-03 | End: 2024-05-03

## 2024-05-03 RX ADMIN — ARFORMOTEROL TARTRATE 15 MCG: 15 SOLUTION RESPIRATORY (INHALATION) at 10:32

## 2024-05-03 RX ADMIN — SODIUM CHLORIDE, PRESERVATIVE FREE 40 MG: 5 INJECTION INTRAVENOUS at 09:01

## 2024-05-03 RX ADMIN — ACETAMINOPHEN 650 MG: 325 TABLET ORAL at 04:38

## 2024-05-03 RX ADMIN — BUDESONIDE INHALATION 500 MCG: 0.5 SUSPENSION RESPIRATORY (INHALATION) at 19:58

## 2024-05-03 RX ADMIN — BUDESONIDE INHALATION 500 MCG: 0.5 SUSPENSION RESPIRATORY (INHALATION) at 10:32

## 2024-05-03 RX ADMIN — Medication 300 UNITS: at 09:01

## 2024-05-03 RX ADMIN — ARFORMOTEROL TARTRATE 15 MCG: 15 SOLUTION RESPIRATORY (INHALATION) at 19:58

## 2024-05-03 RX ADMIN — SODIUM CHLORIDE, PRESERVATIVE FREE 10 ML: 5 INJECTION INTRAVENOUS at 20:55

## 2024-05-03 RX ADMIN — MAGNESIUM SULFATE HEPTAHYDRATE 2000 MG: 40 INJECTION, SOLUTION INTRAVENOUS at 09:08

## 2024-05-03 RX ADMIN — Medication 5 MG: at 20:55

## 2024-05-03 RX ADMIN — IPRATROPIUM BROMIDE AND ALBUTEROL SULFATE 1 DOSE: 2.5; .5 SOLUTION RESPIRATORY (INHALATION) at 17:27

## 2024-05-03 RX ADMIN — SODIUM CHLORIDE, PRESERVATIVE FREE 10 ML: 5 INJECTION INTRAVENOUS at 09:03

## 2024-05-03 RX ADMIN — LORAZEPAM 1 MG: 1 TABLET ORAL at 04:51

## 2024-05-03 RX ADMIN — LORAZEPAM 1 MG: 1 TABLET ORAL at 20:55

## 2024-05-03 RX ADMIN — Medication 300 UNITS: at 20:56

## 2024-05-03 ASSESSMENT — PAIN SCALES - WONG BAKER: WONGBAKER_NUMERICALRESPONSE: NO HURT

## 2024-05-03 ASSESSMENT — PAIN SCALES - GENERAL
PAINLEVEL_OUTOF10: 0
PAINLEVEL_OUTOF10: 1
PAINLEVEL_OUTOF10: 10
PAINLEVEL_OUTOF10: 0
PAINLEVEL_OUTOF10: 0

## 2024-05-03 ASSESSMENT — PAIN DESCRIPTION - ORIENTATION: ORIENTATION: LOWER

## 2024-05-03 ASSESSMENT — PAIN DESCRIPTION - LOCATION: LOCATION: BACK

## 2024-05-03 ASSESSMENT — PAIN - FUNCTIONAL ASSESSMENT: PAIN_FUNCTIONAL_ASSESSMENT: ACTIVITIES ARE NOT PREVENTED

## 2024-05-03 ASSESSMENT — PAIN DESCRIPTION - DESCRIPTORS: DESCRIPTORS: ACHING;THROBBING;DISCOMFORT

## 2024-05-03 NOTE — CARE COORDINATION
CM Update: Plan at discharge is Home, pt ciroant no MARTA after numerous conversations with CM and residents. HS set up @ [facility].Chair time confirmed for M-W-F 7:05 am.  Her son and daughter in law will provide transport when released. CM will continue to follow (TF)        ALEJANDRO ObrienN,RN  Case Management  [phone]

## 2024-05-03 NOTE — PROGRESS NOTES
[facility]  Internal Medicine Residency / House Medicine Service    Attending Physician Statement  I have discussed the case, including pertinent history and exam findings with the resident and the team.  I have seen and examined the patient and the key elements of the encounter have been performed by me.  I agree with the assessment, plan and orders as documented by the resident.      Case Discussed During AM Rounds   No significant overnight events  Leukocytosis continues to improve   H/H has been stable with goal > 7.5  HD completed on 5/2   Patient was sleepy bedside today- on exam- noting significant ecchymosis internal upper thigh from known hematoma  However- now also noting hardening behind knee which is knee; appears nontender and absence of significant erythema or warmth   Discussed directly with patient significant concern with low AMPAC scoring and assistance needs at home- she continues to note she does not wish for MARTA assessment and prefers DC to home      Anemia with thigh hematoma   This was strongly suspected when assessed on the weekend- U/S at that time appeared to be targeted at wrong area- however- our team contacted U/S for questioning and need for repeating- they assured the team it was completed on the correct area in question   Repeat U/S completed revealing a Large Hematoma    H/H has been stable over last 48 hours    No comment on concern for pseudoaneurysm- low threshold for Vascular consultation should further reduction in Hgb be noted   With new finding on exam today- repeat U/S Soft tissue of the LE and vascular assessment with doppler    No anticoagulation given findings and significant anemia in the context of recent NSTEMI     DOMINGO Stage III- HD completed on 5/2    Likely without meaningful renal recovery   Nephrology following    HD continued    Outpatient HD set up- discussed with Nephrology during rounds- plan for HD today    Chronic pain with underlying  lower back disease and hip disease   Low [facility]   Continued chronic pain   Adjustment in meds ongoing   Avoiding narcotics given clinical presentation and findings   Ativan continued while inpatient    Acute Hypoxic Respiratory Failure- stable   Off oxygen     Disposition- if remains stable- consider DC to home tomorrow given patients refusal to go to [facility]. In addition- set up for M/W/F HD established. Repeat labs and electrolytes- as well as repeat imaging to be considered prior to disposition decision     Remainder of medical problems as per resident note.  Attending note is in collaboration with resident-physician Dr. Bettencourt progress note on  5/3/2024.    Elmer Noguera MD  5/3/24    Internal Medicine Residency Faculty

## 2024-05-03 NOTE — PLAN OF CARE
Problem: Safety - Adult  Goal: Free from fall injury  5/2/2024 2149 by Nkechi Shankar RN  Outcome: Progressing  5/2/2024 1841 by Saima Tristan RN  Outcome: Progressing     Problem: Discharge Planning  Goal: Discharge to home or other facility with appropriate resources  5/2/2024 2149 by Nkechi Shankar RN  Outcome: Progressing  Flowsheets (Taken 5/2/2024 1943)  Discharge to home or other facility with appropriate resources: Identify barriers to discharge with patient and caregiver  5/2/2024 1841 by Saima Tristan RN  Outcome: Progressing     Problem: Skin/Tissue Integrity  Goal: Absence of new skin breakdown  Description: 1.  Monitor for areas of redness and/or skin breakdown  2.  Assess vascular access sites hourly  3.  Every 4-6 hours minimum:  Change oxygen saturation probe site  4.  Every 4-6 hours:  If on nasal continuous positive airway pressure, respiratory therapy assess nares and determine need for appliance change or resting period.  5/2/2024 2149 by Nkechi Shankar RN  Outcome: Progressing  5/2/2024 1841 by Saiam Tristan RN  Outcome: Progressing     Problem: ABCDS Injury Assessment  Goal: Absence of physical injury  Outcome: Progressing     Problem: Respiratory - Adult  Goal: Achieves optimal ventilation and oxygenation  Outcome: Progressing     Problem: Cardiovascular - Adult  Goal: Absence of cardiac dysrhythmias or at baseline  Outcome: Progressing     Problem: Skin/Tissue Integrity - Adult  Goal: Incisions, wounds, or drain sites healing without S/S of infection  Outcome: Progressing  Flowsheets (Taken 5/2/2024 1943)  Incisions, Wounds, or Drain Sites Healing Without Sign and Symptoms of Infection: TWICE DAILY: Assess and document skin integrity     Problem: Metabolic/Fluid and Electrolytes - Adult  Goal: Electrolytes maintained within normal limits  Outcome: Progressing  Flowsheets (Taken 5/2/2024 1943)  Electrolytes maintained within normal limits: Monitor labs and assess patient

## 2024-05-03 NOTE — PROGRESS NOTES
[facility]  Internal Medicine Residency Program  Progress Note - House Team       Patient:  Saba Sullivan 59 y.o. female   MRN: [id number]       Date of Service: 5/3/2024  Admission date: 2024  7:49 AM ; Hospital day: 16   CC: Shortness of Breath (Hx COPD- started 4-5 hrs ago, 2 Duoneb and cpap by EMS right leg swelling, hx blood clots, no current thinners)     Overnight events:   K 3.6, replaced    Subjective     Saba Sullivan was seen and examined at bedside today morning. She was awake, alert, and oriented, co-operative, was able to answer questions, she is saturating 98% on Room air. She said, feeling much better today. Denies any acute concerns.     Objective     PHYSICAL EXAM  General Appearance: not in acute distress, on room air  HEENT: Normocephalic, atraumatic  Neck: midline trachea  Lung: clear to auscultation bilaterally, no wheezing or rales, TDC present on right chest  Heart: regular rate and rhythm, no murmur  Abdomen: soft, bowel sounds normal; no masses,   Extremities: New Hard induration posteriorly to right thigh, large hematoma on right thigh  Musculoskeletal: No joint swelling  Neurologic: Mental status: AAOx3, normal reflexes and symmetric bilaterally, cogwheel rigidity intermittently present    CARDIOVASCULAR  Vitals: /72   Pulse 98   Temp 98.5 °F (36.9 °C) (Temporal)   Resp 18   Ht 1.753 m (5' 9.02\")   Wt 70.2 kg (154 lb 12.2 oz)   LMP 2013   SpO2 98%   BMI 22.84 kg/m²     Pulse rate range      : Pulse  Av.4  Min: 87  Max: 98  BP range                  : Systolic (24hrs), Av , Min:112 , Max:141   ; Diastolic (24hrs), Av, Min:60, Max:72    PULMONARY  Respiration range   : Resp  Av  Min: 18  Max: 18  Pulse Ox range : SpO2  Av.8 %  Min: 97 %  Max: 98 %  No results for input(s): \"PH\", \"PCO2\", \"PO2\", \"HCO3\", \"BE\", \"O2SAT\" in the last 72 hours.    Invalid input(s): \"PFRATIO\"    NEPHROLOGY (FLUIDS/ELECTROLYTES &  the last 72 hours.  Occult blood screening: No results for input(s): \"OCCBS\" in the last 72 hours.  Occult blood QC: No results for input(s): \"OBQC\" in the last 72 hours.    Medications     Continuous Infusions:   dextrose      sodium chloride       Scheduled Meds:   potassium chloride  40 mEq Oral Once    magnesium sulfate  2,000 mg IntraVENous Once    sennosides-docusate sodium  2 tablet Oral Daily    DULoxetine  30 mg Oral Daily    lidocaine  1 patch TransDERmal Daily    amLODIPine  10 mg Oral Daily    lidocaine  1 patch TransDERmal Daily    lidocaine  1 patch TransDERmal Daily    vitamin D  50,000 Units Oral Weekly    insulin lispro  0-4 Units SubCUTAneous Q4H    lidocaine PF  5 mL IntraDERmal Once    heparin flush  3 mL IntraVENous 2 times per day    sodium chloride flush  5-40 mL IntraVENous 2 times per day    [Held by provider] heparin (porcine)  5,000 Units SubCUTAneous 3 times per day    arformoterol tartrate  15 mcg Nebulization BID RT    budesonide  0.5 mg Nebulization BID RT    pantoprazole (PROTONIX) 40 mg in sodium chloride (PF) 0.9 % 10 mL injection  40 mg IntraVENous Daily     PRN Meds: LORazepam, melatonin, ipratropium 0.5 mg-albuterol 2.5 mg, glucose, dextrose bolus **OR** dextrose bolus, glucagon (rDNA), dextrose, acetaminophen, heparin flush, sodium chloride flush, sodium chloride    Imaging studies     US SOFT TISSUE LIMITED AREA   Final Result   Mildly complicated 11.9 cm elliptical fluid collection within the soft   tissues of the upper right thigh, most likely a hematoma, less likely an   abscess.         US SOFT TISSUE LIMITED AREA   Final Result   Addendum (preliminary) 1 of 1   ADDENDUM:   Addendum      CORRECTION      EXAMINATION:   ULTRASOUND OF THE RIGHT GROIN      Grayscale color flow Doppler imaging of the right groin was performed    color   Doppler reveals flow within the common femoral artery and vein without    signs   of echogenic thrombus.      There is an area of palpable

## 2024-05-03 NOTE — PROGRESS NOTES
Nephrology Progress Note  [facility]      CC:   DOMINGO    HPI:       4/18: the pt is a 59 yo female with a pmh of copd, DVT, polysubstance abuse, recurrent OM of R foot who presented with sob and ms changes. Sat was 85% on RA in ER. She was put on bipap. Cta showed no PE. She was given nebs, steroids and started on doxycycline and merrem. Bilateral LE US showed no DVT. Labs showed na 131 k 4.6 co2 27 bun 36 cr 2.2 > 3.1, lactate 1.5, ca 7.8, p 5.8, nh4 25ck 21k>16K, alb 2.7, mg 1.9, tsh 1.04, uds pos amphetamine, benzos, cocaine, fentanyl, opiates. Wbc 20.9, hgb 13.5, plt 313. Ua 100 glucose, mod bili, sg >1.03, 100 protein, pos nitrite, 10-20 rbc, lg hgb. Vitals showed rr 17 hr 112 bp 148/67, temp 98.1.  cr from 7/2021 was 0.7. she was started on ns at 51. Pt is not giving a history, mumbles when asked a question, eyes closed.       Patient Active Problem List   Diagnosis    Abscess    Nonhealing ulcer of heel (HCC)    Bradycardia    Substance abuse (HCC)    Current moderate episode of major depressive disorder (HCC)    Abnormal weight loss    COPD (chronic obstructive pulmonary disease) (HCC)    History of fracture of left hip    Ambulatory dysfunction    Hypotension    Subclinical hypothyroidism    Chronic recurrent multifocal osteomyelitis of foot (HCC)    Pressure injury of heel, stage 3 (HCC)    Open wound of fifth toe of left foot    COPD exacerbation (HCC)    Acute respiratory failure with hypoxia (HCC)    Mild protein-calorie malnutrition (HCC)    Osteomyelitis (HCC)    High risk medication use    Acute respiratory failure with hypoxia and hypercapnia (HCC)    NSTEMI (non-ST elevated myocardial infarction) ([facility])         Subjective    5/1: Hemoglobin low this a.m. 1 unit PRBC ordered; she complains of fatigue    5/2 seen during hemodialysis: She reports feeling anxious tolerating procedure    5/3: She denies shortness of breath; no nausea or abdominal pain reported          Meds:     magnesium sulfate   6.3 (L) 05/03/2024 04:54 AM    OSMOU 355 04/18/2024 03:37 AM       No components found for: \"URIC\"    No results found for: \"LIPIDPAN\"      Assessment and Plan:    DOMINGO STAGE 3  Baseline cr 0.7 from 2021  In setting of rhabdo and cocaine use and possible hypovolemia/IV contrast with CTA chest  on 4/16  Hemodialysis initiated 4/25 mainly for poor solute clearance  Sp RIJ  TDC placement 4/26  Continue dialysis support  Next treatment today  Good prognosis for recovery of kidney function ; Cr now 1.3      2. S/p Acute Resp failure  CTA neg for PE  S/p empiric abx  Drug OD    3. Polysubstance abuse  Benzos, cocaine amphetamine  Mental status improved    4. NSTEMI  Precipitated by substance abuse  Stable cardiac rhythm  No reports of angina    5.  Anemia  In part due to acute blood loss  Repeat ultrasound right thigh demonstrate large hematoma  Eliquis on hold  Continue to monitor H&H  PRBC transfusion for hemoglobin less than 7    Procedure: Hemodialysis    D/w Dr Noguera and [facility] team    Guy Briseno MD

## 2024-05-03 NOTE — FLOWSHEET NOTE
05/03/24 1623   Vital Signs   BP (!) 123/57   Temp 97.1 °F (36.2 °C)   Pulse 89   Respirations 16   Weight - Scale 67.9 kg (149 lb 11.1 oz)   Weight Method Bed scale   Percent Weight Change -3.28   Post-Hemodialysis Assessment   Post-Treatment Procedures Blood returned;Catheter capped, clamped and heparinized x 2 ports   Machine Disinfection Process Acid/Vinegar Clean;Heat Disinfect;Exterior Machine Disinfection   Rinseback Volume (ml) 300 ml   Blood Volume Processed (Liters) 50.6 L   Dialyzer Clearance Lightly streaked   Duration of Treatment (minutes) 180 minutes   Heparin Amount Administered During Treatment (mL) 0 mL   Hemodialysis Intake (ml) 300 ml   Hemodialysis Output (ml) 1800 ml   NET Removed (ml) 1500   Tolerated Treatment Good   Patient Response to Treatment tolerated well, blood returned, cath care per policy/procedure, lines flushed, heaprin instille ports capped

## 2024-05-03 NOTE — PROGRESS NOTES
Peak Flow Results    Predicted  Peak Flow 380 lpm    Peak Flow before bronchodilators 120 lpm, which is 32 % predicted    Peak Flow after bronchodilators 120 lpm, which is 32 % predicted.       Pt had fair effort    Performed by Jesus Siddiqui RCP

## 2024-05-04 VITALS
WEIGHT: 149.69 LBS | BODY MASS INDEX: 22.17 KG/M2 | TEMPERATURE: 98.1 F | HEIGHT: 69 IN | HEART RATE: 77 BPM | DIASTOLIC BLOOD PRESSURE: 65 MMHG | SYSTOLIC BLOOD PRESSURE: 129 MMHG | RESPIRATION RATE: 18 BRPM | OXYGEN SATURATION: 96 %

## 2024-05-04 LAB
ALBUMIN SERPL-MCNC: 3.5 G/DL (ref 3.5–5.2)
ALP SERPL-CCNC: 74 U/L (ref 35–104)
ALT SERPL-CCNC: 6 U/L (ref 0–32)
ANION GAP SERPL CALCULATED.3IONS-SCNC: 13 MMOL/L (ref 7–16)
AST SERPL-CCNC: 14 U/L (ref 0–31)
BILIRUB SERPL-MCNC: 0.6 MG/DL (ref 0–1.2)
BUN SERPL-MCNC: 18 MG/DL (ref 6–20)
CALCIUM SERPL-MCNC: 9 MG/DL (ref 8.6–10.2)
CHLORIDE SERPL-SCNC: 100 MMOL/L (ref 98–107)
CO2 SERPL-SCNC: 25 MMOL/L (ref 22–29)
CREAT SERPL-MCNC: 1.1 MG/DL (ref 0.5–1)
GFR, ESTIMATED: 57 ML/MIN/1.73M2
GLUCOSE BLD-MCNC: 158 MG/DL (ref 74–99)
GLUCOSE SERPL-MCNC: 197 MG/DL (ref 74–99)
HCT VFR BLD AUTO: 24.9 % (ref 34–48)
HGB BLD-MCNC: 7.9 G/DL (ref 11.5–15.5)
MAGNESIUM SERPL-MCNC: 1.8 MG/DL (ref 1.6–2.6)
PHOSPHATE SERPL-MCNC: 2.8 MG/DL (ref 2.5–4.5)
POTASSIUM SERPL-SCNC: 3.5 MMOL/L (ref 3.5–5)
PROT SERPL-MCNC: 6.3 G/DL (ref 6.4–8.3)
SODIUM SERPL-SCNC: 138 MMOL/L (ref 132–146)

## 2024-05-04 PROCEDURE — 84100 ASSAY OF PHOSPHORUS: CPT

## 2024-05-04 PROCEDURE — 6370000000 HC RX 637 (ALT 250 FOR IP)

## 2024-05-04 PROCEDURE — 6360000002 HC RX W HCPCS: Performed by: NURSE PRACTITIONER

## 2024-05-04 PROCEDURE — 6370000000 HC RX 637 (ALT 250 FOR IP): Performed by: STUDENT IN AN ORGANIZED HEALTH CARE EDUCATION/TRAINING PROGRAM

## 2024-05-04 PROCEDURE — 80053 COMPREHEN METABOLIC PANEL: CPT

## 2024-05-04 PROCEDURE — C9113 INJ PANTOPRAZOLE SODIUM, VIA: HCPCS | Performed by: NURSE PRACTITIONER

## 2024-05-04 PROCEDURE — 85014 HEMATOCRIT: CPT

## 2024-05-04 PROCEDURE — 94640 AIRWAY INHALATION TREATMENT: CPT

## 2024-05-04 PROCEDURE — 2580000003 HC RX 258: Performed by: NURSE PRACTITIONER

## 2024-05-04 PROCEDURE — A4216 STERILE WATER/SALINE, 10 ML: HCPCS | Performed by: NURSE PRACTITIONER

## 2024-05-04 PROCEDURE — 83735 ASSAY OF MAGNESIUM: CPT

## 2024-05-04 PROCEDURE — 85018 HEMOGLOBIN: CPT

## 2024-05-04 PROCEDURE — 82962 GLUCOSE BLOOD TEST: CPT

## 2024-05-04 PROCEDURE — 2700000000 HC OXYGEN THERAPY PER DAY

## 2024-05-04 RX ORDER — ECHINACEA PURPUREA EXTRACT 125 MG
1 TABLET ORAL PRN
Qty: 1 EACH | Refills: 3 | Status: SHIPPED | OUTPATIENT
Start: 2024-05-04 | End: 2024-05-10

## 2024-05-04 RX ORDER — LANOLIN ALCOHOL/MO/W.PET/CERES
3 CREAM (GRAM) TOPICAL DAILY
Qty: 30 TABLET | Refills: 0 | Status: SHIPPED | OUTPATIENT
Start: 2024-05-04 | End: 2024-05-10

## 2024-05-04 RX ORDER — ERGOCALCIFEROL 1.25 MG/1
50000 CAPSULE ORAL WEEKLY
Qty: 5 CAPSULE | Refills: 0 | Status: SHIPPED | OUTPATIENT
Start: 2024-05-06 | End: 2024-05-10

## 2024-05-04 RX ORDER — AMLODIPINE BESYLATE 10 MG/1
10 TABLET ORAL DAILY
Qty: 30 TABLET | Refills: 3 | Status: ON HOLD | OUTPATIENT
Start: 2024-05-05

## 2024-05-04 RX ORDER — DULOXETIN HYDROCHLORIDE 30 MG/1
30 CAPSULE, DELAYED RELEASE ORAL DAILY
Qty: 30 CAPSULE | Refills: 3 | Status: ON HOLD | OUTPATIENT
Start: 2024-05-05

## 2024-05-04 RX ADMIN — DULOXETINE HYDROCHLORIDE 30 MG: 30 CAPSULE, DELAYED RELEASE ORAL at 08:08

## 2024-05-04 RX ADMIN — SENNOSIDES AND DOCUSATE SODIUM 2 TABLET: 50; 8.6 TABLET ORAL at 08:08

## 2024-05-04 RX ADMIN — AMLODIPINE BESYLATE 10 MG: 10 TABLET ORAL at 08:08

## 2024-05-04 RX ADMIN — BUDESONIDE INHALATION 500 MCG: 0.5 SUSPENSION RESPIRATORY (INHALATION) at 10:20

## 2024-05-04 RX ADMIN — SODIUM CHLORIDE, PRESERVATIVE FREE 10 ML: 5 INJECTION INTRAVENOUS at 08:07

## 2024-05-04 RX ADMIN — Medication 300 UNITS: at 08:08

## 2024-05-04 RX ADMIN — ARFORMOTEROL TARTRATE 15 MCG: 15 SOLUTION RESPIRATORY (INHALATION) at 10:20

## 2024-05-04 RX ADMIN — SODIUM CHLORIDE, PRESERVATIVE FREE 40 MG: 5 INJECTION INTRAVENOUS at 08:08

## 2024-05-04 ASSESSMENT — PAIN SCALES - WONG BAKER
WONGBAKER_NUMERICALRESPONSE: NO HURT
WONGBAKER_NUMERICALRESPONSE: HURTS A LITTLE BIT

## 2024-05-04 ASSESSMENT — PAIN - FUNCTIONAL ASSESSMENT: PAIN_FUNCTIONAL_ASSESSMENT: PREVENTS OR INTERFERES SOME ACTIVE ACTIVITIES AND ADLS

## 2024-05-04 ASSESSMENT — PAIN DESCRIPTION - DESCRIPTORS: DESCRIPTORS: ACHING

## 2024-05-04 ASSESSMENT — PAIN SCALES - GENERAL
PAINLEVEL_OUTOF10: 5
PAINLEVEL_OUTOF10: 4

## 2024-05-04 ASSESSMENT — PAIN DESCRIPTION - ONSET: ONSET: ON-GOING

## 2024-05-04 ASSESSMENT — PAIN DESCRIPTION - FREQUENCY: FREQUENCY: CONTINUOUS

## 2024-05-04 ASSESSMENT — PAIN DESCRIPTION - LOCATION: LOCATION: BACK

## 2024-05-04 ASSESSMENT — PAIN DESCRIPTION - PAIN TYPE: TYPE: CHRONIC PAIN

## 2024-05-04 ASSESSMENT — PAIN DESCRIPTION - ORIENTATION: ORIENTATION: LOWER

## 2024-05-04 NOTE — PLAN OF CARE
Problem: Safety - Adult  Goal: Free from fall injury  5/3/2024 2138 by Nkechi Siegel RN  Outcome: Progressing  5/3/2024 1723 by Tyler Jones RN  Outcome: Progressing     Problem: Discharge Planning  Goal: Discharge to home or other facility with appropriate resources  5/3/2024 2138 by Nkechi Siegel RN  Outcome: Progressing  Flowsheets (Taken 5/3/2024 2000)  Discharge to home or other facility with appropriate resources:   Identify barriers to discharge with patient and caregiver   Identify discharge learning needs (meds, wound care, etc)  5/3/2024 1723 by Tyler Jones RN  Outcome: Progressing     Problem: Skin/Tissue Integrity  Goal: Absence of new skin breakdown  Description: 1.  Monitor for areas of redness and/or skin breakdown  2.  Assess vascular access sites hourly  3.  Every 4-6 hours minimum:  Change oxygen saturation probe site  4.  Every 4-6 hours:  If on nasal continuous positive airway pressure, respiratory therapy assess nares and determine need for appliance change or resting period.  5/3/2024 2138 by Nkechi Siegel RN  Outcome: Progressing  5/3/2024 1723 by Tyler Jones RN  Outcome: Progressing     Problem: ABCDS Injury Assessment  Goal: Absence of physical injury  Outcome: Progressing     Problem: Respiratory - Adult  Goal: Achieves optimal ventilation and oxygenation  Outcome: Progressing  Flowsheets (Taken 5/3/2024 2000)  Achieves optimal ventilation and oxygenation:   Assess for changes in respiratory status   Assess for changes in mentation and behavior   Position to facilitate oxygenation and minimize respiratory effort     Problem: Cardiovascular - Adult  Goal: Absence of cardiac dysrhythmias or at baseline  Outcome: Progressing     Problem: Skin/Tissue Integrity - Adult  Goal: Incisions, wounds, or drain sites healing without S/S of infection  Outcome: Progressing     Problem: Metabolic/Fluid and Electrolytes - Adult  Goal: Electrolytes maintained within normal limits  Outcome:

## 2024-05-04 NOTE — PLAN OF CARE
Cardiovascular - Adult  Goal: Absence of cardiac dysrhythmias or at baseline  5/3/2024 2138 by Nkechi Siegel RN  Outcome: Progressing     Problem: Skin/Tissue Integrity - Adult  Goal: Incisions, wounds, or drain sites healing without S/S of infection  Recent Flowsheet Documentation  Taken 5/4/2024 1045 by Tessa Mancini RN  Incisions, Wounds, or Drain Sites Healing Without Sign and Symptoms of Infection: TWICE DAILY: Assess and document skin integrity  5/3/2024 2138 by Nkechi Siegel RN  Outcome: Progressing     Problem: Metabolic/Fluid and Electrolytes - Adult  Goal: Electrolytes maintained within normal limits  Recent Flowsheet Documentation  Taken 5/4/2024 0815 by Tessa Mancini RN  Electrolytes maintained within normal limits: Monitor labs and assess patient for signs and symptoms of electrolyte imbalances  5/3/2024 2138 by Nkechi Siegel RN  Outcome: Progressing  Flowsheets (Taken 5/3/2024 2000)  Electrolytes maintained within normal limits: Monitor labs and assess patient for signs and symptoms of electrolyte imbalances     Problem: Pain  Goal: Verbalizes/displays adequate comfort level or baseline comfort level  5/3/2024 2138 by Nkechi Siegel RN  Outcome: Progressing  Flowsheets (Taken 5/3/2024 2000)  Verbalizes/displays adequate comfort level or baseline comfort level:   Encourage patient to monitor pain and request assistance   Assess pain using appropriate pain scale   Administer analgesics based on type and severity of pain and evaluate response     Problem: Nutrition Deficit:  Goal: Optimize nutritional status  5/3/2024 2138 by Nkechi Siegel RN  Outcome: Progressing

## 2024-05-04 NOTE — DISCHARGE INSTRUCTIONS
Internal medicine    Follow ups  Please follow up with your primary care physician ( Dr. Velasquez, Pallavi SHORE DO ) within 10 days of discharge from hospital. Please call as soon as possible to make an appointment. Please contact the internal medicine clinic for an appointment if you are unable to get an appointment with your PCP.   Please keep all other follow up appointments:  [facility] on Monday, Wednesday, and Friday at 7:00 am    Changes in healthcare   Please take all medications as indicated  Diet: renal diet   Activity: activity as tolerated  New Medications started during this hospital stay  Amlodipine 10 Mg daily  Cymbalta 30 mg daily  Melatonin 3 mg nightly  Vitamin D 45084 U weekly    Please contact us if you have any concerns, wish to change or make an appointment:  Internal medicine clinic   Phone: [phone]  Fax: [phone]  [facility] [facility] [address]  Should you have further questions in regards to this visit, you can review your clinical note and after visit summary document on your [employer] account.     Other than any new prescriptions given to you today, the list of home medications on this After Visit Summary are based on information provided to us from you and your healthcare providers. This information, including the list, dose, and frequency of medications is only as accurate as the information you provided. If you have any questions or concerns about your home medications, please contact your Primary Care Physician for further clarification.

## 2024-05-04 NOTE — PROGRESS NOTES
CLINICAL PHARMACY NOTE: MEDS TO BEDS    Total # of Prescriptions Filled: 5   The following medications were delivered to the patient:  Deep sea nasal spray 0.65%  Vitamin d2 1.25mg  Amlodipine 10mg  Melatonin 3mg  Duloxetine 30mg    Additional Documentation:  Patient's friend alex picked up in pharmacy 5-4-24

## 2024-05-04 NOTE — PROGRESS NOTES
Nephrology Progress Note  [facility]      CC:   DOMINGO    HPI:       4/18: the pt is a 61 yo female with a pmh of copd, DVT, polysubstance abuse, recurrent OM of R foot who presented with sob and ms changes. Sat was 85% on RA in ER. She was put on bipap. Cta showed no PE. She was given nebs, steroids and started on doxycycline and merrem. Bilateral LE US showed no DVT. Labs showed na 131 k 4.6 co2 27 bun 36 cr 2.2 > 3.1, lactate 1.5, ca 7.8, p 5.8, nh4 25ck 21k>16K, alb 2.7, mg 1.9, tsh 1.04, uds pos amphetamine, benzos, cocaine, fentanyl, opiates. Wbc 20.9, hgb 13.5, plt 313. Ua 100 glucose, mod bili, sg >1.03, 100 protein, pos nitrite, 10-20 rbc, lg hgb. Vitals showed rr 17 hr 112 bp 148/67, temp 98.1.  cr from 7/2021 was 0.7. she was started on ns at 51. Pt is not giving a history, mumbles when asked a question, eyes closed.       Patient Active Problem List   Diagnosis    Abscess    Nonhealing ulcer of heel (HCC)    Bradycardia    Substance abuse (HCC)    Current moderate episode of major depressive disorder (HCC)    Abnormal weight loss    COPD (chronic obstructive pulmonary disease) (HCC)    History of fracture of left hip    Ambulatory dysfunction    Hypotension    Subclinical hypothyroidism    Chronic recurrent multifocal osteomyelitis of foot (HCC)    Pressure injury of heel, stage 3 (HCC)    Open wound of fifth toe of left foot    COPD exacerbation (HCC)    Acute respiratory failure with hypoxia (HCC)    Mild protein-calorie malnutrition (HCC)    Osteomyelitis (HCC)    High risk medication use    Acute respiratory failure with hypoxia and hypercapnia (HCC)    NSTEMI (non-ST elevated myocardial infarction) ([facility])         Subjective    5/1: Hemoglobin low this a.m. 1 unit PRBC ordered; she complains of fatigue    5/2 seen during hemodialysis: She reports feeling anxious tolerating procedure    5/3: She denies shortness of breath; no nausea or abdominal pain reported    5/4: No new c/o; possible discharge    05/03/24 1715 126/77 97.3 °F (36.3 °C) Infrared 89 22 96 % --   05/03/24 1623 (!) 123/57 97.1 °F (36.2 °C) -- 89 16 -- 67.9 kg (149 lb 11.1 oz)   05/03/24 1147 124/66 98 °F (36.7 °C) Temporal 84 18 94 % --   05/03/24 1102 130/64 98.3 °F (36.8 °C) Temporal 81 18 98 % --         Intake/Output Summary (Last 24 hours) at 5/4/2024 0932  Last data filed at 5/3/2024 2000  Gross per 24 hour   Intake 720 ml   Output 1800 ml   Net -1080 ml       Constitutional: Patient in no acute distress   Head: normocephalic, atraumatic   Neck: supple, no jvd  Cardiovascular: regular rate and rhythm, no murmurs, gallops, or rubs   Respiratory: Clear, no rales, rhochi, or wheezes,   Gastrointestinal: soft, nontender, nondistended, no hepatosplenomegaly  Ext: edema R LE; no redness or swelling; right thigh with some swelling minimal tenderness, no redness  Skin: dry, no rash   Back: nontender    Data:    Recent Labs     05/01/24  2346 05/02/24  0600 05/03/24  0454   WBC  --  13.7* 12.2*   HGB 7.1* 7.3* 7.9*   HCT 22.1* 22.5* 24.7*   MCV  --  89.3 89.2   PLT  --  213 235       Recent Labs     05/02/24  0600 05/03/24  0454    138   K 3.7 3.6    99   CO2 23 24   CREATININE 1.7* 1.3*   BUN 42* 23*   LABGLOM 34* 48*   GLUCOSE 110* 131*   CALCIUM 8.7 8.5*   PHOS 2.7 2.5   MG 1.7 1.5*       Vit D, 25-Hydroxy   Date Value Ref Range Status   04/20/2024 <6.0 (L) 30.0 - 100.0 ng/mL Final       No results found for: \"PTH\"    Recent Labs     05/02/24  0600 05/03/24  0454   ALT 8 8   AST 18 19   ALKPHOS 60 70   BILITOT 0.6 0.6         No results for input(s): \"LABALBU\" in the last 72 hours.    No results found for: \"FERRITIN\", \"IRON\", \"TIBC\"    Vitamin B-12   Date Value Ref Range Status   02/10/2020 426 211 - 946 pg/mL Final       Folate   Date Value Ref Range Status   02/10/2020 5.0 4.8 - 24.2 ng/mL Final         Lab Results   Component Value Date/Time    COLORU SHEBA 04/18/2024 03:37 AM    NITRU POSITIVE 04/18/2024 03:37 AM    GLUCOSEU 100

## 2024-05-07 ENCOUNTER — HOSPITAL ENCOUNTER (OUTPATIENT)
Age: 59
Discharge: HOME | End: 2024-05-07
Payer: MEDICAID

## 2024-05-07 LAB
ANION GAP SERPL CALCULATED.3IONS-SCNC: 12 MMOL/L (ref 7–16)
BUN SERPL-MCNC: 28 MG/DL (ref 6–20)
CALCIUM SERPL-MCNC: 9.4 MG/DL (ref 8.6–10.2)
CHLORIDE SERPL-SCNC: 97 MMOL/L (ref 98–107)
CO2 SERPL-SCNC: 29 MMOL/L (ref 22–29)
CREAT SERPL-MCNC: 1.2 MG/DL (ref 0.5–1)
GFR, ESTIMATED: 54 ML/MIN/1.73M2
GLUCOSE SERPL-MCNC: 92 MG/DL (ref 74–99)
POTASSIUM SERPL-SCNC: 4 MMOL/L (ref 3.5–5)
SODIUM SERPL-SCNC: 138 MMOL/L (ref 132–146)

## 2024-05-07 PROCEDURE — 80048 BASIC METABOLIC PNL TOTAL CA: CPT

## 2024-05-07 PROCEDURE — 36415 COLL VENOUS BLD VENIPUNCTURE: CPT

## 2024-05-09 ENCOUNTER — HOSPITAL ENCOUNTER (INPATIENT)
Age: 59
LOS: 6 days | Discharge: OTHER INSTITUTION | DRG: 720 | End: 2024-05-16
Attending: EMERGENCY MEDICINE | Admitting: INTERNAL MEDICINE
Payer: MEDICAID

## 2024-05-09 DIAGNOSIS — Z91.158 NONCOMPLIANCE WITH RENAL DIALYSIS (HCC): ICD-10-CM

## 2024-05-09 DIAGNOSIS — F19.10 POLYSUBSTANCE ABUSE (HCC): ICD-10-CM

## 2024-05-09 DIAGNOSIS — A41.9 SEPSIS DUE TO PNEUMONIA (HCC): ICD-10-CM

## 2024-05-09 DIAGNOSIS — J96.01 ACUTE RESPIRATORY FAILURE WITH HYPOXIA (HCC): Primary | ICD-10-CM

## 2024-05-09 DIAGNOSIS — R94.31 PROLONGED Q-T INTERVAL ON ECG: ICD-10-CM

## 2024-05-09 DIAGNOSIS — J18.9 SEPSIS DUE TO PNEUMONIA (HCC): ICD-10-CM

## 2024-05-09 LAB
AADO2: 270 MMHG
B.E.: 3.7 MMOL/L (ref -3–3)
COHB: 1 % (ref 0–1.5)
CRITICAL: ABNORMAL
DATE ANALYZED: ABNORMAL
DATE OF COLLECTION: ABNORMAL
FIO2: 60 %
HCO3: 29.5 MMOL/L (ref 22–26)
HHB: 4 % (ref 0–5)
LAB: ABNORMAL
Lab: 2335
METHB: 0.5 % (ref 0–1.5)
MODE: ABNORMAL
O2 SATURATION: 95.9 % (ref 92–98.5)
O2HB: 94.5 % (ref 94–97)
OPERATOR ID: 7221
PATIENT TEMP: 37 C
PCO2: 52 MMHG (ref 35–45)
PEEP/CPAP: 6 CMH2O
PFO2: 1.43 MMHG/%
PH BLOOD GAS: 7.37 (ref 7.35–7.45)
PIP: 12 CMH2O
PO2: 85.6 MMHG (ref 75–100)
POTASSIUM SERPL-SCNC: 4.11 MMOL/L (ref 3.5–5)
RI(T): 3.15
SOURCE, BLOOD GAS: ABNORMAL
THB: 8.1 G/DL (ref 11.5–16.5)
TIME ANALYZED: 2341

## 2024-05-09 PROCEDURE — 80307 DRUG TEST PRSMV CHEM ANLYZR: CPT

## 2024-05-09 PROCEDURE — 96375 TX/PRO/DX INJ NEW DRUG ADDON: CPT

## 2024-05-09 PROCEDURE — 96365 THER/PROPH/DIAG IV INF INIT: CPT

## 2024-05-09 PROCEDURE — 2580000003 HC RX 258

## 2024-05-09 PROCEDURE — 82805 BLOOD GASES W/O2 SATURATION: CPT

## 2024-05-09 PROCEDURE — 96368 THER/DIAG CONCURRENT INF: CPT

## 2024-05-09 PROCEDURE — 87040 BLOOD CULTURE FOR BACTERIA: CPT

## 2024-05-09 PROCEDURE — 99285 EMERGENCY DEPT VISIT HI MDM: CPT

## 2024-05-09 PROCEDURE — 0202U NFCT DS 22 TRGT SARS-COV-2: CPT

## 2024-05-09 PROCEDURE — 84484 ASSAY OF TROPONIN QUANT: CPT

## 2024-05-09 PROCEDURE — 85025 COMPLETE CBC W/AUTO DIFF WBC: CPT

## 2024-05-09 PROCEDURE — 83605 ASSAY OF LACTIC ACID: CPT

## 2024-05-09 PROCEDURE — 6370000000 HC RX 637 (ALT 250 FOR IP)

## 2024-05-09 PROCEDURE — 81001 URINALYSIS AUTO W/SCOPE: CPT

## 2024-05-09 PROCEDURE — 94660 CPAP INITIATION&MGMT: CPT

## 2024-05-09 PROCEDURE — 84132 ASSAY OF SERUM POTASSIUM: CPT

## 2024-05-09 PROCEDURE — 6360000002 HC RX W HCPCS

## 2024-05-09 PROCEDURE — 80053 COMPREHEN METABOLIC PANEL: CPT

## 2024-05-09 PROCEDURE — 93005 ELECTROCARDIOGRAM TRACING: CPT

## 2024-05-09 PROCEDURE — 84145 PROCALCITONIN (PCT): CPT

## 2024-05-09 RX ORDER — MAGNESIUM SULFATE IN WATER 40 MG/ML
2000 INJECTION, SOLUTION INTRAVENOUS ONCE
Status: COMPLETED | OUTPATIENT
Start: 2024-05-09 | End: 2024-05-10

## 2024-05-09 RX ORDER — 0.9 % SODIUM CHLORIDE 0.9 %
1000 INTRAVENOUS SOLUTION INTRAVENOUS ONCE
Status: COMPLETED | OUTPATIENT
Start: 2024-05-09 | End: 2024-05-10

## 2024-05-09 RX ORDER — ACETAMINOPHEN 500 MG
1000 TABLET ORAL ONCE
Status: COMPLETED | OUTPATIENT
Start: 2024-05-09 | End: 2024-05-09

## 2024-05-09 RX ORDER — SODIUM CHLORIDE, SODIUM LACTATE, POTASSIUM CHLORIDE, AND CALCIUM CHLORIDE .6; .31; .03; .02 G/100ML; G/100ML; G/100ML; G/100ML
30 INJECTION, SOLUTION INTRAVENOUS ONCE
Status: DISCONTINUED | OUTPATIENT
Start: 2024-05-09 | End: 2024-05-09

## 2024-05-09 RX ADMIN — PIPERACILLIN AND TAZOBACTAM 3375 MG: 3; .375 INJECTION, POWDER, LYOPHILIZED, FOR SOLUTION INTRAVENOUS at 23:51

## 2024-05-09 RX ADMIN — MAGNESIUM SULFATE HEPTAHYDRATE 2000 MG: 40 INJECTION, SOLUTION INTRAVENOUS at 23:51

## 2024-05-09 RX ADMIN — ACETAMINOPHEN 1000 MG: 500 TABLET ORAL at 23:49

## 2024-05-09 RX ADMIN — SODIUM CHLORIDE 1000 ML: 9 INJECTION, SOLUTION INTRAVENOUS at 23:44

## 2024-05-09 ASSESSMENT — PAIN - FUNCTIONAL ASSESSMENT: PAIN_FUNCTIONAL_ASSESSMENT: NONE - DENIES PAIN

## 2024-05-09 ASSESSMENT — LIFESTYLE VARIABLES
HOW MANY STANDARD DRINKS CONTAINING ALCOHOL DO YOU HAVE ON A TYPICAL DAY: PATIENT DOES NOT DRINK
HOW OFTEN DO YOU HAVE A DRINK CONTAINING ALCOHOL: NEVER

## 2024-05-10 ENCOUNTER — APPOINTMENT (OUTPATIENT)
Dept: GENERAL RADIOLOGY | Age: 59
DRG: 720 | End: 2024-05-10
Payer: MEDICAID

## 2024-05-10 ENCOUNTER — APPOINTMENT (OUTPATIENT)
Dept: CT IMAGING | Age: 59
DRG: 720 | End: 2024-05-10
Payer: MEDICAID

## 2024-05-10 PROBLEM — A41.9 SEPSIS (HCC): Status: ACTIVE | Noted: 2024-05-10

## 2024-05-10 LAB
ALBUMIN SERPL-MCNC: 3.4 G/DL (ref 3.5–5.2)
ALP SERPL-CCNC: 83 U/L (ref 35–104)
ALT SERPL-CCNC: 7 U/L (ref 0–32)
AMPHET UR QL SCN: NEGATIVE
ANION GAP SERPL CALCULATED.3IONS-SCNC: 9 MMOL/L (ref 7–16)
ANION GAP SERPL CALCULATED.3IONS-SCNC: 9 MMOL/L (ref 7–16)
APAP SERPL-MCNC: <5 UG/ML (ref 10–30)
AST SERPL-CCNC: 19 U/L (ref 0–31)
B PARAP IS1001 DNA NPH QL NAA+NON-PROBE: NOT DETECTED
B PERT DNA SPEC QL NAA+PROBE: NOT DETECTED
BARBITURATES UR QL SCN: NEGATIVE
BASOPHILS # BLD: 0.02 K/UL (ref 0–0.2)
BASOPHILS # BLD: 0.03 K/UL (ref 0–0.2)
BASOPHILS NFR BLD: 1 % (ref 0–2)
BASOPHILS NFR BLD: 1 % (ref 0–2)
BENZODIAZ UR QL: POSITIVE
BILIRUB SERPL-MCNC: 0.2 MG/DL (ref 0–1.2)
BILIRUB UR QL STRIP: NEGATIVE
BUN SERPL-MCNC: 26 MG/DL (ref 6–20)
BUN SERPL-MCNC: 28 MG/DL (ref 6–20)
BUPRENORPHINE UR QL: NEGATIVE
C PNEUM DNA NPH QL NAA+NON-PROBE: NOT DETECTED
CALCIUM SERPL-MCNC: 8 MG/DL (ref 8.6–10.2)
CALCIUM SERPL-MCNC: 8.7 MG/DL (ref 8.6–10.2)
CANNABINOIDS UR QL SCN: NEGATIVE
CHLORIDE SERPL-SCNC: 102 MMOL/L (ref 98–107)
CHLORIDE SERPL-SCNC: 98 MMOL/L (ref 98–107)
CK SERPL-CCNC: 49 U/L (ref 20–180)
CK SERPL-CCNC: 57 U/L (ref 20–180)
CLARITY UR: CLEAR
CO2 SERPL-SCNC: 24 MMOL/L (ref 22–29)
CO2 SERPL-SCNC: 28 MMOL/L (ref 22–29)
COCAINE UR QL SCN: POSITIVE
COLOR UR: YELLOW
CREAT SERPL-MCNC: 1.2 MG/DL (ref 0.5–1)
CREAT SERPL-MCNC: 1.2 MG/DL (ref 0.5–1)
EKG ATRIAL RATE: 117 BPM
EKG P AXIS: 14 DEGREES
EKG P-R INTERVAL: 176 MS
EKG Q-T INTERVAL: 446 MS
EKG QRS DURATION: 70 MS
EKG QTC CALCULATION (BAZETT): 622 MS
EKG R AXIS: 35 DEGREES
EKG T AXIS: 59 DEGREES
EKG VENTRICULAR RATE: 117 BPM
EOSINOPHIL # BLD: 0 K/UL (ref 0.05–0.5)
EOSINOPHIL # BLD: 0.03 K/UL (ref 0.05–0.5)
EOSINOPHILS RELATIVE PERCENT: 0 % (ref 0–6)
EOSINOPHILS RELATIVE PERCENT: 1 % (ref 0–6)
ERYTHROCYTE [DISTWIDTH] IN BLOOD BY AUTOMATED COUNT: 15.9 % (ref 11.5–15)
ERYTHROCYTE [DISTWIDTH] IN BLOOD BY AUTOMATED COUNT: 15.9 % (ref 11.5–15)
ETHANOLAMINE SERPL-MCNC: <10 MG/DL
FENTANYL UR QL: POSITIVE
FLUAV RNA NPH QL NAA+NON-PROBE: NOT DETECTED
FLUBV RNA NPH QL NAA+NON-PROBE: NOT DETECTED
GFR, ESTIMATED: 50 ML/MIN/1.73M2
GFR, ESTIMATED: 51 ML/MIN/1.73M2
GLUCOSE SERPL-MCNC: 109 MG/DL (ref 74–99)
GLUCOSE SERPL-MCNC: 156 MG/DL (ref 74–99)
GLUCOSE UR STRIP-MCNC: NEGATIVE MG/DL
HADV DNA NPH QL NAA+NON-PROBE: NOT DETECTED
HCOV 229E RNA NPH QL NAA+NON-PROBE: NOT DETECTED
HCOV HKU1 RNA NPH QL NAA+NON-PROBE: NOT DETECTED
HCOV NL63 RNA NPH QL NAA+NON-PROBE: NOT DETECTED
HCOV OC43 RNA NPH QL NAA+NON-PROBE: NOT DETECTED
HCT VFR BLD AUTO: 22.6 % (ref 34–48)
HCT VFR BLD AUTO: 23.9 % (ref 34–48)
HGB BLD-MCNC: 6.7 G/DL (ref 11.5–15.5)
HGB BLD-MCNC: 7.1 G/DL (ref 11.5–15.5)
HGB UR QL STRIP.AUTO: ABNORMAL
HMPV RNA NPH QL NAA+NON-PROBE: NOT DETECTED
HPIV1 RNA NPH QL NAA+NON-PROBE: NOT DETECTED
HPIV2 RNA NPH QL NAA+NON-PROBE: NOT DETECTED
HPIV3 RNA NPH QL NAA+NON-PROBE: DETECTED
HPIV4 RNA NPH QL NAA+NON-PROBE: NOT DETECTED
KETONES UR STRIP-MCNC: NEGATIVE MG/DL
LACTATE BLDV-SCNC: 0.7 MMOL/L (ref 0.5–2.2)
LACTATE BLDV-SCNC: 1.2 MMOL/L (ref 0.5–1.9)
LEUKOCYTE ESTERASE UR QL STRIP: ABNORMAL
LYMPHOCYTES NFR BLD: 0.3 K/UL (ref 1.5–4)
LYMPHOCYTES NFR BLD: 0.34 K/UL (ref 1.5–4)
LYMPHOCYTES RELATIVE PERCENT: 10 % (ref 20–42)
LYMPHOCYTES RELATIVE PERCENT: 12 % (ref 20–42)
M PNEUMO DNA NPH QL NAA+NON-PROBE: NOT DETECTED
MCH RBC QN AUTO: 28 PG (ref 26–35)
MCH RBC QN AUTO: 28.5 PG (ref 26–35)
MCHC RBC AUTO-ENTMCNC: 29.6 G/DL (ref 32–34.5)
MCHC RBC AUTO-ENTMCNC: 29.7 G/DL (ref 32–34.5)
MCV RBC AUTO: 94.1 FL (ref 80–99.9)
MCV RBC AUTO: 96.2 FL (ref 80–99.9)
METHADONE UR QL: NEGATIVE
MONOCYTES NFR BLD: 0.07 K/UL (ref 0.1–0.95)
MONOCYTES NFR BLD: 0.16 K/UL (ref 0.1–0.95)
MONOCYTES NFR BLD: 3 % (ref 2–12)
MONOCYTES NFR BLD: 4 % (ref 2–12)
MYELOCYTES ABSOLUTE COUNT: 0.02 K/UL
MYELOCYTES: 1 %
NEUTROPHILS NFR BLD: 84 % (ref 43–80)
NEUTROPHILS NFR BLD: 84 % (ref 43–80)
NEUTS SEG NFR BLD: 2.09 K/UL (ref 1.8–7.3)
NEUTS SEG NFR BLD: 3.04 K/UL (ref 1.8–7.3)
NITRITE UR QL STRIP: NEGATIVE
OPIATES UR QL SCN: NEGATIVE
OXYCODONE UR QL SCN: NEGATIVE
PCP UR QL SCN: NEGATIVE
PH UR STRIP: 5.5 [PH] (ref 5–9)
PLATELET # BLD AUTO: 212 K/UL (ref 130–450)
PLATELET # BLD AUTO: 228 K/UL (ref 130–450)
PMV BLD AUTO: 10.1 FL (ref 7–12)
PMV BLD AUTO: 9.5 FL (ref 7–12)
POTASSIUM SERPL-SCNC: 4.3 MMOL/L (ref 3.5–5)
POTASSIUM SERPL-SCNC: 4.5 MMOL/L (ref 3.5–5)
PROCALCITONIN SERPL-MCNC: 0.38 NG/ML (ref 0–0.08)
PROT SERPL-MCNC: 6.7 G/DL (ref 6.4–8.3)
PROT UR STRIP-MCNC: 30 MG/DL
RBC # BLD AUTO: 2.35 M/UL (ref 3.5–5.5)
RBC # BLD AUTO: 2.54 M/UL (ref 3.5–5.5)
RBC # BLD: ABNORMAL 10*6/UL
RBC #/AREA URNS HPF: NORMAL /HPF
RSV RNA NPH QL NAA+NON-PROBE: NOT DETECTED
RV+EV RNA NPH QL NAA+NON-PROBE: NOT DETECTED
SALICYLATES SERPL-MCNC: <0.3 MG/DL (ref 0–30)
SARS-COV-2 RNA NPH QL NAA+NON-PROBE: NOT DETECTED
SODIUM SERPL-SCNC: 135 MMOL/L (ref 132–146)
SODIUM SERPL-SCNC: 135 MMOL/L (ref 132–146)
SP GR UR STRIP: 1.02 (ref 1–1.03)
SPECIMEN DESCRIPTION: ABNORMAL
TEST INFORMATION: ABNORMAL
TOXIC TRICYCLIC SC,BLOOD: NEGATIVE
TROPONIN I SERPL HS-MCNC: 222 NG/L (ref 0–9)
TROPONIN I SERPL HS-MCNC: 271 NG/L (ref 0–9)
UROBILINOGEN UR STRIP-ACNC: 0.2 EU/DL (ref 0–1)
WBC #/AREA URNS HPF: NORMAL /HPF
WBC OTHER # BLD: 2.5 K/UL (ref 4.5–11.5)
WBC OTHER # BLD: 3.6 K/UL (ref 4.5–11.5)

## 2024-05-10 PROCEDURE — 6370000000 HC RX 637 (ALT 250 FOR IP)

## 2024-05-10 PROCEDURE — 83605 ASSAY OF LACTIC ACID: CPT

## 2024-05-10 PROCEDURE — 71045 X-RAY EXAM CHEST 1 VIEW: CPT

## 2024-05-10 PROCEDURE — A4216 STERILE WATER/SALINE, 10 ML: HCPCS | Performed by: EMERGENCY MEDICINE

## 2024-05-10 PROCEDURE — 85025 COMPLETE CBC W/AUTO DIFF WBC: CPT

## 2024-05-10 PROCEDURE — 94640 AIRWAY INHALATION TREATMENT: CPT

## 2024-05-10 PROCEDURE — 36415 COLL VENOUS BLD VENIPUNCTURE: CPT

## 2024-05-10 PROCEDURE — 86850 RBC ANTIBODY SCREEN: CPT

## 2024-05-10 PROCEDURE — 80143 DRUG ASSAY ACETAMINOPHEN: CPT

## 2024-05-10 PROCEDURE — 86900 BLOOD TYPING SEROLOGIC ABO: CPT

## 2024-05-10 PROCEDURE — 99221 1ST HOSP IP/OBS SF/LOW 40: CPT | Performed by: INTERNAL MEDICINE

## 2024-05-10 PROCEDURE — 2500000003 HC RX 250 WO HCPCS: Performed by: EMERGENCY MEDICINE

## 2024-05-10 PROCEDURE — 6360000002 HC RX W HCPCS: Performed by: EMERGENCY MEDICINE

## 2024-05-10 PROCEDURE — 80179 DRUG ASSAY SALICYLATE: CPT

## 2024-05-10 PROCEDURE — 2580000003 HC RX 258

## 2024-05-10 PROCEDURE — 6360000002 HC RX W HCPCS

## 2024-05-10 PROCEDURE — 86901 BLOOD TYPING SEROLOGIC RH(D): CPT

## 2024-05-10 PROCEDURE — 36430 TRANSFUSION BLD/BLD COMPNT: CPT

## 2024-05-10 PROCEDURE — 85014 HEMATOCRIT: CPT

## 2024-05-10 PROCEDURE — 82550 ASSAY OF CK (CPK): CPT

## 2024-05-10 PROCEDURE — 85018 HEMOGLOBIN: CPT

## 2024-05-10 PROCEDURE — 84484 ASSAY OF TROPONIN QUANT: CPT

## 2024-05-10 PROCEDURE — 71275 CT ANGIOGRAPHY CHEST: CPT

## 2024-05-10 PROCEDURE — P9016 RBC LEUKOCYTES REDUCED: HCPCS

## 2024-05-10 PROCEDURE — 80307 DRUG TEST PRSMV CHEM ANLYZR: CPT

## 2024-05-10 PROCEDURE — 30233N1 TRANSFUSION OF NONAUTOLOGOUS RED BLOOD CELLS INTO PERIPHERAL VEIN, PERCUTANEOUS APPROACH: ICD-10-PCS | Performed by: INTERNAL MEDICINE

## 2024-05-10 PROCEDURE — 94660 CPAP INITIATION&MGMT: CPT

## 2024-05-10 PROCEDURE — 80048 BASIC METABOLIC PNL TOTAL CA: CPT

## 2024-05-10 PROCEDURE — 30233N1 TRANSFUSION OF NONAUTOLOGOUS RED BLOOD CELLS INTO PERIPHERAL VEIN, PERCUTANEOUS APPROACH: ICD-10-PCS

## 2024-05-10 PROCEDURE — 93010 ELECTROCARDIOGRAM REPORT: CPT | Performed by: INTERNAL MEDICINE

## 2024-05-10 PROCEDURE — 2060000000 HC ICU INTERMEDIATE R&B

## 2024-05-10 PROCEDURE — G0480 DRUG TEST DEF 1-7 CLASSES: HCPCS

## 2024-05-10 PROCEDURE — 86923 COMPATIBILITY TEST ELECTRIC: CPT

## 2024-05-10 PROCEDURE — 2580000003 HC RX 258: Performed by: EMERGENCY MEDICINE

## 2024-05-10 PROCEDURE — 6360000004 HC RX CONTRAST MEDICATION: Performed by: RADIOLOGY

## 2024-05-10 RX ORDER — NICOTINE 21 MG/24HR
1 PATCH, TRANSDERMAL 24 HOURS TRANSDERMAL DAILY
Status: DISCONTINUED | OUTPATIENT
Start: 2024-05-10 | End: 2024-05-15

## 2024-05-10 RX ORDER — ENOXAPARIN SODIUM 100 MG/ML
40 INJECTION SUBCUTANEOUS DAILY
Status: DISCONTINUED | OUTPATIENT
Start: 2024-05-11 | End: 2024-05-10

## 2024-05-10 RX ORDER — IPRATROPIUM BROMIDE AND ALBUTEROL SULFATE 2.5; .5 MG/3ML; MG/3ML
1 SOLUTION RESPIRATORY (INHALATION)
Status: DISCONTINUED | OUTPATIENT
Start: 2024-05-10 | End: 2024-05-15

## 2024-05-10 RX ORDER — HYDROXYZINE PAMOATE 25 MG/1
25 CAPSULE ORAL EVERY 6 HOURS PRN
Status: DISCONTINUED | OUTPATIENT
Start: 2024-05-10 | End: 2024-05-10

## 2024-05-10 RX ORDER — POLYETHYLENE GLYCOL 3350 17 G/17G
17 POWDER, FOR SOLUTION ORAL DAILY PRN
Status: DISCONTINUED | OUTPATIENT
Start: 2024-05-10 | End: 2024-05-16 | Stop reason: HOSPADM

## 2024-05-10 RX ORDER — 0.9 % SODIUM CHLORIDE 0.9 %
1000 INTRAVENOUS SOLUTION INTRAVENOUS ONCE
Status: COMPLETED | OUTPATIENT
Start: 2024-05-10 | End: 2024-05-10

## 2024-05-10 RX ORDER — PANTOPRAZOLE SODIUM 40 MG/1
40 TABLET, DELAYED RELEASE ORAL
Status: DISCONTINUED | OUTPATIENT
Start: 2024-05-10 | End: 2024-05-16 | Stop reason: HOSPADM

## 2024-05-10 RX ORDER — ACETAMINOPHEN 325 MG/1
650 TABLET ORAL EVERY 6 HOURS PRN
Status: DISCONTINUED | OUTPATIENT
Start: 2024-05-10 | End: 2024-05-16 | Stop reason: HOSPADM

## 2024-05-10 RX ORDER — ALBUTEROL SULFATE 2.5 MG/3ML
2.5 SOLUTION RESPIRATORY (INHALATION) 2 TIMES DAILY
COMMUNITY

## 2024-05-10 RX ORDER — PROCHLORPERAZINE EDISYLATE 5 MG/ML
10 INJECTION INTRAMUSCULAR; INTRAVENOUS EVERY 6 HOURS PRN
Status: DISCONTINUED | OUTPATIENT
Start: 2024-05-10 | End: 2024-05-10

## 2024-05-10 RX ORDER — PROCHLORPERAZINE MALEATE 10 MG
10 TABLET ORAL EVERY 8 HOURS PRN
Status: DISCONTINUED | OUTPATIENT
Start: 2024-05-10 | End: 2024-05-10

## 2024-05-10 RX ORDER — SODIUM CHLORIDE 0.9 % (FLUSH) 0.9 %
5-40 SYRINGE (ML) INJECTION EVERY 12 HOURS SCHEDULED
Status: DISCONTINUED | OUTPATIENT
Start: 2024-05-10 | End: 2024-05-16 | Stop reason: HOSPADM

## 2024-05-10 RX ORDER — AMLODIPINE BESYLATE 10 MG/1
10 TABLET ORAL DAILY
Status: DISCONTINUED | OUTPATIENT
Start: 2024-05-10 | End: 2024-05-16 | Stop reason: HOSPADM

## 2024-05-10 RX ORDER — ERGOCALCIFEROL 1.25 MG/1
50000 CAPSULE ORAL SEE ADMIN INSTRUCTIONS
COMMUNITY

## 2024-05-10 RX ORDER — ONDANSETRON 4 MG/1
4 TABLET, ORALLY DISINTEGRATING ORAL EVERY 8 HOURS PRN
Status: DISCONTINUED | OUTPATIENT
Start: 2024-05-10 | End: 2024-05-10

## 2024-05-10 RX ORDER — ACETAMINOPHEN 650 MG/1
650 SUPPOSITORY RECTAL EVERY 6 HOURS PRN
Status: DISCONTINUED | OUTPATIENT
Start: 2024-05-10 | End: 2024-05-16 | Stop reason: HOSPADM

## 2024-05-10 RX ORDER — SODIUM CHLORIDE 9 MG/ML
INJECTION, SOLUTION INTRAVENOUS PRN
Status: DISCONTINUED | OUTPATIENT
Start: 2024-05-10 | End: 2024-05-16 | Stop reason: HOSPADM

## 2024-05-10 RX ORDER — DIPHENHYDRAMINE HYDROCHLORIDE 50 MG/ML
25 INJECTION INTRAMUSCULAR; INTRAVENOUS ONCE
Status: COMPLETED | OUTPATIENT
Start: 2024-05-10 | End: 2024-05-10

## 2024-05-10 RX ORDER — SODIUM CHLORIDE 0.9 % (FLUSH) 0.9 %
5-40 SYRINGE (ML) INJECTION PRN
Status: DISCONTINUED | OUTPATIENT
Start: 2024-05-10 | End: 2024-05-16 | Stop reason: HOSPADM

## 2024-05-10 RX ORDER — ONDANSETRON 2 MG/ML
4 INJECTION INTRAMUSCULAR; INTRAVENOUS EVERY 6 HOURS PRN
Status: DISCONTINUED | OUTPATIENT
Start: 2024-05-10 | End: 2024-05-10

## 2024-05-10 RX ORDER — BUDESONIDE 0.5 MG/2ML
0.5 INHALANT ORAL
Status: DISCONTINUED | OUTPATIENT
Start: 2024-05-10 | End: 2024-05-16 | Stop reason: HOSPADM

## 2024-05-10 RX ORDER — DULOXETIN HYDROCHLORIDE 30 MG/1
30 CAPSULE, DELAYED RELEASE ORAL DAILY
Status: DISCONTINUED | OUTPATIENT
Start: 2024-05-10 | End: 2024-05-16 | Stop reason: HOSPADM

## 2024-05-10 RX ORDER — IPRATROPIUM BROMIDE AND ALBUTEROL SULFATE 2.5; .5 MG/3ML; MG/3ML
3 SOLUTION RESPIRATORY (INHALATION) ONCE
Status: COMPLETED | OUTPATIENT
Start: 2024-05-10 | End: 2024-05-10

## 2024-05-10 RX ORDER — LANOLIN ALCOHOL/MO/W.PET/CERES
3 CREAM (GRAM) TOPICAL NIGHTLY PRN
COMMUNITY

## 2024-05-10 RX ORDER — HEPARIN SODIUM 5000 [USP'U]/ML
5000 INJECTION, SOLUTION INTRAVENOUS; SUBCUTANEOUS EVERY 8 HOURS
Status: DISCONTINUED | OUTPATIENT
Start: 2024-05-10 | End: 2024-05-10

## 2024-05-10 RX ORDER — PREDNISONE 20 MG/1
40 TABLET ORAL DAILY
Status: COMPLETED | OUTPATIENT
Start: 2024-05-11 | End: 2024-05-14

## 2024-05-10 RX ORDER — IPRATROPIUM BROMIDE AND ALBUTEROL SULFATE 2.5; .5 MG/3ML; MG/3ML
1 SOLUTION RESPIRATORY (INHALATION)
Status: DISCONTINUED | OUTPATIENT
Start: 2024-05-10 | End: 2024-05-10

## 2024-05-10 RX ORDER — ENOXAPARIN SODIUM 100 MG/ML
40 INJECTION SUBCUTANEOUS DAILY
Status: DISCONTINUED | OUTPATIENT
Start: 2024-05-10 | End: 2024-05-16 | Stop reason: HOSPADM

## 2024-05-10 RX ORDER — ARFORMOTEROL TARTRATE 15 UG/2ML
15 SOLUTION RESPIRATORY (INHALATION)
Status: DISCONTINUED | OUTPATIENT
Start: 2024-05-10 | End: 2024-05-16 | Stop reason: HOSPADM

## 2024-05-10 RX ORDER — SODIUM CHLORIDE 9 MG/ML
INJECTION, SOLUTION INTRAVENOUS PRN
Status: DISCONTINUED | OUTPATIENT
Start: 2024-05-10 | End: 2024-05-14

## 2024-05-10 RX ADMIN — ARFORMOTEROL TARTRATE 15 MCG: 15 SOLUTION RESPIRATORY (INHALATION) at 19:20

## 2024-05-10 RX ADMIN — ACETAMINOPHEN 650 MG: 325 TABLET ORAL at 22:17

## 2024-05-10 RX ADMIN — IPRATROPIUM BROMIDE AND ALBUTEROL SULFATE 3 DOSE: .5; 3 SOLUTION RESPIRATORY (INHALATION) at 01:01

## 2024-05-10 RX ADMIN — VANCOMYCIN HYDROCHLORIDE 1750 MG: 10 INJECTION, POWDER, LYOPHILIZED, FOR SOLUTION INTRAVENOUS at 01:01

## 2024-05-10 RX ADMIN — PANTOPRAZOLE SODIUM 40 MG: 40 TABLET, DELAYED RELEASE ORAL at 06:22

## 2024-05-10 RX ADMIN — SODIUM CHLORIDE, PRESERVATIVE FREE 10 ML: 5 INJECTION INTRAVENOUS at 04:55

## 2024-05-10 RX ADMIN — WATER 125 MG: 1 INJECTION INTRAMUSCULAR; INTRAVENOUS; SUBCUTANEOUS at 01:04

## 2024-05-10 RX ADMIN — HEPARIN SODIUM 5000 UNITS: 5000 INJECTION INTRAVENOUS; SUBCUTANEOUS at 06:22

## 2024-05-10 RX ADMIN — BUDESONIDE INHALATION 500 MCG: 0.5 SUSPENSION RESPIRATORY (INHALATION) at 19:19

## 2024-05-10 RX ADMIN — DULOXETINE HYDROCHLORIDE 30 MG: 30 CAPSULE, DELAYED RELEASE ORAL at 10:09

## 2024-05-10 RX ADMIN — IOPAMIDOL 75 ML: 755 INJECTION, SOLUTION INTRAVENOUS at 01:47

## 2024-05-10 RX ADMIN — SODIUM CHLORIDE, PRESERVATIVE FREE 10 ML: 5 INJECTION INTRAVENOUS at 15:43

## 2024-05-10 RX ADMIN — SODIUM CHLORIDE 1000 ML: 9 INJECTION, SOLUTION INTRAVENOUS at 04:54

## 2024-05-10 RX ADMIN — IPRATROPIUM BROMIDE AND ALBUTEROL SULFATE 1 DOSE: .5; 2.5 SOLUTION RESPIRATORY (INHALATION) at 19:20

## 2024-05-10 RX ADMIN — IPRATROPIUM BROMIDE AND ALBUTEROL SULFATE 1 DOSE: .5; 2.5 SOLUTION RESPIRATORY (INHALATION) at 08:56

## 2024-05-10 RX ADMIN — FAMOTIDINE 20 MG: 10 INJECTION, SOLUTION INTRAVENOUS at 01:06

## 2024-05-10 RX ADMIN — SODIUM CHLORIDE, PRESERVATIVE FREE 10 ML: 5 INJECTION INTRAVENOUS at 21:20

## 2024-05-10 RX ADMIN — METHYLPREDNISOLONE SODIUM SUCCINATE 40 MG: 40 INJECTION INTRAMUSCULAR; INTRAVENOUS at 10:09

## 2024-05-10 RX ADMIN — DIPHENHYDRAMINE HYDROCHLORIDE 25 MG: 50 INJECTION INTRAMUSCULAR; INTRAVENOUS at 01:05

## 2024-05-10 ASSESSMENT — PAIN - FUNCTIONAL ASSESSMENT: PAIN_FUNCTIONAL_ASSESSMENT: NONE - DENIES PAIN

## 2024-05-10 ASSESSMENT — PAIN SCALES - GENERAL
PAINLEVEL_OUTOF10: 3
PAINLEVEL_OUTOF10: 0
PAINLEVEL_OUTOF10: 0

## 2024-05-10 NOTE — ED PROVIDER NOTES
17 100 % -- -- -- --      Recent Labs     05/07/24  1317 05/09/24  2337   WBC  --  3.6*   CREATININE 1.2* 1.2*   BILITOT  --  0.2   PLT  --  228         Time Severe Sepsis Identified: 2312    Fluid Resuscitation Rational: less than 30mL/kg because of a history of ESRD or stage IV/V CKD.  Instead, 1L was ordered.  More fluid initially would be potentially detrimental to the patient      Repeat lactate level: ordered and pending at this time    Reassessment Exam:   I have reassessed tissue perfusion and hemodynamic status after fluid bolus at this time: 0130          Medical Decision Making/Differential Diagnosis:    CC/HPI Summary, Social Determinants of health, Records Reviewed, DDx, testing done/not done, ED Course, Reassessment, disposition considerations/shared decision making with patient, consults, disposition:      The cardiac monitor revealed sinus tachycardia with a heart rate in the 120s as interpreted by me. The cardiac monitor was ordered secondary to the patient's respiratory distress and to monitor the patient for dysrhythmia.   CPT 45914      ED Course as of 05/10/24 0219   Fri May 10, 2024   0117 Spoke with house medicine hospitalist who will accept pt for admission.  [DOUGLAS]      ED Course User Index  [DOUGLAS] Javier Bella II, DO     EKG Interpretation  Interpreted by emergency department physician.    5/10/24  Time: 2220    Rate: 117  Axis: Normal  MI: 176  QRS: 70  Qtc: 622  Rhythm: Sinus tachycardia  Clinical Impression: No ST segment elevations or depressions.  Occasional PVC, prolonged QT  Comparison to old EKG: stable as compared to pt's most recent       Medical Decision Making  Amount and/or Complexity of Data Reviewed  Labs: ordered.  Radiology: ordered.  ECG/medicine tests: ordered.    Risk  OTC drugs.  Prescription drug management.  Decision regarding hospitalization.        59-year-old female history of COPD, ESRD, brought in by EMS from home for complaint of shortness of breath.  Patient was  found by EMS at home with O2 saturation of 66%, placed on BiPAP, given 1 DuoNeb treatment.  Patient reports that she has had a productive cough with thick green sputum for the past several days.  Patient reports subjective fevers at home.  Patient also complaining of chronic lower back pain.  Patient also reports chronic bilateral lower extremity edema, right greater than left.  Patient does report history of DVTs with no current anticoagulation use.  No report of head pain, chest pain, vision changes, nausea, vomiting, diarrhea, dysuria.  Patient does state that she missed her HD appointment today, last full treatment was 2 days ago.  On exam patient is in moderate distress, alert and oriented x 3, tachypneic, mildly increased work of breathing, tachycardic at 120, lungs diminished bilaterally, no rales or rhonchi appreciated.  BLE edema, right greater than left, peripheral pulses intact bilaterally.  No unilateral weakness, facial droop.  No chest wall or abdominal tenderness.  Differential diagnosis includes but not limited to COPD exacerbation, CHF, viral illness, pneumonia, sepsis, UTI, drug ingestion, electrolyte abnormality.   Patient transition to BiPAP on arrival.  Patient was febrile at 100.9, given Tylenol.  Patient was hypotensive, did not receive full 30 cc/kg bolus due to her missing dialysis today.  Due to her hypotension and tachycardia, as well as fever, patient started on empiric antibiotics of vancomycin and Zosyn.  Initial ABG showed mild hypercapnia of 52 otherwise normal pH.    On reevaluation patient's hemodynamics have improved after fluids, resolution of fever after Tylenol.   Patient with mild leukopenia at 3000, hemoglobin 7.1.  CMP shows no electrolyte disturbance, no DOMINGO.  Procalcitonin minimally elevated at 0.38.   Patient's UDS is positive for benzodiazepines, cocaine, fentanyl.  Troponin levels are elevated, but not uptrending, this is in the setting of CKD.  Patient's respiratory  panel also shows she is positive for parainfluenza virus.  CT of the chest shows no PE, does show a cavitary lesion.   Consulted with house medicine attending Dr. Rees who will accept patient for admission.  Patient admitted in stable condition    CONSULTS:   None        PROCEDURES   Unless otherwise noted below, none       CRITICAL CARE TIME (.cct)           I am the Primary Clinician of Record.    FINAL IMPRESSION      1. Acute respiratory failure with hypoxia (HCC)    2. Noncompliance with renal dialysis (HCC)    3. Prolonged Q-T interval on ECG    4. Sepsis due to pneumonia (HCC)    5. Polysubstance abuse (HCC)          DISPOSITION/PLAN     DISPOSITION Admitted 05/10/2024 01:56:21 AM      PATIENT REFERRED TO:  No follow-up provider specified.    DISCHARGE MEDICATIONS:  New Prescriptions    No medications on file       DISCONTINUED MEDICATIONS:  Discontinued Medications    No medications on file              (Please note that portions of this note were completed with a voice recognition program.  Efforts were made to edit the dictations but occasionally words are mis-transcribed.)    Javier Bella II, DO (electronically signed)      Please refer to my separate ED provider note with my attestation and my separate documentation of the encounter.            Sabino Coto MD  05/10/24 0889

## 2024-05-10 NOTE — ED NOTES
Radiology Procedure Waiver   Name: Saba Sullivan  : 1965  MRN: [id number]    Date:  5/10/24    Time: 12:56 AM EDT    Benefits of immediately proceeding with Radiology exam(s) without pre-testing outweigh the risks or are not indicated as specified below and therefore the following is/are being waived:    [x] Pregnancy test   [] Patients LMP on-time and regular.   [] Patient had Tubal Ligation or has other Contraception Device.   [] Patient  is Menopausal or Premenarcheal.    [] Patient had Full or Partial Hysterectomy.    [x] Protocol for Iodine allergy    [] MRI Questionnaire     [x] BUN/Creatinine   [] Patient age w/no hx of renal dysfunction.   [x] Patient on Dialysis.   [] Recent Normal Labs.  Electronically signed by Sabino Coto MD on 5/10/24 at 12:56 AM EDT               Sabino Coto MD  05/10/24 0056

## 2024-05-10 NOTE — CONSULTS
[facility]  Nephrology Consult Note    Patient's Name: Saba Sullivan    Reason for Consult: DOMINGO on dialysis    Chief Complaint: Shortness of breath  History Obtained From:  patient, past medical records, and EMR    History of Present Illness:    Saba Sullivan is a 59 y.o. female with a past medical history of polysubstance abuse, hepatitis C, and chronic recurrent multifocal osteomyelitis of foot.  She presented to the ED on 5/9 with complaints of shortness of breath.  Vital signs on 5/9 includes temperature 100.9, respirations 17, pulse 120, BP 89/55, and she was 97% SpO2.  Lab data on 5/9 includes BUN 26, creatinine 1.2, glucose 109, procalcitonin 0.38, troponin 271, and hemoglobin 7.1.  Her urine drug screen was positive for benzodiazepine, cocaine, and fentanyl.  Her respiratory panel was positive for parainfluenza. She had a chest x-ray on 5/10 which showed no acute cardiopulmonary process. She had a CTA Chest on 5/10 which showed no evidence of PE, thick-walled cavitary lesion, dilated main pulmonary artery.  Nephrology has been consulted to see the patient for DOMINGO on dialysis.  Patient is known to our service and was followed previously by our service while inpatient for DOMINGO stage III which was presumed likely in the setting of rhabdomyolysis and cocaine use and possible hypovolemia/IV contrast with CTA chest.  Hemodialysis was started on 4/25 and she had a right IJ TDC placed on 4/26.  She discharged on 5/4.  She was set up for outpatient dialysis at [facility].  She was beginning to show signs of renal recovery and hemodialysis has been on hold.  At present, patient was seen and examined.  She explained that she came in with shortness of breath which started last night.  She also notes a little dizziness while standing last night.  She denies any chest pain.  She denies any abdominal pain, nausea, vomiting or diarrhea.  She denies any headaches.  She denies any dysuria.    PMH:    Past  artery  Parainfluenza positive  BiPAP  Defer to primary service    3.  Polysubstance abuse  Urine drug screen (+) benzodiazepine, cocaine, and fentanyl  Defer to primary service    4.  Anemia  Likely in part with chronic disease  Hemoglobin 6.7 today  Transfuse for hemoglobin<7 as per primary service  Monitor H&H    Gabo Trinh, APRN - CNP  Pt seen and examined on rounds with gabo horan  Agree with above  Cr now at 1.2  Resolved arf from previous  Will follow off dialysis  Pt has not had any outpt hd  Radhames Fletcher MD

## 2024-05-10 NOTE — ED NOTES
Patient requested to use toilet.  PCA assisted patient to w/c but patient could not tolerate.  Patient then returned to bed, O2 reapplied.  Patient used bed pan.

## 2024-05-10 NOTE — ED NOTES
ATTENDING PROVIDER ATTESTATION:     I have personally performed and/or participated in the history, exam, medical decision making, and procedures and agree with all pertinent clinical information.      I have also reviewed and agree with the past medical, family and social history unless otherwise noted.    I have discussed this patient in detail with the resident, and provided the instruction and education regarding sob.    My findings/Plan: I was the primary provider for patient.  Patient with history of hematoma by history of substance abuse history of non-STEMI history of DOMINGO history of avascular necrosis left hip history of cholelithiasis history of tobacco use history of prolonged QT history of COPD presenting here because of shortness of breath.  Patient reporting shortness of breath that started earlier this afternoon she reports coughing up some yellow sputum.  Patient reporting fever.  She reports no active chest pain she reports no abdominal pain or vomiting she reports no syncopal event.  Patient was reportedly hypoxic.  Patient was on CPAP on arrival.  Patient is awake alert mild to moderate distress heart exam patient is tachycardic lungs diminished abdomen soft nontender.  She has edema more so on the right than left.  Patient does have history tobacco use she was just recently admitted on 4/17/2024 for respiratory failure and COPD and non-STEMI.  Patient's echo 4/19/2024 showed ejection fraction 65% patient differential includes pneumonia as well as COPD exacerbation as well as pneumonia as well as PE as well as heart failure    EKG:  This EKG is signed and interpreted by me.    Rate: 117  Rhythm: Sinus tachycardia  Interpretation: non-specific EKG patient's magnesium level was 622  Comparison: changes compared to previous EKG 4/20/2024  Patient while here in the Emergency Department was placed on monitor she was placed on BiPAP due to respiratory distress.  Patient labs sodium is 135 potassium 4.3  BUN is 26 creatinine was 1.2 patient's lactate was 1.2 patient's troponin is 271 patient is on dialysis.  Alk phos was 83 patient's urine drug screen is positive for benzodiazepines as well as cocaine as well as fentanyl.  Patient's white count was 3.6 hemoglobin 10.1 patient's platelet count was 228.  Patient was given Tylenol due to her fever I did review her chest x-ray did not appreciate any significant infiltrate.  With patient having fever and also when I went to recheck patient patient was complaining of some right-sided chest pain her right leg is swollen I did order CT of her chest to rule out PE as well as infiltrate patient was premedicated with Benadryl Solu-Medrol and Pepcid.  Patient already received IV antibiotics Zosyn and vancomycin patient was recently hospitalized.  Patient also given fluid bolus 1 L blood pressure did improve with IV fluid bolus but did not receive full sepsis bolus due to patient being on dialysis.  Patient was given IV magnesium due to prolonged QT as well  CT of the chest was done did not show any signs of PE did show a thick-walled cavitary lesion posterior basilar segment right lower lobe surrounding consolidation stretching resolving inflammatory infectious process dilated main pulmonary artery.  Patient rechecked here was taken off BiPAP was currently on nonrebreather.  Patient pulse ox stable.  Patient made aware of findings and plan for admission I did speak to house attending as well as resident was notified.    Please note that the withdrawal or failure to initiate urgent interventions for this patient would likely result in a life threatening deterioration or permanent disability.      Accordingly this patient received 35 minutes of critical care time, excluding separately billable procedures.               --------------------------------- IMPRESSION AND DISPOSITION ---------------------------------    IMPRESSION  1. Acute respiratory failure with hypoxia (HCC)    2.  No

## 2024-05-10 NOTE — PROGRESS NOTES
Date: 5/9/2024    Time: 11:24 PM    Patient Placed On BIPAP/CPAP/ Non-Invasive Ventilation?  Yes    If no must comment.  Facial area red/color change? No           If YES are Blister/Lesion present?No   If yes must notify nursing staff  BIPAP/CPAP skin barrier?  Yes    Skin barrier type:mepilexlite       Comments:        Audrey Tamayo RCP

## 2024-05-10 NOTE — CONSENT
Informed Consent for Blood Component Transfusion Note    I have discussed with the patient the rationale for blood component transfusion; its benefits in treating or preventing fatigue, organ damage, or death; and its risk which includes mild transfusion reactions, rare risk of blood borne infection, or more serious but rare reactions. I have discussed the alternatives to transfusion, including the risk and consequences of not receiving transfusion. The patient had an opportunity to ask questions and had agreed to proceed with transfusion of blood components.    Electronically signed by Samy Mcdaniels MD on 5/10/24 at 11:32 AM EDT

## 2024-05-10 NOTE — PROGRESS NOTES
[facility]  Internal Medicine Residency / House Medicine Service    Attending Physician Statement  I have discussed the case, including pertinent history and exam findings with the resident and the team.  I have seen and examined the patient and the key elements of the encounter have been performed by me.  I agree with the assessment, plan and orders as documented by the resident.      Alert and SOB  May drift occasionally to sleepiness  Oriented x 3  VS stable   Hx of polydrug abuse presently  Has parainfluenza virus infection and productive cough  Still smoking  Admitted with severe hypoxemia , responsive to aerosols ,   BIPAP and CPAP ordered  Meds resumed and steroids started   CXR RLL pneumonia with consolidation and abscess  Plan; Continue same for now, on  Zosyn   Remainder of medical problems as per resident note.      Raul Haynes MD FRCP [facility]  Internal Medicine Residency Faculty

## 2024-05-10 NOTE — H&P
[facility]  Internal Medicine Residency Program  History and Physical    Patient:  Saba Sullivan 59 y.o. female MRN: [id number]     Date of Service: 5/10/2024    Hospital Day: 2      Chief complaint: had concerns including Shortness of Breath (Pt reports SOB since this afternoon causing her to miss her IHD appt.  Last IHD on Tuesday; pt reports getting full treatmen.  EMS reported pt 66% on RA.  PT presented to ED on CPAP.  EMS reported 1 duoneb given en route).    History Obtained From:  patient, electronic medical record    Date of Admission:   History of Present Illness   Saba Sullivan is a 59 y.o. female has a past medical history of Abscess, Chronic pain, Chronic recurrent multifocal osteomyelitis of foot (HCC), Hepatitis C, Hip fracture (HCC), Hx of blood clots, Polysubstance abuse (HCC), Pressure injury of heel, stage 3 (HCC), and Subclinical hypothyroidism. presented with CC of SOB since last few days.     Patient was recently discharged from the hospital last week, admitted for multifactorial acute hypoxic respiratory failure and poly substance abuse. Due to her poor AMPAC score patient was recommended to go to the [facility] patient deferred the order, and discharged to home with scheduled HD at [place] on MWF. Patient hasn't been complaint with HD. States she has not been feeling well since last few days, and complaints about SOB. She also endorses having productive cough with greenish sputum. She denies any chest pain, weakness, no concern about bowel and bladder. She abuses drug.      Patient was brought to the ED by EMS due to SOB, and saturation was 68% on room air. She was on CPAP on arrival.In the ED, Patient was febrile, tachy cardic, 1 L bolus given due to concern for sepsis, LA acid is normal, procal elevated without any leukocytosis.  ABG showed hypercapnia with PCO2 of 52. Cr is elevated. UDS is positive for multiple drugs. One dose of vancomycin and Zosyn was given. Patient  nonspecific pulmonary arterial hypertension. Mediastinum: No evidence of mediastinal lymphadenopathy.  The heart and pericardium demonstrate no acute abnormality.  There is no acute abnormality of the thoracic aorta. Lungs/pleura: A thick-walled cavitary lesion is present at the posterior basilar segment right lower lobe with mild surrounding consolidation in the region of prior dense consolidation suggesting resolving inflammatory process.  The lungs are without acute process.  No focal consolidation or pulmonary edema.  No evidence of pleural effusion or pneumothorax. Resolution of previously demonstrated basilar consolidation and effusions. Upper Abdomen: Limited images of the upper abdomen are unremarkable. Soft Tissues/Bones: No acute bone or soft tissue abnormality.     1. No evidence of pulmonary embolism. 2. Thick-walled cavitary lesion development from April 23, 2024, within the posterior basilar segment right lower lobe with surrounding consolidation suggesting resolving inflammatory/infectious process. 3. Dilated main pulmonary artery suggesting nonspecific pulmonary arterial hypertension.     XR CHEST PORTABLE    Result Date: 5/10/2024  EXAMINATION: ONE XRAY VIEW OF THE CHEST 5/10/2024 12:18 am COMPARISON: None. HISTORY: ORDERING SYSTEM PROVIDED HISTORY: Sepsis TECHNOLOGIST PROVIDED HISTORY: Reason for exam:->Sepsis FINDINGS: Normal cardiomediastinal silhouette.  Lungs clear.  No pneumothorax or effusion. Body wall soft tissues unremarkable. Osseous thorax intact.  Right jugular hemodialysis catheter tip remains with good positioning at the cavoatrial junction.     No acute cardiopulmonary process.     Vascular duplex lower extremity venous right        Resident's Assessment and Plan       Saba Sullivan is a 59 y.o. female with  has a past medical history of Abscess, Chronic pain, Chronic recurrent multifocal osteomyelitis of foot (HCC), Hepatitis C, Hip fracture (HCC), Hx of blood clots, Polysubstance  abuse (HCC), Pressure injury of heel, stage 3 (HCC), and Subclinical hypothyroidism.        Assessment:  Acute hypoxic hypercapnic respiratory failure likely 2/2 para influenza positive vs COPD exacerbation, CHF,  Para-influenza positive   ??Concern for PNA, Thick-walled cavitary lesion development from April 23, 2024, within the  posterior basilar segment right lower lobe with surrounding consolidation; suggesting resolving inflammatory/infectious process.  ?? Concern for sepsis, BP lower borderline, procal elevated, LA normal  DOMINGO, cr 1.2  UDS is positive for multiple drugs, benzo, cocaine, fentanyl  COPD Gold stage IV-PFT in 2015  Prolonged QTc ( 521), recent EKG showed Qtc 412  Hepatitis C antibody reactive  Moderate pulmonary hypertension 2/2 pulmonary hypertension  Hypertension, on Amlodipine 10 mg daily  Cholelithiasis-seen on CTA pulm  Normocytic anemia 2/2 anemia of chronic disease  Chronic pain 2/2 L hip fracture and R calc ulcer  Essential tremor  Vitamin D deficiency  Functional oropharyngeal swallow disorder  History of Parkinson disease  History of tobacco use (35-pack-year, quit 2016 )  Subclinical hypothyroidism  History of osteomyelitis of right heel s/p debridement  History of polysubstance abuse      Plan:   Follow daily labs and replace electrolytes as needed  Follow CK, trend if needed and start fluid  Follow LA q6h, discontinue if normal  Follow repeat troponin, if no delta change, discontinue   Vancomycin and Zosyn one dose given, continue if needed  Continue breathing treatment  Continue steroid   Keep on NIV, patient is non-complaint  Resume diet when stable  Monitor respiratory status, low threshold for intubation, during last admission pt was transferred to the MICU, but never intubated  Consider consulting nephro if the patient needs HD, she is scheduled MWF in [facility]            PT/OT: Not indicated at this time  DVT ppx: Heparin  GI ppx: Protonix    Darell Villa MD, PGY-2

## 2024-05-11 LAB
ABO/RH: NORMAL
ANION GAP SERPL CALCULATED.3IONS-SCNC: 13 MMOL/L (ref 7–16)
ANTIBODY SCREEN: NEGATIVE
ARM BAND NUMBER: NORMAL
BASOPHILS # BLD: 0.01 K/UL (ref 0–0.2)
BASOPHILS NFR BLD: 0 % (ref 0–2)
BLOOD BANK BLOOD PRODUCT EXPIRATION DATE: NORMAL
BLOOD BANK DISPENSE STATUS: NORMAL
BLOOD BANK ISBT PRODUCT BLOOD TYPE: 6200
BLOOD BANK PRODUCT CODE: NORMAL
BLOOD BANK SAMPLE EXPIRATION: NORMAL
BLOOD BANK UNIT TYPE AND RH: NORMAL
BPU ID: NORMAL
BUN SERPL-MCNC: 29 MG/DL (ref 6–20)
CALCIUM SERPL-MCNC: 8.8 MG/DL (ref 8.6–10.2)
CHLORIDE SERPL-SCNC: 104 MMOL/L (ref 98–107)
CO2 SERPL-SCNC: 22 MMOL/L (ref 22–29)
COMPONENT: NORMAL
CREAT SERPL-MCNC: 1.1 MG/DL (ref 0.5–1)
CROSSMATCH RESULT: NORMAL
EOSINOPHIL # BLD: 0 K/UL (ref 0.05–0.5)
EOSINOPHILS RELATIVE PERCENT: 0 % (ref 0–6)
ERYTHROCYTE [DISTWIDTH] IN BLOOD BY AUTOMATED COUNT: 16.9 % (ref 11.5–15)
GFR, ESTIMATED: 58 ML/MIN/1.73M2
GLUCOSE SERPL-MCNC: 105 MG/DL (ref 74–99)
HCT VFR BLD AUTO: 27.5 % (ref 34–48)
HCT VFR BLD AUTO: 27.6 % (ref 34–48)
HGB BLD-MCNC: 8.1 G/DL (ref 11.5–15.5)
HGB BLD-MCNC: 8.2 G/DL (ref 11.5–15.5)
IMM GRANULOCYTES # BLD AUTO: <0.03 K/UL (ref 0–0.58)
IMM GRANULOCYTES NFR BLD: 0 % (ref 0–5)
LYMPHOCYTES NFR BLD: 0.6 K/UL (ref 1.5–4)
LYMPHOCYTES RELATIVE PERCENT: 12 % (ref 20–42)
MCH RBC QN AUTO: 28.4 PG (ref 26–35)
MCHC RBC AUTO-ENTMCNC: 29.3 G/DL (ref 32–34.5)
MCV RBC AUTO: 96.8 FL (ref 80–99.9)
MONOCYTES NFR BLD: 0.46 K/UL (ref 0.1–0.95)
MONOCYTES NFR BLD: 9 % (ref 2–12)
NEUTROPHILS NFR BLD: 78 % (ref 43–80)
NEUTS SEG NFR BLD: 3.85 K/UL (ref 1.8–7.3)
PLATELET # BLD AUTO: 265 K/UL (ref 130–450)
PMV BLD AUTO: 9.9 FL (ref 7–12)
POTASSIUM SERPL-SCNC: 4.3 MMOL/L (ref 3.5–5)
RBC # BLD AUTO: 2.85 M/UL (ref 3.5–5.5)
SODIUM SERPL-SCNC: 139 MMOL/L (ref 132–146)
TRANSFUSION STATUS: NORMAL
TROPONIN I SERPL HS-MCNC: 139 NG/L (ref 0–9)
UNIT DIVISION: 0
UNIT ISSUE DATE/TIME: NORMAL
WBC OTHER # BLD: 4.9 K/UL (ref 4.5–11.5)

## 2024-05-11 PROCEDURE — 84484 ASSAY OF TROPONIN QUANT: CPT

## 2024-05-11 PROCEDURE — 94660 CPAP INITIATION&MGMT: CPT

## 2024-05-11 PROCEDURE — 85025 COMPLETE CBC W/AUTO DIFF WBC: CPT

## 2024-05-11 PROCEDURE — 6370000000 HC RX 637 (ALT 250 FOR IP)

## 2024-05-11 PROCEDURE — 80048 BASIC METABOLIC PNL TOTAL CA: CPT

## 2024-05-11 PROCEDURE — 36415 COLL VENOUS BLD VENIPUNCTURE: CPT

## 2024-05-11 PROCEDURE — 2700000000 HC OXYGEN THERAPY PER DAY

## 2024-05-11 PROCEDURE — 2580000003 HC RX 258

## 2024-05-11 PROCEDURE — 2060000000 HC ICU INTERMEDIATE R&B

## 2024-05-11 PROCEDURE — 99232 SBSQ HOSP IP/OBS MODERATE 35: CPT | Performed by: INTERNAL MEDICINE

## 2024-05-11 PROCEDURE — 6360000002 HC RX W HCPCS

## 2024-05-11 PROCEDURE — 94640 AIRWAY INHALATION TREATMENT: CPT

## 2024-05-11 RX ORDER — HYDROXYZINE PAMOATE 50 MG/1
50 CAPSULE ORAL 3 TIMES DAILY PRN
Status: DISCONTINUED | OUTPATIENT
Start: 2024-05-11 | End: 2024-05-12

## 2024-05-11 RX ORDER — LANOLIN ALCOHOL/MO/W.PET/CERES
3 CREAM (GRAM) TOPICAL NIGHTLY PRN
Status: DISCONTINUED | OUTPATIENT
Start: 2024-05-11 | End: 2024-05-12

## 2024-05-11 RX ADMIN — BUDESONIDE INHALATION 500 MCG: 0.5 SUSPENSION RESPIRATORY (INHALATION) at 06:45

## 2024-05-11 RX ADMIN — PREDNISONE 40 MG: 20 TABLET ORAL at 08:52

## 2024-05-11 RX ADMIN — PANTOPRAZOLE SODIUM 40 MG: 40 TABLET, DELAYED RELEASE ORAL at 08:51

## 2024-05-11 RX ADMIN — ARFORMOTEROL TARTRATE 15 MCG: 15 SOLUTION RESPIRATORY (INHALATION) at 18:20

## 2024-05-11 RX ADMIN — IPRATROPIUM BROMIDE AND ALBUTEROL SULFATE 1 DOSE: .5; 2.5 SOLUTION RESPIRATORY (INHALATION) at 10:38

## 2024-05-11 RX ADMIN — BUDESONIDE INHALATION 500 MCG: 0.5 SUSPENSION RESPIRATORY (INHALATION) at 18:20

## 2024-05-11 RX ADMIN — Medication 3 MG: at 21:00

## 2024-05-11 RX ADMIN — IPRATROPIUM BROMIDE AND ALBUTEROL SULFATE 1 DOSE: .5; 2.5 SOLUTION RESPIRATORY (INHALATION) at 06:45

## 2024-05-11 RX ADMIN — SODIUM CHLORIDE, PRESERVATIVE FREE 10 ML: 5 INJECTION INTRAVENOUS at 08:52

## 2024-05-11 RX ADMIN — DULOXETINE HYDROCHLORIDE 30 MG: 30 CAPSULE, DELAYED RELEASE ORAL at 08:52

## 2024-05-11 RX ADMIN — SODIUM CHLORIDE, PRESERVATIVE FREE 10 ML: 5 INJECTION INTRAVENOUS at 21:00

## 2024-05-11 RX ADMIN — IPRATROPIUM BROMIDE AND ALBUTEROL SULFATE 1 DOSE: .5; 2.5 SOLUTION RESPIRATORY (INHALATION) at 18:20

## 2024-05-11 RX ADMIN — ARFORMOTEROL TARTRATE 15 MCG: 15 SOLUTION RESPIRATORY (INHALATION) at 06:45

## 2024-05-11 RX ADMIN — IPRATROPIUM BROMIDE AND ALBUTEROL SULFATE 1 DOSE: .5; 2.5 SOLUTION RESPIRATORY (INHALATION) at 14:37

## 2024-05-11 RX ADMIN — HYDROXYZINE PAMOATE 50 MG: 50 CAPSULE ORAL at 21:00

## 2024-05-11 ASSESSMENT — PAIN SCALES - GENERAL
PAINLEVEL_OUTOF10: 0

## 2024-05-11 NOTE — PROGRESS NOTES
Comprehensive Nutrition Assessment    Type and Reason for Visit:  Initial, Positive Nutrition Screen    Nutrition Recommendations/Plan:   Continue current diet. Recommend and start ONS: Nepro BID to promote nutrient/PO intake. Will continue to monitor.     Malnutrition Assessment:  Malnutrition Status:  At risk for malnutrition (Comment) (05/11/24 4574)    Context:  Acute Illness     Findings of the 6 clinical characteristics of malnutrition:  Energy Intake:  Mild decrease in energy intake (Comment)  Weight Loss:  Unable to assess (2/2 poor wt hx on file to assess/trend)     Body Fat Loss:  Unable to assess (2/2 parainfluenza ISO status)     Muscle Mass Loss:  Unable to assess (2/2 parainfluenza ISO status)    Fluid Accumulation:  Unable to assess (2/2 parainfluenza ISO status)     Strength:  Not Performed    Nutrition Assessment:    Pt. admitted for acute hypercapnic respiratory failure likely 2/2 COPD exacerbation 2/2 to parainfluenza infection; Noted concern for sepsis and PNA. Nephrology following for hx of recent DOMINGO stage II, s/p ST started on 4/25, s/p right IJ TDC 4/26; HD has been on hold with renal recovery. PMHx of COPD, hep C, recurrent OM of R foot. Hx of polysubstance abuse; noted UDS (+) benzodiazepine, cocaine, and fentanyl. Will start ONS and monitor.    Nutrition Related Findings:    A&Ox4, poor dentition, abd/BS WDL, abd/BS WDL, +I/Os(2.6L), elevated BUN/CR, Wound Type: None       Current Nutrition Intake & Therapies:    Average Meal Intake: 1-25%, 26-50%  Average Supplements Intake: None Ordered  ADULT DIET; Regular    Anthropometric Measures:  Height: 175.3 cm (5' 9.02\")  Ideal Body Weight (IBW): 145 lbs (66 kg)    Admission Body Weight: 68 kg (149 lb 14.6 oz) (5/09 first measured)  Current Body Weight: 68 kg (149 lb 14.6 oz) (5/09 stated ?method as weight is measured), 103.4 % IBW. Weight Source: Other (Comment)  Current BMI (kg/m2): 22.1  Usual Body Weight:  (UTO d/t limited/poor wt hx on  file to assess/trend)     Weight Adjustment For: No Adjustment                 BMI Categories: Normal Weight (BMI 22.0 to 24.9) age over 65    Estimated Daily Nutrient Needs:  Energy Requirements Based On: Formula  Weight Used for Energy Requirements: Current  Energy (kcal/day): 1600-1700kcal (MSJ x1.2SF)  Weight Used for Protein Requirements: Current  Protein (g/day): 80-95g (1.2-1.4g/kgCBW) (as tolerated w/ CKD)  Method Used for Fluid Requirements: Standard Renal  Fluid (ml/day): per renal management    Nutrition Diagnosis:   Inadequate oral intake related to impaired respiratory function as evidenced by poor intake prior to admission, intake 0-25%, intake 26-50%    Nutrition Interventions:   Food and/or Nutrient Delivery: Continue Current Diet, Start Oral Nutrition Supplement (start ONS: Nepro BID)  Nutrition Education/Counseling: No recommendation at this time  Coordination of Nutrition Care: Continue to monitor while inpatient       Goals:     Goals: PO intake 50% or greater, by next RD assessment       Nutrition Monitoring and Evaluation:   Behavioral-Environmental Outcomes: None Identified  Food/Nutrient Intake Outcomes: Food and Nutrient Intake, Supplement Intake  Physical Signs/Symptoms Outcomes: Biochemical Data, GI Status, Fluid Status or Edema, Nutrition Focused Physical Findings, Skin, Weight    Discharge Planning:    Too soon to determine     Daniel Pratt RD  Contact: ext [phone]

## 2024-05-11 NOTE — PROGRESS NOTES
[facility]  Nephrology Attending Progress Note  Radhames Fletcher MD        SUBJECTIVE:   From 5/10 History of Present Illness:    \"Saba Sullivan is a 59 y.o. female with a past medical history of polysubstance abuse, hepatitis C, and chronic recurrent multifocal osteomyelitis of foot.  She presented to the ED on 5/9 with complaints of shortness of breath.  Vital signs on 5/9 includes temperature 100.9, respirations 17, pulse 120, BP 89/55, and she was 97% SpO2.  Lab data on 5/9 includes BUN 26, creatinine 1.2, glucose 109, procalcitonin 0.38, troponin 271, and hemoglobin 7.1.  Her urine drug screen was positive for benzodiazepine, cocaine, and fentanyl.  Her respiratory panel was positive for parainfluenza. She had a chest x-ray on 5/10 which showed no acute cardiopulmonary process. She had a CTA Chest on 5/10 which showed no evidence of PE, thick-walled cavitary lesion, dilated main pulmonary artery.  Nephrology has been consulted to see the patient for DOMINGO on dialysis.  Patient is known to our service and was followed previously by our service while inpatient for DOMINGO stage III which was presumed likely in the setting of rhabdomyolysis and cocaine use and possible hypovolemia/IV contrast with CTA chest.  Hemodialysis was started on 4/25 and she had a right IJ TDC placed on 4/26.  She discharged on 5/4.  She was set up for outpatient dialysis at [facility].  She was beginning to show signs of renal recovery and hemodialysis has been on hold.  At present, patient was seen and examined.  She explained that she came in with shortness of breath which started last night.  She also notes a little dizziness while standing last night.  She denies any chest pain.  She denies any abdominal pain, nausea, vomiting or diarrhea.  She denies any headaches.  She denies any dysuria\"    5/11/2024: Resting in bed. States she is feeling good. No sob, chest pain, abd pain. Appetite is good. Is having chronic back pain.  °F (36.5 °C) Temporal 94 16 96 %   05/10/24 1745 138/72 -- -- 89 16 96 %   05/10/24 1700 (!) 144/71 -- -- 90 17 96 %   05/10/24 1651 127/79 -- -- 95 14 (!) 88 %   05/10/24 1645 131/82 -- -- 90 23 95 %   05/10/24 1630 126/67 -- -- 93 22 95 %   05/10/24 1615 113/78 -- -- 88 20 --   05/10/24 1600 126/78 -- -- 87 21 --   05/10/24 1545 127/76 -- -- 86 18 --   05/10/24 1530 126/74 98 °F (36.7 °C) -- 86 18 100 %   05/10/24 1515 127/73 -- -- 86 16 --   05/10/24 1500 124/74 -- -- 87 22 100 %   05/10/24 1458 121/68 -- -- 86 16 100 %   05/10/24 1445 118/75 -- -- 90 19 95 %   05/10/24 1438 121/73 97.7 °F (36.5 °C) -- 88 16 96 %   05/10/24 1430 121/73 -- -- 87 14 96 %   05/10/24 1415 116/60 -- -- 89 21 --   05/10/24 1400 (!) 114/59 -- -- 91 23 --   05/10/24 1345 118/68 -- -- 99 20 94 %   05/10/24 1315 109/64 -- -- 85 21 95 %   05/10/24 1300 111/64 -- -- 86 20 --   05/10/24 1245 129/69 -- -- 87 23 99 %   05/10/24 1215 103/61 -- -- 81 26 100 %   05/10/24 1145 103/64 -- -- 88 28 94 %   05/10/24 1115 134/71 -- -- 92 27 (!) 89 %   @      Intake/Output Summary (Last 24 hours) at 5/11/2024 1113  Last data filed at 5/11/2024 0645  Gross per 24 hour   Intake 950 ml   Output 400 ml   Net 550 ml         Wt Readings from Last 3 Encounters:   05/09/24 68 kg (149 lb 14.6 oz)   05/03/24 67.9 kg (149 lb 11.1 oz)   06/24/22 86.2 kg (190 lb)       General appearance: In no acute distress  Skin: Multiple dry closed lesions on arms and legs  Neck: No JVD  Lungs: Clear, no adventitious sounds  Heart: RRR, no rub  Abdomen: Soft, non-tender, + bowel sounds  Extremities: 1+ edema RLE, neg edema LLE  Neurologic: Mental status: Alert, oriented    MEDS (scheduled):    [Held by provider] amLODIPine  10 mg Oral Daily    DULoxetine  30 mg Oral Daily    sodium chloride flush  5-40 mL IntraVENous 2 times per day    pantoprazole  40 mg Oral QAM AC    predniSONE  40 mg Oral Daily    enoxaparin  40 mg SubCUTAneous Daily    budesonide  0.5 mg Nebulization BID RT

## 2024-05-11 NOTE — PROGRESS NOTES
4 Eyes Skin Assessment     NAME:  Saba Sullivan  YOB: 1965  MEDICAL RECORD NUMBER:  [id number]    The patient is being assessed for  Admission    I agree that at least one RN has performed a thorough Head to Toe Skin Assessment on the patient. ALL assessment sites listed below have been assessed.      Areas assessed by both nurses:    Head, Face, Ears, Shoulders, Back, Chest, Arms, Elbows, Hands, Sacrum. Buttock, Coccyx, Ischium, Legs. Feet and Heels, and Under Medical Devices         Does the Patient have a Wound? Yes wound(s) were present on assessment. LDA wound assessment was Initiated and completed by RN blanchable buttock redness; r heel wound; r foot wound       Kt Prevention initiated by RN: Yes  Wound Care Orders initiated by RN: Yes    Pressure Injury (Stage 3,4, Unstageable, DTI, NWPT, and Complex wounds) if present, place Wound referral order by RN under : Yes    New Ostomies, if present place, Ostomy referral order under : No     Nurse 1 eSignature: Electronically signed by Tosha Pendleton RN on 5/10/24 at 11:38 PM EDT    **SHARE this note so that the co-signing nurse can place an eSignature**    Nurse 2 eSignature: Electronically signed by GUCCI CHAVES RN on 5/10/24 at 11:47 PM EDT

## 2024-05-11 NOTE — PLAN OF CARE
Problem: Skin/Tissue Integrity  Goal: Absence of new skin breakdown  Description: 1.  Monitor for areas of redness and/or skin breakdown  2.  Assess vascular access sites hourly  3.  Every 4-6 hours minimum:  Change oxygen saturation probe site  4.  Every 4-6 hours:  If on nasal continuous positive airway pressure, respiratory therapy assess nares and determine need for appliance change or resting period.  Outcome: Progressing     Problem: Safety - Adult  Goal: Free from fall injury  Outcome: Progressing     Problem: Discharge Planning  Goal: Discharge to home or other facility with appropriate resources  Outcome: Progressing     Problem: ABCDS Injury Assessment  Goal: Absence of physical injury  Outcome: Progressing     Problem: Pain  Goal: Verbalizes/displays adequate comfort level or baseline comfort level  Outcome: Progressing

## 2024-05-11 NOTE — PROGRESS NOTES
[facility]  Internal Medicine Residency Program  Progress Note - House Team       Patient:  Saba Sullivan 59 y.o. female   MRN: [id number]       Date of Service: 5/11/2024      Subjective     Patient was seen and examined at bedside this morning, was saturating on 5L/NC, no new complains. Received a unit of blood transfusion yesterday, post transfusion H/H is 8.1, tolerated the procedure well. No significant or concerning events overnight.     Objective       Physical Exam  Vitals: /77   Pulse 97   Temp 98 °F (36.7 °C) (Temporal)   Resp 20   Ht 1.753 m (5' 9\")   Wt 68 kg (149 lb 14.6 oz)   LMP 08/28/2013   SpO2 100%   BMI 22.14 kg/m²     I & O - 24hr: No intake/output data recorded.   General Appearance: appears older than stated age, cooperative, and no distress  HEENT:  Head: Normocephalic, no lesions, without obvious abnormality.  Lung: B/L diminished air entry, occasional crepitus, no obvious wheeze  Heart: S1, S2 normal  Abdomen: soft, non-tender; bowel sounds normal; no masses,  no organomegaly  Extremities:  extremities normal, atraumatic, no cyanosis or edema  Neurologic: Mental status: Alert, oriented, thought content appropriate    Diet:   ADULT DIET; Regular    Pertinent Labs & Imaging Studies     Labs    CBC with Differential:    Lab Results   Component Value Date/Time    WBC 4.9 05/11/2024 05:21 AM    RBC 2.85 05/11/2024 05:21 AM    HGB 8.1 05/11/2024 05:21 AM    HCT 27.6 05/11/2024 05:21 AM     05/11/2024 05:21 AM    MCV 96.8 05/11/2024 05:21 AM    MCH 28.4 05/11/2024 05:21 AM    MCHC 29.3 05/11/2024 05:21 AM    RDW 16.9 05/11/2024 05:21 AM    NRBC 1 04/26/2024 03:58 AM    METASPCT 1 04/27/2024 06:00 AM    LYMPHOPCT 12 05/11/2024 05:21 AM    PROMYELOPCT 1 04/25/2024 03:54 AM    MONOPCT 9 05/11/2024 05:21 AM    MYELOPCT 1 05/10/2024 07:22 AM    EOSPCT 0 05/11/2024 05:21 AM    BASOPCT 0 05/11/2024 05:21 AM    MONOSABS 0.46 05/11/2024 05:21 AM    EOSABS 0.00  blood loss in past  [place] hx - temporary ESRD -- she has recovery of renal function  Ongoing ACD hgb 8 range    Transfused one unit  Anemia stable     Trop 271 trend down to 222-- cr 1.2    (200+ range last admission with pneumonia also)  No chest pain  EKG stable  Demand ischemia - type 2 myocardial/nstemi  +hx of cocaine  No ischemic workup planned    PT/OT - home health?  Home situation issues- friend takes care of her  Home o2?  Refused placement last time

## 2024-05-12 ENCOUNTER — APPOINTMENT (OUTPATIENT)
Dept: GENERAL RADIOLOGY | Age: 59
DRG: 720 | End: 2024-05-12
Payer: MEDICAID

## 2024-05-12 LAB
ANION GAP SERPL CALCULATED.3IONS-SCNC: 11 MMOL/L (ref 7–16)
BASOPHILS # BLD: 0 K/UL (ref 0–0.2)
BASOPHILS NFR BLD: 0 % (ref 0–2)
BUN SERPL-MCNC: 26 MG/DL (ref 6–20)
CALCIUM SERPL-MCNC: 8.8 MG/DL (ref 8.6–10.2)
CHLORIDE SERPL-SCNC: 106 MMOL/L (ref 98–107)
CO2 SERPL-SCNC: 23 MMOL/L (ref 22–29)
CREAT SERPL-MCNC: 0.9 MG/DL (ref 0.5–1)
EOSINOPHIL # BLD: 0 K/UL (ref 0.05–0.5)
EOSINOPHILS RELATIVE PERCENT: 0 % (ref 0–6)
ERYTHROCYTE [DISTWIDTH] IN BLOOD BY AUTOMATED COUNT: 16.2 % (ref 11.5–15)
GFR, ESTIMATED: 79 ML/MIN/1.73M2
GLUCOSE SERPL-MCNC: 94 MG/DL (ref 74–99)
HCT VFR BLD AUTO: 28.4 % (ref 34–48)
HGB BLD-MCNC: 8.6 G/DL (ref 11.5–15.5)
LYMPHOCYTES NFR BLD: 0.79 K/UL (ref 1.5–4)
LYMPHOCYTES RELATIVE PERCENT: 17 % (ref 20–42)
MAGNESIUM SERPL-MCNC: 1.7 MG/DL (ref 1.6–2.6)
MCH RBC QN AUTO: 28.4 PG (ref 26–35)
MCHC RBC AUTO-ENTMCNC: 30.3 G/DL (ref 32–34.5)
MCV RBC AUTO: 93.7 FL (ref 80–99.9)
MONOCYTES NFR BLD: 0.37 K/UL (ref 0.1–0.95)
MONOCYTES NFR BLD: 8 % (ref 2–12)
NEUTROPHILS NFR BLD: 75 % (ref 43–80)
NEUTS SEG NFR BLD: 3.54 K/UL (ref 1.8–7.3)
NUCLEATED RED BLOOD CELLS: 1 PER 100 WBC
PHOSPHATE SERPL-MCNC: 2.9 MG/DL (ref 2.5–4.5)
PLATELET # BLD AUTO: 308 K/UL (ref 130–450)
PMV BLD AUTO: 9.6 FL (ref 7–12)
POTASSIUM SERPL-SCNC: 4.5 MMOL/L (ref 3.5–5)
RBC # BLD AUTO: 3.03 M/UL (ref 3.5–5.5)
RBC # BLD: ABNORMAL 10*6/UL
SODIUM SERPL-SCNC: 140 MMOL/L (ref 132–146)
WBC OTHER # BLD: 4.7 K/UL (ref 4.5–11.5)

## 2024-05-12 PROCEDURE — 6370000000 HC RX 637 (ALT 250 FOR IP)

## 2024-05-12 PROCEDURE — 83735 ASSAY OF MAGNESIUM: CPT

## 2024-05-12 PROCEDURE — 2700000000 HC OXYGEN THERAPY PER DAY

## 2024-05-12 PROCEDURE — 94660 CPAP INITIATION&MGMT: CPT

## 2024-05-12 PROCEDURE — 36415 COLL VENOUS BLD VENIPUNCTURE: CPT

## 2024-05-12 PROCEDURE — 84100 ASSAY OF PHOSPHORUS: CPT

## 2024-05-12 PROCEDURE — 71045 X-RAY EXAM CHEST 1 VIEW: CPT

## 2024-05-12 PROCEDURE — 2060000000 HC ICU INTERMEDIATE R&B

## 2024-05-12 PROCEDURE — 2580000003 HC RX 258

## 2024-05-12 PROCEDURE — 94640 AIRWAY INHALATION TREATMENT: CPT

## 2024-05-12 PROCEDURE — 99232 SBSQ HOSP IP/OBS MODERATE 35: CPT | Performed by: INTERNAL MEDICINE

## 2024-05-12 PROCEDURE — 85025 COMPLETE CBC W/AUTO DIFF WBC: CPT

## 2024-05-12 PROCEDURE — 80048 BASIC METABOLIC PNL TOTAL CA: CPT

## 2024-05-12 PROCEDURE — 6360000002 HC RX W HCPCS

## 2024-05-12 RX ORDER — BUPRENORPHINE HYDROCHLORIDE AND NALOXONE HYDROCHLORIDE DIHYDRATE 2; .5 MG/1; MG/1
2 TABLET SUBLINGUAL PRN
Status: DISCONTINUED | OUTPATIENT
Start: 2024-05-12 | End: 2024-05-16 | Stop reason: HOSPADM

## 2024-05-12 RX ORDER — PROCHLORPERAZINE MALEATE 10 MG
10 TABLET ORAL EVERY 6 HOURS PRN
Status: DISCONTINUED | OUTPATIENT
Start: 2024-05-12 | End: 2024-05-16 | Stop reason: HOSPADM

## 2024-05-12 RX ORDER — HYDROXYZINE PAMOATE 25 MG/1
50 CAPSULE ORAL 3 TIMES DAILY PRN
Status: DISCONTINUED | OUTPATIENT
Start: 2024-05-12 | End: 2024-05-16 | Stop reason: HOSPADM

## 2024-05-12 RX ORDER — LANOLIN ALCOHOL/MO/W.PET/CERES
3 CREAM (GRAM) TOPICAL NIGHTLY
Status: DISCONTINUED | OUTPATIENT
Start: 2024-05-12 | End: 2024-05-16 | Stop reason: HOSPADM

## 2024-05-12 RX ORDER — PROMETHAZINE HYDROCHLORIDE 25 MG/1
25 TABLET ORAL EVERY 6 HOURS PRN
Status: DISCONTINUED | OUTPATIENT
Start: 2024-05-12 | End: 2024-05-12 | Stop reason: CLARIF

## 2024-05-12 RX ORDER — CLONIDINE HYDROCHLORIDE 0.1 MG/1
0.1 TABLET ORAL EVERY 6 HOURS PRN
Status: DISCONTINUED | OUTPATIENT
Start: 2024-05-12 | End: 2024-05-16 | Stop reason: HOSPADM

## 2024-05-12 RX ADMIN — HYDROXYZINE PAMOATE 50 MG: 25 CAPSULE ORAL at 19:33

## 2024-05-12 RX ADMIN — SODIUM CHLORIDE, PRESERVATIVE FREE 10 ML: 5 INJECTION INTRAVENOUS at 09:29

## 2024-05-12 RX ADMIN — HYDROXYZINE PAMOATE 50 MG: 50 CAPSULE ORAL at 06:31

## 2024-05-12 RX ADMIN — ACETAMINOPHEN 650 MG: 325 TABLET ORAL at 06:31

## 2024-05-12 RX ADMIN — ARFORMOTEROL TARTRATE 15 MCG: 15 SOLUTION RESPIRATORY (INHALATION) at 07:57

## 2024-05-12 RX ADMIN — ACETAMINOPHEN 650 MG: 325 TABLET ORAL at 23:22

## 2024-05-12 RX ADMIN — PREDNISONE 40 MG: 20 TABLET ORAL at 09:29

## 2024-05-12 RX ADMIN — DULOXETINE HYDROCHLORIDE 30 MG: 30 CAPSULE, DELAYED RELEASE ORAL at 09:29

## 2024-05-12 RX ADMIN — BUDESONIDE INHALATION 500 MCG: 0.5 SUSPENSION RESPIRATORY (INHALATION) at 07:57

## 2024-05-12 RX ADMIN — IPRATROPIUM BROMIDE AND ALBUTEROL SULFATE 1 DOSE: .5; 2.5 SOLUTION RESPIRATORY (INHALATION) at 21:53

## 2024-05-12 RX ADMIN — BUDESONIDE INHALATION 500 MCG: 0.5 SUSPENSION RESPIRATORY (INHALATION) at 21:53

## 2024-05-12 RX ADMIN — IPRATROPIUM BROMIDE AND ALBUTEROL SULFATE 1 DOSE: .5; 2.5 SOLUTION RESPIRATORY (INHALATION) at 07:57

## 2024-05-12 RX ADMIN — IPRATROPIUM BROMIDE AND ALBUTEROL SULFATE 1 DOSE: .5; 2.5 SOLUTION RESPIRATORY (INHALATION) at 17:34

## 2024-05-12 RX ADMIN — BUPRENORPHINE HYDROCHLORIDE AND NALOXONE HYDROCHLORIDE DIHYDRATE 2 TABLET: 2; .5 TABLET SUBLINGUAL at 17:46

## 2024-05-12 RX ADMIN — ARFORMOTEROL TARTRATE 15 MCG: 15 SOLUTION RESPIRATORY (INHALATION) at 21:53

## 2024-05-12 RX ADMIN — BUPRENORPHINE HYDROCHLORIDE AND NALOXONE HYDROCHLORIDE DIHYDRATE 2 TABLET: 2; .5 TABLET SUBLINGUAL at 09:29

## 2024-05-12 RX ADMIN — Medication 3 MG: at 23:22

## 2024-05-12 RX ADMIN — IPRATROPIUM BROMIDE AND ALBUTEROL SULFATE 1 DOSE: .5; 2.5 SOLUTION RESPIRATORY (INHALATION) at 13:21

## 2024-05-12 RX ADMIN — PANTOPRAZOLE SODIUM 40 MG: 40 TABLET, DELAYED RELEASE ORAL at 06:31

## 2024-05-12 ASSESSMENT — PAIN SCALES - GENERAL
PAINLEVEL_OUTOF10: 0
PAINLEVEL_OUTOF10: 5

## 2024-05-12 ASSESSMENT — PAIN DESCRIPTION - LOCATION: LOCATION: BACK

## 2024-05-12 NOTE — PROGRESS NOTES
[facility]  Internal Medicine Residency Program  Progress Note - House Team       Patient:  Saba Sullivan 59 y.o. female   MRN: [id number]       Date of Service: 5/12/2024      Subjective     Patient was seen and examined at bedside this morning, was laying in bed, was complaining of SOB, gradually became anxious during the bedside interview, asking of NIV, patient was saturating >90 throughout the encounter. Placed on CPAP.  No chest pain, rigor or chills, no sweating. No abdominal pain or diarrhea.   No significant or concerning overnight events.     Objective     Physical Exam  Vitals: BP (!) 149/78   Pulse 90   Temp 97.8 °F (36.6 °C) (Temporal)   Resp 16   Ht 1.753 m (5' 9.02\")   Wt 68 kg (149 lb 14.6 oz)   LMP 08/28/2013   SpO2 99%   BMI 22.13 kg/m²     I & O - 24hr: No intake/output data recorded.   General Appearance: Appears older than stated age, cooperative, and no distress  HEENT:  Head: Normocephalic, no lesions, without obvious abnormality.  Lung: B/L diminished air entry, occasional crepitus and wheeze  Heart: S1, S2 normal  Abdomen: soft, non-tender; bowel sounds normal; no masses,  no organomegaly  Extremities:  extremities normal, atraumatic, no cyanosis or edema  Neurologic: Mental status: Alert, oriented, thought content appropriate    Diet:   ADULT DIET; Regular  ADULT ORAL NUTRITION SUPPLEMENT; Breakfast, Dinner; Renal Oral Supplement    Pertinent Labs & Imaging Studies     Labs    CBC with Differential:    Lab Results   Component Value Date/Time    WBC 4.9 05/11/2024 05:21 AM    RBC 2.85 05/11/2024 05:21 AM    HGB 8.1 05/11/2024 05:21 AM    HCT 27.6 05/11/2024 05:21 AM     05/11/2024 05:21 AM    MCV 96.8 05/11/2024 05:21 AM    MCH 28.4 05/11/2024 05:21 AM    MCHC 29.3 05/11/2024 05:21 AM    RDW 16.9 05/11/2024 05:21 AM    NRBC 1 04/26/2024 03:58 AM    METASPCT 1 04/27/2024 06:00 AM    LYMPHOPCT 12 05/11/2024 05:21 AM    PROMYELOPCT 1 04/25/2024 03:54 AM     admission for acute respiratory failure- \"pneumonia\" 4/17- May 4  Attempts for [facility] etc..  Then went home              Picked up new virus--  +parainfluenza  New acute hypxic respiratory failure  Steroids, breathing tx, o2  No abx              Baseline ACD- hematoma in past with HD access, blood loss in past  [place] hx - temporary ESRD -- she has recovery of renal function  Ongoing ACD hgb 8 range     Transfused one unit  Anemia stable      Acute hypoxia on admission   Trop 271 trend down to 222-- cr 1.2  Down to  trop 130  high delta  (200+ range last admission with pneumonia also)  No chest pain  EKG stable  Demand ischemia - type 2 myocardial/nstemi  +hx of cocaine  No ischemic workup planned     Steroids, duoneb- switch to xopenex  Anxiety issues-- acute on chronic  COWS meds- possible withdrawals    PT/OT - home health?  Broken nebulizer at home  Home situation issues- friend takes care of her  Home o2?  Refused placement last time

## 2024-05-12 NOTE — PLAN OF CARE
Problem: Skin/Tissue Integrity  Goal: Absence of new skin breakdown  Description: 1.  Monitor for areas of redness and/or skin breakdown  2.  Assess vascular access sites hourly  3.  Every 4-6 hours minimum:  Change oxygen saturation probe site  4.  Every 4-6 hours:  If on nasal continuous positive airway pressure, respiratory therapy assess nares and determine need for appliance change or resting period.  5/11/2024 2035 by David Huff, RN  Outcome: Progressing  5/11/2024 1044 by ED Balderas, RN  Outcome: Progressing     Problem: Safety - Adult  Goal: Free from fall injury  5/11/2024 2035 by David Huff, RN  Outcome: Progressing  5/11/2024 1044 by DE Balderas, RN  Outcome: Progressing

## 2024-05-12 NOTE — PROGRESS NOTES
[facility]  Nephrology Attending Progress Note  Radhames Fletcher MD        SUBJECTIVE:   From 5/10 History of Present Illness:    \"Saba Sullivan is a 59 y.o. female with a past medical history of polysubstance abuse, hepatitis C, and chronic recurrent multifocal osteomyelitis of foot.  She presented to the ED on 5/9 with complaints of shortness of breath.  Vital signs on 5/9 includes temperature 100.9, respirations 17, pulse 120, BP 89/55, and she was 97% SpO2.  Lab data on 5/9 includes BUN 26, creatinine 1.2, glucose 109, procalcitonin 0.38, troponin 271, and hemoglobin 7.1.  Her urine drug screen was positive for benzodiazepine, cocaine, and fentanyl.  Her respiratory panel was positive for parainfluenza. She had a chest x-ray on 5/10 which showed no acute cardiopulmonary process. She had a CTA Chest on 5/10 which showed no evidence of PE, thick-walled cavitary lesion, dilated main pulmonary artery.  Nephrology has been consulted to see the patient for DOMINGO on dialysis.  Patient is known to our service and was followed previously by our service while inpatient for DOMINGO stage III which was presumed likely in the setting of rhabdomyolysis and cocaine use and possible hypovolemia/IV contrast with CTA chest.  Hemodialysis was started on 4/25 and she had a right IJ TDC placed on 4/26.  She discharged on 5/4.  She was set up for outpatient dialysis at [facility].  She was beginning to show signs of renal recovery and hemodialysis has been on hold.  At present, patient was seen and examined.  She explained that she came in with shortness of breath which started last night.  She also notes a little dizziness while standing last night.  She denies any chest pain.  She denies any abdominal pain, nausea, vomiting or diarrhea.  She denies any headaches.  She denies any dysuria\"    5/12/2024: Resting in bed. States she is feeling short of breath today, CPAP on. No chest, abd pain. On COWS protocol.        PROBLEM

## 2024-05-13 ENCOUNTER — APPOINTMENT (OUTPATIENT)
Dept: INTERVENTIONAL RADIOLOGY/VASCULAR | Age: 59
DRG: 720 | End: 2024-05-13
Payer: MEDICAID

## 2024-05-13 LAB
ANION GAP SERPL CALCULATED.3IONS-SCNC: 14 MMOL/L (ref 7–16)
BASOPHILS # BLD: 0 K/UL (ref 0–0.2)
BASOPHILS NFR BLD: 0 % (ref 0–2)
BUN SERPL-MCNC: 24 MG/DL (ref 6–20)
CALCIUM SERPL-MCNC: 9.3 MG/DL (ref 8.6–10.2)
CHLORIDE SERPL-SCNC: 105 MMOL/L (ref 98–107)
CO2 SERPL-SCNC: 27 MMOL/L (ref 22–29)
CREAT SERPL-MCNC: 0.8 MG/DL (ref 0.5–1)
EOSINOPHIL # BLD: 0 K/UL (ref 0.05–0.5)
EOSINOPHILS RELATIVE PERCENT: 0 % (ref 0–6)
ERYTHROCYTE [DISTWIDTH] IN BLOOD BY AUTOMATED COUNT: 16.2 % (ref 11.5–15)
GFR, ESTIMATED: 87 ML/MIN/1.73M2
GLUCOSE SERPL-MCNC: 82 MG/DL (ref 74–99)
HCT VFR BLD AUTO: 28.7 % (ref 34–48)
HGB BLD-MCNC: 8.7 G/DL (ref 11.5–15.5)
INR PPP: 1
LYMPHOCYTES NFR BLD: 1.2 K/UL (ref 1.5–4)
LYMPHOCYTES RELATIVE PERCENT: 26 % (ref 20–42)
MAGNESIUM SERPL-MCNC: 1.8 MG/DL (ref 1.6–2.6)
MCH RBC QN AUTO: 28.9 PG (ref 26–35)
MCHC RBC AUTO-ENTMCNC: 30.3 G/DL (ref 32–34.5)
MCV RBC AUTO: 95.3 FL (ref 80–99.9)
MONOCYTES NFR BLD: 0.44 K/UL (ref 0.1–0.95)
MONOCYTES NFR BLD: 10 % (ref 2–12)
MYELOCYTES ABSOLUTE COUNT: 0.08 K/UL
MYELOCYTES: 2 %
NEUTROPHILS NFR BLD: 63 % (ref 43–80)
NEUTS SEG NFR BLD: 2.88 K/UL (ref 1.8–7.3)
PARTIAL THROMBOPLASTIN TIME: 33.7 SEC (ref 24.5–35.1)
PATH REV BLD -IMP: NORMAL
PHOSPHATE SERPL-MCNC: 2.2 MG/DL (ref 2.5–4.5)
PLATELET # BLD AUTO: 353 K/UL (ref 130–450)
PMV BLD AUTO: 9.5 FL (ref 7–12)
POTASSIUM SERPL-SCNC: 3.9 MMOL/L (ref 3.5–5)
PROTHROMBIN TIME: 10.8 SEC (ref 9.3–12.4)
RBC # BLD AUTO: 3.01 M/UL (ref 3.5–5.5)
RBC # BLD: ABNORMAL 10*6/UL
SODIUM SERPL-SCNC: 146 MMOL/L (ref 132–146)
WBC OTHER # BLD: 4.6 K/UL (ref 4.5–11.5)

## 2024-05-13 PROCEDURE — 85730 THROMBOPLASTIN TIME PARTIAL: CPT

## 2024-05-13 PROCEDURE — 94640 AIRWAY INHALATION TREATMENT: CPT

## 2024-05-13 PROCEDURE — 0JPTXXZ REMOVAL OF TUNNELED VASCULAR ACCESS DEVICE FROM TRUNK SUBCUTANEOUS TISSUE AND FASCIA, EXTERNAL APPROACH: ICD-10-PCS | Performed by: INTERNAL MEDICINE

## 2024-05-13 PROCEDURE — 6370000000 HC RX 637 (ALT 250 FOR IP)

## 2024-05-13 PROCEDURE — 85025 COMPLETE CBC W/AUTO DIFF WBC: CPT

## 2024-05-13 PROCEDURE — 76000 FLUOROSCOPY <1 HR PHYS/QHP: CPT

## 2024-05-13 PROCEDURE — 2700000000 HC OXYGEN THERAPY PER DAY

## 2024-05-13 PROCEDURE — 02PYX3Z REMOVAL OF INFUSION DEVICE FROM GREAT VESSEL, EXTERNAL APPROACH: ICD-10-PCS | Performed by: INTERNAL MEDICINE

## 2024-05-13 PROCEDURE — 6360000002 HC RX W HCPCS

## 2024-05-13 PROCEDURE — 2580000003 HC RX 258

## 2024-05-13 PROCEDURE — 83735 ASSAY OF MAGNESIUM: CPT

## 2024-05-13 PROCEDURE — 85610 PROTHROMBIN TIME: CPT

## 2024-05-13 PROCEDURE — 80048 BASIC METABOLIC PNL TOTAL CA: CPT

## 2024-05-13 PROCEDURE — 99231 SBSQ HOSP IP/OBS SF/LOW 25: CPT | Performed by: INTERNAL MEDICINE

## 2024-05-13 PROCEDURE — 2060000000 HC ICU INTERMEDIATE R&B

## 2024-05-13 PROCEDURE — 84100 ASSAY OF PHOSPHORUS: CPT

## 2024-05-13 PROCEDURE — 94660 CPAP INITIATION&MGMT: CPT

## 2024-05-13 PROCEDURE — 36415 COLL VENOUS BLD VENIPUNCTURE: CPT

## 2024-05-13 RX ADMIN — IPRATROPIUM BROMIDE AND ALBUTEROL SULFATE 1 DOSE: .5; 2.5 SOLUTION RESPIRATORY (INHALATION) at 09:06

## 2024-05-13 RX ADMIN — Medication 250 MG: at 16:18

## 2024-05-13 RX ADMIN — ARFORMOTEROL TARTRATE 15 MCG: 15 SOLUTION RESPIRATORY (INHALATION) at 06:01

## 2024-05-13 RX ADMIN — IPRATROPIUM BROMIDE AND ALBUTEROL SULFATE 1 DOSE: .5; 2.5 SOLUTION RESPIRATORY (INHALATION) at 20:35

## 2024-05-13 RX ADMIN — HYDROXYZINE PAMOATE 50 MG: 25 CAPSULE ORAL at 21:46

## 2024-05-13 RX ADMIN — Medication 3 MG: at 21:42

## 2024-05-13 RX ADMIN — BUPRENORPHINE HYDROCHLORIDE AND NALOXONE HYDROCHLORIDE DIHYDRATE 2 TABLET: 2; .5 TABLET SUBLINGUAL at 08:30

## 2024-05-13 RX ADMIN — BUDESONIDE INHALATION 500 MCG: 0.5 SUSPENSION RESPIRATORY (INHALATION) at 06:01

## 2024-05-13 RX ADMIN — PREDNISONE 40 MG: 20 TABLET ORAL at 08:30

## 2024-05-13 RX ADMIN — DULOXETINE HYDROCHLORIDE 30 MG: 30 CAPSULE, DELAYED RELEASE ORAL at 08:29

## 2024-05-13 RX ADMIN — IPRATROPIUM BROMIDE AND ALBUTEROL SULFATE 1 DOSE: .5; 2.5 SOLUTION RESPIRATORY (INHALATION) at 16:24

## 2024-05-13 RX ADMIN — Medication 250 MG: at 13:51

## 2024-05-13 RX ADMIN — ARFORMOTEROL TARTRATE 15 MCG: 15 SOLUTION RESPIRATORY (INHALATION) at 20:35

## 2024-05-13 RX ADMIN — BUDESONIDE INHALATION 500 MCG: 0.5 SUSPENSION RESPIRATORY (INHALATION) at 20:35

## 2024-05-13 RX ADMIN — SODIUM CHLORIDE, PRESERVATIVE FREE 10 ML: 5 INJECTION INTRAVENOUS at 08:34

## 2024-05-13 RX ADMIN — IPRATROPIUM BROMIDE AND ALBUTEROL SULFATE 1 DOSE: .5; 2.5 SOLUTION RESPIRATORY (INHALATION) at 06:01

## 2024-05-13 RX ADMIN — SODIUM CHLORIDE, PRESERVATIVE FREE 10 ML: 5 INJECTION INTRAVENOUS at 21:42

## 2024-05-13 ASSESSMENT — PAIN SCALES - GENERAL: PAINLEVEL_OUTOF10: 0

## 2024-05-13 NOTE — PROCEDURES
Patient for tunneled HD cath removal. Please make patient NPO and hold all thinners. Will review case with interventionalist, and re-evaluate when labs result. Time of procedure to be determined. Patient's nurse on unit notified.

## 2024-05-13 NOTE — BRIEF OP NOTE
Brief-Op Note  ______________________________________________________________      IR TUNNELED HD CATHETER REMOVAL  [facility]    Patient Name: Saba Sullivan   YOB: 1965  Medical Record Number: [id number]  Date of Procedure: 5/13/24  Room/Bed: [address] [place] [address]      Preoperative diagnosis: Removal of Tunneled HD Catheter.    Postoperative diagnosis: Same.    Consent: INFORMED CONSENT WAS OBTAINED BY patient, RISK AND BENEFITS WERE DISCUSSED.     Procedure: Removal of tunneled central catheter.    Anesthesia: none.    Performed by: JAY JAY Bhakta under on-site supervision by Elan Carreno MD.    Estimated blood loss: Less than 10 mL    Complications: None    Implants: None      Electronically signed by JAY JAY Bhakta   DD: 5/13/24  3:38 PM

## 2024-05-13 NOTE — CARE COORDINATION
Peer Recovery Support Note    Name: Saba Sullivan  Date: 5/13/2024    Chief Complaint   Patient presents with    Shortness of Breath     Pt reports SOB since this afternoon causing her to miss her IHD appt.  Last IHD on Tuesday; pt reports getting full treatmen.  EMS reported pt 66% on RA.  PT presented to ED on CPAP.  EMS reported 1 duoneb given en route       Peer Support met with patient.  [x] Support and education provided  [x] Resources provided   [] Treatment referral:   [x] Other: Left Peer contact information  [] Patient declined peer recovery services     Referred By: Lubna (STEPHANIE/NISREEN)    Notes: Met with patient who admits she has struggled for many years with abusing substances, but does voice the desire to stop. However, due to medical condition, she is not agreeable to attempt inpatient treatment at this time. Peer then suggested IOP or something outpatient, such as counseling or support group/AA meetings. Patient appeared receptive and accepted multiple treatment resources, but said she was not sure if she would be physically capable of getting to the meetings/groups/facilities consistently. Peer suggested that she get get engaged in something recovery related as quickly as possible and stressed the importance of having support and accountability. Patient agreed, and said that her using drugs is literally killing her, and she will not allow it to do so. Peer also suggested contacting her insurance company to potentially assist with rides. Patient confirms that she will do this. Peer provided support, resources, and contact information.     Please feel free to contact PRS with any questions or if any further assistance is required.    Signed: Luanne Covington, 5/13/2024      [phone]

## 2024-05-13 NOTE — CARE COORDINATION
Met with pt to discuss discharge planning/transition of care. Pt lives with her aunt and uncle, one story home with ramp to enter. Pt uses a wheelchair, has walker, crutches, shower chair, bedside commode. Pt has a nebulizer, but it broke and will need a new one. Pt does not have a PCP and goes to [facility] on [address] for pharmacy needs. Pt was positive for fentanyl and cocaine, discussed peer recovery, pt is agreeable for referral. Referral to Peer recovery. Pt was on outpatient HD, plan to remove tunnel cath. Will need order for nebulizer.Lubna Yoo, MSW, LSW

## 2024-05-13 NOTE — OP NOTE
Operative Note  ______________________________________________________________      IR REMOVAL OF TUNNELED HD CATHETER  [facility]    Patient Name: Saba Sullivan   YOB: 1965  Medical Record Number: [id number]  Date of Procedure: 5/13/24  Room/Bed: [address] [place] [address]      DATE OF PROCEDURE: 5/13/2024     PERFORMED BY: JAY JAY Bhakta     PREOPERATIVE DIAGNOSIS: Removal of HD Tunneled Catheter     POSTOPERATIVE DIAGNOSIS: Same    PROCEDURE: Removal of right internal jugular vein tunneled hemodialysis catheter     ANESTHESIA: none.     ESTIMATED BLOOD LOSS: Minimal     COMPLICATIONS: None    DESCRIPTION OF PROCEDURE: The patient was identified and the procedure was confirmed. The right neck, chest, and catheter were prepped and draped in the usual sterile fashion. Next, 1% lidocaine was used for local anesthesia.  Through the catheter exit site the cuff was freed from the surrounding tissues. Traction was applied to the catheter and it was removed intact. Pressure was held for hemostasis and tissue adhesive was placed and a sterile pressure dressing was then applied to the exit site.  The patient tolerated the procedure and left in satisfactory condition.    Electronically signed by JAY JAY Bhakta   DD: 5/13/24  3:38 PM

## 2024-05-13 NOTE — CONSULTS
Department of Internal Medicine  Infectious Diseases   Consult Note      Reason for Consult:  Cavitary lung lesion       Requesting Physician:  Dr Rees         HISTORY OF PRESENT ILLNESS:     This is a 59 yrs old female with hx of polysubstance abuse,tobacco abuse,  Hep C infection ( spontaneous clearance ) , right heel osteomyelitis ( treated on  2021) , recently treated for aspiration pneumonia ( 2024) presented to the ER with shortness of breath ( about 5 days after discharge ) , reported cough, denied fever, chills , chest pain . She has a non healing wound right foot ( heel ) , denied increased pain or drainage .  WBC was 3.6 K  -4.6 K   CT scan of chest - thick walled cavity lesion , with resolving consolidation      Past Medical History:    Past Medical History:   Diagnosis Date    Abscess     states for a couple years she has had problems with     Chronic pain     Chronic recurrent multifocal osteomyelitis of foot (HCC) 2019    Hepatitis C     Hip fracture (HCC)     Hx of blood clots     dvt rt calf    Polysubstance abuse (HCC)     Pressure injury of heel, stage 3 (HCC) 2019    Subclinical hypothyroidism 2019       Past Surgical History:    Past Surgical History:   Procedure Laterality Date     SECTION      x three    DRAIN SKIN ABSCESS SIMPLE  2014         FOOT DEBRIDEMENT Right 2021    RIGHT FOOT DEBRIDEMENT INCISION AND DRAINAGE WITH BONE BIOPSY AND WOUND VAC APPLICATION performed by Luís Dominguez DPM at [facility]    FOOT SURGERY      IR TUNNELED CATHETER PLACEMENT GREATER THAN 5 YEARS  2024    IR TUNNELED CATHETER PLACEMENT GREATER THAN 5 YEARS 2024 [facility]ISITO MD [facility] SPECIAL PROCEDURES         Current Medications:      Current Facility-Administered Medications   Medication Dose Route Frequency Provider Last Rate Last Admin    potassium & sodium phosphates (PHOS-NAK) 280-160-250 MG packet 250 mg  1 packet Oral 4x Daily Estephania Trinh, APRN -  05/13/2024 06:59 AM    BASOPCT 0 05/13/2024 06:59 AM    MONOSABS 0.44 05/13/2024 06:59 AM    EOSABS 0.00 05/13/2024 06:59 AM    BASOSABS 0.00 05/13/2024 06:59 AM       CMP     Lab Results   Component Value Date/Time     05/13/2024 06:59 AM    K 3.9 05/13/2024 06:59 AM    K 4.4 07/20/2021 02:14 PM     05/13/2024 06:59 AM    CO2 27 05/13/2024 06:59 AM    BUN 24 05/13/2024 06:59 AM    CREATININE 0.8 05/13/2024 06:59 AM    GFRAA >60 07/30/2021 06:25 AM    LABGLOM 87 05/13/2024 06:59 AM    LABGLOM 26 04/30/2024 04:30 AM    GLUCOSE 82 05/13/2024 06:59 AM    CALCIUM 9.3 05/13/2024 06:59 AM    BILITOT 0.2 05/09/2024 11:37 PM    ALKPHOS 83 05/09/2024 11:37 PM    AST 19 05/09/2024 11:37 PM    ALT 7 05/09/2024 11:37 PM         Hepatic Function Panel:    Lab Results   Component Value Date/Time    ALKPHOS 83 05/09/2024 11:37 PM    ALT 7 05/09/2024 11:37 PM    AST 19 05/09/2024 11:37 PM    BILITOT 0.2 05/09/2024 11:37 PM       PT/INR:    Lab Results   Component Value Date/Time    PROTIME 12.0 04/27/2024 06:00 AM    INR 1.1 04/27/2024 06:00 AM       TSH:    Lab Results   Component Value Date/Time    TSH 1.04 04/18/2024 02:56 AM       U/A:    Lab Results   Component Value Date/Time    COLORU Yellow 05/09/2024 11:45 PM    PHUR 5.5 05/09/2024 11:45 PM    PHUR 5.0 04/18/2024 03:37 AM    WBCUA 0 TO 5 05/09/2024 11:45 PM    RBCUA 0 TO 2 05/09/2024 11:45 PM    BACTERIA  04/18/2024 03:37 AM     UNABLE TO DETERMINE PRESENCE OF BACTERIA DUE TO LARGE AMOUNT OF AMORPHOUS SEDIMENT    CLARITYU Clear 02/09/2020 08:35 PM    LEUKOCYTESUR TRACE 05/09/2024 11:45 PM    UROBILINOGEN 0.2 05/09/2024 11:45 PM    BILIRUBINUR NEGATIVE 05/09/2024 11:45 PM    BLOODU TRACE-INTACT 02/09/2020 08:35 PM    GLUCOSEU NEGATIVE 05/09/2024 11:45 PM    AMORPHOUS PRESENT 04/18/2024 03:37 AM       ABG:  No results found for: \"NLX9YTG\", \"BEART\", \"L7CFMHLB\", \"PHART\", \"THGBART\", \"QDD1QQG\", \"PO2ART\", \"TKF9UKX\"    MICROBIOLOGY:    Blood culture - neg to date

## 2024-05-13 NOTE — PROGRESS NOTES
[facility]  Nephrology Progress Note    Patient's Name: Saba Sullivan    History of Present Illness from 5/10 Consult Note:    \"Saba Sullivan is a 59 y.o. female with a past medical history of polysubstance abuse, hepatitis C, and chronic recurrent multifocal osteomyelitis of foot.  She presented to the ED on 5/9 with complaints of shortness of breath.  Vital signs on 5/9 includes temperature 100.9, respirations 17, pulse 120, BP 89/55, and she was 97% SpO2.  Lab data on 5/9 includes BUN 26, creatinine 1.2, glucose 109, procalcitonin 0.38, troponin 271, and hemoglobin 7.1.  Her urine drug screen was positive for benzodiazepine, cocaine, and fentanyl.  Her respiratory panel was positive for parainfluenza. She had a chest x-ray on 5/10 which showed no acute cardiopulmonary process. She had a CTA Chest on 5/10 which showed no evidence of PE, thick-walled cavitary lesion, dilated main pulmonary artery.  Nephrology has been consulted to see the patient for DOMINGO on dialysis.  Patient is known to our service and was followed previously by our service while inpatient for DOMINGO stage III which was presumed likely in the setting of rhabdomyolysis and cocaine use and possible hypovolemia/IV contrast with CTA chest.  Hemodialysis was started on 4/25 and she had a right IJ TDC placed on 4/26.  She discharged on 5/4.  She was set up for outpatient dialysis at [facility].  She was beginning to show signs of renal recovery and hemodialysis has been on hold.  At present, patient was seen and examined.  She explained that she came in with shortness of breath which started last night.  She also notes a little dizziness while standing last night.  She denies any chest pain.  She denies any abdominal pain, nausea, vomiting or diarrhea.  She denies any headaches.  She denies any dysuria.\"    Subjective:    5/13: Patient was seen and examined.  She reports that she feels good.  She denies any chest pain or shortness of

## 2024-05-13 NOTE — PROGRESS NOTES
[facility]  Internal Medicine Residency / House Medicine Service    Attending Physician Statement  I have discussed the case, including pertinent history and exam findings with the resident and the team.  I have seen and examined the patient and the key elements of the encounter have been performed by me.  I agree with the assessment, plan and orders as documented by the resident.      Alert and now on COWS  VS stable   Resolving parainfluenza virus  Hx of polydrug abuse   VS stable     RLL pneumonia with cavitation  Left heelosteomyelitis  Antibiotics?  Remainder of medical problems as per resident note.      Raul Haynes MD FRCP [facility]  Internal Medicine Residency Faculty

## 2024-05-13 NOTE — PLAN OF CARE
Problem: Skin/Tissue Integrity  Goal: Absence of new skin breakdown  Description: 1.  Monitor for areas of redness and/or skin breakdown  2.  Assess vascular access sites hourly  3.  Every 4-6 hours minimum:  Change oxygen saturation probe site  4.  Every 4-6 hours:  If on nasal continuous positive airway pressure, respiratory therapy assess nares and determine need for appliance change or resting period.  5/13/2024 1019 by Romina Valencia RN  Outcome: Progressing  5/13/2024 0524 by Xuan Kumar RN  Outcome: Progressing     Problem: Safety - Adult  Goal: Free from fall injury  5/13/2024 1019 by Romina Valencia RN  Outcome: Progressing  5/13/2024 0524 by Xuan Kumar RN  Outcome: Progressing     Problem: Discharge Planning  Goal: Discharge to home or other facility with appropriate resources  5/13/2024 1019 by Romina Valencia RN  Outcome: Progressing  5/13/2024 0524 by Xuan Kumar RN  Outcome: Progressing     Problem: ABCDS Injury Assessment  Goal: Absence of physical injury  5/13/2024 1019 by Romina Valencia RN  Outcome: Progressing  5/13/2024 0524 by Xuan Kumar RN  Outcome: Progressing     Problem: Pain  Goal: Verbalizes/displays adequate comfort level or baseline comfort level  5/13/2024 1019 by Romina Valencia RN  Outcome: Progressing  5/13/2024 0524 by Xuan Kumar RN  Outcome: Progressing     Problem: Nutrition Deficit:  Goal: Optimize nutritional status  5/13/2024 1019 by Romina Valencia RN  Outcome: Progressing  5/13/2024 0524 by Xuan Kumar RN  Outcome: Progressing     Problem: Confusion  Goal: Confusion, delirium, dementia, or psychosis is improved or at baseline  Description: INTERVENTIONS:  1. Assess for possible contributors to thought disturbance, including medications, impaired vision or hearing, underlying metabolic abnormalities, dehydration, psychiatric diagnoses, and notify attending LIP  2. [place] high risk fall precautions, as

## 2024-05-13 NOTE — PROGRESS NOTES
[facility]  Internal Medicine Residency Program  Progress Note - House Team       Patient:  Saba Sullivan 59 y.o. female   MRN: [id number]       Date of Service: 5/13/2024    Subjective     Patient was seen and examined at bedside this morning, laying in bed, comfortable in position, saturating at 4L via NC. No new complaints.   No significant or concerning overnight events.    Objective     Physical Exam  Vitals: /76   Pulse 81   Temp 98.5 °F (36.9 °C) (Temporal)   Resp 22   Ht 1.753 m (5' 9.02\")   Wt 68 kg (149 lb 14.6 oz)   LMP 08/28/2013   SpO2 96%   BMI 22.13 kg/m²     I & O - 24hr: No intake/output data recorded.   General Appearance: Appears older than stated age, cooperative, and no distress  HEENT:  Head: Normocephalic, no lesions, without obvious abnormality.  Lung: B/L diminished but equal air entry  Heart: S1, S2 normal  Abdomen: soft, non-tender; bowel sounds normal; no masses,  no organomegaly  Extremities:  extremities normal, atraumatic, no cyanosis or edema  Neurologic: Mental status: Alert, oriented, thought content appropriate    Diet:   ADULT DIET; Regular  ADULT ORAL NUTRITION SUPPLEMENT; Breakfast, Dinner; Renal Oral Supplement      Pertinent Labs & Imaging Studies     Labs    CBC with Differential:    Lab Results   Component Value Date/Time    WBC 4.7 05/12/2024 08:49 AM    RBC 3.03 05/12/2024 08:49 AM    HGB 8.6 05/12/2024 08:49 AM    HCT 28.4 05/12/2024 08:49 AM     05/12/2024 08:49 AM    MCV 93.7 05/12/2024 08:49 AM    MCH 28.4 05/12/2024 08:49 AM    MCHC 30.3 05/12/2024 08:49 AM    RDW 16.2 05/12/2024 08:49 AM    NRBC 1 05/12/2024 08:49 AM    METASPCT 1 04/27/2024 06:00 AM    LYMPHOPCT 17 05/12/2024 08:49 AM    PROMYELOPCT 1 04/25/2024 03:54 AM    MONOPCT 8 05/12/2024 08:49 AM    MYELOPCT 1 05/10/2024 07:22 AM    EOSPCT 0 05/12/2024 08:49 AM    BASOPCT 0 05/12/2024 08:49 AM    MONOSABS 0.37 05/12/2024 08:49 AM    EOSABS 0.00 05/12/2024 08:49 AM     process.  -no clinical or microbiological signs of bacterial pneumonia  History of DOMINGO stage II, s/p ST started on 4/25, s/p right IJ TDC 4/26 was following for outpatient HD at [facility], improving Cr  COPD Gold stage IV PFT in 2015  Polysubstance abuse, UDS positive for benzo cocaine and fentanyl  -watch for s/s of withdrawal  Elevated troponin, 271>>222>>139  Hepatitis C antibody reactive  Moderate pulmonary hypertension secondary  Hypertension on amlodipine 10 mg  Cholelithiasis seen on CTA pulm  Normocytic anemia secondary to anemia of chronic disease  Chronic pain secondary to left hip fracture and right calc culture  Essential tremor  Vitamin D deficiency  Functional oropharyngeal swallow disorder  History of tobacco abuse  Subclinical hypothyroidism  History of osteomyelitis of right heel status postdebridement     On supplemental oxygen, on NIV, wean as tolerated  On COWS protocol  Continue breathing treatments  On prednisone 40 mg daily  Monitor respiratory status  Nephro consultation, appreciate recommendation, plan for TDC removal today        DVT prophylaxis: Lovenox  GI prophylaxis: Protonix  Diet:   ADULT DIET; Regular   Bowel regimen: Glycolax  Pain management: as needed  Code status: Full Code   Disposition: Continue Current Care  Family: updated as available      Samy Mcdaniels MD, PGY-1   Attending physician: Dr. Haynes

## 2024-05-14 LAB
ANION GAP SERPL CALCULATED.3IONS-SCNC: 10 MMOL/L (ref 7–16)
ANION GAP SERPL CALCULATED.3IONS-SCNC: 6 MMOL/L (ref 7–16)
BASOPHILS # BLD: 0 K/UL (ref 0–0.2)
BASOPHILS NFR BLD: 0 % (ref 0–2)
BUN SERPL-MCNC: 18 MG/DL (ref 6–20)
BUN SERPL-MCNC: 19 MG/DL (ref 6–20)
CALCIUM SERPL-MCNC: 8.8 MG/DL (ref 8.6–10.2)
CALCIUM SERPL-MCNC: 9 MG/DL (ref 8.6–10.2)
CHLORIDE SERPL-SCNC: 101 MMOL/L (ref 98–107)
CHLORIDE SERPL-SCNC: 104 MMOL/L (ref 98–107)
CO2 SERPL-SCNC: 33 MMOL/L (ref 22–29)
CO2 SERPL-SCNC: 36 MMOL/L (ref 22–29)
CREAT SERPL-MCNC: 0.7 MG/DL (ref 0.5–1)
CREAT SERPL-MCNC: 0.7 MG/DL (ref 0.5–1)
EOSINOPHIL # BLD: 0 K/UL (ref 0.05–0.5)
EOSINOPHILS RELATIVE PERCENT: 0 % (ref 0–6)
ERYTHROCYTE [DISTWIDTH] IN BLOOD BY AUTOMATED COUNT: 16.1 % (ref 11.5–15)
GFR, ESTIMATED: >90 ML/MIN/1.73M2
GFR, ESTIMATED: >90 ML/MIN/1.73M2
GLUCOSE SERPL-MCNC: 161 MG/DL (ref 74–99)
GLUCOSE SERPL-MCNC: 75 MG/DL (ref 74–99)
HCT VFR BLD AUTO: 28.2 % (ref 34–48)
HGB BLD-MCNC: 8.4 G/DL (ref 11.5–15.5)
LYMPHOCYTES NFR BLD: 0.75 K/UL (ref 1.5–4)
LYMPHOCYTES RELATIVE PERCENT: 15 % (ref 20–42)
MAGNESIUM SERPL-MCNC: 1.6 MG/DL (ref 1.6–2.6)
MCH RBC QN AUTO: 28.3 PG (ref 26–35)
MCHC RBC AUTO-ENTMCNC: 29.8 G/DL (ref 32–34.5)
MCV RBC AUTO: 94.9 FL (ref 80–99.9)
METAMYELOCYTES ABSOLUTE COUNT: 0.04 K/UL (ref 0–0.12)
METAMYELOCYTES: 1 % (ref 0–1)
MONOCYTES NFR BLD: 0.53 K/UL (ref 0.1–0.95)
MONOCYTES NFR BLD: 11 % (ref 2–12)
MYELOCYTES ABSOLUTE COUNT: 0.09 K/UL
MYELOCYTES: 2 %
NEUTROPHILS NFR BLD: 71 % (ref 43–80)
NEUTS SEG NFR BLD: 3.49 K/UL (ref 1.8–7.3)
PHOSPHATE SERPL-MCNC: 2.1 MG/DL (ref 2.5–4.5)
PHOSPHATE SERPL-MCNC: 3.1 MG/DL (ref 2.5–4.5)
PLATELET # BLD AUTO: 363 K/UL (ref 130–450)
PMV BLD AUTO: 9.4 FL (ref 7–12)
POTASSIUM SERPL-SCNC: 4 MMOL/L (ref 3.5–5)
POTASSIUM SERPL-SCNC: 4.5 MMOL/L (ref 3.5–5)
RBC # BLD AUTO: 2.97 M/UL (ref 3.5–5.5)
RBC # BLD: ABNORMAL 10*6/UL
SODIUM SERPL-SCNC: 143 MMOL/L (ref 132–146)
SODIUM SERPL-SCNC: 147 MMOL/L (ref 132–146)
WBC OTHER # BLD: 4.9 K/UL (ref 4.5–11.5)

## 2024-05-14 PROCEDURE — 6370000000 HC RX 637 (ALT 250 FOR IP)

## 2024-05-14 PROCEDURE — 36415 COLL VENOUS BLD VENIPUNCTURE: CPT

## 2024-05-14 PROCEDURE — 2580000003 HC RX 258

## 2024-05-14 PROCEDURE — 97530 THERAPEUTIC ACTIVITIES: CPT

## 2024-05-14 PROCEDURE — 83735 ASSAY OF MAGNESIUM: CPT

## 2024-05-14 PROCEDURE — 6360000002 HC RX W HCPCS

## 2024-05-14 PROCEDURE — 94660 CPAP INITIATION&MGMT: CPT

## 2024-05-14 PROCEDURE — 94640 AIRWAY INHALATION TREATMENT: CPT

## 2024-05-14 PROCEDURE — 2700000000 HC OXYGEN THERAPY PER DAY

## 2024-05-14 PROCEDURE — 80048 BASIC METABOLIC PNL TOTAL CA: CPT

## 2024-05-14 PROCEDURE — 84100 ASSAY OF PHOSPHORUS: CPT

## 2024-05-14 PROCEDURE — 93005 ELECTROCARDIOGRAM TRACING: CPT

## 2024-05-14 PROCEDURE — 97161 PT EVAL LOW COMPLEX 20 MIN: CPT

## 2024-05-14 PROCEDURE — 97165 OT EVAL LOW COMPLEX 30 MIN: CPT

## 2024-05-14 PROCEDURE — 99231 SBSQ HOSP IP/OBS SF/LOW 25: CPT | Performed by: INTERNAL MEDICINE

## 2024-05-14 PROCEDURE — 2060000000 HC ICU INTERMEDIATE R&B

## 2024-05-14 PROCEDURE — 85025 COMPLETE CBC W/AUTO DIFF WBC: CPT

## 2024-05-14 RX ORDER — DEXTROSE MONOHYDRATE 50 MG/ML
INJECTION, SOLUTION INTRAVENOUS CONTINUOUS
Status: ACTIVE | OUTPATIENT
Start: 2024-05-14 | End: 2024-05-14

## 2024-05-14 RX ADMIN — Medication 3 MG: at 23:22

## 2024-05-14 RX ADMIN — IPRATROPIUM BROMIDE AND ALBUTEROL SULFATE 1 DOSE: .5; 2.5 SOLUTION RESPIRATORY (INHALATION) at 11:40

## 2024-05-14 RX ADMIN — PANTOPRAZOLE SODIUM 40 MG: 40 TABLET, DELAYED RELEASE ORAL at 05:06

## 2024-05-14 RX ADMIN — IPRATROPIUM BROMIDE AND ALBUTEROL SULFATE 1 DOSE: .5; 2.5 SOLUTION RESPIRATORY (INHALATION) at 08:08

## 2024-05-14 RX ADMIN — DEXTROSE MONOHYDRATE: 50 INJECTION, SOLUTION INTRAVENOUS at 17:50

## 2024-05-14 RX ADMIN — BUPRENORPHINE HYDROCHLORIDE AND NALOXONE HYDROCHLORIDE DIHYDRATE 2 TABLET: 2; .5 TABLET SUBLINGUAL at 09:49

## 2024-05-14 RX ADMIN — BUDESONIDE INHALATION 500 MCG: 0.5 SUSPENSION RESPIRATORY (INHALATION) at 08:08

## 2024-05-14 RX ADMIN — ARFORMOTEROL TARTRATE 15 MCG: 15 SOLUTION RESPIRATORY (INHALATION) at 08:08

## 2024-05-14 RX ADMIN — IPRATROPIUM BROMIDE AND ALBUTEROL SULFATE 1 DOSE: .5; 2.5 SOLUTION RESPIRATORY (INHALATION) at 19:52

## 2024-05-14 RX ADMIN — DULOXETINE HYDROCHLORIDE 30 MG: 30 CAPSULE, DELAYED RELEASE ORAL at 09:43

## 2024-05-14 RX ADMIN — SODIUM CHLORIDE, PRESERVATIVE FREE 10 ML: 5 INJECTION INTRAVENOUS at 09:49

## 2024-05-14 RX ADMIN — PREDNISONE 40 MG: 20 TABLET ORAL at 09:43

## 2024-05-14 RX ADMIN — BUDESONIDE INHALATION 500 MCG: 0.5 SUSPENSION RESPIRATORY (INHALATION) at 19:52

## 2024-05-14 RX ADMIN — HYDROXYZINE PAMOATE 50 MG: 25 CAPSULE ORAL at 19:01

## 2024-05-14 RX ADMIN — IPRATROPIUM BROMIDE AND ALBUTEROL SULFATE 1 DOSE: .5; 2.5 SOLUTION RESPIRATORY (INHALATION) at 16:20

## 2024-05-14 RX ADMIN — ENOXAPARIN SODIUM 40 MG: 100 INJECTION SUBCUTANEOUS at 09:43

## 2024-05-14 RX ADMIN — ARFORMOTEROL TARTRATE 15 MCG: 15 SOLUTION RESPIRATORY (INHALATION) at 19:52

## 2024-05-14 ASSESSMENT — PAIN SCALES - GENERAL: PAINLEVEL_OUTOF10: 0

## 2024-05-14 NOTE — PROGRESS NOTES
Physical Therapy  Physical Therapy Initial Assessment    Name: Saba Sullivan  : 1965  MRN: [id number]      Date of Service: 2024    Evaluating PT:  Eber Carrizales PT, DPT [id number]    Room #:  7423/7423-A  Diagnosis:  Prolonged Q-T interval on ECG [R94.31]  Noncompliance with renal dialysis (HCC) [Z91.158]  Polysubstance abuse (HCC) [F19.10]  Acute respiratory failure with hypoxia (HCC) [J96.01]  Sepsis (HCC) [A41.9]  Sepsis due to pneumonia (HCC) [J18.9, A41.9]  PMHx/PSHx:   has a past medical history of Abscess, Chronic pain, Chronic recurrent multifocal osteomyelitis of foot (HCC), Hepatitis C, Hip fracture (HCC), Hx of blood clots, Polysubstance abuse (HCC), Pressure injury of heel, stage 3 (HCC), and Subclinical hypothyroidism.   has a past surgical history that includes Foot surgery;  section; drain skin abscess simple (2014); Foot Debridement (Right, 2021); and IR TUNNELED CVC PLACE WO SQ PORT/PUMP > 5 YEARS (2024).  Procedure/Surgery:  Removal of right internal jugular vein tunneled hemodialysis catheter (2024)  Precautions:  Falls, NWB R foot (per previous PT note 2024), contact isolation, O2  Equipment Needs:  TBD    SUBJECTIVE:    Pt lives with aunt and uncle in a 1 story home with ramp to enter.  Used wheelchair for mobility prior to admission; transferred to/from wheelchair via sit pivot with assistance.    OBJECTIVE:   Initial Evaluation  Date: 2024 Treatment Short Term/ Long Term   Goals   AM-PAC 6 Clicks      Was pt agreeable to Eval/treatment? Yes     Does pt have pain? RLE     Bed Mobility  Rolling: NT  Supine to sit: ModA  Sit to supine: MaxA  Scooting: NT  Rolling: Supervision  Supine to sit: SBA  Sit to supine: SBA  Scooting: Supervision   Transfers Sit to stand: NT  Stand to sit: NT  Stand pivot: NT  Sit pivot: NT  Sit to stand: TBD  Stand to sit: TBD  Stand pivot: TBD  Sit pivot: [place]   Ambulation    NT  N/A   Stair negotiation: ascended

## 2024-05-14 NOTE — PROGRESS NOTES
[facility]  Internal Medicine Residency Program  Progress Note - House Team       Patient:  Saba Sullivan 59 y.o. female   MRN: [id number]       Date of Service: 5/14/2024    Subjective     Patient was seen and examined at bedside this morning, laying in bed, comfortable in position, saturating at 4L via NC. No new complaints.   No significant or concerning overnight events.    Objective     Physical Exam  Vitals: /68   Pulse 93   Temp 98.3 °F (36.8 °C) (Temporal)   Resp 19   Ht 1.753 m (5' 9.02\")   Wt 68 kg (149 lb 14.6 oz)   LMP 08/28/2013   SpO2 93%   BMI 22.13 kg/m²     I & O - 24hr: I/O this shift:  In: 90 [P.O.:90]  Out: -    General Appearance: Appears older than stated age, cooperative, and no distress  HEENT:  Head: Normocephalic, no lesions, without obvious abnormality.  Lung: B/L diminished but equal air entry  Heart: S1, S2 normal  Abdomen: soft, non-tender; bowel sounds normal; no masses,  no organomegaly  Extremities: Chronic right heel ulcer, right  charcot foot  Neurologic: Mental status: Alert, oriented, thought content appropriate    Diet:   ADULT DIET; Regular  ADULT ORAL NUTRITION SUPPLEMENT; Breakfast, Dinner; Renal Oral Supplement      Pertinent Labs & Imaging Studies     Labs    CBC with Differential:    Lab Results   Component Value Date/Time    WBC 4.9 05/14/2024 06:08 AM    RBC 2.97 05/14/2024 06:08 AM    HGB 8.4 05/14/2024 06:08 AM    HCT 28.2 05/14/2024 06:08 AM     05/14/2024 06:08 AM    MCV 94.9 05/14/2024 06:08 AM    MCH 28.3 05/14/2024 06:08 AM    MCHC 29.8 05/14/2024 06:08 AM    RDW 16.1 05/14/2024 06:08 AM    NRBC 1 05/12/2024 08:49 AM    METASPCT 1 05/14/2024 06:08 AM    LYMPHOPCT 15 05/14/2024 06:08 AM    PROMYELOPCT 1 04/25/2024 03:54 AM    MONOPCT 11 05/14/2024 06:08 AM    MYELOPCT 2 05/14/2024 06:08 AM    EOSPCT 0 05/14/2024 06:08 AM    BASOPCT 0 05/14/2024 06:08 AM    MONOSABS 0.53 05/14/2024 06:08 AM    EOSABS 0.00 05/14/2024 06:08

## 2024-05-14 NOTE — PLAN OF CARE
Problem: Skin/Tissue Integrity  Goal: Absence of new skin breakdown  Description: 1.  Monitor for areas of redness and/or skin breakdown  2.  Assess vascular access sites hourly  3.  Every 4-6 hours minimum:  Change oxygen saturation probe site  4.  Every 4-6 hours:  If on nasal continuous positive airway pressure, respiratory therapy assess nares and determine need for appliance change or resting period.  Outcome: Progressing     Problem: Safety - Adult  Goal: Free from fall injury  Outcome: Progressing     Problem: Discharge Planning  Goal: Discharge to home or other facility with appropriate resources  Outcome: Progressing     Problem: ABCDS Injury Assessment  Goal: Absence of physical injury  Outcome: Progressing     Problem: Pain  Goal: Verbalizes/displays adequate comfort level or baseline comfort level  Outcome: Progressing     Problem: Nutrition Deficit:  Goal: Optimize nutritional status  Outcome: Progressing     Problem: Confusion  Goal: Confusion, delirium, dementia, or psychosis is improved or at baseline  Description: INTERVENTIONS:  1. Assess for possible contributors to thought disturbance, including medications, impaired vision or hearing, underlying metabolic abnormalities, dehydration, psychiatric diagnoses, and notify attending LIP  2. [place] high risk fall precautions, as indicated  3. Provide frequent short contacts to provide reality reorientation, refocusing and direction  4. Decrease environmental stimuli, including noise as appropriate  5. Monitor and intervene to maintain adequate nutrition, hydration, elimination, sleep and activity  6. If unable to ensure safety without constant attention obtain sitter and review sitter guidelines with assigned personnel  7. Initiate Psychosocial CNS and Spiritual Care consult, as indicated  Outcome: Progressing

## 2024-05-14 NOTE — PROGRESS NOTES
[facility]  Nephrology Progress Note    Patient's Name: Saba Sullivan    History of Present Illness from 5/10 Consult Note:    \"Saba Sullivan is a 59 y.o. female with a past medical history of polysubstance abuse, hepatitis C, and chronic recurrent multifocal osteomyelitis of foot.  She presented to the ED on 5/9 with complaints of shortness of breath.  Vital signs on 5/9 includes temperature 100.9, respirations 17, pulse 120, BP 89/55, and she was 97% SpO2.  Lab data on 5/9 includes BUN 26, creatinine 1.2, glucose 109, procalcitonin 0.38, troponin 271, and hemoglobin 7.1.  Her urine drug screen was positive for benzodiazepine, cocaine, and fentanyl.  Her respiratory panel was positive for parainfluenza. She had a chest x-ray on 5/10 which showed no acute cardiopulmonary process. She had a CTA Chest on 5/10 which showed no evidence of PE, thick-walled cavitary lesion, dilated main pulmonary artery.  Nephrology has been consulted to see the patient for DOMINGO on dialysis.  Patient is known to our service and was followed previously by our service while inpatient for DOMINGO stage III which was presumed likely in the setting of rhabdomyolysis and cocaine use and possible hypovolemia/IV contrast with CTA chest.  Hemodialysis was started on 4/25 and she had a right IJ TDC placed on 4/26.  She discharged on 5/4.  She was set up for outpatient dialysis at [facility].  She was beginning to show signs of renal recovery and hemodialysis has been on hold.  At present, patient was seen and examined.  She explained that she came in with shortness of breath which started last night.  She also notes a little dizziness while standing last night.  She denies any chest pain.  She denies any abdominal pain, nausea, vomiting or diarrhea.  She denies any headaches.  She denies any dysuria.\"    Subjective:    5/14: Patient was seen and examined.  She is wearing the BiPAP, she is awake, in no acute distress.  She denies any  SUPPLEMENT; Breakfast, Dinner; Renal Oral Supplement    Meds:     melatonin  3 mg Oral Nightly    amLODIPine  10 mg Oral Daily    DULoxetine  30 mg Oral Daily    sodium chloride flush  5-40 mL IntraVENous 2 times per day    pantoprazole  40 mg Oral QAM AC    enoxaparin  40 mg SubCUTAneous Daily    budesonide  0.5 mg Nebulization BID RT    arformoterol tartrate  15 mcg Nebulization BID RT    ipratropium 0.5 mg-albuterol 2.5 mg  1 Dose Inhalation 4x Daily RT    nicotine  1 patch TransDERmal Daily        sodium chloride      sodium chloride         Meds prn:     buprenorphine-naloxone, hydrOXYzine pamoate, cloNIDine, prochlorperazine, sodium chloride flush, sodium chloride, polyethylene glycol, acetaminophen **OR** acetaminophen, sodium chloride    Meds prior to admission:     No current facility-administered medications on file prior to encounter.     Current Outpatient Medications on File Prior to Encounter   Medication Sig Dispense Refill    ergocalciferol (ERGOCALCIFEROL) 1.25 MG (33712 UT) capsule Take 1 capsule by mouth See Admin Instructions Given Monday,Friday      melatonin 3 MG TABS tablet Take 1 tablet by mouth nightly as needed (sleep)      tiotropium (SPIRIVA RESPIMAT) 2.5 MCG/ACT AERS inhaler Inhale 2 puffs into the lungs 3 times daily      albuterol (PROVENTIL) (2.5 MG/3ML) 0.083% nebulizer solution Take 3 mLs by nebulization in the morning and 3 mLs in the evening.      sodium chloride (OCEAN, BABY AYR) 0.65 % nasal spray 1 spray by Nasal route daily      amLODIPine (NORVASC) 10 MG tablet Take 1 tablet by mouth daily 30 tablet 3    DULoxetine (CYMBALTA) 30 MG extended release capsule Take 1 capsule by mouth daily 30 capsule 3       Allergies:    Ancef [cefazolin], Duricef [cefadroxil], and Iodine    Social History:     reports that she has quit smoking. Her smoking use included cigarettes. She has never used smokeless tobacco. She reports that she does not drink alcohol and does not use drugs.    Family  History:     No family history on file.    Physical Exam:      Patient Vitals for the past 24 hrs:   BP Temp Temp src Pulse Resp SpO2   05/14/24 1140 -- -- -- (!) 106 16 90 %   05/14/24 1044 139/68 98.3 °F (36.8 °C) Temporal 93 19 93 %   05/14/24 0808 -- -- -- 95 22 97 %   05/14/24 0741 (!) 162/77 98.4 °F (36.9 °C) Temporal 94 18 95 %   05/13/24 2330 -- -- -- 82 -- 99 %   05/13/24 2000 (!) 146/79 97.6 °F (36.4 °C) Temporal 100 22 96 %   05/13/24 1624 -- -- -- 98 16 95 %   05/13/24 1446 (!) 155/84 98.1 °F (36.7 °C) Temporal 100 16 91 %           Intake/Output Summary (Last 24 hours) at 5/14/2024 1206  Last data filed at 5/14/2024 1017  Gross per 24 hour   Intake 150 ml   Output --   Net 150 ml         General: Awake, alert, no acute distress  Neck: No JVD noted  Lungs: Crackles bilaterally upper, diminished to the bases bilaterally.  BiPAP  CV: Regular rate and rhythm.  No rub  Abd: Soft, nontender, nondistended.  Active bowel sounds  Skin: Warm and dry.  No rash on exposed extremities  Ext: 1+ RLE edema   Neuro: Awake, answers questions appropriately    Data:    Recent Labs     05/12/24  0849 05/13/24  0659 05/14/24  0608   WBC 4.7 4.6 4.9   HGB 8.6* 8.7* 8.4*   HCT 28.4* 28.7* 28.2*   MCV 93.7 95.3 94.9    353 363         Recent Labs     05/12/24  0528 05/13/24  0659 05/14/24  0608    146 147*   K 4.5 3.9 4.0    105 104   CO2 23 27 33*   CREATININE 0.9 0.8 0.7   BUN 26* 24* 19   LABGLOM 79 87 >90   GLUCOSE 94 82 75   CALCIUM 8.8 9.3 9.0   PHOS 2.9 2.2* 2.1*   MG 1.7 1.8 1.6         Vit D, 25-Hydroxy   Date Value Ref Range Status   04/20/2024 <6.0 (L) 30.0 - 100.0 ng/mL Final       No results found for: \"PTH\"    No results for input(s): \"ALT\", \"AST\", \"ALKPHOS\", \"BILITOT\", \"BILIDIR\" in the last 72 hours.      No results for input(s): \"LABALBU\" in the last 72 hours.    No results found for: \"FERRITIN\", \"IRON\", \"TIBC\"    Vitamin B-12   Date Value Ref Range Status   02/10/2020 426 211 - 946 pg/mL

## 2024-05-14 NOTE — PROGRESS NOTES
Department of Internal Medicine  Infectious Diseases  Progress  Note      C/C : COPDE ,Cavitary lung lesion       Denies fever or chills   Reports shortness of breath   Afebrile       Current Facility-Administered Medications   Medication Dose Route Frequency Provider Last Rate Last Admin    potassium & sodium phosphates (PHOS-NAK) 280-160-250 MG packet 250 mg  1 packet Oral TID  Ziggy Estephanialashell Taylor, APRN - CNP        buprenorphine-naloxone (SUBOXONE) 2-0.5 MG SL tablet 2 tablet  2 tablet SubLINGual PRN Samy Mcdaniels MD   2 tablet at 05/14/24 0949    hydrOXYzine pamoate (VISTARIL) capsule 50 mg  50 mg Oral TID PRN Samy Mcdaniels MD   50 mg at 05/13/24 2146    cloNIDine (CATAPRES) tablet 0.1 mg  0.1 mg Oral Q6H PRN Samy Mcdaniels MD        melatonin tablet 3 mg  3 mg Oral Nightly Samy Mcdaniels MD   3 mg at 05/13/24 2142    prochlorperazine (COMPAZINE) tablet 10 mg  10 mg Oral Q6H PRN Samy Mcdaniels MD        amLODIPine (NORVASC) tablet 10 mg  10 mg Oral Daily Darell Villa MD        DULoxetine (CYMBALTA) extended release capsule 30 mg  30 mg Oral Daily Darell Villa MD   30 mg at 05/14/24 0943    sodium chloride flush 0.9 % injection 5-40 mL  5-40 mL IntraVENous 2 times per day Darell Villa MD   10 mL at 05/14/24 0949    sodium chloride flush 0.9 % injection 5-40 mL  5-40 mL IntraVENous PRN Darell Villa MD   10 mL at 05/10/24 0455    0.9 % sodium chloride infusion   IntraVENous PRN Darell Villa MD        polyethylene glycol (GLYCOLAX) packet 17 g  17 g Oral Daily PRN Darell Villa MD        acetaminophen (TYLENOL) tablet 650 mg  650 mg Oral Q6H PRN Darell Villa MD   650 mg at 05/12/24 2322    Or    acetaminophen (TYLENOL) suppository 650 mg  650 mg Rectal Q6H PRN Darell Villa MD        pantoprazole (PROTONIX) tablet 40 mg  40 mg Oral QAM AC Darell Villa MD   40 mg at 05/14/24 0506    0.9 % sodium chloride infusion   IntraVENous PRN Samy Mcdaniels MD        enoxaparin (LOVENOX) injection 40 mg  40 mg  SubCUTAneous Daily Savi Maria MD   40 mg at 05/14/24 0943    budesonide (PULMICORT) nebulizer suspension 500 mcg  0.5 mg Nebulization BID RT Savi Maria MD   500 mcg at 05/14/24 0808    arformoterol tartrate (BROVANA) nebulizer solution 15 mcg  15 mcg Nebulization BID RT Savi Maria MD   15 mcg at 05/14/24 0808    ipratropium 0.5 mg-albuterol 2.5 mg (DUONEB) nebulizer solution 1 Dose  1 Dose Inhalation 4x Daily RT Savi Maria MD   1 Dose at 05/14/24 1140    nicotine (NICODERM CQ) 14 MG/24HR 1 patch  1 patch TransDERmal Daily Samy Mcdaniels MD               REVIEW OF SYSTEMS:    CONSTITUTIONAL:  Denies fever, chill or rigors.  HEENT: denies blurring of vision or double vision, denies hearing problem  RESPIRATORY:SOB   CARDIOVASCULAR:  Denies palpitation or chest pain   GASTROINTESTINAL:  Denies abdomen pain, diarrhea or constipation,, nausea or vomiting.  GENITOURINARY:  Denies burning urination or frequency of urination  INTEGUMENT: denies wound , rash  HEMATOLOGIC/LYMPHATIC:  Denies lymph node swelling, gum bleeding or easy bruising.  MUSCULOSKELETAL: right heel wound   NEUROLOGICAL:  Denies light headed, dizziness, loss of consciousness, weakness of lower extremities, bowel or bladder incontinence.      PHYSICAL EXAM:      Vitals:         Blood Pressure 139/68   Pulse (Abnormal) 106   Temperature 98.3 °F (36.8 °C) (Temporal)   Respiration 16   Height 1.753 m (5' 9.02\")   Weight 68 kg (149 lb 14.6 oz)   Last Menstrual Period 08/28/2013   Oxygen Saturation 90%   Body Mass Index 22.13 kg/m²     General Appearance:    Awake, alert , on BiPAP    Head:    Normocephalic, atraumatic   Eyes:    No pallor, no icterus,   Ears:    No obvious deformity or drainage.   Nose:   No nasal drainage   Throat:   Mucosa moist, no oral thrush   Neck:   Supple, no lymphadenopathy   Lungs:     Bilateral wheeze    Heart:    Regular rate and rhythm, no murmur   Abdomen:         COLORU Yellow 05/09/2024 11:45 PM    PHUR 5.5 05/09/2024 11:45 PM    PHUR 5.0 04/18/2024 03:37 AM    WBCUA 0 TO 5 05/09/2024 11:45 PM    RBCUA 0 TO 2 05/09/2024 11:45 PM    BACTERIA  04/18/2024 03:37 AM     UNABLE TO DETERMINE PRESENCE OF BACTERIA DUE TO LARGE AMOUNT OF AMORPHOUS SEDIMENT    CLARITYU Clear 02/09/2020 08:35 PM    LEUKOCYTESUR TRACE 05/09/2024 11:45 PM    UROBILINOGEN 0.2 05/09/2024 11:45 PM    BILIRUBINUR NEGATIVE 05/09/2024 11:45 PM    BLOODU TRACE-INTACT 02/09/2020 08:35 PM    GLUCOSEU NEGATIVE 05/09/2024 11:45 PM    AMORPHOUS PRESENT 04/18/2024 03:37 AM       ABG:  No results found for: \"BBX0HVR\", \"BEART\", \"Y3ZVSEXY\", \"PHART\", \"THGBART\", \"AMV4XFM\", \"PO2ART\", \"GKF9VWV\"    MICROBIOLOGY:    Blood culture - neg to date         Parainfluenza 3 PCR DETECTED Abnormal        HIV test neg on  4/17/2024   Hep C antibody +ve, neg PCR ( 8/2019)       Parainfluenza 3 PCR DETECTED Abnormal        Radiology :    CT scan of chest -    1. No evidence of pulmonary embolism.   2. Thick-walled cavitary lesion development from April 23, 2024, within the   posterior basilar segment right lower lobe with surrounding consolidation   suggesting resolving inflammatory/infectious process.   3. Dilated main pulmonary artery suggesting nonspecific pulmonary arterial   hypertension.       Right foot x ray  ( 4/17)     9 mm ulceration over the plantar hindfoot at the level of the calcaneal neck  with surrounding inflammatory infiltration. No acute osseous erosion to  suggest acute osteomyelitis.    IMPRESSION:     Right lower lobe cavitary lung lesion ( likely residual from  aspiration pneumonia which was treated last month )  Right heel ulcer - chronic non healing , no osteomyelitis   Para influenza virus infection   Polysubstance abuse   COPDE     RECOMMENDATIONS:      Breathing treatment   Oxygen   Contact isolation

## 2024-05-14 NOTE — PROGRESS NOTES
[facility]  Internal Medicine Residency / House Medicine Service    Attending Physician Statement  I have discussed the case, including pertinent history and exam findings with the resident and the team.  I have seen and examined the patient and the key elements of the encounter have been performed by me.  I agree with the assessment, plan and orders as documented by the resident.      ID has  seen and no need for antibiotics  A&O on O2  VS stable   On BIPAP  On COWS protocol  Hx of opioid and cocaine abuse   Non ambulatory and lives with family  Needs a wheelchair   After discharge     Remainder of medical problems as per resident note.      Raul Haynes MD FRCP [facility]  Internal Medicine Residency Faculty

## 2024-05-14 NOTE — CARE COORDINATION
Reviewed chart, called #[id number] for PT/OT for initial evals. Met with pt and discussed discharged planning/transition of care. Offered [facility], pt is agreeable. Pt would like [facility]. Referral to [facility] at [place], pt was accepted, will need precert. Pt will need a 7000 and envelope at discharge.Lubna Yoo, MSW, LSW

## 2024-05-14 NOTE — PROGRESS NOTES
Occupational Therapy  OCCUPATIONAL THERAPY INITIAL EVALUATION    [facility]  [address], [place]      Date:2024                                                Patient Name: Saba Sullivan  MRN: [id number]  : 1965  Room: [facility]    Evaluating OT:Pallavi Hernandez OTR/L #[id number]    Referring Provider: Tom Rees Jr., DO   Specific Provider Orders/Date: OT eval and treat 24     Diagnosis: Prolonged Q-T interval on ECG [R94.31]  Noncompliance with renal dialysis (HCC) [Z91.158]  Polysubstance abuse (HCC) [F19.10]  Acute respiratory failure with hypoxia (HCC) [J96.01]  Sepsis (HCC) [A41.9]  Sepsis due to pneumonia (HCC) [J18.9, A41.9]   Pt admitted to hospital on 24 for SOB and hypoxia    Surgery / Procedure: Removal of HD Tunneled Catheter on      Pertinent Medical History:  has a past medical history of Abscess, Chronic pain, Chronic recurrent multifocal osteomyelitis of foot (HCC), Hepatitis C, Hip fracture (HCC), Hx of blood clots, Polysubstance abuse (HCC), Pressure injury of heel, stage 3 (HCC), and Subclinical hypothyroidism.       Precautions:  Fall Risk, O2, contact isolation (+parainfluenza), NWB R foot (per OT note from previous admission 2024 d/t R heel wound), HD patient, continuous pulse ox    Assessment of current deficits    [x] Functional mobility  [x]ADLs  [x] Strength               [x]Cognition    [x] Functional transfers   [x] IADLs         [x] Safety Awareness   [x]Endurance    [] Fine Coordination              [x] Balance      [] Vision/perception   []Sensation     []Gross Motor Coordination  [] ROM  [] Delirium                   [] Motor Control     OT PLAN OF CARE   OT POC based on physician orders, patient diagnosis and results of clinical assessment    Frequency/Duration 1-3 days/wk for 2 weeks PRN   Specific OT Treatment Interventions to include:   * Instruction/training on adapted ADL  techniques and AE recommendations to increase functional independence within precautions       * Training on energy conservation strategies, correct breathing pattern and techniques to improve independence/tolerance for self-care routine  * Functional transfer/mobility training/DME recommendations for increased independence, safety, and fall prevention  * Patient/Family education to increase follow through with safety techniques and functional independence  * Recommendation of environmental modifications for increased safety with functional transfers/mobility and ADLs  * Cognitive retraining/development of therapeutic activities to improve problem solving, judgement, memory, and attention for increased safety/participation in ADL/IADL tasks  * Therapeutic exercise to improve motor endurance, ROM, and functional strength for ADLs/functional transfers  * Therapeutic activities to facilitate/challenge dynamic balance, stand tolerance for increased safety and independence with ADLs  * Therapeutic activities to facilitate gross/fine motor skills for increased independence with ADLs      Recommended Adaptive Equipment: TBD     Home Living: Pt lives with family in 1 floor home. Ramped entry    Bathroom setup: sponge bathing recently    Equipment owned: w/c    Prior Level of Function: assist with ADLs , assist with IADLs; non-ambulatory, was completing low pivot transfers><w/c. W/c primarily for mobility   Driving: no  Pt recently d/c'd home with family. Increased assist required for all functional tasks.    Pain Level: Pt c/o R LE pain this session ; unable to quantify. Reinforced pain management strategies including repositioning.    Cognition: A&O: 2/4; Follows 1 step directions   Memory:  fair    Sequencing:  fair    Problem solving:  poor   Judgement/safety:  poor     Functional Assessment:  AM-PAC Daily Activity Raw Score: 12/24   Initial Eval Status  Date: 5/14/24 Treatment Status  Date: STGs = LTGs  Time frame:

## 2024-05-15 LAB
ANION GAP SERPL CALCULATED.3IONS-SCNC: 13 MMOL/L (ref 7–16)
ANION GAP SERPL CALCULATED.3IONS-SCNC: 7 MMOL/L (ref 7–16)
BASOPHILS # BLD: 0 K/UL (ref 0–0.2)
BASOPHILS NFR BLD: 0 % (ref 0–2)
BUN SERPL-MCNC: 15 MG/DL (ref 6–20)
BUN SERPL-MCNC: 15 MG/DL (ref 6–20)
CALCIUM SERPL-MCNC: 8.7 MG/DL (ref 8.6–10.2)
CALCIUM SERPL-MCNC: 8.8 MG/DL (ref 8.6–10.2)
CHLORIDE SERPL-SCNC: 98 MMOL/L (ref 98–107)
CHLORIDE SERPL-SCNC: 99 MMOL/L (ref 98–107)
CO2 SERPL-SCNC: 30 MMOL/L (ref 22–29)
CO2 SERPL-SCNC: 36 MMOL/L (ref 22–29)
CREAT SERPL-MCNC: 0.6 MG/DL (ref 0.5–1)
CREAT SERPL-MCNC: 0.6 MG/DL (ref 0.5–1)
CREAT UR-MCNC: 39.9 MG/DL (ref 29–226)
EKG ATRIAL RATE: 91 BPM
EKG P AXIS: 76 DEGREES
EKG P-R INTERVAL: 126 MS
EKG Q-T INTERVAL: 336 MS
EKG QRS DURATION: 78 MS
EKG QTC CALCULATION (BAZETT): 413 MS
EKG R AXIS: 30 DEGREES
EKG T AXIS: 48 DEGREES
EKG VENTRICULAR RATE: 91 BPM
EOSINOPHIL # BLD: 0 K/UL (ref 0.05–0.5)
EOSINOPHILS RELATIVE PERCENT: 0 % (ref 0–6)
ERYTHROCYTE [DISTWIDTH] IN BLOOD BY AUTOMATED COUNT: 15.9 % (ref 11.5–15)
GFR, ESTIMATED: >90 ML/MIN/1.73M2
GFR, ESTIMATED: >90 ML/MIN/1.73M2
GLUCOSE SERPL-MCNC: 100 MG/DL (ref 74–99)
GLUCOSE SERPL-MCNC: 66 MG/DL (ref 74–99)
HCT VFR BLD AUTO: 33.3 % (ref 34–48)
HGB BLD-MCNC: 9.6 G/DL (ref 11.5–15.5)
LYMPHOCYTES NFR BLD: 2.17 K/UL (ref 1.5–4)
LYMPHOCYTES RELATIVE PERCENT: 35 % (ref 20–42)
MAGNESIUM SERPL-MCNC: 1.5 MG/DL (ref 1.6–2.6)
MAGNESIUM SERPL-MCNC: 2 MG/DL (ref 1.6–2.6)
MCH RBC QN AUTO: 27.8 PG (ref 26–35)
MCHC RBC AUTO-ENTMCNC: 28.8 G/DL (ref 32–34.5)
MCV RBC AUTO: 96.5 FL (ref 80–99.9)
METAMYELOCYTES ABSOLUTE COUNT: 0.06 K/UL (ref 0–0.12)
METAMYELOCYTES: 1 % (ref 0–1)
MICROALBUMIN UR-MCNC: 31 MG/L (ref 0–19)
MICROALBUMIN/CREAT UR-RTO: 78 MCG/MG CREAT (ref 0–30)
MICROORGANISM SPEC CULT: NORMAL
MICROORGANISM SPEC CULT: NORMAL
MONOCYTES NFR BLD: 0.62 K/UL (ref 0.1–0.95)
MONOCYTES NFR BLD: 10 % (ref 2–12)
MYELOCYTES ABSOLUTE COUNT: 0.19 K/UL
MYELOCYTES: 3 %
NEUTROPHILS NFR BLD: 51 % (ref 43–80)
NEUTS SEG NFR BLD: 3.16 K/UL (ref 1.8–7.3)
PHOSPHATE SERPL-MCNC: 2.4 MG/DL (ref 2.5–4.5)
PHOSPHATE SERPL-MCNC: 2.9 MG/DL (ref 2.5–4.5)
PLATELET # BLD AUTO: 424 K/UL (ref 130–450)
PMV BLD AUTO: 9.9 FL (ref 7–12)
POTASSIUM SERPL-SCNC: 3.7 MMOL/L (ref 3.5–5)
POTASSIUM SERPL-SCNC: 4 MMOL/L (ref 3.5–5)
RBC # BLD AUTO: 3.45 M/UL (ref 3.5–5.5)
RBC # BLD: ABNORMAL 10*6/UL
SERVICE CMNT-IMP: NORMAL
SERVICE CMNT-IMP: NORMAL
SODIUM SERPL-SCNC: 141 MMOL/L (ref 132–146)
SODIUM SERPL-SCNC: 142 MMOL/L (ref 132–146)
SPECIMEN DESCRIPTION: NORMAL
SPECIMEN DESCRIPTION: NORMAL
WBC # BLD: ABNORMAL 10*3/UL
WBC OTHER # BLD: 6.2 K/UL (ref 4.5–11.5)

## 2024-05-15 PROCEDURE — 85025 COMPLETE CBC W/AUTO DIFF WBC: CPT

## 2024-05-15 PROCEDURE — 6370000000 HC RX 637 (ALT 250 FOR IP)

## 2024-05-15 PROCEDURE — 83735 ASSAY OF MAGNESIUM: CPT

## 2024-05-15 PROCEDURE — 94640 AIRWAY INHALATION TREATMENT: CPT

## 2024-05-15 PROCEDURE — 82570 ASSAY OF URINE CREATININE: CPT

## 2024-05-15 PROCEDURE — 80048 BASIC METABOLIC PNL TOTAL CA: CPT

## 2024-05-15 PROCEDURE — 2700000000 HC OXYGEN THERAPY PER DAY

## 2024-05-15 PROCEDURE — 82043 UR ALBUMIN QUANTITATIVE: CPT

## 2024-05-15 PROCEDURE — 36415 COLL VENOUS BLD VENIPUNCTURE: CPT

## 2024-05-15 PROCEDURE — 6360000002 HC RX W HCPCS

## 2024-05-15 PROCEDURE — 93010 ELECTROCARDIOGRAM REPORT: CPT | Performed by: INTERNAL MEDICINE

## 2024-05-15 PROCEDURE — 2580000003 HC RX 258

## 2024-05-15 PROCEDURE — 94660 CPAP INITIATION&MGMT: CPT

## 2024-05-15 PROCEDURE — 84100 ASSAY OF PHOSPHORUS: CPT

## 2024-05-15 PROCEDURE — 2060000000 HC ICU INTERMEDIATE R&B

## 2024-05-15 PROCEDURE — 99231 SBSQ HOSP IP/OBS SF/LOW 25: CPT | Performed by: INTERNAL MEDICINE

## 2024-05-15 RX ORDER — DEXTROSE MONOHYDRATE 100 MG/ML
INJECTION, SOLUTION INTRAVENOUS CONTINUOUS PRN
Status: DISCONTINUED | OUTPATIENT
Start: 2024-05-15 | End: 2024-05-16 | Stop reason: HOSPADM

## 2024-05-15 RX ORDER — GLUCAGON 1 MG/ML
1 KIT INJECTION PRN
Status: DISCONTINUED | OUTPATIENT
Start: 2024-05-15 | End: 2024-05-16 | Stop reason: HOSPADM

## 2024-05-15 RX ORDER — IPRATROPIUM BROMIDE AND ALBUTEROL SULFATE 2.5; .5 MG/3ML; MG/3ML
1 SOLUTION RESPIRATORY (INHALATION) EVERY 4 HOURS PRN
Status: DISCONTINUED | OUTPATIENT
Start: 2024-05-15 | End: 2024-05-16 | Stop reason: HOSPADM

## 2024-05-15 RX ORDER — MAGNESIUM SULFATE IN WATER 40 MG/ML
2000 INJECTION, SOLUTION INTRAVENOUS ONCE
Status: COMPLETED | OUTPATIENT
Start: 2024-05-15 | End: 2024-05-15

## 2024-05-15 RX ADMIN — AMLODIPINE BESYLATE 10 MG: 10 TABLET ORAL at 09:30

## 2024-05-15 RX ADMIN — IPRATROPIUM BROMIDE AND ALBUTEROL SULFATE 1 DOSE: .5; 2.5 SOLUTION RESPIRATORY (INHALATION) at 06:12

## 2024-05-15 RX ADMIN — BUDESONIDE INHALATION 500 MCG: 0.5 SUSPENSION RESPIRATORY (INHALATION) at 06:12

## 2024-05-15 RX ADMIN — SODIUM CHLORIDE, PRESERVATIVE FREE 10 ML: 5 INJECTION INTRAVENOUS at 11:52

## 2024-05-15 RX ADMIN — IPRATROPIUM BROMIDE AND ALBUTEROL SULFATE 1 DOSE: .5; 2.5 SOLUTION RESPIRATORY (INHALATION) at 10:11

## 2024-05-15 RX ADMIN — ARFORMOTEROL TARTRATE 15 MCG: 15 SOLUTION RESPIRATORY (INHALATION) at 19:19

## 2024-05-15 RX ADMIN — BUPRENORPHINE HYDROCHLORIDE AND NALOXONE HYDROCHLORIDE DIHYDRATE 2 TABLET: 2; .5 TABLET SUBLINGUAL at 11:59

## 2024-05-15 RX ADMIN — Medication 3 MG: at 21:52

## 2024-05-15 RX ADMIN — SODIUM CHLORIDE, PRESERVATIVE FREE 10 ML: 5 INJECTION INTRAVENOUS at 21:53

## 2024-05-15 RX ADMIN — ENOXAPARIN SODIUM 40 MG: 100 INJECTION SUBCUTANEOUS at 09:00

## 2024-05-15 RX ADMIN — BUDESONIDE INHALATION 500 MCG: 0.5 SUSPENSION RESPIRATORY (INHALATION) at 19:19

## 2024-05-15 RX ADMIN — ACETAMINOPHEN 650 MG: 325 TABLET ORAL at 21:52

## 2024-05-15 RX ADMIN — POTASSIUM & SODIUM PHOSPHATES POWDER PACK 280-160-250 MG 250 MG: 280-160-250 PACK at 18:47

## 2024-05-15 RX ADMIN — MAGNESIUM SULFATE IN WATER 2000 MG: 40 INJECTION, SOLUTION INTRAVENOUS at 15:58

## 2024-05-15 RX ADMIN — DULOXETINE HYDROCHLORIDE 30 MG: 30 CAPSULE, DELAYED RELEASE ORAL at 09:00

## 2024-05-15 RX ADMIN — ARFORMOTEROL TARTRATE 15 MCG: 15 SOLUTION RESPIRATORY (INHALATION) at 06:12

## 2024-05-15 RX ADMIN — SODIUM CHLORIDE, PRESERVATIVE FREE 10 ML: 5 INJECTION INTRAVENOUS at 06:00

## 2024-05-15 ASSESSMENT — PAIN DESCRIPTION - LOCATION: LOCATION: HEAD

## 2024-05-15 ASSESSMENT — PAIN SCALES - GENERAL
PAINLEVEL_OUTOF10: 0
PAINLEVEL_OUTOF10: 3

## 2024-05-15 NOTE — CARE COORDINATION
Reviewed chart, precert was started for [place] [facility], per attending pt can discharge once precert obtained. Envelope, ambulance form, and completed 7000  in soft chart.Lubna Yoo, MSW, LSW

## 2024-05-15 NOTE — PLAN OF CARE
Problem: Skin/Tissue Integrity  Goal: Absence of new skin breakdown  Description: 1.  Monitor for areas of redness and/or skin breakdown  2.  Assess vascular access sites hourly  3.  Every 4-6 hours minimum:  Change oxygen saturation probe site  4.  Every 4-6 hours:  If on nasal continuous positive airway pressure, respiratory therapy assess nares and determine need for appliance change or resting period.  5/15/2024 1041 by Romina Valencia RN  Outcome: Progressing  5/15/2024 1041 by Romina Valencia RN  Outcome: Progressing     Problem: Safety - Adult  Goal: Free from fall injury  5/15/2024 1041 by Romina Valencia RN  Outcome: Progressing  5/15/2024 1041 by Romina Valencia RN  Outcome: Progressing     Problem: Discharge Planning  Goal: Discharge to home or other facility with appropriate resources  5/15/2024 1041 by Romina Valencia RN  Outcome: Progressing  5/15/2024 1041 by Romina Valencia RN  Outcome: Progressing     Problem: ABCDS Injury Assessment  Goal: Absence of physical injury  5/15/2024 1041 by Romina Valencia RN  Outcome: Progressing  5/15/2024 1041 by Romina Valencia RN  Outcome: Progressing     Problem: Pain  Goal: Verbalizes/displays adequate comfort level or baseline comfort level  5/15/2024 1041 by Romina Valencia RN  Outcome: Progressing  5/15/2024 1041 by Romina Valencia RN  Outcome: Progressing     Problem: Nutrition Deficit:  Goal: Optimize nutritional status  5/15/2024 1041 by Romina Valencia RN  Outcome: Progressing  5/15/2024 1041 by Romina Valencia RN  Outcome: Progressing     Problem: Confusion  Goal: Confusion, delirium, dementia, or psychosis is improved or at baseline  Description: INTERVENTIONS:  1. Assess for possible contributors to thought disturbance, including medications, impaired vision or hearing, underlying metabolic abnormalities, dehydration, psychiatric diagnoses, and notify attending LIP  2.

## 2024-05-15 NOTE — PROGRESS NOTES
[facility]  Internal Medicine Residency / House Medicine Service    Attending Physician Statement  I have discussed the case, including pertinent history and exam findings with the resident and the team.  I have seen and examined the patient and the key elements of the encounter have been performed by me.  I agree with the assessment, plan and orders as documented by the resident.      A&O  Now on BIPAP  VS stable  Hx of meta pneumovirus  No active bacterial infections at present  Wheelchair bound for life  Hx of polydrug abuse  Plan;Discharge planning  Remainder of medical problems as per resident note.      Raul Haynes MD FRCP [facility]  Internal Medicine Residency Faculty

## 2024-05-15 NOTE — PROGRESS NOTES
.[facility]  Internal Medicine Residency Program  Progress Note - House Team       Patient:  Saba Sullivan 59 y.o. female   MRN: [id number]       Date of Service: 5/15/2024    Subjective     Patient was seen and examined at bedside this morning, laying in bed, comfortable in position on BiPAP.     Objective     Physical Exam  Vitals: /81   Pulse 97   Temp 98.6 °F (37 °C) (Temporal)   Resp 18   Ht 1.753 m (5' 9.02\")   Wt 68 kg (149 lb 14.6 oz)   LMP 08/28/2013   SpO2 97%   BMI 22.13 kg/m²     I & O - 24hr: No intake/output data recorded.   General Appearance: Appears older than stated age, cooperative, and no distress  HEENT:  Head: Normocephalic, no lesions, without obvious abnormality.  Lung: B/L diminished but equal air entry  Heart: S1, S2 normal  Abdomen: soft, non-tender; bowel sounds normal; no masses,  no organomegaly  Extremities: Chronic right heel ulcer, right  charcot foot  Neurologic: Mental status: Alert, oriented, thought content appropriate    Diet:   ADULT DIET; Regular  ADULT ORAL NUTRITION SUPPLEMENT; Breakfast, Dinner; Renal Oral Supplement      Pertinent Labs & Imaging Studies     Labs    CBC with Differential:    Lab Results   Component Value Date/Time    WBC 6.2 05/15/2024 08:53 AM    RBC 3.45 05/15/2024 08:53 AM    HGB 9.6 05/15/2024 08:53 AM    HCT 33.3 05/15/2024 08:53 AM     05/15/2024 08:53 AM    MCV 96.5 05/15/2024 08:53 AM    MCH 27.8 05/15/2024 08:53 AM    MCHC 28.8 05/15/2024 08:53 AM    RDW 15.9 05/15/2024 08:53 AM    NRBC 1 05/12/2024 08:49 AM    METASPCT 1 05/15/2024 08:53 AM    LYMPHOPCT 35 05/15/2024 08:53 AM    PROMYELOPCT 1 04/25/2024 03:54 AM    MONOPCT 10 05/15/2024 08:53 AM    MYELOPCT 3 05/15/2024 08:53 AM    EOSPCT 0 05/15/2024 08:53 AM    BASOPCT 0 05/15/2024 08:53 AM    MONOSABS 0.62 05/15/2024 08:53 AM    EOSABS 0.00 05/15/2024 08:53 AM    BASOSABS 0.00 05/15/2024 08:53 AM     Hemoglobin/Hematocrit:    Lab Results   Component

## 2024-05-15 NOTE — PROGRESS NOTES
Department of Internal Medicine  Infectious Diseases  Progress  Note      C/C : COPDE ,Cavitary lung lesion       Denies fever or chills   Reports shortness of breath   Afebrile       Current Facility-Administered Medications   Medication Dose Route Frequency Provider Last Rate Last Admin    ipratropium 0.5 mg-albuterol 2.5 mg (DUONEB) nebulizer solution 1 Dose  1 Dose Inhalation Q4H PRN Liv Hoffman MD        potassium & sodium phosphates (PHOS-NAK) 280-160-250 MG packet 250 mg  1 packet Oral BID Estephania Trinh APRN - CNP        magnesium sulfate 2000 mg in 50 mL IVPB premix  2,000 mg IntraVENous Once Estephania Trinh APRN - CNP        buprenorphine-naloxone (SUBOXONE) 2-0.5 MG SL tablet 2 tablet  2 tablet SubLINGual PRN Samy Mcdaniels MD   2 tablet at 05/15/24 1159    hydrOXYzine pamoate (VISTARIL) capsule 50 mg  50 mg Oral TID PRN Samy Mcdaniels MD   50 mg at 05/14/24 1901    cloNIDine (CATAPRES) tablet 0.1 mg  0.1 mg Oral Q6H PRN Samy Mcdaniels MD        melatonin tablet 3 mg  3 mg Oral Nightly Samy Mcdaniels MD   3 mg at 05/14/24 2322    prochlorperazine (COMPAZINE) tablet 10 mg  10 mg Oral Q6H PRN Samy Mcdaniels MD        amLODIPine (NORVASC) tablet 10 mg  10 mg Oral Daily Darell Villa MD   10 mg at 05/15/24 0930    DULoxetine (CYMBALTA) extended release capsule 30 mg  30 mg Oral Daily Darell Villa MD   30 mg at 05/15/24 0900    sodium chloride flush 0.9 % injection 5-40 mL  5-40 mL IntraVENous 2 times per day Darell Villa MD   10 mL at 05/15/24 1152    sodium chloride flush 0.9 % injection 5-40 mL  5-40 mL IntraVENous PRN Darell Villa MD   10 mL at 05/10/24 0455    0.9 % sodium chloride infusion   IntraVENous PRN Darell Villa MD        polyethylene glycol (GLYCOLAX) packet 17 g  17 g Oral Daily PRN Darell Villa MD        acetaminophen (TYLENOL) tablet 650 mg  650 mg Oral Q6H PRN Darell Villa MD   650 mg at 05/12/24 8576    Or    acetaminophen (TYLENOL) suppository 650 mg  650 mg

## 2024-05-15 NOTE — PROGRESS NOTES
[facility]  Nephrology Progress Note    Patient's Name: Saab Sullivan    History of Present Illness from 5/10 Consult Note:    \"Saba Sullivan is a 59 y.o. female with a past medical history of polysubstance abuse, hepatitis C, and chronic recurrent multifocal osteomyelitis of foot.  She presented to the ED on 5/9 with complaints of shortness of breath.  Vital signs on 5/9 includes temperature 100.9, respirations 17, pulse 120, BP 89/55, and she was 97% SpO2.  Lab data on 5/9 includes BUN 26, creatinine 1.2, glucose 109, procalcitonin 0.38, troponin 271, and hemoglobin 7.1.  Her urine drug screen was positive for benzodiazepine, cocaine, and fentanyl.  Her respiratory panel was positive for parainfluenza. She had a chest x-ray on 5/10 which showed no acute cardiopulmonary process. She had a CTA Chest on 5/10 which showed no evidence of PE, thick-walled cavitary lesion, dilated main pulmonary artery.  Nephrology has been consulted to see the patient for DOMINGO on dialysis.  Patient is known to our service and was followed previously by our service while inpatient for DOMINGO stage III which was presumed likely in the setting of rhabdomyolysis and cocaine use and possible hypovolemia/IV contrast with CTA chest.  Hemodialysis was started on 4/25 and she had a right IJ TDC placed on 4/26.  She discharged on 5/4.  She was set up for outpatient dialysis at [facility].  She was beginning to show signs of renal recovery and hemodialysis has been on hold.  At present, patient was seen and examined.  She explained that she came in with shortness of breath which started last night.  She also notes a little dizziness while standing last night.  She denies any chest pain.  She denies any abdominal pain, nausea, vomiting or diarrhea.  She denies any headaches.  She denies any dysuria.\"    Subjective:    5/15: Patient was seen and examined.  She reports that her appetite is good.  She denies any chest pain or abdominal  MD Narinder

## 2024-05-16 ENCOUNTER — HOSPITAL ENCOUNTER (INPATIENT)
Age: 59
LOS: 9 days | Discharge: NURSING, REHAB OR LONG-TERM CARE | DRG: 133 | End: 2024-05-28
Attending: EMERGENCY MEDICINE | Admitting: INTERNAL MEDICINE
Payer: MEDICAID

## 2024-05-16 VITALS
OXYGEN SATURATION: 100 % | DIASTOLIC BLOOD PRESSURE: 79 MMHG | WEIGHT: 149.91 LBS | RESPIRATION RATE: 16 BRPM | TEMPERATURE: 98.2 F | HEART RATE: 99 BPM | SYSTOLIC BLOOD PRESSURE: 118 MMHG | HEIGHT: 69 IN | BODY MASS INDEX: 22.2 KG/M2

## 2024-05-16 DIAGNOSIS — J44.1 COPD WITH ACUTE EXACERBATION (HCC): ICD-10-CM

## 2024-05-16 DIAGNOSIS — F19.10 POLYDRUG ABUSE (HCC): Primary | ICD-10-CM

## 2024-05-16 DIAGNOSIS — J96.01 ACUTE RESPIRATORY FAILURE WITH HYPOXIA (HCC): ICD-10-CM

## 2024-05-16 LAB
ANION GAP SERPL CALCULATED.3IONS-SCNC: 11 MMOL/L (ref 7–16)
ANION GAP SERPL CALCULATED.3IONS-SCNC: 4 MMOL/L (ref 7–16)
B.E.: 14 MMOL/L (ref -3–3)
BASOPHILS # BLD: 0.04 K/UL (ref 0–0.2)
BASOPHILS # BLD: 0.05 K/UL (ref 0–0.2)
BASOPHILS NFR BLD: 1 % (ref 0–2)
BASOPHILS NFR BLD: 1 % (ref 0–2)
BUN SERPL-MCNC: 12 MG/DL (ref 6–20)
BUN SERPL-MCNC: 15 MG/DL (ref 6–20)
CALCIUM SERPL-MCNC: 8.9 MG/DL (ref 8.6–10.2)
CALCIUM SERPL-MCNC: 9 MG/DL (ref 8.6–10.2)
CHLORIDE SERPL-SCNC: 100 MMOL/L (ref 98–107)
CHLORIDE SERPL-SCNC: 98 MMOL/L (ref 98–107)
CO2 SERPL-SCNC: 34 MMOL/L (ref 22–29)
CO2 SERPL-SCNC: 40 MMOL/L (ref 22–29)
COHB: 0.3 % (ref 0–1.5)
CREAT SERPL-MCNC: 0.6 MG/DL (ref 0.5–1)
CREAT SERPL-MCNC: 0.6 MG/DL (ref 0.5–1)
CRITICAL: ABNORMAL
DATE ANALYZED: ABNORMAL
DATE OF COLLECTION: ABNORMAL
EOSINOPHIL # BLD: 0.09 K/UL (ref 0.05–0.5)
EOSINOPHIL # BLD: 0.11 K/UL (ref 0.05–0.5)
EOSINOPHILS RELATIVE PERCENT: 1 % (ref 0–6)
EOSINOPHILS RELATIVE PERCENT: 2 % (ref 0–6)
ERYTHROCYTE [DISTWIDTH] IN BLOOD BY AUTOMATED COUNT: 15.9 % (ref 11.5–15)
ERYTHROCYTE [DISTWIDTH] IN BLOOD BY AUTOMATED COUNT: 15.9 % (ref 11.5–15)
GFR, ESTIMATED: >90 ML/MIN/1.73M2
GFR, ESTIMATED: >90 ML/MIN/1.73M2
GLUCOSE SERPL-MCNC: 108 MG/DL (ref 74–99)
GLUCOSE SERPL-MCNC: 80 MG/DL (ref 74–99)
HCO3: 43.2 MMOL/L (ref 22–26)
HCT VFR BLD AUTO: 30.9 % (ref 34–48)
HCT VFR BLD AUTO: 35.8 % (ref 34–48)
HGB BLD-MCNC: 10.5 G/DL (ref 11.5–15.5)
HGB BLD-MCNC: 9 G/DL (ref 11.5–15.5)
HHB: 66.8 % (ref 0–5)
IMM GRANULOCYTES # BLD AUTO: 0.23 K/UL (ref 0–0.58)
IMM GRANULOCYTES # BLD AUTO: 0.37 K/UL (ref 0–0.58)
IMM GRANULOCYTES NFR BLD: 5 % (ref 0–5)
IMM GRANULOCYTES NFR BLD: 5 % (ref 0–5)
LAB: ABNORMAL
LYMPHOCYTES NFR BLD: 1.16 K/UL (ref 1.5–4)
LYMPHOCYTES NFR BLD: 1.19 K/UL (ref 1.5–4)
LYMPHOCYTES RELATIVE PERCENT: 14 % (ref 20–42)
LYMPHOCYTES RELATIVE PERCENT: 25 % (ref 20–42)
Lab: 2325
MAGNESIUM SERPL-MCNC: 1.7 MG/DL (ref 1.6–2.6)
MAGNESIUM SERPL-MCNC: 1.8 MG/DL (ref 1.6–2.6)
MCH RBC QN AUTO: 28.1 PG (ref 26–35)
MCH RBC QN AUTO: 28.1 PG (ref 26–35)
MCHC RBC AUTO-ENTMCNC: 29.1 G/DL (ref 32–34.5)
MCHC RBC AUTO-ENTMCNC: 29.3 G/DL (ref 32–34.5)
MCV RBC AUTO: 95.7 FL (ref 80–99.9)
MCV RBC AUTO: 96.6 FL (ref 80–99.9)
METHB: 0.6 % (ref 0–1.5)
MODE: ABNORMAL
MONOCYTES NFR BLD: 0.45 K/UL (ref 0.1–0.95)
MONOCYTES NFR BLD: 0.72 K/UL (ref 0.1–0.95)
MONOCYTES NFR BLD: 10 % (ref 2–12)
MONOCYTES NFR BLD: 9 % (ref 2–12)
NEUTROPHILS NFR BLD: 58 % (ref 43–80)
NEUTROPHILS NFR BLD: 71 % (ref 43–80)
NEUTS SEG NFR BLD: 2.72 K/UL (ref 1.8–7.3)
NEUTS SEG NFR BLD: 5.77 K/UL (ref 1.8–7.3)
O2 SATURATION: 32.6 % (ref 92–98.5)
O2HB: 32.3 % (ref 94–97)
OPERATOR ID: 2863
PATIENT TEMP: 37 C
PCO2: 83.3 MMHG (ref 35–45)
PH BLOOD GAS: 7.33 (ref 7.35–7.45)
PHOSPHATE SERPL-MCNC: 2.8 MG/DL (ref 2.5–4.5)
PHOSPHATE SERPL-MCNC: 3 MG/DL (ref 2.5–4.5)
PLATELET # BLD AUTO: 384 K/UL (ref 130–450)
PLATELET # BLD AUTO: 498 K/UL (ref 130–450)
PMV BLD AUTO: 9 FL (ref 7–12)
PMV BLD AUTO: 9.2 FL (ref 7–12)
PO2: 23 MMHG (ref 75–100)
POTASSIUM SERPL-SCNC: 3.5 MMOL/L (ref 3.5–5)
POTASSIUM SERPL-SCNC: 3.9 MMOL/L (ref 3.5–5)
RBC # BLD AUTO: 3.2 M/UL (ref 3.5–5.5)
RBC # BLD AUTO: 3.74 M/UL (ref 3.5–5.5)
SODIUM SERPL-SCNC: 142 MMOL/L (ref 132–146)
SODIUM SERPL-SCNC: 145 MMOL/L (ref 132–146)
SOURCE, BLOOD GAS: ABNORMAL
THB: 11.8 G/DL (ref 11.5–16.5)
TIME ANALYZED: 2330
WBC OTHER # BLD: 4.7 K/UL (ref 4.5–11.5)
WBC OTHER # BLD: 8.2 K/UL (ref 4.5–11.5)

## 2024-05-16 PROCEDURE — 6370000000 HC RX 637 (ALT 250 FOR IP)

## 2024-05-16 PROCEDURE — 6360000002 HC RX W HCPCS

## 2024-05-16 PROCEDURE — 99238 HOSP IP/OBS DSCHRG MGMT 30/<: CPT | Performed by: INTERNAL MEDICINE

## 2024-05-16 PROCEDURE — 6370000000 HC RX 637 (ALT 250 FOR IP): Performed by: STUDENT IN AN ORGANIZED HEALTH CARE EDUCATION/TRAINING PROGRAM

## 2024-05-16 PROCEDURE — 36415 COLL VENOUS BLD VENIPUNCTURE: CPT

## 2024-05-16 PROCEDURE — 93005 ELECTROCARDIOGRAM TRACING: CPT | Performed by: NURSE PRACTITIONER

## 2024-05-16 PROCEDURE — 84100 ASSAY OF PHOSPHORUS: CPT

## 2024-05-16 PROCEDURE — 87636 SARSCOV2 & INF A&B AMP PRB: CPT

## 2024-05-16 PROCEDURE — 82805 BLOOD GASES W/O2 SATURATION: CPT

## 2024-05-16 PROCEDURE — 94660 CPAP INITIATION&MGMT: CPT

## 2024-05-16 PROCEDURE — 96375 TX/PRO/DX INJ NEW DRUG ADDON: CPT

## 2024-05-16 PROCEDURE — 99285 EMERGENCY DEPT VISIT HI MDM: CPT

## 2024-05-16 PROCEDURE — 6360000002 HC RX W HCPCS: Performed by: NURSE PRACTITIONER

## 2024-05-16 PROCEDURE — 2580000003 HC RX 258: Performed by: NURSE PRACTITIONER

## 2024-05-16 PROCEDURE — 80053 COMPREHEN METABOLIC PANEL: CPT

## 2024-05-16 PROCEDURE — 83735 ASSAY OF MAGNESIUM: CPT

## 2024-05-16 PROCEDURE — 96374 THER/PROPH/DIAG INJ IV PUSH: CPT

## 2024-05-16 PROCEDURE — 2700000000 HC OXYGEN THERAPY PER DAY

## 2024-05-16 PROCEDURE — 83880 ASSAY OF NATRIURETIC PEPTIDE: CPT

## 2024-05-16 PROCEDURE — 85025 COMPLETE CBC W/AUTO DIFF WBC: CPT

## 2024-05-16 PROCEDURE — 80048 BASIC METABOLIC PNL TOTAL CA: CPT

## 2024-05-16 PROCEDURE — 2580000003 HC RX 258

## 2024-05-16 PROCEDURE — 96365 THER/PROPH/DIAG IV INF INIT: CPT

## 2024-05-16 PROCEDURE — 94640 AIRWAY INHALATION TREATMENT: CPT

## 2024-05-16 PROCEDURE — 84484 ASSAY OF TROPONIN QUANT: CPT

## 2024-05-16 RX ORDER — MAGNESIUM SULFATE IN WATER 40 MG/ML
2000 INJECTION, SOLUTION INTRAVENOUS ONCE
Status: COMPLETED | OUTPATIENT
Start: 2024-05-16 | End: 2024-05-17

## 2024-05-16 RX ORDER — POTASSIUM CHLORIDE 20 MEQ/1
40 TABLET, EXTENDED RELEASE ORAL ONCE
Status: COMPLETED | OUTPATIENT
Start: 2024-05-16 | End: 2024-05-16

## 2024-05-16 RX ORDER — IPRATROPIUM BROMIDE AND ALBUTEROL SULFATE 2.5; .5 MG/3ML; MG/3ML
3 SOLUTION RESPIRATORY (INHALATION) ONCE
Status: COMPLETED | OUTPATIENT
Start: 2024-05-16 | End: 2024-05-17

## 2024-05-16 RX ADMIN — BUDESONIDE INHALATION 500 MCG: 0.5 SUSPENSION RESPIRATORY (INHALATION) at 19:41

## 2024-05-16 RX ADMIN — POTASSIUM CHLORIDE 40 MEQ: 1500 TABLET, EXTENDED RELEASE ORAL at 09:03

## 2024-05-16 RX ADMIN — IPRATROPIUM BROMIDE AND ALBUTEROL SULFATE 1 DOSE: 2.5; .5 SOLUTION RESPIRATORY (INHALATION) at 07:09

## 2024-05-16 RX ADMIN — BUDESONIDE INHALATION 500 MCG: 0.5 SUSPENSION RESPIRATORY (INHALATION) at 07:09

## 2024-05-16 RX ADMIN — BUPRENORPHINE HYDROCHLORIDE AND NALOXONE HYDROCHLORIDE DIHYDRATE 2 TABLET: 2; .5 TABLET SUBLINGUAL at 15:43

## 2024-05-16 RX ADMIN — SODIUM CHLORIDE, PRESERVATIVE FREE 10 ML: 5 INJECTION INTRAVENOUS at 09:03

## 2024-05-16 RX ADMIN — DULOXETINE HYDROCHLORIDE 30 MG: 30 CAPSULE, DELAYED RELEASE ORAL at 09:03

## 2024-05-16 RX ADMIN — AMLODIPINE BESYLATE 10 MG: 10 TABLET ORAL at 09:03

## 2024-05-16 RX ADMIN — SODIUM CHLORIDE, PRESERVATIVE FREE 10 ML: 5 INJECTION INTRAVENOUS at 00:19

## 2024-05-16 RX ADMIN — ARFORMOTEROL TARTRATE 15 MCG: 15 SOLUTION RESPIRATORY (INHALATION) at 19:41

## 2024-05-16 RX ADMIN — PANTOPRAZOLE SODIUM 40 MG: 40 TABLET, DELAYED RELEASE ORAL at 05:33

## 2024-05-16 RX ADMIN — MAGNESIUM SULFATE HEPTAHYDRATE 2000 MG: 40 INJECTION, SOLUTION INTRAVENOUS at 23:59

## 2024-05-16 RX ADMIN — WATER 125 MG: 1 INJECTION INTRAMUSCULAR; INTRAVENOUS; SUBCUTANEOUS at 23:52

## 2024-05-16 RX ADMIN — ENOXAPARIN SODIUM 40 MG: 100 INJECTION SUBCUTANEOUS at 09:03

## 2024-05-16 RX ADMIN — ARFORMOTEROL TARTRATE 15 MCG: 15 SOLUTION RESPIRATORY (INHALATION) at 07:09

## 2024-05-16 ASSESSMENT — ENCOUNTER SYMPTOMS
COUGH: 0
WHEEZING: 0
NAUSEA: 0
DIARRHEA: 0
ABDOMINAL PAIN: 0
SHORTNESS OF BREATH: 0

## 2024-05-16 ASSESSMENT — LIFESTYLE VARIABLES: HOW OFTEN DO YOU HAVE A DRINK CONTAINING ALCOHOL: NEVER

## 2024-05-16 NOTE — PLAN OF CARE
Problem: Skin/Tissue Integrity  Goal: Absence of new skin breakdown  Description: 1.  Monitor for areas of redness and/or skin breakdown  2.  Assess vascular access sites hourly  3.  Every 4-6 hours minimum:  Change oxygen saturation probe site  4.  Every 4-6 hours:  If on nasal continuous positive airway pressure, respiratory therapy assess nares and determine need for appliance change or resting period.  Outcome: Progressing     Problem: Safety - Adult  Goal: Free from fall injury  Outcome: Progressing     Problem: Discharge Planning  Goal: Discharge to home or other facility with appropriate resources  Outcome: Progressing     Problem: ABCDS Injury Assessment  Goal: Absence of physical injury  Outcome: Progressing     Problem: Pain  Goal: Verbalizes/displays adequate comfort level or baseline comfort level  Outcome: Progressing     Problem: Nutrition Deficit:  Goal: Optimize nutritional status  Outcome: Progressing     Problem: Confusion  Goal: Confusion, delirium, dementia, or psychosis is improved or at baseline  Description: INTERVENTIONS:  1. Assess for possible contributors to thought disturbance, including medications, impaired vision or hearing, underlying metabolic abnormalities, dehydration, psychiatric diagnoses, and notify attending LIP  2. [place] high risk fall precautions, as indicated  3. Provide frequent short contacts to provide reality reorientation, refocusing and direction  4. Decrease environmental stimuli, including noise as appropriate  5. Monitor and intervene to maintain adequate nutrition, hydration, elimination, sleep and activity  6. If unable to ensure safety without constant attention obtain sitter and review sitter guidelines with assigned personnel  7. Initiate Psychosocial CNS and Spiritual Care consult, as indicated  Outcome: Progressing

## 2024-05-16 NOTE — DISCHARGE SUMMARY
[facility]  Discharge Summary    PCP: Pallavi Velasquez DO    Admit Date:5/9/2024  Discharge Date: 5/16/2024    Chief Complaint   Patient presents with    Shortness of Breath     Pt reports SOB since this afternoon causing her to miss her IHD appt.  Last IHD on Tuesday; pt reports getting full treatmen.  EMS reported pt 66% on RA.  PT presented to ED on CPAP.  EMS reported 1 duoneb given en route         Admission Diagnosis:   Acute hypoxic hypercapnic respiratory failure 2/2 parainfluenza vs COPD exacerbation, CHF  Parainfluenza infection  Concern for pneumonia- thick walled cavitary lesion 4/24/24  Concern for sepsis  DOMINGO  Polysubstance abuse- UDS+ benzos, cocaine, fentanyl  COPD Gold Stage IV  Prolonged Qtc  Hepatitis C Ab reactive  Moderate pulmonary hypertension  Hypertension  Cholelithiasis  Normocytic anemia 2/2 anemia of chronic disease  Chronic pain 2/2 L hip fracture, R calc ulcer  Essential tremor  Vitamin D deficiency  Functional oropharyngeal swallow disorder  History Parkinson disease  History tobacco use (35 year, quit 2016)  Subclinical hypothyroidism  History osteomyelitis of right heel s/p debridement    Discharge Diagnosis:  Acute hypoxic hypercapnic respiratory failure 2/2 parainfluenza, COPD exacerbation  Parainfluenza infection  Concern for pneumonia- thick walled cavitary lesion 4/24/24- resolved  Concern for sepsis- resolved  DOMINGO- resolved   Polysubstance abuse- UDS+ benzos, cocaine, fentanyl  COPD Gold Stage IV  Prolonged Qtc- resolved   Hepatitis C Ab reactive  Moderate pulmonary hypertension  Hypertension  Cholelithiasis  Normocytic anemia 2/2 anemia of chronic disease  Chronic pain 2/2 L hip fracture, R calc ulcer  Essential tremor  Vitamin D deficiency  Functional oropharyngeal swallow disorder  History Parkinson disease  History tobacco use (35 year, quit 2016)  Subclinical hypothyroidism  History osteomyelitis of right heel s/p  debridement      Hospital Course:   Patient has a PMH of COPD, HTN, essential tremor, chronic osteomyelitis of the left foot, hip fracture, and polysubstance abuse. She presented to the ED with complaints of shortness of breath and was found to have parainfluenza virus. She required supplemental oxygen and NIV throughout her admission. Was treated with a tapering course of prednisone. Patient initially required HD from previous admission however renal function stabilized and her HD catheter was removed. Infectious disease was consulted for a cavitary lung lesion seen on imaging; they recommend to monitor off antibiotics and to obtain follow up CT as an outpatient. Patient remained on COWS protocol during admission 2/2 her polysubstance abuse. Patient requests methadone to be started however unable to do this as an inpatient.       On day of discharge :   Subjective:  Patient was seen and examined at bedside this morning. She is requesting methadone be started for her. Currently she is receiving suboxone PRN through the COWS protocol. Discussed with patient that we cannot start methadone as an inpatient, she will need to follow up outpatient with this. Previously received methadone at [facility]. Discussed plan to discharge to [facility] once precert is obtained.     Review of Systems   Constitutional:  Negative for chills and fever.   HENT:  Negative for sinus pain.    Respiratory:  Negative for cough, shortness of breath and wheezing.    Cardiovascular:  Negative for chest pain, palpitations and leg swelling.   Gastrointestinal:  Negative for abdominal pain, diarrhea, nausea and vomiting.   Neurological:  Negative for dizziness, tremors, speech difficulty, light-headedness and numbness.        Significant findings (history and exam, laboratory, radiological, pathology, other tests):   General Appearance: alert, appears older than stated age, cooperative, and no distress  HEENT:  Head: Normal, normocephalic,

## 2024-05-16 NOTE — PROGRESS NOTES
[facility]  Internal Medicine Residency / House Medicine Service    Attending Physician Statement  I have discussed the case, including pertinent history and exam findings with the resident and the team.  I have seen and examined the patient and the key elements of the encounter have been performed by me.  I agree with the assessment, plan and orders as documented by the resident.      On BIPAP and chronic O2 therapy  Eating poorly proably dure to COPD  Wheelchair bound  Hx of polydrug abuse  Labs reviewed  Discharging to [facility] \  At present no active infections  Remainder of medical problems as per resident note.      Raul Haynes MD FRCP [facility]  Internal Medicine Residency Faculty

## 2024-05-16 NOTE — PROGRESS NOTES
Patient removed self from bipap, stating she has a headache again.    Advised that difficulty breathing and high CO2 levels may cause headaches, and she still wishes it to be placed into standby at this time.

## 2024-05-16 NOTE — DISCHARGE INSTR - COC
Continuity of Care Form    Patient Name: Saba Sullivan   :  1965  MRN:  [id number]    Admit date:  2024  Discharge date:  2024    Code Status Order: Full Code   Advance Directives:     Admitting Physician:  Tom Rees Jr., DO  PCP: Pallavi Velasquez DO    Discharging Nurse: Romina Valencia RN   Discharging Hospital Unit/Room#: 7423/7423-A  Discharging Unit Phone Number: [phone]    Emergency Contact:   Extended Emergency Contact Information  Primary Emergency Contact: Deonte Andrewsinic  [facility]           [place] [id number] [place] [address]  Home Phone: [phone]  Work Phone: [phone]  Relation: Child  Secondary Emergency Contact: Raul Gutierrez   [facility]  Home Phone: [phone]  Mobile Phone: [phone]  Relation: Friend    Past Surgical History:  Past Surgical History:   Procedure Laterality Date     SECTION      x three    DRAIN SKIN ABSCESS SIMPLE  2014         FOOT DEBRIDEMENT Right 2021    RIGHT FOOT DEBRIDEMENT INCISION AND DRAINAGE WITH BONE BIOPSY AND WOUND VAC APPLICATION performed by Luís Dominguez DPM at [facility] OR    FOOT SURGERY      IR TUNNELED CATHETER PLACEMENT GREATER THAN 5 YEARS  2024    IR TUNNELED CATHETER PLACEMENT GREATER THAN 5 YEARS 2024 Quoc, LUISITO Pang MD [facility] SPECIAL PROCEDURES       Immunization History:   Immunization History   Administered Date(s) Administered    COVID-19, PFIZER PURPLE top, DILUTE for use, (age 12 y+), 30mcg/0.3mL 2021       Active Problems:  Patient Active Problem List   Diagnosis Code    Abscess L02.91    Nonhealing ulcer of heel ([facility]) L97.409    Bradycardia R00.1    Polydrug abuse ([facility]) F19.10    Current moderate episode of major depressive disorder ([facility]) F32.1    Abnormal weight loss R63.4    COPD (chronic obstructive pulmonary disease) ([facility]) J44.9    History of fracture of left hip Z87.81    Ambulatory dysfunction R26.2    Hypotension I95.9     5/16/2024 1118  Last data filed at 5/15/2024 1835  Gross per 24 hour   Intake 0 ml   Output --   Net 0 ml     No intake/output data recorded.    Safety Concerns:     At Risk for Falls    Impairments/Disabilities:      None    Nutrition Therapy:  Current Nutrition Therapy:   - Oral Diet:  General    Routes of Feeding: Oral  Liquids: Thin Liquids  Daily Fluid Restriction: no  Last Modified Barium Swallow with Video (Video Swallowing Test): not done    Treatments at the Time of Hospital Discharge:   Respiratory Treatments:   Oxygen Therapy:  is on oxygen at 2 L/min per nasal cannula.  Ventilator:    - BiPAP   IPAP: 12 cmH20, CPAP/EPAP: 6 cmH2O only when sleeping and device from facility    Rehab Therapies: Physical Therapy and Occupational Therapy  Weight Bearing Status/Restrictions: No weight bearing restrictions  Other Medical Equipment (for information only, NOT a DME order):  wheelchair  Other Treatments: NA    Patient's personal belongings (please select all that are sent with patient):  ***    RN SIGNATURE:  Electronically signed by Romina Valencia RN on 5/16/24 at 3:31 PM EDT    CASE MANAGEMENT/SOCIAL WORK SECTION    Inpatient Status Date: ***    Readmission Risk Assessment Score:  Readmission Risk              Risk of Unplanned Readmission:  26           Discharging to Facility/ Agency   Name:   Address:  Phone:  Fax:    Dialysis Facility (if applicable)   Name:  Address:  Dialysis Schedule:  Phone:  Fax:    / signature: {Esignature:[id number]}    PHYSICIAN SECTION    Prognosis: Fair    Condition at Discharge: Stable    Rehab Potential (if transferring to Rehab): Fair    Recommended Labs or Other Treatments After Discharge: BIPAP, CT chest     Physician Certification: I certify the above information and transfer of Saba Sullivan  is necessary for the continuing treatment of the diagnosis listed and that she requires Skilled Nursing Facility for greater 30 days.     Update

## 2024-05-16 NOTE — CARE COORDINATION
Reviewed chart, spoke with Tiffanie at [facility], they have precert. Envelope, ambulance form, and completed 7000 in soft chart. Per Tiffanie, they ordered bipap for pt. Bedside RN to reach out for discharge.  1pm messaged resident for discharge.  2pm discharge order noted PAS for 4:30pm  , notified charge RN, pt, Tiffanie at [facility], and son of discharge/time.HILARIA Cooper, CONSTANZA  .Case Management Assessment  Initial Evaluation    Date/Time of Evaluation: 5/16/2024 10:57 AM  Assessment Completed by: HILARIA Cooper, CONSTANZA    If patient is discharged prior to next notation, then this note serves as note for discharge by case management.    Patient Name: Saba Sullivan                   YOB: 1965  Diagnosis: Prolonged Q-T interval on ECG [R94.31]  Noncompliance with renal dialysis (HCC) [Z91.158]  Polysubstance abuse (HCC) [F19.10]  Acute respiratory failure with hypoxia (HCC) [J96.01]  Sepsis (HCC) [A41.9]  Sepsis due to pneumonia (HCC) [J18.9, A41.9]                   Date / Time: 5/9/2024 11:14 PM    Patient Admission Status: Inpatient   Readmission Risk (Low < 19, Mod (19-27), High > 27): Readmission Risk Score: 18.5    Current PCP: Pallavi Velasquez, DO  PCP verified by CM? Yes    Chart Reviewed: Yes      History Provided by: Patient  Patient Orientation: Alert and Oriented    Patient Cognition: Alert    Hospitalization in the last 30 days (Readmission):  Yes    If yes, Readmission Assessment in  Navigator will be completed.    Advance Directives:      Code Status: Full Code   Patient's Primary Decision Maker is: Patient Declined (Legal Next of Kin Remains as Decision Maker)    Primary Decision Maker: Phil Andrews - Child - [phone]    Discharge Planning:    Patient lives with: Alone Type of Home: House  Primary Care Giver: Family  Patient Support Systems include: Family Members, Friends/Neighbors   Current Financial resources:    Current community resources:    Current  services prior to admission: None            Current DME:              Type of Home Care services:  None    ADLS  Prior functional level: Assistance with the following:, Cooking, Shopping, Housework  Current functional level: Assistance with the following:, Dressing, Bathing, Cooking, Housework, Shopping, Mobility    PT AM-PAC: 9 /24  OT AM-PAC: 12 /24    Family can provide assistance at DC: No  Would you like Case Management to discuss the discharge plan with any other family members/significant others, and if so, who? No  Plans to Return to Present Housing: No  Other Identified Issues/Barriers to RETURNING to current housing: O2 needs, substance abuse  Potential Assistance needed at discharge: Durable Medical Equipment, Home Care, Long Term Acute Care, Outpatient PT/OT, Skilled Nursing Facility, Transportation            Potential DME: Wheelchair, Walker  Patient expects to discharge to: House  Plan for transportation at discharge:      Financial    Payor: [employer] [place] MEDICAID / Plan: [employer] [place] MEDICA / Product Type: *No Product type* /     Does insurance require precert for SNF: Yes    Potential assistance Purchasing Medications:    Meds-to-Beds request: Yes      [id number] - [facility] [address] [facility] -  [phone] - F [phone]  [address] [place] [address]  Phone: [phone] Fax: [phone]    [facility] - [facility] [address] [address] - P [phone] - F [phone]  [facility] [place] [id number]  Phone: [phone] Fax: [phone]      Notes:    Factors facilitating achievement of predicted outcomes: Pleasant    Barriers to discharge: Limited family support, Anxiety, and Long standing deficits    Additional Case Management Notes: we have precert for [place] pt is agreeable for physical rehab and substance abuse tx    The Plan for Transition of Care is related to the following treatment goals of Prolonged Q-T

## 2024-05-16 NOTE — PROGRESS NOTES
[facility]  Internal Medicine Residency Program  Progress Note - House Team 1    Patient:  Saba Sullivan 59 y.o. female MRN: [id number]     Date of Service: 5/16/2024     CC: shortness of breath  Overnight events: no overnight events reported     Subjective     Patient was seen and examined at bedside this morning. She is requesting methadone be started for her. Currently she is receiving suboxone PRN through the [facility] protocol. Discussed with patient that we cannot start methadone as an inpatient, she will need to follow up outpatient with this. Previously received methadone at [facility]. Discussed plan to discharge to [facility] once precert is obtained.     Objective     Physical Exam:  Vitals: BP (!) 140/74   Pulse 94   Temp 97 °F (36.1 °C) (Temporal)   Resp 19   Ht 1.753 m (5' 9.02\")   Wt 68 kg (149 lb 14.6 oz)   LMP 08/28/2013   SpO2 (!) 84%   BMI 22.13 kg/m²     I & O - 24hr: No intake/output data recorded.   General Appearance: alert, appears older than stated age, cooperative, and no distress  HEENT:  Head: Normal, normocephalic, atraumatic.  Neck: supple, symmetrical, trachea midline  Lung: diminished breath sounds bilaterally  Heart: regular rate and rhythm, S1, S2 normal, no murmur, click, rub or gallop  Abdomen: soft, non-tender; bowel sounds normal; no masses,  no organomegaly  Extremities:   chronic right heel ulcer  Neurologic: Mental status: Alert, oriented, thought content appropriate    Pertinent Labs & Imaging Studies     CBC:   Lab Results   Component Value Date/Time    WBC 4.7 05/16/2024 05:36 AM    RBC 3.20 05/16/2024 05:36 AM    HGB 9.0 05/16/2024 05:36 AM    HCT 30.9 05/16/2024 05:36 AM    MCV 96.6 05/16/2024 05:36 AM    MCH 28.1 05/16/2024 05:36 AM    MCHC 29.1 05/16/2024 05:36 AM    RDW 15.9 05/16/2024 05:36 AM     05/16/2024 05:36 AM    MPV 9.2 05/16/2024 05:36 AM     BMP:    Lab Results   Component Value Date/Time     05/16/2024 05:36 AM    K

## 2024-05-16 NOTE — PROGRESS NOTES
Department of Internal Medicine  Infectious Diseases  Progress  Note      C/C : COPDE ,Cavitary lung lesion       Denies fever or chills   Reports shortness of breath , cough   Afebrile       Current Facility-Administered Medications   Medication Dose Route Frequency Provider Last Rate Last Admin    ipratropium 0.5 mg-albuterol 2.5 mg (DUONEB) nebulizer solution 1 Dose  1 Dose Inhalation Q4H PRN Liv Hoffman MD   1 Dose at 05/16/24 0709    glucose chewable tablet 16 g  4 tablet Oral PRN Ritika Zuluaga MD        dextrose bolus 10% 125 mL  125 mL IntraVENous PRN Ritika Zuluaga MD        Or    dextrose bolus 10% 250 mL  250 mL IntraVENous PRN Ritika Zuluaga MD        glucagon injection 1 mg  1 mg SubCUTAneous PRN Ritika Zuluaga MD        dextrose 10 % infusion   IntraVENous Continuous PRN Ritika Zuluaga MD        buprenorphine-naloxone (SUBOXONE) 2-0.5 MG SL tablet 2 tablet  2 tablet SubLINGual PRN Samy Mcdaniels MD   2 tablet at 05/15/24 1159    hydrOXYzine pamoate (VISTARIL) capsule 50 mg  50 mg Oral TID PRN Samy Mcdaniels MD   50 mg at 05/14/24 1901    cloNIDine (CATAPRES) tablet 0.1 mg  0.1 mg Oral Q6H PRN Samy Mcdaniels MD        melatonin tablet 3 mg  3 mg Oral Nightly Samy Mcdaniels MD   3 mg at 05/15/24 2152    prochlorperazine (COMPAZINE) tablet 10 mg  10 mg Oral Q6H PRN Samy Mcdaniels MD        amLODIPine (NORVASC) tablet 10 mg  10 mg Oral Daily Darell Villa MD   10 mg at 05/16/24 0903    DULoxetine (CYMBALTA) extended release capsule 30 mg  30 mg Oral Daily Darell Villa MD   30 mg at 05/16/24 0903    sodium chloride flush 0.9 % injection 5-40 mL  5-40 mL IntraVENous 2 times per day Darell Villa MD   10 mL at 05/16/24 0903    sodium chloride flush 0.9 % injection 5-40 mL  5-40 mL IntraVENous PRN Darell Villa MD   10 mL at 05/15/24 2152    0.9 % sodium chloride infusion   IntraVENous PRN  PROTIME 10.8 05/13/2024 02:06 PM    INR 1.0 05/13/2024 02:06 PM       TSH:    Lab Results   Component Value Date/Time    TSH 1.04 04/18/2024 02:56 AM       U/A:    Lab Results   Component Value Date/Time    COLORU Yellow 05/09/2024 11:45 PM    PHUR 5.5 05/09/2024 11:45 PM    PHUR 5.0 04/18/2024 03:37 AM    WBCUA 0 TO 5 05/09/2024 11:45 PM    RBCUA 0 TO 2 05/09/2024 11:45 PM    BACTERIA  04/18/2024 03:37 AM     UNABLE TO DETERMINE PRESENCE OF BACTERIA DUE TO LARGE AMOUNT OF AMORPHOUS SEDIMENT    CLARITYU Clear 02/09/2020 08:35 PM    LEUKOCYTESUR TRACE 05/09/2024 11:45 PM    UROBILINOGEN 0.2 05/09/2024 11:45 PM    BILIRUBINUR NEGATIVE 05/09/2024 11:45 PM    BLOODU TRACE-INTACT 02/09/2020 08:35 PM    GLUCOSEU NEGATIVE 05/09/2024 11:45 PM    AMORPHOUS PRESENT 04/18/2024 03:37 AM       ABG:  No results found for: \"NVC3AWC\", \"BEART\", \"G2EHTJNS\", \"PHART\", \"THGBART\", \"ZUS9LYA\", \"PO2ART\", \"BKA8NNM\"    MICROBIOLOGY:    Blood culture - neg to date         Parainfluenza 3 PCR DETECTED Abnormal        HIV test neg on  4/17/2024   Hep C antibody +ve, neg PCR ( 8/2019)       Parainfluenza 3 PCR DETECTED Abnormal        Radiology :    Chest x ray -    No acute process.       CT scan of chest -    1. No evidence of pulmonary embolism.   2. Thick-walled cavitary lesion development from April 23, 2024, within the   posterior basilar segment right lower lobe with surrounding consolidation   suggesting resolving inflammatory/infectious process.   3. Dilated main pulmonary artery suggesting nonspecific pulmonary arterial   hypertension.       Right foot x ray  ( 4/17)     9 mm ulceration over the plantar hindfoot at the level of the calcaneal neck  with surrounding inflammatory infiltration. No acute osseous erosion to  suggest acute osteomyelitis.    IMPRESSION:     Right lower lobe cavitary lung lesion  Right heel ulcer - chronic non healing , no osteomyelitis   Para influenza virus infection   Polysubstance abuse   COPDE      RECOMMENDATIONS:      BiPAP   Contact isolation   Follow up CT scan out pt basis

## 2024-05-17 ENCOUNTER — APPOINTMENT (OUTPATIENT)
Dept: GENERAL RADIOLOGY | Age: 59
DRG: 133 | End: 2024-05-17
Payer: MEDICAID

## 2024-05-17 PROBLEM — J96.01 ACUTE HYPOXEMIC RESPIRATORY FAILURE (HCC): Status: ACTIVE | Noted: 2024-05-17

## 2024-05-17 LAB
AADO2: 209.7 MMHG
ALBUMIN SERPL-MCNC: 3.7 G/DL (ref 3.5–5.2)
ALP SERPL-CCNC: 87 U/L (ref 35–104)
ALT SERPL-CCNC: 6 U/L (ref 0–32)
AMPHET UR QL SCN: NEGATIVE
ANION GAP SERPL CALCULATED.3IONS-SCNC: 10 MMOL/L (ref 7–16)
ANION GAP SERPL CALCULATED.3IONS-SCNC: 12 MMOL/L (ref 7–16)
AST SERPL-CCNC: 13 U/L (ref 0–31)
B PARAP IS1001 DNA NPH QL NAA+NON-PROBE: NOT DETECTED
B PERT DNA SPEC QL NAA+PROBE: NOT DETECTED
B.E.: 11.8 MMOL/L (ref -3–3)
BARBITURATES UR QL SCN: NEGATIVE
BASOPHILS # BLD: 0 K/UL (ref 0–0.2)
BASOPHILS NFR BLD: 0 % (ref 0–2)
BENZODIAZ UR QL: NEGATIVE
BILIRUB SERPL-MCNC: 0.4 MG/DL (ref 0–1.2)
BNP SERPL-MCNC: 1463 PG/ML (ref 0–125)
BUN SERPL-MCNC: 13 MG/DL (ref 6–20)
BUPRENORPHINE UR QL: POSITIVE
C PNEUM DNA NPH QL NAA+NON-PROBE: NOT DETECTED
CALCIUM SERPL-MCNC: 9.2 MG/DL (ref 8.6–10.2)
CALCIUM SERPL-MCNC: 9.4 MG/DL (ref 8.6–10.2)
CANNABINOIDS UR QL SCN: NEGATIVE
CHLORIDE SERPL-SCNC: 95 MMOL/L (ref 98–107)
CHLORIDE SERPL-SCNC: 95 MMOL/L (ref 98–107)
CO2 SERPL-SCNC: 32 MMOL/L (ref 22–29)
CO2 SERPL-SCNC: 36 MMOL/L (ref 22–29)
COCAINE UR QL SCN: NEGATIVE
COHB: 0.7 % (ref 0–1.5)
CREAT SERPL-MCNC: 0.6 MG/DL (ref 0.5–1)
CREAT SERPL-MCNC: 0.6 MG/DL (ref 0.5–1)
CRITICAL: ABNORMAL
DATE ANALYZED: ABNORMAL
DATE OF COLLECTION: ABNORMAL
EKG ATRIAL RATE: 83 BPM
EKG ATRIAL RATE: 91 BPM
EKG P AXIS: 75 DEGREES
EKG P AXIS: 78 DEGREES
EKG P-R INTERVAL: 122 MS
EKG P-R INTERVAL: 126 MS
EKG Q-T INTERVAL: 346 MS
EKG Q-T INTERVAL: 362 MS
EKG QRS DURATION: 82 MS
EKG QRS DURATION: 90 MS
EKG QTC CALCULATION (BAZETT): 425 MS
EKG QTC CALCULATION (BAZETT): 425 MS
EKG R AXIS: 24 DEGREES
EKG R AXIS: 37 DEGREES
EKG T AXIS: 43 DEGREES
EKG T AXIS: 49 DEGREES
EKG VENTRICULAR RATE: 83 BPM
EKG VENTRICULAR RATE: 91 BPM
EOSINOPHIL # BLD: 0 K/UL (ref 0.05–0.5)
EOSINOPHILS RELATIVE PERCENT: 0 % (ref 0–6)
ERYTHROCYTE [DISTWIDTH] IN BLOOD BY AUTOMATED COUNT: 15.8 % (ref 11.5–15)
FENTANYL UR QL: POSITIVE
FIO2: 50 %
FLUAV RNA NPH QL NAA+NON-PROBE: NOT DETECTED
FLUAV RNA RESP QL NAA+PROBE: NOT DETECTED
FLUBV RNA NPH QL NAA+NON-PROBE: NOT DETECTED
FLUBV RNA RESP QL NAA+PROBE: NOT DETECTED
GFR, ESTIMATED: >90 ML/MIN/1.73M2
GFR, ESTIMATED: >90 ML/MIN/1.73M2
GLUCOSE SERPL-MCNC: 133 MG/DL (ref 74–99)
GLUCOSE SERPL-MCNC: 256 MG/DL (ref 74–99)
HADV DNA NPH QL NAA+NON-PROBE: NOT DETECTED
HCO3: 39 MMOL/L (ref 22–26)
HCOV 229E RNA NPH QL NAA+NON-PROBE: NOT DETECTED
HCOV HKU1 RNA NPH QL NAA+NON-PROBE: NOT DETECTED
HCOV NL63 RNA NPH QL NAA+NON-PROBE: NOT DETECTED
HCOV OC43 RNA NPH QL NAA+NON-PROBE: NOT DETECTED
HCT VFR BLD AUTO: 34.7 % (ref 34–48)
HGB BLD-MCNC: 10.2 G/DL (ref 11.5–15.5)
HHB: 9.4 % (ref 0–5)
HMPV RNA NPH QL NAA+NON-PROBE: NOT DETECTED
HPIV1 RNA NPH QL NAA+NON-PROBE: NOT DETECTED
HPIV2 RNA NPH QL NAA+NON-PROBE: NOT DETECTED
HPIV3 RNA NPH QL NAA+NON-PROBE: DETECTED
HPIV4 RNA NPH QL NAA+NON-PROBE: NOT DETECTED
LAB: ABNORMAL
LYMPHOCYTES NFR BLD: 0.14 K/UL (ref 1.5–4)
LYMPHOCYTES RELATIVE PERCENT: 3 % (ref 20–42)
Lab: 545
M PNEUMO DNA NPH QL NAA+NON-PROBE: NOT DETECTED
MAGNESIUM SERPL-MCNC: 2.1 MG/DL (ref 1.6–2.6)
MCH RBC QN AUTO: 27.9 PG (ref 26–35)
MCHC RBC AUTO-ENTMCNC: 29.4 G/DL (ref 32–34.5)
MCV RBC AUTO: 94.8 FL (ref 80–99.9)
METHADONE UR QL: NEGATIVE
METHB: 0.3 % (ref 0–1.5)
MODE: ABNORMAL
MONOCYTES NFR BLD: 0 % (ref 2–12)
MONOCYTES NFR BLD: 0 K/UL (ref 0.1–0.95)
MYELOCYTES ABSOLUTE COUNT: 0.14 K/UL
MYELOCYTES: 3 %
NEUTROPHILS NFR BLD: 95 % (ref 43–80)
NEUTS SEG NFR BLD: 5.21 K/UL (ref 1.8–7.3)
O2 SATURATION: 90.5 % (ref 92–98.5)
O2HB: 89.6 % (ref 94–97)
OPERATOR ID: 2577
OPIATES UR QL SCN: NEGATIVE
OXYCODONE UR QL SCN: NEGATIVE
PATIENT TEMP: 37 C
PCO2: 65.9 MMHG (ref 35–45)
PCP UR QL SCN: NEGATIVE
PEEP/CPAP: 8 CMH2O
PFO2: 1.2 MMHG/%
PH BLOOD GAS: 7.39 (ref 7.35–7.45)
PHOSPHATE SERPL-MCNC: 2.9 MG/DL (ref 2.5–4.5)
PIP: 16 CMH2O
PLATELET # BLD AUTO: 422 K/UL (ref 130–450)
PMV BLD AUTO: 9.2 FL (ref 7–12)
PO2: 60.2 MMHG (ref 75–100)
POTASSIUM SERPL-SCNC: 3.9 MMOL/L (ref 3.5–5)
POTASSIUM SERPL-SCNC: 4.3 MMOL/L (ref 3.5–5)
PROT SERPL-MCNC: 7 G/DL (ref 6.4–8.3)
RBC # BLD AUTO: 3.66 M/UL (ref 3.5–5.5)
RBC # BLD: ABNORMAL 10*6/UL
RI(T): 3.48
RSV RNA NPH QL NAA+NON-PROBE: NOT DETECTED
RV+EV RNA NPH QL NAA+NON-PROBE: NOT DETECTED
SARS-COV-2 RNA NPH QL NAA+NON-PROBE: NOT DETECTED
SARS-COV-2 RNA RESP QL NAA+PROBE: NOT DETECTED
SODIUM SERPL-SCNC: 139 MMOL/L (ref 132–146)
SODIUM SERPL-SCNC: 141 MMOL/L (ref 132–146)
SOURCE, BLOOD GAS: ABNORMAL
SOURCE: NORMAL
SPECIMEN DESCRIPTION: ABNORMAL
SPECIMEN DESCRIPTION: NORMAL
TEST INFORMATION: ABNORMAL
THB: 11.1 G/DL (ref 11.5–16.5)
TIME ANALYZED: 604
TROPONIN I SERPL HS-MCNC: 220 NG/L (ref 0–9)
TROPONIN I SERPL HS-MCNC: 271 NG/L (ref 0–9)
TROPONIN I SERPL HS-MCNC: 297 NG/L (ref 0–9)
WBC OTHER # BLD: 5.5 K/UL (ref 4.5–11.5)

## 2024-05-17 PROCEDURE — 82805 BLOOD GASES W/O2 SATURATION: CPT

## 2024-05-17 PROCEDURE — 99221 1ST HOSP IP/OBS SF/LOW 40: CPT | Performed by: INTERNAL MEDICINE

## 2024-05-17 PROCEDURE — 6360000002 HC RX W HCPCS

## 2024-05-17 PROCEDURE — 94640 AIRWAY INHALATION TREATMENT: CPT

## 2024-05-17 PROCEDURE — 80048 BASIC METABOLIC PNL TOTAL CA: CPT

## 2024-05-17 PROCEDURE — 96375 TX/PRO/DX INJ NEW DRUG ADDON: CPT

## 2024-05-17 PROCEDURE — 96372 THER/PROPH/DIAG INJ SC/IM: CPT

## 2024-05-17 PROCEDURE — 93010 ELECTROCARDIOGRAM REPORT: CPT | Performed by: INTERNAL MEDICINE

## 2024-05-17 PROCEDURE — 85025 COMPLETE CBC W/AUTO DIFF WBC: CPT

## 2024-05-17 PROCEDURE — 71045 X-RAY EXAM CHEST 1 VIEW: CPT

## 2024-05-17 PROCEDURE — 2580000003 HC RX 258

## 2024-05-17 PROCEDURE — 83735 ASSAY OF MAGNESIUM: CPT

## 2024-05-17 PROCEDURE — G0378 HOSPITAL OBSERVATION PER HR: HCPCS

## 2024-05-17 PROCEDURE — 94660 CPAP INITIATION&MGMT: CPT

## 2024-05-17 PROCEDURE — 5A09557 ASSISTANCE WITH RESPIRATORY VENTILATION, GREATER THAN 96 CONSECUTIVE HOURS, CONTINUOUS POSITIVE AIRWAY PRESSURE: ICD-10-PCS | Performed by: INTERNAL MEDICINE

## 2024-05-17 PROCEDURE — 96376 TX/PRO/DX INJ SAME DRUG ADON: CPT

## 2024-05-17 PROCEDURE — 0202U NFCT DS 22 TRGT SARS-COV-2: CPT

## 2024-05-17 PROCEDURE — 93005 ELECTROCARDIOGRAM TRACING: CPT

## 2024-05-17 PROCEDURE — 6370000000 HC RX 637 (ALT 250 FOR IP)

## 2024-05-17 PROCEDURE — 2700000000 HC OXYGEN THERAPY PER DAY

## 2024-05-17 PROCEDURE — 84484 ASSAY OF TROPONIN QUANT: CPT

## 2024-05-17 PROCEDURE — 80307 DRUG TEST PRSMV CHEM ANLYZR: CPT

## 2024-05-17 PROCEDURE — 84100 ASSAY OF PHOSPHORUS: CPT

## 2024-05-17 PROCEDURE — 6370000000 HC RX 637 (ALT 250 FOR IP): Performed by: NURSE PRACTITIONER

## 2024-05-17 RX ORDER — ARFORMOTEROL TARTRATE 15 UG/2ML
15 SOLUTION RESPIRATORY (INHALATION)
Status: DISCONTINUED | OUTPATIENT
Start: 2024-05-17 | End: 2024-05-28 | Stop reason: HOSPADM

## 2024-05-17 RX ORDER — BUPRENORPHINE HYDROCHLORIDE AND NALOXONE HYDROCHLORIDE DIHYDRATE 2; .5 MG/1; MG/1
2 TABLET SUBLINGUAL PRN
Status: DISCONTINUED | OUTPATIENT
Start: 2024-05-17 | End: 2024-05-26

## 2024-05-17 RX ORDER — IPRATROPIUM BROMIDE AND ALBUTEROL SULFATE 2.5; .5 MG/3ML; MG/3ML
1 SOLUTION RESPIRATORY (INHALATION)
Status: DISCONTINUED | OUTPATIENT
Start: 2024-05-17 | End: 2024-05-22

## 2024-05-17 RX ORDER — SODIUM CHLORIDE 9 MG/ML
INJECTION, SOLUTION INTRAVENOUS PRN
Status: DISCONTINUED | OUTPATIENT
Start: 2024-05-17 | End: 2024-05-28 | Stop reason: HOSPADM

## 2024-05-17 RX ORDER — HYDROXYZINE PAMOATE 25 MG/1
50 CAPSULE ORAL EVERY 8 HOURS PRN
Status: DISCONTINUED | OUTPATIENT
Start: 2024-05-17 | End: 2024-05-28 | Stop reason: HOSPADM

## 2024-05-17 RX ORDER — POLYETHYLENE GLYCOL 3350 17 G/17G
17 POWDER, FOR SOLUTION ORAL DAILY PRN
Status: DISCONTINUED | OUTPATIENT
Start: 2024-05-17 | End: 2024-05-20

## 2024-05-17 RX ORDER — SODIUM CHLORIDE 0.9 % (FLUSH) 0.9 %
5-40 SYRINGE (ML) INJECTION PRN
Status: DISCONTINUED | OUTPATIENT
Start: 2024-05-17 | End: 2024-05-28 | Stop reason: HOSPADM

## 2024-05-17 RX ORDER — LANOLIN ALCOHOL/MO/W.PET/CERES
3 CREAM (GRAM) TOPICAL NIGHTLY
Status: DISCONTINUED | OUTPATIENT
Start: 2024-05-17 | End: 2024-05-28 | Stop reason: HOSPADM

## 2024-05-17 RX ORDER — AMLODIPINE BESYLATE 10 MG/1
10 TABLET ORAL DAILY
Status: DISCONTINUED | OUTPATIENT
Start: 2024-05-17 | End: 2024-05-28 | Stop reason: HOSPADM

## 2024-05-17 RX ORDER — ACETAMINOPHEN 650 MG/1
650 SUPPOSITORY RECTAL EVERY 6 HOURS PRN
Status: DISCONTINUED | OUTPATIENT
Start: 2024-05-17 | End: 2024-05-28 | Stop reason: HOSPADM

## 2024-05-17 RX ORDER — ENOXAPARIN SODIUM 100 MG/ML
40 INJECTION SUBCUTANEOUS DAILY
Status: DISCONTINUED | OUTPATIENT
Start: 2024-05-17 | End: 2024-05-28 | Stop reason: HOSPADM

## 2024-05-17 RX ORDER — PREDNISONE 20 MG/1
40 TABLET ORAL DAILY
Status: COMPLETED | OUTPATIENT
Start: 2024-05-18 | End: 2024-05-20

## 2024-05-17 RX ORDER — DULOXETIN HYDROCHLORIDE 30 MG/1
30 CAPSULE, DELAYED RELEASE ORAL DAILY
Status: DISCONTINUED | OUTPATIENT
Start: 2024-05-17 | End: 2024-05-28 | Stop reason: HOSPADM

## 2024-05-17 RX ORDER — ONDANSETRON 2 MG/ML
4 INJECTION INTRAMUSCULAR; INTRAVENOUS EVERY 6 HOURS PRN
Status: DISCONTINUED | OUTPATIENT
Start: 2024-05-17 | End: 2024-05-28 | Stop reason: HOSPADM

## 2024-05-17 RX ORDER — BUDESONIDE 0.5 MG/2ML
0.5 INHALANT ORAL
Status: DISCONTINUED | OUTPATIENT
Start: 2024-05-17 | End: 2024-05-28 | Stop reason: HOSPADM

## 2024-05-17 RX ORDER — ONDANSETRON 4 MG/1
4 TABLET, ORALLY DISINTEGRATING ORAL EVERY 8 HOURS PRN
Status: DISCONTINUED | OUTPATIENT
Start: 2024-05-17 | End: 2024-05-28 | Stop reason: HOSPADM

## 2024-05-17 RX ORDER — SODIUM CHLORIDE 0.9 % (FLUSH) 0.9 %
5-40 SYRINGE (ML) INJECTION EVERY 12 HOURS SCHEDULED
Status: DISCONTINUED | OUTPATIENT
Start: 2024-05-17 | End: 2024-05-28 | Stop reason: HOSPADM

## 2024-05-17 RX ORDER — ACETAMINOPHEN 325 MG/1
650 TABLET ORAL EVERY 6 HOURS PRN
Status: DISCONTINUED | OUTPATIENT
Start: 2024-05-17 | End: 2024-05-28 | Stop reason: HOSPADM

## 2024-05-17 RX ADMIN — IPRATROPIUM BROMIDE AND ALBUTEROL SULFATE 1 DOSE: .5; 2.5 SOLUTION RESPIRATORY (INHALATION) at 15:41

## 2024-05-17 RX ADMIN — ONDANSETRON 4 MG: 2 INJECTION INTRAMUSCULAR; INTRAVENOUS at 19:01

## 2024-05-17 RX ADMIN — ARFORMOTEROL TARTRATE 15 MCG: 15 SOLUTION RESPIRATORY (INHALATION) at 19:59

## 2024-05-17 RX ADMIN — BUPRENORPHINE HYDROCHLORIDE AND NALOXONE HYDROCHLORIDE DIHYDRATE 2 TABLET: 2; .5 TABLET SUBLINGUAL at 20:25

## 2024-05-17 RX ADMIN — AMLODIPINE BESYLATE 10 MG: 10 TABLET ORAL at 09:08

## 2024-05-17 RX ADMIN — IPRATROPIUM BROMIDE AND ALBUTEROL SULFATE 1 DOSE: .5; 2.5 SOLUTION RESPIRATORY (INHALATION) at 07:49

## 2024-05-17 RX ADMIN — SODIUM CHLORIDE, PRESERVATIVE FREE 10 ML: 5 INJECTION INTRAVENOUS at 20:25

## 2024-05-17 RX ADMIN — DULOXETINE HYDROCHLORIDE 30 MG: 30 CAPSULE, DELAYED RELEASE ORAL at 09:08

## 2024-05-17 RX ADMIN — BUDESONIDE 500 MCG: 0.5 SUSPENSION RESPIRATORY (INHALATION) at 19:59

## 2024-05-17 RX ADMIN — SODIUM CHLORIDE, PRESERVATIVE FREE 10 ML: 5 INJECTION INTRAVENOUS at 09:14

## 2024-05-17 RX ADMIN — Medication 3 MG: at 20:25

## 2024-05-17 RX ADMIN — BUDESONIDE 500 MCG: 0.5 SUSPENSION RESPIRATORY (INHALATION) at 04:34

## 2024-05-17 RX ADMIN — ARFORMOTEROL TARTRATE 15 MCG: 15 SOLUTION RESPIRATORY (INHALATION) at 04:34

## 2024-05-17 RX ADMIN — IPRATROPIUM BROMIDE AND ALBUTEROL SULFATE 1 DOSE: .5; 2.5 SOLUTION RESPIRATORY (INHALATION) at 19:59

## 2024-05-17 RX ADMIN — ENOXAPARIN SODIUM 40 MG: 100 INJECTION SUBCUTANEOUS at 09:08

## 2024-05-17 RX ADMIN — IPRATROPIUM BROMIDE AND ALBUTEROL SULFATE 1 DOSE: .5; 2.5 SOLUTION RESPIRATORY (INHALATION) at 11:36

## 2024-05-17 RX ADMIN — IPRATROPIUM BROMIDE AND ALBUTEROL SULFATE 3 DOSE: 2.5; .5 SOLUTION RESPIRATORY (INHALATION) at 00:38

## 2024-05-17 RX ADMIN — HYDROXYZINE PAMOATE 50 MG: 25 CAPSULE ORAL at 21:09

## 2024-05-17 RX ADMIN — WATER 40 MG: 1 INJECTION INTRAMUSCULAR; INTRAVENOUS; SUBCUTANEOUS at 09:12

## 2024-05-17 RX ADMIN — IPRATROPIUM BROMIDE AND ALBUTEROL SULFATE 1 DOSE: .5; 2.5 SOLUTION RESPIRATORY (INHALATION) at 04:34

## 2024-05-17 NOTE — PROGRESS NOTES
Nurse to nurse report called to Devon . All questions anc concerns addressed. Await  by PAS. Will continue to monitor.

## 2024-05-17 NOTE — H&P
[facility]  Internal Medicine Residency Program  History and Physical    Patient:  Saba Sullivan 59 y.o. female MRN: [id number]     Date of Service: 5/17/2024    Hospital Day: 2    History of Present Illness   Saba Sullivan is a 59 y.o. female with PMHx of COPD Gold stage IV, polysubstance abuse, chronically elevated troponin, normocytic anemia, essential tremor, vitamin D deficiency came in with a CC of hypoxia and difficulty breathing.    Patient was recently discharged from the hospital a few hours ago.  Patient left the hospital at 930, she went to the nursing facility, just stayed there for 1 hour and was back in the ED at 11 PM via EMS due to complaints of hypoxemia, decreased O2 saturation.    Regarding the patient, she says that she will never go to that nursing facility again, as they are ready to be improved to the patient.  They are BiPAP machine was very dirty and full of dust.  As soon as she put the machine on she could not breathe.    In the ED, the patient initially was SpO2 100%, pulse 96%, ABG showed pCO2 33.3, pO2 23.0, bicarb 43, CBC: Hemoglobin 10.5, hematocrit 35.8, MCH 28.1, MCHC 29.3, RDW 14.9.  CMP shows sodium 139, potassium 3.9, chloride 95, carbon dioxide 32, anion gap 12, glucose 133.  Troponin elevated to 271> 297> 220 proBNP elevated to 1463.  COVID and influenza negative, EKG normal.    She was given prednisone 125 mg, 2 g of magnesium sulfate and breathing treatment.  The patient was put on BiPAP with 100% FiO2.    I saw the patient in the ED, the patient was alert and oriented and was on BiPAP.  She was adamant that she does not want to go back to the nursing facility again.  She was asking if she could drink water.  No other complaints.      Past Medical History:       Diagnosis Date    Abscess     states for a couple years she has had problems with     Chronic pain     Chronic recurrent multifocal osteomyelitis of foot (HCC) 07/06/2019    Hepatitis C     Hip

## 2024-05-17 NOTE — ED PROVIDER NOTES
[facility]  EMERGENCY DEPARTMENT ENCOUNTER        Pt Name: Saba Sullivan  MRN: [id number]  Birthdate 1965  Date of evaluation: 2024  Provider: Zonia Cartwright DO  PCP: Pallavi Velasquez DO  Note Started: 2:43 AM EDT 24    CHIEF COMPLAINT       Chief Complaint   Patient presents with    Shortness of Breath     Was discharged from Hosp and went to NH arrived NH became sob spo2 in 80'S arrived on c-pap from EMT received duoneb in route        HISTORY OF PRESENT ILLNESS: 1 or more Elements        Limitations to history : None    Saba Sullivan is a 59 y.o. female brought in by EMS with shortness of breath.  Saturations were found to be in the 80s.  She was placed on CPAP by EMS and oxygen only slightly improved.  She was just discharged from the hospital today for acute hypoxemic respiratory failure.  She denies any fever or chills.  Denies any chest pain      Nursing Notes were all reviewed and agreed with or any disagreements were addressed in the HPI.      REVIEW OF EXTERNAL NOTE :       Reviewed previous admission on 2024 for respiratory      Chart Review/External Note Review    Last Echo reviewed by Me:  No results found for: \"LVEF\", \"LVEFMODE\"          Controlled Substance Monitoring:    Acute and Chronic Pain Monitoring:   RX Monitoring Attestation Periodic Controlled Substance Monitoring   2019   7:31 AM The Prescription Monitoring Report for this patient was reviewed today. Possible medication side effects, risk of tolerance/dependence & alternative treatments discussed.;No signs of potential drug abuse or diversion identified: otherwise, see note documentation           REVIEW OF SYSTEMS :      Positives and Pertinent negatives as per HPI.     SURGICAL HISTORY     Past Surgical History:   Procedure Laterality Date     SECTION      x three    DRAIN SKIN ABSCESS SIMPLE  2014         FOOT DEBRIDEMENT Right 2021    RIGHT FOOT DEBRIDEMENT INCISION AND

## 2024-05-17 NOTE — FLOWSHEET NOTE
05/17/24 1719   Belongings   Dental Appliances None   Vision - Corrective Lenses Eyeglasses   Hearing Aid None   Clothing Shirt   Jewelry None   Electronic Devices Cell Phone;   Weapons (Notify Protective Services/Security) None   Home Medications None   Valuables Given To Patient     Patient admitted to unit with the above items.

## 2024-05-18 LAB
ANION GAP SERPL CALCULATED.3IONS-SCNC: 14 MMOL/L (ref 7–16)
BASOPHILS # BLD: 0.01 K/UL (ref 0–0.2)
BASOPHILS NFR BLD: 0 % (ref 0–2)
BUN SERPL-MCNC: 18 MG/DL (ref 6–20)
CALCIUM SERPL-MCNC: 9.4 MG/DL (ref 8.6–10.2)
CHLORIDE SERPL-SCNC: 99 MMOL/L (ref 98–107)
CO2 SERPL-SCNC: 33 MMOL/L (ref 22–29)
CREAT SERPL-MCNC: 0.6 MG/DL (ref 0.5–1)
EOSINOPHIL # BLD: 0.02 K/UL (ref 0.05–0.5)
EOSINOPHILS RELATIVE PERCENT: 0 % (ref 0–6)
ERYTHROCYTE [DISTWIDTH] IN BLOOD BY AUTOMATED COUNT: 15.9 % (ref 11.5–15)
GFR, ESTIMATED: >90 ML/MIN/1.73M2
GLUCOSE SERPL-MCNC: 131 MG/DL (ref 74–99)
HBA1C MFR BLD: 5.4 % (ref 4–5.6)
HCT VFR BLD AUTO: 28.9 % (ref 34–48)
HGB BLD-MCNC: 8.6 G/DL (ref 11.5–15.5)
IMM GRANULOCYTES # BLD AUTO: 0.08 K/UL (ref 0–0.58)
IMM GRANULOCYTES NFR BLD: 1 % (ref 0–5)
LYMPHOCYTES NFR BLD: 0.97 K/UL (ref 1.5–4)
LYMPHOCYTES RELATIVE PERCENT: 14 % (ref 20–42)
MAGNESIUM SERPL-MCNC: 1.8 MG/DL (ref 1.6–2.6)
MCH RBC QN AUTO: 28.6 PG (ref 26–35)
MCHC RBC AUTO-ENTMCNC: 29.8 G/DL (ref 32–34.5)
MCV RBC AUTO: 96 FL (ref 80–99.9)
MONOCYTES NFR BLD: 0.78 K/UL (ref 0.1–0.95)
MONOCYTES NFR BLD: 11 % (ref 2–12)
NEUTROPHILS NFR BLD: 74 % (ref 43–80)
NEUTS SEG NFR BLD: 5.26 K/UL (ref 1.8–7.3)
PHOSPHATE SERPL-MCNC: 2.6 MG/DL (ref 2.5–4.5)
PLATELET # BLD AUTO: 381 K/UL (ref 130–450)
PMV BLD AUTO: 9.5 FL (ref 7–12)
POTASSIUM SERPL-SCNC: 4 MMOL/L (ref 3.5–5)
RBC # BLD AUTO: 3.01 M/UL (ref 3.5–5.5)
SODIUM SERPL-SCNC: 146 MMOL/L (ref 132–146)
WBC OTHER # BLD: 7.1 K/UL (ref 4.5–11.5)

## 2024-05-18 PROCEDURE — 6360000002 HC RX W HCPCS

## 2024-05-18 PROCEDURE — G0378 HOSPITAL OBSERVATION PER HR: HCPCS

## 2024-05-18 PROCEDURE — 85025 COMPLETE CBC W/AUTO DIFF WBC: CPT

## 2024-05-18 PROCEDURE — 36415 COLL VENOUS BLD VENIPUNCTURE: CPT

## 2024-05-18 PROCEDURE — 6370000000 HC RX 637 (ALT 250 FOR IP)

## 2024-05-18 PROCEDURE — 83735 ASSAY OF MAGNESIUM: CPT

## 2024-05-18 PROCEDURE — 96372 THER/PROPH/DIAG INJ SC/IM: CPT

## 2024-05-18 PROCEDURE — 2700000000 HC OXYGEN THERAPY PER DAY

## 2024-05-18 PROCEDURE — 80048 BASIC METABOLIC PNL TOTAL CA: CPT

## 2024-05-18 PROCEDURE — 84100 ASSAY OF PHOSPHORUS: CPT

## 2024-05-18 PROCEDURE — 94640 AIRWAY INHALATION TREATMENT: CPT

## 2024-05-18 PROCEDURE — 94660 CPAP INITIATION&MGMT: CPT

## 2024-05-18 PROCEDURE — 83036 HEMOGLOBIN GLYCOSYLATED A1C: CPT

## 2024-05-18 PROCEDURE — 2580000003 HC RX 258

## 2024-05-18 PROCEDURE — 99232 SBSQ HOSP IP/OBS MODERATE 35: CPT | Performed by: INTERNAL MEDICINE

## 2024-05-18 RX ADMIN — SODIUM CHLORIDE, PRESERVATIVE FREE 10 ML: 5 INJECTION INTRAVENOUS at 21:36

## 2024-05-18 RX ADMIN — SODIUM CHLORIDE, PRESERVATIVE FREE 10 ML: 5 INJECTION INTRAVENOUS at 09:25

## 2024-05-18 RX ADMIN — BUPRENORPHINE HYDROCHLORIDE AND NALOXONE HYDROCHLORIDE DIHYDRATE 2 TABLET: 2; .5 TABLET SUBLINGUAL at 09:24

## 2024-05-18 RX ADMIN — BUDESONIDE 500 MCG: 0.5 SUSPENSION RESPIRATORY (INHALATION) at 21:40

## 2024-05-18 RX ADMIN — IPRATROPIUM BROMIDE AND ALBUTEROL SULFATE 1 DOSE: .5; 2.5 SOLUTION RESPIRATORY (INHALATION) at 21:40

## 2024-05-18 RX ADMIN — AMLODIPINE BESYLATE 10 MG: 10 TABLET ORAL at 09:25

## 2024-05-18 RX ADMIN — ARFORMOTEROL TARTRATE 15 MCG: 15 SOLUTION RESPIRATORY (INHALATION) at 21:40

## 2024-05-18 RX ADMIN — IPRATROPIUM BROMIDE AND ALBUTEROL SULFATE 1 DOSE: .5; 2.5 SOLUTION RESPIRATORY (INHALATION) at 11:50

## 2024-05-18 RX ADMIN — PREDNISONE 40 MG: 20 TABLET ORAL at 09:24

## 2024-05-18 RX ADMIN — IPRATROPIUM BROMIDE AND ALBUTEROL SULFATE 1 DOSE: .5; 2.5 SOLUTION RESPIRATORY (INHALATION) at 06:14

## 2024-05-18 RX ADMIN — ENOXAPARIN SODIUM 40 MG: 100 INJECTION SUBCUTANEOUS at 09:24

## 2024-05-18 RX ADMIN — BUDESONIDE 500 MCG: 0.5 SUSPENSION RESPIRATORY (INHALATION) at 06:14

## 2024-05-18 RX ADMIN — DULOXETINE HYDROCHLORIDE 30 MG: 30 CAPSULE, DELAYED RELEASE ORAL at 09:25

## 2024-05-18 RX ADMIN — HYDROXYZINE PAMOATE 50 MG: 25 CAPSULE ORAL at 13:55

## 2024-05-18 RX ADMIN — Medication 3 MG: at 21:36

## 2024-05-18 RX ADMIN — ARFORMOTEROL TARTRATE 15 MCG: 15 SOLUTION RESPIRATORY (INHALATION) at 06:14

## 2024-05-18 RX ADMIN — IPRATROPIUM BROMIDE AND ALBUTEROL SULFATE 1 DOSE: .5; 2.5 SOLUTION RESPIRATORY (INHALATION) at 15:24

## 2024-05-18 ASSESSMENT — PAIN DESCRIPTION - FREQUENCY: FREQUENCY: INTERMITTENT

## 2024-05-18 ASSESSMENT — PAIN SCALES - GENERAL
PAINLEVEL_OUTOF10: 0
PAINLEVEL_OUTOF10: 8
PAINLEVEL_OUTOF10: 0

## 2024-05-18 ASSESSMENT — PAIN DESCRIPTION - ONSET: ONSET: GRADUAL

## 2024-05-18 ASSESSMENT — PAIN DESCRIPTION - ORIENTATION: ORIENTATION: LOWER;RIGHT

## 2024-05-18 ASSESSMENT — PAIN DESCRIPTION - DESCRIPTORS: DESCRIPTORS: ACHING

## 2024-05-18 ASSESSMENT — PAIN DESCRIPTION - PAIN TYPE: TYPE: CHRONIC PAIN

## 2024-05-18 ASSESSMENT — PAIN DESCRIPTION - LOCATION: LOCATION: BACK;FOOT

## 2024-05-18 NOTE — PLAN OF CARE
Problem: Discharge Planning  Goal: Discharge to home or other facility with appropriate resources  5/18/2024 1006 by Danna Elizalde, RN  Outcome: Progressing     Problem: ABCDS Injury Assessment  Goal: Absence of physical injury  5/18/2024 1006 by Danna Elizalde, RN  Outcome: Progressing     Problem: Skin/Tissue Integrity  Goal: Absence of new skin breakdown  Description: 1.  Monitor for areas of redness and/or skin breakdown  2.  Assess vascular access sites hourly  3.  Every 4-6 hours minimum:  Change oxygen saturation probe site  4.  Every 4-6 hours:  If on nasal continuous positive airway pressure, respiratory therapy assess nares and determine need for appliance change or resting period.  5/18/2024 1006 by Danna Elizalde, RN  Outcome: Progressing     Problem: Safety - Adult  Goal: Free from fall injury  5/18/2024 1006 by Danna Elizalde, RN  Outcome: Progressing

## 2024-05-18 NOTE — PLAN OF CARE
Problem: ABCDS Injury Assessment  Goal: Absence of physical injury  5/18/2024 0419 by Candice Jacinto, RN  Outcome: Progressing  5/17/2024 1757 by Marilyn Teran, RN  Outcome: Progressing     Problem: Skin/Tissue Integrity  Goal: Absence of new skin breakdown  Description: 1.  Monitor for areas of redness and/or skin breakdown  2.  Assess vascular access sites hourly  3.  Every 4-6 hours minimum:  Change oxygen saturation probe site  4.  Every 4-6 hours:  If on nasal continuous positive airway pressure, respiratory therapy assess nares and determine need for appliance change or resting period.  5/18/2024 0419 by Candice Jacinto, RN  Outcome: Progressing  5/17/2024 1757 by Marilyn Teran, RN  Outcome: Progressing

## 2024-05-19 LAB
ANION GAP SERPL CALCULATED.3IONS-SCNC: 10 MMOL/L (ref 7–16)
BASOPHILS # BLD: 0.02 K/UL (ref 0–0.2)
BASOPHILS NFR BLD: 0 % (ref 0–2)
BUN SERPL-MCNC: 17 MG/DL (ref 6–20)
CALCIUM SERPL-MCNC: 8.9 MG/DL (ref 8.6–10.2)
CHLORIDE SERPL-SCNC: 96 MMOL/L (ref 98–107)
CO2 SERPL-SCNC: 36 MMOL/L (ref 22–29)
CREAT SERPL-MCNC: 0.6 MG/DL (ref 0.5–1)
EOSINOPHIL # BLD: 0.01 K/UL (ref 0.05–0.5)
EOSINOPHILS RELATIVE PERCENT: 0 % (ref 0–6)
ERYTHROCYTE [DISTWIDTH] IN BLOOD BY AUTOMATED COUNT: 15.9 % (ref 11.5–15)
GFR, ESTIMATED: >90 ML/MIN/1.73M2
GLUCOSE SERPL-MCNC: 111 MG/DL (ref 74–99)
HCT VFR BLD AUTO: 29.9 % (ref 34–48)
HGB BLD-MCNC: 8.8 G/DL (ref 11.5–15.5)
IMM GRANULOCYTES # BLD AUTO: 0.1 K/UL (ref 0–0.58)
IMM GRANULOCYTES NFR BLD: 1 % (ref 0–5)
LYMPHOCYTES NFR BLD: 1.11 K/UL (ref 1.5–4)
LYMPHOCYTES RELATIVE PERCENT: 16 % (ref 20–42)
MAGNESIUM SERPL-MCNC: 1.6 MG/DL (ref 1.6–2.6)
MCH RBC QN AUTO: 28.4 PG (ref 26–35)
MCHC RBC AUTO-ENTMCNC: 29.4 G/DL (ref 32–34.5)
MCV RBC AUTO: 96.5 FL (ref 80–99.9)
MONOCYTES NFR BLD: 0.84 K/UL (ref 0.1–0.95)
MONOCYTES NFR BLD: 12 % (ref 2–12)
NEUTROPHILS NFR BLD: 71 % (ref 43–80)
NEUTS SEG NFR BLD: 5.1 K/UL (ref 1.8–7.3)
PHOSPHATE SERPL-MCNC: 2.2 MG/DL (ref 2.5–4.5)
PLATELET # BLD AUTO: 369 K/UL (ref 130–450)
PMV BLD AUTO: 9.6 FL (ref 7–12)
POTASSIUM SERPL-SCNC: 4.1 MMOL/L (ref 3.5–5)
RBC # BLD AUTO: 3.1 M/UL (ref 3.5–5.5)
SODIUM SERPL-SCNC: 142 MMOL/L (ref 132–146)
WBC OTHER # BLD: 7.2 K/UL (ref 4.5–11.5)

## 2024-05-19 PROCEDURE — 83735 ASSAY OF MAGNESIUM: CPT

## 2024-05-19 PROCEDURE — 6370000000 HC RX 637 (ALT 250 FOR IP): Performed by: INTERNAL MEDICINE

## 2024-05-19 PROCEDURE — 6370000000 HC RX 637 (ALT 250 FOR IP)

## 2024-05-19 PROCEDURE — 2700000000 HC OXYGEN THERAPY PER DAY

## 2024-05-19 PROCEDURE — 6360000002 HC RX W HCPCS

## 2024-05-19 PROCEDURE — 2500000003 HC RX 250 WO HCPCS

## 2024-05-19 PROCEDURE — 84100 ASSAY OF PHOSPHORUS: CPT

## 2024-05-19 PROCEDURE — 1200000000 HC SEMI PRIVATE

## 2024-05-19 PROCEDURE — 85025 COMPLETE CBC W/AUTO DIFF WBC: CPT

## 2024-05-19 PROCEDURE — 94640 AIRWAY INHALATION TREATMENT: CPT

## 2024-05-19 PROCEDURE — 2580000003 HC RX 258

## 2024-05-19 PROCEDURE — 99232 SBSQ HOSP IP/OBS MODERATE 35: CPT | Performed by: INTERNAL MEDICINE

## 2024-05-19 PROCEDURE — 80048 BASIC METABOLIC PNL TOTAL CA: CPT

## 2024-05-19 PROCEDURE — 36415 COLL VENOUS BLD VENIPUNCTURE: CPT

## 2024-05-19 PROCEDURE — 94660 CPAP INITIATION&MGMT: CPT

## 2024-05-19 RX ORDER — GUAIFENESIN 400 MG/1
400 TABLET ORAL 3 TIMES DAILY
Status: DISCONTINUED | OUTPATIENT
Start: 2024-05-19 | End: 2024-05-22

## 2024-05-19 RX ADMIN — Medication 3 MG: at 20:55

## 2024-05-19 RX ADMIN — IPRATROPIUM BROMIDE AND ALBUTEROL SULFATE 1 DOSE: .5; 2.5 SOLUTION RESPIRATORY (INHALATION) at 17:56

## 2024-05-19 RX ADMIN — ENOXAPARIN SODIUM 40 MG: 100 INJECTION SUBCUTANEOUS at 08:56

## 2024-05-19 RX ADMIN — SODIUM CHLORIDE, PRESERVATIVE FREE 10 ML: 5 INJECTION INTRAVENOUS at 20:55

## 2024-05-19 RX ADMIN — ARFORMOTEROL TARTRATE 15 MCG: 15 SOLUTION RESPIRATORY (INHALATION) at 20:46

## 2024-05-19 RX ADMIN — BUPRENORPHINE HYDROCHLORIDE AND NALOXONE HYDROCHLORIDE DIHYDRATE 2 TABLET: 2; .5 TABLET SUBLINGUAL at 10:07

## 2024-05-19 RX ADMIN — GUAIFENESIN 400 MG: 400 TABLET ORAL at 15:50

## 2024-05-19 RX ADMIN — ARFORMOTEROL TARTRATE 15 MCG: 15 SOLUTION RESPIRATORY (INHALATION) at 09:17

## 2024-05-19 RX ADMIN — BUPRENORPHINE HYDROCHLORIDE AND NALOXONE HYDROCHLORIDE DIHYDRATE 2 TABLET: 2; .5 TABLET SUBLINGUAL at 15:50

## 2024-05-19 RX ADMIN — BUDESONIDE 500 MCG: 0.5 SUSPENSION RESPIRATORY (INHALATION) at 20:46

## 2024-05-19 RX ADMIN — IPRATROPIUM BROMIDE AND ALBUTEROL SULFATE 1 DOSE: .5; 2.5 SOLUTION RESPIRATORY (INHALATION) at 12:58

## 2024-05-19 RX ADMIN — BUDESONIDE 500 MCG: 0.5 SUSPENSION RESPIRATORY (INHALATION) at 09:17

## 2024-05-19 RX ADMIN — IPRATROPIUM BROMIDE AND ALBUTEROL SULFATE 1 DOSE: .5; 2.5 SOLUTION RESPIRATORY (INHALATION) at 20:46

## 2024-05-19 RX ADMIN — PREDNISONE 40 MG: 20 TABLET ORAL at 09:03

## 2024-05-19 RX ADMIN — AMLODIPINE BESYLATE 10 MG: 10 TABLET ORAL at 08:56

## 2024-05-19 RX ADMIN — SODIUM CHLORIDE, PRESERVATIVE FREE 10 ML: 5 INJECTION INTRAVENOUS at 08:57

## 2024-05-19 RX ADMIN — SODIUM PHOSPHATE, MONOBASIC, MONOHYDRATE AND SODIUM PHOSPHATE, DIBASIC, ANHYDROUS 15 MMOL: 276; 142 INJECTION, SOLUTION INTRAVENOUS at 11:37

## 2024-05-19 RX ADMIN — GUAIFENESIN 400 MG: 400 TABLET ORAL at 11:41

## 2024-05-19 RX ADMIN — DULOXETINE HYDROCHLORIDE 30 MG: 30 CAPSULE, DELAYED RELEASE ORAL at 09:04

## 2024-05-19 RX ADMIN — GUAIFENESIN 400 MG: 400 TABLET ORAL at 20:55

## 2024-05-19 RX ADMIN — IPRATROPIUM BROMIDE AND ALBUTEROL SULFATE 1 DOSE: .5; 2.5 SOLUTION RESPIRATORY (INHALATION) at 09:17

## 2024-05-19 RX ADMIN — HYDROXYZINE PAMOATE 50 MG: 25 CAPSULE ORAL at 10:07

## 2024-05-19 NOTE — PLAN OF CARE
Problem: Discharge Planning  Goal: Discharge to home or other facility with appropriate resources  5/18/2024 2321 by Kina Wan RN  Outcome: Progressing  5/18/2024 1006 by Danna Elizalde RN  Outcome: Progressing     Problem: ABCDS Injury Assessment  Goal: Absence of physical injury  5/18/2024 2321 by Kina Wan RN  Outcome: Progressing  5/18/2024 1006 by Danna Elizalde RN  Outcome: Progressing     Problem: Skin/Tissue Integrity  Goal: Absence of new skin breakdown  Description: 1.  Monitor for areas of redness and/or skin breakdown  2.  Assess vascular access sites hourly  3.  Every 4-6 hours minimum:  Change oxygen saturation probe site  4.  Every 4-6 hours:  If on nasal continuous positive airway pressure, respiratory therapy assess nares and determine need for appliance change or resting period.  5/18/2024 2321 by Kina Wan RN  Outcome: Progressing  5/18/2024 1006 by Danan Elizalde RN  Outcome: Progressing     Problem: Safety - Adult  Goal: Free from fall injury  5/18/2024 2321 by Kina Wan RN  Outcome: Progressing  5/18/2024 1006 by Danna Elizalde RN  Outcome: Progressing     Problem: Pain  Goal: Verbalizes/displays adequate comfort level or baseline comfort level  Outcome: Progressing

## 2024-05-20 LAB
ANION GAP SERPL CALCULATED.3IONS-SCNC: 9 MMOL/L (ref 7–16)
BASOPHILS # BLD: 0.02 K/UL (ref 0–0.2)
BASOPHILS NFR BLD: 0 % (ref 0–2)
BUN SERPL-MCNC: 16 MG/DL (ref 6–20)
CALCIUM SERPL-MCNC: 9.1 MG/DL (ref 8.6–10.2)
CHLORIDE SERPL-SCNC: 96 MMOL/L (ref 98–107)
CO2 SERPL-SCNC: 34 MMOL/L (ref 22–29)
CREAT SERPL-MCNC: 0.5 MG/DL (ref 0.5–1)
EOSINOPHIL # BLD: 0 K/UL (ref 0.05–0.5)
EOSINOPHILS RELATIVE PERCENT: 0 % (ref 0–6)
ERYTHROCYTE [DISTWIDTH] IN BLOOD BY AUTOMATED COUNT: 15.8 % (ref 11.5–15)
GFR, ESTIMATED: >90 ML/MIN/1.73M2
GLUCOSE SERPL-MCNC: 88 MG/DL (ref 74–99)
HCT VFR BLD AUTO: 30.6 % (ref 34–48)
HGB BLD-MCNC: 9 G/DL (ref 11.5–15.5)
IMM GRANULOCYTES # BLD AUTO: 0.18 K/UL (ref 0–0.58)
IMM GRANULOCYTES NFR BLD: 2 % (ref 0–5)
LYMPHOCYTES NFR BLD: 1.26 K/UL (ref 1.5–4)
LYMPHOCYTES RELATIVE PERCENT: 17 % (ref 20–42)
MAGNESIUM SERPL-MCNC: 1.6 MG/DL (ref 1.6–2.6)
MCH RBC QN AUTO: 28 PG (ref 26–35)
MCHC RBC AUTO-ENTMCNC: 29.4 G/DL (ref 32–34.5)
MCV RBC AUTO: 95.3 FL (ref 80–99.9)
MONOCYTES NFR BLD: 0.75 K/UL (ref 0.1–0.95)
MONOCYTES NFR BLD: 10 % (ref 2–12)
NEUTROPHILS NFR BLD: 70 % (ref 43–80)
NEUTS SEG NFR BLD: 5.17 K/UL (ref 1.8–7.3)
PHOSPHATE SERPL-MCNC: 2.5 MG/DL (ref 2.5–4.5)
PLATELET # BLD AUTO: 348 K/UL (ref 130–450)
PMV BLD AUTO: 9.5 FL (ref 7–12)
POTASSIUM SERPL-SCNC: 3.9 MMOL/L (ref 3.5–5)
RBC # BLD AUTO: 3.21 M/UL (ref 3.5–5.5)
SODIUM SERPL-SCNC: 139 MMOL/L (ref 132–146)
WBC OTHER # BLD: 7.4 K/UL (ref 4.5–11.5)

## 2024-05-20 PROCEDURE — 6370000000 HC RX 637 (ALT 250 FOR IP)

## 2024-05-20 PROCEDURE — 80048 BASIC METABOLIC PNL TOTAL CA: CPT

## 2024-05-20 PROCEDURE — 6360000002 HC RX W HCPCS

## 2024-05-20 PROCEDURE — 85025 COMPLETE CBC W/AUTO DIFF WBC: CPT

## 2024-05-20 PROCEDURE — 6370000000 HC RX 637 (ALT 250 FOR IP): Performed by: INTERNAL MEDICINE

## 2024-05-20 PROCEDURE — 1200000000 HC SEMI PRIVATE

## 2024-05-20 PROCEDURE — 2700000000 HC OXYGEN THERAPY PER DAY

## 2024-05-20 PROCEDURE — 87522 HEPATITIS C REVRS TRNSCRPJ: CPT

## 2024-05-20 PROCEDURE — 94640 AIRWAY INHALATION TREATMENT: CPT

## 2024-05-20 PROCEDURE — 36415 COLL VENOUS BLD VENIPUNCTURE: CPT

## 2024-05-20 PROCEDURE — 94660 CPAP INITIATION&MGMT: CPT

## 2024-05-20 PROCEDURE — 99232 SBSQ HOSP IP/OBS MODERATE 35: CPT | Performed by: INTERNAL MEDICINE

## 2024-05-20 PROCEDURE — 84100 ASSAY OF PHOSPHORUS: CPT

## 2024-05-20 PROCEDURE — 2580000003 HC RX 258

## 2024-05-20 PROCEDURE — 83735 ASSAY OF MAGNESIUM: CPT

## 2024-05-20 RX ORDER — SENNOSIDES A AND B 8.6 MG/1
2 TABLET, FILM COATED ORAL 2 TIMES DAILY
Status: DISCONTINUED | OUTPATIENT
Start: 2024-05-20 | End: 2024-05-25

## 2024-05-20 RX ORDER — ERGOCALCIFEROL 1.25 MG/1
50000 CAPSULE ORAL WEEKLY
Status: DISCONTINUED | OUTPATIENT
Start: 2024-05-20 | End: 2024-05-20

## 2024-05-20 RX ORDER — NICOTINE 21 MG/24HR
1 PATCH, TRANSDERMAL 24 HOURS TRANSDERMAL DAILY
Status: DISCONTINUED | OUTPATIENT
Start: 2024-05-20 | End: 2024-05-20

## 2024-05-20 RX ORDER — ERGOCALCIFEROL 1.25 MG/1
50000 CAPSULE ORAL WEEKLY
Status: DISCONTINUED | OUTPATIENT
Start: 2024-05-27 | End: 2024-05-28 | Stop reason: HOSPADM

## 2024-05-20 RX ORDER — CHOLECALCIFEROL (VITAMIN D3) 50 MCG
2000 TABLET ORAL DAILY
Status: DISCONTINUED | OUTPATIENT
Start: 2024-05-20 | End: 2024-05-28 | Stop reason: HOSPADM

## 2024-05-20 RX ORDER — POLYETHYLENE GLYCOL 3350 17 G/17G
17 POWDER, FOR SOLUTION ORAL DAILY
Status: DISCONTINUED | OUTPATIENT
Start: 2024-05-20 | End: 2024-05-28 | Stop reason: HOSPADM

## 2024-05-20 RX ORDER — MAGNESIUM SULFATE IN WATER 40 MG/ML
2000 INJECTION, SOLUTION INTRAVENOUS ONCE
Status: DISCONTINUED | OUTPATIENT
Start: 2024-05-20 | End: 2024-05-21

## 2024-05-20 RX ORDER — NICOTINE 21 MG/24HR
1 PATCH, TRANSDERMAL 24 HOURS TRANSDERMAL DAILY
Status: DISCONTINUED | OUTPATIENT
Start: 2024-05-20 | End: 2024-05-28 | Stop reason: HOSPADM

## 2024-05-20 RX ADMIN — ARFORMOTEROL TARTRATE 15 MCG: 15 SOLUTION RESPIRATORY (INHALATION) at 12:32

## 2024-05-20 RX ADMIN — DULOXETINE HYDROCHLORIDE 30 MG: 30 CAPSULE, DELAYED RELEASE ORAL at 10:19

## 2024-05-20 RX ADMIN — BUPRENORPHINE HYDROCHLORIDE AND NALOXONE HYDROCHLORIDE DIHYDRATE 2 TABLET: 2; .5 TABLET SUBLINGUAL at 10:18

## 2024-05-20 RX ADMIN — Medication 2000 UNITS: at 16:14

## 2024-05-20 RX ADMIN — IPRATROPIUM BROMIDE AND ALBUTEROL SULFATE 1 DOSE: .5; 2.5 SOLUTION RESPIRATORY (INHALATION) at 22:32

## 2024-05-20 RX ADMIN — SODIUM CHLORIDE, PRESERVATIVE FREE 10 ML: 5 INJECTION INTRAVENOUS at 21:20

## 2024-05-20 RX ADMIN — ARFORMOTEROL TARTRATE 15 MCG: 15 SOLUTION RESPIRATORY (INHALATION) at 22:32

## 2024-05-20 RX ADMIN — PREDNISONE 40 MG: 20 TABLET ORAL at 10:18

## 2024-05-20 RX ADMIN — AMLODIPINE BESYLATE 10 MG: 10 TABLET ORAL at 10:18

## 2024-05-20 RX ADMIN — BUPRENORPHINE HYDROCHLORIDE AND NALOXONE HYDROCHLORIDE DIHYDRATE 2 TABLET: 2; .5 TABLET SUBLINGUAL at 21:15

## 2024-05-20 RX ADMIN — GUAIFENESIN 400 MG: 400 TABLET ORAL at 21:16

## 2024-05-20 RX ADMIN — GUAIFENESIN 400 MG: 400 TABLET ORAL at 10:18

## 2024-05-20 RX ADMIN — ENOXAPARIN SODIUM 40 MG: 100 INJECTION SUBCUTANEOUS at 10:19

## 2024-05-20 RX ADMIN — IPRATROPIUM BROMIDE AND ALBUTEROL SULFATE 1 DOSE: .5; 2.5 SOLUTION RESPIRATORY (INHALATION) at 17:26

## 2024-05-20 RX ADMIN — SENNOSIDES 17.2 MG: 8.6 TABLET, FILM COATED ORAL at 21:16

## 2024-05-20 RX ADMIN — IPRATROPIUM BROMIDE AND ALBUTEROL SULFATE 1 DOSE: .5; 2.5 SOLUTION RESPIRATORY (INHALATION) at 12:32

## 2024-05-20 RX ADMIN — SENNOSIDES 17.2 MG: 8.6 TABLET, FILM COATED ORAL at 12:20

## 2024-05-20 RX ADMIN — BUDESONIDE 500 MCG: 0.5 SUSPENSION RESPIRATORY (INHALATION) at 12:32

## 2024-05-20 RX ADMIN — POLYETHYLENE GLYCOL 3350 17 G: 17 POWDER, FOR SOLUTION ORAL at 12:20

## 2024-05-20 RX ADMIN — GUAIFENESIN 400 MG: 400 TABLET ORAL at 16:14

## 2024-05-20 RX ADMIN — BUDESONIDE 500 MCG: 0.5 SUSPENSION RESPIRATORY (INHALATION) at 22:32

## 2024-05-20 ASSESSMENT — PAIN SCALES - GENERAL: PAINLEVEL_OUTOF10: 10

## 2024-05-20 ASSESSMENT — PAIN DESCRIPTION - DESCRIPTORS: DESCRIPTORS: ACHING;SORE

## 2024-05-20 ASSESSMENT — PAIN DESCRIPTION - ORIENTATION: ORIENTATION: LOWER;RIGHT

## 2024-05-20 ASSESSMENT — PAIN DESCRIPTION - LOCATION: LOCATION: BACK;FOOT

## 2024-05-20 NOTE — CARE COORDINATION
Peer Recovery Support Note    Name: Saba Sullivan  Date: 5/20/2024    Chief Complaint   Patient presents with    Shortness of Breath     Was discharged from Hosp and went to NH arrived NH became sob spo2 in 80'S arrived on c-pap from EMT received duoneb in route        Peer Support met with patient.  [x] Support and education provided  [x] Resources provided   [x] Treatment referral: Outpatient   [x] Other: Left contact information  [] Patient declined peer recovery services     Referred By: Jazmin (STEPHANIE/NISREEN)    Notes: Met with patient who I previously saw on last admission (5/13). Patient has some medical issues that hinder her from many [facility] facilities, and was sent previously to [facility]; patient REFUSES to go back to this facility, stating that proper care was not provided to her there. Peer suggested facility in [place] called \"[facility]\" as a potential option. This is a skilled nursing facility that also offers a [facility] program ([facility]). Patient was receptive to this suggestion even though it is noted that she declined SNF. Patient says that she IS willing to consider going to [facility] if she is a candidate for their program. Patient was provided with facility information as well as Peer contact information. If patient is not eligible to go to this facility, there were also multiple outpatient resources given, but again, patients options are limited d/t her minimal mobility.      [facility]    [address] [place] [address]  **LiaMinerva douglas, can be reached at: [phone] with questions pertaining to facility and requirements for eligibility.     Please contact PRS office if any further assistance is required.    Signed: Luanne Covington, 5/20/2024      [phone]

## 2024-05-20 NOTE — CARE COORDINATION
Care Coordination - Parainfluenza  The patient was just discharged to [facility] on 5/16. She went to [facility] was there one hour call 911 for a ride back to this hospital. She says the bipap machine was very dirty and full of dust. As soon as she put the machine on she could not breathe. Spoke to the the patients son Phil kaba by Phone @ [phone]. Per him his mom Lives alone prior to admission in a 2 story home but she stays on the main floor  3 steps to enter. 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 Per the son his mome has a fiend who come is to help her. Per the son his mom will be returning home she does not want to go back to any skilled facility. Therapies have been ordered this am. She is currently on 5 liters n/c . Her primary care physician is Pallavi Velasquez and her pharmacy is  here at [facility] . Called Jodie at [facility] to see the patient about going to [facility] as an outpatient . She is declining skilled. Vania will see her today.

## 2024-05-21 LAB
ANION GAP SERPL CALCULATED.3IONS-SCNC: 14 MMOL/L (ref 7–16)
BASOPHILS # BLD: 0.03 K/UL (ref 0–0.2)
BASOPHILS NFR BLD: 0 % (ref 0–2)
BUN SERPL-MCNC: 19 MG/DL (ref 6–20)
CALCIUM SERPL-MCNC: 9.1 MG/DL (ref 8.6–10.2)
CHLORIDE SERPL-SCNC: 98 MMOL/L (ref 98–107)
CO2 SERPL-SCNC: 30 MMOL/L (ref 22–29)
CREAT SERPL-MCNC: 0.6 MG/DL (ref 0.5–1)
EOSINOPHIL # BLD: 0 K/UL (ref 0.05–0.5)
EOSINOPHILS RELATIVE PERCENT: 0 % (ref 0–6)
ERYTHROCYTE [DISTWIDTH] IN BLOOD BY AUTOMATED COUNT: 15.6 % (ref 11.5–15)
GFR, ESTIMATED: >90 ML/MIN/1.73M2
GLUCOSE SERPL-MCNC: 86 MG/DL (ref 74–99)
HCT VFR BLD AUTO: 31.1 % (ref 34–48)
HGB BLD-MCNC: 9.4 G/DL (ref 11.5–15.5)
IMM GRANULOCYTES # BLD AUTO: 0.25 K/UL (ref 0–0.58)
IMM GRANULOCYTES NFR BLD: 3 % (ref 0–5)
LYMPHOCYTES NFR BLD: 1.19 K/UL (ref 1.5–4)
LYMPHOCYTES RELATIVE PERCENT: 14 % (ref 20–42)
MAGNESIUM SERPL-MCNC: 1.7 MG/DL (ref 1.6–2.6)
MCH RBC QN AUTO: 28.7 PG (ref 26–35)
MCHC RBC AUTO-ENTMCNC: 30.2 G/DL (ref 32–34.5)
MCV RBC AUTO: 95.1 FL (ref 80–99.9)
MONOCYTES NFR BLD: 0.77 K/UL (ref 0.1–0.95)
MONOCYTES NFR BLD: 9 % (ref 2–12)
NEUTROPHILS NFR BLD: 73 % (ref 43–80)
NEUTS SEG NFR BLD: 6.2 K/UL (ref 1.8–7.3)
PHOSPHATE SERPL-MCNC: 2.7 MG/DL (ref 2.5–4.5)
PLATELET # BLD AUTO: 330 K/UL (ref 130–450)
PMV BLD AUTO: 9.8 FL (ref 7–12)
POTASSIUM SERPL-SCNC: 4.1 MMOL/L (ref 3.5–5)
RBC # BLD AUTO: 3.27 M/UL (ref 3.5–5.5)
SODIUM SERPL-SCNC: 142 MMOL/L (ref 132–146)
WBC OTHER # BLD: 8.4 K/UL (ref 4.5–11.5)

## 2024-05-21 PROCEDURE — 85025 COMPLETE CBC W/AUTO DIFF WBC: CPT

## 2024-05-21 PROCEDURE — 6370000000 HC RX 637 (ALT 250 FOR IP)

## 2024-05-21 PROCEDURE — 94640 AIRWAY INHALATION TREATMENT: CPT

## 2024-05-21 PROCEDURE — 97530 THERAPEUTIC ACTIVITIES: CPT

## 2024-05-21 PROCEDURE — 6370000000 HC RX 637 (ALT 250 FOR IP): Performed by: INTERNAL MEDICINE

## 2024-05-21 PROCEDURE — 80048 BASIC METABOLIC PNL TOTAL CA: CPT

## 2024-05-21 PROCEDURE — 94660 CPAP INITIATION&MGMT: CPT

## 2024-05-21 PROCEDURE — 1200000000 HC SEMI PRIVATE

## 2024-05-21 PROCEDURE — 6360000002 HC RX W HCPCS

## 2024-05-21 PROCEDURE — 99232 SBSQ HOSP IP/OBS MODERATE 35: CPT | Performed by: INTERNAL MEDICINE

## 2024-05-21 PROCEDURE — 2700000000 HC OXYGEN THERAPY PER DAY

## 2024-05-21 PROCEDURE — 97161 PT EVAL LOW COMPLEX 20 MIN: CPT

## 2024-05-21 PROCEDURE — 36415 COLL VENOUS BLD VENIPUNCTURE: CPT

## 2024-05-21 PROCEDURE — 97165 OT EVAL LOW COMPLEX 30 MIN: CPT

## 2024-05-21 PROCEDURE — 84100 ASSAY OF PHOSPHORUS: CPT

## 2024-05-21 PROCEDURE — 83735 ASSAY OF MAGNESIUM: CPT

## 2024-05-21 PROCEDURE — 2580000003 HC RX 258

## 2024-05-21 PROCEDURE — 97535 SELF CARE MNGMENT TRAINING: CPT

## 2024-05-21 RX ORDER — PREDNISONE 20 MG/1
40 TABLET ORAL DAILY
Status: COMPLETED | OUTPATIENT
Start: 2024-05-21 | End: 2024-05-23

## 2024-05-21 RX ORDER — BISACODYL 5 MG/1
5 TABLET, DELAYED RELEASE ORAL DAILY
Status: DISCONTINUED | OUTPATIENT
Start: 2024-05-21 | End: 2024-05-25

## 2024-05-21 RX ADMIN — PREDNISONE 40 MG: 20 TABLET ORAL at 17:56

## 2024-05-21 RX ADMIN — Medication 3 MG: at 21:29

## 2024-05-21 RX ADMIN — Medication 2000 UNITS: at 09:21

## 2024-05-21 RX ADMIN — BUPRENORPHINE HYDROCHLORIDE AND NALOXONE HYDROCHLORIDE DIHYDRATE 2 TABLET: 2; .5 TABLET SUBLINGUAL at 09:21

## 2024-05-21 RX ADMIN — SODIUM CHLORIDE, PRESERVATIVE FREE 10 ML: 5 INJECTION INTRAVENOUS at 21:29

## 2024-05-21 RX ADMIN — ARFORMOTEROL TARTRATE 15 MCG: 15 SOLUTION RESPIRATORY (INHALATION) at 10:26

## 2024-05-21 RX ADMIN — DULOXETINE HYDROCHLORIDE 30 MG: 30 CAPSULE, DELAYED RELEASE ORAL at 09:23

## 2024-05-21 RX ADMIN — GUAIFENESIN 400 MG: 400 TABLET ORAL at 17:56

## 2024-05-21 RX ADMIN — HYDROXYZINE PAMOATE 50 MG: 25 CAPSULE ORAL at 21:33

## 2024-05-21 RX ADMIN — SODIUM CHLORIDE, PRESERVATIVE FREE 10 ML: 5 INJECTION INTRAVENOUS at 09:20

## 2024-05-21 RX ADMIN — BISACODYL 5 MG: 5 TABLET, COATED ORAL at 21:29

## 2024-05-21 RX ADMIN — SENNOSIDES 17.2 MG: 8.6 TABLET, FILM COATED ORAL at 21:29

## 2024-05-21 RX ADMIN — IPRATROPIUM BROMIDE AND ALBUTEROL SULFATE 1 DOSE: .5; 2.5 SOLUTION RESPIRATORY (INHALATION) at 12:59

## 2024-05-21 RX ADMIN — GUAIFENESIN 400 MG: 400 TABLET ORAL at 09:21

## 2024-05-21 RX ADMIN — ENOXAPARIN SODIUM 40 MG: 100 INJECTION SUBCUTANEOUS at 09:20

## 2024-05-21 RX ADMIN — POLYETHYLENE GLYCOL 3350 17 G: 17 POWDER, FOR SOLUTION ORAL at 09:20

## 2024-05-21 RX ADMIN — SENNOSIDES 17.2 MG: 8.6 TABLET, FILM COATED ORAL at 09:23

## 2024-05-21 RX ADMIN — AMLODIPINE BESYLATE 10 MG: 10 TABLET ORAL at 09:20

## 2024-05-21 RX ADMIN — IPRATROPIUM BROMIDE AND ALBUTEROL SULFATE 1 DOSE: .5; 2.5 SOLUTION RESPIRATORY (INHALATION) at 10:26

## 2024-05-21 RX ADMIN — BUPRENORPHINE HYDROCHLORIDE AND NALOXONE HYDROCHLORIDE DIHYDRATE 2 TABLET: 2; .5 TABLET SUBLINGUAL at 21:29

## 2024-05-21 RX ADMIN — IPRATROPIUM BROMIDE AND ALBUTEROL SULFATE 1 DOSE: .5; 2.5 SOLUTION RESPIRATORY (INHALATION) at 16:20

## 2024-05-21 RX ADMIN — GUAIFENESIN 400 MG: 400 TABLET ORAL at 21:29

## 2024-05-21 RX ADMIN — BUDESONIDE 500 MCG: 0.5 SUSPENSION RESPIRATORY (INHALATION) at 10:26

## 2024-05-21 ASSESSMENT — PAIN SCALES - GENERAL: PAINLEVEL_OUTOF10: 0

## 2024-05-21 NOTE — PLAN OF CARE
Problem: Discharge Planning  Goal: Discharge to home or other facility with appropriate resources  Outcome: Progressing  Flowsheets (Taken 5/20/2024 2100)  Discharge to home or other facility with appropriate resources: Identify barriers to discharge with patient and caregiver     Problem: ABCDS Injury Assessment  Goal: Absence of physical injury  Outcome: Progressing     Problem: Skin/Tissue Integrity  Goal: Absence of new skin breakdown  Description: 1.  Monitor for areas of redness and/or skin breakdown  2.  Assess vascular access sites hourly  3.  Every 4-6 hours minimum:  Change oxygen saturation probe site  4.  Every 4-6 hours:  If on nasal continuous positive airway pressure, respiratory therapy assess nares and determine need for appliance change or resting period.  Outcome: Progressing     Problem: Safety - Adult  Goal: Free from fall injury  Outcome: Progressing  Flowsheets (Taken 5/20/2024 2100)  Free From Fall Injury: Instruct family/caregiver on patient safety     Problem: Pain  Goal: Verbalizes/displays adequate comfort level or baseline comfort level  Outcome: Progressing

## 2024-05-21 NOTE — DISCHARGE INSTRUCTIONS
Internal medicine    Follow ups  Please follow up with your primary care physician ( @PCP@) within 10 days of discharge from hospital. Please call as soon as possible to make an appointment. Please contact the internal medicine clinic for an appointment if you are unable to get an appointment with your PCP.   Please keep all other follow up appointments:  No future appointments.     Changes in healthcare   Please take all medications as indicated  Diet: regular diet   Activity: activity as tolerated    Outpatient recommendation:  -Follow-up with pulmonology for severe COPD  -Will need pulmonary rehabilitation  -Follow-up with orthopedics as outpatient for left hip severe OA and avascular necrosis   -Smoking cessation, substance use cessation   -Will need to establish with Suboxone provider   -Needs BIPAP/AVAPS and oxygen on discharge   -Continue Vit D supplementation, recheck values in 3-4 mos  -Consider ear irrigation, debrox started during admission     Please contact us if you have any concerns, wish to change or make an appointment:  Internal medicine clinic   Phone: [phone]  Fax: [phone]  [facility] [facility] [address]  Should you have further questions in regards to this visit, you can review your clinical note and after visit summary document on your [facility] account.     Other than any new prescriptions given to you today, the list of home medications on this After Visit Summary are based on information provided to us from you and your healthcare providers. This information, including the list, dose, and frequency of medications is only as accurate as the information you provided. If you have any questions or concerns about your home medications, please contact your Primary Care Physician for further clarification.

## 2024-05-21 NOTE — DISCHARGE SUMMARY
[facility]  Discharge Summary    PCP: Pallavi Velasquez DO    Admit Date:5/16/2024  Discharge Date: 5/28/2024     Admission Diagnosis:   Acute hypoxic hypercapnic respiratory failure 2/2 most likely in the setting of BiPAP noncompliance versus COPD exacerbation  COPD Gold stage IV-PFT in 2015  Chronically elevated troponin  Hepatitis C antibody-reactive  Moderate pulmonary hypertension  Hypertension  Cholelithiasis  Chronic pain 2/2 left hip fracture and right heel osteomyelitis  Normocytic anemia 2/2 anemia of chronic disease  Essential tremor  Vitamin D deficiency  Functional oropharyngeal swallow disorder  History of tobacco use  Subclinical hypothyroidism  History of osteomyelitis of the right hip s/p debridement    Discharge Diagnosis:  Acute on chronic hypoxic hypercapnic respiratory failure 2/2 most likely in the setting of BiPAP noncompliance versus COPD exacerbation  COPD Gold stage IV, not on baseline O2  Moderate pulmonary hypertension group III  Hypertension, on amlodipine 10 mg OD  Chronically elevated troponin, no significant delta   Hepatitis C infection, resolved (HCV RNA not detected)   Cholelithiasis  Hx of DOMINGO stage II (4/2024) requiring HD, discontinued on 5/13 - resolved   Hx of T2DM, last A1c 5.4, not on medications  Normocytic anemia  Vitamin D deficiency, <0.6  Functional oropharyngeal swallow disorder  Essential tremor  Chronic pain 2/2 left hip avascular necrosis and right heel osteomyelitis, on duloxetine   Hx of anxiety, on vistaril 50 mg Q8 prn  History of tobacco use, 2 ppd   Hx of substance use, on Suboxone      Hospital Course:   59/F with PMHx of COPD stage IV, polysubstance abuse, normocytic anemia, essential tremor, vitamin D deficiency who came in with a CC of hypoxia and SOB.     Recently admitted on 5/9/24 to 5/16/24 for acute hypoxic hypercapnic respiratory failure 2/2 parainfluenza, COPD exacerbation. During previous admission,

## 2024-05-21 NOTE — CARE COORDINATION
Care Coordination  Peer recovery  feels the patient should go to [facility] due the [facility] program they have. The patient is now in agreement and a referral was made to [place] at [facility] await if accepted. Awaiting pt ot evals to be done. The patient is refusing her bipap this am. She remains on 5 liters n/c. Messaged Dr Villagomez and told him the patient is declining any type of rehab and that she wants to go home instead. He feels that's not an appropriate plan. If still declined rehab in am her plan is for possible dc in am and per Dr Villagomez she will need to sign a waiver.          Case Management Assessment  Initial Evaluation    Date/Time of Evaluation: 5/21/2024 10:31 AM  Assessment Completed by: Jazmin Gonzalez RN    If patient is discharged prior to next notation, then this note serves as note for discharge by case management.    Patient Name: Saba Sullivan                   YOB: 1965  Diagnosis: COPD with acute exacerbation (HCC) [J44.1]  Acute respiratory failure with hypoxia (HCC) [J96.01]  Acute hypoxemic respiratory failure (HCC) [J96.01]                   Date / Time: 5/16/2024 11:33 PM    Patient Admission Status: Inpatient   Readmission Risk (Low < 19, Mod (19-27), High > 27): Readmission Risk Score: 20.7    Current PCP: Pallavi Velasquez, DO  PCP verified by CM? Yes    Chart Reviewed: Yes      History Provided by: Patient  Patient Orientation: Alert and Oriented    Patient Cognition: Alert    Hospitalization in the last 30 days (Readmission):  Yes    If yes, Readmission Assessment in  Navigator will be completed.    Advance Directives:      Code Status: Full Code   Patient's Primary Decision Maker is: Named in Scanned ACP Document    Primary Decision Maker: Phil Andrews - Child - [phone]    Discharge Planning:    Patient lives with: Spouse/Significant Other Type of Home: House  Primary Care Giver: Self  Patient Support Systems include: Children   Current

## 2024-05-22 LAB
ANION GAP SERPL CALCULATED.3IONS-SCNC: 12 MMOL/L (ref 7–16)
BASOPHILS # BLD: 0.02 K/UL (ref 0–0.2)
BASOPHILS NFR BLD: 0 % (ref 0–2)
BUN SERPL-MCNC: 20 MG/DL (ref 6–20)
CALCIUM SERPL-MCNC: 9.1 MG/DL (ref 8.6–10.2)
CHLORIDE SERPL-SCNC: 98 MMOL/L (ref 98–107)
CO2 SERPL-SCNC: 30 MMOL/L (ref 22–29)
CREAT SERPL-MCNC: 0.5 MG/DL (ref 0.5–1)
EOSINOPHIL # BLD: 0 K/UL (ref 0.05–0.5)
EOSINOPHILS RELATIVE PERCENT: 0 % (ref 0–6)
ERYTHROCYTE [DISTWIDTH] IN BLOOD BY AUTOMATED COUNT: 15.9 % (ref 11.5–15)
GFR, ESTIMATED: >90 ML/MIN/1.73M2
GLUCOSE SERPL-MCNC: 131 MG/DL (ref 74–99)
HCT VFR BLD AUTO: 33.8 % (ref 34–48)
HGB BLD-MCNC: 10.2 G/DL (ref 11.5–15.5)
IMM GRANULOCYTES # BLD AUTO: 0.15 K/UL (ref 0–0.58)
IMM GRANULOCYTES NFR BLD: 1 % (ref 0–5)
LYMPHOCYTES NFR BLD: 0.78 K/UL (ref 1.5–4)
LYMPHOCYTES RELATIVE PERCENT: 7 % (ref 20–42)
MAGNESIUM SERPL-MCNC: 1.5 MG/DL (ref 1.6–2.6)
MCH RBC QN AUTO: 28.3 PG (ref 26–35)
MCHC RBC AUTO-ENTMCNC: 30.2 G/DL (ref 32–34.5)
MCV RBC AUTO: 93.6 FL (ref 80–99.9)
MONOCYTES NFR BLD: 0.56 K/UL (ref 0.1–0.95)
MONOCYTES NFR BLD: 5 % (ref 2–12)
NEUTROPHILS NFR BLD: 87 % (ref 43–80)
NEUTS SEG NFR BLD: 9.67 K/UL (ref 1.8–7.3)
PHOSPHATE SERPL-MCNC: 3 MG/DL (ref 2.5–4.5)
PLATELET # BLD AUTO: 325 K/UL (ref 130–450)
PMV BLD AUTO: 9.7 FL (ref 7–12)
POTASSIUM SERPL-SCNC: 4.6 MMOL/L (ref 3.5–5)
RBC # BLD AUTO: 3.61 M/UL (ref 3.5–5.5)
SODIUM SERPL-SCNC: 140 MMOL/L (ref 132–146)
WBC OTHER # BLD: 11.2 K/UL (ref 4.5–11.5)

## 2024-05-22 PROCEDURE — 6370000000 HC RX 637 (ALT 250 FOR IP): Performed by: INTERNAL MEDICINE

## 2024-05-22 PROCEDURE — 6370000000 HC RX 637 (ALT 250 FOR IP)

## 2024-05-22 PROCEDURE — 2700000000 HC OXYGEN THERAPY PER DAY

## 2024-05-22 PROCEDURE — 99232 SBSQ HOSP IP/OBS MODERATE 35: CPT | Performed by: INTERNAL MEDICINE

## 2024-05-22 PROCEDURE — 6360000002 HC RX W HCPCS

## 2024-05-22 PROCEDURE — 1200000000 HC SEMI PRIVATE

## 2024-05-22 PROCEDURE — 94640 AIRWAY INHALATION TREATMENT: CPT

## 2024-05-22 PROCEDURE — 83735 ASSAY OF MAGNESIUM: CPT

## 2024-05-22 PROCEDURE — 36415 COLL VENOUS BLD VENIPUNCTURE: CPT

## 2024-05-22 PROCEDURE — 2580000003 HC RX 258

## 2024-05-22 PROCEDURE — 94660 CPAP INITIATION&MGMT: CPT

## 2024-05-22 PROCEDURE — 80048 BASIC METABOLIC PNL TOTAL CA: CPT

## 2024-05-22 PROCEDURE — 85025 COMPLETE CBC W/AUTO DIFF WBC: CPT

## 2024-05-22 PROCEDURE — 84100 ASSAY OF PHOSPHORUS: CPT

## 2024-05-22 RX ORDER — IPRATROPIUM BROMIDE AND ALBUTEROL SULFATE 2.5; .5 MG/3ML; MG/3ML
1 SOLUTION RESPIRATORY (INHALATION) EVERY 4 HOURS PRN
Status: DISCONTINUED | OUTPATIENT
Start: 2024-05-22 | End: 2024-05-28 | Stop reason: HOSPADM

## 2024-05-22 RX ORDER — GUAIFENESIN 400 MG/1
400 TABLET ORAL 3 TIMES DAILY PRN
Status: DISCONTINUED | OUTPATIENT
Start: 2024-05-22 | End: 2024-05-25

## 2024-05-22 RX ORDER — MAGNESIUM SULFATE IN WATER 40 MG/ML
2000 INJECTION, SOLUTION INTRAVENOUS ONCE
Status: COMPLETED | OUTPATIENT
Start: 2024-05-22 | End: 2024-05-22

## 2024-05-22 RX ADMIN — GUAIFENESIN 400 MG: 400 TABLET ORAL at 08:26

## 2024-05-22 RX ADMIN — AMLODIPINE BESYLATE 10 MG: 10 TABLET ORAL at 08:27

## 2024-05-22 RX ADMIN — SODIUM CHLORIDE, PRESERVATIVE FREE 10 ML: 5 INJECTION INTRAVENOUS at 12:29

## 2024-05-22 RX ADMIN — BUPRENORPHINE HYDROCHLORIDE AND NALOXONE HYDROCHLORIDE DIHYDRATE 2 TABLET: 2; .5 TABLET SUBLINGUAL at 08:25

## 2024-05-22 RX ADMIN — SODIUM CHLORIDE, PRESERVATIVE FREE 10 ML: 5 INJECTION INTRAVENOUS at 20:25

## 2024-05-22 RX ADMIN — POLYETHYLENE GLYCOL 3350 17 G: 17 POWDER, FOR SOLUTION ORAL at 08:25

## 2024-05-22 RX ADMIN — Medication 2000 UNITS: at 08:26

## 2024-05-22 RX ADMIN — BUPRENORPHINE HYDROCHLORIDE AND NALOXONE HYDROCHLORIDE DIHYDRATE 2 TABLET: 2; .5 TABLET SUBLINGUAL at 20:32

## 2024-05-22 RX ADMIN — BISACODYL 5 MG: 5 TABLET, COATED ORAL at 08:26

## 2024-05-22 RX ADMIN — ARFORMOTEROL TARTRATE 15 MCG: 15 SOLUTION RESPIRATORY (INHALATION) at 21:41

## 2024-05-22 RX ADMIN — BUDESONIDE 500 MCG: 0.5 SUSPENSION RESPIRATORY (INHALATION) at 09:12

## 2024-05-22 RX ADMIN — SODIUM CHLORIDE, PRESERVATIVE FREE 10 ML: 5 INJECTION INTRAVENOUS at 08:31

## 2024-05-22 RX ADMIN — ARFORMOTEROL TARTRATE 15 MCG: 15 SOLUTION RESPIRATORY (INHALATION) at 09:12

## 2024-05-22 RX ADMIN — BUDESONIDE 500 MCG: 0.5 SUSPENSION RESPIRATORY (INHALATION) at 21:41

## 2024-05-22 RX ADMIN — Medication 3 MG: at 20:25

## 2024-05-22 RX ADMIN — ENOXAPARIN SODIUM 40 MG: 100 INJECTION SUBCUTANEOUS at 08:25

## 2024-05-22 RX ADMIN — SENNOSIDES 17.2 MG: 8.6 TABLET, FILM COATED ORAL at 20:24

## 2024-05-22 RX ADMIN — SENNOSIDES 17.2 MG: 8.6 TABLET, FILM COATED ORAL at 08:26

## 2024-05-22 RX ADMIN — DULOXETINE HYDROCHLORIDE 30 MG: 30 CAPSULE, DELAYED RELEASE ORAL at 08:27

## 2024-05-22 RX ADMIN — IPRATROPIUM BROMIDE AND ALBUTEROL SULFATE 1 DOSE: .5; 2.5 SOLUTION RESPIRATORY (INHALATION) at 09:12

## 2024-05-22 RX ADMIN — HYDROXYZINE PAMOATE 50 MG: 25 CAPSULE ORAL at 20:27

## 2024-05-22 RX ADMIN — MAGNESIUM SULFATE HEPTAHYDRATE 2000 MG: 40 INJECTION, SOLUTION INTRAVENOUS at 12:33

## 2024-05-22 RX ADMIN — PREDNISONE 40 MG: 20 TABLET ORAL at 08:27

## 2024-05-22 ASSESSMENT — PAIN SCALES - GENERAL
PAINLEVEL_OUTOF10: 10
PAINLEVEL_OUTOF10: 0
PAINLEVEL_OUTOF10: 0

## 2024-05-22 ASSESSMENT — PAIN DESCRIPTION - DESCRIPTORS: DESCRIPTORS: ACHING;DISCOMFORT

## 2024-05-22 ASSESSMENT — PAIN DESCRIPTION - LOCATION: LOCATION: HIP

## 2024-05-22 NOTE — CARE COORDINATION
5/22. Spoke to the pt regarding rehab. She is now agreeable to going to [facility]. She is aware that she cannot smoke while she is using O2. Notified facility. She states that her right hip is painful. She has been seeing Dr Motta for her hip. She needs surgery, but will need to be more physically stable to undergo surgery. Dr Cano aware of hip pain. Lizz Gonzalez RN

## 2024-05-22 NOTE — PLAN OF CARE
Problem: Discharge Planning  Goal: Discharge to home or other facility with appropriate resources  Outcome: Progressing  Flowsheets (Taken 5/22/2024 1154)  Discharge to home or other facility with appropriate resources: Identify barriers to discharge with patient and caregiver     Problem: Discharge Planning  Goal: Discharge to home or other facility with appropriate resources  Outcome: Progressing  Flowsheets (Taken 5/22/2024 1154)  Discharge to home or other facility with appropriate resources: Identify barriers to discharge with patient and caregiver     Problem: ABCDS Injury Assessment  Goal: Absence of physical injury  Outcome: Progressing     Problem: Skin/Tissue Integrity  Goal: Absence of new skin breakdown  Description: 1.  Monitor for areas of redness and/or skin breakdown  2.  Assess vascular access sites hourly  3.  Every 4-6 hours minimum:  Change oxygen saturation probe site  4.  Every 4-6 hours:  If on nasal continuous positive airway pressure, respiratory therapy assess nares and determine need for appliance change or resting period.  Outcome: Progressing     Problem: Safety - Adult  Goal: Free from fall injury  Outcome: Progressing     Problem: Pain  Goal: Verbalizes/displays adequate comfort level or baseline comfort level  Outcome: Progressing  Flowsheets (Taken 5/22/2024 7781)  Verbalizes/displays adequate comfort level or baseline comfort level: Encourage patient to monitor pain and request assistance

## 2024-05-22 NOTE — PLAN OF CARE
Problem: Discharge Planning  Goal: Discharge to home or other facility with appropriate resources  Outcome: Progressing  Flowsheets (Taken 5/21/2024 2115)  Discharge to home or other facility with appropriate resources: Identify barriers to discharge with patient and caregiver     Problem: ABCDS Injury Assessment  Goal: Absence of physical injury  Outcome: Progressing  Flowsheets (Taken 5/21/2024 2115)  Absence of Physical Injury: Implement safety measures based on patient assessment     Problem: Skin/Tissue Integrity  Goal: Absence of new skin breakdown  Description: 1.  Monitor for areas of redness and/or skin breakdown  2.  Assess vascular access sites hourly  3.  Every 4-6 hours minimum:  Change oxygen saturation probe site  4.  Every 4-6 hours:  If on nasal continuous positive airway pressure, respiratory therapy assess nares and determine need for appliance change or resting period.  Outcome: Progressing     Problem: Safety - Adult  Goal: Free from fall injury  Outcome: Progressing  Flowsheets (Taken 5/21/2024 2115)  Free From Fall Injury: Instruct family/caregiver on patient safety     Problem: Pain  Goal: Verbalizes/displays adequate comfort level or baseline comfort level  Outcome: Progressing

## 2024-05-23 LAB
ANION GAP SERPL CALCULATED.3IONS-SCNC: 11 MMOL/L (ref 7–16)
BASOPHILS # BLD: 0.03 K/UL (ref 0–0.2)
BASOPHILS NFR BLD: 0 % (ref 0–2)
BUN SERPL-MCNC: 19 MG/DL (ref 6–20)
CALCIUM SERPL-MCNC: 9.2 MG/DL (ref 8.6–10.2)
CHLORIDE SERPL-SCNC: 98 MMOL/L (ref 98–107)
CO2 SERPL-SCNC: 30 MMOL/L (ref 22–29)
CREAT SERPL-MCNC: 0.5 MG/DL (ref 0.5–1)
EOSINOPHIL # BLD: 0 K/UL (ref 0.05–0.5)
EOSINOPHILS RELATIVE PERCENT: 0 % (ref 0–6)
ERYTHROCYTE [DISTWIDTH] IN BLOOD BY AUTOMATED COUNT: 16 % (ref 11.5–15)
GFR, ESTIMATED: >90 ML/MIN/1.73M2
GLUCOSE SERPL-MCNC: 100 MG/DL (ref 74–99)
HCT VFR BLD AUTO: 32.8 % (ref 34–48)
HCV RNA SERPL NAA+PROBE-ACNC: NOT DETECTED IU/ML
HCV RNA SERPL NAA+PROBE-LOG IU: NOT DETECTED LOG IU/ML
HCV RNA SERPL QL NAA+PROBE: NOT DETECTED
HGB BLD-MCNC: 10 G/DL (ref 11.5–15.5)
IMM GRANULOCYTES # BLD AUTO: 0.17 K/UL (ref 0–0.58)
IMM GRANULOCYTES NFR BLD: 2 % (ref 0–5)
LYMPHOCYTES NFR BLD: 1.75 K/UL (ref 1.5–4)
LYMPHOCYTES RELATIVE PERCENT: 16 % (ref 20–42)
MAGNESIUM SERPL-MCNC: 2 MG/DL (ref 1.6–2.6)
MCH RBC QN AUTO: 28.6 PG (ref 26–35)
MCHC RBC AUTO-ENTMCNC: 30.5 G/DL (ref 32–34.5)
MCV RBC AUTO: 93.7 FL (ref 80–99.9)
MONOCYTES NFR BLD: 0.84 K/UL (ref 0.1–0.95)
MONOCYTES NFR BLD: 8 % (ref 2–12)
NEUTROPHILS NFR BLD: 74 % (ref 43–80)
NEUTS SEG NFR BLD: 7.9 K/UL (ref 1.8–7.3)
PHOSPHATE SERPL-MCNC: 2.9 MG/DL (ref 2.5–4.5)
PLATELET # BLD AUTO: 304 K/UL (ref 130–450)
PMV BLD AUTO: 9.8 FL (ref 7–12)
POTASSIUM SERPL-SCNC: 4 MMOL/L (ref 3.5–5)
RBC # BLD AUTO: 3.5 M/UL (ref 3.5–5.5)
SODIUM SERPL-SCNC: 139 MMOL/L (ref 132–146)
WBC OTHER # BLD: 10.7 K/UL (ref 4.5–11.5)

## 2024-05-23 PROCEDURE — 84100 ASSAY OF PHOSPHORUS: CPT

## 2024-05-23 PROCEDURE — 6370000000 HC RX 637 (ALT 250 FOR IP): Performed by: INTERNAL MEDICINE

## 2024-05-23 PROCEDURE — 1200000000 HC SEMI PRIVATE

## 2024-05-23 PROCEDURE — 6360000002 HC RX W HCPCS

## 2024-05-23 PROCEDURE — 83735 ASSAY OF MAGNESIUM: CPT

## 2024-05-23 PROCEDURE — 80048 BASIC METABOLIC PNL TOTAL CA: CPT

## 2024-05-23 PROCEDURE — 2580000003 HC RX 258

## 2024-05-23 PROCEDURE — 2700000000 HC OXYGEN THERAPY PER DAY

## 2024-05-23 PROCEDURE — 85025 COMPLETE CBC W/AUTO DIFF WBC: CPT

## 2024-05-23 PROCEDURE — 94640 AIRWAY INHALATION TREATMENT: CPT

## 2024-05-23 PROCEDURE — 6370000000 HC RX 637 (ALT 250 FOR IP)

## 2024-05-23 PROCEDURE — 99231 SBSQ HOSP IP/OBS SF/LOW 25: CPT | Performed by: INTERNAL MEDICINE

## 2024-05-23 PROCEDURE — 94660 CPAP INITIATION&MGMT: CPT

## 2024-05-23 PROCEDURE — 36415 COLL VENOUS BLD VENIPUNCTURE: CPT

## 2024-05-23 RX ADMIN — BUPRENORPHINE HYDROCHLORIDE AND NALOXONE HYDROCHLORIDE DIHYDRATE 2 TABLET: 2; .5 TABLET SUBLINGUAL at 21:12

## 2024-05-23 RX ADMIN — Medication 3 MG: at 21:12

## 2024-05-23 RX ADMIN — BISACODYL 5 MG: 5 TABLET, COATED ORAL at 09:00

## 2024-05-23 RX ADMIN — ARFORMOTEROL TARTRATE 15 MCG: 15 SOLUTION RESPIRATORY (INHALATION) at 20:41

## 2024-05-23 RX ADMIN — ENOXAPARIN SODIUM 40 MG: 100 INJECTION SUBCUTANEOUS at 08:59

## 2024-05-23 RX ADMIN — SODIUM CHLORIDE, PRESERVATIVE FREE 10 ML: 5 INJECTION INTRAVENOUS at 21:12

## 2024-05-23 RX ADMIN — SENNOSIDES 17.2 MG: 8.6 TABLET, FILM COATED ORAL at 09:02

## 2024-05-23 RX ADMIN — SODIUM CHLORIDE, PRESERVATIVE FREE 10 ML: 5 INJECTION INTRAVENOUS at 09:04

## 2024-05-23 RX ADMIN — BUPRENORPHINE HYDROCHLORIDE AND NALOXONE HYDROCHLORIDE DIHYDRATE 2 TABLET: 2; .5 TABLET SUBLINGUAL at 09:02

## 2024-05-23 RX ADMIN — HYDROXYZINE PAMOATE 50 MG: 25 CAPSULE ORAL at 21:11

## 2024-05-23 RX ADMIN — DULOXETINE HYDROCHLORIDE 30 MG: 30 CAPSULE, DELAYED RELEASE ORAL at 09:04

## 2024-05-23 RX ADMIN — AMLODIPINE BESYLATE 10 MG: 10 TABLET ORAL at 09:00

## 2024-05-23 RX ADMIN — ARFORMOTEROL TARTRATE 15 MCG: 15 SOLUTION RESPIRATORY (INHALATION) at 09:23

## 2024-05-23 RX ADMIN — BUDESONIDE 500 MCG: 0.5 SUSPENSION RESPIRATORY (INHALATION) at 20:41

## 2024-05-23 RX ADMIN — BUDESONIDE 500 MCG: 0.5 SUSPENSION RESPIRATORY (INHALATION) at 09:23

## 2024-05-23 RX ADMIN — Medication 2000 UNITS: at 09:03

## 2024-05-23 RX ADMIN — PREDNISONE 40 MG: 20 TABLET ORAL at 09:04

## 2024-05-23 RX ADMIN — POLYETHYLENE GLYCOL 3350 17 G: 17 POWDER, FOR SOLUTION ORAL at 09:02

## 2024-05-23 ASSESSMENT — PAIN DESCRIPTION - ORIENTATION: ORIENTATION: RIGHT

## 2024-05-23 ASSESSMENT — PAIN DESCRIPTION - LOCATION
LOCATION: LEG
LOCATION: HIP;BACK

## 2024-05-23 ASSESSMENT — PAIN DESCRIPTION - DESCRIPTORS
DESCRIPTORS: ACHING;BURNING;THROBBING
DESCRIPTORS: ACHING;BURNING;THROBBING

## 2024-05-23 ASSESSMENT — PAIN SCALES - GENERAL
PAINLEVEL_OUTOF10: 10
PAINLEVEL_OUTOF10: 10

## 2024-05-23 NOTE — PLAN OF CARE
Problem: Discharge Planning  Goal: Discharge to home or other facility with appropriate resources  5/23/2024 1057 by Helena Pretty RN  Outcome: Progressing  5/23/2024 1057 by Helena Pretty RN  Outcome: Progressing  5/23/2024 0122 by Barrera Haywood RN  Outcome: Progressing     Problem: ABCDS Injury Assessment  Goal: Absence of physical injury  5/23/2024 1057 by Helena Pretty RN  Outcome: Progressing  5/23/2024 1057 by Helena Pretty RN  Outcome: Progressing  5/23/2024 0122 by Barrera Haywood RN  Outcome: Progressing     Problem: Skin/Tissue Integrity  Goal: Absence of new skin breakdown  Description: 1.  Monitor for areas of redness and/or skin breakdown  2.  Assess vascular access sites hourly  3.  Every 4-6 hours minimum:  Change oxygen saturation probe site  4.  Every 4-6 hours:  If on nasal continuous positive airway pressure, respiratory therapy assess nares and determine need for appliance change or resting period.  5/23/2024 1057 by Helena Pretty RN  Outcome: Progressing  5/23/2024 1057 by Helena Pretty RN  Outcome: Progressing  5/23/2024 0122 by Barrera Haywood RN  Outcome: Progressing     Problem: Safety - Adult  Goal: Free from fall injury  5/23/2024 1057 by Helena Pretty RN  Outcome: Progressing  5/23/2024 1057 by Helena Pretty RN  Outcome: Progressing  5/23/2024 0122 by Barrera Haywood RN  Outcome: Progressing     Problem: Pain  Goal: Verbalizes/displays adequate comfort level or baseline comfort level  5/23/2024 1057 by Helena Pretty RN  Outcome: Progressing  5/23/2024 1057 by Helena Pretty RN  Outcome: Progressing  5/23/2024 0122 by Barrera Haywood RN  Outcome: Progressing

## 2024-05-23 NOTE — CARE COORDINATION
5/23. [facility] liaison will be at the bedside this morning to assess the pt for admission. The pt is agreeable to go to [place]. Lizz Gonzalez RN

## 2024-05-23 NOTE — PLAN OF CARE
Problem: Discharge Planning  Goal: Discharge to home or other facility with appropriate resources  5/23/2024 0122 by Barrera Haywood RN  Outcome: Progressing     Problem: ABCDS Injury Assessment  Goal: Absence of physical injury  5/23/2024 0122 by Barrera Haywood RN  Outcome: Progressing     Problem: Skin/Tissue Integrity  Goal: Absence of new skin breakdown  Description: 1.  Monitor for areas of redness and/or skin breakdown  2.  Assess vascular access sites hourly  3.  Every 4-6 hours minimum:  Change oxygen saturation probe site  4.  Every 4-6 hours:  If on nasal continuous positive airway pressure, respiratory therapy assess nares and determine need for appliance change or resting period.  5/23/2024 0122 by Barrera Haywood RN  Outcome: Progressing     Problem: Safety - Adult  Goal: Free from fall injury  5/23/2024 0122 by Barrera Haywood RN  Outcome: Progressing     Problem: Pain  Goal: Verbalizes/displays adequate comfort level or baseline comfort level  5/23/2024 0122 by Barrera Haywood RN  Outcome: Progressing

## 2024-05-23 NOTE — CARE COORDINATION
5/23. Discharge plan is [facility]. Completed Substance Use Disorder Program agreement form faxed to [phone]. 7000/envelope completed. Lizz Gonzalez RN

## 2024-05-23 NOTE — DISCHARGE INSTR - COC
Depth (cm) 0.1 cm 05/17/24 1700   Wound Surface Area (cm^2) 12 cm^2 05/17/24 1700   Wound Volume (cm^3) 1.2 cm^3 05/17/24 1700   Wound Assessment Dry 05/23/24 0109   Drainage Amount None (dry) 05/23/24 0109   Odor None 05/23/24 0109   Number of days: 6       Wound 05/17/24 Right;Plantar (Active)   Dressing Status Other (Comment) 05/17/24 2338   Dressing/Treatment Open to air 05/23/24 0109   Wound Length (cm) 2.6 cm 05/17/24 1700   Wound Width (cm) 2.6 cm 05/17/24 1700   Wound Depth (cm) 0.2 cm 05/17/24 1700   Wound Surface Area (cm^2) 6.76 cm^2 05/17/24 1700   Wound Volume (cm^3) 1.352 cm^3 05/17/24 1700   Wound Assessment Dry 05/23/24 0109   Drainage Amount None (dry) 05/23/24 0109   Odor None 05/23/24 0109   Number of days: 5        Elimination:  Continence:   Bowel: Yes  Bladder: No  Urinary Catheter: None   Colostomy/Ileostomy/Ileal Conduit: No       Date of Last BM: 5/27/24      Intake/Output Summary (Last 24 hours) at 5/23/2024 0858  Last data filed at 5/22/2024 2257  Gross per 24 hour   Intake 370 ml   Output 400 ml   Net -30 ml     I/O last 3 completed shifts:  In: 660 [P.O.:640; I.V.:20]  Out: 1600 [Urine:1600]    Safety Concerns:     History of Falls (last 30 days) and At Risk for Falls    Impairments/Disabilities:      None    Nutrition Therapy:  Current Nutrition Therapy:   - Oral Diet:  General    Routes of Feeding: Oral  Liquids: Thin Liquids  Daily Fluid Restriction: no  Last Modified Barium Swallow with Video (Video Swallowing Test): not done    Treatments at the Time of Hospital Discharge:   Respiratory Treatments: yes  Oxygen Therapy:  is on oxygen at 3 L/min per nasal cannula.  Ventilator:    - BiPAP   IPAP: 16 cmH20, CPAP/EPAP: 6 cmH2O only when sleeping  Question Answer   Initial FiO2 or L/min Flow Rate 50%   Sp02 Goal (%): 90% to 96%   Initiate Oxygen Titration Protocol (see below)\" Yes   Indications for use Acute   Have the absolute and relative contraindications for NIPV been considered and

## 2024-05-24 LAB
ANION GAP SERPL CALCULATED.3IONS-SCNC: 7 MMOL/L (ref 7–16)
BASOPHILS # BLD: 0.03 K/UL (ref 0–0.2)
BASOPHILS NFR BLD: 0 % (ref 0–2)
BUN SERPL-MCNC: 22 MG/DL (ref 6–20)
CALCIUM SERPL-MCNC: 9.1 MG/DL (ref 8.6–10.2)
CHLORIDE SERPL-SCNC: 99 MMOL/L (ref 98–107)
CO2 SERPL-SCNC: 32 MMOL/L (ref 22–29)
CREAT SERPL-MCNC: 0.5 MG/DL (ref 0.5–1)
EOSINOPHIL # BLD: 0 K/UL (ref 0.05–0.5)
EOSINOPHILS RELATIVE PERCENT: 0 % (ref 0–6)
ERYTHROCYTE [DISTWIDTH] IN BLOOD BY AUTOMATED COUNT: 15.9 % (ref 11.5–15)
GFR, ESTIMATED: >90 ML/MIN/1.73M2
GLUCOSE SERPL-MCNC: 93 MG/DL (ref 74–99)
HCT VFR BLD AUTO: 32.5 % (ref 34–48)
HGB BLD-MCNC: 10.1 G/DL (ref 11.5–15.5)
IMM GRANULOCYTES # BLD AUTO: 0.19 K/UL (ref 0–0.58)
IMM GRANULOCYTES NFR BLD: 2 % (ref 0–5)
LYMPHOCYTES NFR BLD: 1.35 K/UL (ref 1.5–4)
LYMPHOCYTES RELATIVE PERCENT: 12 % (ref 20–42)
MAGNESIUM SERPL-MCNC: 1.9 MG/DL (ref 1.6–2.6)
MCH RBC QN AUTO: 29.4 PG (ref 26–35)
MCHC RBC AUTO-ENTMCNC: 31.1 G/DL (ref 32–34.5)
MCV RBC AUTO: 94.8 FL (ref 80–99.9)
MONOCYTES NFR BLD: 0.76 K/UL (ref 0.1–0.95)
MONOCYTES NFR BLD: 7 % (ref 2–12)
NEUTROPHILS NFR BLD: 80 % (ref 43–80)
NEUTS SEG NFR BLD: 9.07 K/UL (ref 1.8–7.3)
PHOSPHATE SERPL-MCNC: 2.8 MG/DL (ref 2.5–4.5)
PLATELET # BLD AUTO: 289 K/UL (ref 130–450)
PMV BLD AUTO: 10.1 FL (ref 7–12)
POTASSIUM SERPL-SCNC: 4.2 MMOL/L (ref 3.5–5)
RBC # BLD AUTO: 3.43 M/UL (ref 3.5–5.5)
SODIUM SERPL-SCNC: 138 MMOL/L (ref 132–146)
WBC OTHER # BLD: 11.4 K/UL (ref 4.5–11.5)

## 2024-05-24 PROCEDURE — 6370000000 HC RX 637 (ALT 250 FOR IP): Performed by: INTERNAL MEDICINE

## 2024-05-24 PROCEDURE — 94660 CPAP INITIATION&MGMT: CPT

## 2024-05-24 PROCEDURE — 80048 BASIC METABOLIC PNL TOTAL CA: CPT

## 2024-05-24 PROCEDURE — 6370000000 HC RX 637 (ALT 250 FOR IP)

## 2024-05-24 PROCEDURE — 83735 ASSAY OF MAGNESIUM: CPT

## 2024-05-24 PROCEDURE — 36415 COLL VENOUS BLD VENIPUNCTURE: CPT

## 2024-05-24 PROCEDURE — 6360000002 HC RX W HCPCS

## 2024-05-24 PROCEDURE — 99232 SBSQ HOSP IP/OBS MODERATE 35: CPT | Performed by: INTERNAL MEDICINE

## 2024-05-24 PROCEDURE — 2580000003 HC RX 258

## 2024-05-24 PROCEDURE — 1200000000 HC SEMI PRIVATE

## 2024-05-24 PROCEDURE — 94640 AIRWAY INHALATION TREATMENT: CPT

## 2024-05-24 PROCEDURE — 84100 ASSAY OF PHOSPHORUS: CPT

## 2024-05-24 PROCEDURE — 2700000000 HC OXYGEN THERAPY PER DAY

## 2024-05-24 PROCEDURE — 85025 COMPLETE CBC W/AUTO DIFF WBC: CPT

## 2024-05-24 RX ADMIN — SODIUM CHLORIDE, PRESERVATIVE FREE 10 ML: 5 INJECTION INTRAVENOUS at 20:03

## 2024-05-24 RX ADMIN — SODIUM CHLORIDE, PRESERVATIVE FREE 10 ML: 5 INJECTION INTRAVENOUS at 09:08

## 2024-05-24 RX ADMIN — Medication 2000 UNITS: at 09:05

## 2024-05-24 RX ADMIN — DULOXETINE HYDROCHLORIDE 30 MG: 30 CAPSULE, DELAYED RELEASE ORAL at 09:05

## 2024-05-24 RX ADMIN — BISACODYL 5 MG: 5 TABLET, COATED ORAL at 09:05

## 2024-05-24 RX ADMIN — Medication 3 MG: at 20:02

## 2024-05-24 RX ADMIN — ENOXAPARIN SODIUM 40 MG: 100 INJECTION SUBCUTANEOUS at 09:03

## 2024-05-24 RX ADMIN — BUDESONIDE 500 MCG: 0.5 SUSPENSION RESPIRATORY (INHALATION) at 20:39

## 2024-05-24 RX ADMIN — ARFORMOTEROL TARTRATE 15 MCG: 15 SOLUTION RESPIRATORY (INHALATION) at 20:39

## 2024-05-24 RX ADMIN — BUPRENORPHINE HYDROCHLORIDE AND NALOXONE HYDROCHLORIDE DIHYDRATE 2 TABLET: 2; .5 TABLET SUBLINGUAL at 09:04

## 2024-05-24 RX ADMIN — BUPRENORPHINE HYDROCHLORIDE AND NALOXONE HYDROCHLORIDE DIHYDRATE 2 TABLET: 2; .5 TABLET SUBLINGUAL at 20:03

## 2024-05-24 RX ADMIN — BUDESONIDE 500 MCG: 0.5 SUSPENSION RESPIRATORY (INHALATION) at 09:20

## 2024-05-24 RX ADMIN — Medication 5 DROP: at 14:52

## 2024-05-24 RX ADMIN — ARFORMOTEROL TARTRATE 15 MCG: 15 SOLUTION RESPIRATORY (INHALATION) at 09:20

## 2024-05-24 RX ADMIN — HYDROXYZINE PAMOATE 50 MG: 25 CAPSULE ORAL at 20:02

## 2024-05-24 RX ADMIN — AMLODIPINE BESYLATE 10 MG: 10 TABLET ORAL at 09:05

## 2024-05-24 RX ADMIN — Medication 5 DROP: at 20:05

## 2024-05-24 RX ADMIN — ACETAMINOPHEN 650 MG: 325 TABLET ORAL at 20:02

## 2024-05-24 ASSESSMENT — PAIN DESCRIPTION - DESCRIPTORS
DESCRIPTORS: ACHING;DISCOMFORT;SORE
DESCRIPTORS: ACHING;DISCOMFORT;THROBBING

## 2024-05-24 ASSESSMENT — PAIN DESCRIPTION - ORIENTATION
ORIENTATION: RIGHT;LEFT;LOWER
ORIENTATION: LEFT

## 2024-05-24 ASSESSMENT — PAIN DESCRIPTION - PAIN TYPE: TYPE: ACUTE PAIN

## 2024-05-24 ASSESSMENT — PAIN - FUNCTIONAL ASSESSMENT: PAIN_FUNCTIONAL_ASSESSMENT: PREVENTS OR INTERFERES SOME ACTIVE ACTIVITIES AND ADLS

## 2024-05-24 ASSESSMENT — PAIN SCALES - GENERAL
PAINLEVEL_OUTOF10: 10
PAINLEVEL_OUTOF10: 10
PAINLEVEL_OUTOF10: 0

## 2024-05-24 ASSESSMENT — PAIN DESCRIPTION - ONSET: ONSET: ON-GOING

## 2024-05-24 ASSESSMENT — PAIN DESCRIPTION - LOCATION
LOCATION: HIP
LOCATION: HIP;BACK

## 2024-05-24 ASSESSMENT — PAIN DESCRIPTION - FREQUENCY: FREQUENCY: INTERMITTENT

## 2024-05-24 NOTE — CARE COORDINATION
5/24. [facility] the pt. Insurance precert initiated yesterday. Lizz Gonzalez RN

## 2024-05-24 NOTE — PLAN OF CARE
Problem: Discharge Planning  Goal: Discharge to home or other facility with appropriate resources  5/24/2024 1012 by Helena Pretty RN  Outcome: Progressing  5/23/2024 2335 by Barrera Haywood RN  Outcome: Progressing     Problem: ABCDS Injury Assessment  Goal: Absence of physical injury  5/24/2024 1012 by Helena Pretty RN  Outcome: Progressing  5/23/2024 2335 by Barrera Haywood RN  Outcome: Progressing     Problem: Skin/Tissue Integrity  Goal: Absence of new skin breakdown  Description: 1.  Monitor for areas of redness and/or skin breakdown  2.  Assess vascular access sites hourly  3.  Every 4-6 hours minimum:  Change oxygen saturation probe site  4.  Every 4-6 hours:  If on nasal continuous positive airway pressure, respiratory therapy assess nares and determine need for appliance change or resting period.  5/24/2024 1012 by Helena Pretty RN  Outcome: Progressing  5/23/2024 2335 by Barrera Haywood RN  Outcome: Progressing     Problem: Safety - Adult  Goal: Free from fall injury  5/24/2024 1012 by Helena Pretty RN  Outcome: Progressing  5/23/2024 2335 by Barrera Haywood RN  Outcome: Progressing     Problem: Pain  Goal: Verbalizes/displays adequate comfort level or baseline comfort level  5/24/2024 1012 by Helena Pretty RN  Outcome: Progressing  5/23/2024 2335 by Barrera Haywood RN  Outcome: Progressing

## 2024-05-24 NOTE — PLAN OF CARE
Problem: Discharge Planning  Goal: Discharge to home or other facility with appropriate resources  5/23/2024 2335 by Barrera Haywood RN  Outcome: Progressing     Problem: ABCDS Injury Assessment  Goal: Absence of physical injury  5/23/2024 2335 by Barrera Haywood RN  Outcome: Progressing     Problem: Skin/Tissue Integrity  Goal: Absence of new skin breakdown  Description: 1.  Monitor for areas of redness and/or skin breakdown  2.  Assess vascular access sites hourly  3.  Every 4-6 hours minimum:  Change oxygen saturation probe site  4.  Every 4-6 hours:  If on nasal continuous positive airway pressure, respiratory therapy assess nares and determine need for appliance change or resting period.  5/23/2024 2335 by Barrera Haywood RN  Outcome: Progressing     Problem: Safety - Adult  Goal: Free from fall injury  5/23/2024 2335 by Barrera Haywood RN  Outcome: Progressing     Problem: Pain  Goal: Verbalizes/displays adequate comfort level or baseline comfort level  5/23/2024 2335 by Barrera Haywood RN  Outcome: Progressing

## 2024-05-25 LAB
ANION GAP SERPL CALCULATED.3IONS-SCNC: 13 MMOL/L (ref 7–16)
BASOPHILS # BLD: 0.05 K/UL (ref 0–0.2)
BASOPHILS NFR BLD: 1 % (ref 0–2)
BUN SERPL-MCNC: 27 MG/DL (ref 6–20)
CALCIUM SERPL-MCNC: 8.8 MG/DL (ref 8.6–10.2)
CHLORIDE SERPL-SCNC: 101 MMOL/L (ref 98–107)
CO2 SERPL-SCNC: 29 MMOL/L (ref 22–29)
CREAT SERPL-MCNC: 0.6 MG/DL (ref 0.5–1)
EOSINOPHIL # BLD: 0.02 K/UL (ref 0.05–0.5)
EOSINOPHILS RELATIVE PERCENT: 0 % (ref 0–6)
ERYTHROCYTE [DISTWIDTH] IN BLOOD BY AUTOMATED COUNT: 16.1 % (ref 11.5–15)
GFR, ESTIMATED: >90 ML/MIN/1.73M2
GLUCOSE SERPL-MCNC: 84 MG/DL (ref 74–99)
HCT VFR BLD AUTO: 35 % (ref 34–48)
HGB BLD-MCNC: 10.4 G/DL (ref 11.5–15.5)
IMM GRANULOCYTES # BLD AUTO: 0.17 K/UL (ref 0–0.58)
IMM GRANULOCYTES NFR BLD: 2 % (ref 0–5)
LYMPHOCYTES NFR BLD: 1.62 K/UL (ref 1.5–4)
LYMPHOCYTES RELATIVE PERCENT: 22 % (ref 20–42)
MAGNESIUM SERPL-MCNC: 1.9 MG/DL (ref 1.6–2.6)
MCH RBC QN AUTO: 28.6 PG (ref 26–35)
MCHC RBC AUTO-ENTMCNC: 29.7 G/DL (ref 32–34.5)
MCV RBC AUTO: 96.2 FL (ref 80–99.9)
MONOCYTES NFR BLD: 0.75 K/UL (ref 0.1–0.95)
MONOCYTES NFR BLD: 10 % (ref 2–12)
NEUTROPHILS NFR BLD: 65 % (ref 43–80)
NEUTS SEG NFR BLD: 4.75 K/UL (ref 1.8–7.3)
PHOSPHATE SERPL-MCNC: 4.1 MG/DL (ref 2.5–4.5)
PLATELET # BLD AUTO: 277 K/UL (ref 130–450)
PMV BLD AUTO: 9.9 FL (ref 7–12)
POTASSIUM SERPL-SCNC: 4.1 MMOL/L (ref 3.5–5)
RBC # BLD AUTO: 3.64 M/UL (ref 3.5–5.5)
SODIUM SERPL-SCNC: 143 MMOL/L (ref 132–146)
WBC OTHER # BLD: 7.4 K/UL (ref 4.5–11.5)

## 2024-05-25 PROCEDURE — 99231 SBSQ HOSP IP/OBS SF/LOW 25: CPT | Performed by: INTERNAL MEDICINE

## 2024-05-25 PROCEDURE — 94660 CPAP INITIATION&MGMT: CPT

## 2024-05-25 PROCEDURE — 80048 BASIC METABOLIC PNL TOTAL CA: CPT

## 2024-05-25 PROCEDURE — 6360000002 HC RX W HCPCS

## 2024-05-25 PROCEDURE — 36415 COLL VENOUS BLD VENIPUNCTURE: CPT

## 2024-05-25 PROCEDURE — 85025 COMPLETE CBC W/AUTO DIFF WBC: CPT

## 2024-05-25 PROCEDURE — 6370000000 HC RX 637 (ALT 250 FOR IP): Performed by: INTERNAL MEDICINE

## 2024-05-25 PROCEDURE — 2580000003 HC RX 258

## 2024-05-25 PROCEDURE — 6370000000 HC RX 637 (ALT 250 FOR IP)

## 2024-05-25 PROCEDURE — 94640 AIRWAY INHALATION TREATMENT: CPT

## 2024-05-25 PROCEDURE — 84100 ASSAY OF PHOSPHORUS: CPT

## 2024-05-25 PROCEDURE — 2700000000 HC OXYGEN THERAPY PER DAY

## 2024-05-25 PROCEDURE — 83735 ASSAY OF MAGNESIUM: CPT

## 2024-05-25 PROCEDURE — 1200000000 HC SEMI PRIVATE

## 2024-05-25 RX ORDER — DEXTROSE MONOHYDRATE 100 MG/ML
INJECTION, SOLUTION INTRAVENOUS CONTINUOUS PRN
Status: DISCONTINUED | OUTPATIENT
Start: 2024-05-25 | End: 2024-05-28 | Stop reason: HOSPADM

## 2024-05-25 RX ORDER — GLUCAGON 1 MG/ML
1 KIT INJECTION PRN
Status: DISCONTINUED | OUTPATIENT
Start: 2024-05-25 | End: 2024-05-28 | Stop reason: HOSPADM

## 2024-05-25 RX ORDER — GUAIFENESIN 400 MG/1
400 TABLET ORAL 3 TIMES DAILY
Status: DISCONTINUED | OUTPATIENT
Start: 2024-05-25 | End: 2024-05-28 | Stop reason: HOSPADM

## 2024-05-25 RX ORDER — SENNOSIDES A AND B 8.6 MG/1
2 TABLET, FILM COATED ORAL NIGHTLY PRN
Status: DISCONTINUED | OUTPATIENT
Start: 2024-05-25 | End: 2024-05-28 | Stop reason: HOSPADM

## 2024-05-25 RX ADMIN — BUPRENORPHINE HYDROCHLORIDE AND NALOXONE HYDROCHLORIDE DIHYDRATE 2 TABLET: 2; .5 TABLET SUBLINGUAL at 21:46

## 2024-05-25 RX ADMIN — DULOXETINE HYDROCHLORIDE 30 MG: 30 CAPSULE, DELAYED RELEASE ORAL at 08:37

## 2024-05-25 RX ADMIN — Medication 5 DROP: at 08:37

## 2024-05-25 RX ADMIN — AMLODIPINE BESYLATE 10 MG: 10 TABLET ORAL at 08:37

## 2024-05-25 RX ADMIN — ARFORMOTEROL TARTRATE 15 MCG: 15 SOLUTION RESPIRATORY (INHALATION) at 21:37

## 2024-05-25 RX ADMIN — Medication 2000 UNITS: at 08:37

## 2024-05-25 RX ADMIN — GUAIFENESIN 400 MG: 400 TABLET ORAL at 20:20

## 2024-05-25 RX ADMIN — ARFORMOTEROL TARTRATE 15 MCG: 15 SOLUTION RESPIRATORY (INHALATION) at 07:57

## 2024-05-25 RX ADMIN — SODIUM CHLORIDE, PRESERVATIVE FREE 10 ML: 5 INJECTION INTRAVENOUS at 08:38

## 2024-05-25 RX ADMIN — BUDESONIDE 500 MCG: 0.5 SUSPENSION RESPIRATORY (INHALATION) at 07:57

## 2024-05-25 RX ADMIN — HYDROXYZINE PAMOATE 50 MG: 25 CAPSULE ORAL at 08:40

## 2024-05-25 RX ADMIN — ENOXAPARIN SODIUM 40 MG: 100 INJECTION SUBCUTANEOUS at 08:36

## 2024-05-25 RX ADMIN — BUDESONIDE 500 MCG: 0.5 SUSPENSION RESPIRATORY (INHALATION) at 21:37

## 2024-05-25 RX ADMIN — Medication 5 DROP: at 20:20

## 2024-05-25 RX ADMIN — SODIUM CHLORIDE, PRESERVATIVE FREE 10 ML: 5 INJECTION INTRAVENOUS at 20:20

## 2024-05-25 RX ADMIN — Medication 3 MG: at 20:20

## 2024-05-25 ASSESSMENT — PAIN SCALES - GENERAL: PAINLEVEL_OUTOF10: 2

## 2024-05-26 PROCEDURE — 6360000002 HC RX W HCPCS

## 2024-05-26 PROCEDURE — 6370000000 HC RX 637 (ALT 250 FOR IP)

## 2024-05-26 PROCEDURE — 94640 AIRWAY INHALATION TREATMENT: CPT

## 2024-05-26 PROCEDURE — 1200000000 HC SEMI PRIVATE

## 2024-05-26 PROCEDURE — 99231 SBSQ HOSP IP/OBS SF/LOW 25: CPT | Performed by: INTERNAL MEDICINE

## 2024-05-26 PROCEDURE — 2700000000 HC OXYGEN THERAPY PER DAY

## 2024-05-26 PROCEDURE — 2580000003 HC RX 258

## 2024-05-26 PROCEDURE — 94660 CPAP INITIATION&MGMT: CPT

## 2024-05-26 PROCEDURE — 6370000000 HC RX 637 (ALT 250 FOR IP): Performed by: INTERNAL MEDICINE

## 2024-05-26 RX ORDER — KETOROLAC TROMETHAMINE 30 MG/ML
15 INJECTION, SOLUTION INTRAMUSCULAR; INTRAVENOUS ONCE
Status: COMPLETED | OUTPATIENT
Start: 2024-05-26 | End: 2024-05-26

## 2024-05-26 RX ORDER — BUPRENORPHINE HYDROCHLORIDE AND NALOXONE HYDROCHLORIDE DIHYDRATE 2; .5 MG/1; MG/1
2 TABLET SUBLINGUAL 2 TIMES DAILY
Status: DISCONTINUED | OUTPATIENT
Start: 2024-05-26 | End: 2024-05-28 | Stop reason: HOSPADM

## 2024-05-26 RX ADMIN — SODIUM CHLORIDE, PRESERVATIVE FREE 10 ML: 5 INJECTION INTRAVENOUS at 08:20

## 2024-05-26 RX ADMIN — SODIUM CHLORIDE, PRESERVATIVE FREE 10 ML: 5 INJECTION INTRAVENOUS at 21:13

## 2024-05-26 RX ADMIN — ENOXAPARIN SODIUM 40 MG: 100 INJECTION SUBCUTANEOUS at 08:20

## 2024-05-26 RX ADMIN — BUDESONIDE 500 MCG: 0.5 SUSPENSION RESPIRATORY (INHALATION) at 21:39

## 2024-05-26 RX ADMIN — ARFORMOTEROL TARTRATE 15 MCG: 15 SOLUTION RESPIRATORY (INHALATION) at 21:39

## 2024-05-26 RX ADMIN — KETOROLAC TROMETHAMINE 15 MG: 30 INJECTION, SOLUTION INTRAMUSCULAR at 18:26

## 2024-05-26 RX ADMIN — Medication 5 DROP: at 08:20

## 2024-05-26 RX ADMIN — Medication 2000 UNITS: at 08:19

## 2024-05-26 RX ADMIN — BUPRENORPHINE HYDROCHLORIDE AND NALOXONE HYDROCHLORIDE DIHYDRATE 2 TABLET: 2; .5 TABLET SUBLINGUAL at 08:36

## 2024-05-26 RX ADMIN — HYDROXYZINE PAMOATE 50 MG: 25 CAPSULE ORAL at 08:18

## 2024-05-26 RX ADMIN — BUDESONIDE 500 MCG: 0.5 SUSPENSION RESPIRATORY (INHALATION) at 08:59

## 2024-05-26 RX ADMIN — DULOXETINE HYDROCHLORIDE 30 MG: 30 CAPSULE, DELAYED RELEASE ORAL at 08:19

## 2024-05-26 RX ADMIN — ARFORMOTEROL TARTRATE 15 MCG: 15 SOLUTION RESPIRATORY (INHALATION) at 08:59

## 2024-05-26 RX ADMIN — Medication 3 MG: at 21:11

## 2024-05-26 RX ADMIN — GUAIFENESIN 400 MG: 400 TABLET ORAL at 21:13

## 2024-05-26 RX ADMIN — AMLODIPINE BESYLATE 10 MG: 10 TABLET ORAL at 08:18

## 2024-05-26 RX ADMIN — Medication 5 DROP: at 21:12

## 2024-05-26 RX ADMIN — KETOROLAC TROMETHAMINE 15 MG: 30 INJECTION, SOLUTION INTRAMUSCULAR at 10:02

## 2024-05-26 RX ADMIN — BUPRENORPHINE HYDROCHLORIDE AND NALOXONE HYDROCHLORIDE DIHYDRATE 2 TABLET: 2; .5 TABLET SUBLINGUAL at 21:12

## 2024-05-26 ASSESSMENT — PAIN DESCRIPTION - DESCRIPTORS
DESCRIPTORS: ACHING;BURNING;THROBBING
DESCRIPTORS: SHARP;SORE;DISCOMFORT

## 2024-05-26 ASSESSMENT — PAIN DESCRIPTION - ORIENTATION
ORIENTATION: LEFT;LOWER
ORIENTATION: LEFT;LOWER

## 2024-05-26 ASSESSMENT — PAIN SCALES - GENERAL
PAINLEVEL_OUTOF10: 8
PAINLEVEL_OUTOF10: 0
PAINLEVEL_OUTOF10: 10
PAINLEVEL_OUTOF10: 6

## 2024-05-26 ASSESSMENT — PAIN DESCRIPTION - LOCATION
LOCATION: BACK;HIP
LOCATION: HIP;BACK

## 2024-05-27 PROBLEM — J96.02 ACUTE RESPIRATORY FAILURE WITH HYPOXIA AND HYPERCAPNIA (HCC): Status: RESOLVED | Noted: 2024-04-17 | Resolved: 2024-05-27

## 2024-05-27 PROBLEM — A41.9 SEPSIS (HCC): Status: RESOLVED | Noted: 2024-05-10 | Resolved: 2024-05-27

## 2024-05-27 PROBLEM — J96.01 ACUTE RESPIRATORY FAILURE WITH HYPOXIA (HCC): Status: RESOLVED | Noted: 2020-02-08 | Resolved: 2024-05-27

## 2024-05-27 PROBLEM — S70.11XA HEMATOMA OF RIGHT THIGH: Status: RESOLVED | Noted: 2024-05-01 | Resolved: 2024-05-27

## 2024-05-27 PROBLEM — J96.01 ACUTE RESPIRATORY FAILURE WITH HYPOXIA AND HYPERCAPNIA (HCC): Status: RESOLVED | Noted: 2024-04-17 | Resolved: 2024-05-27

## 2024-05-27 PROBLEM — N17.9 AKI (ACUTE KIDNEY INJURY) (HCC): Status: RESOLVED | Noted: 2024-04-30 | Resolved: 2024-05-27

## 2024-05-27 PROBLEM — J96.11 CHRONIC RESPIRATORY FAILURE WITH HYPOXIA AND HYPERCAPNIA (HCC): Status: ACTIVE | Noted: 2024-05-27

## 2024-05-27 PROBLEM — J96.12 CHRONIC RESPIRATORY FAILURE WITH HYPOXIA AND HYPERCAPNIA (HCC): Status: ACTIVE | Noted: 2024-05-27

## 2024-05-27 PROBLEM — L02.91 ABSCESS: Status: RESOLVED | Noted: 2018-02-23 | Resolved: 2024-05-27

## 2024-05-27 PROBLEM — I95.9 HYPOTENSION: Status: RESOLVED | Noted: 2019-07-05 | Resolved: 2024-05-27

## 2024-05-27 PROBLEM — R63.4 ABNORMAL WEIGHT LOSS: Status: RESOLVED | Noted: 2019-07-05 | Resolved: 2024-05-27

## 2024-05-27 PROBLEM — J96.01 ACUTE HYPOXEMIC RESPIRATORY FAILURE (HCC): Status: RESOLVED | Noted: 2024-05-17 | Resolved: 2024-05-27

## 2024-05-27 PROBLEM — I21.4 NSTEMI (NON-ST ELEVATED MYOCARDIAL INFARCTION) (HCC): Status: RESOLVED | Noted: 2024-04-18 | Resolved: 2024-05-27

## 2024-05-27 LAB
ANION GAP SERPL CALCULATED.3IONS-SCNC: 9 MMOL/L (ref 7–16)
BASOPHILS # BLD: 0.04 K/UL (ref 0–0.2)
BASOPHILS NFR BLD: 1 % (ref 0–2)
BUN SERPL-MCNC: 23 MG/DL (ref 6–20)
CALCIUM SERPL-MCNC: 8.9 MG/DL (ref 8.6–10.2)
CHLORIDE SERPL-SCNC: 100 MMOL/L (ref 98–107)
CREAT SERPL-MCNC: 0.6 MG/DL (ref 0.5–1)
EOSINOPHIL # BLD: 0.02 K/UL (ref 0.05–0.5)
EOSINOPHILS RELATIVE PERCENT: 0 % (ref 0–6)
ERYTHROCYTE [DISTWIDTH] IN BLOOD BY AUTOMATED COUNT: 16.3 % (ref 11.5–15)
GFR, ESTIMATED: >90 ML/MIN/1.73M2
GLUCOSE SERPL-MCNC: 85 MG/DL (ref 74–99)
HCT VFR BLD AUTO: 34 % (ref 34–48)
HGB BLD-MCNC: 10.2 G/DL (ref 11.5–15.5)
IMM GRANULOCYTES # BLD AUTO: 0.11 K/UL (ref 0–0.58)
IMM GRANULOCYTES NFR BLD: 1 % (ref 0–5)
LYMPHOCYTES NFR BLD: 1.08 K/UL (ref 1.5–4)
LYMPHOCYTES RELATIVE PERCENT: 14 % (ref 20–42)
MAGNESIUM SERPL-MCNC: 1.9 MG/DL (ref 1.6–2.6)
MCH RBC QN AUTO: 28.9 PG (ref 26–35)
MCHC RBC AUTO-ENTMCNC: 30 G/DL (ref 32–34.5)
MCV RBC AUTO: 96.3 FL (ref 80–99.9)
MONOCYTES NFR BLD: 0.62 K/UL (ref 0.1–0.95)
MONOCYTES NFR BLD: 8 % (ref 2–12)
NEUTROPHILS NFR BLD: 76 % (ref 43–80)
NEUTS SEG NFR BLD: 6.1 K/UL (ref 1.8–7.3)
PLATELET # BLD AUTO: 220 K/UL (ref 130–450)
PMV BLD AUTO: 9.7 FL (ref 7–12)
POTASSIUM SERPL-SCNC: 4.4 MMOL/L (ref 3.5–5)
RBC # BLD AUTO: 3.53 M/UL (ref 3.5–5.5)
SODIUM SERPL-SCNC: 138 MMOL/L (ref 132–146)
WBC OTHER # BLD: 8 K/UL (ref 4.5–11.5)

## 2024-05-27 PROCEDURE — 6360000002 HC RX W HCPCS

## 2024-05-27 PROCEDURE — 94660 CPAP INITIATION&MGMT: CPT

## 2024-05-27 PROCEDURE — 6370000000 HC RX 637 (ALT 250 FOR IP)

## 2024-05-27 PROCEDURE — 85025 COMPLETE CBC W/AUTO DIFF WBC: CPT

## 2024-05-27 PROCEDURE — 2700000000 HC OXYGEN THERAPY PER DAY

## 2024-05-27 PROCEDURE — 6370000000 HC RX 637 (ALT 250 FOR IP): Performed by: INTERNAL MEDICINE

## 2024-05-27 PROCEDURE — 99231 SBSQ HOSP IP/OBS SF/LOW 25: CPT | Performed by: INTERNAL MEDICINE

## 2024-05-27 PROCEDURE — 2580000003 HC RX 258

## 2024-05-27 PROCEDURE — 83735 ASSAY OF MAGNESIUM: CPT

## 2024-05-27 PROCEDURE — 36415 COLL VENOUS BLD VENIPUNCTURE: CPT

## 2024-05-27 PROCEDURE — 94640 AIRWAY INHALATION TREATMENT: CPT

## 2024-05-27 PROCEDURE — 80048 BASIC METABOLIC PNL TOTAL CA: CPT

## 2024-05-27 PROCEDURE — 1200000000 HC SEMI PRIVATE

## 2024-05-27 RX ORDER — KETOROLAC TROMETHAMINE 30 MG/ML
15 INJECTION, SOLUTION INTRAMUSCULAR; INTRAVENOUS ONCE
Status: COMPLETED | OUTPATIENT
Start: 2024-05-27 | End: 2024-05-27

## 2024-05-27 RX ADMIN — KETOROLAC TROMETHAMINE 15 MG: 30 INJECTION, SOLUTION INTRAMUSCULAR at 14:45

## 2024-05-27 RX ADMIN — GUAIFENESIN 400 MG: 400 TABLET ORAL at 14:24

## 2024-05-27 RX ADMIN — Medication 2000 UNITS: at 09:54

## 2024-05-27 RX ADMIN — GUAIFENESIN 400 MG: 400 TABLET ORAL at 21:31

## 2024-05-27 RX ADMIN — Medication 5 DROP: at 21:31

## 2024-05-27 RX ADMIN — GUAIFENESIN 400 MG: 400 TABLET ORAL at 09:54

## 2024-05-27 RX ADMIN — AMLODIPINE BESYLATE 10 MG: 10 TABLET ORAL at 09:55

## 2024-05-27 RX ADMIN — Medication 3 MG: at 21:30

## 2024-05-27 RX ADMIN — Medication 5 DROP: at 09:56

## 2024-05-27 RX ADMIN — ARFORMOTEROL TARTRATE 15 MCG: 15 SOLUTION RESPIRATORY (INHALATION) at 21:50

## 2024-05-27 RX ADMIN — SODIUM CHLORIDE, PRESERVATIVE FREE 10 ML: 5 INJECTION INTRAVENOUS at 09:55

## 2024-05-27 RX ADMIN — DULOXETINE HYDROCHLORIDE 30 MG: 30 CAPSULE, DELAYED RELEASE ORAL at 09:55

## 2024-05-27 RX ADMIN — BUPRENORPHINE HYDROCHLORIDE AND NALOXONE HYDROCHLORIDE DIHYDRATE 2 TABLET: 2; .5 TABLET SUBLINGUAL at 09:55

## 2024-05-27 RX ADMIN — BUDESONIDE 500 MCG: 0.5 SUSPENSION RESPIRATORY (INHALATION) at 21:50

## 2024-05-27 RX ADMIN — BUPRENORPHINE HYDROCHLORIDE AND NALOXONE HYDROCHLORIDE DIHYDRATE 2 TABLET: 2; .5 TABLET SUBLINGUAL at 21:30

## 2024-05-27 RX ADMIN — HYDROXYZINE PAMOATE 50 MG: 25 CAPSULE ORAL at 21:34

## 2024-05-27 RX ADMIN — ENOXAPARIN SODIUM 40 MG: 100 INJECTION SUBCUTANEOUS at 09:54

## 2024-05-27 RX ADMIN — ERGOCALCIFEROL 50000 UNITS: 1.25 CAPSULE ORAL at 09:55

## 2024-05-27 RX ADMIN — ACETAMINOPHEN 650 MG: 325 TABLET ORAL at 14:23

## 2024-05-27 ASSESSMENT — PAIN - FUNCTIONAL ASSESSMENT
PAIN_FUNCTIONAL_ASSESSMENT: PREVENTS OR INTERFERES SOME ACTIVE ACTIVITIES AND ADLS

## 2024-05-27 ASSESSMENT — PAIN DESCRIPTION - LOCATION
LOCATION: HIP;BACK

## 2024-05-27 ASSESSMENT — PAIN SCALES - GENERAL
PAINLEVEL_OUTOF10: 10
PAINLEVEL_OUTOF10: 4
PAINLEVEL_OUTOF10: 0
PAINLEVEL_OUTOF10: 10
PAINLEVEL_OUTOF10: 3

## 2024-05-27 ASSESSMENT — PAIN DESCRIPTION - DESCRIPTORS
DESCRIPTORS: SHARP;STABBING;OTHER (COMMENT)
DESCRIPTORS: SHARP;STABBING
DESCRIPTORS: ACHING;SORE;TENDER

## 2024-05-27 ASSESSMENT — PAIN DESCRIPTION - ORIENTATION
ORIENTATION: LEFT;LOWER

## 2024-05-27 NOTE — PROGRESS NOTES
Physician Progress Note      PATIENT:               LEE HOOKS  [facility] #:                  [id number]  :                       1965  ADMIT DATE:       2024 11:14 PM  DISCH DATE:        2024 10:30 PM  RESPONDING  PROVIDER #:        Chase Franco MD          QUERY TEXT:    Patient admitted with Acute respiratory failure. Documentation reflects   concern for sepsis in H&P.  If possible, please document in the progress notes   and discharge summary if sepsis was:    The medical record reflects the following:  Risk Factors: SOB, PNA  Clinical Indicators: Per H&P -?? Concern for sepsis, BP lower borderline,   procal elevated, LA normal; Acute hypoxic hypercapnic respiratory failure   likely 2/2 para influenza positive vs COPD exacerbation; per ED Sepsis due to   PNA; VS findings 100.9, 120, 17, 89/55, 97% PAP  98.7, 89, 17, 97/53 Lab   findings WBC 3.6  2.5 procal 0.38  Treatment: Zosyn, solumedrol, IV Vancomycin, serial labs and VS monitoring    Thank you  Ashlee Stratton BSN, RN, CCDS  Clinical Documentation Improvement  Options provided:  -- sepsis confirmed after study  -- sepsis treated and resolved  -- sepsis ruled out after study  -- Other - I will add my own diagnosis  -- Disagree - Not applicable / Not valid  -- Disagree - Clinically unable to determine / Unknown  -- Refer to Clinical Documentation Reviewer    PROVIDER RESPONSE TEXT:    sepsis treated and resolved.    Query created by: Ashlee Stratton on 2024 12:30 PM      Electronically signed by:  Chase Franco MD 2024 10:29 AM

## 2024-05-27 NOTE — PLAN OF CARE
Problem: Discharge Planning  Goal: Discharge to home or other facility with appropriate resources  5/26/2024 2218 by Ximena Carpenter RN  Outcome: Progressing  5/26/2024 1102 by Suha Cagle RN  Outcome: Progressing  Flowsheets (Taken 5/26/2024 0755)  Discharge to home or other facility with appropriate resources: Identify barriers to discharge with patient and caregiver     Problem: ABCDS Injury Assessment  Goal: Absence of physical injury  5/26/2024 2218 by Ximena Carpenter RN  Outcome: Progressing  5/26/2024 1102 by Suha Cagle RN  Outcome: Progressing     Problem: Skin/Tissue Integrity  Goal: Absence of new skin breakdown  Description: 1.  Monitor for areas of redness and/or skin breakdown  2.  Assess vascular access sites hourly  3.  Every 4-6 hours minimum:  Change oxygen saturation probe site  4.  Every 4-6 hours:  If on nasal continuous positive airway pressure, respiratory therapy assess nares and determine need for appliance change or resting period.  5/26/2024 2218 by Ximena Carpenter RN  Outcome: Progressing  5/26/2024 1102 by Suha Cagle RN  Outcome: Progressing     Problem: Safety - Adult  Goal: Free from fall injury  5/26/2024 2218 by Ximena Carpenter RN  Outcome: Progressing  5/26/2024 1102 by Suha Cagle RN  Outcome: Progressing     Problem: Pain  Goal: Verbalizes/displays adequate comfort level or baseline comfort level  5/26/2024 2218 by Ximena Carpenter RN  Outcome: Progressing  5/26/2024 1102 by Suha Cagle RN  Outcome: Progressing      

## 2024-05-28 VITALS
OXYGEN SATURATION: 95 % | HEART RATE: 60 BPM | SYSTOLIC BLOOD PRESSURE: 109 MMHG | BODY MASS INDEX: 22.96 KG/M2 | WEIGHT: 155 LBS | TEMPERATURE: 96 F | HEIGHT: 69 IN | DIASTOLIC BLOOD PRESSURE: 62 MMHG | RESPIRATION RATE: 17 BRPM

## 2024-05-28 PROCEDURE — 6370000000 HC RX 637 (ALT 250 FOR IP): Performed by: INTERNAL MEDICINE

## 2024-05-28 PROCEDURE — 99238 HOSP IP/OBS DSCHRG MGMT 30/<: CPT | Performed by: INTERNAL MEDICINE

## 2024-05-28 PROCEDURE — 6360000002 HC RX W HCPCS

## 2024-05-28 PROCEDURE — 94640 AIRWAY INHALATION TREATMENT: CPT

## 2024-05-28 PROCEDURE — 6370000000 HC RX 637 (ALT 250 FOR IP)

## 2024-05-28 PROCEDURE — 2700000000 HC OXYGEN THERAPY PER DAY

## 2024-05-28 PROCEDURE — 94660 CPAP INITIATION&MGMT: CPT

## 2024-05-28 PROCEDURE — 2580000003 HC RX 258

## 2024-05-28 RX ORDER — GUAIFENESIN 400 MG/1
400 TABLET ORAL 3 TIMES DAILY
Qty: 21 TABLET | Refills: 0 | DISCHARGE
Start: 2024-05-28 | End: 2024-06-04

## 2024-05-28 RX ORDER — BUPRENORPHINE HYDROCHLORIDE AND NALOXONE HYDROCHLORIDE DIHYDRATE 2; .5 MG/1; MG/1
2 TABLET SUBLINGUAL 2 TIMES DAILY
Qty: 180 TABLET | Refills: 0 | Status: SHIPPED | DISCHARGE
Start: 2024-05-28 | End: 2024-06-27

## 2024-05-28 RX ORDER — CHOLECALCIFEROL (VITAMIN D3) 50 MCG
2000 TABLET ORAL DAILY
Qty: 90 TABLET | Refills: 1 | Status: SHIPPED | OUTPATIENT
Start: 2024-05-28

## 2024-05-28 RX ORDER — NICOTINE 21 MG/24HR
1 PATCH, TRANSDERMAL 24 HOURS TRANSDERMAL DAILY
Qty: 60 PATCH | Refills: 1 | Status: SHIPPED | OUTPATIENT
Start: 2024-05-28

## 2024-05-28 RX ADMIN — POLYETHYLENE GLYCOL 3350 17 G: 17 POWDER, FOR SOLUTION ORAL at 08:29

## 2024-05-28 RX ADMIN — AMLODIPINE BESYLATE 10 MG: 10 TABLET ORAL at 08:28

## 2024-05-28 RX ADMIN — BUDESONIDE 500 MCG: 0.5 SUSPENSION RESPIRATORY (INHALATION) at 08:54

## 2024-05-28 RX ADMIN — Medication 2000 UNITS: at 08:29

## 2024-05-28 RX ADMIN — ENOXAPARIN SODIUM 40 MG: 100 INJECTION SUBCUTANEOUS at 08:28

## 2024-05-28 RX ADMIN — ARFORMOTEROL TARTRATE 15 MCG: 15 SOLUTION RESPIRATORY (INHALATION) at 08:54

## 2024-05-28 RX ADMIN — GUAIFENESIN 400 MG: 400 TABLET ORAL at 08:29

## 2024-05-28 RX ADMIN — SODIUM CHLORIDE, PRESERVATIVE FREE 10 ML: 5 INJECTION INTRAVENOUS at 08:29

## 2024-05-28 RX ADMIN — HYDROXYZINE PAMOATE 50 MG: 25 CAPSULE ORAL at 08:31

## 2024-05-28 RX ADMIN — DULOXETINE HYDROCHLORIDE 30 MG: 30 CAPSULE, DELAYED RELEASE ORAL at 08:28

## 2024-05-28 RX ADMIN — BUPRENORPHINE HYDROCHLORIDE AND NALOXONE HYDROCHLORIDE DIHYDRATE 2 TABLET: 2; .5 TABLET SUBLINGUAL at 08:28

## 2024-05-28 ASSESSMENT — PAIN DESCRIPTION - ORIENTATION: ORIENTATION: LOWER;LEFT

## 2024-05-28 ASSESSMENT — PAIN DESCRIPTION - ONSET: ONSET: GRADUAL

## 2024-05-28 ASSESSMENT — PAIN DESCRIPTION - PAIN TYPE: TYPE: ACUTE PAIN

## 2024-05-28 ASSESSMENT — PAIN DESCRIPTION - FREQUENCY: FREQUENCY: INTERMITTENT

## 2024-05-28 ASSESSMENT — PAIN SCALES - GENERAL: PAINLEVEL_OUTOF10: 3

## 2024-05-28 ASSESSMENT — PAIN DESCRIPTION - LOCATION: LOCATION: BACK;HIP

## 2024-05-28 ASSESSMENT — PAIN - FUNCTIONAL ASSESSMENT: PAIN_FUNCTIONAL_ASSESSMENT: PREVENTS OR INTERFERES SOME ACTIVE ACTIVITIES AND ADLS

## 2024-05-28 ASSESSMENT — PAIN DESCRIPTION - DESCRIPTORS: DESCRIPTORS: ACHING;DISCOMFORT;SORE

## 2024-05-28 NOTE — PROGRESS NOTES
[facility]  Internal Medicine Department    Attending Physician Statement:  Tom Rees Jr. D.O.    I have discussed the case, including pertinent history and exam findings with the resident. I have reviewed all past medical history, past surgical history, family history, social history, medications, and allergies and updated as appropriate in the history section of the chart. I have seen and examined the patient and the key elements of the encounter have been performed by me. I agree with the assessment, plan and orders as documented by the resident.      Chronic Hypoxic Respiratory Failure  -continue current pulmonary regimen  -continue steroids and wean as patient tolerates   -continue BiPAP at night   -pulmonary toilet, pep/flutter and guaifenasin    Polysubstance Abuse  -COWS protocol; will need to establish with methadone clinic as outpatient     HTN  -continue amlodipine     Remainder of medical problems as per resident note.    
    [facility]  Internal Medicine Department    Attending Physician Statement:  Tom Rees Jr. D.O.    I have discussed the case, including pertinent history and exam findings with the resident. I have reviewed all past medical history, past surgical history, family history, social history, medications, and allergies and updated as appropriate in the history section of the chart. I have seen and examined the patient and the key elements of the encounter have been performed by me. I agree with the assessment, plan and orders as documented by the resident.      Chronic Hypoxic Respiratory Failure  -continue current pulmonary regimen  -continue steroids and wean as patient tolerates   -continue BiPAP at night     Polysubstance Abuse  -COWS protocol; will need to establish with methadone clinic as outpatient     HTN  -continue amlodipine     Remainder of medical problems as per resident note.    
   05/24/24 2044   NIV Type   NIV Started/Stopped Off  (Patient says she's not sure whether or not she wants to wear Bipap tonight. She said check back around 1 AM.)       
4 Eyes Skin Assessment     NAME:  Saba Sullivan  YOB: 1965  MEDICAL RECORD NUMBER:  [id number]    The patient is being assessed for  Transfer to New Unit    I agree that at least one RN has performed a thorough Head to Toe Skin Assessment on the patient. ALL assessment sites listed below have been assessed.      Areas assessed by both nurses:    Head, Face, Ears, Shoulders, Back, Chest, Arms, Elbows, Hands, Sacrum. Buttock, Coccyx, Ischium, and Legs. Feet and Heels        Does the Patient have a Wound? Yes wound(s) were present on assessment. LDA wound assessment was Initiated and completed by RN       Kt Prevention initiated by RN: No  Wound Care Orders initiated by RN: No    Pressure Injury (Stage 3,4, Unstageable, DTI, NWPT, and Complex wounds) if present, place Wound referral order by RN under : No    New Ostomies, if present place, Ostomy referral order under : No     Nurse 1 eSignature: Electronically signed by Bobbi Tran RN on 5/18/24 at 5:37 PM EDT    **SHARE this note so that the co-signing nurse can place an eSignature**    Nurse 2 eSignature: {Esignature:[id number]}  
4 Eyes Skin Assessment     NAME:  Saba Sullivan  YOB: 1965  MEDICAL RECORD NUMBER:  [id number]    The patient is being assessed for  {Reason for Assessment:93695}    I agree that at least one RN has performed a thorough Head to Toe Skin Assessment on the patient. ALL assessment sites listed below have been assessed.      Areas assessed by both nurses:    Head, Face, Ears, Shoulders, Back, Chest, Arms, Elbows, Hands, Sacrum. Buttock, Coccyx, Ischium, and Legs. Feet and Heels        Does the Patient have a Wound? Yes wound(s) were present on assessment. LDA wound assessment was Initiated and completed by RN    Right heel       Kt Prevention initiated by RN: Yes  Wound Care Orders initiated by RN: Yes    Pressure Injury (Stage 3,4, Unstageable, DTI, NWPT, and Complex wounds) if present, place Wound referral order by RN under : No    New Ostomies, if present place, Ostomy referral order under : No     Nurse 1 eSignature: Electronically signed by Marilyn Teran RN on 5/17/24 at 5:37 PM EDT    **SHARE this note so that the co-signing nurse can place an eSignature**    Nurse 2 eSignature: {Esignature:[id number]}   
Attempted to call report x 3. Constantly went to voicemail  
[facility]  Internal Medicine Residency Program  Progress Note - House Team       Patient:  Saba Sullivan 59 y.o. female   MRN: [id number]       Date of Service: 5/26/2024    CC: SOB  Subjective     Overnight, spO2 within target at 3L NC. Did not use AVAPS overnight because she \"fell asleep\" and requested not to use it. She is amenable to use it tonight.     Patient seen and examined at bedside this morning, alert and oriented x3, not in cardiorespiratory distress. With improved SOB.    Objective     Physical Exam  Vitals: /63   Pulse 81   Temp 98.2 °F (36.8 °C) (Temporal)   Resp 19   Ht 1.753 m (5' 9\")   Wt 70.3 kg (155 lb)   LMP 08/28/2013   SpO2 96%   BMI 22.89 kg/m²     I & O - 24hr: No intake/output data recorded.   General Appearance: alert, appears stated age, and cooperative  HEENT:  No tragal tenderness, (+) impacted cerumen bilateral  Neck: no adenopathy, no carotid bruit, no JVD, and supple, symmetrical, trachea midline  Lung: No wheezing appreciated on examination  Heart: regular rate and rhythm, S1, S2 normal, no murmur, click, rub or gallop  Abdomen: soft, non-tender; bowel sounds normal; no masses,  no organomegaly  Extremities:  (+) chronic R heel ulcer, no discharge noted, no edema  Musculokeletal: No joint swelling, no muscle tenderness. ROM normal in all joints of extremities.   Neurologic: Mental status: Alert, oriented, thought content appropriate    Diet:   ADULT DIET; Regular    Pertinent Labs & Imaging Studies     Labs  Recent Labs     05/24/24  0443 05/25/24  0415   WBC 11.4 7.4   RBC 3.43* 3.64   HGB 10.1* 10.4*   HCT 32.5* 35.0   MCV 94.8 96.2   MCH 29.4 28.6   MCHC 31.1* 29.7*   RDW 15.9* 16.1*    277   MPV 10.1 9.9       Recent Labs     05/24/24  0443 05/25/24  0415    143   K 4.2 4.1   CL 99 101   MG 1.9 1.9   PHOS 2.8 4.1   CO2 32* 29   BUN 22* 27*   CREATININE 0.5 0.6   ANIONGAP 7 13   GLUCOSE 93 84   CALCIUM 9.1 8.8     Imaging 
Date: 5/20/2024    Time: 1:05 AM    Patient Placed On BIPAP/CPAP/ Non-Invasive Ventilation?  No    If no must comment    Comments: patient is refusing to wear BIPAP at this time, on standby in room if needed        Fany Graff RCP    
[facility]  Internal Medicine Residency Program  Progress Note - House Team       Patient:  Saba Sullivan 59 y.o. female   MRN: [id number]       Date of Service: 5/25/2024    CC: SOB  Subjective     Overnight, spO2 within target at 3L NC.     Patient seen and examined at bedside this morning, alert and oriented x3, not in cardiorespiratory distress. No new subjective complaints. With improved SOB compared to prior to admission, still with cough with reported difficulty expectorating sputum. Did note significant relief from ear itchiness after starting debrox. Still pending precert for placement.     Objective     Physical Exam  Vitals: /76   Pulse 73   Temp 97.5 °F (36.4 °C) (Temporal)   Resp 18   Ht 1.753 m (5' 9\")   Wt 70.3 kg (155 lb)   LMP 08/28/2013   SpO2 92%   BMI 22.89 kg/m²     I & O - 24hr: No intake/output data recorded.   General Appearance: alert, appears stated age, and cooperative  HEENT:  No tragal tenderness, (+) impacted cerumen bilateral  Neck: no adenopathy, no carotid bruit, no JVD, and supple, symmetrical, trachea midline  Lung: No wheezing appreciated on examination  Heart: regular rate and rhythm, S1, S2 normal, no murmur, click, rub or gallop  Abdomen: soft, non-tender; bowel sounds normal; no masses,  no organomegaly  Extremities:  (+) chronic R heel ulcer, no discharge noted, no edema  Musculokeletal: No joint swelling, no muscle tenderness. ROM normal in all joints of extremities.   Neurologic: Mental status: Alert, oriented, thought content appropriate    Diet:   ADULT DIET; Regular    Pertinent Labs & Imaging Studies     Labs  Recent Labs     05/23/24  0437 05/24/24  0443 05/25/24  0415   WBC 10.7 11.4 7.4   RBC 3.50 3.43* 3.64   HGB 10.0* 10.1* 10.4*   HCT 32.8* 32.5* 35.0   MCV 93.7 94.8 96.2   MCH 28.6 29.4 28.6   MCHC 30.5* 31.1* 29.7*   RDW 16.0* 15.9* 16.1*    289 277   MPV 9.8 10.1 9.9       Recent Labs     05/23/24  0437 05/24/24  4979 
[facility]  Internal Medicine Residency / House Medicine Service    Attending Physician Statement  I have discussed the case, including pertinent history and exam findings with the resident and the team.  I have seen and examined the patient and the key elements of the encounter have been performed by me.  I agree with the assessment, plan and orders as documented by the resident.      A&O this AM  Essential tremor vvery evident  Very upset about [place]  Had no electricity for CPAP and dilapidated CPAP machine  Over heated  Meds not available at facility at night  Short staffed with nursing  Called 911 in a panic and returned to ER  Refiused to stay  Remainder of medical problems as per resident note.      Raul Haynes MD FRCP [facility]  Internal Medicine Residency Faculty    
[facility]  Internal Medicine Residency Program  Progress Note - House Team       Patient:  Saba Sullivan 59 y.o. female   MRN: [id number]       Date of Service: 5/18/2024    Subjective     Patient was seen and examined at bedside this morning, was laying in bed, comfortably, sating @ 92% on 5L/NC. No new complaints. No significant events overnight.     Objective     Physical Exam  Vitals: /63   Pulse 81   Temp 97.2 °F (36.2 °C) (Temporal)   Resp 18   Ht 1.753 m (5' 9\")   Wt 70.3 kg (155 lb)   LMP 08/28/2013   SpO2 99%   BMI 22.89 kg/m²     I & O - 24hr: No intake/output data recorded.   General Appearance: alert, appears older than stated age, cooperative, and no distress, tremors in b/l upper extremity  HEENT:  Unkempt look, Veraguth's sign (may suggest chronicity of depression/ psy issues), head tremor (could suggest essential tremor),   Lung: B/L equal but decreased air entry, NVBS, occasional crackles and wheeze  Heart: S1, S2 normal  Abdomen: soft, non-tender; bowel sounds normal; no masses,  no organomegaly  Extremities:  chronic appearing right heel ulcer, non tender, no discharge  Neurologic: Mental status: Alert, oriented, thought content appropriate    Diet:   ADULT DIET; Regular    Pertinent Labs & Imaging Studies     Labs    CBC with Differential:    Lab Results   Component Value Date/Time    WBC 7.1 05/18/2024 04:56 AM    RBC 3.01 05/18/2024 04:56 AM    HGB 8.6 05/18/2024 04:56 AM    HCT 28.9 05/18/2024 04:56 AM     05/18/2024 04:56 AM    MCV 96.0 05/18/2024 04:56 AM    MCH 28.6 05/18/2024 04:56 AM    MCHC 29.8 05/18/2024 04:56 AM    RDW 15.9 05/18/2024 04:56 AM    NRBC 1 05/12/2024 08:49 AM    METASPCT 1 05/15/2024 08:53 AM    LYMPHOPCT 14 05/18/2024 04:56 AM    PROMYELOPCT 1 04/25/2024 03:54 AM    MONOPCT 11 05/18/2024 04:56 AM    MYELOPCT 3 05/17/2024 06:00 AM    EOSPCT 0 05/18/2024 04:56 AM    BASOPCT 0 05/18/2024 04:56 AM    MONOSABS 0.78 05/18/2024 04:56 
[facility]  Internal Medicine Residency Program  Progress Note - House Team       Patient:  Saba Sullivan 59 y.o. female   MRN: [id number]       Date of Service: 5/19/2024    Subjective     Patient was seen and examined at bedside this morning, was laying in bed, awake, alert and oriented to time, place and person. Patient saturating at 5L/NC.  Refused to use BiPAP overnight.  No significant or concerning events overnight    Objective     Physical Exam  Vitals: /74   Pulse 93   Temp 98 °F (36.7 °C) (Temporal)   Resp 18   Ht 1.753 m (5' 9\")   Wt 70.3 kg (155 lb)   LMP 08/28/2013   SpO2 93%   BMI 22.89 kg/m²     I & O - 24hr: No intake/output data recorded.   General Appearance: alert, appears older than stated age, cooperative, and no distress, tremors in b/l upper extremity  HEENT:  Unkempt look, Veraguth's sign (may suggest chronicity of depression/ psy issues), head tremor (could suggest essential tremor),   Lung: B/L equal but decreased air entry, NVBS, occasional crackles and wheeze  Heart: S1, S2 normal  Abdomen: soft, non-tender; bowel sounds normal; no masses,  no organomegaly  Extremities:  Chronic appearing right heel ulcer, non tender, no discharge  Neurologic: Mental status: Alert, oriented, thought content appropriate    Diet:   ADULT DIET; Regular    Pertinent Labs & Imaging Studies     Labs    CBC with Differential:    Lab Results   Component Value Date/Time    WBC 7.2 05/19/2024 05:41 AM    RBC 3.10 05/19/2024 05:41 AM    HGB 8.8 05/19/2024 05:41 AM    HCT 29.9 05/19/2024 05:41 AM     05/19/2024 05:41 AM    MCV 96.5 05/19/2024 05:41 AM    MCH 28.4 05/19/2024 05:41 AM    MCHC 29.4 05/19/2024 05:41 AM    RDW 15.9 05/19/2024 05:41 AM    NRBC 1 05/12/2024 08:49 AM    METASPCT 1 05/15/2024 08:53 AM    LYMPHOPCT 16 05/19/2024 05:41 AM    PROMYELOPCT 1 04/25/2024 03:54 AM    MONOPCT 12 05/19/2024 05:41 AM    MYELOPCT 3 05/17/2024 06:00 AM    EOSPCT 0 05/19/2024 
[facility]  Internal Medicine Residency Program  Progress Note - House Team       Patient:  Saba Sullivan 59 y.o. female   MRN: [id number]       Date of Service: 5/23/2024    CC: SOB  Subjective     Overnight, spO2 within target at 3L NC.     Patient seen and examined at bedside this morning, alert and oriented x3, not in cardiorespiratory distress. Saturating 95% on 3L NC. Notes improvement in SOB. Still with productive cough, no fevers. Notes bowel movement yesterday with relief of bloating. Did not use enema.     Objective     Physical Exam  Vitals: /62   Pulse 82   Temp 98.4 °F (36.9 °C) (Temporal)   Resp 18   Ht 1.753 m (5' 9\")   Wt 70.3 kg (155 lb)   LMP 08/28/2013   SpO2 95%   BMI 22.89 kg/m²     I & O - 24hr: I/O this shift:  In: 120 [P.O.:120]  Out: -    General Appearance: alert, appears stated age, and cooperative  HEENT:  Head: Normocephalic, no lesions, without obvious abnormality.  Neck: no adenopathy, no carotid bruit, no JVD, and supple, symmetrical, trachea midline  Lung: No wheezing appreciated on examination  Heart: regular rate and rhythm, S1, S2 normal, no murmur, click, rub or gallop  Abdomen: soft, non-tender; bowel sounds normal; no masses,  no organomegaly  Extremities:  (+) chronic R heel ulcer, no discharge noted, no edema  Musculokeletal: No joint swelling, no muscle tenderness. ROM normal in all joints of extremities.   Neurologic: Mental status: Alert, oriented, thought content appropriate    Diet:   ADULT DIET; Regular    Pertinent Labs & Imaging Studies     Labs  Recent Labs     05/21/24  0431 05/22/24  1010 05/23/24  0437   WBC 8.4 11.2 10.7   RBC 3.27* 3.61 3.50   HGB 9.4* 10.2* 10.0*   HCT 31.1* 33.8* 32.8*   MCV 95.1 93.6 93.7   MCH 28.7 28.3 28.6   MCHC 30.2* 30.2* 30.5*   RDW 15.6* 15.9* 16.0*    325 304   MPV 9.8 9.7 9.8       Recent Labs     05/21/24  0431 05/22/24  1010 05/23/24  0437    140 139   K 4.1 4.6 4.0   CL 98 98 98 
[facility]  Internal Medicine Residency / House Medicine Service    Attending Physician Statement  I have discussed the case, including pertinent history and exam findings with the resident and the team.  I have seen and examined the patient and the key elements of the encounter have been performed by me.  I agree with the assessment, plan and orders as documented by the resident.      A&O  VS stable   Immobile at baseline  Lungs clear  Impression; Improved COPD  Plan; Discharge to [facility] reviewed     Remainder of medical problems as per resident note.      Raul Haynes MD FRCP [facility]  Internal Medicine Residency Faculty    
Messaged IM team 1 ANNAMARIA via perfect serve to request downgrade to med/surg. Dr Garcia taking messages at this time     Electronically signed by Emily Mckeon RN on 5/18/2024 at 8:38 AM     
[facility]  Internal Medicine Residency Program  Progress Note - House Team       Patient:  Saba Sullivan 59 y.o. female   MRN: [id number]       Date of Service: 5/27/2024      Subjective     Patient was seen and examined at bedside this morning, comfortable in position, sating @ 3L/NC, awake, alert and comfortable. No significant or  concerning events overnight.     Objective     Physical Exam  Vitals: /63   Pulse 81   Temp 97.5 °F (36.4 °C) (Temporal)   Resp 16   Ht 1.753 m (5' 9\")   Wt 70.3 kg (155 lb)   LMP 08/28/2013   SpO2 99%   BMI 22.89 kg/m²     I & O - 24hr: No intake/output data recorded.   General Appearance: alert, appears stated age, cooperative, and no distress  HEENT:  Head: Normal, normocephalic, atraumatic.  Lung: clear to auscultation bilaterally  Heart: S1, S2 normal  Abdomen: soft, non-tender; bowel sounds normal; no masses,  no organomegaly  Extremities:  extremities normal, atraumatic, no cyanosis or edema  Neurologic: Mental status: Alert, oriented, thought content appropriate    Diet:   ADULT DIET; Regular    Pertinent Labs & Imaging Studies     Labs    CBC with Differential:    Lab Results   Component Value Date/Time    WBC 8.0 05/27/2024 04:30 AM    RBC 3.53 05/27/2024 04:30 AM    HGB 10.2 05/27/2024 04:30 AM    HCT 34.0 05/27/2024 04:30 AM     05/27/2024 04:30 AM    MCV 96.3 05/27/2024 04:30 AM    MCH 28.9 05/27/2024 04:30 AM    MCHC 30.0 05/27/2024 04:30 AM    RDW 16.3 05/27/2024 04:30 AM    NRBC 1 05/12/2024 08:49 AM    METASPCT 1 05/15/2024 08:53 AM    LYMPHOPCT 14 05/27/2024 04:30 AM    PROMYELOPCT 1 04/25/2024 03:54 AM    MONOPCT 8 05/27/2024 04:30 AM    MYELOPCT 3 05/17/2024 06:00 AM    EOSPCT 0 05/27/2024 04:30 AM    BASOPCT 1 05/27/2024 04:30 AM    MONOSABS 0.62 05/27/2024 04:30 AM    EOSABS 0.02 05/27/2024 04:30 AM    BASOSABS 0.04 05/27/2024 04:30 AM     Hemoglobin/Hematocrit:    Lab Results   Component Value Date/Time    HGB 10.2 
Nurse to nurse called to Ashanti SOLIS on 54. Awaiting bed to be cleaned.   
OCCUPATIONAL THERAPY INITIAL EVALUATION    [facility] [address] [place], [place]      Date:2024                                                Patient Name: Saba Sullivan  MRN: [id number]  : 1965  Room: [address] [place] [address]     Evaluating OT:Ely Jones OTR/L   License #  [id number]       Referring Provider:     Joey Valladares DO       Specific Provider Orders/Date: OT evaluation & treatment        Diagnosis: COPD with acute exacerbation (HCC) [J44.1]  Acute respiratory failure with hypoxia (HCC) [J96.01]  Acute hypoxemic respiratory failure (HCC) [J96.01]      Pertinent Medical History:  has a past medical history of Abscess, Chronic pain, Chronic recurrent multifocal osteomyelitis of foot (HCC), Hepatitis C, Hip fracture (HCC), Hx of blood clots, Polysubstance abuse (HCC), Pressure injury of heel, stage 3 (HCC), and Subclinical hypothyroidism.    Surgery: none this admit    Past Surgical History:  has a past surgical history that includes Foot surgery;  section; drain skin abscess simple (2014); Foot Debridement (Right, 2021); and IR TUNNELED CVC PLACE WO SQ PORT/PUMP > 5 YEARS (2024).       Precautions:  Fall Risk, bed alarms/ cognition, isolation for parainfluenza, NWB R Foot per 5-1-24 d/t R heel wound, CAM boot (not present in room), monitor pulse ox., supplemental O2 (currently at 4L)      Assessment of current deficits    [x] Functional mobility            [x]ADLs           [x] Strength                  [x]Cognition    [x] Functional transfers          [x] IADLs         [x] Safety Awareness   [x]Endurance    [x] Fine Coordination                         [x] Balance      [] Vision/perception   [x]Sensation      []Gross Motor Coordination             [] ROM           [] Delirium                   [] Motor Control      OT PLAN OF CARE   OT POC based on physician orders, patient diagnosis and results of clinical 
Patient refused BIPAP AT THIS TIME   
Patient refuses to wear the BIPAP at this time says she just likes having there in case she would need   
Physical Therapy  Physical Therapy Initial Assessment     Name: Saba Sullivan  : 1965  MRN: [id number]      Date of Service: 2024    Evaluating PT:  Michelle Waterman, PT, DPT, [id number]    Room #:  5411/5411-A  Diagnosis:  COPD with acute exacerbation (HCC) [J44.1]  Acute respiratory failure with hypoxia (HCC) [J96.01]  Acute hypoxemic respiratory failure (HCC) [J96.01]  PMHx/PSHx:    Past Medical History:   Diagnosis Date    Abscess     states for a couple years she has had problems with     Chronic pain     Chronic recurrent multifocal osteomyelitis of foot (HCC) 2019    Hepatitis C     Hip fracture (HCC)     Hx of blood clots     dvt rt calf    Polysubstance abuse (HCC)     Pressure injury of heel, stage 3 (HCC) 2019    Subclinical hypothyroidism 2019      Past Surgical History:   Procedure Laterality Date     SECTION      x three    DRAIN SKIN ABSCESS SIMPLE  2014         FOOT DEBRIDEMENT Right 2021    RIGHT FOOT DEBRIDEMENT INCISION AND DRAINAGE WITH BONE BIOPSY AND WOUND VAC APPLICATION performed by Luís Dominguez DPM at [facility] OR    FOOT SURGERY      IR TUNNELED CATHETER PLACEMENT GREATER THAN 5 YEARS  2024    IR TUNNELED CATHETER PLACEMENT GREATER THAN 5 YEARS 2024 Quoc, LUISITO Pang MD [facility] SPECIAL PROCEDURES      Procedure/Surgery:  none this admission   Precautions:  Fall risk, cognition, Purewick, tremor, monitor pulse ox, per previous PT note 24 NWB RLE with CAM Boot d/t R heel wound, O2, Contact Isolation (Parainfluenza), + Alarms  Equipment Needs:  TBD    SUBJECTIVE:  Pt as admitted from [facility] (pt was there ~1 hour). Per chart review, Pt lived with family in a 1 story home with ramped entry.  Bed is on 1 floor and bath is on 1 floor.  Pt ambulated with w/c PTA.    OBJECTIVE:   Initial Evaluation  Date: 24 Treatment Short Term/ Long Term   Goals   AM-PAC 6 Clicks 10/24     Was pt agreeable to Eval/treatment? Yes with encouragement   
Pt refused soap william enema now ,says she will have it in the morning tomorrow.  
Pt refuses bipap for tonight  
Pulse OX on Room Air sitting_88%   Pulse OX ON ROOM AIR AMBULATING _______%   PULSE OX ON___2___ LITERS SITTING RECOVERY____92_%   PULSE OX ON________LITERS AMBULATING RECOVERY_____%     
[facility]  Internal Medicine Residency / House Medicine Service    Attending Physician Statement  I have discussed the case, including pertinent history and exam findings with the resident and the team.  I have seen and examined the patient and the key elements of the encounter have been performed by me.  I agree with the assessment, plan and orders as documented by the resident.      More alert  Respirations improved   VS stable   Meds reviewed  COPD exacerbation  Chronic immobility  Plan:Discharge to SNF    Remainder of medical problems as per resident note.      Raul Haynes MD CP [facility]  Internal Medicine Residency Faculty    
[facility]  Internal Medicine Residency Program  Progress Note - House Team       Patient:  Saba Sullivan 59 y.o. female   MRN: [id number]       Date of Service: 5/28/2024      Subjective     Patient was seen and examined at bedside this morning, comfortable in position, awake, alert and comfortable. No significant or  concerning events overnight.     Objective     Physical Exam  Vitals: /62   Pulse 83   Temp (!) 96 °F (35.6 °C) (Tympanic)   Resp 18   Ht 1.753 m (5' 9\")   Wt 70.3 kg (155 lb)   LMP 08/28/2013   SpO2 96%   BMI 22.89 kg/m²     I & O - 24hr: No intake/output data recorded.   General Appearance: alert, appears stated age, cooperative, and no distress  HEENT:  Head: Normal, normocephalic, atraumatic.  Lung: clear to auscultation bilaterally  Heart: S1, S2 normal  Abdomen: soft, non-tender; bowel sounds normal; no masses,  no organomegaly  Extremities:  extremities normal, atraumatic, no cyanosis or edema  Neurologic: Mental status: Alert, oriented, thought content appropriate    Diet:   ADULT DIET; Regular    Pertinent Labs & Imaging Studies     Labs    CBC with Differential:    Lab Results   Component Value Date/Time    WBC 8.0 05/27/2024 04:30 AM    RBC 3.53 05/27/2024 04:30 AM    HGB 10.2 05/27/2024 04:30 AM    HCT 34.0 05/27/2024 04:30 AM     05/27/2024 04:30 AM    MCV 96.3 05/27/2024 04:30 AM    MCH 28.9 05/27/2024 04:30 AM    MCHC 30.0 05/27/2024 04:30 AM    RDW 16.3 05/27/2024 04:30 AM    NRBC 1 05/12/2024 08:49 AM    METASPCT 1 05/15/2024 08:53 AM    LYMPHOPCT 14 05/27/2024 04:30 AM    PROMYELOPCT 1 04/25/2024 03:54 AM    MONOPCT 8 05/27/2024 04:30 AM    MYELOPCT 3 05/17/2024 06:00 AM    EOSPCT 0 05/27/2024 04:30 AM    BASOPCT 1 05/27/2024 04:30 AM    MONOSABS 0.62 05/27/2024 04:30 AM    EOSABS 0.02 05/27/2024 04:30 AM    BASOSABS 0.04 05/27/2024 04:30 AM     Hemoglobin/Hematocrit:    Lab Results   Component Value Date/Time    HGB 10.2 05/27/2024 04:30 AM 
[facility]  Internal Medicine Residency Program  Progress Note  - House Team 1    Patient:  Saba Sullivan 59 y.o. female MRN: [id number]     Date of Service: 5/21/2024     CC: SOB  Overnight events: NAEO    Subjective       Patient examined at bedside. Patient feeling better. Continues to endorse cough but not producing anything during exam. SMI 1500mL.  Placement as a better dispo option to home w [facility] discussed with patient. At this time, she is agreeable.      Objective     Physical Exam:  Vitals: /77   Pulse 97   Temp 97.4 °F (36.3 °C) (Temporal)   Resp 14   Ht 1.753 m (5' 9\")   Wt 70.3 kg (155 lb)   LMP 08/28/2013   SpO2 99%   BMI 22.89 kg/m²     I & O - 24hr: I/O this shift:  In: 180 [P.O.:180]  Out: -    General Appearance: alert, appears stated age, and cooperative  HEENT:  Head: Normocephalic, no lesions, without obvious abnormality.  Neck: no adenopathy, no carotid bruit, no JVD, and supple, symmetrical, trachea midline  Lung: (+) expiratory wheezing, no rales  Heart: regular rate and rhythm, S1, S2 normal, no murmur, click, rub or gallop  Abdomen: soft, non-tender; bowel sounds normal; no masses,  no organomegaly  Extremities:  (+) chronic R heel ulcer, no discharge noted, no edema  Musculokeletal: No joint swelling, no muscle tenderness. ROM normal in all joints of extremities.   Neurologic:   Mental status: Alert, oriented, thought content appropriate  Motor: diffuse high frequency moderate amplitude tremor    Pertinent Labs & Imaging Studies     CBC:   Lab Results   Component Value Date/Time    WBC 8.4 05/21/2024 04:31 AM    RBC 3.27 05/21/2024 04:31 AM    HGB 9.4 05/21/2024 04:31 AM    HCT 31.1 05/21/2024 04:31 AM    MCV 95.1 05/21/2024 04:31 AM    MCH 28.7 05/21/2024 04:31 AM    MCHC 30.2 05/21/2024 04:31 AM    RDW 15.6 05/21/2024 04:31 AM     05/21/2024 04:31 AM    MPV 9.8 05/21/2024 04:31 AM     CMP:    Lab Results   Component Value Date/Time     
[facility]  Internal Medicine Residency Program  Progress Note - House Team       Patient:  Saba Sullivan 59 y.o. female   MRN: [id number]       Date of Service: 5/20/2024    CC: SOB    Subjective     Overnight, spO2 within target at 5L NC. Given magnesium sulfate 2g    Patient seen and examined at bedside this morning, alert and oriented x3, not in cardiorespiratory distress. Saturating 92-93% on 4L NC. Notes improvement in SOB. Still with productive cough, no fevers. No chest pain. Patient reports no BM since admission.     Objective     Physical Exam  Vitals: /71   Pulse 93   Temp 98.1 °F (36.7 °C) (Temporal)   Resp 16   Ht 1.753 m (5' 9\")   Wt 70.3 kg (155 lb)   LMP 08/28/2013   SpO2 93%   BMI 22.89 kg/m²     I & O - 24hr: I/O this shift:  In: 120 [P.O.:120]  Out: -    General Appearance: alert, appears stated age, and cooperative  HEENT:  Head: Normocephalic, no lesions, without obvious abnormality.  Neck: no adenopathy, no carotid bruit, no JVD, and supple, symmetrical, trachea midline  Lung: (+) expiratory wheezing, no rales  Heart: regular rate and rhythm, S1, S2 normal, no murmur, click, rub or gallop  Abdomen: soft, non-tender; bowel sounds normal; no masses,  no organomegaly  Extremities:  (+) chronic R heel ulcer, no discharge noted, no edema  Musculokeletal: No joint swelling, no muscle tenderness. ROM normal in all joints of extremities.   Neurologic: Mental status: Alert, oriented, thought content appropriate    Diet:   ADULT DIET; Regular      Pertinent Labs & Imaging Studies     Labs  Recent Labs     05/18/24  0456 05/19/24  0541 05/20/24  0436   WBC 7.1 7.2 7.4   RBC 3.01* 3.10* 3.21*   HGB 8.6* 8.8* 9.0*   HCT 28.9* 29.9* 30.6*   MCV 96.0 96.5 95.3   MCH 28.6 28.4 28.0   MCHC 29.8* 29.4* 29.4*   RDW 15.9* 15.9* 15.8*    369 348   MPV 9.5 9.6 9.5     Recent Labs     05/18/24  0456 05/19/24  0541 05/20/24  0436    142 139   K 4.0 4.1 3.9   CL 99 96* 
DIET; Regular   Bowel regimen: Glycolax, Senna scheduled  Pain management: as needed  Code status: Full Code   Disposition: Continue Current Care. Disposition on discharge: [place]  Family: updated as available    Hammad Green MD, PGY-1   Attending physician: Dr. Villagomez        [facility]  Internal Medicine Faculty   Attending Physician Statement    Saba Sullivan is a 59 y.o. female was seen, examined and discussed with the multi-disciplinary teaching team during rounds. I have personally seen and examined the patient and the key elements of the encounter were performed by me. The data collected below information that was obtained, reviewed, analyzed and interpreted today. Imaging test are reviewed during rounds. Comparison to previous images are always explored.     CBC:   Lab Results   Component Value Date/Time    WBC 11.2 05/22/2024 10:10 AM    RBC 3.61 05/22/2024 10:10 AM    HGB 10.2 05/22/2024 10:10 AM    HCT 33.8 05/22/2024 10:10 AM     05/22/2024 10:10 AM    MCV 93.6 05/22/2024 10:10 AM       BMP:    Lab Results   Component Value Date/Time     05/22/2024 10:10 AM    K 4.6 05/22/2024 10:10 AM    K 4.4 07/20/2021 02:14 PM    CL 98 05/22/2024 10:10 AM    CO2 30 05/22/2024 10:10 AM    BUN 20 05/22/2024 10:10 AM    CREATININE 0.5 05/22/2024 10:10 AM    GLUCOSE 131 05/22/2024 10:10 AM    CALCIUM 9.1 05/22/2024 10:10 AM       TSH:    Lab Results   Component Value Date/Time    TSH 1.04 04/18/2024 02:56 AM         Imaging Studies:    RADIOLOGY:  My interpretation of the current and previous films reveal no pneumothorax    EKG:    My interpretation of the current and previous films rhythm strips and EKG reveals no findings of ischemia    Current issues are :  Debility  Advance lung disease  Chronic Hypoxemia     To rehabiliation otherwise she signs of AMA waiver.  PT OT assessment     I agree with the assessment, plan and orders as documented by the resident. I have 
disorder  History of osteomyelitis of the right hip s/p debridement  Essential tremor  Chronic pain 2/2 left hip fracture and right heel osteomyelitis, on duloxetine   Hx of anxiety, on vistaril 50 mg Q8 prn  History of tobacco use, 2 ppd   Hx of substance use, on Suboxone   UDS (+) buprenorphine, fentanyl again    Plan:  - Completed prednisone 6 days.   - Continue brovana, pulmicort, duoneb. Continue guaifenesin and PEP/flutter, incentive spirometry   - Switch BIPAP to AVAPS at night  - Continue amlodipine  - Continue suboxone scheduled   - Vit D supplementation  - Social work consulted for placement. Plan is [facility] as they are able to administer Suboxone, precert initiated.   - Start Debrox for impacted cerumen  - Will need O2, AVAPS on discharge. Pulmonary rehabilitation evaluation ordered. Patient will need to establish care w pulmonology after discharge.    --------------------  PT/OT evaluation: Consulted - PT 10/24, OT 13/24  DVT prophylaxis: Lovenox  GI prophylaxis: Diet  Diet:   ADULT DIET; Regular   Bowel regimen: Glycolax, Senna, Dulcolax  Pain management: as needed  Code status: Full Code   Disposition: Will discharge to [facility] - pending precert  Family: Updated as available    Ma. Florence Green MD, PGY-1   Attending physician: Dr. Villagomez      [facility]  Internal Medicine Faculty   Attending Physician Statement    Saba Sullivan is a 59 y.o. female was seen, examined and discussed with the multi-disciplinary teaching team during rounds. I have personally seen and examined the patient and the key elements of the encounter were performed by me. The data collected below information that was obtained, reviewed, analyzed and interpreted today. Imaging test are reviewed during rounds. Comparison to previous images are always explored.     CBC:   Lab Results   Component Value Date/Time    WBC 11.4 05/24/2024 04:43 AM    RBC 3.43 05/24/2024 04:43 AM    HGB 10.1

## 2024-05-28 NOTE — DISCHARGE INSTR - DIET

## 2024-05-28 NOTE — PLAN OF CARE
Problem: Discharge Planning  Goal: Discharge to home or other facility with appropriate resources  5/27/2024 2241 by Ximena Carpenter RN  Outcome: Progressing  5/27/2024 1311 by Suha Cagle RN  Outcome: Progressing  Flowsheets (Taken 5/27/2024 0815)  Discharge to home or other facility with appropriate resources: Identify barriers to discharge with patient and caregiver     Problem: ABCDS Injury Assessment  Goal: Absence of physical injury  5/27/2024 2241 by Ximena Carpenter RN  Outcome: Progressing  5/27/2024 1311 by Suha Cagle RN  Outcome: Progressing     Problem: Skin/Tissue Integrity  Goal: Absence of new skin breakdown  Description: 1.  Monitor for areas of redness and/or skin breakdown  2.  Assess vascular access sites hourly  3.  Every 4-6 hours minimum:  Change oxygen saturation probe site  4.  Every 4-6 hours:  If on nasal continuous positive airway pressure, respiratory therapy assess nares and determine need for appliance change or resting period.  5/27/2024 2241 by Ximena Carpenter RN  Outcome: Progressing  5/27/2024 1311 by Suha Cagle RN  Outcome: Progressing     Problem: Safety - Adult  Goal: Free from fall injury  5/27/2024 2241 by Ximena Carpenter RN  Outcome: Progressing  5/27/2024 1311 by Suha Cagle RN  Outcome: Progressing     Problem: Pain  Goal: Verbalizes/displays adequate comfort level or baseline comfort level  5/27/2024 2241 by Ximena Carpenter RN  Outcome: Progressing  5/27/2024 1311 by Suha Calge RN  Outcome: Progressing

## 2024-05-28 NOTE — CARE COORDINATION
Care Coordination  Have received the auth for the patient to go to [facility] today. She is set up to be picked up at 1 pm. Envelope hens completed.

## 2024-11-15 ENCOUNTER — HOSPITAL ENCOUNTER (EMERGENCY)
Age: 59
Discharge: HOME | End: 2024-11-15
Attending: EMERGENCY MEDICINE
Payer: OTHER GOVERNMENT

## 2024-11-15 ENCOUNTER — APPOINTMENT (OUTPATIENT)
Dept: GENERAL RADIOLOGY | Age: 59
End: 2024-11-15
Payer: OTHER GOVERNMENT

## 2024-11-15 VITALS
RESPIRATION RATE: 20 BRPM | WEIGHT: 138 LBS | OXYGEN SATURATION: 94 % | DIASTOLIC BLOOD PRESSURE: 95 MMHG | TEMPERATURE: 98.2 F | HEART RATE: 71 BPM | BODY MASS INDEX: 20.44 KG/M2 | SYSTOLIC BLOOD PRESSURE: 132 MMHG | HEIGHT: 69 IN

## 2024-11-15 DIAGNOSIS — R07.9 CHEST PAIN, UNSPECIFIED TYPE: ICD-10-CM

## 2024-11-15 DIAGNOSIS — J44.1 COPD EXACERBATION (HCC): Primary | ICD-10-CM

## 2024-11-15 LAB
ALBUMIN SERPL-MCNC: 4.2 G/DL (ref 3.5–5.2)
ALP SERPL-CCNC: 95 U/L (ref 35–104)
ALT SERPL-CCNC: <5 U/L (ref 0–32)
ANION GAP SERPL CALCULATED.3IONS-SCNC: 7 MMOL/L (ref 7–16)
AST SERPL-CCNC: 13 U/L (ref 0–31)
BASOPHILS # BLD: 0.08 K/UL (ref 0–0.2)
BASOPHILS NFR BLD: 1 % (ref 0–2)
BILIRUB SERPL-MCNC: 0.4 MG/DL (ref 0–1.2)
BUN SERPL-MCNC: 14 MG/DL (ref 6–20)
CALCIUM SERPL-MCNC: 9.8 MG/DL (ref 8.6–10.2)
CHLORIDE SERPL-SCNC: 99 MMOL/L (ref 98–107)
CO2 SERPL-SCNC: 34 MMOL/L (ref 22–29)
CREAT SERPL-MCNC: 0.9 MG/DL (ref 0.5–1)
EOSINOPHIL # BLD: 0.09 K/UL (ref 0.05–0.5)
EOSINOPHILS RELATIVE PERCENT: 1 % (ref 0–6)
ERYTHROCYTE [DISTWIDTH] IN BLOOD BY AUTOMATED COUNT: 15.6 % (ref 11.5–15)
GFR, ESTIMATED: 79 ML/MIN/1.73M2
GLUCOSE SERPL-MCNC: 95 MG/DL (ref 74–99)
HCT VFR BLD AUTO: 46.4 % (ref 34–48)
HGB BLD-MCNC: 14.2 G/DL (ref 11.5–15.5)
IMM GRANULOCYTES # BLD AUTO: <0.03 K/UL (ref 0–0.58)
IMM GRANULOCYTES NFR BLD: 0 % (ref 0–5)
INR PPP: 1
LYMPHOCYTES NFR BLD: 1.62 K/UL (ref 1.5–4)
LYMPHOCYTES RELATIVE PERCENT: 21 % (ref 20–42)
MAGNESIUM SERPL-MCNC: 2.2 MG/DL (ref 1.6–2.6)
MCH RBC QN AUTO: 26.6 PG (ref 26–35)
MCHC RBC AUTO-ENTMCNC: 30.6 G/DL (ref 32–34.5)
MCV RBC AUTO: 86.9 FL (ref 80–99.9)
MONOCYTES NFR BLD: 0.47 K/UL (ref 0.1–0.95)
MONOCYTES NFR BLD: 6 % (ref 2–12)
NEUTROPHILS NFR BLD: 71 % (ref 43–80)
NEUTS SEG NFR BLD: 5.5 K/UL (ref 1.8–7.3)
PLATELET # BLD AUTO: 416 K/UL (ref 130–450)
PMV BLD AUTO: 10.2 FL (ref 7–12)
POTASSIUM SERPL-SCNC: 4.8 MMOL/L (ref 3.5–5)
PROT SERPL-MCNC: 8.7 G/DL (ref 6.4–8.3)
PROTHROMBIN TIME: 11.5 SEC (ref 9.3–12.4)
RBC # BLD AUTO: 5.34 M/UL (ref 3.5–5.5)
SODIUM SERPL-SCNC: 140 MMOL/L (ref 132–146)
TROPONIN I SERPL HS-MCNC: 46 NG/L (ref 0–9)
TROPONIN I SERPL HS-MCNC: 46 NG/L (ref 0–9)
WBC OTHER # BLD: 7.8 K/UL (ref 4.5–11.5)

## 2024-11-15 PROCEDURE — 83735 ASSAY OF MAGNESIUM: CPT

## 2024-11-15 PROCEDURE — 84484 ASSAY OF TROPONIN QUANT: CPT

## 2024-11-15 PROCEDURE — 6370000000 HC RX 637 (ALT 250 FOR IP): Performed by: EMERGENCY MEDICINE

## 2024-11-15 PROCEDURE — 99285 EMERGENCY DEPT VISIT HI MDM: CPT

## 2024-11-15 PROCEDURE — 85025 COMPLETE CBC W/AUTO DIFF WBC: CPT

## 2024-11-15 PROCEDURE — 85610 PROTHROMBIN TIME: CPT

## 2024-11-15 PROCEDURE — 80053 COMPREHEN METABOLIC PANEL: CPT

## 2024-11-15 PROCEDURE — 71046 X-RAY EXAM CHEST 2 VIEWS: CPT

## 2024-11-15 RX ORDER — PREDNISONE 20 MG/1
60 TABLET ORAL ONCE
Status: COMPLETED | OUTPATIENT
Start: 2024-11-15 | End: 2024-11-15

## 2024-11-15 RX ORDER — IPRATROPIUM BROMIDE AND ALBUTEROL SULFATE 2.5; .5 MG/3ML; MG/3ML
1 SOLUTION RESPIRATORY (INHALATION) ONCE
Status: COMPLETED | OUTPATIENT
Start: 2024-11-15 | End: 2024-11-15

## 2024-11-15 RX ORDER — PREDNISONE 50 MG/1
50 TABLET ORAL DAILY
Qty: 5 TABLET | Refills: 0 | Status: SHIPPED | OUTPATIENT
Start: 2024-11-15 | End: 2024-11-20

## 2024-11-15 RX ORDER — ASPIRIN 81 MG/1
324 TABLET, CHEWABLE ORAL ONCE
Status: COMPLETED | OUTPATIENT
Start: 2024-11-15 | End: 2024-11-15

## 2024-11-15 RX ADMIN — IPRATROPIUM BROMIDE AND ALBUTEROL SULFATE 1 DOSE: 2.5; .5 SOLUTION RESPIRATORY (INHALATION) at 19:34

## 2024-11-15 RX ADMIN — ASPIRIN 81 MG CHEWABLE TABLET 324 MG: 81 TABLET CHEWABLE at 15:46

## 2024-11-15 RX ADMIN — PREDNISONE 60 MG: 20 TABLET ORAL at 19:34

## 2024-11-15 ASSESSMENT — LIFESTYLE VARIABLES: HOW OFTEN DO YOU HAVE A DRINK CONTAINING ALCOHOL: NEVER

## 2024-11-15 ASSESSMENT — PAIN - FUNCTIONAL ASSESSMENT: PAIN_FUNCTIONAL_ASSESSMENT: NONE - DENIES PAIN

## 2024-11-15 NOTE — ED PROVIDER NOTES
HPI:  11/15/24,   Time: 3:38 PM ASHLEY Sullivan is a 59 y.o. female presenting to the ED for cp/sob/palpitations, beginning hrs ago.  The complaint has been persistent, moderate in severity, and worsened by nothing.  History of COPD.  On oxygen chronically.  Some cough.  Some wheezing.  Some left chest discomfort.  Nothing makes better.  Brought in by private vehicle.    Review of Systems:   Pertinent positives and negatives are stated within HPI, all other systems reviewed and are negative.          --------------------------------------------- PAST HISTORY ---------------------------------------------  Past Medical History:  has a past medical history of Abscess, Chronic pain, Chronic recurrent multifocal osteomyelitis of foot, Hepatitis C, Hip fracture (HCC), Hx of blood clots, NSTEMI (non-ST elevated myocardial infarction) ([facility]), Polysubstance abuse (HCC), Pressure injury of heel, stage 3 (HCC), and Subclinical hypothyroidism.    Past Surgical History:  has a past surgical history that includes Foot surgery;  section; drain skin abscess simple (2014); Foot Debridement (Right, 2021); and IR TUNNELED CVC PLACE WO SQ PORT/PUMP > 5 YEARS (2024).    Social History:  reports that she has quit smoking. Her smoking use included cigarettes. She has never used smokeless tobacco. She reports that she does not drink alcohol and does not use drugs.    Family History: family history is not on file.     The patient’s home medications have been reviewed.    Allergies: Ancef [cefazolin], Duricef [cefadroxil], and Iodine        ---------------------------------------------------PHYSICAL EXAM--------------------------------------    Constitutional/General: Alert and oriented x3, well appearing, non toxic in NAD  Head: Normocephalic and atraumatic  Eyes: PERRL, EOMI, conjunctive normal, sclera non icteric  Mouth: Oropharynx clear, handling secretions, no trismus, no asymmetry of the posterior  mg (60 mg Oral Given 11/15/24 1934)         ED COURSE:  ED Course as of 11/15/24 1951   Fri Nov 15, 2024   1948 Re nancieal, nad, improved with tx [AT]      ED Course User Index  [AT] James Torres MD             This patient's ED course included: a personal history and physicial examination    This patient has remained hemodynamically stable during their ED course.      Re-Evaluations:             Re-evaluation.  Patient’s symptoms are improving    Re-examination  11/15/24   3:38 PM EST          Vital Signs:   Vitals:    11/15/24 1357 11/15/24 1358 11/15/24 1410 11/15/24 1945   BP:   (!) 139/98 (!) 132/95   Pulse: 85   71   Resp: 20   20   Temp: 98.2 °F (36.8 °C)      TempSrc: Temporal      SpO2: 90% 95%  94%   Weight:   62.6 kg (138 lb)    Height:   1.753 m (5' 9\")            Counseling:   The emergency provider has spoken with the patient and discussed today’s results, in addition to providing specific details for the plan of care and counseling regarding the diagnosis and prognosis.  Questions are answered at this time and they are agreeable with the plan.       --------------------------------- IMPRESSION AND DISPOSITION ---------------------------------    IMPRESSION  1. COPD exacerbation (HCC)    2. Chest pain, unspecified type        DISPOSITION  Disposition: Discharge to home  Patient condition is stable    NOTE: This report was transcribed using voice recognition software. Every effort was made to ensure accuracy; however, inadvertent computerized transcription errors may be present        James Torres MD  11/15/24 1951

## 2024-11-19 LAB
EKG ATRIAL RATE: 82 BPM
EKG P AXIS: 88 DEGREES
EKG P-R INTERVAL: 142 MS
EKG Q-T INTERVAL: 386 MS
EKG QRS DURATION: 82 MS
EKG QTC CALCULATION (BAZETT): 450 MS
EKG R AXIS: 85 DEGREES
EKG T AXIS: 83 DEGREES
EKG VENTRICULAR RATE: 82 BPM

## (undated) DEVICE — TRAP,MUCUS SPECIMEN,40CC: Brand: MEDLINE

## (undated) DEVICE — SWAB SPEC COLL SHFT L5.25IN POLYUR FOAM TIP SFT DBL MEDIA

## (undated) DEVICE — HANDPIECE SET WITH BONE CLEANING TIP AND SUCTION TUBE: Brand: INTERPULSE

## (undated) DEVICE — BANDAGE COMPR W4INXL10YD WHITE/BEIGE E MTRX HK LOOP CLSR

## (undated) DEVICE — CONTAINER VACUTAINER ANAER CULTURE SWAB

## (undated) DEVICE — NEEDLE SYR 18GA L1.5IN RED PLAS HUB S STL BLNT FILL W/O

## (undated) DEVICE — Z DISCONTINUED GLOVE SURG SZ 7 L12IN FNGR THK13MIL WHT ISOLEX POLYISOPRENE

## (undated) DEVICE — GOWN,SIRUS,FABRNF,L,20/CS: Brand: MEDLINE

## (undated) DEVICE — SET ORTHO STD STORTSTD2

## (undated) DEVICE — NEEDLE BNE MAR ASPIR 11GA L4IN TWO PC HNDL W/ PRB JAMSH

## (undated) DEVICE — NEEDLE HYPO 25GA L1.5IN BLU POLYPR HUB S STL REG BVL STR

## (undated) DEVICE — GAUZE,SPONGE,AVANT,4"X4",4PLY,STRL,10/TR: Brand: MEDLINE

## (undated) DEVICE — INTENDED FOR TISSUE SEPARATION, AND OTHER PROCEDURES THAT REQUIRE A SHARP SURGICAL BLADE TO PUNCTURE OR CUT.: Brand: BARD-PARKER ® STAINLESS STEEL BLADES

## (undated) DEVICE — SOLUTION IRRIG 3000ML 0.9% SOD CHL USP UROMATIC PLAS CONT

## (undated) DEVICE — ZIMMER® STERILE DISPOSABLE TOURNIQUET CUFF WITH PROTECTIVE SLEEVE AND PLC, DUAL PORT, SINGLE BLADDER, 34 IN. (86 CM)

## (undated) DEVICE — TOWEL,OR,DSP,ST,BLUE,STD,6/PK,12PK/CS: Brand: MEDLINE

## (undated) DEVICE — CANISTER NEG PRSS 1000ML W/ GEL INFOVAC

## (undated) DEVICE — SOLUTION IV IRRIG WATER 1000ML POUR BRL 2F7114

## (undated) DEVICE — SOLUTION IV IRRIG POUR BRL 0.9% SODIUM CHL 2F7124

## (undated) DEVICE — SURGICAL PROCEDURE PACK BASIC

## (undated) DEVICE — BANDAGE,GAUZE,4.5"X4.1YD,STERILE,LF: Brand: MEDLINE

## (undated) DEVICE — TOTAL KNEE PK

## (undated) DEVICE — SYRINGE MED 10ML LUERLOCK TIP W/O SFTY DISP

## (undated) DEVICE — GAUZE,SPONGE,4"X4",16PLY,XRAY,STRL,LF: Brand: MEDLINE

## (undated) DEVICE — SET ORTHO STD STORTSTD1

## (undated) DEVICE — KIT NEG PRSS SM W2.95XH1.26XL3.94IN BLK HYDROPHOBIC FOAM W/